# Patient Record
Sex: FEMALE | Race: WHITE | NOT HISPANIC OR LATINO | Employment: UNEMPLOYED | URBAN - METROPOLITAN AREA
[De-identification: names, ages, dates, MRNs, and addresses within clinical notes are randomized per-mention and may not be internally consistent; named-entity substitution may affect disease eponyms.]

---

## 2017-01-05 ENCOUNTER — PATIENT MESSAGE (OUTPATIENT)
Dept: ORTHOPEDICS | Facility: CLINIC | Age: 37
End: 2017-01-05

## 2017-01-05 ENCOUNTER — SURGERY (OUTPATIENT)
Age: 37
End: 2017-01-05

## 2017-01-05 RX ORDER — OXYCODONE AND ACETAMINOPHEN 10; 325 MG/1; MG/1
1 TABLET ORAL EVERY 4 HOURS PRN
Qty: 40 TABLET | Refills: 0 | Status: SHIPPED | OUTPATIENT
Start: 2017-01-05 | End: 2017-02-03 | Stop reason: SDUPTHER

## 2017-01-05 RX ADMIN — BUPIVACAINE HYDROCHLORIDE 6 ML: 2.5 INJECTION, SOLUTION EPIDURAL; INFILTRATION; INTRACAUDAL at 02:01

## 2017-01-05 RX ADMIN — FENTANYL CITRATE 25 MCG: 50 INJECTION, SOLUTION INTRAMUSCULAR; INTRAVENOUS at 02:01

## 2017-01-05 RX ADMIN — MIDAZOLAM HYDROCHLORIDE 2 MG: 2 INJECTION, SOLUTION INTRAMUSCULAR; INTRAVENOUS at 02:01

## 2017-01-05 RX ADMIN — LIDOCAINE HYDROCHLORIDE 10 ML: 10 INJECTION INFILTRATION; PERINEURAL at 02:01

## 2017-01-05 RX ADMIN — FENTANYL CITRATE 75 MCG: 50 INJECTION, SOLUTION INTRAMUSCULAR; INTRAVENOUS at 02:01

## 2017-01-05 RX ADMIN — DEXAMETHASONE SODIUM PHOSPHATE 10 MG: 10 INJECTION INTRAMUSCULAR; INTRAVENOUS at 02:01

## 2017-01-05 RX ADMIN — LIDOCAINE HYDROCHLORIDE 6 ML: 20 INJECTION, SOLUTION EPIDURAL; INFILTRATION; INTRACAUDAL; PERINEURAL at 02:01

## 2017-01-05 NOTE — TELEPHONE ENCOUNTER
Patient is requesting a refill on meds. Last refill was 12/15/16 for Percocet 10/325mg tabs #40 Q4

## 2017-01-06 ENCOUNTER — PATIENT MESSAGE (OUTPATIENT)
Dept: PAIN MEDICINE | Facility: CLINIC | Age: 37
End: 2017-01-06

## 2017-01-11 RX ORDER — CELECOXIB 200 MG/1
200 CAPSULE ORAL DAILY
Qty: 30 CAPSULE | Refills: 1 | Status: SHIPPED | OUTPATIENT
Start: 2017-01-11 | End: 2017-02-09 | Stop reason: SDUPTHER

## 2017-01-14 ENCOUNTER — PATIENT MESSAGE (OUTPATIENT)
Dept: NEUROLOGY | Facility: CLINIC | Age: 37
End: 2017-01-14

## 2017-01-16 ENCOUNTER — PATIENT MESSAGE (OUTPATIENT)
Dept: NEUROLOGY | Facility: CLINIC | Age: 37
End: 2017-01-16

## 2017-01-26 ENCOUNTER — OFFICE VISIT (OUTPATIENT)
Dept: ORTHOPEDICS | Facility: CLINIC | Age: 37
End: 2017-01-26
Payer: COMMERCIAL

## 2017-01-26 VITALS
DIASTOLIC BLOOD PRESSURE: 70 MMHG | SYSTOLIC BLOOD PRESSURE: 119 MMHG | HEIGHT: 72 IN | BODY MASS INDEX: 30.88 KG/M2 | WEIGHT: 228 LBS | HEART RATE: 69 BPM

## 2017-01-26 DIAGNOSIS — M62.81 QUADRICEPS WEAKNESS: Primary | ICD-10-CM

## 2017-01-26 DIAGNOSIS — M62.81 QUADRICEPS WEAKNESS: ICD-10-CM

## 2017-01-26 DIAGNOSIS — M17.12 PATELLOFEMORAL ARTHRITIS OF LEFT KNEE: Primary | ICD-10-CM

## 2017-01-26 PROCEDURE — 99999 PR PBB SHADOW E&M-EST. PATIENT-LVL III: CPT | Mod: PBBFAC,,, | Performed by: ORTHOPAEDIC SURGERY

## 2017-01-26 PROCEDURE — 99024 POSTOP FOLLOW-UP VISIT: CPT | Mod: S$GLB,,, | Performed by: ORTHOPAEDIC SURGERY

## 2017-01-29 NOTE — PROGRESS NOTES
This note was created using Dragon dictation software.  It occasionally misinterpreted phrases or words.      Date of surgery: November 22, 2016    Chief complaint: Left knee pain    History of present illness: 36-year-old female who underwent left knee arthroscopy with patellar chondroplasty and a partial lateral meniscectomy.  Patient had fairly significant patellar arthritic changes already that are likely posttraumatic in nature.  Pain as a 6 out of 10.  Injected and help some.  Still having a fair amount of pain.    Physical exam: Examination left knee shows healed surgical portals.  No erythema or drainage.  Patient has full range of motion.  Mild crepitus of the patella.  No calf pain.  Negative Homans sign.  Patient has a moderate amount of quadriceps atrophy.    X-rays: None    Assessment: Status post left patellar chondroplasty    Plan: Patient has moderate patellar arthritis already, even at her young age.  I think the patient would be a good candidate for hyaluronic acid.  She just finished a Synvisc series in September.  She has had one previously with good success.  I recommended a one time physical therapy session for some quad strengthening exercises.  Patient has a fair amount of quadriceps atrophy in her patellar femoral arthritis pressures could be reduced by increasing her quad.

## 2017-02-03 RX ORDER — OXYCODONE AND ACETAMINOPHEN 10; 325 MG/1; MG/1
1 TABLET ORAL EVERY 4 HOURS PRN
Qty: 40 TABLET | Refills: 0 | Status: SHIPPED | OUTPATIENT
Start: 2017-02-03 | End: 2017-02-16

## 2017-02-09 ENCOUNTER — PATIENT MESSAGE (OUTPATIENT)
Dept: PAIN MEDICINE | Facility: CLINIC | Age: 37
End: 2017-02-09

## 2017-02-09 RX ORDER — CELECOXIB 200 MG/1
200 CAPSULE ORAL DAILY
Qty: 30 CAPSULE | Refills: 1 | Status: SHIPPED | OUTPATIENT
Start: 2017-02-09 | End: 2017-04-10 | Stop reason: SDUPTHER

## 2017-02-09 RX ORDER — CYCLOBENZAPRINE HCL 10 MG
10 TABLET ORAL 3 TIMES DAILY PRN
Qty: 90 TABLET | Refills: 1 | Status: SHIPPED | OUTPATIENT
Start: 2017-02-09 | End: 2017-04-10 | Stop reason: SDUPTHER

## 2017-02-09 RX ORDER — IBUPROFEN 800 MG/1
800 TABLET ORAL 3 TIMES DAILY
Qty: 90 TABLET | Refills: 1 | OUTPATIENT
Start: 2017-02-09

## 2017-02-15 ENCOUNTER — CLINICAL SUPPORT (OUTPATIENT)
Dept: REHABILITATION | Facility: HOSPITAL | Age: 37
End: 2017-02-15
Attending: ORTHOPAEDIC SURGERY
Payer: COMMERCIAL

## 2017-02-15 DIAGNOSIS — M62.81 QUADRICEPS WEAKNESS: Primary | ICD-10-CM

## 2017-02-15 PROCEDURE — G8978 MOBILITY CURRENT STATUS: HCPCS | Mod: CM,PN

## 2017-02-15 PROCEDURE — G8979 MOBILITY GOAL STATUS: HCPCS | Mod: CK,PN

## 2017-02-15 PROCEDURE — 97162 PT EVAL MOD COMPLEX 30 MIN: CPT | Mod: PN
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
## 2017-02-16 ENCOUNTER — OFFICE VISIT (OUTPATIENT)
Dept: PAIN MEDICINE | Facility: CLINIC | Age: 37
End: 2017-02-16
Payer: COMMERCIAL

## 2017-02-16 ENCOUNTER — APPOINTMENT (OUTPATIENT)
Dept: LAB | Facility: HOSPITAL | Age: 37
End: 2017-02-16
Attending: ANESTHESIOLOGY
Payer: COMMERCIAL

## 2017-02-16 VITALS
SYSTOLIC BLOOD PRESSURE: 100 MMHG | HEART RATE: 70 BPM | WEIGHT: 227.94 LBS | BODY MASS INDEX: 30.87 KG/M2 | HEIGHT: 72 IN | DIASTOLIC BLOOD PRESSURE: 71 MMHG

## 2017-02-16 DIAGNOSIS — M47.816 FACET ARTHROPATHY, LUMBAR: ICD-10-CM

## 2017-02-16 DIAGNOSIS — M17.12 OSTEOARTHRITIS OF LEFT KNEE, UNSPECIFIED OSTEOARTHRITIS TYPE: ICD-10-CM

## 2017-02-16 DIAGNOSIS — M51.36 DDD (DEGENERATIVE DISC DISEASE), LUMBAR: ICD-10-CM

## 2017-02-16 DIAGNOSIS — F11.90 CHRONIC, CONTINUOUS USE OF OPIOIDS: Primary | ICD-10-CM

## 2017-02-16 LAB
AMPHET+METHAMPHET UR QL: NEGATIVE
BARBITURATES UR QL SCN>200 NG/ML: NEGATIVE
BENZODIAZ UR QL SCN>200 NG/ML: NEGATIVE
BZE UR QL SCN: NEGATIVE
CANNABINOIDS UR QL SCN: NEGATIVE
CREAT UR-MCNC: 47 MG/DL
METHADONE UR QL SCN>300 NG/ML: NEGATIVE
OPIATES UR QL SCN: NEGATIVE
PCP UR QL SCN>25 NG/ML: NEGATIVE
TOXICOLOGY INFORMATION: NORMAL

## 2017-02-16 PROCEDURE — 82570 ASSAY OF URINE CREATININE: CPT

## 2017-02-16 PROCEDURE — 80365 DRUG SCREENING OXYCODONE: CPT

## 2017-02-16 PROCEDURE — 99999 PR PBB SHADOW E&M-EST. PATIENT-LVL IV: CPT | Mod: PBBFAC,,, | Performed by: PHYSICIAN ASSISTANT

## 2017-02-16 PROCEDURE — 99214 OFFICE O/P EST MOD 30 MIN: CPT | Mod: S$GLB,,, | Performed by: PHYSICIAN ASSISTANT

## 2017-02-16 RX ORDER — HYDROCODONE BITARTRATE AND ACETAMINOPHEN 10; 325 MG/1; MG/1
1 TABLET ORAL EVERY 8 HOURS PRN
Qty: 90 TABLET | Refills: 0 | Status: SHIPPED | OUTPATIENT
Start: 2017-02-16 | End: 2017-03-17

## 2017-02-16 RX ORDER — HYDROCODONE BITARTRATE AND ACETAMINOPHEN 10; 325 MG/1; MG/1
1 TABLET ORAL EVERY 8 HOURS PRN
Qty: 90 TABLET | Refills: 0 | Status: SHIPPED | OUTPATIENT
Start: 2017-03-17 | End: 2017-04-13
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
## 2017-02-17 ENCOUNTER — PATIENT MESSAGE (OUTPATIENT)
Dept: PAIN MEDICINE | Facility: CLINIC | Age: 37
End: 2017-02-17

## 2017-02-19 LAB
CODEINE UR-MCNC: NEGATIVE NG/ML
CODEINE UR-MCNC: NEGATIVE NG/ML
HYDROCODONE UR-MCNC: NEGATIVE NG/ML
HYDROMORPHONE UR-MCNC: NEGATIVE NG/ML
MORPHINE UR-MCNC: NEGATIVE NG/ML
NALOXONE BY LS MS/MS: NEGATIVE NG/ML
NORHYDROCODONE BY LC MS/MS: NEGATIVE NG/ML
NOROXYCODONE BY LC-MS/MS: 1626 NG/ML
NOROXYMORPHONE BY LC-MS/MS: NEGATIVE NG/ML
OXYCODONE UR-MCNC: 1127 NG/ML
OXYCODONE UR-MCNC: 28 NG/ML
TOXICOLOGIST REVIEW: NORMAL

## 2017-02-21 ENCOUNTER — CLINICAL SUPPORT (OUTPATIENT)
Dept: REHABILITATION | Facility: HOSPITAL | Age: 37
End: 2017-02-21
Attending: ORTHOPAEDIC SURGERY
Payer: COMMERCIAL

## 2017-02-21 DIAGNOSIS — M62.81 QUADRICEPS WEAKNESS: ICD-10-CM

## 2017-02-21 DIAGNOSIS — M17.12 PATELLOFEMORAL ARTHRITIS OF LEFT KNEE: ICD-10-CM

## 2017-02-21 PROCEDURE — 97110 THERAPEUTIC EXERCISES: CPT | Mod: PN

## 2017-02-21 NOTE — PROGRESS NOTES
Name: Aleyda Rosario [facility] Number: [id number]  Date of Treatment: 02/21/2017   Diagnosis:   Encounter Diagnoses   Name Primary?    Quadriceps weakness     Patellofemoral arthritis of left knee        Time in: 1100  Time Out: 1200  Total Treatment Time: 60        Subjective:    Aleyda requesting to get a copy of exercises to do at home. Pt stated that due to financial reasons she is unable to come 2 days/week.   Patient reports their pain to be n/a/10 on a 0-10 scale with 0 being no pain and 10 being the worst pain imaginable.    Objective    Patient received individual therapy to increase strength, endurance, ROM and flexibility with 0 patients with activities as follows:     Aleyda received therapeutic exercises to develop strength, endurance, ROM, flexibility, posture and core stabilization for 60 minutes including:       nustep x 15 min L-2  Hamstring stretch3 x 30 sec  Piriformis stretch 3 x 30 sec  LAQ 2# 3 x 10 reps  Quad sets x 30 reps  SAQ 0# L, 2# R x 30 reps  S/L abd 2# R, 0# L 2 x 10 reps  S/L add 2# r, 0# l 2 X 10 Reps  Ball squeezes x 30 reps      Written Home Exercises Provided: Pt given a copy of exercises and stretches for HEP.  Discussed each exercises with patient.  Pt demonstrated each exercises and technique corrected as needed      Pt demo good understanding of the education provided. Aleyda demonstrated good return demonstration of activities.     Assessment:     Pt did well with exercises today.  Demonstrated good technique.  Expressed importance for patient to come in next week x 1 treatment to make sure4 proper technique with exercises.    Pt will continue to benefit from skilled PT intervention. Medical Necessity is demonstrated by:  Pain limits function of effected part for some activities, Unable to participate fully in daily activities, Requires skilled supervision to complete and progress HEP and Weakness.    Patient is making good progress towards established  goals.          Plan:  Continue with established Plan of Care towards PT goals.

## 2017-03-08 ENCOUNTER — PROCEDURE VISIT (OUTPATIENT)
Dept: NEUROLOGY | Facility: CLINIC | Age: 37
End: 2017-03-08
Payer: COMMERCIAL

## 2017-03-08 VITALS
HEIGHT: 71 IN | RESPIRATION RATE: 20 BRPM | DIASTOLIC BLOOD PRESSURE: 87 MMHG | TEMPERATURE: 98 F | WEIGHT: 224.88 LBS | BODY MASS INDEX: 31.48 KG/M2 | HEART RATE: 77 BPM | SYSTOLIC BLOOD PRESSURE: 132 MMHG

## 2017-03-08 DIAGNOSIS — G43.719 CHRONIC MIGRAINE WITHOUT AURA, WITH INTRACTABLE MIGRAINE, SO STATED, WITHOUT MENTION OF STATUS MIGRAINOSUS: Primary | ICD-10-CM

## 2017-03-08 PROCEDURE — 64615 CHEMODENERV MUSC MIGRAINE: CPT | Mod: S$GLB,,, | Performed by: PSYCHIATRY & NEUROLOGY

## 2017-03-08 NOTE — PROCEDURES
Procedures     BOTOX PROCEDURE    PROCEDURE PERFORMED: Botulinum toxin injection (14893)    CLINICAL INDICATION: G43.719  NDC: 3459-2332-92    A time out was conducted just before the start of the procedure to verify the correct patient and procedure, procedure location, and all relevant critical information.       This is the third Botox injections and I am aiming for at least 50%  improvement in the patient's symptoms. She let it lapse and this is likely why the last treatment was not as effective. Frequency of treatment is every 3 months unless no response to the treatments, at which time we will discontinue the injections.     DESCRIPTION OF PROCEDURE: After obtaining informed consent and under aseptic technique, a total of 155 units of botulinum toxin type A were injected in the following muscles: Procerus 5 units,  5 units bilaterally, frontalis 20 units, temporalis 20 units bilaterally, occipitalis 15 units, upper cervical paraspinals 10 units bilaterally and trapezius 15 units bilaterally. The patient was given a total of 155 units in 31 sites.The patient tolerated the procedure well. There were no complications. The patient was given a prescription for repeat treatment in 3 months.     Unavoidable waste 45 units    Madisyn Rojas M.D  Medical Director, Headache and Facial Pain  [facility]

## 2017-03-08 NOTE — MR AVS SNAPSHOT
[facility]  [address] [facility] [place] [address]  Phone: [phone]  Fax: [phone]                  Aleyda Plasencia   3/8/2017 10:30 AM   Procedure visit    Description:  Female : 1980   Provider:  Lola Rojas MD   Department:  [facility]           Reason for Visit     Botulinum Toxin Injection           Diagnoses this Visit        Comments    Chronic migraine without aura, with intractable migraine, so stated, without mention of status migrainosus    -  Primary            To Do List           Future Appointments        Provider Department Dept Phone    3/14/2017 10:00 AM Arin Jose PTA [facility] [phone]    3/16/2017 10:00 AM Trupti Gray PTA [facility] [phone]    3/21/2017 10:00 AM Arin Jose PTA [facility] [phone]    3/23/2017 10:00 AM Trupti Gray PTA [facility] [phone]    3/28/2017 10:00 AM Bridgett Salgado, PT [facility] [phone]      Goals (5 Years of Data)     None      [facility]     [facility] - 24/7 Assistance  Registered nurses in the [facility] provide clinical advisement, health education, appointment booking, and other advisory services.  Call for this free service at [phone].             Medications           Message regarding Medications     Verify the changes and/or additions to your medication regime listed below are the same as discussed with your clinician today.  If any of these changes or additions are incorrect, please notify your healthcare provider.        These medications were administered today        Dose Freq    onabotulinumtoxina injection 200 Units 200 Units Once    Sig: Inject 200 Units into the muscle once.    Class: Normal    Route: Intramuscular           Verify that the below list of medications is an accurate representation of the medications  you are currently taking.  If none reported, the list may be blank. If incorrect, please contact your healthcare provider. Carry this list with you in case of emergency.           Current Medications     amitriptyline (ELAVIL) 25 MG tablet 25 mg PO every hs    celecoxib (CELEBREX) 200 MG capsule Take 1 capsule (200 mg total) by mouth once daily.    cetirizine (ZYRTEC) 5 MG tablet Take 5 mg by mouth once daily.    cholecalciferol, vitamin D3, 50,000 unit capsule Take 50,000 Units by mouth once a week.     cyanocobalamin 1,000 mcg/mL injection Inject 1,000 mcg into the skin. Twice monthly    cyanocobalamin, vitamin B-12, 1,000 mcg/mL Kit Inject 1 mL into the muscle every 21 days.    cyclobenzaprine (FLEXERIL) 10 MG tablet Take 1 tablet (10 mg total) by mouth 3 (three) times daily as needed for Muscle spasms.    diclofenac sodium 1 % Gel daily as needed.     epinephrine (EPIPEN) 0.3 mg/0.3 mL (1:1,000) AtIn 0.3 mg daily as needed.     ferrous sulfate 325 mg (65 mg iron) Tab tablet Take 325 mg by mouth once daily.     fluoxetine (PROZAC) 20 MG capsule Take 20 mg by mouth once daily.     hydrocodone-acetaminophen 10-325mg (NORCO)  mg Tab Take 1 tablet by mouth every 8 (eight) hours as needed for Pain.    hydrocodone-acetaminophen 10-325mg (NORCO)  mg Tab Starting on Mar 17, 2017. Take 1 tablet by mouth every 8 (eight) hours as needed for Pain.    hydrOXYzine pamoate (VISTARIL) 25 MG Cap Take 25 mg by mouth every 4 (four) hours as needed.     ibuprofen (ADVIL,MOTRIN) 800 MG tablet every 6 (six) hours as needed.     isometheptene-apap-dichloralphenazone 650-21-346ht (MIDRIN) -325 mg per capsule Take 1 capsule by mouth 4 (four) times daily as needed.    ketorolac (TORADOL) 60 mg/2 mL Soln Please provide patients with IV syringes    lorazepam (ATIVAN) 0.5 MG tablet Take 0.5 mg by mouth every 4 (four) hours as needed.     potassium chloride SA (K-DUR,KLOR-CON) 20 MEQ tablet Take 20 mEq by mouth once daily.  "   promethazine (PHENERGAN) 25 MG tablet Take 1 tablet by mouth every 6 (six) hours as needed (migraine). -MAY CAUSE DROWSINESS-    pseudoephedrine-DM-guaifenesin 45- mg Tab Take 1 tablet by mouth daily as needed.     sumatriptan succinate 4 mg/0.5 mL PnIj Inject 4 mg into the skin daily as needed. imitrex    VOLTAREN 1 % Gel daily as needed.            Clinical Reference Information           Your Vitals Were     BP Pulse Temp Resp Height Weight    132/87 (BP Location: Left arm, Patient Position: Sitting, BP Method: Automatic) 77 97.5 °F (36.4 °C) (Oral) 20 5' 11" (1.803 m) 102 kg (224 lb 13.9 oz)    BMI                31.36 kg/m2          Blood Pressure          Most Recent Value    BP  132/87      Allergies as of 3/8/2017     Clarithromycin    Pcn [Penicillins]    Sulfa (Sulfonamide Antibiotics)    Wellbutrin [Bupropion Hcl]    Betadine [Povidone-iodine]    Cefprozil    Iodinated Contrast Media - Iv Dye    Latex, Natural Rubber    Sulfamethoxazole-trimethoprim    Iodine    Latex      Immunizations Administered on Date of Encounter - 3/8/2017     None      Administrations This Visit     onabotulinumtoxina injection 200 Units     Admin Date Action Dose Route Administered By             03/08/2017 Given 200 Units Intramuscular Lola Rojas MD                      Smoking Cessation     If you would like to quit smoking:   You may be eligible for free services if you are a [place] resident and started smoking cigarettes before September 1, 1988.  Call the Smoking Cessation Trust ([facility]) toll free at ([phone] [phone] or [phone] [phone].   Call 1-800-QUIT-NOW if you do not meet the above criteria.            Language Assistance Services     ATTENTION: Language assistance services are available, free of charge. Please call [phone].      ATENCIÓN: Si narciso toscano, tiene a garcia disposición servicios gratuitos de asistencia lingüística. Llame al [phone].     CHÚ Ý: N?u b?n nói Ti?ng Vi?t, có các " d?ch v? h? tr? ngôn ng? mi?n phí melissah cho b?n. G?i s? [phone].         [facility] complies with applicable Federal civil rights laws and does not discriminate on the basis of race, color, national origin, age, disability, or sex.

## 2017-03-26 ENCOUNTER — PATIENT MESSAGE (OUTPATIENT)
Dept: ORTHOPEDICS | Facility: CLINIC | Age: 37
End: 2017-03-26

## 2017-04-06 ENCOUNTER — TELEPHONE (OUTPATIENT)
Dept: ORTHOPEDICS | Facility: CLINIC | Age: 37
End: 2017-04-06

## 2017-04-10 RX ORDER — CYCLOBENZAPRINE HCL 10 MG
TABLET ORAL
Qty: 90 TABLET | Refills: 0 | Status: SHIPPED | OUTPATIENT
Start: 2017-04-10 | End: 2017-05-03 | Stop reason: SDUPTHER

## 2017-04-10 RX ORDER — CELECOXIB 200 MG/1
CAPSULE ORAL
Qty: 30 CAPSULE | Refills: 0 | Status: SHIPPED | OUTPATIENT
Start: 2017-04-10 | End: 2017-05-03 | Stop reason: SDUPTHER

## 2017-04-13 ENCOUNTER — PATIENT MESSAGE (OUTPATIENT)
Dept: ORTHOPEDICS | Facility: CLINIC | Age: 37
End: 2017-04-13

## 2017-04-13 ENCOUNTER — OFFICE VISIT (OUTPATIENT)
Dept: ORTHOPEDICS | Facility: CLINIC | Age: 37
End: 2017-04-13
Payer: COMMERCIAL

## 2017-04-13 ENCOUNTER — OFFICE VISIT (OUTPATIENT)
Dept: PAIN MEDICINE | Facility: CLINIC | Age: 37
End: 2017-04-13
Payer: COMMERCIAL

## 2017-04-13 VITALS
WEIGHT: 224.88 LBS | HEART RATE: 61 BPM | SYSTOLIC BLOOD PRESSURE: 106 MMHG | BODY MASS INDEX: 30.46 KG/M2 | DIASTOLIC BLOOD PRESSURE: 68 MMHG | HEIGHT: 72 IN

## 2017-04-13 VITALS — WEIGHT: 224 LBS | HEIGHT: 72 IN | BODY MASS INDEX: 30.34 KG/M2

## 2017-04-13 DIAGNOSIS — M25.562 LEFT KNEE PAIN, UNSPECIFIED CHRONICITY: ICD-10-CM

## 2017-04-13 DIAGNOSIS — M17.12 PATELLOFEMORAL ARTHRITIS OF LEFT KNEE: Primary | ICD-10-CM

## 2017-04-13 DIAGNOSIS — M47.816 FACET ARTHROPATHY, LUMBAR: ICD-10-CM

## 2017-04-13 DIAGNOSIS — M51.36 DDD (DEGENERATIVE DISC DISEASE), LUMBAR: Primary | ICD-10-CM

## 2017-04-13 PROCEDURE — 99214 OFFICE O/P EST MOD 30 MIN: CPT | Mod: S$GLB,,, | Performed by: ANESTHESIOLOGY

## 2017-04-13 PROCEDURE — 1160F RVW MEDS BY RX/DR IN RCRD: CPT | Mod: S$GLB,,, | Performed by: ANESTHESIOLOGY

## 2017-04-13 PROCEDURE — 20610 DRAIN/INJ JOINT/BURSA W/O US: CPT | Mod: LT,S$GLB,, | Performed by: ORTHOPAEDIC SURGERY

## 2017-04-13 PROCEDURE — 99499 UNLISTED E&M SERVICE: CPT | Mod: S$GLB,,, | Performed by: ORTHOPAEDIC SURGERY

## 2017-04-13 PROCEDURE — 99999 PR PBB SHADOW E&M-EST. PATIENT-LVL III: CPT | Mod: PBBFAC,,, | Performed by: ANESTHESIOLOGY

## 2017-04-13 PROCEDURE — 99999 PR PBB SHADOW E&M-EST. PATIENT-LVL II: CPT | Mod: PBBFAC,,, | Performed by: ORTHOPAEDIC SURGERY

## 2017-04-13 RX ORDER — HYDROCODONE BITARTRATE AND ACETAMINOPHEN 10; 325 MG/1; MG/1
1 TABLET ORAL EVERY 12 HOURS PRN
Qty: 60 TABLET | Refills: 0 | Status: SHIPPED | OUTPATIENT
Start: 2017-04-13 | End: 2017-05-11 | Stop reason: SDUPTHER

## 2017-04-13 RX ORDER — AMITRIPTYLINE HYDROCHLORIDE 25 MG/1
TABLET, FILM COATED ORAL
Status: ON HOLD | COMMUNITY
Start: 2017-04-10 | End: 2020-10-06

## 2017-04-13 RX ORDER — HYDROXYZINE PAMOATE 25 MG/1
25 CAPSULE ORAL EVERY 8 HOURS PRN
COMMUNITY
Start: 2017-04-03

## 2017-04-13 NOTE — PROCEDURES
Large Joint Aspiration/Injection  Date/Time: 4/13/2017 12:46 PM  Performed by: PARIS KAY  Authorized by: PARIS KAY     Consent Done?:  Yes (Verbal)  Indications:  Pain  Procedure site marked: Yes    Timeout: Prior to procedure the correct patient, procedure, and site was verified      Location:  Knee  Site:  L knee  Prep: Patient was prepped and draped in usual sterile fashion    Needle size:  20 G  Approach:  Anterolateral  Medications:  16 mg hyaluronate 16 mg/2 mL  Patient tolerance:  Patient tolerated the procedure well with no immediate complications
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
## 2017-04-13 NOTE — PROGRESS NOTES
This note was created using Dragon dictation software.  It occasionally misinterpreted phrases or words.      Date of surgery: November 22, 2016    Chief complaint: Left knee pain    History of present illness: 36-year-old female who underwent left knee arthroscopy with patellar chondroplasty and a partial lateral meniscectomy.  Patient had fairly significant patellar arthritic changes already that are likely posttraumatic in nature.  Pain as a 8 out of 10.  Injected and help some.  Still having a fair amount of pain.  Having more patellar instability as well.    Physical exam: Examination left knee shows healed surgical portals.  No erythema or drainage.  Patient has full range of motion.  Mild crepitus of the patella.  No calf pain.  Negative Homans sign.  Patient has a moderate amount of quadriceps atrophy.    X-rays: None    Assessment: Status post left patellar chondroplasty  Left patellar instability    Plan: Patient has moderate patellar arthritis already, even at her young age.  Unfortunately, the patient is having more patellar instability as well.  Here for Synvisc.  I injected her with Synvisc 1 of 3.

## 2017-04-13 NOTE — MR AVS SNAPSHOT
[facility]  [address] [place] [address]  [address]  [place] [address]  Phone: [phone]                  Aleyda Plasencia   2017 9:40 AM   Office Visit    Description:  Female : 1980   Provider:  Yariel Ramirez MD   Department:  [facility]           Reason for Visit     Back Pain           Diagnoses this Visit        Comments    DDD (degenerative disc disease), lumbar    -  Primary     Facet arthropathy, lumbar         Left knee pain, unspecified chronicity                To Do List           Future Appointments        Provider Department Dept Phone    2017 1:45 PM Feliberto Cardenas MD [facility] [phone]    2017 10:20 AM MD [facility] [phone]    2017 10:00 AM Lola Rojas MD [facility] [phone]      Goals (5 Years of Data)     None       These Medications        Disp Refills Start End    hydrocodone-acetaminophen 10-325mg (NORCO)  mg Tab 60 tablet 0 2017    Take 1 tablet by mouth every 12 (twelve) hours as needed (pain). - Oral    Pharmacy: [facility] [place], [address] Ph #: [phone]         [facility]     [facility] Care [facility] -  Assistance  Unless otherwise directed by your provider, please contact [facility], our nurse care line that is available for  assistance.     Registered nurses in the [facility] provide: appointment scheduling, clinical advisement, health education, and other advisory services.  Call: [phone] (toll free)               Medications           Message regarding Medications     Verify the changes and/or additions to your medication regime listed below are the same as discussed with your clinician today.  If any of these changes or additions are incorrect, please notify your healthcare provider.        START taking these NEW medications         Refills    hydrocodone-acetaminophen 10-325mg (NORCO)  mg Tab 0    Sig: Take 1 tablet by mouth every 12 (twelve) hours as needed (pain).    Class: Print    Route: Oral           Verify that the below list of medications is an accurate representation of the medications you are currently taking.  If none reported, the list may be blank. If incorrect, please contact your healthcare provider. Carry this list with you in case of emergency.           Current Medications     amitriptyline (ELAVIL) 25 MG tablet TAKE ONE TABLET BY MOUTH EVERY NIGHT FOR 2 WEEKS THEN INCREASE TO 2 TABLETS AT BEDTIME    celecoxib (CELEBREX) 200 MG capsule TAKE ONE CAPSULE BY MOUTH EVERY DAY    cetirizine (ZYRTEC) 5 MG tablet Take 5 mg by mouth once daily.    cholecalciferol, vitamin D3, 50,000 unit capsule Take 50,000 Units by mouth once a week.     cyanocobalamin 1,000 mcg/mL injection Inject 1,000 mcg into the skin. Twice monthly    cyanocobalamin, vitamin B-12, 1,000 mcg/mL Kit Inject 1 mL into the muscle every 21 days.    cyclobenzaprine (FLEXERIL) 10 MG tablet TAKE ONE TABLET BY MOUTH 3 TIMES A DAY AS NEEDED FOR MUSCLE SPASMS    diclofenac sodium 1 % Gel daily as needed.     epinephrine (EPIPEN) 0.3 mg/0.3 mL (1:1,000) AtIn 0.3 mg daily as needed.     ferrous sulfate 325 mg (65 mg iron) Tab tablet Take 325 mg by mouth once daily.     fluoxetine (PROZAC) 20 MG capsule Take 20 mg by mouth once daily.     hydrOXYzine pamoate (VISTARIL) 25 MG Cap Take 25 mg by mouth every 4 (four) hours as needed.     hydrOXYzine pamoate (VISTARIL) 25 MG Cap TAKE ONE CAPSULE BY MOUTH 3 TIMES A DAY AS NEEDED FOR ANXIETY    ibuprofen (ADVIL,MOTRIN) 800 MG tablet every 6 (six) hours as needed.     isometheptene-apap-dichloralphenazone 282-52-634uw (MIDRIN) -325 mg per capsule Take 1 capsule by mouth 4 (four) times daily as needed.    ketorolac (TORADOL) 60 mg/2 mL Soln Please provide patients with IV syringes    lorazepam (ATIVAN) 0.5 MG tablet  Take 0.5 mg by mouth every 4 (four) hours as needed.     potassium chloride SA (K-DUR,KLOR-CON) 20 MEQ tablet Take 20 mEq by mouth once daily.    promethazine (PHENERGAN) 25 MG tablet Take 1 tablet by mouth every 6 (six) hours as needed (migraine). -MAY CAUSE DROWSINESS-    pseudoephedrine-DM-guaifenesin 45- mg Tab Take 1 tablet by mouth daily as needed.     VOLTAREN 1 % Gel daily as needed.     hydrocodone-acetaminophen 10-325mg (NORCO)  mg Tab Take 1 tablet by mouth every 12 (twelve) hours as needed (pain).    sumatriptan succinate 4 mg/0.5 mL PnIj Inject 4 mg into the skin daily as needed. imitrex           Clinical Reference Information           Your Vitals Were     BP Pulse Height Weight BMI    106/68 61 6' (1.829 m) 102 kg (224 lb 13.9 oz) 30.5 kg/m2      Blood Pressure          Most Recent Value    BP  106/68      Allergies as of 4/13/2017     Clarithromycin    Pcn [Penicillins]    Sulfa (Sulfonamide Antibiotics)    Wellbutrin [Bupropion Hcl]    Betadine [Povidone-iodine]    Cefprozil    Iodinated Contrast Media - Iv Dye    Latex, Natural Rubber    Sulfamethoxazole-trimethoprim    Iodine    Latex      Immunizations Administered on Date of Encounter - 4/13/2017     None      Smoking Cessation     If you would like to quit smoking:   You may be eligible for free services if you are a [place] resident and started smoking cigarettes before September 1, 1988.  Call the Smoking Cessation Trust ([facility]) toll free at ([phone] or [phone] [phone].   Call 1-800-QUIT-NOW if you do not meet the above criteria.   Contact us via email: [email or web address]   View our website for more information: [email or web address]        Language Assistance Services     ATTENTION: Language assistance services are available, free of charge. Please call [phone].      ATENCIÓN: Si habla español, tiene a garcia disposición servicios gratuitos de asistencia lingüística. Llame al [phone].     CHÚ Ý: N?u  b?n nói Ti?ng Vi?t, có các d?ch v? h? tr? ngôn ng? mi?n phí dành cho b?n. G?i s? [phone].         [facility] complies with applicable Federal civil rights laws and does not discriminate on the basis of race, color, national origin, age, disability, or sex.

## 2017-04-20 ENCOUNTER — OFFICE VISIT (OUTPATIENT)
Dept: ORTHOPEDICS | Facility: CLINIC | Age: 37
End: 2017-04-20
Payer: COMMERCIAL

## 2017-04-20 DIAGNOSIS — M17.12 PATELLOFEMORAL ARTHRITIS OF LEFT KNEE: Primary | ICD-10-CM

## 2017-04-20 PROCEDURE — 99999 PR PBB SHADOW E&M-EST. PATIENT-LVL II: CPT | Mod: PBBFAC,,, | Performed by: ORTHOPAEDIC SURGERY

## 2017-04-20 PROCEDURE — 20610 DRAIN/INJ JOINT/BURSA W/O US: CPT | Mod: LT,S$GLB,, | Performed by: ORTHOPAEDIC SURGERY

## 2017-04-20 PROCEDURE — 99499 UNLISTED E&M SERVICE: CPT | Mod: S$GLB,,, | Performed by: ORTHOPAEDIC SURGERY

## 2017-04-20 NOTE — PROCEDURES
Large Joint Aspiration/Injection  Date/Time: 4/20/2017 2:19 PM  Performed by: PARIS KAY  Authorized by: PARIS KAY     Consent Done?:  Yes (Verbal)  Indications:  Pain  Procedure site marked: Yes    Timeout: Prior to procedure the correct patient, procedure, and site was verified      Location:  Knee  Site:  L knee  Prep: Patient was prepped and draped in usual sterile fashion    Needle size:  20 G  Approach:  Anterolateral  Medications:  16 mg hyaluronate 16 mg/2 mL  Patient tolerance:  Patient tolerated the procedure well with no immediate complications

## 2017-04-20 NOTE — PROGRESS NOTES
This note was created using Dragon dictation software.  It occasionally misinterpreted phrases or words.      Date of surgery: November 22, 2016    Chief complaint: Left knee pain    History of present illness: 36-year-old female who underwent left knee arthroscopy with patellar chondroplasty and a partial lateral meniscectomy.  Patient had fairly significant patellar arthritic changes already that are likely posttraumatic in nature.  Pain as a 8 out of 10.  Injected and help some.  Still having a fair amount of pain.  Having more patellar instability as well.    Physical exam: Examination left knee shows healed surgical portals.  No erythema or drainage.  Patient has full range of motion.  Mild crepitus of the patella.  No calf pain.  Negative Homans sign.  Patient has a moderate amount of quadriceps atrophy.    X-rays: None    Assessment: Status post left patellar chondroplasty  Left patellar instability    Plan:   I injected her with Synvisc 2 of 3.  Talked about possibly doing an MPFL reconstruction on her a month or 2 after we finish the Synvisc.  Follow-up next week.

## 2017-04-27 ENCOUNTER — OFFICE VISIT (OUTPATIENT)
Dept: ORTHOPEDICS | Facility: CLINIC | Age: 37
End: 2017-04-27
Payer: COMMERCIAL

## 2017-04-27 DIAGNOSIS — M17.12 PATELLOFEMORAL ARTHRITIS OF LEFT KNEE: Primary | ICD-10-CM

## 2017-04-27 DIAGNOSIS — M25.362 PATELLAR INSTABILITY OF LEFT KNEE: ICD-10-CM

## 2017-04-27 PROCEDURE — 99999 PR PBB SHADOW E&M-EST. PATIENT-LVL II: CPT | Mod: PBBFAC,,, | Performed by: ORTHOPAEDIC SURGERY

## 2017-04-27 PROCEDURE — 99499 UNLISTED E&M SERVICE: CPT | Mod: S$GLB,,, | Performed by: ORTHOPAEDIC SURGERY

## 2017-04-27 PROCEDURE — 20610 DRAIN/INJ JOINT/BURSA W/O US: CPT | Mod: LT,S$GLB,, | Performed by: ORTHOPAEDIC SURGERY

## 2017-04-27 NOTE — PROCEDURES
Large Joint Aspiration/Injection  Date/Time: 4/27/2017 1:20 PM  Performed by: PARIS KAY  Authorized by: PARIS KAY     Consent Done?:  Yes (Verbal)  Indications:  Pain  Procedure site marked: Yes    Timeout: Prior to procedure the correct patient, procedure, and site was verified      Location:  Knee  Site:  L knee  Prep: Patient was prepped and draped in usual sterile fashion    Needle size:  20 G  Approach:  Anterolateral  Medications:  16 mg hyaluronate 16 mg/2 mL  Patient tolerance:  Patient tolerated the procedure well with no immediate complications

## 2017-04-27 NOTE — PROGRESS NOTES
This note was created using Dragon dictation software.  It occasionally misinterpreted phrases or words.      Date of surgery: November 22, 2016    Chief complaint: Left knee pain    History of present illness: 36-year-old female who underwent left knee arthroscopy with patellar chondroplasty and a partial lateral meniscectomy.  Patient had fairly significant patellar arthritic changes already that are likely posttraumatic in nature.  Pain as a 8 out of 10.  Injected and help some.  Still having a fair amount of pain.  Having more patellar instability as well.    Physical exam: Examination left knee shows healed surgical portals.  No erythema or drainage.  Patient has full range of motion.  Mild crepitus of the patella.  No calf pain.  Negative Homans sign.  Patient has a moderate amount of quadriceps atrophy.    Heart is regular rate and rhythm without abnormal murmurs rubs or gallops  Lungs are clear to auscultation bilaterally  Abdomen is soft nontender nondistended    X-rays: None    Assessment: Status post left patellar chondroplasty  Left patellar instability    Plan:   I injected her with Synvisc 3 of 3.  Talked about possibly doing an MPFL reconstruction on her in about 6 weeks.  The plan is for a left MPFL reconstruction using a semitendinosus or gracilis allograft.Risks, benefits, and alternatives to the procedure were explained to the patient including but not limited to damage to nerves, arteries, blood vessels, bones, tendons, ligaments, stiffness, instability, infection, DVT, PE, as well as general anesthetic complications including seizure, stroke, heart attack and even death. The patient understood these risks and wished to proceed and signed the informed consent.

## 2017-04-28 RX ORDER — CLINDAMYCIN PHOSPHATE 900 MG/50ML
900 INJECTION, SOLUTION INTRAVENOUS
Status: CANCELLED | OUTPATIENT
Start: 2017-04-28

## 2017-05-03 RX ORDER — CYCLOBENZAPRINE HCL 10 MG
TABLET ORAL
Qty: 90 TABLET | Refills: 0 | Status: SHIPPED | OUTPATIENT
Start: 2017-05-03 | End: 2017-06-02 | Stop reason: SDUPTHER

## 2017-05-03 RX ORDER — CELECOXIB 200 MG/1
CAPSULE ORAL
Qty: 30 CAPSULE | Refills: 0 | Status: SHIPPED | OUTPATIENT
Start: 2017-05-03 | End: 2017-06-02 | Stop reason: SDUPTHER

## 2017-05-11 ENCOUNTER — OFFICE VISIT (OUTPATIENT)
Dept: PAIN MEDICINE | Facility: CLINIC | Age: 37
End: 2017-05-11
Payer: COMMERCIAL

## 2017-05-11 VITALS
DIASTOLIC BLOOD PRESSURE: 72 MMHG | HEART RATE: 66 BPM | HEIGHT: 72 IN | WEIGHT: 232 LBS | SYSTOLIC BLOOD PRESSURE: 104 MMHG | BODY MASS INDEX: 31.42 KG/M2

## 2017-05-11 DIAGNOSIS — M51.36 DDD (DEGENERATIVE DISC DISEASE), LUMBAR: ICD-10-CM

## 2017-05-11 DIAGNOSIS — M47.816 FACET ARTHROPATHY, LUMBAR: Primary | ICD-10-CM

## 2017-05-11 DIAGNOSIS — M25.562 LEFT KNEE PAIN, UNSPECIFIED CHRONICITY: ICD-10-CM

## 2017-05-11 PROCEDURE — 99999 PR PBB SHADOW E&M-EST. PATIENT-LVL III: CPT | Mod: PBBFAC,,, | Performed by: ANESTHESIOLOGY

## 2017-05-11 PROCEDURE — 1160F RVW MEDS BY RX/DR IN RCRD: CPT | Mod: S$GLB,,, | Performed by: ANESTHESIOLOGY

## 2017-05-11 PROCEDURE — 99213 OFFICE O/P EST LOW 20 MIN: CPT | Mod: S$GLB,,, | Performed by: ANESTHESIOLOGY

## 2017-05-11 RX ORDER — FLUOXETINE HYDROCHLORIDE 40 MG/1
40 CAPSULE ORAL NIGHTLY
Refills: 3 | COMMUNITY
Start: 2017-04-23 | End: 2020-11-24 | Stop reason: SDUPTHER

## 2017-05-11 RX ORDER — HYDROCODONE BITARTRATE AND ACETAMINOPHEN 10; 325 MG/1; MG/1
1 TABLET ORAL EVERY 12 HOURS PRN
Qty: 30 TABLET | Refills: 0 | Status: SHIPPED | OUTPATIENT
Start: 2017-05-11 | End: 2017-06-02 | Stop reason: SDUPTHER
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
## 2017-05-17 ENCOUNTER — PATIENT MESSAGE (OUTPATIENT)
Dept: NEUROLOGY | Facility: CLINIC | Age: 37
End: 2017-05-17

## 2017-05-17 RX ORDER — KETOROLAC TROMETHAMINE 30 MG/ML
INJECTION, SOLUTION INTRAMUSCULAR; INTRAVENOUS
Qty: 10 ML | Refills: 3 | Status: SHIPPED | OUTPATIENT
Start: 2017-05-17 | End: 2017-09-07 | Stop reason: SDUPTHER

## 2017-05-28 ENCOUNTER — PATIENT MESSAGE (OUTPATIENT)
Dept: SURGERY | Facility: HOSPITAL | Age: 37
End: 2017-05-28

## 2017-06-02 ENCOUNTER — PROCEDURE VISIT (OUTPATIENT)
Dept: NEUROLOGY | Facility: CLINIC | Age: 37
End: 2017-06-02
Payer: COMMERCIAL

## 2017-06-02 ENCOUNTER — PATIENT MESSAGE (OUTPATIENT)
Dept: PAIN MEDICINE | Facility: CLINIC | Age: 37
End: 2017-06-02

## 2017-06-02 VITALS
BODY MASS INDEX: 31.36 KG/M2 | HEART RATE: 78 BPM | DIASTOLIC BLOOD PRESSURE: 73 MMHG | RESPIRATION RATE: 18 BRPM | HEIGHT: 72 IN | WEIGHT: 231.5 LBS | SYSTOLIC BLOOD PRESSURE: 113 MMHG | TEMPERATURE: 98 F

## 2017-06-02 DIAGNOSIS — M47.816 FACET ARTHROPATHY, LUMBAR: ICD-10-CM

## 2017-06-02 DIAGNOSIS — G43.719 CHRONIC MIGRAINE WITHOUT AURA, WITH INTRACTABLE MIGRAINE, SO STATED, WITHOUT MENTION OF STATUS MIGRAINOSUS: Primary | ICD-10-CM

## 2017-06-02 DIAGNOSIS — M25.562 LEFT KNEE PAIN, UNSPECIFIED CHRONICITY: ICD-10-CM

## 2017-06-02 DIAGNOSIS — M51.36 DDD (DEGENERATIVE DISC DISEASE), LUMBAR: ICD-10-CM

## 2017-06-02 PROCEDURE — 64615 CHEMODENERV MUSC MIGRAINE: CPT | Mod: S$GLB,,, | Performed by: PSYCHIATRY & NEUROLOGY

## 2017-06-02 RX ORDER — CYCLOBENZAPRINE HCL 10 MG
TABLET ORAL
Qty: 90 TABLET | Refills: 0 | Status: SHIPPED | OUTPATIENT
Start: 2017-06-02 | End: 2017-07-03 | Stop reason: SDUPTHER

## 2017-06-02 RX ORDER — CYCLOBENZAPRINE HCL 10 MG
10 TABLET ORAL 3 TIMES DAILY
Qty: 90 TABLET | Refills: 0 | Status: SHIPPED | OUTPATIENT
Start: 2017-06-02 | End: 2017-06-02 | Stop reason: SDUPTHER

## 2017-06-02 RX ORDER — HYDROCODONE BITARTRATE AND ACETAMINOPHEN 10; 325 MG/1; MG/1
1 TABLET ORAL
Qty: 30 TABLET | Refills: 0 | Status: SHIPPED | OUTPATIENT
Start: 2017-06-02 | End: 2017-07-02

## 2017-06-02 RX ORDER — CELECOXIB 200 MG/1
200 CAPSULE ORAL DAILY
Qty: 30 CAPSULE | Refills: 0 | Status: SHIPPED | OUTPATIENT
Start: 2017-06-02 | End: 2017-06-02 | Stop reason: SDUPTHER

## 2017-06-02 RX ORDER — CELECOXIB 200 MG/1
CAPSULE ORAL
Qty: 30 CAPSULE | Refills: 0 | Status: SHIPPED | OUTPATIENT
Start: 2017-06-02 | End: 2017-07-03 | Stop reason: SDUPTHER

## 2017-06-02 NOTE — PROCEDURES
Procedures     BOTOX PROCEDURE    PROCEDURE PERFORMED: Botulinum toxin injection (03490)    CLINICAL INDICATION: G43.719  NDC: 0152-5730-49    A time out was conducted just before the start of the procedure to verify the correct patient and procedure, procedure location, and all relevant critical information.       This is the forth Botox injections and I am aiming for at least 50%  improvement in the patient's symptoms. She let it lapse and this is likely why the last treatment was not as effective. Frequency of treatment is every 3 months unless no response to the treatments, at which time we will discontinue the injections.     DESCRIPTION OF PROCEDURE: After obtaining informed consent and under aseptic technique, a total of 155 units of botulinum toxin type A were injected in the following muscles: Procerus 5 units,  5 units bilaterally, frontalis 20 units, temporalis 20 units bilaterally, occipitalis 15 units, upper cervical paraspinals 10 units bilaterally and trapezius 15 units bilaterally. The patient was given a total of 155 units in 31 sites.The patient tolerated the procedure well. There were no complications. The patient was given a prescription for repeat treatment in 3 months.     Unavoidable waste 45 units    Madisyn Rojas M.D  Medical Director, Headache and Facial Pain  [facility]

## 2017-07-03 RX ORDER — CELECOXIB 200 MG/1
CAPSULE ORAL
Qty: 30 CAPSULE | Refills: 0 | Status: SHIPPED | OUTPATIENT
Start: 2017-07-03 | End: 2017-08-01 | Stop reason: SDUPTHER

## 2017-07-03 RX ORDER — CYCLOBENZAPRINE HCL 10 MG
TABLET ORAL
Qty: 90 TABLET | Refills: 0 | Status: SHIPPED | OUTPATIENT
Start: 2017-07-03 | End: 2017-08-01 | Stop reason: SDUPTHER

## 2017-08-01 RX ORDER — CELECOXIB 200 MG/1
CAPSULE ORAL
Qty: 30 CAPSULE | Refills: 0 | Status: SHIPPED | OUTPATIENT
Start: 2017-08-01 | End: 2017-09-21 | Stop reason: SDUPTHER

## 2017-08-01 RX ORDER — CYCLOBENZAPRINE HCL 10 MG
TABLET ORAL
Qty: 90 TABLET | Refills: 0 | Status: SHIPPED | OUTPATIENT
Start: 2017-08-01 | End: 2017-09-21 | Stop reason: SDUPTHER

## 2017-09-05 ENCOUNTER — TELEPHONE (OUTPATIENT)
Dept: ORTHOPEDICS | Facility: CLINIC | Age: 37
End: 2017-09-05

## 2017-09-05 NOTE — TELEPHONE ENCOUNTER
----- Message from Shannan Ayala sent at 9/5/2017 12:40 PM CDT -----  Contact: Aleyda   Patient is ready to set up surgery appointment. Please call [phone]. Thanks!

## 2017-09-07 DIAGNOSIS — G43.719 CHRONIC MIGRAINE WITHOUT AURA, WITH INTRACTABLE MIGRAINE, SO STATED, WITHOUT MENTION OF STATUS MIGRAINOSUS: Primary | ICD-10-CM

## 2017-09-07 RX ORDER — KETOROLAC TROMETHAMINE 30 MG/ML
INJECTION, SOLUTION INTRAMUSCULAR; INTRAVENOUS
Qty: 10 ML | Refills: 3 | Status: SHIPPED | OUTPATIENT
Start: 2017-09-07 | End: 2019-01-10 | Stop reason: SDUPTHER

## 2017-09-07 NOTE — TELEPHONE ENCOUNTER
----- Message from Love Jaimes sent at 9/6/2017 10:50 AM CDT -----  Contact: self  Patient needs first available appointment due to Botox. Patient also needs a refill of Tordol. Please call patient at [phone]. Thanks!

## 2017-09-08 ENCOUNTER — TELEPHONE (OUTPATIENT)
Dept: NEUROLOGY | Facility: CLINIC | Age: 37
End: 2017-09-08

## 2017-09-08 DIAGNOSIS — M25.362 PATELLAR INSTABILITY OF LEFT KNEE: Primary | ICD-10-CM

## 2017-09-08 RX ORDER — CLINDAMYCIN PHOSPHATE 900 MG/50ML
900 INJECTION, SOLUTION INTRAVENOUS
Status: CANCELLED | OUTPATIENT
Start: 2017-09-08

## 2017-09-08 NOTE — TELEPHONE ENCOUNTER
----- Message from Janie Gonzales sent at 9/8/2017 12:30 PM CDT -----  Contact: self [phone]  Please call her to reschedule her procedure on Monday due to insurance.  Thank you!

## 2017-09-15 ENCOUNTER — PROCEDURE VISIT (OUTPATIENT)
Dept: NEUROLOGY | Facility: CLINIC | Age: 37
End: 2017-09-15
Payer: COMMERCIAL

## 2017-09-15 VITALS
HEIGHT: 72 IN | BODY MASS INDEX: 31.36 KG/M2 | WEIGHT: 231.5 LBS | HEART RATE: 62 BPM | RESPIRATION RATE: 18 BRPM | DIASTOLIC BLOOD PRESSURE: 64 MMHG | SYSTOLIC BLOOD PRESSURE: 106 MMHG

## 2017-09-15 DIAGNOSIS — G43.719 CHRONIC MIGRAINE WITHOUT AURA, WITH INTRACTABLE MIGRAINE, SO STATED, WITHOUT MENTION OF STATUS MIGRAINOSUS: Primary | ICD-10-CM

## 2017-09-15 PROCEDURE — 64615 CHEMODENERV MUSC MIGRAINE: CPT | Mod: S$GLB,,, | Performed by: PSYCHIATRY & NEUROLOGY

## 2017-09-15 NOTE — PROCEDURES
Procedures   and Follow Up    The Botox injections have achieved well over 50%  improvement in the patient's symptoms. Migraines have been reduced at least 7 days per month and the number of cumulative hours suffering with headaches has been reduced at least 100 total hours per month. Today she does have a headache indicating that the Botox has worn off. Frequency of treatment is every 3 months unless no response to the treatments, at which time we will discontinue the injections.     ROS: Positive for photophobia, phonophobia, nausea, insomnia with the attacks     Past Medical History:   Diagnosis Date    Arthritis     OA    Depression     Migraine      Current Outpatient Prescriptions   Medication Sig    amitriptyline (ELAVIL) 25 MG tablet TAKE ONE TABLET BY MOUTH EVERY NIGHT FOR 2 WEEKS THEN INCREASE TO 2 TABLETS AT BEDTIME    celecoxib (CELEBREX) 200 MG capsule TAKE ONE CAPSULE BY MOUTH EVERY DAY    cetirizine (ZYRTEC) 5 MG tablet Take 5 mg by mouth once daily.    cholecalciferol, vitamin D3, 50,000 unit capsule Take 50,000 Units by mouth once a week.     cyanocobalamin 1,000 mcg/mL injection Inject 1,000 mcg into the skin. Twice monthly    cyanocobalamin, vitamin B-12, 1,000 mcg/mL Kit Inject 1 mL into the muscle every 21 days.    cyclobenzaprine (FLEXERIL) 10 MG tablet TAKE ONE TABLET BY MOUTH 3 TIMES A DAY AS NEEDED FOR MUSCLE SPASMS    diclofenac sodium 1 % Gel daily as needed.     epinephrine (EPIPEN) 0.3 mg/0.3 mL (1:1,000) AtIn 0.3 mg daily as needed.     fluoxetine (PROZAC) 20 MG capsule     hydrOXYzine pamoate (VISTARIL) 25 MG Cap Take 25 mg by mouth every 4 (four) hours as needed.     hydrOXYzine pamoate (VISTARIL) 25 MG Cap TAKE ONE CAPSULE BY MOUTH 3 TIMES A DAY AS NEEDED FOR ANXIETY    ibuprofen (ADVIL,MOTRIN) 800 MG tablet every 6 (six) hours as needed.     isometheptene-apap-dichloralphenazone [id number] (MIDRIN) -325 mg per capsule Take 1 capsule by mouth 4 (four) times  daily as needed.    ketorolac (TORADOL) 60 mg/2 mL Soln Please provide patients with IV syringes    lorazepam (ATIVAN) 0.5 MG tablet Take 0.5 mg by mouth every 4 (four) hours as needed.     potassium chloride SA (K-DUR,KLOR-CON) 20 MEQ tablet Take 20 mEq by mouth once daily.    promethazine (PHENERGAN) 25 MG tablet Take 1 tablet by mouth every 6 (six) hours as needed (migraine). -MAY CAUSE DROWSINESS-    pseudoephedrine-DM-guaifenesin 45- mg Tab Take 1 tablet by mouth daily as needed.     sumatriptan succinate 4 mg/0.5 mL PnIj Inject 4 mg into the skin daily as needed. imitrex    VOLTAREN 1 % Gel daily as needed.      Current Facility-Administered Medications   Medication    onabotulinumtoxina injection 200 Units    [COMPLETED] onabotulinumtoxina injection 200 Units     Review of patient's allergies indicates:   Allergen Reactions    Clarithromycin Swelling and Other (See Comments)     DIFFICULTY SWALLOWING  DIFFICULTY SWALLOWING    Pcn [penicillins] Anaphylaxis    Sulfa (sulfonamide antibiotics) Hives    Wellbutrin [bupropion hcl] Shortness Of Breath and Anaphylaxis    Betadine [povidone-iodine] Hives     Swelling      Cefprozil Hives    Iodinated contrast- oral and iv dye Hives    Latex, natural rubber Rash    Sulfamethoxazole-trimethoprim Nausea And Vomiting    Iodine Hives and Swelling    Latex Hives and Swelling       Alert and fully oriented. MOCA: 25/30  Lungs CTA  Heart RRR  CN II-XII intact  Motor normal bulk and tone, symmetric strength  Coordination intact FTN  Sensory in tact to LT  Gait: normal, pace and base     A/P:     Chronic Migraine responding to Botox as expected. Continue treatment until patient in true remission, meaning that the patient stays headache free when Botox wears off in 10 to 12 weeks. That will be the time to stop.  Encouraged the patient to comply with with early acute intervention for escalations  Subjective cognitive deficit. MOCA 25/30, will repeat in 6  months and send for neuropsychological evaluation if score falls      BOTOX PROCEDURE    PROCEDURE PERFORMED: Botulinum toxin injection (09531)    CLINICAL INDICATION: G43.719  NDC: 6201-7722-82    A time out was conducted just before the start of the procedure to verify the correct patient and procedure, procedure location, and all relevant critical information.     DESCRIPTION OF PROCEDURE: After obtaining informed consent and under aseptic technique, a total of 155 units of botulinum toxin type A were injected in the following muscles: Procerus 5 units,  5 units bilaterally, frontalis 20 units, temporalis 20 units bilaterally, occipitalis 15 units, upper cervical paraspinals 10 units bilaterally and trapezius 15 units bilaterally. The patient was given a total of 155 units in 31 sites.The patient tolerated the procedure well. There were no complications. The patient was given a prescription for repeat treatment in 3 months.     Unavoidable waste 45 units            Madisyn Rojas M.D  Medical Director, Headache and Facial Pain  [facility]

## 2017-09-20 ENCOUNTER — HOSPITAL ENCOUNTER (OUTPATIENT)
Dept: PREADMISSION TESTING | Facility: HOSPITAL | Age: 37
Discharge: HOME | End: 2017-09-20
Attending: ORTHOPAEDIC SURGERY
Payer: COMMERCIAL

## 2017-09-20 VITALS — HEIGHT: 72 IN | BODY MASS INDEX: 31.29 KG/M2 | WEIGHT: 231 LBS

## 2017-09-20 DIAGNOSIS — M25.362 PATELLAR INSTABILITY OF LEFT KNEE: ICD-10-CM

## 2017-09-20 LAB
ANION GAP SERPL CALC-SCNC: 12 MMOL/L
BASOPHILS # BLD AUTO: 0 K/UL
BASOPHILS NFR BLD: 0.4 %
BUN SERPL-MCNC: 5 MG/DL
CALCIUM SERPL-MCNC: 9.1 MG/DL
CHLORIDE SERPL-SCNC: 107 MMOL/L
CO2 SERPL-SCNC: 22 MMOL/L
CREAT SERPL-MCNC: 0.7 MG/DL
DIFFERENTIAL METHOD: ABNORMAL
EOSINOPHIL # BLD AUTO: 0.1 K/UL
EOSINOPHIL NFR BLD: 2.8 %
ERYTHROCYTE [DISTWIDTH] IN BLOOD BY AUTOMATED COUNT: 13.4 %
EST. GFR  (AFRICAN AMERICAN): >60 ML/MIN/1.73 M^2
EST. GFR  (NON AFRICAN AMERICAN): >60 ML/MIN/1.73 M^2
GLUCOSE SERPL-MCNC: 108 MG/DL
HCT VFR BLD AUTO: 41.1 %
HGB BLD-MCNC: 14.1 G/DL
LYMPHOCYTES # BLD AUTO: 1.4 K/UL
LYMPHOCYTES NFR BLD: 29.7 %
MCH RBC QN AUTO: 32.5 PG
MCHC RBC AUTO-ENTMCNC: 34.3 G/DL
MCV RBC AUTO: 95 FL
MONOCYTES # BLD AUTO: 0.6 K/UL
MONOCYTES NFR BLD: 11.7 %
NEUTROPHILS # BLD AUTO: 2.6 K/UL
NEUTROPHILS NFR BLD: 55.4 %
PLATELET # BLD AUTO: 188 K/UL
PMV BLD AUTO: 8.6 FL
POTASSIUM SERPL-SCNC: 3.2 MMOL/L
RBC # BLD AUTO: 4.34 M/UL
SODIUM SERPL-SCNC: 141 MMOL/L
WBC # BLD AUTO: 4.7 K/UL

## 2017-09-20 PROCEDURE — 36415 COLL VENOUS BLD VENIPUNCTURE: CPT

## 2017-09-20 PROCEDURE — 85025 COMPLETE CBC W/AUTO DIFF WBC: CPT

## 2017-09-20 PROCEDURE — 99900103 DSU ONLY-NO CHARGE-INITIAL HR (STAT)

## 2017-09-20 PROCEDURE — 99900104 DSU ONLY-NO CHARGE-EA ADD'L HR (STAT)

## 2017-09-20 PROCEDURE — 80048 BASIC METABOLIC PNL TOTAL CA: CPT

## 2017-09-20 RX ORDER — HYDROCODONE BITARTRATE AND ACETAMINOPHEN 10; 325 MG/1; MG/1
TABLET ORAL 3 TIMES DAILY
COMMUNITY
End: 2018-11-01

## 2017-09-21 RX ORDER — CELECOXIB 200 MG/1
CAPSULE ORAL
Qty: 30 CAPSULE | Refills: 0 | Status: SHIPPED | OUTPATIENT
Start: 2017-09-21 | End: 2017-10-02 | Stop reason: SDUPTHER

## 2017-09-21 RX ORDER — CYCLOBENZAPRINE HCL 10 MG
TABLET ORAL
Qty: 90 TABLET | Refills: 0 | Status: SHIPPED | OUTPATIENT
Start: 2017-09-21 | End: 2017-10-02 | Stop reason: SDUPTHER

## 2017-09-27 ENCOUNTER — ANESTHESIA EVENT (OUTPATIENT)
Dept: SURGERY | Facility: HOSPITAL | Age: 37
End: 2017-09-27
Payer: COMMERCIAL

## 2017-09-29 ENCOUNTER — ANESTHESIA (OUTPATIENT)
Dept: SURGERY | Facility: HOSPITAL | Age: 37
End: 2017-09-29
Payer: COMMERCIAL

## 2017-09-29 ENCOUNTER — SURGERY (OUTPATIENT)
Age: 37
End: 2017-09-29

## 2017-09-29 ENCOUNTER — HOSPITAL ENCOUNTER (OUTPATIENT)
Facility: HOSPITAL | Age: 37
Discharge: HOME | End: 2017-09-29
Attending: ORTHOPAEDIC SURGERY | Admitting: ORTHOPAEDIC SURGERY
Payer: COMMERCIAL

## 2017-09-29 DIAGNOSIS — M25.362 PATELLAR INSTABILITY OF LEFT KNEE: ICD-10-CM

## 2017-09-29 PROCEDURE — S0077 INJECTION, CLINDAMYCIN PHOSP: HCPCS | Performed by: ORTHOPAEDIC SURGERY

## 2017-09-29 PROCEDURE — 99900103 DSU ONLY-NO CHARGE-INITIAL HR (STAT): Performed by: ORTHOPAEDIC SURGERY

## 2017-09-29 PROCEDURE — 71000033 HC RECOVERY, INTIAL HOUR: Performed by: ORTHOPAEDIC SURGERY

## 2017-09-29 PROCEDURE — 36000709 HC OR TIME LEV III EA ADD 15 MIN: Performed by: ORTHOPAEDIC SURGERY

## 2017-09-29 PROCEDURE — D9220A PRA ANESTHESIA: Mod: ,,, | Performed by: ANESTHESIOLOGY

## 2017-09-29 PROCEDURE — 25000003 PHARM REV CODE 250: Performed by: ANESTHESIOLOGY

## 2017-09-29 PROCEDURE — 37000009 HC ANESTHESIA EA ADD 15 MINS: Performed by: ORTHOPAEDIC SURGERY

## 2017-09-29 PROCEDURE — 25000003 PHARM REV CODE 250: Performed by: ORTHOPAEDIC SURGERY

## 2017-09-29 PROCEDURE — 25000003 PHARM REV CODE 250: Performed by: NURSE ANESTHETIST, CERTIFIED REGISTERED

## 2017-09-29 PROCEDURE — 99900104 DSU ONLY-NO CHARGE-EA ADD'L HR (STAT): Performed by: ORTHOPAEDIC SURGERY

## 2017-09-29 PROCEDURE — 27420 REVISION OF UNSTABLE KNEECAP: CPT | Mod: LT,,, | Performed by: ORTHOPAEDIC SURGERY

## 2017-09-29 PROCEDURE — 63600175 PHARM REV CODE 636 W HCPCS: Performed by: ANESTHESIOLOGY

## 2017-09-29 PROCEDURE — 25000003 PHARM REV CODE 250

## 2017-09-29 PROCEDURE — 37000008 HC ANESTHESIA 1ST 15 MINUTES: Performed by: ORTHOPAEDIC SURGERY

## 2017-09-29 PROCEDURE — 64448 NJX AA&/STRD FEM NRV NFS IMG: CPT | Mod: 59,LT,, | Performed by: ANESTHESIOLOGY

## 2017-09-29 PROCEDURE — 27800903 OPTIME MED/SURG SUP & DEVICES OTHER IMPLANTS: Performed by: ORTHOPAEDIC SURGERY

## 2017-09-29 PROCEDURE — 71000039 HC RECOVERY, EACH ADD'L HOUR: Performed by: ORTHOPAEDIC SURGERY

## 2017-09-29 PROCEDURE — 63600175 PHARM REV CODE 636 W HCPCS: Performed by: NURSE ANESTHETIST, CERTIFIED REGISTERED

## 2017-09-29 PROCEDURE — 27201423 OPTIME MED/SURG SUP & DEVICES STERILE SUPPLY: Performed by: ORTHOPAEDIC SURGERY

## 2017-09-29 PROCEDURE — 76942 ECHO GUIDE FOR BIOPSY: CPT | Performed by: ANESTHESIOLOGY

## 2017-09-29 PROCEDURE — 36000708 HC OR TIME LEV III 1ST 15 MIN: Performed by: ORTHOPAEDIC SURGERY

## 2017-09-29 PROCEDURE — 63600175 PHARM REV CODE 636 W HCPCS

## 2017-09-29 PROCEDURE — C1713 ANCHOR/SCREW BN/BN,TIS/BN: HCPCS | Performed by: ORTHOPAEDIC SURGERY

## 2017-09-29 PROCEDURE — 71000015 HC POSTOP RECOV 1ST HR: Performed by: ORTHOPAEDIC SURGERY

## 2017-09-29 PROCEDURE — C2626 INFUSION PUMP, NON-PROG,TEMP: HCPCS | Performed by: ANESTHESIOLOGY

## 2017-09-29 DEVICE — IMPLANTABLE DEVICE: Type: IMPLANTABLE DEVICE | Site: KNEE | Status: FUNCTIONAL

## 2017-09-29 DEVICE — SYS PATLLO FEM LIGAMENT 6X23MM: Type: IMPLANTABLE DEVICE | Site: KNEE | Status: FUNCTIONAL

## 2017-09-29 RX ORDER — METOCLOPRAMIDE HYDROCHLORIDE 5 MG/ML
10 INJECTION INTRAMUSCULAR; INTRAVENOUS EVERY 10 MIN PRN
Status: COMPLETED | OUTPATIENT
Start: 2017-09-29 | End: 2017-09-29

## 2017-09-29 RX ORDER — OXYCODONE HYDROCHLORIDE 5 MG/1
5 TABLET ORAL
Status: DISCONTINUED | OUTPATIENT
Start: 2017-09-29 | End: 2017-09-29 | Stop reason: HOSPADM

## 2017-09-29 RX ORDER — DEXAMETHASONE SODIUM PHOSPHATE 4 MG/ML
INJECTION, SOLUTION INTRA-ARTICULAR; INTRALESIONAL; INTRAMUSCULAR; INTRAVENOUS; SOFT TISSUE
Status: DISCONTINUED | OUTPATIENT
Start: 2017-09-29 | End: 2017-09-29

## 2017-09-29 RX ORDER — OXYCODONE HYDROCHLORIDE 5 MG/1
5 TABLET ORAL ONCE
Status: COMPLETED | OUTPATIENT
Start: 2017-09-29 | End: 2017-09-29

## 2017-09-29 RX ORDER — LIDOCAINE HYDROCHLORIDE 10 MG/ML
1 INJECTION, SOLUTION EPIDURAL; INFILTRATION; INTRACAUDAL; PERINEURAL
Status: DISCONTINUED | OUTPATIENT
Start: 2017-09-29 | End: 2017-09-29 | Stop reason: HOSPADM

## 2017-09-29 RX ORDER — CLINDAMYCIN PHOSPHATE 900 MG/50ML
900 INJECTION, SOLUTION INTRAVENOUS
Status: COMPLETED | OUTPATIENT
Start: 2017-09-29 | End: 2017-09-29

## 2017-09-29 RX ORDER — SODIUM CHLORIDE 0.9 % (FLUSH) 0.9 %
3 SYRINGE (ML) INJECTION
Status: DISCONTINUED | OUTPATIENT
Start: 2017-09-29 | End: 2017-09-29 | Stop reason: HOSPADM

## 2017-09-29 RX ORDER — FENTANYL CITRATE 50 UG/ML
25 INJECTION, SOLUTION INTRAMUSCULAR; INTRAVENOUS EVERY 5 MIN PRN
Status: COMPLETED | OUTPATIENT
Start: 2017-09-29 | End: 2017-09-29

## 2017-09-29 RX ORDER — MIDAZOLAM HYDROCHLORIDE 1 MG/ML
INJECTION, SOLUTION INTRAMUSCULAR; INTRAVENOUS
Status: DISCONTINUED | OUTPATIENT
Start: 2017-09-29 | End: 2017-09-29

## 2017-09-29 RX ORDER — PROPOFOL 10 MG/ML
VIAL (ML) INTRAVENOUS
Status: DISCONTINUED | OUTPATIENT
Start: 2017-09-29 | End: 2017-09-29

## 2017-09-29 RX ORDER — OXYCODONE AND ACETAMINOPHEN 5; 325 MG/1; MG/1
1 TABLET ORAL EVERY 6 HOURS PRN
Qty: 40 TABLET | Refills: 0 | Status: SHIPPED | OUTPATIENT
Start: 2017-09-29 | End: 2019-01-25

## 2017-09-29 RX ORDER — SODIUM CHLORIDE, SODIUM LACTATE, POTASSIUM CHLORIDE, CALCIUM CHLORIDE 600; 310; 30; 20 MG/100ML; MG/100ML; MG/100ML; MG/100ML
10 INJECTION, SOLUTION INTRAVENOUS CONTINUOUS
Status: DISCONTINUED | OUTPATIENT
Start: 2017-09-30 | End: 2017-09-29 | Stop reason: HOSPADM

## 2017-09-29 RX ORDER — FENTANYL CITRATE 50 UG/ML
INJECTION, SOLUTION INTRAMUSCULAR; INTRAVENOUS
Status: DISCONTINUED | OUTPATIENT
Start: 2017-09-29 | End: 2017-09-29

## 2017-09-29 RX ORDER — LIDOCAINE HCL/PF 100 MG/5ML
SYRINGE (ML) INTRAVENOUS
Status: DISCONTINUED | OUTPATIENT
Start: 2017-09-29 | End: 2017-09-29

## 2017-09-29 RX ORDER — ONDANSETRON HYDROCHLORIDE 2 MG/ML
INJECTION, SOLUTION INTRAMUSCULAR; INTRAVENOUS
Status: DISCONTINUED | OUTPATIENT
Start: 2017-09-29 | End: 2017-09-29

## 2017-09-29 RX ORDER — FENTANYL CITRATE 50 UG/ML
25 INJECTION, SOLUTION INTRAMUSCULAR; INTRAVENOUS EVERY 5 MIN PRN
Status: DISCONTINUED | OUTPATIENT
Start: 2017-09-29 | End: 2017-09-29 | Stop reason: HOSPADM

## 2017-09-29 RX ORDER — LIDOCAINE HYDROCHLORIDE 10 MG/ML
INJECTION, SOLUTION EPIDURAL; INFILTRATION; INTRACAUDAL; PERINEURAL
Status: COMPLETED
Start: 2017-09-29 | End: 2017-09-29

## 2017-09-29 RX ADMIN — FENTANYL CITRATE 25 MCG: 50 INJECTION INTRAMUSCULAR; INTRAVENOUS at 01:09

## 2017-09-29 RX ADMIN — ROPIVACAINE HYDROCHLORIDE: 2 INJECTION, SOLUTION EPIDURAL; INFILTRATION at 02:09

## 2017-09-29 RX ADMIN — EPHEDRINE SULFATE 10 MG: 50 INJECTION, SOLUTION INTRAMUSCULAR; INTRAVENOUS; SUBCUTANEOUS at 11:09

## 2017-09-29 RX ADMIN — SODIUM CHLORIDE, SODIUM LACTATE, POTASSIUM CHLORIDE, AND CALCIUM CHLORIDE 10 ML/HR: 600; 310; 30; 20 INJECTION, SOLUTION INTRAVENOUS at 07:09

## 2017-09-29 RX ADMIN — DEXAMETHASONE SODIUM PHOSPHATE 8 MG: 4 INJECTION, SOLUTION INTRAMUSCULAR; INTRAVENOUS at 11:09

## 2017-09-29 RX ADMIN — LIDOCAINE HYDROCHLORIDE 100 MG: 20 INJECTION, SOLUTION INTRAVENOUS at 11:09

## 2017-09-29 RX ADMIN — FENTANYL CITRATE 100 MCG: 50 INJECTION INTRAMUSCULAR; INTRAVENOUS at 08:09

## 2017-09-29 RX ADMIN — FENTANYL CITRATE 50 MCG: 50 INJECTION INTRAMUSCULAR; INTRAVENOUS at 11:09

## 2017-09-29 RX ADMIN — PROPOFOL 200 MG: 10 INJECTION, EMULSION INTRAVENOUS at 11:09

## 2017-09-29 RX ADMIN — MIDAZOLAM 2 MG: 1 INJECTION INTRAMUSCULAR; INTRAVENOUS at 08:09

## 2017-09-29 RX ADMIN — METOCLOPRAMIDE 10 MG: 5 INJECTION, SOLUTION INTRAMUSCULAR; INTRAVENOUS at 01:09

## 2017-09-29 RX ADMIN — LIDOCAINE HYDROCHLORIDE 10 MG: 10 INJECTION, SOLUTION EPIDURAL; INFILTRATION; INTRACAUDAL; PERINEURAL at 07:09

## 2017-09-29 RX ADMIN — ONDANSETRON 4 MG: 2 INJECTION, SOLUTION INTRAMUSCULAR; INTRAVENOUS at 11:09

## 2017-09-29 RX ADMIN — FENTANYL CITRATE 25 MCG: 50 INJECTION INTRAMUSCULAR; INTRAVENOUS at 02:09

## 2017-09-29 RX ADMIN — CLINDAMYCIN PHOSPHATE 900 MG: 18 INJECTION, SOLUTION INTRAVENOUS at 11:09

## 2017-09-29 RX ADMIN — OXYCODONE HYDROCHLORIDE 5 MG: 5 TABLET ORAL at 01:09

## 2017-09-29 NOTE — TRANSFER OF CARE
Anesthesia Transfer of Care Note    Patient: Aleyda Plasencia    Procedure(s) Performed: Procedure(s) (LRB):  RECONSTRUCTION- LIGAMENT-MPFL RECONSTRUCTION WITH ALLOGRAFT (Left)    Patient location: PACU    Anesthesia Type: general and regional    Transport from OR: Transported from OR on 2-3 L/min O2 by NC with adequate spontaneous ventilation    Post pain: adequate analgesia    Post assessment: tolerated procedure well and no apparent anesthetic complications    Post vital signs: stable    Level of consciousness: sedated    Nausea/Vomiting: no nausea/vomiting    Complications: none    Transfer of care protocol was followed      Last vitals:   Visit Vitals  BP (!) 104/51 (BP Location: Left arm, Patient Position: Lying)   Pulse (!) 48   Temp 36.1 °C (97 °F) (Temporal)   Resp 18   Ht 6' (1.829 m)   Wt 104.3 kg (230 lb)   SpO2 96%   Breastfeeding? No   BMI 31.19 kg/m²

## 2017-09-29 NOTE — PLAN OF CARE
Pt in phase II rec. Has left leg in immobilizer with ace wrap over dsg. Pt states pain now 3/10, but feels much better since given pain med in rec. SO at bedside, explained dc instructions to pt and SO, along with use of iceman and crutches. Pt used  Crutches before and is very proficient in use. Used them to go to bathroom and get dressed and ambulated with steady gait. Also instructed on use of pain ball and how to remove. Also instructed pt and SO to keep the immobilizer on until Sunday when dressing is to be removed and then put back on until clinic visie. Both verb understanding will wheel to car via wheelchair when able

## 2017-09-29 NOTE — ANESTHESIA POSTPROCEDURE EVALUATION
Anesthesia Post Evaluation    Patient: Aleyda Plasencia    Procedure(s) Performed: Procedure(s) (LRB):  RECONSTRUCTION- LIGAMENT-MPFL RECONSTRUCTION WITH ALLOGRAFT (Left)    Final Anesthesia Type: general  Patient location during evaluation: PACU  Patient participation: Yes- Able to Participate  Level of consciousness: awake and alert  Post-procedure vital signs: reviewed and stable  Pain management: adequate  Airway patency: patent  PONV status at discharge: No PONV  Anesthetic complications: no      Cardiovascular status: blood pressure returned to baseline  Respiratory status: unassisted  Hydration status: euvolemic  Follow-up not needed.        Visit Vitals  BP (!) 113/58   Pulse 80   Temp 36.4 °C (97.5 °F) (Temporal)   Resp 16   Ht 6' (1.829 m)   Wt 104.3 kg (230 lb)   SpO2 95%   Breastfeeding? No   BMI 31.19 kg/m²       Pain/Claudine Score: Pain Assessment Performed: Yes (9/29/2017  2:24 PM)  Presence of Pain: complains of pain/discomfort (9/29/2017  2:24 PM)  Pain Rating Prior to Med Admin: 4 (9/29/2017  2:07 PM)  Pain Rating Post Med Admin: 4 (9/29/2017  2:24 PM)  Claudine Score: 10 (9/29/2017  2:24 PM)

## 2017-09-29 NOTE — OR NURSING
Updated patient and fiance that there is a delay on equipment needed for surgery, so her case is pushed back. Patient okay offered warm blanket  for comfort.

## 2017-09-29 NOTE — BRIEF OP NOTE
[facility]  Brief Operative Note     SUMMARY     Surgery Date: 9/29/2017     Surgeon(s) and Role:     * Feliberto Cardenas MD - Primary    Assisting Surgeon: None    Pre-op Diagnosis:  Patellar instability of left knee [M25.362]    Post-op Diagnosis:  Post-Op Diagnosis Codes:     * Patellar instability of left knee [M25.362]    Procedure(s) (LRB):  RECONSTRUCTION- LIGAMENT-MPFL RECONSTRUCTION WITH ALLOGRAFT (Left)    Anesthesia: General    Description of the findings of the procedure: allograft reconstruction    Estimated Blood Loss: 10mL         Specimens:   Specimen (12h ago through future)    None          Discharge Note    SUMMARY     Admit Date: 9/29/2017    Discharge Date and Time:  09/29/2017 12:22 PM    Hospital Course (synopsis of major diagnoses, care, treatment, and services provided during the course of the hospital stay): Patient underwent the indicated procedure without complications or incidents and was discharged home in stable condition.        Final Diagnosis: Post-Op Diagnosis Codes:     * Patellar instability of left knee [M25.362]    Disposition: Home or Self Care    Follow Up/Patient Instructions:     Medications:  Reconciled Home Medications:   Current Discharge Medication List      START taking these medications    Details   oxycodone-acetaminophen (PERCOCET) 5-325 mg per tablet Take 1 tablet by mouth every 6 (six) hours as needed.  Qty: 40 tablet, Refills: 0         CONTINUE these medications which have NOT CHANGED    Details   amitriptyline (ELAVIL) 25 MG tablet TAKE ONE TABLET BY MOUTH EVERY NIGHT FOR 2 WEEKS THEN INCREASE TO 2 TABLETS AT BEDTIME      celecoxib (CELEBREX) 200 MG capsule TAKE ONE CAPSULE BY MOUTH EVERY DAY  Qty: 30 capsule, Refills: 0      cetirizine (ZYRTEC) 5 MG tablet Take 5 mg by mouth once daily.      cholecalciferol, vitamin D3, 50,000 unit capsule Take 50,000 Units by mouth once a week.       cyanocobalamin, vitamin B-12, 1,000 mcg/mL Kit  Inject 1 mL into the muscle every 21 days.      cyclobenzaprine (FLEXERIL) 10 MG tablet TAKE ONE TABLET BY MOUTH 3 TIMES A DAY AS NEEDED FOR MUSCLE SPASMS  Qty: 90 tablet, Refills: 0      !! diclofenac sodium 1 % Gel daily as needed.       FERROUS SULFATE (IRON ORAL) Take by mouth.      fluoxetine (PROZAC) 20 MG capsule Refills: 3      hydrocodone-acetaminophen 10-325mg (NORCO)  mg Tab Take by mouth.      hydrOXYzine pamoate (VISTARIL) 25 MG Cap TAKE ONE CAPSULE BY MOUTH 3 TIMES A DAY AS NEEDED FOR ANXIETY      ibuprofen (ADVIL,MOTRIN) 800 MG tablet every 6 (six) hours as needed.   Refills: 5      isometheptene-apap-dichloralphenazone [id number] (MIDRIN) -325 mg per capsule Take 1 capsule by mouth 4 (four) times daily as needed.      ketorolac (TORADOL) 60 mg/2 mL Soln Please provide patients with IV syringes  Qty: 10 mL, Refills: 3    Associated Diagnoses: Chronic migraine without aura, with intractable migraine, so stated, without mention of status migrainosus      lorazepam (ATIVAN) 0.5 MG tablet Take 0.5 mg by mouth every 4 (four) hours as needed.       potassium chloride SA (K-DUR,KLOR-CON) 20 MEQ tablet Take 20 mEq by mouth once daily.  Refills: 3      promethazine (PHENERGAN) 25 MG tablet Take 1 tablet by mouth every 6 (six) hours as needed (migraine). -MAY CAUSE DROWSINESS-      pseudoephedrine-DM-guaifenesin 45- mg Tab Take 1 tablet by mouth daily as needed.       !! VOLTAREN 1 % Gel daily as needed.   Refills: 3      epinephrine (EPIPEN) 0.3 mg/0.3 mL (1:1,000) AtIn 0.3 mg daily as needed.       sumatriptan succinate 4 mg/0.5 mL PnIj Inject 4 mg into the skin daily as needed. imitrex  Qty: 6 each, Refills: 5    Associated Diagnoses: Intractable chronic migraine without aura and without status migrainosus       !! - Potential duplicate medications found. Please discuss with provider.          Discharge Procedure Orders  CRUTCHES FOR HOME USE   Order Specific Question Answer Comments    Type: Axillary    Height: 6' (1.829 m)    Weight: 104.3 kg (230 lb)    Length of need (1-99 months): 1      Diet general     Keep surgical extremity elevated     Ice to affected area     Weight bearing restrictions (specify)   Order Comments: WBAT with Crutches     Remove dressing in 24 hours       Follow-up Information     Feliberto Cardenas MD In 2 weeks.    Specialties:  Sports Medicine, Orthopedic Surgery  Contact information:  [address] [place] [address] DR Gina RAMIREZ [id number] [phone]

## 2017-09-29 NOTE — ANESTHESIA PREPROCEDURE EVALUATION
09/29/2017  Aleyda Plasencia is a 37 y.o., female.    Anesthesia Evaluation    I have reviewed the Patient Summary Reports.    I have reviewed the Nursing Notes.   I have reviewed the Medications.     Review of Systems  Anesthesia Hx:  No problems with previous Anesthesia    Social:  Smoker    Hematology/Oncology:  Hematology Normal   Oncology Normal     EENT/Dental:EENT/Dental Normal   Cardiovascular:  Cardiovascular Normal     Pulmonary:   Sleep Apnea    Renal/:  Renal/ Normal     Hepatic/GI:  Hepatic/GI Normal    Musculoskeletal:   Arthritis  Patellar instability of left knee    Neurological:   Headaches Seizures    Endocrine:  Endocrine Normal    Psych:   Psychiatric History          Physical Exam  General:  Obesity    Airway/Jaw/Neck:  Airway Findings: Mouth Opening: Normal Tongue: Normal  General Airway Assessment: Adult  Mallampati: I  TM Distance: Normal, at least 6 cm        Eyes/Ears/Nose:  EYES/EARS/NOSE FINDINGS: Normal   Dental:  Dental Findings: Edentulous   Chest/Lungs:  Chest/Lungs Findings: Clear to auscultation, Normal Respiratory Rate     Heart/Vascular:  Heart Findings: Rate: Normal  Rhythm: Regular Rhythm  Sounds: Normal     Abdomen:  Abdomen Findings: Normal    Musculoskeletal:  Musculoskeletal Findings: Normal   Skin:  Skin Findings: Normal    Mental Status:  Mental Status Findings: Normal        Anesthesia Plan  Type of Anesthesia, risks & benefits discussed:  Anesthesia Type:  general  Patient's Preference:   Intra-op Monitoring Plan: standard ASA monitors  Intra-op Monitoring Plan Comments:   Post Op Pain Control Plan: per primary service following discharge from PACU  Post Op Pain Control Plan Comments:   Induction:   IV  Beta Blocker:  Patient is not currently on a Beta-Blocker (No further documentation required).       Informed Consent: Patient understands risks and  agrees with Anesthesia plan.  Questions answered. Anesthesia consent signed with patient.  ASA Score: 2     Day of Surgery Review of History & Physical: I have interviewed and examined the patient. I have reviewed the patient's H&P dated:    H&P update referred to the surgeon.         Ready For Surgery From Anesthesia Perspective.

## 2017-09-29 NOTE — DISCHARGE INSTRUCTIONS
General Information:    1.  Do not drink alcoholic beverages including beer for 24 hours or as long as you are on pain medication..  2.  Do not drive a motor vehicle, operate machinery or power tools, or signs legal papers for 24 hours or as long as you are on pain medication.   3.  You may experience light-headedness, dizziness, and sleepiness following surgery. Please do not stay alone. A responsible adult should be with you for this 24 hour period.  4.  Go home and rest.    5. Progress slowly to a normal diet unless instructed.  Otherwise, begin with liquids such as soft drinks, then soup and crackers working up to solid foods. Drink plenty of nonalcoholic fluids.  6.  Certain anesthetics and pain medications produce nausea and vomiting in certain       individuals. If nausea becomes a problem at home, call you doctor.    7. A nurse will be calling you sometime after surgery. Do not be alarmed. This is our way of finding out how you are doing.    8. Several times every hour while you are awake, take 2-3 deep breaths and cough. If you had stomach surgery hold a pillow or rolled towel firmly against your stomach before you cough. This will help with any pain the cough might cause.  9. Several times every hour while you are awake, pump and flex your feet 5-6 times and do foot circles. This will help prevent blood clots.    10.Call your doctor for severe pain, bleeding, fever, or signs or symptoms of infection (pain, swelling, redness, foul odor, drainage).    Discharge Instructions: After Your Surgery/Procedure  Youve just had surgery. During surgery you were given medicine called anesthesia to keep you relaxed and free of pain. After surgery you may have some pain or nausea. This is common. Here are some tips for feeling better and getting well after surgery.     Stay on schedule with your medication.   Going home  Your doctor or nurse will show you how to take care of yourself when you go home. He or she will  "also answer your questions. Have an adult family member or friend drive you home.      For your safety we recommend these precaution for the first 24 hours after your procedure:  · Do not drive or use heavy equipment.  · Do not make important decisions or sign legal papers.  · Do not drink alcohol.  · Have someone stay with you, if needed. He or she can watch for problems and help keep you safe.  · Your concentration, balance, coordination, and judgement may be impaired for many hours after anesthesia.  Use caution when ambulating or standing up.     · You may feel weak and "washed out" after anesthesia and surgery.      Subtle residual effects of general anesthesia or sedation with regional / local anesthesia can last more than 24 hours.  Rest for the remainder of the day or longer if your Doctor/Surgeon has advised you to do so.  Although you may feel normal within the first 24 hours, your reflexes and mental ability may be impaired without you realizing it.  You may feel dizzy, lightheaded or sleepy for 24 hours or longer.      Be sure to go to all follow-up visits with your doctor. And rest after your surgery for as long as your doctor tells you to.  Coping with pain  If you have pain after surgery, pain medicine will help you feel better. Take it as told, before pain becomes severe. Also, ask your doctor or pharmacist about other ways to control pain. This might be with heat, ice, or relaxation. And follow any other instructions your surgeon or nurse gives you.  Tips for taking pain medicine  To get the best relief possible, remember these points:  · Pain medicines can upset your stomach. Taking them with a little food may help.  · Most pain relievers taken by mouth need at least 20 to 30 minutes to start to work.  · Taking medicine on a schedule can help you remember to take it. Try to time your medicine so that you can take it before starting an activity. This might be before you get dressed, go for a walk, " or sit down for dinner.  · Constipation is a common side effect of pain medicines. Call your doctor before taking any medicines such as laxatives or stool softeners to help ease constipation. Also ask if you should skip any foods. Drinking lots of fluids and eating foods such as fruits and vegetables that are high in fiber can also help. Remember, do not take laxatives unless your surgeon has prescribed them.  · Drinking alcohol and taking pain medicine can cause dizziness and slow your breathing. It can even be deadly. Do not drink alcohol while taking pain medicine.  · Pain medicine can make you react more slowly to things. Do not drive or run machinery while taking pain medicine.  Your health care provider may tell you to take acetaminophen to help ease your pain. Ask him or her how much you are supposed to take each day. Acetaminophen or other pain relievers may interact with your prescription medicines or other over-the-counter (OTC) drugs. Some prescription medicines have acetaminophen and other ingredients. Using both prescription and OTC acetaminophen for pain can cause you to overdose. Read the labels on your OTC medicines with care. This will help you to clearly know the list of ingredients, how much to take, and any warnings. It may also help you not take too much acetaminophen. If you have questions or do not understand the information, ask your pharmacist or health care provider to explain it to you before you take the OTC medicine.  Managing nausea  Some people have an upset stomach after surgery. This is often because of anesthesia, pain, or pain medicine, or the stress of surgery. These tips will help you handle nausea and eat healthy foods as you get better. If you were on a special food plan before surgery, ask your doctor if you should follow it while you get better. These tips may help:  · Do not push yourself to eat. Your body will tell you when to eat and how much.  · Start off with clear  liquids and soup. They are easier to digest.  · Next try semi-solid foods, such as mashed potatoes, applesauce, and gelatin, as you feel ready.  · Slowly move to solid foods. Dont eat fatty, rich, or spicy foods at first.  · Do not force yourself to have 3 large meals a day. Instead eat smaller amounts more often.  · Take pain medicines with a small amount of solid food, such as crackers or toast, to avoid nausea.     Call your surgeon if  · You still have pain an hour after taking medicine. The medicine may not be strong enough.  · You feel too sleepy, dizzy, or groggy. The medicine may be too strong.  · You have side effects like nausea, vomiting, or skin changes, such as rash, itching, or hives.       If you have obstructive sleep apnea  You were given anesthesia medicine during surgery to keep you comfortable and free of pain. After surgery, you may have more apnea spells because of this medicine and other medicines you were given. The spells may last longer than usual.   At home:  · Keep using the continuous positive airway pressure (CPAP) device when you sleep. Unless your health care provider tells you not to, use it when you sleep, day or night. CPAP is a common device used to treat obstructive sleep apnea.  · Talk with your provider before taking any pain medicine, muscle relaxants, or sedatives. Your provider will tell you about the possible dangers of taking these medicines.  © 1235-2833 [employer]. [address] [place] [id number]. All rights reserved. This information is not intended as a substitute for professional medical care. Always follow your healthcare professional's instructions.  Post op instructions for prevention of DVT  What is deep vein thrombosis?  Deep vein thrombosis (DVT) is the medical term for blood clots in the deep veins of the leg.  These blood clots can be dangerous.  A DVT can block a blood vessel and keep blood from getting where it needs to go.   Another problem is that the clot can travel to other parts of the body such as the lungs.  A clot that travels to the lungs is called a pulmonary embolus (PE) and can cause serious problems with breathing which can lead to death.  Am I at risk for DVT/PE?  If you are not very active, you are at risk of DVT.  Anyone confined to bed, sitting for long periods of time, recovering from surgery, etc. increases the risk of DVT.  Other risk factors are cancer diagnosis, certain medications, estrogen replacement in any form,older age, obesity, pregnancy, smoking, history of clotting disorders, and dehydration.  How will I know if I have a DVT?   Swelling in the lower leg   Pain   Warmth, redness, hardness or bulging of the vein  If you have any of these symptoms, call your doctors office right away.  Some people will not have any symptoms until the clot moves to the lungs.  What are the symptoms of a PE?   Panting, shortness of breath, or trouble breathing   Sharp, knife-like chest pain when you breathe   Coughing or coughing up blood   Rapid heartbeat  If you have any of these symptoms or get worse quickly, call 911 for emergency treatment.  How can I prevent a DVT?   Avoid long periods of inactivity and dont cross your legs--get up and walk around every hour or so.   Stay active--walking after surgery is highly encouraged.  This means you should get out of the house and walk in the neighborhood.  Going up and down stairs will not impair healing (unless advised against such activity by your doctor).     Drink plenty of noncaffeinated, nonalcoholic fluids each day to prevent dehydration.   Wear special support stockings, if they have been advised by your doctor.   If you travel, stop at least once an hour and walk around.   Avoid smoking (assistance with stopping is available through your healthcare provider)  Always notify your doctor if you are not able to follow the post operative instructions that are  given to you at the time of discharge.  It may be necessary to prescribe one of the medications available to prevent DVT.Using Opioids for Pain Management     Your doctor has given instructions for you to take an opioid.  This is a drug for bad pain.  It helps control pain without causing bleeding and kidney problems.  Common opioid names are morphine, hydromorphone, oxycodone, and methadone. These drugs are called narcotics.    There are several safety concerns you need to know.     · It is against the law to give or sell this drug to another person.  You must keep this medicine safely locked.    · You may have side effects from taking this medication.  These include nausea, itching, sweating, sleepiness, a change in your ability to breathe, and depression.  · Do not take alcohol or sleeping pills opioids.    · Long-term opoid use may no longer giver you relief from pain.  It can cause you stomach pain, mental anxiety, and headaches.  Long-term opoid use can potentially lead to unlawful street drug abuse and reduce your ability to stay employed.    · Your body may become opioid tolerant if you need to take more to get relief.    · You must stop taking opioids if you begin having more pain as a result of the medicine.    · Opioid withdrawal occurs when you have to stop taking the drug.  It can cause you to have nausea, vomiting, diarrhea, stomach pain, anxiety, and dilated pupils in your eyes. This condition means you are opioid dependent.    · Addiction is a drug induced brain disease. It means there are changes in how your brain is working.  Children, teens, and young adults under 25 years old are more likely to get addicted to opioids.      · Addiction can happen with repeated opioid use.  It does not happen with short-term use of two weeks or less.       For more information, please speak with your doctor or pharmacist.          Discharge Instructions: Using Crutches (Weight-Bearing)  Your healthcare provider  has prescribed crutches for you. A healthy leg can support your body weight, but when you have an injured leg or foot, you need to keep weight off it. Once you are told that you can put some weight on your leg, use a weight-bearing method of walking as the leg heals. Depending on your arm strength and balance, you can either step to or step through. Practice will help you learn to step through so that you can cover more ground with each step.      Before you use crutches  Be prepared with the following tips:  · Remove throw rugs, electrical cords, and anything else that may cause you to fall.  · Arrange your household to keep the items you need handy. Keep everything else out of the way.  · Find a backpack, kermit pack, or apron, or use pockets to carry things. This will help you keep your hands free.  Standing with crutches  Use the balanced standing (tripod) position when you start or end a movement. Also use it whenever you're standing for a length of time.  · Move your crutches in front of you about 12 inches.  · Find your balance.  · Be sure not to rest your armpits on the pads.  Walking with crutches  Follow these tips:  · Start in a balanced standing (tripod) position.  · Step forward with your affected foot.  · Land lightly between your crutches.  · Squeeze the pads against the sides of your chest.  · Support your weight with your hands and your affected leg.  · Press down on the handgrips.  Step to  This method includes the following:  · Lift your unaffected foot and step to the crutches.  · Land on your unaffected foot, between your crutches.  · Keep the knee slightly bent.  · Reach forward and out with the crutches to begin the next step.  Step through  This method includes the following:  · Lift the unaffected foot.  · Step forward through the crutches.  · Land on the unaffected foot, with the heel slightly in front of the toe of the other foot.  · Keep the knee slightly bent.  · Reach forward and  out with the crutches to begin the next step.  Follow-up  Make a follow-up appointment as directed by your healthcare provider.     When to call your healthcare provider  Call your healthcare provider right away if you have any of the following:  · Sudden or increased shortness of breath  · Sudden chest pain or localized chest pain with coughing  · Fever above 100.4°F (38.0°C)  · Increasing redness, tenderness, or swelling at theincision site or in the injured limb  · Drainage from the incision or injured limb  · Opening of the incision or injury  · Increasing pain, with or without activity   Date Last Reviewed: 8/1/2016  © 0365-1356 [employer]. [address] [place] [address], [place] [address]. All rights reserved. This information is not intended as a substitute for professional medical care. Always follow your healthcare professional's instructions.      We hope your stay was comfortable as you heal now, mend and rest.    For we have enjoyed taking care of you by giving your our best.    And as you get better, by regaining your health and strength;   We count it as a privilege to have served you and hope your time at [facility] was well spent.      Thank  You!!!
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
## 2017-09-30 NOTE — ANESTHESIA POST-OP PAIN MANAGEMENT
Acute Pain Service Progress Note    Aleyda Plasencia is a 37 y.o., female, [id number].    Surgery:  Ligament repair knee    Post Op Day #: 1    Catheter type: perineural  femoral    Infusion type: Ropivacaine 0.2%  8 basal    Problem List:    Active Hospital Problems    Diagnosis  POA    *Patellar instability of left knee [M25.362]  Yes      Resolved Hospital Problems    Diagnosis Date Resolved POA   No resolved problems to display.       Subjective:     General appearance of alert, oriented, no complaints   Pain with rest: 5    Numbers   Pain with movement: 5    Numbers   Side Effects    1. Pruritis No    2. Nausea No    3. Motor Blockade No, 0=Ability to raise lower extremities off bed    4. Sedation No, 1=awake and alert    Objective:     Catheter level unchanged   Catheter site clean, dry, intact   Catheter placement ok cm @skin      Vitals   Vitals:    09/29/17 1459   BP:    Pulse: 75   Resp:    Temp:         Labs    No visits with results within 2 Day(s) from this visit.   Latest known visit with results is:   Hospital Outpatient Visit on 09/20/2017   Component Date Value Ref Range Status    Sodium 09/20/2017 141  136 - 145 mmol/L Final    Potassium 09/20/2017 3.2* 3.5 - 5.1 mmol/L Final    Chloride 09/20/2017 107  95 - 110 mmol/L Final    CO2 09/20/2017 22* 23 - 29 mmol/L Final    Glucose 09/20/2017 108  70 - 110 mg/dL Final    BUN, Bld 09/20/2017 5* 6 - 20 mg/dL Final    Creatinine 09/20/2017 0.7  0.5 - 1.4 mg/dL Final    Calcium 09/20/2017 9.1  8.7 - 10.5 mg/dL Final    Anion Gap 09/20/2017 12  8 - 16 mmol/L Final    eGFR if African American 09/20/2017 >60  >60 mL/min/1.73 m^2 Final    eGFR if non African American 09/20/2017 >60  >60 mL/min/1.73 m^2 Final    WBC 09/20/2017 4.70  3.90 - 12.70 K/uL Final    RBC 09/20/2017 4.34  4.00 - 5.40 M/uL Final    Hemoglobin 09/20/2017 14.1  12.0 - 16.0 g/dL Final    Hematocrit 09/20/2017 41.1  37.0 - 48.5 % Final    MCV 09/20/2017 95  82 - 98 fL  Final    MCH 09/20/2017 32.5* 27.0 - 31.0 pg Final    MCHC 09/20/2017 34.3  32.0 - 36.0 g/dL Final    RDW 09/20/2017 13.4  11.5 - 14.5 % Final    Platelets 09/20/2017 188  150 - 350 K/uL Final    MPV 09/20/2017 8.6* 9.2 - 12.9 fL Final    Gran # 09/20/2017 2.6  1.8 - 7.7 K/uL Final    Lymph # 09/20/2017 1.4  1.0 - 4.8 K/uL Final    Mono # 09/20/2017 0.6  0.3 - 1.0 K/uL Final    Eos # 09/20/2017 0.1  0.0 - 0.5 K/uL Final    Baso # 09/20/2017 0.00  0.00 - 0.20 K/uL Final    Gran% 09/20/2017 55.4  38.0 - 73.0 % Final    Lymph% 09/20/2017 29.7  18.0 - 48.0 % Final    Mono% 09/20/2017 11.7  4.0 - 15.0 % Final    Eosinophil% 09/20/2017 2.8  0.0 - 8.0 % Final    Basophil% 09/20/2017 0.4  0.0 - 1.9 % Final    Differential Method 09/20/2017 Automated   Final        Meds   Current Facility-Administered Medications   Medication Dose Route Frequency Provider Last Rate Last Dose    onabotulinumtoxina injection 200 Units  1 vial Intramuscular 1 time in Clinic/HOD Lola Rojas MD         Current Outpatient Prescriptions   Medication Sig Dispense Refill    amitriptyline (ELAVIL) 25 MG tablet TAKE ONE TABLET BY MOUTH EVERY NIGHT FOR 2 WEEKS THEN INCREASE TO 2 TABLETS AT BEDTIME      celecoxib (CELEBREX) 200 MG capsule TAKE ONE CAPSULE BY MOUTH EVERY DAY 30 capsule 0    cetirizine (ZYRTEC) 5 MG tablet Take 5 mg by mouth once daily.      cholecalciferol, vitamin D3, 50,000 unit capsule Take 50,000 Units by mouth once a week.       cyanocobalamin, vitamin B-12, 1,000 mcg/mL Kit Inject 1 mL into the muscle every 21 days.      cyclobenzaprine (FLEXERIL) 10 MG tablet TAKE ONE TABLET BY MOUTH 3 TIMES A DAY AS NEEDED FOR MUSCLE SPASMS 90 tablet 0    diclofenac sodium 1 % Gel daily as needed.       FERROUS SULFATE (IRON ORAL) Take by mouth.      fluoxetine (PROZAC) 20 MG capsule   3    hydrocodone-acetaminophen 10-325mg (NORCO)  mg Tab Take by mouth.      hydrOXYzine pamoate (VISTARIL) 25 MG Cap TAKE  ONE CAPSULE BY MOUTH 3 TIMES A DAY AS NEEDED FOR ANXIETY      ibuprofen (ADVIL,MOTRIN) 800 MG tablet every 6 (six) hours as needed.   5    isometheptene-apap-dichloralphenazone [id number] (MIDRIN) -325 mg per capsule Take 1 capsule by mouth 4 (four) times daily as needed.      ketorolac (TORADOL) 60 mg/2 mL Soln Please provide patients with IV syringes 10 mL 3    lorazepam (ATIVAN) 0.5 MG tablet Take 0.5 mg by mouth every 4 (four) hours as needed.       potassium chloride SA (K-DUR,KLOR-CON) 20 MEQ tablet Take 20 mEq by mouth once daily.  3    promethazine (PHENERGAN) 25 MG tablet Take 1 tablet by mouth every 6 (six) hours as needed (migraine). -MAY CAUSE DROWSINESS-      pseudoephedrine-DM-guaifenesin 45- mg Tab Take 1 tablet by mouth daily as needed.       VOLTAREN 1 % Gel daily as needed.   3    epinephrine (EPIPEN) 0.3 mg/0.3 mL (1:1,000) AtIn 0.3 mg daily as needed.       oxycodone-acetaminophen (PERCOCET) 5-325 mg per tablet Take 1 tablet by mouth every 6 (six) hours as needed. 40 tablet 0    sumatriptan succinate 4 mg/0.5 mL PnIj Inject 4 mg into the skin daily as needed. imitrex 6 each 5        Anticoagulant dose MAR at MAR.    Assessment:     Pain control adequate    Plan:     Patient doing well, continue present treatment.    Evaluator Nikos Cash

## 2017-10-01 NOTE — ANESTHESIA POST-OP PAIN MANAGEMENT
Acute Pain Service Progress Note    Aleyda Plasencia is a 37 y.o., female, [id number].    Surgery:  Knee ligament repair    Post Op Day #: 2    Catheter type: perineural  femoral    Infusion type: Ropivacaine 0.2%  8 basal    Problem List:    Active Hospital Problems    Diagnosis  POA    *Patellar instability of left knee [M25.362]  Yes      Resolved Hospital Problems    Diagnosis Date Resolved POA   No resolved problems to display.       Subjective:     General appearance of alert, oriented, no complaints   Pain with rest: 5    Numbers   Pain with movement: 7    Numbers   Side Effects    1. Pruritis No    2. Nausea No    3. Motor Blockade No, 0=Ability to raise lower extremities off bed    4. Sedation No, 1=awake and alert    Objective:     Catheter level unchanged   Catheter site clean, dry, intact   Catheter placement ok cm @skin      Vitals   Vitals:    09/29/17 1459   BP:    Pulse: 75   Resp:    Temp:         Labs    No visits with results within 2 Day(s) from this visit.   Latest known visit with results is:   Hospital Outpatient Visit on 09/20/2017   Component Date Value Ref Range Status    Sodium 09/20/2017 141  136 - 145 mmol/L Final    Potassium 09/20/2017 3.2* 3.5 - 5.1 mmol/L Final    Chloride 09/20/2017 107  95 - 110 mmol/L Final    CO2 09/20/2017 22* 23 - 29 mmol/L Final    Glucose 09/20/2017 108  70 - 110 mg/dL Final    BUN, Bld 09/20/2017 5* 6 - 20 mg/dL Final    Creatinine 09/20/2017 0.7  0.5 - 1.4 mg/dL Final    Calcium 09/20/2017 9.1  8.7 - 10.5 mg/dL Final    Anion Gap 09/20/2017 12  8 - 16 mmol/L Final    eGFR if African American 09/20/2017 >60  >60 mL/min/1.73 m^2 Final    eGFR if non African American 09/20/2017 >60  >60 mL/min/1.73 m^2 Final    WBC 09/20/2017 4.70  3.90 - 12.70 K/uL Final    RBC 09/20/2017 4.34  4.00 - 5.40 M/uL Final    Hemoglobin 09/20/2017 14.1  12.0 - 16.0 g/dL Final    Hematocrit 09/20/2017 41.1  37.0 - 48.5 % Final    MCV 09/20/2017 95  82 - 98 fL  Final    MCH 09/20/2017 32.5* 27.0 - 31.0 pg Final    MCHC 09/20/2017 34.3  32.0 - 36.0 g/dL Final    RDW 09/20/2017 13.4  11.5 - 14.5 % Final    Platelets 09/20/2017 188  150 - 350 K/uL Final    MPV 09/20/2017 8.6* 9.2 - 12.9 fL Final    Gran # 09/20/2017 2.6  1.8 - 7.7 K/uL Final    Lymph # 09/20/2017 1.4  1.0 - 4.8 K/uL Final    Mono # 09/20/2017 0.6  0.3 - 1.0 K/uL Final    Eos # 09/20/2017 0.1  0.0 - 0.5 K/uL Final    Baso # 09/20/2017 0.00  0.00 - 0.20 K/uL Final    Gran% 09/20/2017 55.4  38.0 - 73.0 % Final    Lymph% 09/20/2017 29.7  18.0 - 48.0 % Final    Mono% 09/20/2017 11.7  4.0 - 15.0 % Final    Eosinophil% 09/20/2017 2.8  0.0 - 8.0 % Final    Basophil% 09/20/2017 0.4  0.0 - 1.9 % Final    Differential Method 09/20/2017 Automated   Final        Meds   Current Facility-Administered Medications   Medication Dose Route Frequency Provider Last Rate Last Dose    onabotulinumtoxina injection 200 Units  1 vial Intramuscular 1 time in Clinic/HOD Lola Rojas MD         Current Outpatient Prescriptions   Medication Sig Dispense Refill    amitriptyline (ELAVIL) 25 MG tablet TAKE ONE TABLET BY MOUTH EVERY NIGHT FOR 2 WEEKS THEN INCREASE TO 2 TABLETS AT BEDTIME      celecoxib (CELEBREX) 200 MG capsule TAKE ONE CAPSULE BY MOUTH EVERY DAY 30 capsule 0    cetirizine (ZYRTEC) 5 MG tablet Take 5 mg by mouth once daily.      cholecalciferol, vitamin D3, 50,000 unit capsule Take 50,000 Units by mouth once a week.       cyanocobalamin, vitamin B-12, 1,000 mcg/mL Kit Inject 1 mL into the muscle every 21 days.      cyclobenzaprine (FLEXERIL) 10 MG tablet TAKE ONE TABLET BY MOUTH 3 TIMES A DAY AS NEEDED FOR MUSCLE SPASMS 90 tablet 0    diclofenac sodium 1 % Gel daily as needed.       FERROUS SULFATE (IRON ORAL) Take by mouth.      fluoxetine (PROZAC) 20 MG capsule   3    hydrocodone-acetaminophen 10-325mg (NORCO)  mg Tab Take by mouth.      hydrOXYzine pamoate (VISTARIL) 25 MG Cap TAKE  ONE CAPSULE BY MOUTH 3 TIMES A DAY AS NEEDED FOR ANXIETY      ibuprofen (ADVIL,MOTRIN) 800 MG tablet every 6 (six) hours as needed.   5    isometheptene-apap-dichloralphenazone [id number] (MIDRIN) -325 mg per capsule Take 1 capsule by mouth 4 (four) times daily as needed.      ketorolac (TORADOL) 60 mg/2 mL Soln Please provide patients with IV syringes 10 mL 3    lorazepam (ATIVAN) 0.5 MG tablet Take 0.5 mg by mouth every 4 (four) hours as needed.       potassium chloride SA (K-DUR,KLOR-CON) 20 MEQ tablet Take 20 mEq by mouth once daily.  3    promethazine (PHENERGAN) 25 MG tablet Take 1 tablet by mouth every 6 (six) hours as needed (migraine). -MAY CAUSE DROWSINESS-      pseudoephedrine-DM-guaifenesin 45- mg Tab Take 1 tablet by mouth daily as needed.       VOLTAREN 1 % Gel daily as needed.   3    epinephrine (EPIPEN) 0.3 mg/0.3 mL (1:1,000) AtIn 0.3 mg daily as needed.       oxycodone-acetaminophen (PERCOCET) 5-325 mg per tablet Take 1 tablet by mouth every 6 (six) hours as needed. 40 tablet 0    sumatriptan succinate 4 mg/0.5 mL PnIj Inject 4 mg into the skin daily as needed. imitrex 6 each 5        Anticoagulant dose MAR at MAR.    Assessment:     Pain control adequate    Plan:     Modify present therapy to improve control of pain Patient instructed to add iboprofen up to 800 mg PO TID to ease breakthrough pain    Evaluator Nikos Cash

## 2017-10-02 ENCOUNTER — PATIENT MESSAGE (OUTPATIENT)
Dept: ORTHOPEDICS | Facility: CLINIC | Age: 37
End: 2017-10-02

## 2017-10-02 VITALS
DIASTOLIC BLOOD PRESSURE: 56 MMHG | WEIGHT: 230 LBS | OXYGEN SATURATION: 95 % | HEART RATE: 75 BPM | SYSTOLIC BLOOD PRESSURE: 119 MMHG | TEMPERATURE: 98 F | HEIGHT: 72 IN | RESPIRATION RATE: 16 BRPM | BODY MASS INDEX: 31.15 KG/M2

## 2017-10-02 RX ORDER — CELECOXIB 200 MG/1
CAPSULE ORAL
Qty: 30 CAPSULE | Refills: 0 | Status: SHIPPED | OUTPATIENT
Start: 2017-10-02 | End: 2018-12-31 | Stop reason: ALTCHOICE

## 2017-10-02 RX ORDER — OXYCODONE HYDROCHLORIDE 15 MG/1
15 TABLET, FILM COATED, EXTENDED RELEASE ORAL EVERY 12 HOURS
Qty: 20 TABLET | Refills: 0 | Status: SHIPPED | OUTPATIENT
Start: 2017-10-02 | End: 2019-10-14

## 2017-10-02 RX ORDER — CYCLOBENZAPRINE HCL 10 MG
TABLET ORAL
Qty: 90 TABLET | Refills: 0 | Status: SHIPPED | OUTPATIENT
Start: 2017-10-02 | End: 2019-01-25

## 2017-10-03 ENCOUNTER — PATIENT MESSAGE (OUTPATIENT)
Dept: ORTHOPEDICS | Facility: CLINIC | Age: 37
End: 2017-10-03

## 2017-10-03 NOTE — ANESTHESIA POST-OP PAIN MANAGEMENT
Anesthesia Acute Pain Service Follow up Note    Aledya Plasencia  1980  [id number]    Procedure: Procedure(s) (LRB):  RECONSTRUCTION- LIGAMENT-MPFL RECONSTRUCTION WITH ALLOGRAFT (Left)    4 Days Post-Op         Attempted to reach patient at all phone numbers provided to [facility], unable to obtain answer from patient or patient caregiver/emergency contact numbers. Message left for patient regarding attempt for followup, and inquiry regarding si/sx of local anesthetic toxicity as well as induration/erythema at insertion site. Instructions left to call number provided to patient in writing at time of discharge if any questions or problems occur.   Instructed patient to remove catheter if On-Q ball empty.         ZOILA WEBB MD  Department of Anesthesiology   [facility]  Ext [phone]

## 2017-10-03 NOTE — ADDENDUM NOTE
Addendum  created 10/03/17 1007 by Landon Encarnacion MD    Anesthesia Event edited, Sign clinical note

## 2017-10-12 ENCOUNTER — PATIENT MESSAGE (OUTPATIENT)
Dept: ORTHOPEDICS | Facility: CLINIC | Age: 37
End: 2017-10-12

## 2017-10-16 ENCOUNTER — OFFICE VISIT (OUTPATIENT)
Dept: ORTHOPEDICS | Facility: CLINIC | Age: 37
End: 2017-10-16
Payer: COMMERCIAL

## 2017-10-16 VITALS
SYSTOLIC BLOOD PRESSURE: 134 MMHG | DIASTOLIC BLOOD PRESSURE: 68 MMHG | HEIGHT: 72 IN | WEIGHT: 230 LBS | HEART RATE: 111 BPM | BODY MASS INDEX: 31.15 KG/M2

## 2017-10-16 DIAGNOSIS — M25.362 PATELLAR INSTABILITY OF LEFT KNEE: Primary | ICD-10-CM

## 2017-10-16 PROCEDURE — 99999 PR PBB SHADOW E&M-EST. PATIENT-LVL III: CPT | Mod: PBBFAC,,, | Performed by: ORTHOPAEDIC SURGERY

## 2017-10-16 PROCEDURE — 99024 POSTOP FOLLOW-UP VISIT: CPT | Mod: S$GLB,,, | Performed by: ORTHOPAEDIC SURGERY

## 2017-10-16 NOTE — PROGRESS NOTES
Past Medical History:   Diagnosis Date    Arthritis     OA    Depression     Migraine     Seizures     Sleep apnea     Does not use c-pap since weight loss - 170 lbs       Past Surgical History:   Procedure Laterality Date    APPENDECTOMY      CARPAL TUNNEL RELEASE Bilateral      SECTION      CHOLECYSTECTOMY      FRACTURE SURGERY      ankle    gastric sleve      HYSTERECTOMY      KNEE SURGERY      LUMBAR EPIDURAL INJECTION      SINUS SURGERY         Current Outpatient Prescriptions   Medication Sig    amitriptyline (ELAVIL) 25 MG tablet TAKE ONE TABLET BY MOUTH EVERY NIGHT FOR 2 WEEKS THEN INCREASE TO 2 TABLETS AT BEDTIME    celecoxib (CELEBREX) 200 MG capsule TAKE ONE CAPSULE BY MOUTH EVERY DAY    cetirizine (ZYRTEC) 5 MG tablet Take 5 mg by mouth once daily.    cholecalciferol, vitamin D3, 50,000 unit capsule Take 50,000 Units by mouth once a week.     cyanocobalamin, vitamin B-12, 1,000 mcg/mL Kit Inject 1 mL into the muscle every 21 days.    cyclobenzaprine (FLEXERIL) 10 MG tablet TAKE ONE TABLET BY MOUTH 3 TIMES A DAY AS NEEDED FOR MUSCLE SPASMS    diclofenac sodium 1 % Gel daily as needed.     epinephrine (EPIPEN) 0.3 mg/0.3 mL (1:1,000) AtIn 0.3 mg daily as needed.     FERROUS SULFATE (IRON ORAL) Take by mouth.    fluoxetine (PROZAC) 20 MG capsule     hydrocodone-acetaminophen 10-325mg (NORCO)  mg Tab Take by mouth.    hydrOXYzine pamoate (VISTARIL) 25 MG Cap TAKE ONE CAPSULE BY MOUTH 3 TIMES A DAY AS NEEDED FOR ANXIETY    ibuprofen (ADVIL,MOTRIN) 800 MG tablet every 6 (six) hours as needed.     isometheptene-apap-dichloralphenazone [id number] (MIDRIN) -325 mg per capsule Take 1 capsule by mouth 4 (four) times daily as needed.    ketorolac (TORADOL) 60 mg/2 mL Soln Please provide patients with IV syringes    lorazepam (ATIVAN) 0.5 MG tablet Take 0.5 mg by mouth every 4 (four) hours as needed.     oxycodone (OXYCONTIN) 15 mg TR12 12 hr tablet Take 1  tablet (15 mg total) by mouth every 12 (twelve) hours.    oxycodone-acetaminophen (PERCOCET) 5-325 mg per tablet Take 1 tablet by mouth every 6 (six) hours as needed.    potassium chloride SA (K-DUR,KLOR-CON) 20 MEQ tablet Take 20 mEq by mouth once daily.    promethazine (PHENERGAN) 25 MG tablet Take 1 tablet by mouth every 6 (six) hours as needed (migraine). -MAY CAUSE DROWSINESS-    pseudoephedrine-DM-guaifenesin 45- mg Tab Take 1 tablet by mouth daily as needed.     VOLTAREN 1 % Gel daily as needed.     sumatriptan succinate 4 mg/0.5 mL PnIj Inject 4 mg into the skin daily as needed. imitrex     Current Facility-Administered Medications   Medication    onabotulinumtoxina injection 200 Units       Review of patient's allergies indicates:   Allergen Reactions    Clarithromycin Swelling and Other (See Comments)     DIFFICULTY SWALLOWING  DIFFICULTY SWALLOWING    Pcn [penicillins] Anaphylaxis    Sulfa (sulfonamide antibiotics) Hives    Wellbutrin [bupropion hcl] Shortness Of Breath and Anaphylaxis    Betadine [povidone-iodine] Hives     Swelling      Cefprozil Hives    Iodinated contrast- oral and iv dye Hives    Latex, natural rubber Rash    Sulfamethoxazole-trimethoprim Nausea And Vomiting    Iodine Hives and Swelling    Latex Hives and Swelling       Family History   Problem Relation Age of Onset    Heart disease Mother        Social History     Social History    Marital status: Single     Spouse name: N/A    Number of children: N/A    Years of education: N/A     Occupational History    Not on file.     Social History Main Topics    Smoking status: Current Every Day Smoker     Packs/day: 0.50     Years: 20.00     Types: Cigarettes    Smokeless tobacco: Never Used    Alcohol use 0.0 oz/week      Comment: occasionally    Drug use: No    Sexual activity: Not on file     Other Topics Concern    Not on file     Social History Narrative    No narrative on file       Chief Complaint:    Chief Complaint   Patient presents with    Post-op Evaluation     s/p left knee MPFL reconstruction 9/29/17        Date of surgery: September 29, 2017    History of present illness: 37-year-old female underwent allograft MPFL reconstruction.  She is exited doing pretty well.  Compliant with the brace.  Has not been using the crutches.  Pain of 6 out of 10.  No real swelling.  No redness or erythema.      Review of Systems:    Musculoskeletal:  See HPI        Physical Examination:    Vital Signs:    Vitals:    10/16/17 1455   BP: 134/68   Pulse: (!) 111       Body mass index is 31.19 kg/m².    This a well-developed, well nourished patient in no acute distress.  They are alert and oriented and cooperative to examination.  Pt. walks without an antalgic gait.      Examination left knee shows well-healing surgical incisions.  No erythema or drainage.  No real joint effusion.  Range of motion is already 0-90°.    X-rays: None     Assessment:: Status post left MPFL reconstruction    Plan:  I reviewed the findings with her today.  I changed her out of the hinge knee brace and put her in a Shields brace.  Patient states that her insurance has run out and cannot afford physical therapy.  I printed a out a rehabilitation protocol.  I will see her back in 4 weeks.    This note was created using Dragon voice recognition software that occasionally misinterpreted phrases or words.

## 2017-10-24 ENCOUNTER — PATIENT MESSAGE (OUTPATIENT)
Dept: ORTHOPEDICS | Facility: CLINIC | Age: 37
End: 2017-10-24

## 2017-11-05 NOTE — PLAN OF CARE
Pt states saúl there pain level is a 4/10 now    She is complaining of nausea  Medications given per oder   Statement Selected

## 2017-12-14 ENCOUNTER — PATIENT MESSAGE (OUTPATIENT)
Dept: PAIN MEDICINE | Facility: CLINIC | Age: 37
End: 2017-12-14

## 2017-12-14 ENCOUNTER — PATIENT MESSAGE (OUTPATIENT)
Dept: ORTHOPEDICS | Facility: CLINIC | Age: 37
End: 2017-12-14

## 2018-07-18 DIAGNOSIS — M25.561 RIGHT KNEE PAIN, UNSPECIFIED CHRONICITY: Primary | ICD-10-CM

## 2018-07-19 ENCOUNTER — HOSPITAL ENCOUNTER (OUTPATIENT)
Dept: RADIOLOGY | Facility: HOSPITAL | Age: 38
Discharge: HOME | End: 2018-07-19
Attending: ORTHOPAEDIC SURGERY
Payer: COMMERCIAL

## 2018-07-19 ENCOUNTER — OFFICE VISIT (OUTPATIENT)
Dept: ORTHOPEDICS | Facility: CLINIC | Age: 38
End: 2018-07-19
Payer: COMMERCIAL

## 2018-07-19 VITALS
HEIGHT: 72 IN | HEART RATE: 80 BPM | WEIGHT: 230 LBS | DIASTOLIC BLOOD PRESSURE: 54 MMHG | BODY MASS INDEX: 31.15 KG/M2 | SYSTOLIC BLOOD PRESSURE: 112 MMHG

## 2018-07-19 DIAGNOSIS — M25.561 RIGHT KNEE PAIN, UNSPECIFIED CHRONICITY: ICD-10-CM

## 2018-07-19 DIAGNOSIS — M17.11 ARTHRITIS OF RIGHT KNEE: Primary | ICD-10-CM

## 2018-07-19 PROCEDURE — 73564 X-RAY EXAM KNEE 4 OR MORE: CPT | Mod: TC,PN,RT

## 2018-07-19 PROCEDURE — 99999 PR PBB SHADOW E&M-EST. PATIENT-LVL III: CPT | Mod: PBBFAC,,, | Performed by: ORTHOPAEDIC SURGERY

## 2018-07-19 PROCEDURE — 73562 X-RAY EXAM OF KNEE 3: CPT | Mod: 26,XS,LT, | Performed by: RADIOLOGY

## 2018-07-19 PROCEDURE — 73564 X-RAY EXAM KNEE 4 OR MORE: CPT | Mod: 26,RT,, | Performed by: RADIOLOGY

## 2018-07-19 PROCEDURE — 99213 OFFICE O/P EST LOW 20 MIN: CPT | Mod: S$GLB,,, | Performed by: ORTHOPAEDIC SURGERY

## 2018-07-19 NOTE — PROGRESS NOTES
Past Medical History:   Diagnosis Date    Arthritis     OA    Depression     Migraine     Seizures     Sleep apnea     Does not use c-pap since weight loss - 170 lbs       Past Surgical History:   Procedure Laterality Date    APPENDECTOMY      CARPAL TUNNEL RELEASE Bilateral      SECTION      CHOLECYSTECTOMY      FRACTURE SURGERY      ankle    gastric sleve      HYSTERECTOMY      KNEE SURGERY      LUMBAR EPIDURAL INJECTION      SINUS SURGERY         Current Outpatient Prescriptions   Medication Sig    amitriptyline (ELAVIL) 25 MG tablet TAKE ONE TABLET BY MOUTH EVERY NIGHT FOR 2 WEEKS THEN INCREASE TO 2 TABLETS AT BEDTIME    celecoxib (CELEBREX) 200 MG capsule TAKE ONE CAPSULE BY MOUTH EVERY DAY    cetirizine (ZYRTEC) 5 MG tablet Take 5 mg by mouth once daily.    cholecalciferol, vitamin D3, 50,000 unit capsule Take 50,000 Units by mouth once a week.     cyanocobalamin, vitamin B-12, 1,000 mcg/mL Kit Inject 1 mL into the muscle every 21 days.    cyclobenzaprine (FLEXERIL) 10 MG tablet TAKE ONE TABLET BY MOUTH 3 TIMES A DAY AS NEEDED FOR MUSCLE SPASMS    diclofenac sodium 1 % Gel daily as needed.     epinephrine (EPIPEN) 0.3 mg/0.3 mL (1:1,000) AtIn 0.3 mg daily as needed.     FERROUS SULFATE (IRON ORAL) Take by mouth.    fluoxetine (PROZAC) 20 MG capsule     hydrocodone-acetaminophen 10-325mg (NORCO)  mg Tab Take by mouth.    hydrOXYzine pamoate (VISTARIL) 25 MG Cap TAKE ONE CAPSULE BY MOUTH 3 TIMES A DAY AS NEEDED FOR ANXIETY    ibuprofen (ADVIL,MOTRIN) 800 MG tablet every 6 (six) hours as needed.     isometheptene-apap-dichloralphenazone [id number] (MIDRIN) -325 mg per capsule Take 1 capsule by mouth 4 (four) times daily as needed.    ketorolac (TORADOL) 60 mg/2 mL Soln Please provide patients with IV syringes    lorazepam (ATIVAN) 0.5 MG tablet Take 0.5 mg by mouth every 4 (four) hours as needed.     oxycodone (OXYCONTIN) 15 mg TR12 12 hr tablet Take 1 tablet  (15 mg total) by mouth every 12 (twelve) hours.    oxycodone-acetaminophen (PERCOCET) 5-325 mg per tablet Take 1 tablet by mouth every 6 (six) hours as needed.    potassium chloride SA (K-DUR,KLOR-CON) 20 MEQ tablet Take 20 mEq by mouth once daily.    promethazine (PHENERGAN) 25 MG tablet Take 1 tablet by mouth every 6 (six) hours as needed (migraine). -MAY CAUSE DROWSINESS-    pseudoephedrine-DM-guaifenesin 45- mg Tab Take 1 tablet by mouth daily as needed.     VOLTAREN 1 % Gel daily as needed.     sumatriptan succinate 4 mg/0.5 mL PnIj Inject 4 mg into the skin daily as needed. imitrex     Current Facility-Administered Medications   Medication    onabotulinumtoxina injection 200 Units       Review of patient's allergies indicates:   Allergen Reactions    Clarithromycin Swelling and Other (See Comments)     DIFFICULTY SWALLOWING  DIFFICULTY SWALLOWING    Pcn [penicillins] Anaphylaxis    Sulfa (sulfonamide antibiotics) Hives    Wellbutrin [bupropion hcl] Shortness Of Breath and Anaphylaxis    Betadine [povidone-iodine] Hives     Swelling      Cefprozil Hives    Iodinated contrast- oral and iv dye Hives    Latex, natural rubber Rash    Sulfamethoxazole-trimethoprim Nausea And Vomiting    Iodine Hives and Swelling    Latex Hives and Swelling       Family History   Problem Relation Age of Onset    Heart disease Mother        Social History     Social History    Marital status: Single     Spouse name: N/A    Number of children: N/A    Years of education: N/A     Occupational History    Not on file.     Social History Main Topics    Smoking status: Current Every Day Smoker     Packs/day: 0.50     Years: 20.00     Types: Cigarettes    Smokeless tobacco: Never Used    Alcohol use 0.0 oz/week      Comment: occasionally    Drug use: No    Sexual activity: Not on file     Other Topics Concern    Not on file     Social History Narrative    No narrative on file       Chief Complaint:   Chief  Complaint   Patient presents with    Right Knee - Pain       History of present illness:  This is a 38-year-old patient of mine seen for a new complaint of right knee pain.  Patient has had a long history of problems with the left knee but the right knee started few months ago.  Pretty significant pain and swelling that a DrMainor drained int and injected it with cortisone about 2 weeks ago.  Still having pain with periodic swelling.  Gives out a lot.  Pain along the lateral aspect of her knee. Patient had an MRI done but we do not have that today.  Pain of 7/10.      Review of Systems:    Constitution: Negative for chills, fever, and sweats.  Negative for unexplained weight loss.    HENT:  Negative for headaches and blurry vision.    Cardiovascular:Negative for chest pain or irregular heart beat. Negative for hypertension.    Respiratory:  Negative for cough and shortness of breath.    Gastrointestinal: Negative for abdominal pain, heartburn, melena, nausea, and vomitting.    Genitourinary:  Negative bladder incontinence and dysuria.    Musculoskeletal:  See HPI    Neurological: Negative for numbness.    Psychiatric/Behavioral: Negative for depression.  The patient is not nervous/anxious.      Endocrine: Negative for polyuria    Hematologic/Lymphatic: Negative for bleeding problem.  Does not bruise/bleed easily.    Skin: Negative for poor would healing and rash      Physical Examination:    Vital Signs:    Vitals:    07/19/18 1338   BP: (!) 112/54   Pulse: 80       Body mass index is 31.19 kg/m².    This a well-developed, well nourished patient in no acute distress.  They are alert and oriented and cooperative to examination.  Pt. walks without an antalgic gait.      Examination of the right knee shows no rashes or erythema. There are no masses ecchymosis or effusion. Patient has full range of motion from 0-130°. Patient is mildly tender to palpation over lateral joint line and nontender to palpation over the medial  joint line. Patient has a - Lachman exam, - anterior drawer exam, and - posterior drawer exam. - Kwasi's exam. Knee is stable to varus and valgus stress. 5 out of 5 motor strength. Palpable distal pulses. Intact light touch sensation.  Moderate Patellofemoral crepitus      X-rays:  X-rays of the right knee are ordered and reviewed which show moderate arthritic changes particularly of the patellofemoral joint and some medial narrowing     Assessment::  Right knee arthritis    Plan:  Reviewed the findings with her today.  I will have them send the MRI report.  I recommended a Synvisc series since this worked pretty well for her other knee.  We will get approval.    This note was created using M [employer] voice recognition software that occasionally misinterpreted phrases or words.    Consult note is delivered via Epic messaging service.

## 2018-07-20 DIAGNOSIS — M17.11 PRIMARY OSTEOARTHRITIS OF RIGHT KNEE: Primary | ICD-10-CM

## 2018-07-24 ENCOUNTER — TELEPHONE (OUTPATIENT)
Dept: ORTHOPEDICS | Facility: CLINIC | Age: 38
End: 2018-07-24

## 2018-07-24 NOTE — TELEPHONE ENCOUNTER
----- Message from Jennifer Hollis MA sent at 7/24/2018 11:02 AM CDT -----  Contact: Yani//[facility]  Yani AGUSTIN stating she needs a cover sheet with the information needed before she can fax the MRI report previously requested.   FAX# [phone]  Thanks

## 2018-07-30 ENCOUNTER — PATIENT MESSAGE (OUTPATIENT)
Dept: ORTHOPEDICS | Facility: CLINIC | Age: 38
End: 2018-07-30

## 2018-08-02 ENCOUNTER — OFFICE VISIT (OUTPATIENT)
Dept: ORTHOPEDICS | Facility: CLINIC | Age: 38
End: 2018-08-02
Payer: COMMERCIAL

## 2018-08-02 VITALS — HEIGHT: 72 IN | WEIGHT: 230 LBS | BODY MASS INDEX: 31.15 KG/M2

## 2018-08-02 DIAGNOSIS — M17.11 ARTHRITIS OF RIGHT KNEE: Primary | ICD-10-CM

## 2018-08-02 PROCEDURE — 99499 UNLISTED E&M SERVICE: CPT | Mod: S$GLB,,, | Performed by: ORTHOPAEDIC SURGERY

## 2018-08-02 PROCEDURE — 20610 DRAIN/INJ JOINT/BURSA W/O US: CPT | Mod: RT,S$GLB,, | Performed by: ORTHOPAEDIC SURGERY

## 2018-08-02 PROCEDURE — 99999 PR PBB SHADOW E&M-EST. PATIENT-LVL III: CPT | Mod: PBBFAC,,, | Performed by: ORTHOPAEDIC SURGERY

## 2018-08-02 NOTE — PROCEDURES
Large Joint Aspiration/Injection  Date/Time: 8/2/2018 1:39 PM  Performed by: PARIS KAY  Authorized by: PARIS KAY     Consent Done?:  Yes (Verbal)  Indications:  Pain  Procedure site marked: Yes    Timeout: Prior to procedure the correct patient, procedure, and site was verified      Location:  Knee  Site:  R knee  Prep: Patient was prepped and draped in usual sterile fashion    Needle size:  20 G  Approach:  Anterolateral  Medications:  16 mg hyaluronate 16 mg/2 mL  Patient tolerance:  Patient tolerated the procedure well with no immediate complications

## 2018-08-02 NOTE — PROGRESS NOTES
Past Medical History:   Diagnosis Date    Arthritis     OA    Depression     Migraine     Seizures     Sleep apnea     Does not use c-pap since weight loss - 170 lbs       Past Surgical History:   Procedure Laterality Date    APPENDECTOMY      CARPAL TUNNEL RELEASE Bilateral      SECTION      CHOLECYSTECTOMY      FRACTURE SURGERY      ankle    gastric sleve      HYSTERECTOMY      KNEE SURGERY      LUMBAR EPIDURAL INJECTION      SINUS SURGERY         Current Outpatient Prescriptions   Medication Sig    amitriptyline (ELAVIL) 25 MG tablet TAKE ONE TABLET BY MOUTH EVERY NIGHT FOR 2 WEEKS THEN INCREASE TO 2 TABLETS AT BEDTIME    celecoxib (CELEBREX) 200 MG capsule TAKE ONE CAPSULE BY MOUTH EVERY DAY    cetirizine (ZYRTEC) 5 MG tablet Take 5 mg by mouth once daily.    cholecalciferol, vitamin D3, 50,000 unit capsule Take 50,000 Units by mouth once a week.     cyanocobalamin, vitamin B-12, 1,000 mcg/mL Kit Inject 1 mL into the muscle every 21 days.    cyclobenzaprine (FLEXERIL) 10 MG tablet TAKE ONE TABLET BY MOUTH 3 TIMES A DAY AS NEEDED FOR MUSCLE SPASMS    diclofenac sodium 1 % Gel daily as needed.     epinephrine (EPIPEN) 0.3 mg/0.3 mL (1:1,000) AtIn 0.3 mg daily as needed.     FERROUS SULFATE (IRON ORAL) Take by mouth.    fluoxetine (PROZAC) 20 MG capsule     hydrocodone-acetaminophen 10-325mg (NORCO)  mg Tab Take by mouth.    hydrOXYzine pamoate (VISTARIL) 25 MG Cap TAKE ONE CAPSULE BY MOUTH 3 TIMES A DAY AS NEEDED FOR ANXIETY    ibuprofen (ADVIL,MOTRIN) 800 MG tablet every 6 (six) hours as needed.     isometheptene-apap-dichloralphenazone [id number] (MIDRIN) -325 mg per capsule Take 1 capsule by mouth 4 (four) times daily as needed.    ketorolac (TORADOL) 60 mg/2 mL Soln Please provide patients with IV syringes    lorazepam (ATIVAN) 0.5 MG tablet Take 0.5 mg by mouth every 4 (four) hours as needed.     oxycodone (OXYCONTIN) 15 mg TR12 12 hr tablet Take 1 tablet  (15 mg total) by mouth every 12 (twelve) hours.    oxycodone-acetaminophen (PERCOCET) 5-325 mg per tablet Take 1 tablet by mouth every 6 (six) hours as needed.    potassium chloride SA (K-DUR,KLOR-CON) 20 MEQ tablet Take 20 mEq by mouth once daily.    promethazine (PHENERGAN) 25 MG tablet Take 1 tablet by mouth every 6 (six) hours as needed (migraine). -MAY CAUSE DROWSINESS-    pseudoephedrine-DM-guaifenesin 45- mg Tab Take 1 tablet by mouth daily as needed.     sumatriptan succinate 4 mg/0.5 mL PnIj Inject 4 mg into the skin daily as needed. imitrex    VOLTAREN 1 % Gel daily as needed.      Current Facility-Administered Medications   Medication    onabotulinumtoxina injection 200 Units       Review of patient's allergies indicates:   Allergen Reactions    Clarithromycin Swelling and Other (See Comments)     DIFFICULTY SWALLOWING  DIFFICULTY SWALLOWING    Pcn [penicillins] Anaphylaxis    Sulfa (sulfonamide antibiotics) Hives    Wellbutrin [bupropion hcl] Shortness Of Breath and Anaphylaxis    Betadine [povidone-iodine] Hives     Swelling      Cefprozil Hives    Iodinated contrast- oral and iv dye Hives    Latex, natural rubber Rash    Sulfamethoxazole-trimethoprim Nausea And Vomiting    Iodine Hives and Swelling    Latex Hives and Swelling       Family History   Problem Relation Age of Onset    Heart disease Mother        Social History     Social History    Marital status:      Spouse name: N/A    Number of children: N/A    Years of education: N/A     Occupational History    Not on file.     Social History Main Topics    Smoking status: Current Every Day Smoker     Packs/day: 0.50     Years: 20.00     Types: Cigarettes    Smokeless tobacco: Never Used    Alcohol use 0.0 oz/week      Comment: occasionally    Drug use: No    Sexual activity: Not on file     Other Topics Concern    Not on file     Social History Narrative    No narrative on file       Chief Complaint:   Chief  Complaint   Patient presents with    Knee Pain     right knee synvisc 1/3       History of present illness:  This is a 38-year-old patient of mine seen for a new complaint of right knee pain.  Patient has had a long history of problems with the left knee but the right knee started few months ago.  Pretty significant pain and swelling that a DrMainor drained int and injected it with cortisone about 2 weeks ago.  Still having pain with periodic swelling.  Gives out a lot.  Pain along the lateral aspect of her knee. Patient had an MRI done but we do not have that today.  Pain of 7/10.      Review of Systems:    Constitution: Negative for chills, fever, and sweats.  Negative for unexplained weight loss.    HENT:  Negative for headaches and blurry vision.    Cardiovascular:Negative for chest pain or irregular heart beat. Negative for hypertension.    Respiratory:  Negative for cough and shortness of breath.    Gastrointestinal: Negative for abdominal pain, heartburn, melena, nausea, and vomitting.    Genitourinary:  Negative bladder incontinence and dysuria.    Musculoskeletal:  See HPI    Neurological: Negative for numbness.    Psychiatric/Behavioral: Negative for depression.  The patient is not nervous/anxious.      Endocrine: Negative for polyuria    Hematologic/Lymphatic: Negative for bleeding problem.  Does not bruise/bleed easily.    Skin: Negative for poor would healing and rash      Physical Examination:    Vital Signs:    There were no vitals filed for this visit.    Body mass index is 31.19 kg/m².    This a well-developed, well nourished patient in no acute distress.  They are alert and oriented and cooperative to examination.  Pt. walks without an antalgic gait.      Examination of the right knee shows no rashes or erythema. There are no masses ecchymosis or effusion. Patient has full range of motion from 0-130°. Patient is mildly tender to palpation over lateral joint line and nontender to palpation over the  medial joint line. Patient has a - Lachman exam, - anterior drawer exam, and - posterior drawer exam. - Kwasi's exam. Knee is stable to varus and valgus stress. 5 out of 5 motor strength. Palpable distal pulses. Intact light touch sensation.  Moderate Patellofemoral crepitus      X-rays:  X-rays of the right knee are  reviewed which show moderate arthritic changes particularly of the patellofemoral joint and some medial narrowing    MRI report from outside facility:  Arthritic changes with medial meniscal tear.     Assessment::  Right knee arthritis    Plan:  Reviewed the findings with her today.  I injected her right knee with Synvisc 1 of 3.  Follow up next week to continue.    This note was created using M [employer] voice recognition software that occasionally misinterpreted phrases or words.    Consult note is delivered via Epic messaging service.

## 2018-08-07 DIAGNOSIS — M25.561 RIGHT KNEE PAIN, UNSPECIFIED CHRONICITY: Primary | ICD-10-CM

## 2018-08-08 ENCOUNTER — OFFICE VISIT (OUTPATIENT)
Dept: ORTHOPEDICS | Facility: CLINIC | Age: 38
End: 2018-08-08
Payer: COMMERCIAL

## 2018-08-08 ENCOUNTER — HOSPITAL ENCOUNTER (OUTPATIENT)
Dept: RADIOLOGY | Facility: HOSPITAL | Age: 38
Discharge: HOME | End: 2018-08-08
Attending: ORTHOPAEDIC SURGERY
Payer: COMMERCIAL

## 2018-08-08 DIAGNOSIS — M17.12 PATELLOFEMORAL ARTHRITIS OF LEFT KNEE: Primary | ICD-10-CM

## 2018-08-08 DIAGNOSIS — M25.562 BILATERAL KNEE PAIN: ICD-10-CM

## 2018-08-08 DIAGNOSIS — M25.362 PATELLAR INSTABILITY OF LEFT KNEE: ICD-10-CM

## 2018-08-08 DIAGNOSIS — M25.561 BILATERAL KNEE PAIN: ICD-10-CM

## 2018-08-08 DIAGNOSIS — M17.11 PRIMARY OSTEOARTHRITIS OF RIGHT KNEE: ICD-10-CM

## 2018-08-08 DIAGNOSIS — M25.561 RIGHT KNEE PAIN, UNSPECIFIED CHRONICITY: ICD-10-CM

## 2018-08-08 PROCEDURE — 20610 DRAIN/INJ JOINT/BURSA W/O US: CPT | Mod: LT,S$GLB,, | Performed by: ORTHOPAEDIC SURGERY

## 2018-08-08 PROCEDURE — 20610 DRAIN/INJ JOINT/BURSA W/O US: CPT | Mod: 51,RT,S$GLB, | Performed by: ORTHOPAEDIC SURGERY

## 2018-08-08 PROCEDURE — 99999 PR PBB SHADOW E&M-EST. PATIENT-LVL II: CPT | Mod: PBBFAC,,, | Performed by: ORTHOPAEDIC SURGERY

## 2018-08-08 PROCEDURE — 73565 X-RAY EXAM OF KNEES: CPT | Mod: TC,PO

## 2018-08-08 PROCEDURE — 73565 X-RAY EXAM OF KNEES: CPT | Mod: 26,,, | Performed by: RADIOLOGY

## 2018-08-08 PROCEDURE — 99499 UNLISTED E&M SERVICE: CPT | Mod: S$GLB,,, | Performed by: ORTHOPAEDIC SURGERY

## 2018-08-08 RX ORDER — TRIAMCINOLONE ACETONIDE 40 MG/ML
40 INJECTION, SUSPENSION INTRA-ARTICULAR; INTRAMUSCULAR
Status: DISCONTINUED | OUTPATIENT
Start: 2018-08-08 | End: 2018-08-08 | Stop reason: HOSPADM

## 2018-08-08 RX ADMIN — TRIAMCINOLONE ACETONIDE 40 MG: 40 INJECTION, SUSPENSION INTRA-ARTICULAR; INTRAMUSCULAR at 03:08

## 2018-08-08 NOTE — PROGRESS NOTES
Past Medical History:   Diagnosis Date    Arthritis     OA    Depression     Migraine     Seizures     Sleep apnea     Does not use c-pap since weight loss - 170 lbs       Past Surgical History:   Procedure Laterality Date    APPENDECTOMY      CARPAL TUNNEL RELEASE Bilateral      SECTION      CHOLECYSTECTOMY      FRACTURE SURGERY      ankle    gastric sleve      HYSTERECTOMY      KNEE SURGERY      LUMBAR EPIDURAL INJECTION      SINUS SURGERY         Current Outpatient Prescriptions   Medication Sig    amitriptyline (ELAVIL) 25 MG tablet TAKE ONE TABLET BY MOUTH EVERY NIGHT FOR 2 WEEKS THEN INCREASE TO 2 TABLETS AT BEDTIME    celecoxib (CELEBREX) 200 MG capsule TAKE ONE CAPSULE BY MOUTH EVERY DAY    cetirizine (ZYRTEC) 5 MG tablet Take 5 mg by mouth once daily.    cholecalciferol, vitamin D3, 50,000 unit capsule Take 50,000 Units by mouth once a week.     cyanocobalamin, vitamin B-12, 1,000 mcg/mL Kit Inject 1 mL into the muscle every 21 days.    cyclobenzaprine (FLEXERIL) 10 MG tablet TAKE ONE TABLET BY MOUTH 3 TIMES A DAY AS NEEDED FOR MUSCLE SPASMS    diclofenac sodium 1 % Gel daily as needed.     epinephrine (EPIPEN) 0.3 mg/0.3 mL (1:1,000) AtIn 0.3 mg daily as needed.     FERROUS SULFATE (IRON ORAL) Take by mouth.    fluoxetine (PROZAC) 20 MG capsule     hydrocodone-acetaminophen 10-325mg (NORCO)  mg Tab Take by mouth.    hydrOXYzine pamoate (VISTARIL) 25 MG Cap TAKE ONE CAPSULE BY MOUTH 3 TIMES A DAY AS NEEDED FOR ANXIETY    ibuprofen (ADVIL,MOTRIN) 800 MG tablet every 6 (six) hours as needed.     isometheptene-apap-dichloralphenazone [id number] (MIDRIN) -325 mg per capsule Take 1 capsule by mouth 4 (four) times daily as needed.    ketorolac (TORADOL) 60 mg/2 mL Soln Please provide patients with IV syringes    lorazepam (ATIVAN) 0.5 MG tablet Take 0.5 mg by mouth every 4 (four) hours as needed.     oxycodone (OXYCONTIN) 15 mg TR12 12 hr tablet Take 1 tablet  (15 mg total) by mouth every 12 (twelve) hours.    oxycodone-acetaminophen (PERCOCET) 5-325 mg per tablet Take 1 tablet by mouth every 6 (six) hours as needed.    potassium chloride SA (K-DUR,KLOR-CON) 20 MEQ tablet Take 20 mEq by mouth once daily.    promethazine (PHENERGAN) 25 MG tablet Take 1 tablet by mouth every 6 (six) hours as needed (migraine). -MAY CAUSE DROWSINESS-    pseudoephedrine-DM-guaifenesin 45- mg Tab Take 1 tablet by mouth daily as needed.     sumatriptan succinate 4 mg/0.5 mL PnIj Inject 4 mg into the skin daily as needed. imitrex    VOLTAREN 1 % Gel daily as needed.      Current Facility-Administered Medications   Medication    onabotulinumtoxina injection 200 Units       Review of patient's allergies indicates:   Allergen Reactions    Clarithromycin Swelling and Other (See Comments)     DIFFICULTY SWALLOWING  DIFFICULTY SWALLOWING    Pcn [penicillins] Anaphylaxis    Sulfa (sulfonamide antibiotics) Hives    Wellbutrin [bupropion hcl] Shortness Of Breath and Anaphylaxis    Betadine [povidone-iodine] Hives     Swelling      Cefprozil Hives    Iodinated contrast- oral and iv dye Hives    Latex, natural rubber Rash    Sulfamethoxazole-trimethoprim Nausea And Vomiting    Iodine Hives and Swelling    Latex Hives and Swelling       Family History   Problem Relation Age of Onset    Heart disease Mother        Social History     Social History    Marital status:      Spouse name: N/A    Number of children: N/A    Years of education: N/A     Occupational History    Not on file.     Social History Main Topics    Smoking status: Current Every Day Smoker     Packs/day: 0.50     Years: 20.00     Types: Cigarettes    Smokeless tobacco: Never Used    Alcohol use 0.0 oz/week      Comment: occasionally    Drug use: No    Sexual activity: Not on file     Other Topics Concern    Not on file     Social History Narrative    No narrative on file       Chief Complaint:   No  chief complaint on file.      History of present illness:  This is a 38-year-old patient of mine seen for a new complaint of right knee pain.  Patient has had a long history of problems with the left knee but the right knee started few months ago.  Pretty significant pain and swelling that a DrMainor drained int and injected it with cortisone about 2 weeks ago.  Still having pain with periodic swelling.  Gives out a lot.  Pain along the lateral aspect of her knee. Patient had an MRI done but we do not have that today.  Pain of 7/10.      Review of Systems:    Constitution: Negative for chills, fever, and sweats.  Negative for unexplained weight loss.    HENT:  Negative for headaches and blurry vision.    Cardiovascular:Negative for chest pain or irregular heart beat. Negative for hypertension.    Respiratory:  Negative for cough and shortness of breath.    Gastrointestinal: Negative for abdominal pain, heartburn, melena, nausea, and vomitting.    Genitourinary:  Negative bladder incontinence and dysuria.    Musculoskeletal:  See HPI    Neurological: Negative for numbness.    Psychiatric/Behavioral: Negative for depression.  The patient is not nervous/anxious.      Endocrine: Negative for polyuria    Hematologic/Lymphatic: Negative for bleeding problem.  Does not bruise/bleed easily.    Skin: Negative for poor would healing and rash      Physical Examination:    Vital Signs:    There were no vitals filed for this visit.    There is no height or weight on file to calculate BMI.    This a well-developed, well nourished patient in no acute distress.  They are alert and oriented and cooperative to examination.  Pt. walks without an antalgic gait.      Examination of the right knee shows no rashes or erythema. There are no masses ecchymosis or effusion. Patient has full range of motion from 0-130°. Patient is mildly tender to palpation over lateral joint line and nontender to palpation over the medial joint line. Patient has a  - Lachman exam, - anterior drawer exam, and - posterior drawer exam. - Kwasi's exam. Knee is stable to varus and valgus stress. 5 out of 5 motor strength. Palpable distal pulses. Intact light touch sensation.  Moderate Patellofemoral crepitus      X-rays:  X-rays of the right knee are  reviewed which show moderate arthritic changes particularly of the patellofemoral joint and some medial narrowing    MRI report from outside facility:  Arthritic changes with medial meniscal tear.     Assessment::  Right knee arthritis  Left knee arthritis    Plan:  Reviewed the findings with her today.  I injected her right knee with Synvisc 2 of 3.  Inject her left knee with cortisone.  Placed her in a Shields brace as well. Follow up next week to continue.    This note was created using [employer] voice recognition software that occasionally misinterpreted phrases or words.    Consult note is delivered via Epic messaging service.

## 2018-08-08 NOTE — PROCEDURES
Large Joint Aspiration/Injection  Date/Time: 8/8/2018 3:02 PM  Performed by: PARIS KAY  Authorized by: PARIS KAY     Consent Done?:  Yes (Verbal)  Indications:  Pain  Procedure site marked: Yes    Timeout: Prior to procedure the correct patient, procedure, and site was verified      Location:  Knee  Site:  L knee and R knee  Prep: Patient was prepped and draped in usual sterile fashion    Needle size:  20 G  Approach:  Anterolateral  Medications:  40 mg triamcinolone acetonide 40 mg/mL; 16 mg hyaluronate 16 mg/2 mL  Patient tolerance:  Patient tolerated the procedure well with no immediate complications

## 2018-08-16 ENCOUNTER — OFFICE VISIT (OUTPATIENT)
Dept: ORTHOPEDICS | Facility: CLINIC | Age: 38
End: 2018-08-16
Payer: COMMERCIAL

## 2018-08-16 DIAGNOSIS — M76.52 PATELLAR TENDINITIS, LEFT KNEE: ICD-10-CM

## 2018-08-16 DIAGNOSIS — M25.562 LEFT KNEE PAIN, UNSPECIFIED CHRONICITY: Primary | ICD-10-CM

## 2018-08-16 DIAGNOSIS — M17.11 PRIMARY OSTEOARTHRITIS OF RIGHT KNEE: Primary | ICD-10-CM

## 2018-08-16 PROCEDURE — 20610 DRAIN/INJ JOINT/BURSA W/O US: CPT | Mod: RT,S$GLB,, | Performed by: ORTHOPAEDIC SURGERY

## 2018-08-16 PROCEDURE — 99213 OFFICE O/P EST LOW 20 MIN: CPT | Mod: 25,S$GLB,, | Performed by: ORTHOPAEDIC SURGERY

## 2018-08-16 PROCEDURE — 99999 PR PBB SHADOW E&M-EST. PATIENT-LVL II: CPT | Mod: PBBFAC,,, | Performed by: ORTHOPAEDIC SURGERY

## 2018-08-16 NOTE — PROGRESS NOTES
Past Medical History:   Diagnosis Date    Arthritis     OA    Depression     Migraine     Seizures     Sleep apnea     Does not use c-pap since weight loss - 170 lbs       Past Surgical History:   Procedure Laterality Date    APPENDECTOMY      CARPAL TUNNEL RELEASE Bilateral      SECTION      CHOLECYSTECTOMY      FRACTURE SURGERY      ankle    gastric sleve      HYSTERECTOMY      KNEE SURGERY      LUMBAR EPIDURAL INJECTION      SINUS SURGERY         Current Outpatient Medications   Medication Sig    amitriptyline (ELAVIL) 25 MG tablet TAKE ONE TABLET BY MOUTH EVERY NIGHT FOR 2 WEEKS THEN INCREASE TO 2 TABLETS AT BEDTIME    celecoxib (CELEBREX) 200 MG capsule TAKE ONE CAPSULE BY MOUTH EVERY DAY    cetirizine (ZYRTEC) 5 MG tablet Take 5 mg by mouth once daily.    cholecalciferol, vitamin D3, 50,000 unit capsule Take 50,000 Units by mouth once a week.     cyanocobalamin, vitamin B-12, 1,000 mcg/mL Kit Inject 1 mL into the muscle every 21 days.    cyclobenzaprine (FLEXERIL) 10 MG tablet TAKE ONE TABLET BY MOUTH 3 TIMES A DAY AS NEEDED FOR MUSCLE SPASMS    diclofenac sodium 1 % Gel daily as needed.     epinephrine (EPIPEN) 0.3 mg/0.3 mL (1:1,000) AtIn 0.3 mg daily as needed.     FERROUS SULFATE (IRON ORAL) Take by mouth.    fluoxetine (PROZAC) 20 MG capsule     hydrocodone-acetaminophen 10-325mg (NORCO)  mg Tab Take by mouth.    hydrOXYzine pamoate (VISTARIL) 25 MG Cap TAKE ONE CAPSULE BY MOUTH 3 TIMES A DAY AS NEEDED FOR ANXIETY    ibuprofen (ADVIL,MOTRIN) 800 MG tablet every 6 (six) hours as needed.     isometheptene-apap-dichloralphenazone [id number] (MIDRIN) -325 mg per capsule Take 1 capsule by mouth 4 (four) times daily as needed.    ketorolac (TORADOL) 60 mg/2 mL Soln Please provide patients with IV syringes    lorazepam (ATIVAN) 0.5 MG tablet Take 0.5 mg by mouth every 4 (four) hours as needed.     oxycodone (OXYCONTIN) 15 mg TR12 12 hr tablet Take 1 tablet  (15 mg total) by mouth every 12 (twelve) hours.    oxycodone-acetaminophen (PERCOCET) 5-325 mg per tablet Take 1 tablet by mouth every 6 (six) hours as needed.    potassium chloride SA (K-DUR,KLOR-CON) 20 MEQ tablet Take 20 mEq by mouth once daily.    promethazine (PHENERGAN) 25 MG tablet Take 1 tablet by mouth every 6 (six) hours as needed (migraine). -MAY CAUSE DROWSINESS-    pseudoephedrine-DM-guaifenesin 45- mg Tab Take 1 tablet by mouth daily as needed.     VOLTAREN 1 % Gel daily as needed.     sumatriptan succinate 4 mg/0.5 mL PnIj Inject 4 mg into the skin daily as needed. imitrex     Current Facility-Administered Medications   Medication    onabotulinumtoxina injection 200 Units       Review of patient's allergies indicates:   Allergen Reactions    Clarithromycin Swelling and Other (See Comments)     DIFFICULTY SWALLOWING  DIFFICULTY SWALLOWING    Pcn [penicillins] Anaphylaxis    Sulfa (sulfonamide antibiotics) Hives    Wellbutrin [bupropion hcl] Shortness Of Breath and Anaphylaxis    Betadine [povidone-iodine] Hives     Swelling      Cefprozil Hives    Iodinated contrast- oral and iv dye Hives    Latex, natural rubber Rash    Sulfamethoxazole-trimethoprim Nausea And Vomiting    Iodine Hives and Swelling    Latex Hives and Swelling       Family History   Problem Relation Age of Onset    Heart disease Mother        Social History     Socioeconomic History    Marital status:      Spouse name: Not on file    Number of children: Not on file    Years of education: Not on file    Highest education level: Not on file   Social Needs    Financial resource strain: Not on file    Food insecurity - worry: Not on file    Food insecurity - inability: Not on file    Transportation needs - medical: Not on file    Transportation needs - non-medical: Not on file   Occupational History    Not on file   Tobacco Use    Smoking status: Current Every Day Smoker     Packs/day: 0.50      Years: 20.00     Pack years: 10.00     Types: Cigarettes    Smokeless tobacco: Never Used   Substance and Sexual Activity    Alcohol use: Yes     Alcohol/week: 0.0 oz     Comment: occasionally    Drug use: No    Sexual activity: Not on file   Other Topics Concern    Not on file   Social History Narrative    Not on file       Chief Complaint:   Chief Complaint   Patient presents with    Knee Pain     R knee synvisc 3/3       History of present illness:  This is a 38-year-old patient of mine seen for a new complaint of right knee pain.  Patient has had a long history of problems with the left knee but the right knee started few months ago.  Pretty significant pain and swelling that a DrMainor drained it and injected it with cortisone about 2 weeks ago.  Still having pain with periodic swelling.  Gives out a lot.  Pain along the lateral aspect of her knee.  Pain of 7/10.      Review of Systems:    Constitution: Negative for chills, fever, and sweats.  Negative for unexplained weight loss.    HENT:  Negative for headaches and blurry vision.    Cardiovascular:Negative for chest pain or irregular heart beat. Negative for hypertension.    Respiratory:  Negative for cough and shortness of breath.    Gastrointestinal: Negative for abdominal pain, heartburn, melena, nausea, and vomitting.    Genitourinary:  Negative bladder incontinence and dysuria.    Musculoskeletal:  See HPI    Neurological: Negative for numbness.    Psychiatric/Behavioral: Negative for depression.  The patient is not nervous/anxious.      Endocrine: Negative for polyuria    Hematologic/Lymphatic: Negative for bleeding problem.  Does not bruise/bleed easily.    Skin: Negative for poor would healing and rash      Physical Examination:    Vital Signs:    There were no vitals filed for this visit.    There is no height or weight on file to calculate BMI.    This a well-developed, well nourished patient in no acute distress.  They are alert and oriented and  cooperative to examination.  Pt. walks without an antalgic gait.      Examination of the right knee shows no rashes or erythema. There are no masses ecchymosis or effusion. Patient has full range of motion from 0-130°. Patient is mildly tender to palpation over lateral joint line and nontender to palpation over the medial joint line. Patient has a - Lachman exam, - anterior drawer exam, and - posterior drawer exam. - Kwasi's exam. Knee is stable to varus and valgus stress. 5 out of 5 motor strength. Palpable distal pulses. Intact light touch sensation.  Moderate Patellofemoral crepitus    Exam below the left knee shows well-healed surgical portals and scars.  Pain along the medial compartment and underneath the kneecap.  Intact light touch sensation.  Moderate patellofemoral crepitus.  Knee is stable to varus valgus stress.      X-rays:  X-rays of the right knee are  reviewed which show moderate arthritic changes particularly of the patellofemoral joint and some medial narrowing    MRI report from outside facility:  Arthritic changes with medial meniscal tear.     Assessment::  Right knee arthritis  Left knee arthritis    Plan:  Reviewed the findings with her today.  I injected her right knee with Synvisc 3 of 3.  We will get an MRI of her left knee to see with going on.  She is having more increasing pain despite cortisone injections and viscosupplementation.  She has only a little more out from 1 year from her surgery.    This note was created using [employer] voice recognition software that occasionally misinterpreted phrases or words.    Consult note is delivered via Epic messaging service.

## 2018-08-17 ENCOUNTER — HOSPITAL ENCOUNTER (OUTPATIENT)
Dept: RADIOLOGY | Facility: HOSPITAL | Age: 38
Discharge: HOME | End: 2018-08-17
Attending: ORTHOPAEDIC SURGERY
Payer: COMMERCIAL

## 2018-08-17 DIAGNOSIS — M25.562 LEFT KNEE PAIN, UNSPECIFIED CHRONICITY: ICD-10-CM

## 2018-08-17 PROCEDURE — 73721 MRI JNT OF LWR EXTRE W/O DYE: CPT | Mod: 26,LT,, | Performed by: RADIOLOGY

## 2018-08-17 PROCEDURE — 73721 MRI JNT OF LWR EXTRE W/O DYE: CPT | Mod: TC,LT

## 2018-08-22 ENCOUNTER — OFFICE VISIT (OUTPATIENT)
Dept: ORTHOPEDICS | Facility: CLINIC | Age: 38
End: 2018-08-22
Payer: COMMERCIAL

## 2018-08-22 VITALS
WEIGHT: 230 LBS | SYSTOLIC BLOOD PRESSURE: 113 MMHG | HEIGHT: 72 IN | BODY MASS INDEX: 31.15 KG/M2 | HEART RATE: 64 BPM | DIASTOLIC BLOOD PRESSURE: 75 MMHG

## 2018-08-22 DIAGNOSIS — M17.12 PATELLOFEMORAL ARTHRITIS OF LEFT KNEE: Primary | ICD-10-CM

## 2018-08-22 PROCEDURE — 99213 OFFICE O/P EST LOW 20 MIN: CPT | Mod: S$GLB,,, | Performed by: ORTHOPAEDIC SURGERY

## 2018-08-22 PROCEDURE — 99999 PR PBB SHADOW E&M-EST. PATIENT-LVL III: CPT | Mod: PBBFAC,,, | Performed by: ORTHOPAEDIC SURGERY

## 2018-08-22 NOTE — PROGRESS NOTES
Past Medical History:   Diagnosis Date    Arthritis     OA    Depression     Migraine     Seizures     Sleep apnea     Does not use c-pap since weight loss - 170 lbs       Past Surgical History:   Procedure Laterality Date    APPENDECTOMY      CARPAL TUNNEL RELEASE Bilateral      SECTION      CHOLECYSTECTOMY      FRACTURE SURGERY      ankle    gastric sleve      HYSTERECTOMY      KNEE SURGERY      LUMBAR EPIDURAL INJECTION      SINUS SURGERY         Current Outpatient Medications   Medication Sig    amitriptyline (ELAVIL) 25 MG tablet TAKE ONE TABLET BY MOUTH EVERY NIGHT FOR 2 WEEKS THEN INCREASE TO 2 TABLETS AT BEDTIME    celecoxib (CELEBREX) 200 MG capsule TAKE ONE CAPSULE BY MOUTH EVERY DAY    cetirizine (ZYRTEC) 5 MG tablet Take 5 mg by mouth once daily.    cholecalciferol, vitamin D3, 50,000 unit capsule Take 50,000 Units by mouth once a week.     cyanocobalamin, vitamin B-12, 1,000 mcg/mL Kit Inject 1 mL into the muscle every 21 days.    cyclobenzaprine (FLEXERIL) 10 MG tablet TAKE ONE TABLET BY MOUTH 3 TIMES A DAY AS NEEDED FOR MUSCLE SPASMS    diclofenac sodium 1 % Gel daily as needed.     epinephrine (EPIPEN) 0.3 mg/0.3 mL (1:1,000) AtIn 0.3 mg daily as needed.     FERROUS SULFATE (IRON ORAL) Take by mouth.    fluoxetine (PROZAC) 20 MG capsule     hydrocodone-acetaminophen 10-325mg (NORCO)  mg Tab Take by mouth.    hydrOXYzine pamoate (VISTARIL) 25 MG Cap TAKE ONE CAPSULE BY MOUTH 3 TIMES A DAY AS NEEDED FOR ANXIETY    ibuprofen (ADVIL,MOTRIN) 800 MG tablet every 6 (six) hours as needed.     isometheptene-apap-dichloralphenazone [id number] (MIDRIN) -325 mg per capsule Take 1 capsule by mouth 4 (four) times daily as needed.    ketorolac (TORADOL) 60 mg/2 mL Soln Please provide patients with IV syringes    lorazepam (ATIVAN) 0.5 MG tablet Take 0.5 mg by mouth every 4 (four) hours as needed.     oxycodone (OXYCONTIN) 15 mg TR12 12 hr tablet Take 1 tablet  (15 mg total) by mouth every 12 (twelve) hours.    oxycodone-acetaminophen (PERCOCET) 5-325 mg per tablet Take 1 tablet by mouth every 6 (six) hours as needed.    potassium chloride SA (K-DUR,KLOR-CON) 20 MEQ tablet Take 20 mEq by mouth once daily.    promethazine (PHENERGAN) 25 MG tablet Take 1 tablet by mouth every 6 (six) hours as needed (migraine). -MAY CAUSE DROWSINESS-    pseudoephedrine-DM-guaifenesin 45- mg Tab Take 1 tablet by mouth daily as needed.     VOLTAREN 1 % Gel daily as needed.     sumatriptan succinate 4 mg/0.5 mL PnIj Inject 4 mg into the skin daily as needed. imitrex     Current Facility-Administered Medications   Medication    onabotulinumtoxina injection 200 Units       Review of patient's allergies indicates:   Allergen Reactions    Clarithromycin Swelling and Other (See Comments)     DIFFICULTY SWALLOWING  DIFFICULTY SWALLOWING    Pcn [penicillins] Anaphylaxis    Sulfa (sulfonamide antibiotics) Hives    Wellbutrin [bupropion hcl] Shortness Of Breath and Anaphylaxis    Betadine [povidone-iodine] Hives     Swelling      Cefprozil Hives    Iodinated contrast- oral and iv dye Hives    Latex, natural rubber Rash    Sulfamethoxazole-trimethoprim Nausea And Vomiting    Iodine Hives and Swelling    Latex Hives and Swelling       Family History   Problem Relation Age of Onset    Heart disease Mother        Social History     Socioeconomic History    Marital status:      Spouse name: Not on file    Number of children: Not on file    Years of education: Not on file    Highest education level: Not on file   Social Needs    Financial resource strain: Not on file    Food insecurity - worry: Not on file    Food insecurity - inability: Not on file    Transportation needs - medical: Not on file    Transportation needs - non-medical: Not on file   Occupational History    Not on file   Tobacco Use    Smoking status: Current Every Day Smoker     Packs/day: 0.50      Years: 20.00     Pack years: 10.00     Types: Cigarettes    Smokeless tobacco: Never Used   Substance and Sexual Activity    Alcohol use: Yes     Alcohol/week: 0.0 oz     Comment: occasionally    Drug use: No    Sexual activity: Not on file   Other Topics Concern    Not on file   Social History Narrative    Not on file       Chief Complaint:   Chief Complaint   Patient presents with    Left Knee - Pain       History of present illness:  This is a 38-year-old patient of mine seen for recurrent left knee pain. Patient has a history of multiple surgeries on the left knee with multi ligament injury.  She has severe patellofemoral arthritis.  We got an MRI which confirmed no new real injury but significant chondral damage to the patella. Pain is 6 out 10.      Review of Systems:    Constitution: Negative for chills, fever, and sweats.  Negative for unexplained weight loss.    HENT:  Negative for headaches and blurry vision.    Cardiovascular:Negative for chest pain or irregular heart beat. Negative for hypertension.    Respiratory:  Negative for cough and shortness of breath.    Gastrointestinal: Negative for abdominal pain, heartburn, melena, nausea, and vomitting.    Genitourinary:  Negative bladder incontinence and dysuria.    Musculoskeletal:  See HPI    Neurological: Negative for numbness.    Psychiatric/Behavioral: Negative for depression.  The patient is not nervous/anxious.      Endocrine: Negative for polyuria    Hematologic/Lymphatic: Negative for bleeding problem.  Does not bruise/bleed easily.    Skin: Negative for poor would healing and rash      Physical Examination:    Vital Signs:    Vitals:    08/22/18 1404   BP: 113/75   Pulse: 64       Body mass index is 31.19 kg/m².    This a well-developed, well nourished patient in no acute distress.  They are alert and oriented and cooperative to examination.  Pt. walks without an antalgic gait.        Exam below the left knee shows well-healed surgical  portals and scars.  Pain along the medial compartment and underneath the kneecap.  Intact light touch sensation.  Moderate patellofemoral crepitus.  Knee is stable to varus valgus stress.      X-rays:  X-rays of the right knee are  reviewed which show moderate arthritic changes particularly of the patellofemoral joint and some medial narrowing    MRI report from outside facility:  Arthritic changes with medial meniscal tear.    MRI of the left knee:Severe patellofemoral chondromalacia.  Anterior horn lateral meniscal tear with small parameniscal cyst.  Postoperative findings of probable medial patellofemoral ligament reconstruction.  Moderate patellar tendon and mild quadriceps tendinosis.     Assessment::  Right knee arthritis  Left knee arthritis    Plan:  Reviewed the findings with her today.  She has tried both cortisone injections as well as viscosupplementation injections.  She has had surgeries including arthroscopy and an MPFL reconstruction.  The only real treatment left would be for knee replacement. She feels mentally ready to go ahead and do this.  She has been suffering with this knee for so long and it just continues to worsen.  She knows the drawbacks including the need for multiple revisions.  Patient is about to lose her insurance.  She will need to see a primary care doctor and get medically cleared and optimized.  Patient can then follow up for surgical scheduling.  We need to wait a few months before we can do it because of her knee injection a couple months ago.    This note was created using [employer] voice recognition software that occasionally misinterpreted phrases or words.    Consult note is delivered via Epic messaging service.

## 2018-09-05 ENCOUNTER — PATIENT MESSAGE (OUTPATIENT)
Dept: ORTHOPEDICS | Facility: CLINIC | Age: 38
End: 2018-09-05

## 2018-09-13 ENCOUNTER — PATIENT MESSAGE (OUTPATIENT)
Dept: NEUROLOGY | Facility: CLINIC | Age: 38
End: 2018-09-13

## 2018-10-03 ENCOUNTER — PATIENT MESSAGE (OUTPATIENT)
Dept: ORTHOPEDICS | Facility: CLINIC | Age: 38
End: 2018-10-03

## 2018-10-08 ENCOUNTER — TELEPHONE (OUTPATIENT)
Dept: NEUROLOGY | Facility: CLINIC | Age: 38
End: 2018-10-08

## 2018-10-08 ENCOUNTER — OFFICE VISIT (OUTPATIENT)
Dept: ORTHOPEDICS | Facility: CLINIC | Age: 38
End: 2018-10-08
Payer: COMMERCIAL

## 2018-10-08 VITALS
DIASTOLIC BLOOD PRESSURE: 60 MMHG | WEIGHT: 230 LBS | HEIGHT: 72 IN | BODY MASS INDEX: 31.15 KG/M2 | HEART RATE: 64 BPM | SYSTOLIC BLOOD PRESSURE: 127 MMHG

## 2018-10-08 DIAGNOSIS — G43.719 INTRACTABLE CHRONIC MIGRAINE WITHOUT AURA AND WITHOUT STATUS MIGRAINOSUS: Primary | ICD-10-CM

## 2018-10-08 DIAGNOSIS — M17.12 PATELLOFEMORAL ARTHRITIS OF LEFT KNEE: Primary | ICD-10-CM

## 2018-10-08 PROCEDURE — 99214 OFFICE O/P EST MOD 30 MIN: CPT | Mod: 57,S$GLB,, | Performed by: ORTHOPAEDIC SURGERY

## 2018-10-08 PROCEDURE — 99999 PR PBB SHADOW E&M-EST. PATIENT-LVL III: CPT | Mod: PBBFAC,,, | Performed by: ORTHOPAEDIC SURGERY

## 2018-10-08 PROCEDURE — 3008F BODY MASS INDEX DOCD: CPT | Mod: CPTII,S$GLB,, | Performed by: ORTHOPAEDIC SURGERY

## 2018-10-08 NOTE — PROGRESS NOTES
Past Medical History:   Diagnosis Date    Arthritis     OA    Depression     Migraine     Seizures     Sleep apnea     Does not use c-pap since weight loss - 170 lbs       Past Surgical History:   Procedure Laterality Date    APPENDECTOMY      ARTHROSCOPY-KNEE Left 2016    [facility]    BLOCK-NERVE-MEDIAL BRANCH-LUMBAR Bilateral 2016    [facility]    CARPAL TUNNEL RELEASE Bilateral      SECTION      CHOLECYSTECTOMY      CHONDROPLASTY-KNEE-ARTHROSCOPY Left 2016    [facility]    FRACTURE SURGERY      ankle    gastric sleve      HYSTERECTOMY      INJECTION-STEROID-EPIDURAL-LUMBAR N/A 2016    [facility]    INJECTION-STEROID-EPIDURAL-LUMBAR N/A 3/28/2016    [facility]    KNEE SURGERY      LUMBAR EPIDURAL INJECTION      RADIOFREQUENCY THERMOCOAGULATION (RFTC)-NERVE-MEDIAN BRANCH-LUMBAR Bilateral 2017    [facility]    RADIOFREQUENCY THERMOCOAGULATION (RFTC)-NERVE-MEDIAN BRANCH-LUMBAR Bilateral 2016    [facility]    RECONSTRUCTION- LIGAMENT-MPFL RECONSTRUCTION WITH ALLOGRAFT Left 2017    [facility]    SINUS SURGERY         Current Outpatient Medications   Medication Sig    amitriptyline (ELAVIL) 25 MG tablet TAKE ONE TABLET BY MOUTH EVERY NIGHT FOR 2 WEEKS THEN INCREASE TO 2 TABLETS AT BEDTIME    celecoxib (CELEBREX) 200 MG capsule TAKE ONE CAPSULE BY MOUTH EVERY DAY    cetirizine (ZYRTEC) 5 MG tablet Take 5 mg by mouth once daily.    cholecalciferol, vitamin D3, 50,000 unit capsule Take 50,000 Units by mouth once a week.     cyanocobalamin, vitamin B-12, 1,000 mcg/mL Kit Inject 1 mL into the muscle every 21 days.    cyclobenzaprine (FLEXERIL) 10 MG tablet TAKE ONE TABLET BY MOUTH 3 TIMES A DAY AS  NEEDED FOR MUSCLE SPASMS    diclofenac sodium 1 % Gel daily as needed.     epinephrine (EPIPEN) 0.3 mg/0.3 mL (1:1,000) AtIn 0.3 mg daily as needed.     FERROUS SULFATE (IRON ORAL) Take by mouth.    fluoxetine (PROZAC) 20 MG capsule     hydrocodone-acetaminophen 10-325mg (NORCO)  mg Tab Take by mouth.    hydrOXYzine pamoate (VISTARIL) 25 MG Cap TAKE ONE CAPSULE BY MOUTH 3 TIMES A DAY AS NEEDED FOR ANXIETY    ibuprofen (ADVIL,MOTRIN) 800 MG tablet every 6 (six) hours as needed.     isometheptene-apap-dichloralphenazone [id number] (MIDRIN) -325 mg per capsule Take 1 capsule by mouth 4 (four) times daily as needed.    ketorolac (TORADOL) 60 mg/2 mL Soln Please provide patients with IV syringes    lorazepam (ATIVAN) 0.5 MG tablet Take 0.5 mg by mouth every 4 (four) hours as needed.     oxycodone (OXYCONTIN) 15 mg TR12 12 hr tablet Take 1 tablet (15 mg total) by mouth every 12 (twelve) hours.    oxycodone-acetaminophen (PERCOCET) 5-325 mg per tablet Take 1 tablet by mouth every 6 (six) hours as needed.    potassium chloride SA (K-DUR,KLOR-CON) 20 MEQ tablet Take 20 mEq by mouth once daily.    promethazine (PHENERGAN) 25 MG tablet Take 1 tablet by mouth every 6 (six) hours as needed (migraine). -MAY CAUSE DROWSINESS-    pseudoephedrine-DM-guaifenesin 45- mg Tab Take 1 tablet by mouth daily as needed.     VOLTAREN 1 % Gel daily as needed.     sumatriptan succinate 4 mg/0.5 mL PnIj Inject 4 mg into the skin daily as needed. imitrex     Current Facility-Administered Medications   Medication    onabotulinumtoxina injection 200 Units       Review of patient's allergies indicates:   Allergen Reactions    Clarithromycin Swelling and Other (See Comments)     DIFFICULTY SWALLOWING  DIFFICULTY SWALLOWING    Pcn [penicillins] Anaphylaxis    Sulfa (sulfonamide antibiotics) Hives    Wellbutrin [bupropion hcl] Shortness Of Breath and Anaphylaxis    Betadine [povidone-iodine] Hives      Swelling      Cefprozil Hives    Iodinated contrast- oral and iv dye Hives    Latex, natural rubber Rash    Sulfamethoxazole-trimethoprim Nausea And Vomiting    Iodine Hives and Swelling    Latex Hives and Swelling       Family History   Problem Relation Age of Onset    Heart disease Mother        Social History     Socioeconomic History    Marital status:      Spouse name: Not on file    Number of children: Not on file    Years of education: Not on file    Highest education level: Not on file   Social Needs    Financial resource strain: Not on file    Food insecurity - worry: Not on file    Food insecurity - inability: Not on file    Transportation needs - medical: Not on file    Transportation needs - non-medical: Not on file   Occupational History    Not on file   Tobacco Use    Smoking status: Current Every Day Smoker     Packs/day: 0.50     Years: 20.00     Pack years: 10.00     Types: Cigarettes    Smokeless tobacco: Never Used   Substance and Sexual Activity    Alcohol use: Yes     Alcohol/week: 0.0 oz     Comment: occasionally    Drug use: No    Sexual activity: Not on file   Other Topics Concern    Not on file   Social History Narrative    Not on file       Chief Complaint:   Chief Complaint   Patient presents with    Knee Pain     left knee surgery consents       History of present illness:  This is a 38-year-old patient of mine seen for recurrent left knee pain. Patient has a history of multiple surgeries on the left knee with multi ligament injury.  She has severe patellofemoral arthritis.  We got an MRI which confirmed no new real injury but significant chondral damage to the patella. Pain is 6 out 10.      Review of Systems:    Constitution: Negative for chills, fever, and sweats.  Negative for unexplained weight loss.    HENT:  Negative for headaches and blurry vision.    Cardiovascular:Negative for chest pain or irregular heart beat. Negative for  hypertension.    Respiratory:  Negative for cough and shortness of breath.    Gastrointestinal: Negative for abdominal pain, heartburn, melena, nausea, and vomitting.    Genitourinary:  Negative bladder incontinence and dysuria.    Musculoskeletal:  See HPI    Neurological: Negative for numbness.    Psychiatric/Behavioral: Negative for depression.  The patient is not nervous/anxious.      Endocrine: Negative for polyuria    Hematologic/Lymphatic: Negative for bleeding problem.  Does not bruise/bleed easily.    Skin: Negative for poor would healing and rash      Physical Examination:    Vital Signs:    Vitals:    10/08/18 1419   BP: 127/60   Pulse: 64       Body mass index is 31.19 kg/m².    This a well-developed, well nourished patient in no acute distress.  They are alert and oriented and cooperative to examination.  Pt. walks without an antalgic gait.        Exam below the left knee shows well-healed surgical portals and scars.  Pain along the medial compartment and underneath the kneecap.  Intact light touch sensation.  Moderate patellofemoral crepitus.  Knee is stable to varus valgus stress.      Heart is regular rate without obvious murmurs   Normal respiratory effort without audible wheezing  Abdomen is soft and nontender     X-rays:  X-rays of the right knee are  reviewed which show moderate arthritic changes particularly of the patellofemoral joint and some medial narrowing    MRI report from outside facility:  Arthritic changes with medial meniscal tear.    MRI of the left knee:Severe patellofemoral chondromalacia.  Anterior horn lateral meniscal tear with small parameniscal cyst.  Postoperative findings of probable medial patellofemoral ligament reconstruction.  Moderate patellar tendon and mild quadriceps tendinosis.     Assessment::  Right knee arthritis  Left knee arthritis    Plan:  Reviewed the findings with her today.  She has tried both cortisone injections as well as viscosupplementation  injections.  She has had surgeries including arthroscopy and an MPFL reconstruction.  The only real treatment left would be for knee replacement. She feels mentally ready to go ahead and do this.  She has been suffering with this knee for so long and it just continues to worsen.  She knows the drawbacks including the need for multiple revisions.  Patient is about to lose her insurance.  She will need to see a primary care doctor and get medically cleared and optimized.  Patient can then follow up for surgical scheduling.  Risks, benefits, and alternatives to the procedure were explained to the patient including but not limited to damage to nerves, arteries, blood vessels, bones, tendons, ligaments, stiffness, instability, infection, DVT, PE, as well as general anesthetic complications including seizure, stroke, heart attack and even death. The patient understood these risks and wished to proceed and signed the informed consent.       This note was created using [employer] voice recognition software that occasionally misinterpreted phrases or words.    Consult note is delivered via Epic messaging service.

## 2018-10-09 ENCOUNTER — TELEPHONE (OUTPATIENT)
Dept: NEUROLOGY | Facility: CLINIC | Age: 38
End: 2018-10-09

## 2018-10-09 RX ORDER — MUPIROCIN 20 MG/G
OINTMENT TOPICAL
Status: CANCELLED | OUTPATIENT
Start: 2018-10-09

## 2018-10-09 RX ORDER — CLINDAMYCIN PHOSPHATE 900 MG/50ML
900 INJECTION, SOLUTION INTRAVENOUS
Status: CANCELLED | OUTPATIENT
Start: 2018-10-09

## 2018-10-09 RX ORDER — SODIUM CHLORIDE 9 MG/ML
INJECTION, SOLUTION INTRAVENOUS CONTINUOUS
Status: CANCELLED | OUTPATIENT
Start: 2018-10-09

## 2018-10-09 NOTE — TELEPHONE ENCOUNTER
Called patient and rescheduled botox appointment due to insurance not covering it. Appointment successfully rescheduled, patient verbalized understanding.

## 2018-10-12 ENCOUNTER — TELEPHONE (OUTPATIENT)
Dept: NEUROLOGY | Facility: CLINIC | Age: 38
End: 2018-10-12

## 2018-10-12 NOTE — TELEPHONE ENCOUNTER
Called patient in regards to Botox appointment. Rescheduled appointment due to insurance purposes. Patient verbalized understanding.

## 2018-10-16 ENCOUNTER — TELEPHONE (OUTPATIENT)
Dept: NEUROLOGY | Facility: CLINIC | Age: 38
End: 2018-10-16

## 2018-10-16 ENCOUNTER — PATIENT MESSAGE (OUTPATIENT)
Dept: SURGERY | Facility: HOSPITAL | Age: 38
End: 2018-10-16

## 2018-10-16 NOTE — TELEPHONE ENCOUNTER
[facility] call in regards to Botox delivery. All questions answered and address verified. Botox will be delivered tomorrow per [facility] rep. Verbalized understanding.

## 2018-10-16 NOTE — TELEPHONE ENCOUNTER
----- Message from Og Morrell sent at 10/16/2018  3:06 PM CDT -----  Type: Needs Medical Advice    Who Called:  April/[facility]    [facility] Call Back Number: [phone]  Additional Information: Caller states that she needs to talk to the nurse about a delivery of Botox for 10/17.  Caller states that if they don't hear from the office within an hour, delivery will be on the next day

## 2018-10-18 ENCOUNTER — PROCEDURE VISIT (OUTPATIENT)
Dept: NEUROLOGY | Facility: CLINIC | Age: 38
End: 2018-10-18
Payer: COMMERCIAL

## 2018-10-18 VITALS
HEIGHT: 72 IN | WEIGHT: 230 LBS | DIASTOLIC BLOOD PRESSURE: 79 MMHG | HEART RATE: 80 BPM | SYSTOLIC BLOOD PRESSURE: 110 MMHG | RESPIRATION RATE: 16 BRPM | BODY MASS INDEX: 31.15 KG/M2

## 2018-10-18 DIAGNOSIS — G43.719 INTRACTABLE CHRONIC MIGRAINE WITHOUT AURA AND WITHOUT STATUS MIGRAINOSUS: Primary | ICD-10-CM

## 2018-10-18 PROCEDURE — 64615 CHEMODENERV MUSC MIGRAINE: CPT | Mod: S$GLB,,, | Performed by: PSYCHIATRY & NEUROLOGY

## 2018-10-18 NOTE — PROCEDURES
Procedures and Follow Up    The Botox injections have achieved well over 50%  improvement in the patient's symptoms. Migraines have been reduced at least 7 days per month and the number of cumulative hours suffering with headaches has been reduced at least 100 total hours per month. Today she does have a headache indicating that the Botox has worn off. Frequency of treatment is every 3 months unless no response to the treatments, at which time we will discontinue the injections.     ROS: Positive for photophobia, phonophobia, nausea, insomnia with the attacks     Past Medical History:   Diagnosis Date    Arthritis     OA    Depression     Migraine     Seizures     Sleep apnea     Does not use c-pap since weight loss - 170 lbs     Current Outpatient Medications   Medication Sig    amitriptyline (ELAVIL) 25 MG tablet TAKE ONE TABLET BY MOUTH EVERY NIGHT FOR 2 WEEKS THEN INCREASE TO 2 TABLETS AT BEDTIME    celecoxib (CELEBREX) 200 MG capsule TAKE ONE CAPSULE BY MOUTH EVERY DAY    cetirizine (ZYRTEC) 5 MG tablet Take 5 mg by mouth once daily.    cholecalciferol, vitamin D3, 50,000 unit capsule Take 50,000 Units by mouth once a week.     cyanocobalamin, vitamin B-12, 1,000 mcg/mL Kit Inject 1 mL into the muscle every 21 days.    cyclobenzaprine (FLEXERIL) 10 MG tablet TAKE ONE TABLET BY MOUTH 3 TIMES A DAY AS NEEDED FOR MUSCLE SPASMS    diclofenac sodium 1 % Gel daily as needed.     epinephrine (EPIPEN) 0.3 mg/0.3 mL (1:1,000) AtIn 0.3 mg daily as needed.     FERROUS SULFATE (IRON ORAL) Take by mouth.    fluoxetine (PROZAC) 20 MG capsule     hydrocodone-acetaminophen 10-325mg (NORCO)  mg Tab Take by mouth.    hydrOXYzine pamoate (VISTARIL) 25 MG Cap TAKE ONE CAPSULE BY MOUTH 3 TIMES A DAY AS NEEDED FOR ANXIETY    ibuprofen (ADVIL,MOTRIN) 800 MG tablet every 6 (six) hours as needed.     isometheptene-apap-dichloralphenazone [id number] (MIDRIN) -325 mg per capsule Take 1 capsule by mouth 4  (four) times daily as needed.    ketorolac (TORADOL) 60 mg/2 mL Soln Please provide patients with IV syringes    lorazepam (ATIVAN) 0.5 MG tablet Take 0.5 mg by mouth every 4 (four) hours as needed.     oxycodone (OXYCONTIN) 15 mg TR12 12 hr tablet Take 1 tablet (15 mg total) by mouth every 12 (twelve) hours.    oxycodone-acetaminophen (PERCOCET) 5-325 mg per tablet Take 1 tablet by mouth every 6 (six) hours as needed.    potassium chloride SA (K-DUR,KLOR-CON) 20 MEQ tablet Take 20 mEq by mouth once daily.    promethazine (PHENERGAN) 25 MG tablet Take 1 tablet by mouth every 6 (six) hours as needed (migraine). -MAY CAUSE DROWSINESS-    pseudoephedrine-DM-guaifenesin 45- mg Tab Take 1 tablet by mouth daily as needed.     VOLTAREN 1 % Gel daily as needed.     sumatriptan succinate 4 mg/0.5 mL PnIj Inject 4 mg into the skin daily as needed. imitrex     Current Facility-Administered Medications   Medication    onabotulinumtoxina injection 200 Units    onabotulinumtoxina injection 200 Units     Review of patient's allergies indicates:   Allergen Reactions    Clarithromycin Swelling and Other (See Comments)     DIFFICULTY SWALLOWING  DIFFICULTY SWALLOWING    Pcn [penicillins] Anaphylaxis    Sulfa (sulfonamide antibiotics) Hives    Wellbutrin [bupropion hcl] Shortness Of Breath and Anaphylaxis    Betadine [povidone-iodine] Hives     Swelling      Cefprozil Hives    Iodinated contrast- oral and iv dye Hives    Latex, natural rubber Rash    Sulfamethoxazole-trimethoprim Nausea And Vomiting    Iodine Hives and Swelling    Latex Hives and Swelling       Alert and fully oriented. MOCA: 25/30  Lungs CTA  Heart RRR  CN II-XII intact  Motor normal bulk and tone, symmetric strength  Coordination intact FTN  Sensory in tact to LT  Gait: normal, pace and base     A/P:     Chronic Migraine responding to Botox as expected. Continue treatment until patient in true remission, meaning that the patient stays  headache free when Botox wears off in 10 to 12 weeks. That will be the time to stop.  Encouraged the patient to comply with with early acute intervention for escalations  Subjective cognitive deficit. MOCA 25/30, will repeat in 6 months and send for neuropsychological evaluation if score falls      BOTOX PROCEDURE: [facility] DELIVERED, PLEASE DO NOT CHARGE    PROCEDURE PERFORMED: Botulinum toxin injection (47444)    CLINICAL INDICATION: G43.719  NDC: 5434-5170-91    A time out was conducted just before the start of the procedure to verify the correct patient and procedure, procedure location, and all relevant critical information.     DESCRIPTION OF PROCEDURE: After obtaining informed consent and under aseptic technique, a total of 155 units of botulinum toxin type A were injected in the following muscles: Procerus 5 units,  5 units bilaterally, frontalis 20 units, temporalis 20 units bilaterally, occipitalis 15 units, upper cervical paraspinals 10 units bilaterally and trapezius 15 units bilaterally. The patient was given a total of 155 units in 31 sites.The patient tolerated the procedure well. There were no complications. The patient was given a prescription for repeat treatment in 3 months.     Unavoidable waste 45 units            Madisyn Rojas M.D  Medical Director, Headache and Facial Pain  [facility]

## 2018-10-21 ENCOUNTER — PATIENT MESSAGE (OUTPATIENT)
Dept: SURGERY | Facility: HOSPITAL | Age: 38
End: 2018-10-21

## 2018-10-25 ENCOUNTER — OFFICE VISIT (OUTPATIENT)
Dept: ORTHOPEDICS | Facility: CLINIC | Age: 38
End: 2018-10-25
Payer: COMMERCIAL

## 2018-10-25 VITALS
HEART RATE: 72 BPM | DIASTOLIC BLOOD PRESSURE: 62 MMHG | BODY MASS INDEX: 31.15 KG/M2 | WEIGHT: 230 LBS | SYSTOLIC BLOOD PRESSURE: 126 MMHG | HEIGHT: 72 IN

## 2018-10-25 DIAGNOSIS — M17.11 PRIMARY OSTEOARTHRITIS OF RIGHT KNEE: Primary | ICD-10-CM

## 2018-10-25 PROCEDURE — 99213 OFFICE O/P EST LOW 20 MIN: CPT | Mod: 25,S$GLB,, | Performed by: ORTHOPAEDIC SURGERY

## 2018-10-25 PROCEDURE — 3008F BODY MASS INDEX DOCD: CPT | Mod: CPTII,S$GLB,, | Performed by: ORTHOPAEDIC SURGERY

## 2018-10-25 PROCEDURE — 99999 PR PBB SHADOW E&M-EST. PATIENT-LVL III: CPT | Mod: PBBFAC,,, | Performed by: ORTHOPAEDIC SURGERY

## 2018-10-25 PROCEDURE — 20610 DRAIN/INJ JOINT/BURSA W/O US: CPT | Mod: RT,S$GLB,, | Performed by: ORTHOPAEDIC SURGERY

## 2018-10-25 RX ADMIN — TRIAMCINOLONE ACETONIDE 40 MG: 40 INJECTION, SUSPENSION INTRA-ARTICULAR; INTRAMUSCULAR at 12:10

## 2018-10-26 RX ORDER — TRIAMCINOLONE ACETONIDE 40 MG/ML
40 INJECTION, SUSPENSION INTRA-ARTICULAR; INTRAMUSCULAR
Status: DISCONTINUED | OUTPATIENT
Start: 2018-10-25 | End: 2018-10-26 | Stop reason: HOSPADM

## 2018-10-26 NOTE — PROGRESS NOTES
Past Medical History:   Diagnosis Date    Arthritis     OA    Depression     Migraine     Seizures     Sleep apnea     Does not use c-pap since weight loss - 170 lbs       Past Surgical History:   Procedure Laterality Date    APPENDECTOMY      ARTHROSCOPY-KNEE Left 2016    [facility]    BLOCK-NERVE-MEDIAL BRANCH-LUMBAR Bilateral 2016    Performed by [facility] at [facility] OR    CARPAL TUNNEL RELEASE Bilateral      SECTION      CHOLECYSTECTOMY      CHONDROPLASTY-KNEE-ARTHROSCOPY Left 2016    [facility]    FRACTURE SURGERY      ankle    gastric sleve      HYSTERECTOMY      INJECTION-STEROID-EPIDURAL-LUMBAR N/A 2016    [facility] at [facility] OR    INJECTION-STEROID-EPIDURAL-LUMBAR N/A 3/28/2016    [facility] at [facility] OR    KNEE SURGERY      LUMBAR EPIDURAL INJECTION      RADIOFREQUENCY THERMOCOAGULATION (RFTC)-NERVE-MEDIAN BRANCH-LUMBAR Bilateral 2017    [facility]formed [facility] at [facility] OR    RADIOFREQUENCY THERMOCOAGULATION (RFTC)-NERVE-MEDIAN BRANCH-LUMBAR Bilateral 2016    [facility] by [facility] at [facility] OR    RECONSTRUCTION- LIGAMENT-MPFL RECONSTRUCTION WITH ALLOGRAFT Left 2017    [facility] OR    SINUS SURGERY         Current Outpatient Medications   Medication Sig    amitriptyline (ELAVIL) 25 MG tablet TAKE ONE TABLET BY MOUTH EVERY NIGHT FOR 2 WEEKS THEN INCREASE TO 2 TABLETS AT BEDTIME    celecoxib (CELEBREX) 200 MG capsule TAKE ONE CAPSULE BY MOUTH EVERY DAY    cetirizine (ZYRTEC) 5 MG tablet Take 5 mg by mouth once daily.    cholecalciferol, vitamin D3, 50,000 unit capsule Take 50,000 Units by mouth once a week.     cyanocobalamin, vitamin B-12, 1,000 mcg/mL Kit Inject 1 mL into the muscle every 21 days.    cyclobenzaprine (FLEXERIL) 10 MG tablet TAKE ONE TABLET BY MOUTH 3 TIMES A DAY AS  NEEDED FOR MUSCLE SPASMS    diclofenac sodium 1 % Gel daily as needed.     epinephrine (EPIPEN) 0.3 mg/0.3 mL (1:1,000) AtIn 0.3 mg daily as needed.     FERROUS SULFATE (IRON ORAL) Take by mouth.    fluoxetine (PROZAC) 20 MG capsule     hydrocodone-acetaminophen 10-325mg (NORCO)  mg Tab Take by mouth.    hydrOXYzine pamoate (VISTARIL) 25 MG Cap TAKE ONE CAPSULE BY MOUTH 3 TIMES A DAY AS NEEDED FOR ANXIETY    ibuprofen (ADVIL,MOTRIN) 800 MG tablet every 6 (six) hours as needed.     isometheptene-apap-dichloralphenazone [id number] (MIDRIN) -325 mg per capsule Take 1 capsule by mouth 4 (four) times daily as needed.    ketorolac (TORADOL) 60 mg/2 mL Soln Please provide patients with IV syringes    lorazepam (ATIVAN) 0.5 MG tablet Take 0.5 mg by mouth every 4 (four) hours as needed.     oxycodone (OXYCONTIN) 15 mg TR12 12 hr tablet Take 1 tablet (15 mg total) by mouth every 12 (twelve) hours.    oxycodone-acetaminophen (PERCOCET) 5-325 mg per tablet Take 1 tablet by mouth every 6 (six) hours as needed.    potassium chloride SA (K-DUR,KLOR-CON) 20 MEQ tablet Take 20 mEq by mouth once daily.    promethazine (PHENERGAN) 25 MG tablet Take 1 tablet by mouth every 6 (six) hours as needed (migraine). -MAY CAUSE DROWSINESS-    pseudoephedrine-DM-guaifenesin 45- mg Tab Take 1 tablet by mouth daily as needed.     VOLTAREN 1 % Gel daily as needed.     sumatriptan succinate 4 mg/0.5 mL PnIj Inject 4 mg into the skin daily as needed. imitrex     Current Facility-Administered Medications   Medication    onabotulinumtoxina injection 200 Units    onabotulinumtoxina injection 200 Units       Review of patient's allergies indicates:   Allergen Reactions    Clarithromycin Swelling and Other (See Comments)     DIFFICULTY SWALLOWING  DIFFICULTY SWALLOWING    Pcn [penicillins] Anaphylaxis    Sulfa (sulfonamide antibiotics) Hives    Wellbutrin [bupropion hcl] Shortness Of Breath and Anaphylaxis     Betadine [povidone-iodine] Hives     Swelling      Cefprozil Hives    Iodinated contrast- oral and iv dye Hives    Latex, natural rubber Rash    Sulfamethoxazole-trimethoprim Nausea And Vomiting    Iodine Hives and Swelling    Latex Hives and Swelling       Family History   Problem Relation Age of Onset    Heart disease Mother        Social History     Socioeconomic History    Marital status:      Spouse name: Not on file    Number of children: Not on file    Years of education: Not on file    Highest education level: Not on file   Social Needs    Financial resource strain: Not on file    Food insecurity - worry: Not on file    Food insecurity - inability: Not on file    Transportation needs - medical: Not on file    Transportation needs - non-medical: Not on file   Occupational History    Not on file   Tobacco Use    Smoking status: Current Every Day Smoker     Packs/day: 0.50     Years: 20.00     Pack years: 10.00     Types: Cigarettes    Smokeless tobacco: Never Used   Substance and Sexual Activity    Alcohol use: Yes     Alcohol/week: 0.0 oz     Comment: occasionally    Drug use: No    Sexual activity: Not on file   Other Topics Concern    Not on file   Social History Narrative    Not on file       Chief Complaint:   Chief Complaint   Patient presents with    Knee Pain     right knee pain and swelling       History of present illness:  This is a 38-year-old patient of [facility] seen for right knee pain today. Patient played some softball and volleyball over the weekend.  Knee swelled up on her.  More pain over the proximal lateral knee.  Pain is a 7/10.      Review of Systems:    Constitution: Negative for chills, fever, and sweats.  Negative for unexplained weight loss.    HENT:  Negative for headaches and blurry vision.    Cardiovascular:Negative for chest pain or irregular heart beat. Negative for hypertension.    Respiratory:  Negative for cough and shortness of  breath.    Gastrointestinal: Negative for abdominal pain, heartburn, melena, nausea, and vomitting.    Genitourinary:  Negative bladder incontinence and dysuria.    Musculoskeletal:  See HPI    Neurological: Negative for numbness.    Psychiatric/Behavioral: Negative for depression.  The patient is not nervous/anxious.      Endocrine: Negative for polyuria    Hematologic/Lymphatic: Negative for bleeding problem.  Does not bruise/bleed easily.    Skin: Negative for poor would healing and rash      Physical Examination:    Vital Signs:    Vitals:    10/25/18 1455   BP: 126/62   Pulse: 72       Body mass index is 31.19 kg/m².    This a well-developed, well nourished patient in no acute distress.  They are alert and oriented and cooperative to examination.  Pt. walks without an antalgic gait.        Exam below the right knee shows well-healed surgical portals and scars.  Pain along the medial compartment and underneath the kneecap.  Intact light touch sensation.  Moderate patellofemoral crepitus.  Knee is stable to varus valgus stress.      X-rays:  X-rays of the right knee are  reviewed which show moderate arthritic changes particularly of the patellofemoral joint and some medial narrowing    MRI report from outside facility:  Arthritic changes with medial meniscal tear.    MRI of the left knee:Severe patellofemoral chondromalacia.  Anterior horn lateral meniscal tear with small parameniscal cyst.  Postoperative findings of probable medial patellofemoral ligament reconstruction.  Moderate patellar tendon and mild quadriceps tendinosis.     Assessment::  Right knee arthritis      Plan:  Reviewed the findings with her today.  She would like to try another steroid injection today. She has her knee replacement scheduled for a couple weeks.  She needs this knee to help her recover.  Follow-up as needed.    This note was created using M Modal voice recognition software that occasionally misinterpreted phrases or  words.    Consult note is delivered via Epic messaging service.

## 2018-10-26 NOTE — PROCEDURES
Large Joint Aspiration/Injection: R knee  Date/Time: 10/25/2018 12:18 PM  Performed by: Feliberto Cardenas MD  Authorized by: Feliberto Cardenas MD     Consent Done?:  Yes (Verbal)  Indications:  Pain  Procedure site marked: Yes    Timeout: Prior to procedure the correct patient, procedure, and site was verified      Location:  Knee  Site:  R knee  Prep: Patient was prepped and draped in usual sterile fashion    Needle size:  20 G  Approach:  Anterolateral  Medications:  40 mg triamcinolone acetonide 40 mg/mL  Patient tolerance:  Patient tolerated the procedure well with no immediate complications

## 2018-11-01 ENCOUNTER — HOSPITAL ENCOUNTER (OUTPATIENT)
Dept: RADIOLOGY | Facility: HOSPITAL | Age: 38
Discharge: HOME | End: 2018-11-01
Attending: ORTHOPAEDIC SURGERY
Payer: COMMERCIAL

## 2018-11-01 ENCOUNTER — HOSPITAL ENCOUNTER (OUTPATIENT)
Dept: PREADMISSION TESTING | Facility: HOSPITAL | Age: 38
Discharge: HOME | End: 2018-11-01
Attending: ORTHOPAEDIC SURGERY
Payer: COMMERCIAL

## 2018-11-01 VITALS — HEIGHT: 72 IN | WEIGHT: 250 LBS | BODY MASS INDEX: 33.86 KG/M2

## 2018-11-01 DIAGNOSIS — M17.12 PATELLOFEMORAL ARTHRITIS OF LEFT KNEE: ICD-10-CM

## 2018-11-01 DIAGNOSIS — M17.11 PRIMARY OSTEOARTHRITIS OF RIGHT KNEE: Primary | ICD-10-CM

## 2018-11-01 LAB
ABO + RH BLD: NORMAL
ANION GAP SERPL CALC-SCNC: 8 MMOL/L
BASOPHILS # BLD AUTO: 0 K/UL
BASOPHILS NFR BLD: 0.6 %
BILIRUB UR QL STRIP: NEGATIVE
BLD GP AB SCN CELLS X3 SERPL QL: NORMAL
BUN SERPL-MCNC: 6 MG/DL
CALCIUM SERPL-MCNC: 9.4 MG/DL
CHLORIDE SERPL-SCNC: 104 MMOL/L
CLARITY UR: CLEAR
CO2 SERPL-SCNC: 25 MMOL/L
COLOR UR: YELLOW
CREAT SERPL-MCNC: 0.7 MG/DL
DIFFERENTIAL METHOD: ABNORMAL
EOSINOPHIL # BLD AUTO: 0.1 K/UL
EOSINOPHIL NFR BLD: 1.2 %
ERYTHROCYTE [DISTWIDTH] IN BLOOD BY AUTOMATED COUNT: 14.7 %
EST. GFR  (AFRICAN AMERICAN): >60 ML/MIN/1.73 M^2
EST. GFR  (NON AFRICAN AMERICAN): >60 ML/MIN/1.73 M^2
GLUCOSE SERPL-MCNC: 88 MG/DL
GLUCOSE UR QL STRIP: NEGATIVE
HCT VFR BLD AUTO: 41.2 %
HGB BLD-MCNC: 14.3 G/DL
HGB UR QL STRIP: ABNORMAL
KETONES UR QL STRIP: NEGATIVE
LEUKOCYTE ESTERASE UR QL STRIP: NEGATIVE
LYMPHOCYTES # BLD AUTO: 2.5 K/UL
LYMPHOCYTES NFR BLD: 29.4 %
MCH RBC QN AUTO: 32.2 PG
MCHC RBC AUTO-ENTMCNC: 34.7 G/DL
MCV RBC AUTO: 93 FL
MONOCYTES # BLD AUTO: 0.6 K/UL
MONOCYTES NFR BLD: 7.2 %
NEUTROPHILS # BLD AUTO: 5.2 K/UL
NEUTROPHILS NFR BLD: 61.6 %
NITRITE UR QL STRIP: NEGATIVE
PH UR STRIP: 7 [PH] (ref 5–8)
PLATELET # BLD AUTO: 260 K/UL
PMV BLD AUTO: 8.1 FL
POTASSIUM SERPL-SCNC: 3.9 MMOL/L
PROT UR QL STRIP: NEGATIVE
RBC # BLD AUTO: 4.44 M/UL
SODIUM SERPL-SCNC: 137 MMOL/L
SP GR UR STRIP: <=1.005 (ref 1–1.03)
URN SPEC COLLECT METH UR: ABNORMAL
UROBILINOGEN UR STRIP-ACNC: NEGATIVE EU/DL
WBC # BLD AUTO: 8.5 K/UL

## 2018-11-01 PROCEDURE — 71046 X-RAY EXAM CHEST 2 VIEWS: CPT | Mod: TC,FY

## 2018-11-01 PROCEDURE — 86850 RBC ANTIBODY SCREEN: CPT

## 2018-11-01 PROCEDURE — 36415 COLL VENOUS BLD VENIPUNCTURE: CPT

## 2018-11-01 PROCEDURE — 93010 ELECTROCARDIOGRAM REPORT: CPT | Mod: ,,, | Performed by: INTERNAL MEDICINE

## 2018-11-01 PROCEDURE — 80048 BASIC METABOLIC PNL TOTAL CA: CPT

## 2018-11-01 PROCEDURE — 81003 URINALYSIS AUTO W/O SCOPE: CPT

## 2018-11-01 PROCEDURE — 87081 CULTURE SCREEN ONLY: CPT

## 2018-11-01 PROCEDURE — 99900104 DSU ONLY-NO CHARGE-EA ADD'L HR (STAT)

## 2018-11-01 PROCEDURE — 71046 X-RAY EXAM CHEST 2 VIEWS: CPT | Mod: 26,,, | Performed by: RADIOLOGY

## 2018-11-01 PROCEDURE — 99900103 DSU ONLY-NO CHARGE-INITIAL HR (STAT)

## 2018-11-01 PROCEDURE — 85025 COMPLETE CBC W/AUTO DIFF WBC: CPT

## 2018-11-01 PROCEDURE — 93005 ELECTROCARDIOGRAM TRACING: CPT

## 2018-11-01 NOTE — PRE ADMISSION SCREENING
[facility] Risk Assessment Scale    Patient Name: Aleyda Costa  YOB: 1980  MRN: [id number]            RIsk Factor Measure Recommendation Patient Data Scale/Score   BMI >40 Reconsider surgery, weight loss   Estimated body mass index is 33.91 kg/m² as calculated from the following:    Height as of this encounter: 6' (1.829 m).    Weight as of this encounter: 113.4 kg (250 lb).   [] 0 = 1 - 24.9  [] 1 = 25-29.9  [x] 2 = 30-34.9  [] 3 = 35-39.9  [] 4 = 40-44.9  [] 5 = 45-99.9   Hemoglobin AIC (if applicable) >9 Delay surgery until DM under control  Refer for:  · Nutrition Therapy  · Exercise   · Medication    No results found for: LABA1C, HGBA1C    Lab Results   Component Value Date    GLU 88 11/01/2018      [] 0 = 4.0-5.6  [] 1 = 5.7-6.4  [] 2 = 6.5-6.9  [] 3 = 7.0-7.9  [] 4 = 8.0-8.9  [] 5 = 9.0-12   Hemoglobin (Anemia) <9 Delay surgery   Correct anemia Lab Results   Component Value Date    HGB 14.3 11/01/2018    [] 20 - <9.0                    Albumin <3 Delay surgery &Workup No results found for: ALBUMIN [] 20 - <3.0   Smoking Cessation >4 Weeks Delay Surgery  Refer to OP Cessation Class     [x] 20 - current smoker                                __0.5___ PPD                    Hx of MI, PE, Arrhythmia, CVA, DVT <30 Days Delay Surgery    N/A [] 20      Infection Variable Delay surgery and re-evaluate   N/A [] 20 - recent/current infection     Depression (PHQ) >10 out of 27 Delay Surgery and re-evaluate  Medication  Counseling              [x] 0     []1     []2     []3      []4      [] 5                    (1-4)      (5-9)  (10-14)  (15-19)   (20-27)     Memory Impairment & Memory loss (Mini-Cog Screening Tool) Advanced dementia and/or Parkinson's Reconsider surgery     [x] 0     []1     []2     []3     []4     [] 5     Physical Conditioning (Modified AM-PAC Per Physical Therapy at [facility]) Unable to ambulate on day of surgery Delay surgery and re-evaluate  Pre-Rehabilitation   (PT  evaluation)       [x]  0   []4       []8     []12        []16     []20       (<20%)   (<40%)   (<60%)   (<80% )    (>80%)     Home Environment/Caregiver support  (Per /Navigator Interview)    Availability of basic services and/or approprate assistance during post-operative period Delay surgery and re-evaluate  Safe home environment  Health   1 week post-surgery  Transportation  availability  Ability to obtain DME/Medications post-op    [x] 0     []1     []2     []3     []4     [] 5  [x] 0     []1     []2     []3     []4     [] 5  [x] 0     []1     []2     []3     []4     [] 5  [x] 0     []1     []2     []3     []4     [] 5         MD Contact: Dr. Cardenas Comments:  Total Score:  22

## 2018-11-01 NOTE — PRE ADMISSION SCREENING
Patient Name: Aleyad Costa  YOB: 1980   MRN: [id number]     [facility]   Basic Mobility Inpatient Short Form 6 Clicks         How much difficulty does the patient currently have  Unable  A Lot  A Little  None      1. Turning over in bed (including adjusting bedclothes, sheets and blankets)?     1 []    2 []    3 []    4 [x]        2. Sitting down on and standing up from a chair with arms (e.g., wheelchair, bedside commode, etc.)     1 []  2 []  3 []     4 [x]      3. Moving from lying on back to sitting on the side of the bed?     1 []  2 []  3 []    4 [x]    How much help from another person does the patient currently need  Total  A Lot  A Little  None      4. Moving to and from a bed to a chair (including a wheelchair)?    1 []  2 []  3 []    4 [x]      5. Need to walk in hospital room?    1 []  2 []  3 []    4 [x]      6. Climbing 3-5 steps with a railing?    1 []  2 []  3 []    4 [x]       Raw Score:       24          CMS 0-100% Score:       0     %   Standardized Score:    61.14           CMS Modifier:      [facility]   Basic Mobility Inpatient Short Form 6 Clicks Score Conversion Table*         *Use this form to convert -PAC Basic Mobility Inpatient Raw Scores.   [facility] Basic Mobility Short Form Scoring Example   1. Add the number values associated with the response to each item. For example, items totals yield a Raw Score of 21.   2. Match the raw score to the t-Scale scores (t-Scale score = 50.25, SE = 4.69).   3. Find the associated CMS % (CMS % = 28.97%).   4. Locate the correct CMS Functional Modifier Code, or G Code (G code = CJ)     NOTE: Each -PAC Short Form has a separate conversion table. Make sure that you use the correct conversion table.       Instruction Manual - page 45 contains conversion table
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
## 2018-11-01 NOTE — DISCHARGE INSTRUCTIONS
To confirm, Your doctor has instructed you that surgery is scheduled for:     Please report to [facility], Registration the morning of surgery. You must check-in and receive a wristband before going to your procedure.    Pre-Op will call the afternoon prior to surgery between 1:00 and 6:00 PM with the final arrival time.  Phone number: [phone]    PLEASE NOTE:  The surgery schedule has many variables which may affect the time of your surgery case.  Family members should be available if your surgery time changes.  Plan to be here the day of your procedure between 4-6 hours.    MEDICATIONS:  TAKE ONLY THESE MEDICATIONS WITH A SMALL SIP OF WATER THE MORNING OF YOUR PROCEDURE:  FLEXERIL, PERCOCET, VISTARIL IF NEEDED    DO NOT TAKE THESE MEDICATIONS 5-7 DAYS PRIOR to your procedure or per your surgeon's request: ASPIRIN, ALEVE, ADVIL, IBUPROFEN, FISH OIL VITAMIN E, HERBALS  (May take Tylenol)  CELEBREX, IBUPROFEN, TORADOL    ONLY if you are prescribed any types of blood thinners such as:  Aspirin, Coumadin, Plavix, Pradaxa, Xarelto, Aggrenox, Effient, Eliquis, Savasya, Brilinta, or any other, ask your surgeon whether you should stop taking them and how long before surgery you should stop.  You may also need to verify with the prescribing physician if it is ok to stop your medication.      INSTRUCTIONS IMPORTANT!!  · Do not eat or drink anything between midnight and the time of your procedure- this includes gum, mints, and candy.  · Do not smoke or drink alcoholic beverages 24 hours prior to your procedure.  · Shower the night before AND the morning of your procedure with a Chlorhexidine wash such as Hibiclens or Dial antibacterial soap from the neck down.  Do not get it on your face or in your eyes.  You may use your own shampoo and face wash. This helps your skin to be as bacteria free as possible.    · If you wear contact lenses, dentures, hearing aids or glasses, bring a container to put  them in during surgery and give to a family member for safe keeping.  Please leave all jewelry, piercing's and valuables at home.   · DO NOT remove hair from the surgery site.  Do not shave the incision site unless you are given specific instructions to do so.    · ONLY if you have been diagnosed with sleep apnea please bring your C-PAP machine.  · ONLY if you wear home oxygen please bring your portable oxygen tank the day of your procedure.  · ONLY if you have a history of OPEN HEART SURGERY you will need a clearance from your Cardiologist per Anesthesia.      · ONLY for patients requiring bowel prep, written instructions will be given by your doctor's office.  · ONLY if you have a neuro stimulator, please bring the controller with you the morning of surgery  · ONLY if a type and screen test is needed before surgery, please return:  · If your doctor has scheduled you for an overnight stay, bring a small overnight bag with any personal items you need.  · Make arrangements in advance for transportation home by a responsible adult.  It is not safe to drive a vehicle during the 24 hours after anesthesia.      · Visiting hours are 10:00AM to 8:30PM.  For the safety of all patients, visitors under the age of 12 are not allowed above the first floor of the hospital.    · All [facility] facilities and properties are tobacco free.  Smoking is NOT allowed.       If you have any questions about these instructions, call Pre-Op Admit  Nursing at [phone] or the Pre-Op Day Surgery Unit at [phone].

## 2018-11-03 LAB — MRSA SPEC QL CULT: NORMAL

## 2018-11-12 ENCOUNTER — ANESTHESIA EVENT (OUTPATIENT)
Dept: SURGERY | Facility: HOSPITAL | Age: 38
DRG: 470 | End: 2018-11-12
Payer: COMMERCIAL

## 2018-11-13 ENCOUNTER — HOSPITAL ENCOUNTER (INPATIENT)
Facility: HOSPITAL | Age: 38
LOS: 1 days | Discharge: HOME HEALTH | DRG: 470 | End: 2018-11-14
Attending: ORTHOPAEDIC SURGERY | Admitting: ORTHOPAEDIC SURGERY
Payer: COMMERCIAL

## 2018-11-13 ENCOUNTER — ANESTHESIA (OUTPATIENT)
Dept: SURGERY | Facility: HOSPITAL | Age: 38
DRG: 470 | End: 2018-11-13
Payer: COMMERCIAL

## 2018-11-13 DIAGNOSIS — Z96.652 S/P TOTAL KNEE ARTHROPLASTY, LEFT: Primary | ICD-10-CM

## 2018-11-13 DIAGNOSIS — M17.12 PATELLOFEMORAL ARTHRITIS OF LEFT KNEE: ICD-10-CM

## 2018-11-13 DIAGNOSIS — G43.719 INTRACTABLE CHRONIC MIGRAINE WITHOUT AURA AND WITHOUT STATUS MIGRAINOSUS: ICD-10-CM

## 2018-11-13 PROBLEM — G47.30 SLEEP APNEA: Status: ACTIVE | Noted: 2018-11-13

## 2018-11-13 PROBLEM — F32.A DEPRESSION: Status: ACTIVE | Noted: 2018-11-13

## 2018-11-13 PROCEDURE — 25000003 PHARM REV CODE 250: Performed by: ORTHOPAEDIC SURGERY

## 2018-11-13 PROCEDURE — 63600175 PHARM REV CODE 636 W HCPCS: Performed by: NURSE PRACTITIONER

## 2018-11-13 PROCEDURE — 36000711: Performed by: ORTHOPAEDIC SURGERY

## 2018-11-13 PROCEDURE — 27200750 HC INSULATED NEEDLE/ STIMUPLEX: Performed by: ANESTHESIOLOGY

## 2018-11-13 PROCEDURE — 63600175 PHARM REV CODE 636 W HCPCS: Performed by: ORTHOPAEDIC SURGERY

## 2018-11-13 PROCEDURE — 25000003 PHARM REV CODE 250: Performed by: NURSE ANESTHETIST, CERTIFIED REGISTERED

## 2018-11-13 PROCEDURE — 11000001 HC ACUTE MED/SURG PRIVATE ROOM

## 2018-11-13 PROCEDURE — 63600175 PHARM REV CODE 636 W HCPCS: Performed by: ANESTHESIOLOGY

## 2018-11-13 PROCEDURE — 37000009 HC ANESTHESIA EA ADD 15 MINS: Performed by: ORTHOPAEDIC SURGERY

## 2018-11-13 PROCEDURE — 25000003 PHARM REV CODE 250: Performed by: NURSE PRACTITIONER

## 2018-11-13 PROCEDURE — 64447 NJX AA&/STRD FEMORAL NRV IMG: CPT | Performed by: ANESTHESIOLOGY

## 2018-11-13 PROCEDURE — 36000710: Performed by: ORTHOPAEDIC SURGERY

## 2018-11-13 PROCEDURE — S0020 INJECTION, BUPIVICAINE HYDRO: HCPCS | Performed by: ANESTHESIOLOGY

## 2018-11-13 PROCEDURE — D9220A PRA ANESTHESIA: Mod: CRNA,,, | Performed by: NURSE ANESTHETIST, CERTIFIED REGISTERED

## 2018-11-13 PROCEDURE — G8978 MOBILITY CURRENT STATUS: HCPCS | Mod: CK

## 2018-11-13 PROCEDURE — 63600175 PHARM REV CODE 636 W HCPCS: Performed by: NURSE ANESTHETIST, CERTIFIED REGISTERED

## 2018-11-13 PROCEDURE — 25000003 PHARM REV CODE 250: Performed by: ANESTHESIOLOGY

## 2018-11-13 PROCEDURE — 71000039 HC RECOVERY, EACH ADD'L HOUR: Performed by: ORTHOPAEDIC SURGERY

## 2018-11-13 PROCEDURE — 63600175 PHARM REV CODE 636 W HCPCS

## 2018-11-13 PROCEDURE — 71000033 HC RECOVERY, INTIAL HOUR: Performed by: ORTHOPAEDIC SURGERY

## 2018-11-13 PROCEDURE — 64447 NJX AA&/STRD FEMORAL NRV IMG: CPT | Mod: 59,LT,, | Performed by: ANESTHESIOLOGY

## 2018-11-13 PROCEDURE — C1776 JOINT DEVICE (IMPLANTABLE): HCPCS | Performed by: ORTHOPAEDIC SURGERY

## 2018-11-13 PROCEDURE — C1713 ANCHOR/SCREW BN/BN,TIS/BN: HCPCS | Performed by: ORTHOPAEDIC SURGERY

## 2018-11-13 PROCEDURE — 27200688 HC TRAY, SPINAL-HYPER/ ISOBARIC: Performed by: NURSE ANESTHETIST, CERTIFIED REGISTERED

## 2018-11-13 PROCEDURE — 27201423 OPTIME MED/SURG SUP & DEVICES STERILE SUPPLY: Performed by: ORTHOPAEDIC SURGERY

## 2018-11-13 PROCEDURE — 99900103 DSU ONLY-NO CHARGE-INITIAL HR (STAT): Performed by: ORTHOPAEDIC SURGERY

## 2018-11-13 PROCEDURE — 27447 TOTAL KNEE ARTHROPLASTY: CPT | Mod: LT,,, | Performed by: ORTHOPAEDIC SURGERY

## 2018-11-13 PROCEDURE — 94761 N-INVAS EAR/PLS OXIMETRY MLT: CPT

## 2018-11-13 PROCEDURE — G8979 MOBILITY GOAL STATUS: HCPCS | Mod: CJ

## 2018-11-13 PROCEDURE — 97116 GAIT TRAINING THERAPY: CPT

## 2018-11-13 PROCEDURE — 37000008 HC ANESTHESIA 1ST 15 MINUTES: Performed by: ORTHOPAEDIC SURGERY

## 2018-11-13 PROCEDURE — 63600175 PHARM REV CODE 636 W HCPCS: Performed by: INTERNAL MEDICINE

## 2018-11-13 PROCEDURE — 99900104 DSU ONLY-NO CHARGE-EA ADD'L HR (STAT): Performed by: ORTHOPAEDIC SURGERY

## 2018-11-13 PROCEDURE — 97162 PT EVAL MOD COMPLEX 30 MIN: CPT

## 2018-11-13 PROCEDURE — S0077 INJECTION, CLINDAMYCIN PHOSP: HCPCS | Performed by: ORTHOPAEDIC SURGERY

## 2018-11-13 PROCEDURE — 99222 1ST HOSP IP/OBS MODERATE 55: CPT | Mod: ,,, | Performed by: INTERNAL MEDICINE

## 2018-11-13 PROCEDURE — 76942 ECHO GUIDE FOR BIOPSY: CPT | Mod: 26,,, | Performed by: ANESTHESIOLOGY

## 2018-11-13 PROCEDURE — D9220A PRA ANESTHESIA: Mod: ANES,,, | Performed by: ANESTHESIOLOGY

## 2018-11-13 PROCEDURE — 0SRD0J9 REPLACEMENT OF LEFT KNEE JOINT WITH SYNTHETIC SUBSTITUTE, CEMENTED, OPEN APPROACH: ICD-10-PCS | Performed by: ORTHOPAEDIC SURGERY

## 2018-11-13 DEVICE — PATELLA ALL POLY DOMED E 8X32: Type: IMPLANTABLE DEVICE | Site: KNEE | Status: FUNCTIONAL

## 2018-11-13 DEVICE — CEMENT BONE HV-G: Type: IMPLANTABLE DEVICE | Site: KNEE | Status: FUNCTIONAL

## 2018-11-13 RX ORDER — FLUOXETINE HYDROCHLORIDE 20 MG/1
20 CAPSULE ORAL NIGHTLY
Status: DISCONTINUED | OUTPATIENT
Start: 2018-11-13 | End: 2018-11-14 | Stop reason: HOSPADM

## 2018-11-13 RX ORDER — OXYCODONE HCL 10 MG/1
10 TABLET, FILM COATED, EXTENDED RELEASE ORAL EVERY 12 HOURS
Status: DISCONTINUED | OUTPATIENT
Start: 2018-11-13 | End: 2018-11-14 | Stop reason: HOSPADM

## 2018-11-13 RX ORDER — BUPIVACAINE HYDROCHLORIDE 7.5 MG/ML
INJECTION, SOLUTION EPIDURAL; RETROBULBAR
Status: DISCONTINUED | OUTPATIENT
Start: 2018-11-13 | End: 2018-11-13

## 2018-11-13 RX ORDER — OXYCODONE HYDROCHLORIDE 10 MG/1
10 TABLET ORAL EVERY 4 HOURS PRN
Status: DISCONTINUED | OUTPATIENT
Start: 2018-11-13 | End: 2018-11-14 | Stop reason: HOSPADM

## 2018-11-13 RX ORDER — CLINDAMYCIN PHOSPHATE 900 MG/50ML
900 INJECTION, SOLUTION INTRAVENOUS
Status: COMPLETED | OUTPATIENT
Start: 2018-11-13 | End: 2018-11-13

## 2018-11-13 RX ORDER — HYDROXYZINE PAMOATE 25 MG/1
25 CAPSULE ORAL EVERY 8 HOURS PRN
Status: DISCONTINUED | OUTPATIENT
Start: 2018-11-13 | End: 2018-11-14 | Stop reason: HOSPADM

## 2018-11-13 RX ORDER — CYCLOBENZAPRINE HCL 10 MG
10 TABLET ORAL 3 TIMES DAILY PRN
Status: DISCONTINUED | OUTPATIENT
Start: 2018-11-13 | End: 2018-11-14 | Stop reason: HOSPADM

## 2018-11-13 RX ORDER — OXYCODONE HCL 10 MG/1
10 TABLET, FILM COATED, EXTENDED RELEASE ORAL EVERY 12 HOURS
Status: DISCONTINUED | OUTPATIENT
Start: 2018-11-13 | End: 2018-11-13 | Stop reason: HOSPADM

## 2018-11-13 RX ORDER — DOCUSATE SODIUM 100 MG/1
100 CAPSULE, LIQUID FILLED ORAL EVERY 12 HOURS
Status: DISCONTINUED | OUTPATIENT
Start: 2018-11-13 | End: 2018-11-14 | Stop reason: HOSPADM

## 2018-11-13 RX ORDER — CELECOXIB 100 MG/1
200 CAPSULE ORAL DAILY
Status: DISCONTINUED | OUTPATIENT
Start: 2018-11-14 | End: 2018-11-14 | Stop reason: HOSPADM

## 2018-11-13 RX ORDER — LOPERAMIDE HYDROCHLORIDE 2 MG/1
2 CAPSULE ORAL CONTINUOUS PRN
Status: DISCONTINUED | OUTPATIENT
Start: 2018-11-13 | End: 2018-11-14 | Stop reason: HOSPADM

## 2018-11-13 RX ORDER — OXYCODONE HYDROCHLORIDE 5 MG/1
10 TABLET ORAL EVERY 4 HOURS PRN
Status: DISCONTINUED | OUTPATIENT
Start: 2018-11-13 | End: 2018-11-13 | Stop reason: HOSPADM

## 2018-11-13 RX ORDER — OXYCODONE HYDROCHLORIDE 5 MG/1
5 TABLET ORAL
Status: DISCONTINUED | OUTPATIENT
Start: 2018-11-13 | End: 2018-11-14 | Stop reason: HOSPADM

## 2018-11-13 RX ORDER — LIDOCAINE HYDROCHLORIDE 10 MG/ML
1 INJECTION, SOLUTION EPIDURAL; INFILTRATION; INTRACAUDAL; PERINEURAL ONCE
Status: DISCONTINUED | OUTPATIENT
Start: 2018-11-13 | End: 2018-11-13 | Stop reason: HOSPADM

## 2018-11-13 RX ORDER — PREGABALIN 75 MG/1
75 CAPSULE ORAL ONCE
Status: COMPLETED | OUTPATIENT
Start: 2018-11-13 | End: 2018-11-13

## 2018-11-13 RX ORDER — FAMOTIDINE 20 MG/1
20 TABLET, FILM COATED ORAL 2 TIMES DAILY
Status: DISCONTINUED | OUTPATIENT
Start: 2018-11-13 | End: 2018-11-14 | Stop reason: HOSPADM

## 2018-11-13 RX ORDER — FERROUS SULFATE 15 MG/ML
30 DROPS ORAL DAILY
Status: DISCONTINUED | OUTPATIENT
Start: 2018-11-13 | End: 2018-11-14 | Stop reason: HOSPADM

## 2018-11-13 RX ORDER — SODIUM CHLORIDE 9 MG/ML
INJECTION, SOLUTION INTRAVENOUS CONTINUOUS
Status: DISCONTINUED | OUTPATIENT
Start: 2018-11-13 | End: 2018-11-13

## 2018-11-13 RX ORDER — OXYCODONE HYDROCHLORIDE 15 MG/1
15 TABLET, FILM COATED, EXTENDED RELEASE ORAL EVERY 12 HOURS
Status: DISCONTINUED | OUTPATIENT
Start: 2018-11-13 | End: 2018-11-13

## 2018-11-13 RX ORDER — TRANEXAMIC ACID 100 MG/ML
INJECTION, SOLUTION INTRAVENOUS
Status: DISCONTINUED | OUTPATIENT
Start: 2018-11-13 | End: 2018-11-13

## 2018-11-13 RX ORDER — BUPIVACAINE HYDROCHLORIDE 5 MG/ML
INJECTION, SOLUTION EPIDURAL; INTRACAUDAL
Status: DISCONTINUED | OUTPATIENT
Start: 2018-11-13 | End: 2018-11-13

## 2018-11-13 RX ORDER — MIDAZOLAM HYDROCHLORIDE 1 MG/ML
INJECTION, SOLUTION INTRAMUSCULAR; INTRAVENOUS
Status: DISCONTINUED | OUTPATIENT
Start: 2018-11-13 | End: 2018-11-13

## 2018-11-13 RX ORDER — ENOXAPARIN SODIUM 100 MG/ML
40 INJECTION SUBCUTANEOUS EVERY 24 HOURS
Status: DISCONTINUED | OUTPATIENT
Start: 2018-11-13 | End: 2018-11-14 | Stop reason: HOSPADM

## 2018-11-13 RX ORDER — OXYCODONE HYDROCHLORIDE 5 MG/1
5 TABLET ORAL ONCE AS NEEDED
Status: DISCONTINUED | OUTPATIENT
Start: 2018-11-13 | End: 2018-11-13 | Stop reason: HOSPADM

## 2018-11-13 RX ORDER — PROPOFOL 10 MG/ML
VIAL (ML) INTRAVENOUS
Status: DISCONTINUED | OUTPATIENT
Start: 2018-11-13 | End: 2018-11-13

## 2018-11-13 RX ORDER — ONDANSETRON 2 MG/ML
4 INJECTION INTRAMUSCULAR; INTRAVENOUS ONCE
Status: COMPLETED | OUTPATIENT
Start: 2018-11-13 | End: 2018-11-13

## 2018-11-13 RX ORDER — LIDOCAINE HCL/PF 100 MG/5ML
SYRINGE (ML) INTRAVENOUS
Status: DISCONTINUED | OUTPATIENT
Start: 2018-11-13 | End: 2018-11-13

## 2018-11-13 RX ORDER — PREGABALIN 75 MG/1
75 CAPSULE ORAL 2 TIMES DAILY
Status: DISCONTINUED | OUTPATIENT
Start: 2018-11-13 | End: 2018-11-13 | Stop reason: HOSPADM

## 2018-11-13 RX ORDER — ACETAMINOPHEN 10 MG/ML
1000 INJECTION, SOLUTION INTRAVENOUS ONCE
Status: COMPLETED | OUTPATIENT
Start: 2018-11-13 | End: 2018-11-13

## 2018-11-13 RX ORDER — PROPOFOL 10 MG/ML
VIAL (ML) INTRAVENOUS CONTINUOUS PRN
Status: DISCONTINUED | OUTPATIENT
Start: 2018-11-13 | End: 2018-11-13

## 2018-11-13 RX ORDER — CELECOXIB 100 MG/1
100 CAPSULE ORAL 2 TIMES DAILY
Status: DISCONTINUED | OUTPATIENT
Start: 2018-11-13 | End: 2018-11-13 | Stop reason: HOSPADM

## 2018-11-13 RX ORDER — VANCOMYCIN HCL IN 5 % DEXTROSE 1G/250ML
1000 PLASTIC BAG, INJECTION (ML) INTRAVENOUS
Status: COMPLETED | OUTPATIENT
Start: 2018-11-13 | End: 2018-11-13

## 2018-11-13 RX ORDER — CETIRIZINE HYDROCHLORIDE 5 MG/1
5 TABLET ORAL DAILY
Status: DISCONTINUED | OUTPATIENT
Start: 2018-11-13 | End: 2018-11-14 | Stop reason: HOSPADM

## 2018-11-13 RX ORDER — ONDANSETRON 2 MG/ML
4 INJECTION INTRAMUSCULAR; INTRAVENOUS ONCE AS NEEDED
Status: DISCONTINUED | OUTPATIENT
Start: 2018-11-13 | End: 2018-11-13 | Stop reason: HOSPADM

## 2018-11-13 RX ORDER — SODIUM CHLORIDE 0.9 % (FLUSH) 0.9 %
5 SYRINGE (ML) INJECTION
Status: DISCONTINUED | OUTPATIENT
Start: 2018-11-13 | End: 2018-11-14 | Stop reason: HOSPADM

## 2018-11-13 RX ORDER — OXYCODONE HCL 10 MG/1
10 TABLET, FILM COATED, EXTENDED RELEASE ORAL ONCE
Status: COMPLETED | OUTPATIENT
Start: 2018-11-13 | End: 2018-11-13

## 2018-11-13 RX ORDER — SODIUM CHLORIDE, SODIUM LACTATE, POTASSIUM CHLORIDE, CALCIUM CHLORIDE 600; 310; 30; 20 MG/100ML; MG/100ML; MG/100ML; MG/100ML
INJECTION, SOLUTION INTRAVENOUS CONTINUOUS
Status: DISCONTINUED | OUTPATIENT
Start: 2018-11-13 | End: 2018-11-13

## 2018-11-13 RX ORDER — SCOLOPAMINE TRANSDERMAL SYSTEM 1 MG/1
1 PATCH, EXTENDED RELEASE TRANSDERMAL
Status: DISCONTINUED | OUTPATIENT
Start: 2018-11-13 | End: 2018-11-14 | Stop reason: HOSPADM

## 2018-11-13 RX ORDER — EPHEDRINE SULFATE 50 MG/ML
INJECTION, SOLUTION INTRAVENOUS
Status: DISCONTINUED | OUTPATIENT
Start: 2018-11-13 | End: 2018-11-13

## 2018-11-13 RX ORDER — FENTANYL CITRATE 50 UG/ML
INJECTION, SOLUTION INTRAMUSCULAR; INTRAVENOUS
Status: DISCONTINUED | OUTPATIENT
Start: 2018-11-13 | End: 2018-11-13

## 2018-11-13 RX ORDER — CELECOXIB 100 MG/1
400 CAPSULE ORAL ONCE
Status: COMPLETED | OUTPATIENT
Start: 2018-11-13 | End: 2018-11-13

## 2018-11-13 RX ORDER — EPINEPHRINE 0.3 MG/.3ML
0.3 INJECTION SUBCUTANEOUS DAILY PRN
Status: DISCONTINUED | OUTPATIENT
Start: 2018-11-13 | End: 2018-11-14 | Stop reason: HOSPADM

## 2018-11-13 RX ORDER — ACETAMINOPHEN 10 MG/ML
1000 INJECTION, SOLUTION INTRAVENOUS EVERY 8 HOURS
Status: DISCONTINUED | OUTPATIENT
Start: 2018-11-13 | End: 2018-11-13 | Stop reason: HOSPADM

## 2018-11-13 RX ORDER — DEXTROSE MONOHYDRATE AND SODIUM CHLORIDE 5; .9 G/100ML; G/100ML
INJECTION, SOLUTION INTRAVENOUS CONTINUOUS
Status: DISCONTINUED | OUTPATIENT
Start: 2018-11-13 | End: 2018-11-14 | Stop reason: HOSPADM

## 2018-11-13 RX ORDER — FENTANYL CITRATE 50 UG/ML
25 INJECTION, SOLUTION INTRAMUSCULAR; INTRAVENOUS EVERY 5 MIN PRN
Status: DISCONTINUED | OUTPATIENT
Start: 2018-11-13 | End: 2018-11-13 | Stop reason: HOSPADM

## 2018-11-13 RX ORDER — OXYCODONE HYDROCHLORIDE 5 MG/1
5 TABLET ORAL
Status: DISCONTINUED | OUTPATIENT
Start: 2018-11-13 | End: 2018-11-13 | Stop reason: HOSPADM

## 2018-11-13 RX ORDER — MUPIROCIN 20 MG/G
OINTMENT TOPICAL
Status: DISCONTINUED | OUTPATIENT
Start: 2018-11-13 | End: 2018-11-13 | Stop reason: HOSPADM

## 2018-11-13 RX ORDER — ONDANSETRON 2 MG/ML
4 INJECTION INTRAMUSCULAR; INTRAVENOUS EVERY 6 HOURS PRN
Status: DISCONTINUED | OUTPATIENT
Start: 2018-11-13 | End: 2018-11-14 | Stop reason: HOSPADM

## 2018-11-13 RX ADMIN — BUPIVACAINE HYDROCHLORIDE 10 ML: 5 INJECTION, SOLUTION EPIDURAL; INTRACAUDAL; PERINEURAL at 04:11

## 2018-11-13 RX ADMIN — CLINDAMYCIN PHOSPHATE 900 MG: 18 INJECTION, SOLUTION INTRAVENOUS at 07:11

## 2018-11-13 RX ADMIN — SCOPALAMINE 1 PATCH: 1 PATCH, EXTENDED RELEASE TRANSDERMAL at 09:11

## 2018-11-13 RX ADMIN — CYCLOBENZAPRINE HYDROCHLORIDE 10 MG: 10 TABLET, FILM COATED ORAL at 09:11

## 2018-11-13 RX ADMIN — ENOXAPARIN SODIUM 40 MG: 100 INJECTION SUBCUTANEOUS at 05:11

## 2018-11-13 RX ADMIN — EPHEDRINE SULFATE 10 MG: 50 INJECTION, SOLUTION INTRAMUSCULAR; INTRAVENOUS; SUBCUTANEOUS at 08:11

## 2018-11-13 RX ADMIN — DEXTROSE AND SODIUM CHLORIDE: 5; .9 INJECTION, SOLUTION INTRAVENOUS at 09:11

## 2018-11-13 RX ADMIN — SODIUM CHLORIDE, SODIUM LACTATE, POTASSIUM CHLORIDE, AND CALCIUM CHLORIDE: .6; .31; .03; .02 INJECTION, SOLUTION INTRAVENOUS at 09:11

## 2018-11-13 RX ADMIN — FAMOTIDINE 20 MG: 20 TABLET ORAL at 11:11

## 2018-11-13 RX ADMIN — BUPIVACAINE HYDROCHLORIDE 2 ML: 7.5 INJECTION, SOLUTION EPIDURAL; RETROBULBAR at 04:11

## 2018-11-13 RX ADMIN — LIDOCAINE HYDROCHLORIDE 20 MG: 20 INJECTION, SOLUTION INTRAVENOUS at 07:11

## 2018-11-13 RX ADMIN — ONDANSETRON 4 MG: 2 INJECTION INTRAMUSCULAR; INTRAVENOUS at 07:11

## 2018-11-13 RX ADMIN — FLUOXETINE 20 MG: 20 CAPSULE ORAL at 09:11

## 2018-11-13 RX ADMIN — PROPOFOL 20 MG: 10 INJECTION, EMULSION INTRAVENOUS at 07:11

## 2018-11-13 RX ADMIN — MIDAZOLAM 2 MG: 1 INJECTION INTRAMUSCULAR; INTRAVENOUS at 07:11

## 2018-11-13 RX ADMIN — OXYCODONE HYDROCHLORIDE 10 MG: 10 TABLET ORAL at 05:11

## 2018-11-13 RX ADMIN — OXYCODONE HYDROCHLORIDE 10 MG: 10 TABLET, FILM COATED, EXTENDED RELEASE ORAL at 06:11

## 2018-11-13 RX ADMIN — CELECOXIB 400 MG: 100 CAPSULE ORAL at 06:11

## 2018-11-13 RX ADMIN — ACETAMINOPHEN 1000 MG: 10 INJECTION, SOLUTION INTRAVENOUS at 06:11

## 2018-11-13 RX ADMIN — EPHEDRINE SULFATE 10 MG: 50 INJECTION, SOLUTION INTRAMUSCULAR; INTRAVENOUS; SUBCUTANEOUS at 07:11

## 2018-11-13 RX ADMIN — OXYCODONE HYDROCHLORIDE 10 MG: 10 TABLET ORAL at 01:11

## 2018-11-13 RX ADMIN — FENTANYL CITRATE 100 MCG: 50 INJECTION, SOLUTION INTRAMUSCULAR; INTRAVENOUS at 07:11

## 2018-11-13 RX ADMIN — PREGABALIN 75 MG: 75 CAPSULE ORAL at 06:11

## 2018-11-13 RX ADMIN — PROMETHAZINE HYDROCHLORIDE 6.25 MG: 25 INJECTION INTRAMUSCULAR; INTRAVENOUS at 08:11

## 2018-11-13 RX ADMIN — PROPOFOL 50 MCG/KG/MIN: 10 INJECTION, EMULSION INTRAVENOUS at 07:11

## 2018-11-13 RX ADMIN — DOCUSATE SODIUM 100 MG: 100 CAPSULE, LIQUID FILLED ORAL at 11:11

## 2018-11-13 RX ADMIN — ONDANSETRON 4 MG: 2 INJECTION INTRAMUSCULAR; INTRAVENOUS at 02:11

## 2018-11-13 RX ADMIN — FAMOTIDINE 20 MG: 20 TABLET ORAL at 09:11

## 2018-11-13 RX ADMIN — OXYCODONE HYDROCHLORIDE 10 MG: 10 TABLET, FILM COATED, EXTENDED RELEASE ORAL at 09:11

## 2018-11-13 RX ADMIN — TRANEXAMIC ACID 1000 MG: 100 INJECTION, SOLUTION INTRAVENOUS at 07:11

## 2018-11-13 RX ADMIN — DOCUSATE SODIUM 100 MG: 100 CAPSULE, LIQUID FILLED ORAL at 09:11

## 2018-11-13 RX ADMIN — VANCOMYCIN HYDROCHLORIDE 1000 MG: 1 INJECTION, POWDER, LYOPHILIZED, FOR SOLUTION INTRAVENOUS at 01:11

## 2018-11-13 RX ADMIN — SODIUM CHLORIDE, SODIUM LACTATE, POTASSIUM CHLORIDE, AND CALCIUM CHLORIDE: .6; .31; .03; .02 INJECTION, SOLUTION INTRAVENOUS at 06:11

## 2018-11-13 RX ADMIN — OXYCODONE HYDROCHLORIDE 10 MG: 10 TABLET, FILM COATED, EXTENDED RELEASE ORAL at 02:11

## 2018-11-13 RX ADMIN — MUPIROCIN: 20 OINTMENT TOPICAL at 06:11

## 2018-11-13 RX ADMIN — Medication 30 MG: at 01:11

## 2018-11-13 NOTE — OP NOTE
[facility]  Orthopedic Surgery  Operative Note    SUMMARY     Date of Procedure: 11/13/2018     Procedure: Procedure(s) (LRB):  ARTHROPLASTY, KNEE (Left)       Surgeon(s) and Role:     * Feliberto Cardenas MD - Primary    Assistant: Nam Mccauley    Pre-Operative Diagnosis: Patellofemoral arthritis of left knee [M17.12]    Post-Operative Diagnosis: Post-Op Diagnosis Codes:     * Patellofemoral arthritis of left knee [M17.12]    Anesthesia: Spinal    Complications: No    Estimated Blood Loss (EBL): 10 mL           Implants:   Implant Name Type Inv. Item Serial No.  Lot No. LRB No. Used   CEMENT BONE HV-G - [id number]  CEMENT BONE HV-G  [id number] Left 1   CEMENT BONE HV-G - [id number]  CEMENT BONE HV-G  [id number] Left 1   DJOSURGICAL UNIBERSAL TIBIA SIZE 7 LEFT     [id number] Left 1   DJOSURGICAL FEMUR SIZE 7 LEFT     [id number] Left 1   PATELLA ALL POLY DOMED E 8X32 - [id number]  PATELLA ALL POLY DOMED E 8X32  DJO [id number] Left 1   DJO SURGICAL 3D KNEE TIBIAL INSERT SIZE 7  LEFT 10MM     [id number] Left 1       Tourniquet time: 54min at 300mmHg    Specimens:   Specimen (12h ago, onward)    None                  Condition: Good    Disposition: PACU - hemodynamically stable.    Attestation: I was present and scrubbed for the entire procedure.    INDICATIONS FOR THE PROCEDURE: A 38 female with a history of chronic   Left knee arthritis, had failed all conservative measures including cortisone   injections, PT, viscosupplementation and activity modification. After a long   discussion, the patient wished to proceed with the procedure above.     PROCEDURE IN DETAIL: Risks, benefits and alternatives of the procedure were   explained to the patient including, but not limited to damage to nerves,   arteries or blood vessels. Also explained risk of infection, stiffness, DVT, PE,   polyethylene wear as well as anesthetic complications including seizure, stroke,   heart attack and death, understood  this and signed informed consent. The   patient's Left knee was marked prior to coming to the Operating Room. The patient was brought to the operating room, placed on the operating table in a supine position.A  formal timeout was done in which correct patient, procedure and op site were all   correctly identified and confirmed by the entire operating team.  900 Clinda was given prior to surgical incision. Spinal anesthesia was induced. The patient'sLeft  lower extremity was prepped and   draped in normal sterile fashion. TheLeft  leg was exsanguinated with an   Esmarch. Tourniquet was inflated up 300 mmHg. Standard anterior approach to   the knee was made. Medial parapatellar arthrotomy was made, leaving about 1/8   inch of tissue for later repair. Some prior suture was removed using the knife as well as a rongeur.  Proximal medial tibial release was performed,   making sure not to release any of the MCL. We then   everted the patella and reflexed the knee. Fat pad was excised as well as some   of the ACL. Soft tissue off the distal anterior femur was removed for   visualization, so as to help prevent notching.  The intramedullary canal was then drilled and suctioned of fat.  The intramedullary guide was then inserted with 5° of valgus set.  It was then pinned into place.  I then made a distal femoral cut of 10 millimeters.  After   this was done, we turned our attention to the tibia. Ankle clamp was then inserted.  We then used the stylus to measure 4 millimeters off the most affected side. The ankle clamp was used to replicate the tibial slope and went perfectly centered down the tibial crest. A tibial cut   was then made. We then checked our extension gap, a 10-spacer was able to be placed.  We then turned our attention back to the femur.  We then sized the femur using 3° off the posterior condylar axis. This was also parallel to the epicondylar axis.  It measured a size 7.  We then drilled our holes.  Four in 1  block for the size 7 block was then placed and the 4 in 1 cuts were made. We then   checked posteriorly and removed posterior femoral osteophytes.  We then used the gap  to make sure that a 10 spacer good fit both in flexion and extension. Varus and valgus balancing was then checked as well. We   decided to trial. Trial components were placed with a 10 meniscal bearing. The   patient had good balancing again in varus valgus in both flexion and extension.   We turned our attention to patella, caliper was used on the patella where it   measured 30. We set guide at 22 and made our 8-mm cut for polyethylene component, 32  was selected. Then drill holes were placed and the patellar button was placed.   This had good tracking. No need for a lateral release and with no hand tests,   it stayed centered the whole time. We then marked our tibial rotation. We then drilled our femoral lugs.  We then   removed everything except the size 7minus tibial tray. We then checked our tibial tray   with a drop alondra again and then marked our rotation and pinned it into place then   drilled, and then punched for our keel. We then started preparing final   components on the back table. Posterior capsule was injected with our local   Cocktail. Bony surfaces   copiously irrigated with Pulsavac and then thoroughly dried. Once the cement   was the appropriate consistency, first the tibia and then the femur were   cemented into place, removing excess cement. A 10 trial was placed. The knee   was held in compression and then the patella was placed. Cement was allowed to   cure. Once the cement was cured, we then removed excess cement. Again trialed   one final time, again seeing a 10. We then tapped into place the   final 10 meniscal bearing into place. After this was done, we proceeded with closing.  Arthrotomy was closed using our StrataFix.   Subcutaneous tissue was closed using 2-0 Vicryl and skin was using a running 3-0   StrataFix and  Dermabond.Instrument, sponge, and needle counts were correct prior to wound closure and at the conclusion of the case.  Sterile dressing was applied including a Cryo/Cuff.   They were extubated, awakened and transferred from the Operating Room to the   Recovery Room in stable condition.

## 2018-11-13 NOTE — PLAN OF CARE
Problem: Physical Therapy Goal  Goal: Physical Therapy Goal  Goals to be met by: 2018     Patient will increase functional independence with mobility by performin. Supine to sit with Contact Guard Assistance  2. Sit to stand transfer with Contact Guard Assistance  3. Bed to chair transfer with Contact Guard Assistance using Rolling Walker  4. Gait  x 250 feet with Contact Guard Assistance using Rolling Walker.   5. Lower extremity exercise program x20 reps per handout, with assistance as needed    Outcome: Ongoing (interventions implemented as appropriate)  PT eval and treat completed. Gait 20ft with RW min assist. Bathroom use in an attempt to void but unsuccessful. LK flexion 90 degrees

## 2018-11-13 NOTE — PLAN OF CARE
To or with CRNA and rn  Pt dad to the [facility] area with the pt personal belongings

## 2018-11-13 NOTE — PLAN OF CARE
Report to Santa. No pain or nausea noted or voiced. Dressing to left knee dry. Intact without drainage. Father at bedside;

## 2018-11-13 NOTE — ANESTHESIA PROCEDURE NOTES
Spinal    Diagnosis: osteoarthritis   Patient location during procedure: OR  Start time: 11/13/2018 7:25 AM  Timeout: 11/13/2018 7:25 AM  End time: 11/13/2018 7:35 AM  Staffing  Anesthesiologist: Henry Ricketts MD  Performed: anesthesiologist   Preanesthetic Checklist  Completed: patient identified, site marked, surgical consent, pre-op evaluation, timeout performed, IV checked, risks and benefits discussed and monitors and equipment checked  Spinal Block  Patient position: sitting  Prep: ChloraPrep  Patient monitoring: heart rate, cardiac monitor and continuous pulse ox  Approach: midline  Location: L4-5  Injection technique: single shot  CSF Fluid: clear free-flowing CSF  Needle  Needle type: Tracie   Needle gauge: 25 G  Needle length: 4 in  Additional Documentation: incremental injection, negative aspiration for heme and no paresthesia on injection  Needle localization: anatomical landmarks  Assessment  Sensory level: T10   Dermatomal levels determined by alcohol wipe  Ease of block: easy  Patient's tolerance of the procedure: comfortable throughout block

## 2018-11-13 NOTE — ANESTHESIA POSTPROCEDURE EVALUATION
Anesthesia Post Evaluation    Patient: Aleyda Costa    Procedure(s) Performed: Procedure(s) (LRB):  ARTHROPLASTY, KNEE (Left)    Final Anesthesia Type: spinal  Patient location during evaluation: PACU  Patient participation: Yes- Able to Participate  Level of consciousness: awake and alert  Post-procedure vital signs: reviewed and stable  Pain management: adequate  Airway patency: patent  PONV status at discharge: No PONV  Anesthetic complications: no      Cardiovascular status: hemodynamically stable  Respiratory status: unassisted and room air  Hydration status: euvolemic  Follow-up not needed.        Visit Vitals  BP (!) 90/51 (BP Location: Right arm, Patient Position: Lying)   Pulse 85   Temp 36.7 °C (98 °F) (Oral)   Resp 16   Ht 6' (1.829 m)   Wt 113.4 kg (249 lb 16 oz)   SpO2 96%   Breastfeeding? No   BMI 33.91 kg/m²       Pain/Claudine Score: Pain Assessment Performed: Yes (11/13/2018  2:00 PM)  Presence of Pain: complains of pain/discomfort (11/13/2018  2:00 PM)  Pain Rating Prior to Med Admin: 10 (11/13/2018  2:00 PM)  Pain Rating Post Med Admin: 7 (11/13/2018  2:40 PM)  Claudine Score: 10 (11/13/2018 10:15 AM)

## 2018-11-13 NOTE — NURSING
"Situation Principle Problem:  S/P total knee arthroplasty, left      Reason for Calling: Pain    Provider Calling: Cousin   Background Vitals:    11/13/18 0606 11/13/18 0645 11/13/18 0913 11/13/18 1038   BP: (!) 98/56 (!) 110/54  (!) 101/54   BP Location:       Patient Position:       Pulse: 63 (!) 59  64   Resp: 16 16  16   Temp:   98 °F (36.7 °C) 97.3 °F (36.3 °C)   TempSrc:   Temporal    SpO2: 98%   96%   Weight:       Height:           No results found for: POCTGLUCOSE    Intake/Output:    Intake/Output Summary (Last 24 hours) at 11/13/2018 1356  Last data filed at 11/13/2018 0911  Gross per 24 hour   Intake 1000 ml   Output 10 ml   Net 990 ml        Assessment What is happening: Patient received 10 mg of Oxycodone IR. Complaining of unrelieved pain.    Response Provider Response: "Oxycontin 10 mg Q12H with a dose now."       Orders placed.     "

## 2018-11-13 NOTE — CONSULTS
PCP: Darlene Hayden MD    Medical Consult    Reason for consult: Medical Management    History of Present Illness:  Patient is a 38 y.o. female s/p left total knee arthroplasty by Dr. Cardenas. Patient has PMH significant for OA, depression, TINY (not on CPAP). Post-operatively, patient is doing well. Patient denied chest pain, shortness of breath, abdominal pain, nausea, vomiting, headache, vision changes, focal neuro-deficits, cough or fever.    Past Medical History:   Diagnosis Date    Arthritis     OA    Back pain     Depression     Full dentures     Migraine     Seizures     Sleep apnea     Does not use c-pap since weight loss - 170 lbs    Wears glasses      Past Surgical History:   Procedure Laterality Date    APPENDECTOMY      ARTHROSCOPY-KNEE Left 2016    [facility]    BLOCK-NERVE-MEDIAL BRANCH-LUMBAR Bilateral 2016    [facility]    CARPAL TUNNEL RELEASE Bilateral      SECTION      CHOLECYSTECTOMY      CHONDROPLASTY-KNEE-ARTHROSCOPY Left 2016    [facility] OR    FRACTURE SURGERY      ankle    gastric sleve      HYSTERECTOMY      INJECTION-STEROID-EPIDURAL-LUMBAR N/A 2016    [facility] OR    INJECTION-STEROID-EPIDURAL-LUMBAR N/A 3/28/2016    [facility] OR    KNEE SURGERY      LUMBAR EPIDURAL INJECTION      RADIAL NERVE Right     RADIOFREQUENCY THERMOCOAGULATION (RFTC)-NERVE-MEDIAN BRANCH-LUMBAR Bilateral 2017    Performed [facility] OR    RADIOFREQUENCY THERMOCOAGULATION (RFTC)-NERVE-MEDIAN BRANCH-LUMBAR Bilateral 2016    Performed by [facility]    RECONSTRUCTION- LIGAMENT-MPFL RECONSTRUCTION WITH ALLOGRAFT Left 2017    [facility] OR    SINUS SURGERY       Family History   Problem Relation Age of Onset    Heart disease Mother       Social History     Tobacco Use    Smoking status: Current Every Day Smoker     Packs/day: 0.50     Years: 20.00     Pack years: 10.00     Types: Cigarettes    Smokeless tobacco: Never Used   Substance Use Topics    Alcohol use: Yes     Alcohol/week: 0.0 oz     Comment: occasionally    Drug use: No      Review of patient's allergies indicates:   Allergen Reactions    Clarithromycin Swelling and Other (See Comments)     DIFFICULTY SWALLOWING  DIFFICULTY SWALLOWING    Pcn [penicillins] Anaphylaxis    Sulfa (sulfonamide antibiotics) Hives    Wellbutrin [bupropion hcl] Shortness Of Breath and Anaphylaxis    Betadine [povidone-iodine] Hives     Swelling      Cefprozil Hives    Iodinated contrast- oral and iv dye Hives    Latex, natural rubber Rash    Sulfamethoxazole-trimethoprim Nausea And Vomiting    Iodine Hives and Swelling    Latex Hives and Swelling     Facility-Administered Medications Prior to Admission   Medication    onabotulinumtoxina injection 200 Units    onabotulinumtoxina injection 200 Units     PTA Medications   Medication Sig    cetirizine (ZYRTEC) 5 MG tablet Take 5 mg by mouth as needed.     amitriptyline (ELAVIL) 25 MG tablet TAKE ONE TABLET BY MOUTH EVERY NIGHT FOR 2 WEEKS THEN INCREASE TO 2 TABLETS AT BEDTIME    celecoxib (CELEBREX) 200 MG capsule TAKE ONE CAPSULE BY MOUTH EVERY DAY    cyanocobalamin, vitamin B-12, 1,000 mcg/mL Kit Inject 1 mL into the muscle every 21 days.    cyclobenzaprine (FLEXERIL) 10 MG tablet TAKE ONE TABLET BY MOUTH 3 TIMES A DAY AS NEEDED FOR MUSCLE SPASMS    epinephrine (EPIPEN) 0.3 mg/0.3 mL (1:1,000) AtIn 0.3 mg daily as needed.     FERROUS SULFATE (IRON ORAL) Take by mouth once daily.     fluoxetine (PROZAC) 20 MG capsule every evening.     hydrOXYzine pamoate (VISTARIL) 25 MG Cap TAKE ONE CAPSULE BY MOUTH 3 TIMES A DAY AS NEEDED FOR ANXIETY    ibuprofen (ADVIL,MOTRIN) 800 MG tablet every 6 (six) hours as needed.      isometheptene-apap-dichloralphenazone [id number] (MIDRIN) -325 mg per capsule Take 1 capsule by mouth 4 (four) times daily as needed.    ketorolac (TORADOL) 60 mg/2 mL Soln Please provide patients with IV syringes (Patient taking differently: as needed. Please provide patients with IV syringes)    lorazepam (ATIVAN) 0.5 MG tablet Take 0.5 mg by mouth every 4 (four) hours as needed.     oxycodone (OXYCONTIN) 15 mg TR12 12 hr tablet Take 1 tablet (15 mg total) by mouth every 12 (twelve) hours.    oxycodone-acetaminophen (PERCOCET) 5-325 mg per tablet Take 1 tablet by mouth every 6 (six) hours as needed. (Patient taking differently: Take 1 tablet by mouth 3 (three) times daily. )    potassium chloride SA (K-DUR,KLOR-CON) 20 MEQ tablet Take 20 mEq by mouth as needed.     promethazine (PHENERGAN) 25 MG tablet Take 1 tablet by mouth every 6 (six) hours as needed (migraine). -MAY CAUSE DROWSINESS-    pseudoephedrine-DM-guaifenesin 45- mg Tab Take 1 tablet by mouth daily as needed.     sumatriptan succinate 4 mg/0.5 mL PnIj Inject 4 mg into the skin daily as needed. imitrex    VOLTAREN 1 % Gel daily as needed.        Review of Systems:  Constitutional: no fever or chills  Eyes: no visual changes  Ears, nose, mouth, throat, and face: no nasal congestion or sore throat  Respiratory: no cough or shorness of breath  Cardiovascular: no chest pain or palpitations  Gastrointestinal: no nausea or vomiting, no abdominal pain or change in bowel habits  Genitourinary: no hematuria or dysuria  Integument/breast: no rash or pruritis  Hematologic/lymphatic: no easy bruising or lymphadenopathy  Musculoskeletal: see HPI  Neurological: no seizures or tremors.  Behavioral/Psych: no auditory or visual hallucinations  Endocrine: no heat or cold intolerance     OBJECTIVE:     Vital Signs (Most Recent)  Temp: 97.3 °F (36.3 °C) (11/13/18 1038)  Pulse: 64 (11/13/18 1038)  Resp: 16 (11/13/18 1038)  BP: (!) 101/54 (11/13/18  1038)  SpO2: 96 % (11/13/18 1038)    Physical Exam:  General appearance: well developed, appears stated age  Head: normocephalic, atraumatic  Eyes:  conjunctivae/corneas clear. PERRL.  Nose: Nares normal. Septum midline.  Throat: lips, mucosa, and tongue normal; teeth and gums normal, no throat erythema.  Neck: supple, symmetrical, trachea midline, no JVD and thyroid not enlarged, symmetric, no tenderness/mass/nodules  Lungs:  clear to auscultation bilaterally and normal respiratory effort  Chest wall: no tenderness  Heart: regular rate and rhythm, S1, S2 normal, no murmur, click, rub or gallop  Abdomen: soft, non-tender non-distented; bowel sounds normal; no masses,  no organomegaly  Extremities: no cyanosis, clubbing or edema. Left knee dressing C/D/I.  Pulses: 2+ and symmetric  Skin: Skin color, texture, turgor normal. No rashes or lesions.  Lymph nodes: Cervical, supraclavicular, and axillary nodes normal.  Neurologic: Normal strength and tone. No focal numbness or weakness. CNII-XII intact.      Laboratory:   CBC: No results for input(s): WBC, RBC, HGB, HCT, PLT, MCV, MCH, MCHC in the last 168 hours.  CMP: No results for input(s): GLU, CALCIUM, ALBUMIN, PROT, NA, K, CO2, CL, BUN, CREATININE, ALKPHOS, ALT, AST, BILITOT in the last 168 hours.    No results found for: HGBA1C    Diagnostic Results:  Left knee x-ray:     ASSESSMENT/PLAN:       Active Hospital Problems    Diagnosis  POA    *S/P total knee arthroplasty, left [Z96.652]  Patellofemoral arthritis of left knee [M17.12]  Continue to follow Orthopedic recommendations.  Needs aggressive incentive spirometry.  Follow hemoglobin and hematocrit closely.  Pain control with IV narcotics and antiemetics as needed.  Physical therapy as per Orthopedics protocol with fall precautions.    Not Applicable    Depression [F32.9]  Continue Fluoxetine.    Yes    Sleep apnea [G47.30]  Yes     Use CPAP as needed.      Nicotine addiction  Smoking cessation counseling  performed. Dangers of cigarette smoking were reviewed with patient in detail and patient was encouraged to quit. Nicotine replacement options were discussed for > 3 minutes.        DVT prophylaxis: Lovenox 40 mg SQ q day.    Thank you for allowing me to participate in the care of your patient. Will follow with you.

## 2018-11-13 NOTE — TRANSFER OF CARE
Anesthesia Transfer of Care Note    Patient: Aleyda Costa    Procedure(s) Performed: Procedure(s) (LRB):  ARTHROPLASTY, KNEE (Left)    Patient location: PACU    Anesthesia Type: MAC, regional and spinal    Transport from OR: Transported from OR on 2-3 L/min O2 by NC with adequate spontaneous ventilation    Post pain: adequate analgesia    Post assessment: no apparent anesthetic complications and tolerated procedure well    Post vital signs: stable    Level of consciousness: awake, alert and oriented    Nausea/Vomiting: no nausea/vomiting    Complications: none    Transfer of care protocol was followed      Last vitals:   Visit Vitals  BP (!) 110/54   Pulse (!) 59   Temp 36.7 °C (98 °F) (Temporal)   Resp 16   Ht 6' (1.829 m)   Wt 113.4 kg (250 lb)   SpO2 98%   Breastfeeding? No   BMI 33.91 kg/m²

## 2018-11-13 NOTE — PLAN OF CARE
Problem: Patient Care Overview  Goal: Plan of Care Review  Outcome: Ongoing (interventions implemented as appropriate)  A&Ox4. Up with walker and one person assist to commode. Worked with PT today; ambulated to BR and back to bed. IVF infusing per order; IV antibiotics infused per order. VSS. Remains afebrile throughout shift. Remains fall-free throughout shift. Comfort level established. Good pain control with prn medications. Neurovascular checks every 4 hours; neurovascularly intact. Dressing to left knee CDI. Cryotherapy intact. Bed low, brakes locked, SR up x2, call light within reach. Verbalized understanding of poc. Open communication facilitated. Will continue to monitor.

## 2018-11-13 NOTE — PT/OT/SLP EVAL
Physical Therapy Evaluation    Patient Name:  Aleyda Costa   MRN:  [id number]    Recommendations:     Discharge Recommendations:  home health PT   Discharge Equipment Recommendations: walker, rolling   Barriers to discharge: None    Assessment:     Aleyda Costa is a 38 y.o. female admitted with a medical diagnosis of S/P total knee arthroplasty, left.  She presents with the following impairments/functional limitations:  weakness, impaired functional mobilty, impaired self care skills, impaired sensation, gait instability, decreased ROM, orthopedic precautions, decreased safety awareness, decreased lower extremity function . Pt seen po day0 , alert, legs slightly numb but tolerated gait short distance to bathrooma nd back to bed. Pt should progress well with PT.    Rehab Prognosis:  fair; patient would benefit from acute skilled PT services to address these deficits and reach maximum level of function.      Recent Surgery: Procedure(s) (LRB):  ARTHROPLASTY, KNEE (Left) Day of Surgery    Plan:     During this hospitalization, patient to be seen BID to address the above listed problems via gait training, therapeutic activities, therapeutic exercises  · Plan of Care Expires:  11/30/18   Plan of Care Reviewed with: patient, friend    Subjective     Communicated with nurse Brandon prior to session.  Patient found supine upon PT entry to room, agreeable to evaluation.    Friend at bedside who stated pt lived at home with spouse and 2 children  Pt stated wanting to use bathroom to void but no result    Chief Complaint: legs as numb  Patient comments/goals: get better  Pain/Comfort:  · Pain Rating 1: 0/10    Patients cultural, spiritual, Protestant conflicts given the current situation:      Living Environment:  Home with spouse  Prior to admission, patients level of function was independent.  Patient has the following equipment: none.  DME owned (not currently used): none.  Upon discharge, patient will have  assistance from family.    Objective:     Patient found with: peripheral IV, cryotherapy     General Precautions: Standard, fall   Orthopedic Precautions:LLE weight bearing as tolerated   Braces: N/A     Exams:  · RLE ROM: WFL  · RLE Strength: WFL  · LLE ROM: Deficits: LK flexion 90 degrees  · LLE Strength: 3-/5    Functional Mobility:  · Bed Mobility:     · Rolling Left:  minimum assistance  · Scooting: minimum assistance  · Bridging: minimum assistance  · Supine to Sit: minimum assistance  · Sit to Supine: minimum assistance  · Transfers:     · Sit to Stand:  minimum assistance with rolling walker  · Toilet Transfer: minimum assistance with  rolling walker and grab bars  using  Stand Pivot  · Gait: 20ft with WBAT LLE    AM-PAC 6 CLICK MOBILITY  Total Score:16       Therapeutic Activities and Exercises:   EOB sitting with extra time  Bathroom use min assist with RW  Pt requesting donning of underwear with set up assist  Back to bed post PT, SCD and cryo reapplied    Patient left HOB elevated with all lines intact, call button in reach, nurse Roma notified and friend present.    GOALS:   Multidisciplinary Problems     Physical Therapy Goals        Problem: Physical Therapy Goal    Goal Priority Disciplines Outcome Goal Variances Interventions   Physical Therapy Goal     PT, PT/OT Ongoing (interventions implemented as appropriate)     Description:  Goals to be met by: 2018     Patient will increase functional independence with mobility by performin. Supine to sit with Contact Guard Assistance  2. Sit to stand transfer with Contact Guard Assistance  3. Bed to chair transfer with Contact Guard Assistance using Rolling Walker  4. Gait  x 250 feet with Contact Guard Assistance using Rolling Walker.   5. Lower extremity exercise program x20 reps per handout, with assistance as needed                      History:     Past Medical History:   Diagnosis Date    Arthritis     OA    Back pain     Depression      Full dentures     Migraine     Seizures     Sleep apnea     Does not use c-pap since weight loss - 170 lbs    Wears glasses        Past Surgical History:   Procedure Laterality Date    APPENDECTOMY      ARTHROSCOPY-KNEE Left 2016    [facility]    BLOCK-NERVE-MEDIAL BRANCH-LUMBAR Bilateral 2016    [facility]    CARPAL TUNNEL RELEASE Bilateral      SECTION      CHOLECYSTECTOMY      CHONDROPLASTY-KNEE-ARTHROSCOPY Left 2016    [facility] OR    FRACTURE SURGERY      ankle    gastric sleve      HYSTERECTOMY      INJECTION-STEROID-EPIDURAL-LUMBAR N/A 2016    [facility]    INJECTION-STEROID-EPIDURAL-LUMBAR N/A 3/28/2016    [facility] OR    KNEE SURGERY      LUMBAR EPIDURAL INJECTION      RADIAL NERVE Right     RADIOFREQUENCY THERMOCOAGULATION (RFTC)-NERVE-MEDIAN BRANCH-LUMBAR Bilateral 2017    [facility] OR    RADIOFREQUENCY THERMOCOAGULATION (RFTC)-NERVE-MEDIAN BRANCH-LUMBAR Bilateral 2016    [facility] OR    RECONSTRUCTION- LIGAMENT-MPFL RECONSTRUCTION WITH ALLOGRAFT Left 2017    [facility] at [facility] OR    SINUS SURGERY         Clinical Decision Making:     History  Co-morbidities and personal factors that may impact the plan of care Examination  Body Structures and Functions, activity limitations and participation restrictions that may impact the plan of care Clinical Presentation   Decision Making/ Complexity Score   Co-morbidities:   [] Time since onset of injury / illness / exacerbation  [] Status of current condition  []Patient's cognitive status and safety concerns    [] Multiple Medical Problems (see med hx)  Personal Factors:   [] Patient's age  [] Prior Level of function   [] Patient's home situation (environment and  family support)  [] Patient's level of motivation  [] Expected progression of patient      HISTORY:(criteria)    [] 09374 - no personal factors/history    [] 00961 - has 1-2 personal factor/comorbidity     [] 47596 - has >3 personal factor/comorbidity     Body Regions:  [] Objective examination findings  [] Head     []  Neck  [] Trunk   [] Upper Extremity  [] Lower Extremity    Body Systems:  [] For communication ability, affect, cognition, language, and learning style: the assessment of the ability to make needs known, consciousness, orientation (person, place, and time), expected emotional /behavioral responses, and learning preferences (eg, learning barriers, education  needs)  [] For the neuromuscular system: a general assessment of gross coordinated movement (eg, balance, gait, locomotion, transfers, and transitions) and motor function  (motor control and motor learning)  [] For the musculoskeletal system: the assessment of gross symmetry, gross range of motion, gross strength, height, and weight  [] For the integumentary system: the assessment of pliability(texture), presence of scar formation, skin color, and skin integrity  [] For cardiovascular/pulmonary system: the assessment of heart rate, respiratory rate, blood pressure, and edema     Activity limitations:    [] Patient's cognitive status and saf ety concerns          [] Status of current condition      [] Weight bearing restriction  [] Cardiopulmunary Restriction    Participation Restrictions:   [] Goals and goal agreement with the patient     [] Rehab potential (prognosis) and probable outcome      Examination of Body System: (criteria)    [] 63052 - addressing 1-2 elements    [] 12535 - addressing a total of 3 or more elements     [] 25990 -  Addressing a total of 4 or more elements         Clinical Presentation: (criteria)  Choose one     On examination of body system using standardized tests and measures patient presents with (CHOOSE ONE) elements  from any of the following: body structures and functions, activity limitations, and/or participation restrictions.  Leading to a clinical presentation that is considered (CHOOSE ONE)                              Clinical Decision Making  (Eval Complexity):  Choose One     Time Tracking:     PT Received On: 11/13/18  PT Start Time: 1446     PT Stop Time: 1508  PT Total Time (min): 22 min     Billable Minutes: Evaluation 10 and Gait Training 12      Brenda Chin, PT  11/13/2018

## 2018-11-13 NOTE — ANESTHESIA PROCEDURE NOTES
Peripheral    Patient location during procedure: pre-op   Block not for primary anesthetic.  Reason for block: at surgeon's request and post-op pain management   Post-op Pain Location: left knee   Start time: 11/13/2018 7:00 AM  Timeout: 11/13/2018 7:00 AM   End time: 11/13/2018 7:08 AM  Staffing  Anesthesiologist: Henry Ricketts MD  Performed: anesthesiologist   Preanesthetic Checklist  Completed: patient identified, site marked, surgical consent, pre-op evaluation, timeout performed, IV checked, risks and benefits discussed and monitors and equipment checked  Peripheral Block  Patient position: supine  Prep: ChloraPrep  Patient monitoring: heart rate, cardiac monitor, continuous pulse ox, continuous capnometry and frequent blood pressure checks  Block type: adductor canal  Laterality: left  Injection technique: single shot  Needle  Needle type: Stimuplex   Needle gauge: 21 G  Needle length: 4 in  Needle localization: anatomical landmarks and ultrasound guidance   -ultrasound image captured on disc.  Assessment  Injection assessment: negative aspiration, negative parasthesia and local visualized surrounding nerve  Paresthesia pain: none  Heart rate change: no  Slow fractionated injection: yes  Additional Notes  + Exparel 1.3% 20cc.  VSS.  [facility] RN monitoring vitals throughout procedure.  Patient tolerated procedure well.

## 2018-11-13 NOTE — H&P
Past Medical History:   Diagnosis Date    Arthritis       OA    Depression      Migraine      Seizures      Sleep apnea       Does not use c-pap since weight loss - 170 lbs               Past Surgical History:   Procedure Laterality Date    APPENDECTOMY        ARTHROSCOPY-KNEE Left 2016     [facility]formed [facility] OR    BLOCK-NERVE-MEDIAL BRANCH-LUMBAR Bilateral 2016     Performed by [facility] at [facility] OR    CARPAL TUNNEL RELEASE Bilateral       SECTION        CHOLECYSTECTOMY        CHONDROPLASTY-KNEE-ARTHROSCOPY Left 2016     [facility]    FRACTURE SURGERY         ankle    gastric sleve        HYSTERECTOMY        INJECTION-STEROID-EPIDURAL-LUMBAR N/A 2016     [facility] at [facility] OR    INJECTION-STEROID-EPIDURAL-LUMBAR N/A 3/28/2016     [facility] at [facility] OR    KNEE SURGERY        LUMBAR EPIDURAL INJECTION        RADIOFREQUENCY THERMOCOAGULATION (RFTC)-NERVE-MEDIAN BRANCH-LUMBAR Bilateral 2017     [facility] at [facility] OR    RADIOFREQUENCY THERMOCOAGULATION (RFTC)-NERVE-MEDIAN BRANCH-LUMBAR Bilateral 2016     [facility] at [facility] OR    RECONSTRUCTION- LIGAMENT-MPFL RECONSTRUCTION WITH ALLOGRAFT Left 2017     [facility] at [facility] OR    SINUS SURGERY                  Current Outpatient Medications   Medication Sig    amitriptyline (ELAVIL) 25 MG tablet TAKE ONE TABLET BY MOUTH EVERY NIGHT FOR 2 WEEKS THEN INCREASE TO 2 TABLETS AT BEDTIME    celecoxib (CELEBREX) 200 MG capsule TAKE ONE CAPSULE BY MOUTH EVERY DAY    cetirizine (ZYRTEC) 5 MG tablet Take 5 mg by mouth once daily.    cholecalciferol, vitamin D3, 50,000 unit capsule Take 50,000 Units by mouth once a week.     cyanocobalamin, vitamin B-12, 1,000 mcg/mL Kit Inject 1 mL into the muscle every 21 days.    cyclobenzaprine (FLEXERIL) 10 MG  tablet TAKE ONE TABLET BY MOUTH 3 TIMES A DAY AS NEEDED FOR MUSCLE SPASMS    diclofenac sodium 1 % Gel daily as needed.     epinephrine (EPIPEN) 0.3 mg/0.3 mL (1:1,000) AtIn 0.3 mg daily as needed.     FERROUS SULFATE (IRON ORAL) Take by mouth.    fluoxetine (PROZAC) 20 MG capsule      hydrocodone-acetaminophen 10-325mg (NORCO)  mg Tab Take by mouth.    hydrOXYzine pamoate (VISTARIL) 25 MG Cap TAKE ONE CAPSULE BY MOUTH 3 TIMES A DAY AS NEEDED FOR ANXIETY    ibuprofen (ADVIL,MOTRIN) 800 MG tablet every 6 (six) hours as needed.     isometheptene-apap-dichloralphenazone [id number] (MIDRIN) -325 mg per capsule Take 1 capsule by mouth 4 (four) times daily as needed.    ketorolac (TORADOL) 60 mg/2 mL Soln Please provide patients with IV syringes    lorazepam (ATIVAN) 0.5 MG tablet Take 0.5 mg by mouth every 4 (four) hours as needed.     oxycodone (OXYCONTIN) 15 mg TR12 12 hr tablet Take 1 tablet (15 mg total) by mouth every 12 (twelve) hours.    oxycodone-acetaminophen (PERCOCET) 5-325 mg per tablet Take 1 tablet by mouth every 6 (six) hours as needed.    potassium chloride SA (K-DUR,KLOR-CON) 20 MEQ tablet Take 20 mEq by mouth once daily.    promethazine (PHENERGAN) 25 MG tablet Take 1 tablet by mouth every 6 (six) hours as needed (migraine). -MAY CAUSE DROWSINESS-    pseudoephedrine-DM-guaifenesin 45- mg Tab Take 1 tablet by mouth daily as needed.     VOLTAREN 1 % Gel daily as needed.     sumatriptan succinate 4 mg/0.5 mL PnIj Inject 4 mg into the skin daily as needed. imitrex          Current Facility-Administered Medications   Medication    onabotulinumtoxina injection 200 Units               Review of patient's allergies indicates:   Allergen Reactions    Clarithromycin Swelling and Other (See Comments)       DIFFICULTY SWALLOWING  DIFFICULTY SWALLOWING    Pcn [penicillins] Anaphylaxis    Sulfa (sulfonamide antibiotics) Hives    Wellbutrin [bupropion hcl] Shortness Of Breath  and Anaphylaxis    Betadine [povidone-iodine] Hives       Swelling       Cefprozil Hives    Iodinated contrast- oral and iv dye Hives    Latex, natural rubber Rash    Sulfamethoxazole-trimethoprim Nausea And Vomiting    Iodine Hives and Swelling    Latex Hives and Swelling               Family History   Problem Relation Age of Onset    Heart disease Mother           Social History               Socioeconomic History    Marital status:        Spouse name: Not on file    Number of children: Not on file    Years of education: Not on file    Highest education level: Not on file   Social Needs    Financial resource strain: Not on file    Food insecurity - worry: Not on file    Food insecurity - inability: Not on file    Transportation needs - medical: Not on file    Transportation needs - non-medical: Not on file   Occupational History    Not on file   Tobacco Use    Smoking status: Current Every Day Smoker       Packs/day: 0.50       Years: 20.00       Pack years: 10.00       Types: Cigarettes    Smokeless tobacco: Never Used   Substance and Sexual Activity    Alcohol use: Yes       Alcohol/week: 0.0 oz       Comment: occasionally    Drug use: No    Sexual activity: Not on file   Other Topics Concern    Not on file   Social History Narrative    Not on file            Chief Complaint:        Chief Complaint   Patient presents with    Knee Pain       left knee surgery consents         History of present illness:  This is a 38-year-old patient of mine seen for recurrent left knee pain. Patient has a history of multiple surgeries on the left knee with multi ligament injury.  She has severe patellofemoral arthritis.  We got an MRI which confirmed no new real injury but significant chondral damage to the patella. Pain is 6 out 10.        Review of Systems:     Constitution: Negative for chills, fever, and sweats.  Negative for unexplained weight loss.     HENT:  Negative for headaches and  blurry vision.     Cardiovascular:Negative for chest pain or irregular heart beat. Negative for hypertension.     Respiratory:  Negative for cough and shortness of breath.     Gastrointestinal: Negative for abdominal pain, heartburn, melena, nausea, and vomitting.     Genitourinary:  Negative bladder incontinence and dysuria.     Musculoskeletal:  See HPI     Neurological: Negative for numbness.     Psychiatric/Behavioral: Negative for depression.  The patient is not nervous/anxious.       Endocrine: Negative for polyuria     Hematologic/Lymphatic: Negative for bleeding problem.  Does not bruise/bleed easily.     Skin: Negative for poor would healing and rash        Physical Examination:     Vital Signs:    Vitals:     10/08/18 1419   BP: 127/60   Pulse: 64         Body mass index is 31.19 kg/m².     This a well-developed, well nourished patient in no acute distress.  They are alert and oriented and cooperative to examination.  Pt. walks without an antalgic gait.          Exam below the left knee shows well-healed surgical portals and scars.  Pain along the medial compartment and underneath the kneecap.  Intact light touch sensation.  Moderate patellofemoral crepitus.  Knee is stable to varus valgus stress.       Heart is regular rate without obvious murmurs   Normal respiratory effort without audible wheezing  Abdomen is soft and nontender      X-rays:  X-rays of the right knee are  reviewed which show moderate arthritic changes particularly of the patellofemoral joint and some medial narrowing     MRI report from outside facility:  Arthritic changes with medial meniscal tear.     MRI of the left knee:Severe patellofemoral chondromalacia.  Anterior horn lateral meniscal tear with small parameniscal cyst.  Postoperative findings of probable medial patellofemoral ligament reconstruction.  Moderate patellar tendon and mild quadriceps tendinosis.     Assessment::  Right knee arthritis  Left knee arthritis     Plan:   Reviewed the findings with her today.  She has tried both cortisone injections as well as viscosupplementation injections.  She has had surgeries including arthroscopy and an MPFL reconstruction.  The only real treatment left would be for knee replacement. She feels mentally ready to go ahead and do this.  She has been suffering with this knee for so long and it just continues to worsen.  She knows the drawbacks including the need for multiple revisions.  Patient is about to lose her insurance.  She will need to see a primary care doctor and get medically cleared and optimized.  Patient can then follow up for surgical scheduling.  Risks, benefits, and alternatives to the procedure were explained to the patient including but not limited to damage to nerves, arteries, blood vessels, bones, tendons, ligaments, stiffness, instability, infection, DVT, PE, as well as general anesthetic complications including seizure, stroke, heart attack and even death. The patient understood these risks and wished to proceed and signed the informed consent.         This note was created using "[employer] voice recognition software that occasionally misinterpreted phrases or words.     Consult note is delivered via Epic messaging service.

## 2018-11-13 NOTE — NURSING TRANSFER
Nursing Transfer Note      11/13/2018     Transfer from PACU to 405    Transfer via bed    Transported by [facility]    Chart send with patient: YES    Notified: Dr. Wise of patient arrival    Upon arrival to floor:   Oriented to room; patient belongings with patient's father at bedside. PIV intact and red; patient complaining of pain to that sight. Attempted to flush; flushed sluggishly. PIV removed, pressure applied, bandage applied. Cryotherapy in use. Dressing to left knee CDI. SCD/flexi foot pump in use. Pulses +2 bilaterally. VSS. Pain 0/10; voices no other complaints at this time. Bed low, brakes locked, SR upx2, call light within reach. Will continue to monitor.

## 2018-11-13 NOTE — ANESTHESIA PREPROCEDURE EVALUATION
11/13/2018  Aleyda Costa is a 38 y.o., female.    Pre-op Assessment    I have reviewed the Patient Summary Reports.     I have reviewed the Nursing Notes.   I have reviewed the Medications.     Review of Systems  Anesthesia Hx:  No problems with previous Anesthesia    Social:  Smoker    Hematology/Oncology:  Hematology Normal   Oncology Normal     EENT/Dental:EENT/Dental Normal   Cardiovascular:  Cardiovascular Normal     Pulmonary:   Sleep Apnea    Renal/:  Renal/ Normal     Hepatic/GI:  Hepatic/GI Normal    Musculoskeletal:   Arthritis  Patellofemoral arthritis of left knee    Neurological:   Headaches Seizures    Endocrine:  Endocrine Normal    Psych:   Psychiatric History          Physical Exam  General:  Obesity    Airway/Jaw/Neck:  Airway Findings: Mouth Opening: Normal Tongue: Normal  General Airway Assessment: Adult  Mallampati: I  TM Distance: Normal, at least 6 cm        Eyes/Ears/Nose:  EYES/EARS/NOSE FINDINGS: Normal   Dental:  Dental Findings: Edentulous   Chest/Lungs:  Chest/Lungs Findings: Clear to auscultation, Normal Respiratory Rate     Heart/Vascular:  Heart Findings: Rate: Normal  Rhythm: Regular Rhythm  Sounds: Normal        Mental Status:  Mental Status Findings:  Cooperative, Alert and Oriented         Anesthesia Plan  Type of Anesthesia, risks & benefits discussed:  Anesthesia Type:  spinal  Patient's Preference:   Intra-op Monitoring Plan: standard ASA monitors  Intra-op Monitoring Plan Comments:   Post Op Pain Control Plan: multimodal analgesia, peripheral nerve block and IV/PO Opioids PRN  Post Op Pain Control Plan Comments:   Induction:   IV  Beta Blocker:  Patient is not currently on a Beta-Blocker (No further documentation required).       Informed Consent: Patient understands risks and agrees with Anesthesia plan.  Questions answered. Anesthesia consent signed with  patient.  ASA Score: 2     Day of Surgery Review of History & Physical: I have interviewed and examined the patient. I have reviewed the patient's H&P dated:    H&P update referred to the surgeon.         Ready For Surgery From Anesthesia Perspective.

## 2018-11-13 NOTE — NURSING
Spoke with Dr. Ricketts in regards to PRN pain medication being DC'd. He gave verbal orders to reorder previous orders of Oxycodone IR 5 mg Q3H PRN and Oxycodone IR 10mg Q4H PRN. Orders placed. Will continue to monitor.

## 2018-11-13 NOTE — NURSING
"Situation Principle Problem:  S/P total knee arthroplasty, left      Reason for Calling: Nausea     Provider Calling:    Background Vitals:    11/13/18 0606 11/13/18 0645 11/13/18 0913 11/13/18 1038   BP: (!) 98/56 (!) 110/54  (!) 101/54   BP Location:       Patient Position:       Pulse: 63 (!) 59  64   Resp: 16 16  16   Temp:   98 °F (36.7 °C) 97.3 °F (36.3 °C)   TempSrc:   Temporal    SpO2: 98%   96%   Weight:       Height:           No results found for: POCTGLUCOSE    Intake/Output:    Intake/Output Summary (Last 24 hours) at 11/13/2018 1412  Last data filed at 11/13/2018 0911  Gross per 24 hour   Intake 1000 ml   Output 10 ml   Net 990 ml        Assessment What is happening: Patient complaining of nausea post pain medication administration.    Response Provider Response: "Zofran 4mg Q6H PRN"      Orders placed.     "

## 2018-11-14 VITALS
SYSTOLIC BLOOD PRESSURE: 106 MMHG | TEMPERATURE: 98 F | HEIGHT: 72 IN | BODY MASS INDEX: 33.86 KG/M2 | DIASTOLIC BLOOD PRESSURE: 59 MMHG | WEIGHT: 250 LBS | OXYGEN SATURATION: 95 % | HEART RATE: 69 BPM | RESPIRATION RATE: 16 BRPM

## 2018-11-14 LAB
ANION GAP SERPL CALC-SCNC: 7 MMOL/L
BASOPHILS # BLD AUTO: 0 K/UL
BASOPHILS NFR BLD: 0.3 %
BUN SERPL-MCNC: 7 MG/DL
CALCIUM SERPL-MCNC: 8.4 MG/DL
CHLORIDE SERPL-SCNC: 106 MMOL/L
CO2 SERPL-SCNC: 23 MMOL/L
CREAT SERPL-MCNC: 0.8 MG/DL
DIFFERENTIAL METHOD: ABNORMAL
EOSINOPHIL # BLD AUTO: 0.1 K/UL
EOSINOPHIL NFR BLD: 1.1 %
ERYTHROCYTE [DISTWIDTH] IN BLOOD BY AUTOMATED COUNT: 14.4 %
EST. GFR  (AFRICAN AMERICAN): >60 ML/MIN/1.73 M^2
EST. GFR  (NON AFRICAN AMERICAN): >60 ML/MIN/1.73 M^2
GLUCOSE SERPL-MCNC: 138 MG/DL
HCT VFR BLD AUTO: 34.7 %
HGB BLD-MCNC: 11.9 G/DL
LYMPHOCYTES # BLD AUTO: 0.8 K/UL
LYMPHOCYTES NFR BLD: 13 %
MCH RBC QN AUTO: 31.7 PG
MCHC RBC AUTO-ENTMCNC: 34.3 G/DL
MCV RBC AUTO: 92 FL
MONOCYTES # BLD AUTO: 0.4 K/UL
MONOCYTES NFR BLD: 6.2 %
NEUTROPHILS # BLD AUTO: 4.7 K/UL
NEUTROPHILS NFR BLD: 79.4 %
PLATELET # BLD AUTO: 204 K/UL
PMV BLD AUTO: 8.3 FL
POTASSIUM SERPL-SCNC: 4.1 MMOL/L
RBC # BLD AUTO: 3.75 M/UL
SODIUM SERPL-SCNC: 136 MMOL/L
WBC # BLD AUTO: 5.9 K/UL

## 2018-11-14 PROCEDURE — 97165 OT EVAL LOW COMPLEX 30 MIN: CPT

## 2018-11-14 PROCEDURE — 36415 COLL VENOUS BLD VENIPUNCTURE: CPT

## 2018-11-14 PROCEDURE — 94761 N-INVAS EAR/PLS OXIMETRY MLT: CPT

## 2018-11-14 PROCEDURE — 99232 SBSQ HOSP IP/OBS MODERATE 35: CPT | Mod: ,,, | Performed by: INTERNAL MEDICINE

## 2018-11-14 PROCEDURE — G8988 SELF CARE GOAL STATUS: HCPCS | Mod: CK

## 2018-11-14 PROCEDURE — 63600175 PHARM REV CODE 636 W HCPCS: Performed by: INTERNAL MEDICINE

## 2018-11-14 PROCEDURE — 85025 COMPLETE CBC W/AUTO DIFF WBC: CPT

## 2018-11-14 PROCEDURE — 80048 BASIC METABOLIC PNL TOTAL CA: CPT

## 2018-11-14 PROCEDURE — 25000003 PHARM REV CODE 250: Performed by: ORTHOPAEDIC SURGERY

## 2018-11-14 PROCEDURE — 97110 THERAPEUTIC EXERCISES: CPT

## 2018-11-14 PROCEDURE — G8989 SELF CARE D/C STATUS: HCPCS | Mod: CK

## 2018-11-14 PROCEDURE — 97116 GAIT TRAINING THERAPY: CPT

## 2018-11-14 PROCEDURE — G8987 SELF CARE CURRENT STATUS: HCPCS | Mod: CK

## 2018-11-14 PROCEDURE — 25000003 PHARM REV CODE 250: Performed by: ANESTHESIOLOGY

## 2018-11-14 PROCEDURE — 97535 SELF CARE MNGMENT TRAINING: CPT

## 2018-11-14 RX ORDER — SODIUM CHLORIDE 0.9 % (FLUSH) 0.9 %
5 SYRINGE (ML) INJECTION
Status: DISCONTINUED | OUTPATIENT
Start: 2018-11-14 | End: 2018-11-14 | Stop reason: HOSPADM

## 2018-11-14 RX ORDER — OXYCODONE AND ACETAMINOPHEN 5; 325 MG/1; MG/1
1 TABLET ORAL EVERY 4 HOURS PRN
Qty: 50 TABLET | Refills: 0 | Status: SHIPPED | OUTPATIENT
Start: 2018-11-14 | End: 2019-01-25

## 2018-11-14 RX ORDER — ACETAMINOPHEN 325 MG/1
650 TABLET ORAL EVERY 6 HOURS PRN
Status: DISCONTINUED | OUTPATIENT
Start: 2018-11-14 | End: 2018-11-14 | Stop reason: HOSPADM

## 2018-11-14 RX ADMIN — CETIRIZINE HYDROCHLORIDE 5 MG: 5 TABLET, FILM COATED ORAL at 08:11

## 2018-11-14 RX ADMIN — CYCLOBENZAPRINE HYDROCHLORIDE 10 MG: 10 TABLET, FILM COATED ORAL at 06:11

## 2018-11-14 RX ADMIN — OXYCODONE HYDROCHLORIDE 10 MG: 10 TABLET, FILM COATED, EXTENDED RELEASE ORAL at 08:11

## 2018-11-14 RX ADMIN — FAMOTIDINE 20 MG: 20 TABLET ORAL at 08:11

## 2018-11-14 RX ADMIN — CELECOXIB 200 MG: 100 CAPSULE ORAL at 08:11

## 2018-11-14 RX ADMIN — OXYCODONE HYDROCHLORIDE 10 MG: 10 TABLET ORAL at 02:11

## 2018-11-14 RX ADMIN — ONDANSETRON 4 MG: 2 INJECTION INTRAMUSCULAR; INTRAVENOUS at 02:11

## 2018-11-14 RX ADMIN — CYCLOBENZAPRINE HYDROCHLORIDE 10 MG: 10 TABLET, FILM COATED ORAL at 12:11

## 2018-11-14 RX ADMIN — DOCUSATE SODIUM 100 MG: 100 CAPSULE, LIQUID FILLED ORAL at 08:11

## 2018-11-14 RX ADMIN — OXYCODONE HYDROCHLORIDE 10 MG: 10 TABLET ORAL at 12:11

## 2018-11-14 NOTE — NURSING
Situation Principle Problem:  S/P total knee arthroplasty, left, post operative nausea and vomiting      Reason for Calling: nausea and vomiting    Provider Calling:  LASHAY Ruelas   Background Vitals:    11/13/18 1100 11/13/18 1521 11/13/18 1602 11/13/18 1939   BP:  (!) 90/51     BP Location:  Right arm     Patient Position:  Lying     Pulse:  60 85 86   Resp:  15 16 16   Temp:  98 °F (36.7 °C)     TempSrc:  Oral     SpO2:  97% 96% 99%   Weight: 113.4 kg (249 lb 16 oz)      Height: 6' (1.829 m)          No results found for: POCTGLUCOSE    Intake/Output:    Intake/Output Summary (Last 24 hours) at 11/13/2018 1952  Last data filed at 11/13/2018 1800  Gross per 24 hour   Intake 3011.67 ml   Output 10 ml   Net 3001.67 ml        Assessment What is happening: Patient actively vomiting despite receiving 4mg Zofran at 1401 and 1911   Response Provider Response: Phenergan 6.25mg IV [id number] (2nd choice) and scopolamine patch.

## 2018-11-14 NOTE — PROGRESS NOTES
Progress Note  Hospital Medicine  Patient Name:Aleyda Costa  MRN:  [id number]  Patient Class: IP- Inpatient  Admit Date: 11/13/2018  Length of Stay: 1 days  Expected Discharge Date:   Attending Physician: Feliberto Cardenas MD  Primary Care Provider:  Darlene Hayden MD    SUBJECTIVE:     Principal Problem: S/P total knee arthroplasty, left  Initial history of present illness: Patient is a 38 y.o. female s/p left total knee arthroplasty by Dr. Cardenas. Patient has PMH significant for OA, depression, TINY (not on CPAP). Post-operatively, patient is doing well. Patient denied chest pain, shortness of breath, abdominal pain, nausea, vomiting, headache, vision changes, focal neuro-deficits, cough or fever.    PMH/PSH/SH/FH/Meds: reviewed.    Symptoms/Review of Systems: Patient is doing well. Nausea and vomiting better with Scopolamine patch application. Pain controlled. No shortness of breath, cough, chest pain or headache, fever or abdominal pain.     Diet:  Adequate intake.    Activity level: Normal.    Pain:  Patient reports no pain.       OBJECTIVE:   Vital Signs (Most Recent):      Temp: (!) 100.5 °F (38.1 °C) (11/14/18 0620)  Pulse: 78 (11/14/18 0530)  Resp: 14 (11/14/18 0530)  BP: (!) 98/50 (11/14/18 0530)  SpO2: 99 % (11/14/18 0759)       Vital Signs Range (Last 24H):  Temp:  [97.3 °F (36.3 °C)-100.6 °F (38.1 °C)]   Pulse:  [60-91]   Resp:  [12-18]   BP: ()/(50-54)   SpO2:  [91 %-99 %]     Weight: 113.4 kg (249 lb 16 oz)  Body mass index is 33.91 kg/m².    Intake/Output Summary (Last 24 hours) at 11/14/2018 0820  Last data filed at 11/14/2018 0700  Gross per 24 hour   Intake 4681.67 ml   Output --   Net 4681.67 ml     Physical Examination:  General appearance: well developed, appears stated age  Head: normocephalic, atraumatic  Eyes:  conjunctivae/corneas clear. PERRL.  Nose: Nares normal. Septum midline.  Throat: lips, mucosa, and tongue normal; teeth and gums normal, no throat erythema.  Neck:  supple, symmetrical, trachea midline, no JVD and thyroid not enlarged, symmetric, no tenderness/mass/nodules  Lungs:  clear to auscultation bilaterally and normal respiratory effort  Chest wall: no tenderness  Heart: regular rate and rhythm, S1, S2 normal, no murmur, click, rub or gallop  Abdomen: soft, non-tender non-distented; bowel sounds normal; no masses,  no organomegaly  Extremities: no cyanosis, clubbing or edema. Left knee dressing C/D/I.  Pulses: 2+ and symmetric  Skin: Skin color, texture, turgor normal. No rashes or lesions.  Lymph nodes: Cervical, supraclavicular, and axillary nodes normal.  Neurologic: Normal strength and tone. No focal numbness or weakness. CNII-XII intact.      CBC:  Recent Labs   Lab 11/14/18  0341   WBC 5.90   RBC 3.75*   HGB 11.9*   HCT 34.7*      MCV 92   MCH 31.7*   MCHC 34.3   BMP  Recent Labs   Lab 11/14/18  0341   *      K 4.1      CO2 23   BUN 7   CREATININE 0.8   CALCIUM 8.4*      Diagnostic Results:  Microbiology Results (last 7 days)     ** No results found for the last 168 hours. **         Left knee x-ray: Left TKA    Assessment/Plan:      *S/P total knee arthroplasty, left [Z96.652]  Patellofemoral arthritis of left knee [M17.12]  Continue to follow Orthopedic recommendations.  Needs aggressive incentive spirometry.  Follow hemoglobin and hematocrit closely.  Pain control with PO narcotics and antiemetics as needed.  Physical therapy as per Orthopedics protocol with fall precautions.      Not Applicable    Depression [F32.9]  Continue Fluoxetine.      Yes    Sleep apnea [G47.30]   Yes       Use CPAP as needed.        Nicotine addiction  Smoking cessation counseling performed. Dangers of cigarette smoking were reviewed with patient in detail and patient was encouraged to quit.              VTE Risk Mitigation (From admission, onward)        Ordered     enoxaparin injection 40 mg  Daily      11/13/18 0915     IP VTE HIGH RISK PATIENT  Once       11/13/18 1033     Place sequential compression device  Until discontinued      11/13/18 1033     Place NITHYA hose  Until discontinued      11/13/18 1033        Darryl Wise MD  Department of Hospital Medicine   [facility]

## 2018-11-14 NOTE — PLAN OF CARE
11/14/18 1132   Discharge Assessment   Assessment Type Discharge Planning Assessment   Confirmed/corrected address and phone number on facesheet? Yes   Assessment information obtained from? Patient   Expected Length of Stay (days) 2   Communicated expected length of stay with patient/caregiver yes   Prior to hospitilization cognitive status: Alert/Oriented   Prior to hospitalization functional status: Independent   Current cognitive status: Alert/Oriented   Current Functional Status: Needs Assistance;Assistive Equipment   Lives With spouse;child(darrell), dependent   Able to Return to Prior Arrangements yes   Is patient able to care for self after discharge? Yes  (with assistance )   Patient's perception of discharge disposition admitted as an inpatient   Readmission Within The Last 30 Days no previous admission in last 30 days   Patient currently being followed by outpatient case management? No   Patient currently receives any other outside agency services? No   Equipment Currently Used at Home walker, rolling   Do you have any problems affording any of your prescribed medications? No  (pharm: [facility] in [place] )   Is the patient taking medications as prescribed? yes   Does the patient have transportation home? Yes   Does the patient receive services at the [facility]? No   Discharge Plan A Home Health   Patient/Family In Agreement With Plan yes     Patient is going to her father's house at [address] [place] [address] [place] [id number]. She signed the pt choice/disclosure for [facility]; I placed in the pt's blue folder....ANTHONY Malcolm CM

## 2018-11-14 NOTE — PT/OT/SLP PROGRESS
Physical Therapy Treatment    Patient Name:  Aleyda Costa   MRN:  [id number]    Recommendations:     Discharge Recommendations:      Discharge Equipment Recommendations:     Barriers to discharge: None    Assessment:     Aleyda Costa is a 38 y.o. female admitted with a medical diagnosis of S/P total knee arthroplasty, left.  She presents with the following impairments/functional limitations:  weakness, impaired endurance, impaired self care skills, impaired functional mobilty, gait instability, pain, orthopedic precautions .    Rehab Prognosis:  good; patient would benefit from acute skilled PT services to address these deficits and reach maximum level of function.      Recent Surgery: Procedure(s) (LRB):  ARTHROPLASTY, KNEE (Left) 1 Day Post-Op    Plan:     During this hospitalization, patient to be seen BID to address the above listed problems via gait training, therapeutic activities, therapeutic exercises  · Plan of Care Expires:  11/30/18   Plan of Care Reviewed with: patient    Subjective     Communicated with nurse Chanel prior to session.  Patient found supine in bed upon PT entry to room, agreeable to treatment.      Chief Complaint: pain L knee  Patient comments/goals: to return home  Pain/Comfort:  · Pain Rating 1: other (see comments)(did not rate)  · Location - Side 1: Left  · Location - Orientation 1: generalized  · Location 1: knee  · Pain Addressed 1: Pre-medicate for activity, Nurse notified    Patients cultural, spiritual, [employer] conflicts given the current situation:      Objective:     Patient found with: cryotherapy     General Precautions: Standard, fall   Orthopedic Precautions:LLE weight bearing as tolerated   Braces: N/A     Functional Mobility:  · Bed Mobility:     · Rolling Left:  contact guard assistance  · Rolling Right: contact guard assistance  · Supine to Sit: contact guard assistance  · Sit to Supine: contact guard assistance  · Transfers:     · Sit to Stand:  contact  guard assistance with rolling walker  · Gait: 250' with rw and CGA, WBAT LLE      AM-PAC 6 CLICK MOBILITY          Therapeutic Activities and Exercises:       Patient left supine with all lines intact, call button in reach and nurse Chanel notified..    GOALS:   Multidisciplinary Problems     Physical Therapy Goals        Problem: Physical Therapy Goal    Goal Priority Disciplines Outcome Goal Variances Interventions   Physical Therapy Goal     PT, PT/OT      Description:  Goals to be met by: 2018     Patient will increase functional independence with mobility by performin. Supine to sit with Contact Guard Assistance  2. Sit to stand transfer with Contact Guard Assistance  3. Bed to chair transfer with Contact Guard Assistance using Rolling Walker  4. Gait  x 250 feet with Contact Guard Assistance using Rolling Walker.   5. Lower extremity exercise program x20 reps per handout, with assistance as needed                       Time Tracking:     PT Received On: 18  PT Start Time: 1325     PT Stop Time: 1335  PT Total Time (min): 10 min     Billable Minutes: Gait Training 10min    Treatment Type: Treatment  PT/PTA: PTA     PTA Visit Number: 1     Arabella Zhang, BERYL  2018

## 2018-11-14 NOTE — PROGRESS NOTES
11/13/18 1939   Patient Assessment/Suction   Level of Consciousness (AVPU) alert   PRE-TX-O2-ETCO2   O2 Device (Oxygen Therapy) room air   SpO2 99 %   Pulse Oximetry Type Intermittent   Pulse 86   Resp 16

## 2018-11-14 NOTE — PLAN OF CARE
Problem: Patient Care Overview  Goal: Plan of Care Review  Outcome: Ongoing (interventions implemented as appropriate)  Patient AAO, VSS.  PIV intact and infusing.  IVF and abx infusing as ordered. Pain well managed with PRN mediations.  Pt medicated throughout shift for nausea.  Scopolamine patch applied to L ear.  Dressing to L knee CDI. Cryo utilized. Walker at bedside.  NITHYA/SCD on.  Pt verbalized understanding of POC. Q2hr rounding done during shift to promote patient safety. Patient free from falls and injury during shift.  Bed in lowest position, brakes locked, and call light within reach.  Will continue to monitor.

## 2018-11-14 NOTE — PLAN OF CARE
11/14/18 5445   Post-Acute Status   Post-Acute Authorization Home Health/Hospice   HME Status Set-up Complete

## 2018-11-14 NOTE — PLAN OF CARE
Problem: Physical Therapy Goal  Goal: Physical Therapy Goal  Goals to be met by: 2018     Patient will increase functional independence with mobility by performin. Supine to sit with Contact Guard Assistance  2. Sit to stand transfer with Contact Guard Assistance  3. Bed to chair transfer with Contact Guard Assistance using Rolling Walker  4. Gait  x 250 feet with Contact Guard Assistance using Rolling Walker.   5. Lower extremity exercise program x20 reps per handout, with assistance as needed     Outcome: Ongoing (interventions implemented as appropriate)  Ambulated with rw and Min A, WBAT LLE.

## 2018-11-14 NOTE — HPI
This is a 38-year-old patient of mine seen for recurrent left knee pain. Patient has a history of multiple surgeries on the left knee with multi ligament injury.  She has severe patellofemoral arthritis.  We got an MRI which confirmed no new real injury but significant chondral damage to the patella. Pain is 6 out 10.

## 2018-11-14 NOTE — PT/OT/SLP PROGRESS
Physical Therapy Treatment    Patient Name:  lAeyda Costa   MRN:  [id number]    Recommendations:     Discharge Recommendations:      Discharge Equipment Recommendations:     Barriers to discharge: None    Assessment:     Aleyda Costa is a 38 y.o. female admitted with a medical diagnosis of S/P total knee arthroplasty, left.  She presents with the following impairments/functional limitations:  weakness, impaired endurance, impaired self care skills, impaired functional mobilty, gait instability, pain, orthopedic precautions, decreased lower extremity function, decreased ROM .    Rehab Prognosis:  good; patient would benefit from acute skilled PT services to address these deficits and reach maximum level of function.      Recent Surgery: Procedure(s) (LRB):  ARTHROPLASTY, KNEE (Left) 1 Day Post-Op    Plan:     During this hospitalization, patient to be seen BID to address the above listed problems via gait training, therapeutic activities, therapeutic exercises  · Plan of Care Expires:  11/30/18   Plan of Care Reviewed with: patient, family    Subjective     Communicated with nurse Chanel prior to session.  Patient found supine in bed upon PT entry to room, agreeable to treatment.      Chief Complaint: pain L knee  Patient comments/goals: to return home  Pain/Comfort:  · Pain Rating 1: other (see comments)(did not rate)  · Location - Side 1: Left  · Location - Orientation 1: generalized  · Location 1: knee  · Pain Addressed 1: Pre-medicate for activity, Reposition, Nurse notified    Patients cultural, spiritual, L[employer] conflicts given the current situation:      Objective:     Patient found with: SCD, peripheral IV, cryotherapy     General Precautions: Standard, fall   Orthopedic Precautions:LLE weight bearing as tolerated   Braces: N/A     Functional Mobility:  · Bed Mobility:     · Rolling Left:  minimum assistance  · Supine to Sit: minimum assistance  · Transfers:     · Sit to Stand:  minimum  assistance with rolling walker  · Gait: 50' with rw and Min A, WBAT LLE      AM-PAC 6 CLICK MOBILITY          Therapeutic Activities and Exercises:   BLE exercises at 10 reps each ; TKE's, SLR's, HS, QS. AP's LLE only.    Patient left up in chair with all lines intact, call button in reach, nurse Chanel notified and family present..    GOALS:   Multidisciplinary Problems     Physical Therapy Goals        Problem: Physical Therapy Goal    Goal Priority Disciplines Outcome Goal Variances Interventions   Physical Therapy Goal     PT, PT/OT Ongoing (interventions implemented as appropriate)     Description:  Goals to be met by: 2018     Patient will increase functional independence with mobility by performin. Supine to sit with Contact Guard Assistance  2. Sit to stand transfer with Contact Guard Assistance  3. Bed to chair transfer with Contact Guard Assistance using Rolling Walker  4. Gait  x 250 feet with Contact Guard Assistance using Rolling Walker.   5. Lower extremity exercise program x20 reps per handout, with assistance as needed                      Time Tracking:     PT Received On: 18  PT Start Time: 0845     PT Stop Time: 0905  PT Total Time (min): 20 min     Billable Minutes: Gait Training 10min and Therapeutic Exercise 10min    Treatment Type: Treatment  PT/PTA: PTA     PTA Visit Number: 1     Arabella Zhang, BERYL  2018

## 2018-11-14 NOTE — DISCHARGE SUMMARY
"[facility]  Orthopedics  Discharge Summary      Patient Name: Aleyda Costa  MRN: [id number]  Admission Date: 11/13/2018  Hospital Length of Stay: 1 days  Discharge Date and Time:  11/14/2018 10:35 AM  Attending Physician: Feliberto Kay MD   Discharging Provider: Feliberto Kay MD  Primary Care Provider: Darlene Hayden MD    HPI:   This is a 38-year-old patient of mine seen for recurrent left knee pain. Patient has a history of multiple surgeries on the left knee with multi ligament injury.  She has severe patellofemoral arthritis.  We got an MRI which confirmed no new real injury but significant chondral damage to the patella. Pain is 6 out 10.        Procedure(s) (LRB):  ARTHROPLASTY, KNEE (Left)      Hospital Course:  S/p L TKA on 11/13/18    Consults (From admission, onward)        Status Ordering Provider     Inpatient consult to Hospitalist  Once     Provider:  Darryl Wise MD    Completed FELIBERTO KAY          Significant Diagnostic Studies: No pertinent studies.    Pending Diagnostic Studies:     None        Final Active Diagnoses:    Diagnosis Date Noted POA    PRINCIPAL PROBLEM:  S/P total knee arthroplasty, left [Z96.652] 11/13/2018 Not Applicable    Depression [F32.9] 11/13/2018 Yes    Sleep apnea [G47.30] 11/13/2018 Yes    Patellofemoral arthritis of left knee [M17.12] 11/22/2016 Yes      Problems Resolved During this Admission:      Discharged Condition: good    Disposition:     Follow Up:  Follow-up Information     Feliberto Kay MD On 11/26/2018.    Specialties:  Sports Medicine, Orthopedic Surgery  Why:  @8:30am   Contact information:  [address] [place] [address] [address]ina [address]IREZ [id number]  [phone]                 Patient Instructions:      WALKER FOR HOME USE     Order Specific Question Answer Comments   Type of Walker: Adult (5'4"-6'6")    With wheels? Yes 2 front    Height: 6' (1.829 m)    Weight: 113.4 kg (249 lb 16 oz)    Length of need (1-99 " months): 11    Does patient have medical equipment at home? none    Please check all that apply: Patient's condition impairs ambulation.      Ambulatory referral to Home Health   Referral Priority: Routine Referral Type: Home Health   Referral Reason: Specialty Services Required   Requested Specialty: Home Health Services   Number of Visits Requested: 1     Referral to Home health   Referral Priority: Routine Referral Type: Home Health   Referral Reason: Specialty Services Required   Requested Specialty: Home Health Services   Number of Visits Requested: 1     Diet general     Ice to affected area     No driving, operating heavy equipment or signing legal documents while taking pain medication     Call MD for:  temperature >100.4     Call MD for:  persistent nausea and vomiting     Call MD for:  severe uncontrolled pain     Call MD for:  difficulty breathing, headache or visual disturbances     Call MD for:  redness, tenderness, or signs of infection (pain, swelling, redness, odor or green/yellow discharge around incision site)     Call MD for:  hives     Call MD for:  persistent dizziness or light-headedness     Call MD for:  extreme fatigue     Leave dressing on - Keep it clean, dry, and intact until clinic visit     Change dressing (specify)   Order Comments: Dressing change: If dressing loses its seal, change daily.     Medications:  Reconciled Home Medications:      Medication List      START taking these medications    mupirocin calcium 2% nasl oint 2 % Oint  Commonly known as:  BACTROBAN  1 g by Nasal route 2 (two) times daily. for 5 days        CHANGE how you take these medications    ketorolac 60 mg/2 mL Soln  Commonly known as:  TORADOL  Please provide patients with IV syringes  What changed:    · when to take this  · reasons to take this  · additional instructions     * oxyCODONE-acetaminophen 5-325 mg per tablet  Commonly known as:  PERCOCET  Take 1 tablet by mouth every 6 (six) hours as needed.  What  changed:  when to take this     * oxyCODONE-acetaminophen 5-325 mg per tablet  Commonly known as:  PERCOCET  Take 1 tablet by mouth every 4 (four) hours as needed for Pain.  What changed:  You were already taking a medication with the same name, and this prescription was added. Make sure you understand how and when to take each.         * This list has 2 medication(s) that are the same as other medications prescribed for you. Read the directions carefully, and ask your doctor or other care provider to review them with you.            CONTINUE taking these medications    amitriptyline 25 MG tablet  Commonly known as:  ELAVIL  TAKE ONE TABLET BY MOUTH EVERY NIGHT FOR 2 WEEKS THEN INCREASE TO 2 TABLETS AT BEDTIME     celecoxib 200 MG capsule  Commonly known as:  CeleBREX  TAKE ONE CAPSULE BY MOUTH EVERY DAY     cetirizine 5 MG tablet  Commonly known as:  ZYRTEC  Take 5 mg by mouth as needed.     cyanocobalamin (vitamin B-12) 1,000 mcg/mL Kit  Inject 1 mL into the muscle every 21 days.     cyclobenzaprine 10 MG tablet  Commonly known as:  FLEXERIL  TAKE ONE TABLET BY MOUTH 3 TIMES A DAY AS NEEDED FOR MUSCLE SPASMS     EPINEPHrine 0.3 mg/0.3 mL Atin  Commonly known as:  EPIPEN  0.3 mg daily as needed.     FLUoxetine 20 MG capsule  Commonly known as:  PROZAC  every evening.     hydrOXYzine pamoate 25 MG Cap  Commonly known as:  VISTARIL  TAKE ONE CAPSULE BY MOUTH 3 TIMES A DAY AS NEEDED FOR ANXIETY     ibuprofen 800 MG tablet  Commonly known as:  ADVIL,MOTRIN  every 6 (six) hours as needed.     IRON ORAL  Take by mouth once daily.     isometheptene-apap-dichloralphenazone 675-35-287ty -325 mg per capsule  Commonly known as:  MIDRIN  Take 1 capsule by mouth 4 (four) times daily as needed.     LORazepam 0.5 MG tablet  Commonly known as:  ATIVAN  Take 0.5 mg by mouth every 4 (four) hours as needed.     oxyCODONE 15 mg Tr12 12 hr tablet  Commonly known as:  OXYCONTIN  Take 1 tablet (15 mg total) by mouth every 12  (twelve) hours.     potassium chloride SA 20 MEQ tablet  Commonly known as:  K-DUR,KLOR-CON  Take 20 mEq by mouth as needed.     promethazine 25 MG tablet  Commonly known as:  PHENERGAN  Take 1 tablet by mouth every 6 (six) hours as needed (migraine). -MAY CAUSE DROWSINESS-     pseudoephedrine-DM-guaifenesin 45- mg Tab  Take 1 tablet by mouth daily as needed.     SUMAtriptan succinate 4 mg/0.5 mL Pnij  Commonly known as:  IMITREX  Inject 4 mg into the skin daily as needed. imitrex     VOLTAREN 1 % Gel  Generic drug:  diclofenac sodium  daily as needed.            Feliberto Cardenas MD  Orthopedics  [facility]

## 2018-11-14 NOTE — PT/OT/SLP EVAL
Occupational Therapy   Evaluation and Discharge Note    Name: Aleyda Costa  MRN: [id number]  Admitting Diagnosis:  S/P total knee arthroplasty, left 1 Day Post-Op    Recommendations:     Discharge Recommendations: home health PT  Discharge Equipment Recommendations:  walker, rolling  Barriers to discharge:       History:     Occupational Profile:  Living Environment: Pt lives with her  and two children in a [facility] with 6 FIONA and has a tub/shower.  Previous level of function: (I)/Mod I with all I/ADL's at home and in the community.  Roles and Routines: Mother, unemployed  Equipment Owned:  grab bar, bath bench  Assistance upon Discharge: Family will help her.    Past Medical History:   Diagnosis Date    Arthritis     OA    Back pain     Depression     Full dentures     Migraine     Seizures     Sleep apnea     Does not use c-pap since weight loss - 170 lbs    Wears glasses        Past Surgical History:   Procedure Laterality Date    APPENDECTOMY      ARTHROSCOPY-KNEE Left 2016    [facility]    BLOCK-NERVE-MEDIAL BRANCH-LUMBAR Bilateral 2016    [facility] at [facility] OR    CARPAL TUNNEL RELEASE Bilateral      SECTION      CHOLECYSTECTOMY      CHONDROPLASTY-KNEE-ARTHROSCOPY Left 2016    [facility] OR    FRACTURE SURGERY      ankle    gastric sleve      HYSTERECTOMY      INJECTION-STEROID-EPIDURAL-LUMBAR N/A 2016    [facility]    INJECTION-STEROID-EPIDURAL-LUMBAR N/A 3/28/2016    [facility]    KNEE SURGERY      LUMBAR EPIDURAL INJECTION      RADIAL NERVE Right     RADIOFREQUENCY THERMOCOAGULATION (RFTC)-NERVE-MEDIAN BRANCH-LUMBAR Bilateral 2017    [facility]    RADIOFREQUENCY THERMOCOAGULATION (RFTC)-NERVE-MEDIAN BRANCH-LUMBAR Bilateral 2016    [facility] at [facility]  OR    RECONSTRUCTION- LIGAMENT-MPFL RECONSTRUCTION WITH ALLOGRAFT Left 9/29/2017    [facility] OR    SINUS SURGERY         Subjective     Chief Complaint: L Knee pain  Patient/Family stated goals: To go home today  Communicated with: Chanel, nurse prior to session.  Pain/Comfort:  · Pain Rating 1: 7/10  · Location - Side 1: Left  · Location 1: knee  · Pain Addressed 1: Pre-medicate for activity, Reposition, Cessation of Activity  · Pain Rating Post-Intervention 1: 7/10    Patients cultural, spiritual, Congregation conflicts given the current situation:      Objective:     Patient found with: SCD, peripheral IV, cryotherapy    General Precautions: Standard, fall   Orthopedic Precautions:LLE weight bearing as tolerated   Braces: N/A     Occupational Performance:    Bed Mobility:    · Patient completed Scooting/Bridging with stand by assistance  · Patient completed Supine to Sit with stand by assistance    Functional Mobility/Transfers:        OT adjusted height on pt's personal walker which was delivered right before OT evaluation.  · Patient completed Sit <> Stand Transfer with stand by assistance  with  rolling walker   · Patient completed Toilet Transfer Stand Pivot technique with stand by assistance with  rolling walker and grab bars with cues to turn walker more slowly when turning around at toilet.  · Functional Mobility: Pt walked from bedside to bathroom, then to sink & back to bedside using walker with SBA & no LOB noted.      Activities of Daily Living:  · Grooming: stand by assistance standing at sink  · Lower Body Dressing: stand by assistance seated EOB to don/doff B socks without AE    Cognitive/Visual Perceptual:  Cognitive/Psychosocial Skills:     -       Safety awareness/insight to disability: intact     Physical Exam:  Balance:    -       Supervision static and SBA dynamic standing  Postural examination/scapula alignment:    -       Posterior pelvic tilt  Skin  "integrity: Visible skin intact  Edema:  None noted  Dominant hand:    -       right  Upper Extremity Range of Motion:     -       Right Upper Extremity: WNL  -       Left Upper Extremity: WNL  Upper Extremity Strength:    -       Right Upper Extremity: WNL  -       Left Upper Extremity: WNL   Strength:    -       Right Upper Extremity: WNL  -       Left Upper Extremity: WNL  Fine Motor Coordination:    -       Intact  Gross motor coordination:   WFL    Patient left up in chair with all lines intact, call button in reach and Yma, nurse notified    [facility] 6 Click:  [facility] Total Score: 19    Treatment & Education:  OT ed pt on OT role & discharge recommendations.    Education:    Assessment:     Aleyda Costa is a 38 y.o. female with a medical diagnosis of S/P total knee arthroplasty, leftOT education completed on LB ADLs and toilet transfers with use of adaptive equipment and on safety with all ADL tasks using assistive devices with pt able to provide return demonstration. No further OT needs at this time.        Clinical Decision Makin.  OT Low:  "Pt evaluation falls under low complexity for evaluation coding due to performance deficits noted in 1-3 areas as stated above and 0 co-morbities affecting current functional status. Data obtained from problem focused assessments. No modifications or assistance was required for completion of evaluation. Only brief occupational profile and history review completed."     Plan:     During this hospitalization, patient does not require further acute OT services.  Please re-consult if situation changes.    · Plan of Care Reviewed with: patient, family    This Plan of care has been discussed with the patient who was involved in its development and understands and is in agreement with the identified goals and treatment plan    Time Tracking:     OT Date of Treatment: 18  OT Start Time: 1048  OT Stop Time: 1108  OT Total Time (min): 20 min    Billable " Minutes:Evaluation 8  Self Care/Home Management 12    DINORA Roberson  11/14/2018

## 2018-11-14 NOTE — PLAN OF CARE
I sent the pts face sheet, H&P, HH orders and discharge summary [facility] and added the info to the pts AVS.  Adina Pierce LMSW     11/14/2018 11:24:05 AM Note: faxing to [place] office [email or web address] 11/14/2018 10:57:54 AM New Adina Pierce     I sent the previous info, discharge summary and orders to the [place] office via [employer] and updated them with the pts address that she is discharging to. I updated the pts AVS. Adian Pierce LMSW     1:51- Per Aleyda at [phone] [facility] - they have received the referral. Adina Pierce LMSW            11/14/18 1058   Post-Acute Status   Post-Acute Authorization HME   E Status Referrals Sent

## 2018-11-15 ENCOUNTER — TELEPHONE (OUTPATIENT)
Dept: ORTHOPEDICS | Facility: CLINIC | Age: 38
End: 2018-11-15

## 2018-11-15 ENCOUNTER — PATIENT MESSAGE (OUTPATIENT)
Dept: ORTHOPEDICS | Facility: CLINIC | Age: 38
End: 2018-11-15

## 2018-11-15 NOTE — NURSING
Discharge instructions reviewed with patient. Patient verbalized understanding. Patient educated on use of cryotherapy. Peripheral IV removed per order. Patient escorted from floor by patient transporter. Relinquish care at this time.

## 2018-11-15 NOTE — TELEPHONE ENCOUNTER
Returned call to [facility] and advised pt is to have 1 week of home health PT then discharged to outpatient PT after. Delma verbalized understanding.

## 2018-11-15 NOTE — TELEPHONE ENCOUNTER
----- Message from Jennifer Hollis MA sent at 11/15/2018  9:38 AM CST -----  Contact: Delma//[facility]  Patient is set to admit today.  Orders were: Admit to home PT x1 week.  [facility] would like to know more specifics. They state the orders are different than the protocol.    Call Back# [phone]

## 2018-11-18 ENCOUNTER — HOSPITAL ENCOUNTER (INPATIENT)
Facility: HOSPITAL | Age: 38
LOS: 2 days | Discharge: HOME | DRG: 176 | End: 2018-11-21
Attending: HOSPITALIST | Admitting: HOSPITALIST
Payer: COMMERCIAL

## 2018-11-18 DIAGNOSIS — R79.89 ELEVATED D-DIMER: ICD-10-CM

## 2018-11-18 DIAGNOSIS — I26.99 ACUTE PULMONARY EMBOLISM: Primary | ICD-10-CM

## 2018-11-18 PROBLEM — R07.1 CHEST PAIN ON BREATHING: Status: ACTIVE | Noted: 2018-11-18

## 2018-11-18 LAB — TROPONIN I SERPL DL<=0.01 NG/ML-MCNC: <0.006 NG/ML

## 2018-11-18 PROCEDURE — 36415 COLL VENOUS BLD VENIPUNCTURE: CPT

## 2018-11-18 PROCEDURE — 25000003 PHARM REV CODE 250: Performed by: NURSE PRACTITIONER

## 2018-11-18 PROCEDURE — G0379 DIRECT REFER HOSPITAL OBSERV: HCPCS

## 2018-11-18 PROCEDURE — 63600175 PHARM REV CODE 636 W HCPCS: Performed by: NURSE PRACTITIONER

## 2018-11-18 PROCEDURE — G0378 HOSPITAL OBSERVATION PER HR: HCPCS

## 2018-11-18 PROCEDURE — 84484 ASSAY OF TROPONIN QUANT: CPT

## 2018-11-18 RX ORDER — MUPIROCIN 20 MG/G
OINTMENT TOPICAL 2 TIMES DAILY
Status: DISCONTINUED | OUTPATIENT
Start: 2018-11-18 | End: 2018-11-21 | Stop reason: HOSPADM

## 2018-11-18 RX ORDER — POTASSIUM CHLORIDE 20 MEQ/15ML
40 SOLUTION ORAL
Status: DISCONTINUED | OUTPATIENT
Start: 2018-11-18 | End: 2018-11-21 | Stop reason: HOSPADM

## 2018-11-18 RX ORDER — LANOLIN ALCOHOL/MO/W.PET/CERES
800 CREAM (GRAM) TOPICAL
Status: DISCONTINUED | OUTPATIENT
Start: 2018-11-18 | End: 2018-11-21 | Stop reason: HOSPADM

## 2018-11-18 RX ORDER — LIDOCAINE 50 MG/G
1 PATCH TOPICAL
Status: DISCONTINUED | OUTPATIENT
Start: 2018-11-18 | End: 2018-11-21 | Stop reason: HOSPADM

## 2018-11-18 RX ORDER — SODIUM CHLORIDE 0.9 % (FLUSH) 0.9 %
5 SYRINGE (ML) INJECTION
Status: DISCONTINUED | OUTPATIENT
Start: 2018-11-18 | End: 2018-11-21 | Stop reason: HOSPADM

## 2018-11-18 RX ORDER — DOCUSATE SODIUM 100 MG/1
100 CAPSULE, LIQUID FILLED ORAL DAILY
Status: DISCONTINUED | OUTPATIENT
Start: 2018-11-18 | End: 2018-11-20

## 2018-11-18 RX ORDER — KETOROLAC TROMETHAMINE 30 MG/ML
15 INJECTION, SOLUTION INTRAMUSCULAR; INTRAVENOUS EVERY 6 HOURS PRN
Status: DISCONTINUED | OUTPATIENT
Start: 2018-11-18 | End: 2018-11-21 | Stop reason: HOSPADM

## 2018-11-18 RX ORDER — HYDROCODONE BITARTRATE AND ACETAMINOPHEN 10; 325 MG/1; MG/1
1 TABLET ORAL EVERY 6 HOURS PRN
Status: DISCONTINUED | OUTPATIENT
Start: 2018-11-18 | End: 2018-11-18

## 2018-11-18 RX ORDER — SODIUM CHLORIDE 9 MG/ML
INJECTION, SOLUTION INTRAVENOUS CONTINUOUS
Status: DISCONTINUED | OUTPATIENT
Start: 2018-11-18 | End: 2018-11-21 | Stop reason: HOSPADM

## 2018-11-18 RX ORDER — ONDANSETRON 2 MG/ML
8 INJECTION INTRAMUSCULAR; INTRAVENOUS EVERY 8 HOURS PRN
Status: DISCONTINUED | OUTPATIENT
Start: 2018-11-18 | End: 2018-11-21 | Stop reason: HOSPADM

## 2018-11-18 RX ORDER — CYCLOBENZAPRINE HCL 5 MG
10 TABLET ORAL 3 TIMES DAILY PRN
Status: DISCONTINUED | OUTPATIENT
Start: 2018-11-18 | End: 2018-11-21 | Stop reason: HOSPADM

## 2018-11-18 RX ORDER — ACETAMINOPHEN 500 MG
1000 TABLET ORAL EVERY 6 HOURS PRN
Status: DISCONTINUED | OUTPATIENT
Start: 2018-11-18 | End: 2018-11-21 | Stop reason: HOSPADM

## 2018-11-18 RX ORDER — ENOXAPARIN SODIUM 150 MG/ML
1 INJECTION SUBCUTANEOUS
Status: DISCONTINUED | OUTPATIENT
Start: 2018-11-19 | End: 2018-11-21

## 2018-11-18 RX ORDER — ENOXAPARIN SODIUM 100 MG/ML
40 INJECTION SUBCUTANEOUS EVERY 24 HOURS
Status: DISCONTINUED | OUTPATIENT
Start: 2018-11-18 | End: 2018-11-19

## 2018-11-18 RX ORDER — HYDROXYZINE PAMOATE 25 MG/1
25 CAPSULE ORAL EVERY 8 HOURS PRN
Status: DISCONTINUED | OUTPATIENT
Start: 2018-11-18 | End: 2018-11-21 | Stop reason: HOSPADM

## 2018-11-18 RX ORDER — IBUPROFEN 200 MG
16 TABLET ORAL
Status: DISCONTINUED | OUTPATIENT
Start: 2018-11-18 | End: 2018-11-21 | Stop reason: HOSPADM

## 2018-11-18 RX ORDER — POTASSIUM CHLORIDE 20 MEQ/15ML
60 SOLUTION ORAL
Status: DISCONTINUED | OUTPATIENT
Start: 2018-11-18 | End: 2018-11-21 | Stop reason: HOSPADM

## 2018-11-18 RX ORDER — ENOXAPARIN SODIUM 100 MG/ML
80 INJECTION SUBCUTANEOUS ONCE
Status: COMPLETED | OUTPATIENT
Start: 2018-11-19 | End: 2018-11-18

## 2018-11-18 RX ORDER — SYRING-NEEDL,DISP,INSUL,0.3 ML 29 G X1/2"
296 SYRINGE, EMPTY DISPOSABLE MISCELLANEOUS ONCE AS NEEDED
Status: ACTIVE | OUTPATIENT
Start: 2018-11-18 | End: 2018-11-18

## 2018-11-18 RX ORDER — AMOXICILLIN 250 MG
1 CAPSULE ORAL 2 TIMES DAILY
Status: DISCONTINUED | OUTPATIENT
Start: 2018-11-18 | End: 2018-11-21 | Stop reason: HOSPADM

## 2018-11-18 RX ORDER — OXYCODONE AND ACETAMINOPHEN 5; 325 MG/1; MG/1
1 TABLET ORAL EVERY 4 HOURS PRN
Status: DISCONTINUED | OUTPATIENT
Start: 2018-11-18 | End: 2018-11-21 | Stop reason: HOSPADM

## 2018-11-18 RX ORDER — CELECOXIB 100 MG/1
200 CAPSULE ORAL DAILY
Status: DISCONTINUED | OUTPATIENT
Start: 2018-11-19 | End: 2018-11-21 | Stop reason: HOSPADM

## 2018-11-18 RX ORDER — IBUPROFEN 200 MG
24 TABLET ORAL
Status: DISCONTINUED | OUTPATIENT
Start: 2018-11-18 | End: 2018-11-21 | Stop reason: HOSPADM

## 2018-11-18 RX ORDER — AMITRIPTYLINE HYDROCHLORIDE 25 MG/1
50 TABLET, FILM COATED ORAL NIGHTLY
Status: DISCONTINUED | OUTPATIENT
Start: 2018-11-18 | End: 2018-11-21 | Stop reason: HOSPADM

## 2018-11-18 RX ORDER — FLUOXETINE HYDROCHLORIDE 20 MG/1
20 CAPSULE ORAL NIGHTLY
Status: DISCONTINUED | OUTPATIENT
Start: 2018-11-18 | End: 2018-11-21 | Stop reason: HOSPADM

## 2018-11-18 RX ORDER — RAMELTEON 8 MG/1
8 TABLET ORAL NIGHTLY PRN
Status: DISCONTINUED | OUTPATIENT
Start: 2018-11-18 | End: 2018-11-21 | Stop reason: HOSPADM

## 2018-11-18 RX ORDER — GLUCAGON 1 MG
1 KIT INJECTION
Status: DISCONTINUED | OUTPATIENT
Start: 2018-11-18 | End: 2018-11-21 | Stop reason: HOSPADM

## 2018-11-18 RX ADMIN — OXYCODONE AND ACETAMINOPHEN 1 TABLET: 5; 325 TABLET ORAL at 11:11

## 2018-11-18 RX ADMIN — ENOXAPARIN SODIUM 80 MG: 100 INJECTION SUBCUTANEOUS at 11:11

## 2018-11-18 RX ADMIN — SODIUM CHLORIDE: 0.9 INJECTION, SOLUTION INTRAVENOUS at 10:11

## 2018-11-18 RX ADMIN — FLUOXETINE 20 MG: 20 CAPSULE ORAL at 09:11

## 2018-11-18 RX ADMIN — AMITRIPTYLINE HYDROCHLORIDE 50 MG: 50 TABLET, FILM COATED ORAL at 09:11

## 2018-11-18 RX ADMIN — KETOROLAC TROMETHAMINE 15 MG: 30 INJECTION, SOLUTION INTRAMUSCULAR at 10:11

## 2018-11-18 RX ADMIN — MUPIROCIN: 20 OINTMENT TOPICAL at 10:11

## 2018-11-18 RX ADMIN — DOCUSATE SODIUM 100 MG: 100 CAPSULE, LIQUID FILLED ORAL at 09:11

## 2018-11-18 RX ADMIN — ENOXAPARIN SODIUM 40 MG: 100 INJECTION SUBCUTANEOUS at 09:11

## 2018-11-18 RX ADMIN — LIDOCAINE 1 PATCH: 50 PATCH TOPICAL at 09:11

## 2018-11-18 RX ADMIN — SENNOSIDES AND DOCUSATE SODIUM 1 TABLET: 8.6; 5 TABLET ORAL at 09:11

## 2018-11-19 PROBLEM — F17.200 NICOTINE DEPENDENCE: Status: ACTIVE | Noted: 2018-11-19

## 2018-11-19 PROBLEM — R01.1 MURMUR, HEART: Status: ACTIVE | Noted: 2018-11-19

## 2018-11-19 PROBLEM — E83.39 HYPERPHOSPHATEMIA: Status: ACTIVE | Noted: 2018-11-19

## 2018-11-19 PROBLEM — F33.0 MILD EPISODE OF RECURRENT MAJOR DEPRESSIVE DISORDER: Status: ACTIVE | Noted: 2018-11-13

## 2018-11-19 PROBLEM — R93.89 ABNORMAL CXR: Status: ACTIVE | Noted: 2018-11-19

## 2018-11-19 PROBLEM — D64.9 NORMOCYTIC ANEMIA: Status: ACTIVE | Noted: 2018-11-19

## 2018-11-19 LAB
ALBUMIN SERPL BCP-MCNC: 2.8 G/DL
ALP SERPL-CCNC: 94 U/L
ALT SERPL W/O P-5'-P-CCNC: 32 U/L
ANION GAP SERPL CALC-SCNC: 11 MMOL/L
AORTIC ROOT ANNULUS: 2.62 CM
AORTIC VALVE CUSP SEPERATION: 2.15 CM
APTT BLDCRRT: 40.7 SEC
AST SERPL-CCNC: 27 U/L
AV MEAN GRADIENT: 5.55 MMHG
AV PEAK GRADIENT: 10.37 MMHG
AV VALVE AREA: 2.74 CM2
BASOPHILS # BLD AUTO: 0 K/UL
BASOPHILS NFR BLD: 0.3 %
BILIRUB SERPL-MCNC: 0.6 MG/DL
BSA FOR ECHO PROCEDURE: 2.47 M2
BUN SERPL-MCNC: 10 MG/DL
CALCIUM SERPL-MCNC: 9.1 MG/DL
CHLORIDE SERPL-SCNC: 101 MMOL/L
CO2 SERPL-SCNC: 25 MMOL/L
CREAT SERPL-MCNC: 0.8 MG/DL
CV ECHO LV RWT: 0.32 CM
D DIMER PPP IA.FEU-MCNC: 5.28 MG/L FEU
DIFFERENTIAL METHOD: ABNORMAL
DOP CALC AO PEAK VEL: 1.61 M/S
DOP CALC AO VTI: 27.65 CM
DOP CALC LVOT AREA: 3.27 CM2
DOP CALC LVOT DIAMETER: 2.04 CM
DOP CALC LVOT STROKE VOLUME: 75.89 CM3
DOP CALCLVOT PEAK VEL VTI: 23.23 CM
E WAVE DECELERATION TIME: 221.28 MSEC
E/A RATIO: 0.87
E/E' RATIO: 7.52
ECHO LV POSTERIOR WALL: 0.78 CM (ref 0.6–1.1)
EOSINOPHIL # BLD AUTO: 0.2 K/UL
EOSINOPHIL NFR BLD: 3.9 %
ERYTHROCYTE [DISTWIDTH] IN BLOOD BY AUTOMATED COUNT: 13.7 %
EST. GFR  (AFRICAN AMERICAN): >60 ML/MIN/1.73 M^2
EST. GFR  (NON AFRICAN AMERICAN): >60 ML/MIN/1.73 M^2
FRACTIONAL SHORTENING: 15 % (ref 28–44)
GLUCOSE SERPL-MCNC: 107 MG/DL
HCT VFR BLD AUTO: 32.9 %
HGB BLD-MCNC: 11.3 G/DL
INR PPP: 1.1
INTERVENTRICULAR SEPTUM: 0.81 CM (ref 0.6–1.1)
IVRT: 0.13 MSEC
LEFT ATRIUM SIZE: 3.71 CM
LEFT INTERNAL DIMENSION IN SYSTOLE: 4.21 CM (ref 2.1–4)
LEFT VENTRICLE DIASTOLIC VOLUME INDEX: 94.79 ML/M2
LEFT VENTRICLE DIASTOLIC VOLUME: 234.13 ML
LEFT VENTRICLE MASS INDEX: 53.4 G/M2
LEFT VENTRICLE SYSTOLIC VOLUME INDEX: 56.4 ML/M2
LEFT VENTRICLE SYSTOLIC VOLUME: 139.43 ML
LEFT VENTRICULAR INTERNAL DIMENSION IN DIASTOLE: 4.94 CM (ref 3.5–6)
LEFT VENTRICULAR MASS: 131.97 G
LV LATERAL E/E' RATIO: 6.58
LV SEPTAL E/E' RATIO: 8.78
LYMPHOCYTES # BLD AUTO: 1.7 K/UL
LYMPHOCYTES NFR BLD: 27.8 %
MAGNESIUM SERPL-MCNC: 2.4 MG/DL
MCH RBC QN AUTO: 31.6 PG
MCHC RBC AUTO-ENTMCNC: 34.2 G/DL
MCV RBC AUTO: 93 FL
MONOCYTES # BLD AUTO: 0.7 K/UL
MONOCYTES NFR BLD: 11.7 %
MV PEAK A VEL: 0.91 M/S
MV PEAK E VEL: 0.79 M/S
NEUTROPHILS # BLD AUTO: 3.4 K/UL
NEUTROPHILS NFR BLD: 56.3 %
PHOSPHATE SERPL-MCNC: 4.9 MG/DL
PISA TR MAX VEL: 2.41 M/S
PLATELET # BLD AUTO: 251 K/UL
PMV BLD AUTO: 7.9 FL
POTASSIUM SERPL-SCNC: 3.9 MMOL/L
PROT SERPL-MCNC: 6.8 G/DL
PROTHROMBIN TIME: 10.7 SEC
PULM VEIN A" WAVE DURATION": 101 MSEC
PV PEAK VELOCITY: 0.97 CM/S
RBC # BLD AUTO: 3.56 M/UL
RIGHT VENTRICULAR END-DIASTOLIC DIMENSION: 2.65 CM
SODIUM SERPL-SCNC: 137 MMOL/L
TDI LATERAL: 0.12
TDI SEPTAL: 0.09
TDI: 0.11
TR MAX PG: 23.23 MMHG
TRICUSPID ANNULAR PLANE SYSTOLIC EXCURSION: 2.99 CM
TROPONIN I SERPL DL<=0.01 NG/ML-MCNC: <0.006 NG/ML
TROPONIN I SERPL DL<=0.01 NG/ML-MCNC: <0.006 NG/ML
WBC # BLD AUTO: 6 K/UL

## 2018-11-19 PROCEDURE — 25000003 PHARM REV CODE 250: Performed by: NURSE PRACTITIONER

## 2018-11-19 PROCEDURE — 85610 PROTHROMBIN TIME: CPT

## 2018-11-19 PROCEDURE — 12000002 HC ACUTE/MED SURGE SEMI-PRIVATE ROOM

## 2018-11-19 PROCEDURE — 83735 ASSAY OF MAGNESIUM: CPT

## 2018-11-19 PROCEDURE — 85379 FIBRIN DEGRADATION QUANT: CPT

## 2018-11-19 PROCEDURE — 99223 1ST HOSP IP/OBS HIGH 75: CPT | Mod: ,,, | Performed by: INTERNAL MEDICINE

## 2018-11-19 PROCEDURE — 80053 COMPREHEN METABOLIC PANEL: CPT

## 2018-11-19 PROCEDURE — 84100 ASSAY OF PHOSPHORUS: CPT

## 2018-11-19 PROCEDURE — 63600175 PHARM REV CODE 636 W HCPCS: Performed by: NURSE PRACTITIONER

## 2018-11-19 PROCEDURE — 85730 THROMBOPLASTIN TIME PARTIAL: CPT

## 2018-11-19 PROCEDURE — 63600175 PHARM REV CODE 636 W HCPCS: Performed by: INTERNAL MEDICINE

## 2018-11-19 PROCEDURE — 84484 ASSAY OF TROPONIN QUANT: CPT | Mod: 91

## 2018-11-19 PROCEDURE — 85025 COMPLETE CBC W/AUTO DIFF WBC: CPT

## 2018-11-19 PROCEDURE — 36415 COLL VENOUS BLD VENIPUNCTURE: CPT

## 2018-11-19 PROCEDURE — 94761 N-INVAS EAR/PLS OXIMETRY MLT: CPT

## 2018-11-19 PROCEDURE — 25500020 PHARM REV CODE 255: Performed by: SPECIALIST

## 2018-11-19 RX ORDER — DIPHENHYDRAMINE HCL 25 MG
50 CAPSULE ORAL ONCE
Status: COMPLETED | OUTPATIENT
Start: 2018-11-20 | End: 2018-11-20

## 2018-11-19 RX ADMIN — DOCUSATE SODIUM 100 MG: 100 CAPSULE, LIQUID FILLED ORAL at 08:11

## 2018-11-19 RX ADMIN — ENOXAPARIN SODIUM 120 MG: 150 INJECTION SUBCUTANEOUS at 08:11

## 2018-11-19 RX ADMIN — THERA TABS 1 TABLET: TAB at 12:11

## 2018-11-19 RX ADMIN — AMITRIPTYLINE HYDROCHLORIDE 50 MG: 50 TABLET, FILM COATED ORAL at 08:11

## 2018-11-19 RX ADMIN — OXYCODONE AND ACETAMINOPHEN 1 TABLET: 5; 325 TABLET ORAL at 08:11

## 2018-11-19 RX ADMIN — CELECOXIB 200 MG: 100 CAPSULE ORAL at 08:11

## 2018-11-19 RX ADMIN — MUPIROCIN: 20 OINTMENT TOPICAL at 08:11

## 2018-11-19 RX ADMIN — OXYCODONE AND ACETAMINOPHEN 1 TABLET: 5; 325 TABLET ORAL at 12:11

## 2018-11-19 RX ADMIN — KETOROLAC TROMETHAMINE 15 MG: 30 INJECTION, SOLUTION INTRAMUSCULAR at 06:11

## 2018-11-19 RX ADMIN — LIDOCAINE 1 PATCH: 50 PATCH TOPICAL at 10:11

## 2018-11-19 RX ADMIN — OXYCODONE AND ACETAMINOPHEN 1 TABLET: 5; 325 TABLET ORAL at 04:11

## 2018-11-19 RX ADMIN — PREDNISONE 50 MG: 20 TABLET ORAL at 06:11

## 2018-11-19 RX ADMIN — SULFUR HEXAFLUORIDE 2.4 ML: KIT at 01:11

## 2018-11-19 RX ADMIN — SODIUM CHLORIDE: 0.9 INJECTION, SOLUTION INTRAVENOUS at 01:11

## 2018-11-19 RX ADMIN — SENNOSIDES AND DOCUSATE SODIUM 1 TABLET: 8.6; 5 TABLET ORAL at 08:11

## 2018-11-19 RX ADMIN — FLUOXETINE 20 MG: 20 CAPSULE ORAL at 08:11

## 2018-11-19 RX ADMIN — OXYCODONE AND ACETAMINOPHEN 1 TABLET: 5; 325 TABLET ORAL at 10:11

## 2018-11-19 NOTE — HOSPITAL COURSE
The patient was monitored closely during her stay. She was placed on full dose lovenox. Pulmonology was consulted.  A Vq scan was done which showed an intermediate  probability for pulmonary embolus.  A bilateral lower extremity ultrasound showed no evidence of deep venous thrombosis in either lower extremity. Patient was scheduled for a CTA of chest. Patient with reported history of anaphylaxis to iodine and was premedicated with benadryl and prednisone prior to procedure.  CTA demonstrated Pulmonary emboli. See results below. She was initiated on oral anticoagulation. Case management consulted to ensure affordability. Discussed with Dr. Kumari. Patient stable for DC    PE; chest CTA heart RRR musc: left knee dressing intact.

## 2018-11-19 NOTE — PLAN OF CARE
Met with patient and spouse at bedside. Patient resides with spouse and school age children. Patient is post op left knee surgery last Tuesday with Dr. Wood. Currently receives home therapy services with [facility] ([facility]  Patient has a walker and a shower chair. Left contact information on white board in room, patient verbalizes understanding to contact for any questions.     11/19/18  11:45 Notified Alejandra with [facility] that patient is currently in the hospital. Referral to be sent to  upon discharge. Patient choice form signed.        11/19/18 1144   Discharge Reassessment   Assessment Type Discharge Planning Assessment   Provided patient/caregiver education on the expected discharge date and the discharge plan Yes   Do you have any problems affording any of your prescribed medications? Yes   Discharge Plan A Home Health   Patient choice form signed by patient/caregiver Yes   Anticipated Discharge Disposition Home-Health   Can the patient answer the patient profile reliably? Yes, cognitively intact   How does the patient rate their overall health at the present time? Excellent   Describe the patient's ability to walk at the present time. Walks with the help of equipment   How often would a person be available to care for the patient? Whenever needed   During the past month, has the patient often been bothered by feeling down, depressed or hopeless? No   During the past month, has the patient often been bothered by little interest or pleasure in doing things? No   Post-Acute Status   Post-Acute Authorization Home Health/Hospice   Home Health/Hospice Status Referrals Sent

## 2018-11-19 NOTE — SIGNIFICANT EVENT
VQ results with large segmental perfusion defect and high prob for PE. 1mg/kg lovenox q 12 ordered.  Consulted pulmonology and ordered BLE US.  Continue supplemental O2.

## 2018-11-19 NOTE — PLAN OF CARE
Problem: Patient Care Overview  Goal: Plan of Care Review  Outcome: Revised  VQ lung scan complete and read by V-RAD  Lovonox injections started; pt educated on rational  BLE ultasound and echocardiogram pending for today  Pulmonary consult pending  Pt with pain to right lateral chest wall; increases with deep breaths; lidocaine patch in place; po and IV pain meds utilized  Oxygen saturations upper 90's on room air.

## 2018-11-19 NOTE — HPI
This is a 39 yo female with PMHx significant for migraines, depression, and arthritis with recent L TKA (11/13).  She was admitted to the service of hospital medicine from [facility] for RIGHT chest wall pain that began this AM.  She reports pain under right breast with radiation to back; pain is 8/10 and worsened with inspiration. There are no alleviating factors including her home oral narcotics.  She denies any cough or injury to the area.  She states the pain does take her breath away.  She denies abdominal pain, but reports nausea and constipation with last BM on Tuesday. She has minimal leg swelling since surgery, but denies any numbness, tingling.  She is not on birth control, quit smoking last week, and has no history of clots.  She had elevated D-Dimer at outside facility that reportedly was > 4000 and was transferred here for VQ scan to rule out PE as she has anaphylaxis to iodine. Patient placed in the hospital for further evaluation and treatment.

## 2018-11-19 NOTE — PLAN OF CARE
Order received for DC planning.  Plan for patient to discharge to home with resumption of home health with [facility] with physical therapy (as ordered/cleared     11/19/18 3267   Discharge Reassessment   Assessment Type Discharge Planning Reassessment    by MD).
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
## 2018-11-19 NOTE — PLAN OF CARE
11/19/18 1649   PRE-TX-O2-ETCO2   O2 Device (Oxygen Therapy) room air   SpO2 95 %   Pulse Oximetry Type Intermittent   $ Pulse Oximetry - Multiple Charge Pulse Oximetry - Multiple

## 2018-11-19 NOTE — SUBJECTIVE & OBJECTIVE
Interval  History: Still some SOB upon exertion and CP with inspiration. VQ scan positive for intermediate probability of pulmonary emboli. Continue full dose lovenox. Pulmonology consulted.           Review of Systems   Constitutional: Positive for activity change. Negative for appetite change, chills, diaphoresis, fatigue and fever.   HENT: Negative for congestion, ear pain, facial swelling, hearing loss, postnasal drip, sore throat and trouble swallowing.    Eyes: Negative for pain and redness.   Respiratory: Positive for shortness of breath. Negative for cough and wheezing.         SOB with exertion   Cardiovascular: Positive for chest pain and leg swelling (minimal LEFT leg swelling). Negative for palpitations.        CP with inspiration   Gastrointestinal: Positive for constipation and nausea. Negative for abdominal distention, abdominal pain, blood in stool and vomiting.   Endocrine: Negative for polydipsia and polyphagia.   Genitourinary: Negative for decreased urine volume, difficulty urinating, dyspareunia, dysuria, flank pain and hematuria.   Musculoskeletal: Positive for arthralgias and gait problem. Negative for myalgias, neck pain and neck stiffness.   Skin: Positive for wound. Negative for color change.        Left  knee surgical dressing   Allergic/Immunologic: Negative for food allergies.   Neurological: Negative for dizziness, seizures, facial asymmetry, speech difficulty, weakness, light-headedness and headaches.   Hematological: Does not bruise/bleed easily.   Psychiatric/Behavioral: Negative for agitation, behavioral problems, confusion and suicidal ideas. The patient is not nervous/anxious.      Objective:     Vital Signs (Most Recent):  Temp: 98 °F (36.7 °C) (11/19/18 1518)  Pulse: 86 (11/19/18 1518)  Resp: 20 (11/19/18 1518)  BP: (!) 104/53 (11/19/18 1518)  SpO2: 95 % (11/19/18 1518) Vital Signs (24h Range):  Temp:  [96.9 °F (36.1 °C)-99.1 °F (37.3 °C)] 98 °F (36.7 °C)  Pulse:  [70-86]  86  Resp:  [16-20] 20  SpO2:  [92 %-95 %] 95 %  BP: ()/(52-58) 104/53     Weight: 120.3 kg (265 lb 3.4 oz)  Body mass index is 35.97 kg/m².    Physical Exam   Constitutional: She is oriented to person, place, and time. She appears well-developed and well-nourished. No distress.   HENT:   Head: Normocephalic and atraumatic.   Eyes: Conjunctivae and EOM are normal. Pupils are equal, round, and reactive to light. Right eye exhibits no discharge. Left eye exhibits no discharge.   Neck: Normal range of motion. Neck supple. No JVD present.   Cardiovascular: Normal rate, regular rhythm and intact distal pulses.   Murmur heard.  Pulmonary/Chest: Effort normal. No respiratory distress. She exhibits tenderness (tenderness at RIGHT lateral ribs).   BBS diminished   Abdominal: Soft. Bowel sounds are normal. She exhibits no distension. There is no tenderness. There is no guarding.   Genitourinary:   Genitourinary Comments: Not examined   Musculoskeletal: Normal range of motion. She exhibits edema (minimal LEFT LE edema near knee).   Neurological: She is alert and oriented to person, place, and time. No cranial nerve deficit.   Skin: Skin is warm and dry. Capillary refill takes less than 2 seconds. She is not diaphoretic. No erythema.   Left knee dressing intact   Psychiatric: She has a normal mood and affect. Her behavior is normal. Judgment and thought content normal.         CRANIAL NERVES     CN III, IV, VI   Pupils are equal, round, and reactive to light.  Extraocular motions are normal.     Labs: Reviewed

## 2018-11-19 NOTE — H&P
[facility] Hospital Medicine  History & Physical    Patient Name: Aleyda Costa  MRN: [id number]  Admission Date: 11/18/2018  Attending Physician: Carole Silverman MD   Primary Care Provider: Darlene Hayden MD         Patient information was obtained from patient, EMS personnel, past medical records and ER records.     Subjective:     Principal Problem:Chest pain on breathing    Chief Complaint:   Chief Complaint   Patient presents with    Chest Pain        HPI: Aleyda Costa is a 37 yo female with PMHx significant for migraines, depression, and arthritis with recent L TKA (11/13).  She was admitted to the service of hospital medicine from [facility] for RIGHT chest wall pain that began this AM.  She reports pain under right breast with radiation to back; pain is 8/10 and worsened with inspiration. There are no alleviating factors including her home oral narcotics.  She denies any cough or injury to the area.  She states the pain does take her breath away.  She denies abdominal pain, but reports nausea and constipation with last BM on Tuesday. She has minimal leg swelling since surgery, but denies any numbness, tingling.  She is not on birth control, quit smoking last week, and has no history of clots.  She had elevated D-Dimer at outside facility and was transferred here for VQ scan to rule out PE as she has anaphylaxis to iodine.  Other pertinent medical history as below:    Patient accepted for direct admission per Dr. Silverman- Case discussed with her.      Past Medical History:   Diagnosis Date    Arthritis     OA    Back pain     Cancer     skin cancer to back    Depression     Full dentures     Migraine     Seizures     Sleep apnea     Does not use c-pap since weight loss - 170 lbs    Wears glasses        Past Surgical History:   Procedure Laterality Date    APPENDECTOMY      ARTHROPLASTY, KNEE Left 11/13/2018    Performed [facility]as [facility]  at [facility] OR    ARTHROSCOPY-KNEE Left 2016    [facility]    BLOCK-NERVE-MEDIAL BRANCH-LUMBAR Bilateral 2016    [facility]    CARPAL TUNNEL RELEASE Bilateral      SECTION      CHOLECYSTECTOMY      CHONDROPLASTY-KNEE-ARTHROSCOPY Left 2016    [facility] at [facility] OR    FRACTURE SURGERY      ankle    gastric sleve      HYSTERECTOMY      INJECTION-STEROID-EPIDURAL-LUMBAR N/A 2016    [facility]    INJECTION-STEROID-EPIDURAL-LUMBAR N/A 3/28/2016    [facility] at [facility] OR    JOINT REPLACEMENT Left 2018    KNEE ARTHROPLASTY Left 2018    Procedure: ARTHROPLASTY, KNEE;  Surgeon: Feliberto Cardenas MD;  Location: [facility];  Service: Orthopedics;  Laterality: Left;    KNEE SURGERY      LUMBAR EPIDURAL INJECTION      RADIAL NERVE Right     RADIOFREQUENCY THERMOCOAGULATION (RFTC)-NERVE-MEDIAN BRANCH-LUMBAR Bilateral 2017    [facility] at [facility] OR    RADIOFREQUENCY THERMOCOAGULATION (RFTC)-NERVE-MEDIAN BRANCH-LUMBAR Bilateral 2016    [facility] at [facility] OR    RECONSTRUCTION- LIGAMENT-MPFL RECONSTRUCTION WITH ALLOGRAFT Left 2017    Performed by [facility] at [facility] OR    SINUS SURGERY         Review of patient's allergies indicates:   Allergen Reactions    Clarithromycin Swelling and Other (See Comments)     DIFFICULTY SWALLOWING  DIFFICULTY SWALLOWING    Pcn [penicillins] Anaphylaxis    Sulfa (sulfonamide antibiotics) Hives    Wellbutrin [bupropion hcl] Shortness Of Breath and Anaphylaxis    Betadine [povidone-iodine] Hives     Swelling      Cefprozil Hives    Iodinated contrast- oral and iv dye Hives    Latex, natural rubber Rash    Sulfamethoxazole-trimethoprim Nausea And Vomiting    Iodine Hives and Swelling    Latex Hives and Swelling       No current  facility-administered medications on file prior to encounter.      Current Outpatient Medications on File Prior to Encounter   Medication Sig    amitriptyline (ELAVIL) 25 MG tablet TAKE ONE TABLET BY MOUTH EVERY NIGHT FOR 2 WEEKS THEN INCREASE TO 2 TABLETS AT BEDTIME    celecoxib (CELEBREX) 200 MG capsule TAKE ONE CAPSULE BY MOUTH EVERY DAY    cetirizine (ZYRTEC) 5 MG tablet Take 5 mg by mouth as needed.     cyclobenzaprine (FLEXERIL) 10 MG tablet TAKE ONE TABLET BY MOUTH 3 TIMES A DAY AS NEEDED FOR MUSCLE SPASMS    epinephrine (EPIPEN) 0.3 mg/0.3 mL (1:1,000) AtIn 0.3 mg daily as needed.     FERROUS SULFATE (IRON ORAL) Take by mouth once daily.     mupirocin calcium 2% nasl oint (BACTROBAN) 2 % Oint 1 g by Nasal route 2 (two) times daily. for 5 days    oxycodone-acetaminophen (PERCOCET) 5-325 mg per tablet Take 1 tablet by mouth every 6 (six) hours as needed. (Patient taking differently: Take 1 tablet by mouth 3 (three) times daily. )    potassium chloride SA (K-DUR,KLOR-CON) 20 MEQ tablet Take 20 mEq by mouth as needed.     promethazine (PHENERGAN) 25 MG tablet Take 1 tablet by mouth every 6 (six) hours as needed (migraine). -MAY CAUSE DROWSINESS-    VOLTAREN 1 % Gel daily as needed.     cyanocobalamin, vitamin B-12, 1,000 mcg/mL Kit Inject 1 mL into the muscle every 21 days.    fluoxetine (PROZAC) 20 MG capsule every evening.     hydrOXYzine pamoate (VISTARIL) 25 MG Cap TAKE ONE CAPSULE BY MOUTH 3 TIMES A DAY AS NEEDED FOR ANXIETY    ibuprofen (ADVIL,MOTRIN) 800 MG tablet every 6 (six) hours as needed.     isometheptene-apap-dichloralphenazone [id number] (MIDRIN) -325 mg per capsule Take 1 capsule by mouth 4 (four) times daily as needed.    ketorolac (TORADOL) 60 mg/2 mL Soln Please provide patients with IV syringes (Patient taking differently: as needed. Please provide patients with IV syringes)    lorazepam (ATIVAN) 0.5 MG tablet Take 0.5 mg by mouth every 4 (four) hours as needed.      oxycodone (OXYCONTIN) 15 mg TR12 12 hr tablet Take 1 tablet (15 mg total) by mouth every 12 (twelve) hours. (Patient taking differently: Take 5 mg by mouth every 4 (four) hours. )    oxyCODONE-acetaminophen (PERCOCET) 5-325 mg per tablet Take 1 tablet by mouth every 4 (four) hours as needed for Pain.    pseudoephedrine-DM-guaifenesin 45- mg Tab Take 1 tablet by mouth daily as needed.     sumatriptan succinate 4 mg/0.5 mL PnIj Inject 4 mg into the skin daily as needed. imitrex     Family History     Problem Relation (Age of Onset)    Heart disease Mother, Father        Tobacco Use    Smoking status: Former Smoker     Packs/day: 0.50     Years: 20.00     Pack years: 10.00     Types: Cigarettes     Last attempt to quit: 2018     Years since quittin.0    Smokeless tobacco: Never Used   Substance and Sexual Activity    Alcohol use: Yes     Alcohol/week: 0.0 oz     Comment: occasionally    Drug use: Yes     Types: Other-see comments    Sexual activity: Yes     Partners: Male     Review of Systems   Constitutional: Negative for activity change, appetite change, chills, fatigue and fever.   HENT: Negative for congestion, postnasal drip, sore throat and trouble swallowing.    Eyes: Negative for photophobia and visual disturbance.   Respiratory: Positive for shortness of breath. Negative for cough and wheezing.    Cardiovascular: Positive for chest pain and leg swelling (minimal LEFT leg swelling). Negative for palpitations.   Gastrointestinal: Positive for constipation and nausea. Negative for abdominal distention, abdominal pain and vomiting.   Genitourinary: Negative for decreased urine volume, dysuria, flank pain and hematuria.   Musculoskeletal: Negative for arthralgias and myalgias.   Skin: Negative for color change.   Neurological: Negative for dizziness, weakness, light-headedness and headaches.   Psychiatric/Behavioral: Negative for confusion. The patient is not nervous/anxious.       Objective:     Vital Signs (Most Recent):  Temp: 99.1 °F (37.3 °C) (11/18/18 2052)  Pulse: 79 (11/18/18 2052)  Resp: 16 (11/18/18 2052)  BP: (!) 127/58 (11/18/18 2050)  SpO2: 95 % (11/18/18 2050) Vital Signs (24h Range):  Temp:  [99.1 °F (37.3 °C)] 99.1 °F (37.3 °C)  Pulse:  [79] 79  Resp:  [16] 16  SpO2:  [95 %] 95 %  BP: (127)/(58) 127/58     Weight: 120.3 kg (265 lb 3.4 oz)  Body mass index is 35.97 kg/m².    Physical Exam   Constitutional: She is oriented to person, place, and time. She appears well-developed and well-nourished. No distress.   HENT:   Head: Normocephalic and atraumatic.   Eyes: Conjunctivae and EOM are normal. Pupils are equal, round, and reactive to light.   Neck: Normal range of motion. Neck supple. No thyromegaly present.   Cardiovascular: Normal rate, regular rhythm, normal heart sounds and intact distal pulses.   Pulmonary/Chest: Effort normal and breath sounds normal. No respiratory distress. She exhibits tenderness (tenderness at RIGHT lateral ribs).   Abdominal: Soft. Bowel sounds are normal. She exhibits no distension. There is no tenderness.   Musculoskeletal: Normal range of motion. She exhibits edema (minimal LEFT LE edema near knee). She exhibits no tenderness.   Neurological: She is alert and oriented to person, place, and time. No cranial nerve deficit.   Skin: Skin is warm and dry. Capillary refill takes less than 2 seconds. No erythema.   Psychiatric: She has a normal mood and affect. Her behavior is normal. Judgment and thought content normal.         CRANIAL NERVES     CN III, IV, VI   Pupils are equal, round, and reactive to light.  Extraocular motions are normal.        I reviewed records from outside facility.  Significant for the following:    WBC: 6  H/H 12.8/38.7  PLT: 296    D-Dimer: 4450 (ref range 0-400)  BNP: 6  Trop: 0.03      Na: 133  K: 3.8  BUN/Cr 7/0.63    CXR Report: No acute process      EKG: NSR 84 no ischemic ST or T wave findings      Assessment/Plan:      * Chest pain on breathing    Acute.  Reproducible with characteristics concerning for costochondritis or pleurisy.  Given recent surgery and + D-Dimer patient was transferred for VQ which is pending at this time.  Utilize anti-inflammatories for pain control, add lidocaine patch.  Doubt cardiac at all, but will trend out troponins.  Monitor sats and maintain O2 > 92%.       Mild depression    Chronic issue.  Stable.  Continue fluoxetine, elavil. Monitor clinically.        S/P total knee arthroplasty, left    Continue routine pain medication, consult PT for AM.        DDD (degenerative disc disease), lumbar    Chronic issue. Stable.  Continue pain medication as per home regimen.         VTE Risk Mitigation (From admission, onward)        Ordered     enoxaparin injection 40 mg  Daily      11/18/18 2114     IP VTE HIGH RISK PATIENT  Once      11/18/18 2114             Elsa Lai NP  Department of Hospital Medicine   [facility]

## 2018-11-19 NOTE — ASSESSMENT & PLAN NOTE
Acute.  Reproducible with characteristics concerning for costochondritis or pleurisy.  Given recent surgery and + D-Dimer patient was transferred for VQ which is pending at this time.  Utilize anti-inflammatories for pain control, add lidocaine patch.  Doubt cardiac at all, but will trend out troponins.  Monitor sats and maintain O2 > 92%.

## 2018-11-19 NOTE — SUBJECTIVE & OBJECTIVE
Past Medical History:   Diagnosis Date    Arthritis     OA    Back pain     Cancer     skin cancer to back    Depression     Full dentures     Migraine     Seizures     Sleep apnea     Does not use c-pap since weight loss - 170 lbs    Wears glasses        Past Surgical History:   Procedure Laterality Date    APPENDECTOMY      ARTHROPLASTY, KNEE Left 2018    [facility]    ARTHROSCOPY-KNEE Left 2016    [facility]    BLOCK-NERVE-MEDIAL BRANCH-LUMBAR Bilateral 2016    [facility]    CARPAL TUNNEL RELEASE Bilateral      SECTION      CHOLECYSTECTOMY      CHONDROPLASTY-KNEE-ARTHROSCOPY Left 2016    [facility]    FRACTURE SURGERY      ankle    gastric sleve      HYSTERECTOMY      INJECTION-STEROID-EPIDURAL-LUMBAR N/A 2016    [facility]    INJECTION-STEROID-EPIDURAL-LUMBAR N/A 3/28/2016    [facility] OR    JOINT REPLACEMENT Left 2018    KNEE ARTHROPLASTY Left 2018    Procedure: ARTHROPLASTY, KNEE;  Surgeon: Feliberto Cardenas MD;  Location: [facility] OR;  Service: Orthopedics;  Laterality: Left;    KNEE SURGERY      LUMBAR EPIDURAL INJECTION      RADIAL NERVE Right     RADIOFREQUENCY THERMOCOAGULATION (RFTC)-NERVE-MEDIAN BRANCH-LUMBAR Bilateral 2017    [facility] OR    RADIOFREQUENCY THERMOCOAGULATION (RFTC)-NERVE-MEDIAN BRANCH-LUMBAR Bilateral 2016    [facility] OR    RECONSTRUCTION- LIGAMENT-MPFL RECONSTRUCTION WITH ALLOGRAFT Left 2017    [facility] at [facility]    SINUS SURGERY         Review of patient's allergies indicates:   Allergen Reactions    Clarithromycin Swelling and Other (See Comments)     DIFFICULTY SWALLOWING  DIFFICULTY SWALLOWING    Pcn  [penicillins] Anaphylaxis    Sulfa (sulfonamide antibiotics) Hives    Wellbutrin [bupropion hcl] Shortness Of Breath and Anaphylaxis    Betadine [povidone-iodine] Hives     Swelling      Cefprozil Hives    Iodinated contrast- oral and iv dye Hives    Latex, natural rubber Rash    Sulfamethoxazole-trimethoprim Nausea And Vomiting    Iodine Hives and Swelling    Latex Hives and Swelling       No current facility-administered medications on file prior to encounter.      Current Outpatient Medications on File Prior to Encounter   Medication Sig    amitriptyline (ELAVIL) 25 MG tablet TAKE ONE TABLET BY MOUTH EVERY NIGHT FOR 2 WEEKS THEN INCREASE TO 2 TABLETS AT BEDTIME    celecoxib (CELEBREX) 200 MG capsule TAKE ONE CAPSULE BY MOUTH EVERY DAY    cetirizine (ZYRTEC) 5 MG tablet Take 5 mg by mouth as needed.     cyclobenzaprine (FLEXERIL) 10 MG tablet TAKE ONE TABLET BY MOUTH 3 TIMES A DAY AS NEEDED FOR MUSCLE SPASMS    epinephrine (EPIPEN) 0.3 mg/0.3 mL (1:1,000) AtIn 0.3 mg daily as needed.     FERROUS SULFATE (IRON ORAL) Take by mouth once daily.     mupirocin calcium 2% nasl oint (BACTROBAN) 2 % Oint 1 g by Nasal route 2 (two) times daily. for 5 days    oxycodone-acetaminophen (PERCOCET) 5-325 mg per tablet Take 1 tablet by mouth every 6 (six) hours as needed. (Patient taking differently: Take 1 tablet by mouth 3 (three) times daily. )    potassium chloride SA (K-DUR,KLOR-CON) 20 MEQ tablet Take 20 mEq by mouth as needed.     promethazine (PHENERGAN) 25 MG tablet Take 1 tablet by mouth every 6 (six) hours as needed (migraine). -MAY CAUSE DROWSINESS-    VOLTAREN 1 % Gel daily as needed.     cyanocobalamin, vitamin B-12, 1,000 mcg/mL Kit Inject 1 mL into the muscle every 21 days.    fluoxetine (PROZAC) 20 MG capsule every evening.     hydrOXYzine pamoate (VISTARIL) 25 MG Cap TAKE ONE CAPSULE BY MOUTH 3 TIMES A DAY AS NEEDED FOR ANXIETY    ibuprofen (ADVIL,MOTRIN) 800 MG tablet every 6 (six) hours  as needed.     isometheptene-apap-dichloralphenazone [id number] (MIDRIN) -325 mg per capsule Take 1 capsule by mouth 4 (four) times daily as needed.    ketorolac (TORADOL) 60 mg/2 mL Soln Please provide patients with IV syringes (Patient taking differently: as needed. Please provide patients with IV syringes)    lorazepam (ATIVAN) 0.5 MG tablet Take 0.5 mg by mouth every 4 (four) hours as needed.     oxycodone (OXYCONTIN) 15 mg TR12 12 hr tablet Take 1 tablet (15 mg total) by mouth every 12 (twelve) hours. (Patient taking differently: Take 5 mg by mouth every 4 (four) hours. )    oxyCODONE-acetaminophen (PERCOCET) 5-325 mg per tablet Take 1 tablet by mouth every 4 (four) hours as needed for Pain.    pseudoephedrine-DM-guaifenesin 45- mg Tab Take 1 tablet by mouth daily as needed.     sumatriptan succinate 4 mg/0.5 mL PnIj Inject 4 mg into the skin daily as needed. imitrex     Family History     Problem Relation (Age of Onset)    Heart disease Mother, Father        Tobacco Use    Smoking status: Former Smoker     Packs/day: 0.50     Years: 20.00     Pack years: 10.00     Types: Cigarettes     Last attempt to quit: 2018     Years since quittin.0    Smokeless tobacco: Never Used   Substance and Sexual Activity    Alcohol use: Yes     Alcohol/week: 0.0 oz     Comment: occasionally    Drug use: Yes     Types: Other-see comments    Sexual activity: Yes     Partners: Male     Review of Systems   Constitutional: Negative for activity change, appetite change, chills, fatigue and fever.   HENT: Negative for congestion, postnasal drip, sore throat and trouble swallowing.    Eyes: Negative for photophobia and visual disturbance.   Respiratory: Positive for shortness of breath. Negative for cough and wheezing.    Cardiovascular: Positive for chest pain and leg swelling (minimal LEFT leg swelling). Negative for palpitations.   Gastrointestinal: Positive for constipation and nausea. Negative for  abdominal distention, abdominal pain and vomiting.   Genitourinary: Negative for decreased urine volume, dysuria, flank pain and hematuria.   Musculoskeletal: Negative for arthralgias and myalgias.   Skin: Negative for color change.   Neurological: Negative for dizziness, weakness, light-headedness and headaches.   Psychiatric/Behavioral: Negative for confusion. The patient is not nervous/anxious.      Objective:     Vital Signs (Most Recent):  Temp: 99.1 °F (37.3 °C) (11/18/18 2052)  Pulse: 79 (11/18/18 2052)  Resp: 16 (11/18/18 2052)  BP: (!) 127/58 (11/18/18 2050)  SpO2: 95 % (11/18/18 2050) Vital Signs (24h Range):  Temp:  [99.1 °F (37.3 °C)] 99.1 °F (37.3 °C)  Pulse:  [79] 79  Resp:  [16] 16  SpO2:  [95 %] 95 %  BP: (127)/(58) 127/58     Weight: 120.3 kg (265 lb 3.4 oz)  Body mass index is 35.97 kg/m².    Physical Exam   Constitutional: She is oriented to person, place, and time. She appears well-developed and well-nourished. No distress.   HENT:   Head: Normocephalic and atraumatic.   Eyes: Conjunctivae and EOM are normal. Pupils are equal, round, and reactive to light.   Neck: Normal range of motion. Neck supple. No thyromegaly present.   Cardiovascular: Normal rate, regular rhythm, normal heart sounds and intact distal pulses.   Pulmonary/Chest: Effort normal and breath sounds normal. No respiratory distress. She exhibits tenderness (tenderness at RIGHT lateral ribs).   Abdominal: Soft. Bowel sounds are normal. She exhibits no distension. There is no tenderness.   Musculoskeletal: Normal range of motion. She exhibits edema (minimal LEFT LE edema near knee). She exhibits no tenderness.   Neurological: She is alert and oriented to person, place, and time. No cranial nerve deficit.   Skin: Skin is warm and dry. Capillary refill takes less than 2 seconds. No erythema.   Psychiatric: She has a normal mood and affect. Her behavior is normal. Judgment and thought content normal.         CRANIAL NERVES     CN III,  IV, VI   Pupils are equal, round, and reactive to light.  Extraocular motions are normal.        I reviewed records from outside facility.  Significant for the following:    WBC: 6  H/H 12.8/38.7  PLT: 296    D-Dimer: 4450 (ref range 0-400)  BNP: 6  Trop: 0.03      Na: 133  K: 3.8  BUN/Cr 7/0.63    CXR Report: No acute process      EKG: NSR 84 no ischemic ST or T wave findings

## 2018-11-19 NOTE — PT/OT/SLP PROGRESS
Physical Therapy      Patient Name:  Aleyda Costa   MRN:  [id number]    PT orders acknowledged. Per NP Anne escalante on pulmonologist input prior to PT    Brenda Chin, PT

## 2018-11-19 NOTE — PROGRESS NOTES
[facility] Hospital Medicine  Progress Note    Patient Name: Aleyda Costa  MRN: [id number]  Patient Class: OP- Observation   Admission Date: 11/18/2018  Length of Stay: 0 days  Attending Physician: Rita Pak MD  Primary Care Provider: Darlene Hayden MD      Subjective:     Principal Problem:Chest pain on breathing    HPI:  This is a 37 yo female with PMHx significant for migraines, depression, and arthritis with recent L TKA (11/13).  She was admitted to the service of hospital medicine from [facility] for RIGHT chest wall pain that began this AM.  She reports pain under right breast with radiation to back; pain is 8/10 and worsened with inspiration. There are no alleviating factors including her home oral narcotics.  She denies any cough or injury to the area.  She states the pain does take her breath away.  She denies abdominal pain, but reports nausea and constipation with last BM on Tuesday. She has minimal leg swelling since surgery, but denies any numbness, tingling.  She is not on birth control, quit smoking last week, and has no history of clots.  She had elevated D-Dimer at outside facility that reportedly was > 4000 and was transferred here for VQ scan to rule out PE as she has anaphylaxis to iodine. Patient placed in the hospital for further evaluation and treatment.     Hospital Course:  The patient was monitored closely during her stay. She was placed on full dose lovenox. Pulmonology was consulted.  A Vq scan was done which showed an intermediate  probability for pulmonary embolus.  A bilateral lower extremity ultrasound showed no evidence of deep venous thrombosis in either lower extremity. Her Ddimer level was repeated.        Interval  History: Still some SOB upon exertion and CP with inspiration. VQ scan positive for intermediate probability of pulmonary emboli. Continue full dose lovenox. Pulmonology consulted.           Review of Systems    Constitutional: Positive for activity change. Negative for appetite change, chills, diaphoresis, fatigue and fever.   HENT: Negative for congestion, ear pain, facial swelling, hearing loss, postnasal drip, sore throat and trouble swallowing.    Eyes: Negative for pain and redness.   Respiratory: Positive for shortness of breath. Negative for cough and wheezing.         SOB with exertion   Cardiovascular: Positive for chest pain and leg swelling (minimal LEFT leg swelling). Negative for palpitations.        CP with inspiration   Gastrointestinal: Positive for constipation and nausea. Negative for abdominal distention, abdominal pain, blood in stool and vomiting.   Endocrine: Negative for polydipsia and polyphagia.   Genitourinary: Negative for decreased urine volume, difficulty urinating, dyspareunia, dysuria, flank pain and hematuria.   Musculoskeletal: Positive for arthralgias and gait problem. Negative for myalgias, neck pain and neck stiffness.   Skin: Positive for wound. Negative for color change.        Left  knee surgical dressing   Allergic/Immunologic: Negative for food allergies.   Neurological: Negative for dizziness, seizures, facial asymmetry, speech difficulty, weakness, light-headedness and headaches.   Hematological: Does not bruise/bleed easily.   Psychiatric/Behavioral: Negative for agitation, behavioral problems, confusion and suicidal ideas. The patient is not nervous/anxious.      Objective:     Vital Signs (Most Recent):  Temp: 98 °F (36.7 °C) (11/19/18 1518)  Pulse: 86 (11/19/18 1518)  Resp: 20 (11/19/18 1518)  BP: (!) 104/53 (11/19/18 1518)  SpO2: 95 % (11/19/18 1518) Vital Signs (24h Range):  Temp:  [96.9 °F (36.1 °C)-99.1 °F (37.3 °C)] 98 °F (36.7 °C)  Pulse:  [70-86] 86  Resp:  [16-20] 20  SpO2:  [92 %-95 %] 95 %  BP: ()/(52-58) 104/53     Weight: 120.3 kg (265 lb 3.4 oz)  Body mass index is 35.97 kg/m².    Physical Exam   Constitutional: She is oriented to person, place, and time.  She appears well-developed and well-nourished. No distress.   HENT:   Head: Normocephalic and atraumatic.   Eyes: Conjunctivae and EOM are normal. Pupils are equal, round, and reactive to light. Right eye exhibits no discharge. Left eye exhibits no discharge.   Neck: Normal range of motion. Neck supple. No JVD present.   Cardiovascular: Normal rate, regular rhythm and intact distal pulses.   Murmur heard.  Pulmonary/Chest: Effort normal. No respiratory distress. She exhibits tenderness (tenderness at RIGHT lateral ribs).   BBS diminished   Abdominal: Soft. Bowel sounds are normal. She exhibits no distension. There is no tenderness. There is no guarding.   Genitourinary:   Genitourinary Comments: Not examined   Musculoskeletal: Normal range of motion. She exhibits edema (minimal LEFT LE edema near knee).   Neurological: She is alert and oriented to person, place, and time. No cranial nerve deficit.   Skin: Skin is warm and dry. Capillary refill takes less than 2 seconds. She is not diaphoretic. No erythema.   Left knee dressing intact   Psychiatric: She has a normal mood and affect. Her behavior is normal. Judgment and thought content normal.         CRANIAL NERVES     CN III, IV, VI   Pupils are equal, round, and reactive to light.  Extraocular motions are normal.     Labs: Reviewed    Assessment/Plan:      * Acute Pulmonary Emboli/ Chest pain on breathing    Positive Vq scan-  Pulmonology consulted  Continue full dose lovenox for now       Mild depression    Chronic issue.  Stable.  Continue fluoxetine, elavil. Monitor clinically.       Hyperphosphatemia-  Monitor   S/P total knee arthroplasty, left    Continue routine pain medication   Physical therapy consulted      Heart Murmur-  Check echocardiogram    Nicotine Dependence-  Smoking cessation education > 3 minutes  Nicotine patch declined    Normocytic Anemia-  Add MVI     DDD (degenerative disc disease), lumbar    Chronic issue. Stable.  Continue pain  medication as per home regimen.         VTE Risk Mitigation (From admission, onward)        Ordered     enoxaparin injection 120 mg  Every 12 hours (non-standard times)      11/18/18 2343     IP VTE HIGH RISK PATIENT  Once      11/18/18 2114          SCHUYLER Rivera  Department of Hospital Medicine   [facility]    Time spent seeing patient( greater than 1/2 spent in direct contact) : 37 minutes

## 2018-11-20 PROBLEM — K59.00 CONSTIPATION: Status: ACTIVE | Noted: 2018-11-20

## 2018-11-20 LAB
ALBUMIN SERPL BCP-MCNC: 2.7 G/DL
ALP SERPL-CCNC: 98 U/L
ALT SERPL W/O P-5'-P-CCNC: 38 U/L
ANION GAP SERPL CALC-SCNC: 9 MMOL/L
AST SERPL-CCNC: 33 U/L
BASOPHILS # BLD AUTO: 0 K/UL
BASOPHILS NFR BLD: 0.3 %
BILIRUB SERPL-MCNC: 0.5 MG/DL
BUN SERPL-MCNC: 10 MG/DL
CALCIUM SERPL-MCNC: 9.5 MG/DL
CHLORIDE SERPL-SCNC: 106 MMOL/L
CO2 SERPL-SCNC: 24 MMOL/L
CREAT SERPL-MCNC: 0.6 MG/DL
DIFFERENTIAL METHOD: ABNORMAL
EOSINOPHIL # BLD AUTO: 0 K/UL
EOSINOPHIL NFR BLD: 0.2 %
ERYTHROCYTE [DISTWIDTH] IN BLOOD BY AUTOMATED COUNT: 13.9 %
EST. GFR  (AFRICAN AMERICAN): >60 ML/MIN/1.73 M^2
EST. GFR  (NON AFRICAN AMERICAN): >60 ML/MIN/1.73 M^2
GLUCOSE SERPL-MCNC: 139 MG/DL
HCT VFR BLD AUTO: 32.3 %
HGB BLD-MCNC: 11.3 G/DL
LYMPHOCYTES # BLD AUTO: 0.7 K/UL
LYMPHOCYTES NFR BLD: 12.8 %
MAGNESIUM SERPL-MCNC: 2.2 MG/DL
MCH RBC QN AUTO: 32 PG
MCHC RBC AUTO-ENTMCNC: 34.9 G/DL
MCV RBC AUTO: 92 FL
MONOCYTES # BLD AUTO: 0.3 K/UL
MONOCYTES NFR BLD: 5 %
NEUTROPHILS # BLD AUTO: 4.5 K/UL
NEUTROPHILS NFR BLD: 81.7 %
PHOSPHATE SERPL-MCNC: 3.2 MG/DL
PLATELET # BLD AUTO: 273 K/UL
PMV BLD AUTO: 8 FL
POTASSIUM SERPL-SCNC: 4.5 MMOL/L
PROT SERPL-MCNC: 6.9 G/DL
RBC # BLD AUTO: 3.52 M/UL
SODIUM SERPL-SCNC: 139 MMOL/L
WBC # BLD AUTO: 5.5 K/UL

## 2018-11-20 PROCEDURE — 80053 COMPREHEN METABOLIC PANEL: CPT

## 2018-11-20 PROCEDURE — 25000003 PHARM REV CODE 250: Performed by: NURSE PRACTITIONER

## 2018-11-20 PROCEDURE — 94761 N-INVAS EAR/PLS OXIMETRY MLT: CPT

## 2018-11-20 PROCEDURE — 97161 PT EVAL LOW COMPLEX 20 MIN: CPT

## 2018-11-20 PROCEDURE — G8978 MOBILITY CURRENT STATUS: HCPCS | Mod: CJ

## 2018-11-20 PROCEDURE — 99232 SBSQ HOSP IP/OBS MODERATE 35: CPT | Mod: ,,, | Performed by: INTERNAL MEDICINE

## 2018-11-20 PROCEDURE — 84100 ASSAY OF PHOSPHORUS: CPT

## 2018-11-20 PROCEDURE — 85025 COMPLETE CBC W/AUTO DIFF WBC: CPT

## 2018-11-20 PROCEDURE — 97116 GAIT TRAINING THERAPY: CPT

## 2018-11-20 PROCEDURE — G8979 MOBILITY GOAL STATUS: HCPCS | Mod: CI

## 2018-11-20 PROCEDURE — 83735 ASSAY OF MAGNESIUM: CPT

## 2018-11-20 PROCEDURE — 12000002 HC ACUTE/MED SURGE SEMI-PRIVATE ROOM

## 2018-11-20 PROCEDURE — 36415 COLL VENOUS BLD VENIPUNCTURE: CPT

## 2018-11-20 PROCEDURE — 25000003 PHARM REV CODE 250: Performed by: INTERNAL MEDICINE

## 2018-11-20 PROCEDURE — 63600175 PHARM REV CODE 636 W HCPCS: Performed by: INTERNAL MEDICINE

## 2018-11-20 PROCEDURE — 97110 THERAPEUTIC EXERCISES: CPT

## 2018-11-20 PROCEDURE — 25500020 PHARM REV CODE 255

## 2018-11-20 PROCEDURE — 63600175 PHARM REV CODE 636 W HCPCS: Performed by: NURSE PRACTITIONER

## 2018-11-20 RX ORDER — DIPHENHYDRAMINE HCL 25 MG
50 CAPSULE ORAL ONCE
Status: COMPLETED | OUTPATIENT
Start: 2018-11-20 | End: 2018-11-20

## 2018-11-20 RX ORDER — POLYETHYLENE GLYCOL 3350 17 G/17G
17 POWDER, FOR SOLUTION ORAL 2 TIMES DAILY
Status: DISCONTINUED | OUTPATIENT
Start: 2018-11-20 | End: 2018-11-21 | Stop reason: HOSPADM

## 2018-11-20 RX ORDER — BISACODYL 5 MG
10 TABLET, DELAYED RELEASE (ENTERIC COATED) ORAL ONCE
Status: COMPLETED | OUTPATIENT
Start: 2018-11-20 | End: 2018-11-20

## 2018-11-20 RX ORDER — SODIUM CHLORIDE 9 MG/ML
INJECTION, SOLUTION INTRAVENOUS
Status: DISPENSED
Start: 2018-11-20 | End: 2018-11-21

## 2018-11-20 RX ADMIN — PREDNISONE 50 MG: 20 TABLET ORAL at 08:11

## 2018-11-20 RX ADMIN — PREDNISONE 50 MG: 20 TABLET ORAL at 07:11

## 2018-11-20 RX ADMIN — LIDOCAINE 1 PATCH: 50 PATCH TOPICAL at 09:11

## 2018-11-20 RX ADMIN — ENOXAPARIN SODIUM 120 MG: 150 INJECTION SUBCUTANEOUS at 08:11

## 2018-11-20 RX ADMIN — OXYCODONE AND ACETAMINOPHEN 1 TABLET: 5; 325 TABLET ORAL at 05:11

## 2018-11-20 RX ADMIN — BISACODYL 10 MG: 5 TABLET, COATED ORAL at 02:11

## 2018-11-20 RX ADMIN — SENNOSIDES AND DOCUSATE SODIUM 1 TABLET: 8.6; 5 TABLET ORAL at 09:11

## 2018-11-20 RX ADMIN — ENOXAPARIN SODIUM 120 MG: 150 INJECTION SUBCUTANEOUS at 09:11

## 2018-11-20 RX ADMIN — IOHEXOL 100 ML: 350 INJECTION, SOLUTION INTRAVENOUS at 08:11

## 2018-11-20 RX ADMIN — OXYCODONE AND ACETAMINOPHEN 1 TABLET: 5; 325 TABLET ORAL at 11:11

## 2018-11-20 RX ADMIN — DOCUSATE SODIUM 100 MG: 100 CAPSULE, LIQUID FILLED ORAL at 08:11

## 2018-11-20 RX ADMIN — PREDNISONE 50 MG: 20 TABLET ORAL at 01:11

## 2018-11-20 RX ADMIN — SENNOSIDES AND DOCUSATE SODIUM 1 TABLET: 8.6; 5 TABLET ORAL at 08:11

## 2018-11-20 RX ADMIN — DIPHENHYDRAMINE HYDROCHLORIDE 50 MG: 25 CAPSULE ORAL at 06:11

## 2018-11-20 RX ADMIN — AMITRIPTYLINE HYDROCHLORIDE 50 MG: 50 TABLET, FILM COATED ORAL at 09:11

## 2018-11-20 RX ADMIN — FLUOXETINE 20 MG: 20 CAPSULE ORAL at 09:11

## 2018-11-20 RX ADMIN — POLYETHYLENE GLYCOL 3350 17 G: 17 POWDER, FOR SOLUTION ORAL at 09:11

## 2018-11-20 RX ADMIN — THERA TABS 1 TABLET: TAB at 08:11

## 2018-11-20 RX ADMIN — MUPIROCIN: 20 OINTMENT TOPICAL at 10:11

## 2018-11-20 RX ADMIN — CELECOXIB 200 MG: 100 CAPSULE ORAL at 08:11

## 2018-11-20 RX ADMIN — KETOROLAC TROMETHAMINE 15 MG: 30 INJECTION, SOLUTION INTRAMUSCULAR at 09:11

## 2018-11-20 RX ADMIN — DIPHENHYDRAMINE HYDROCHLORIDE 50 MG: 25 CAPSULE ORAL at 07:11

## 2018-11-20 NOTE — PLAN OF CARE
Problem: Patient Care Overview  Goal: Plan of Care Review  Outcome: Ongoing (interventions implemented as appropriate)   11/20/18 7714   Coping/Psychosocial   Plan Of Care Reviewed With patient   Alert and oriented. No distress noted. VSS. PRN meds given for pain. Up with assist and walker.daughter at bedside all shift. Ambulated hallway x5 during shift, tolerated well. Free of fall or injury. Side rails up x2. Call light in reach. Will continue to monitor.

## 2018-11-20 NOTE — PT/OT/SLP EVAL
Physical Therapy Evaluation    Patient Name:  Aleyda Costa   MRN:  [id number]    Recommendations:     Discharge Recommendations:  home health PT   Discharge Equipment Recommendations: none   Barriers to discharge: None    Assessment:     Aleyda Costa is a 38 y.o. female admitted with a medical diagnosis of Chest pain on breathing.  She presents with the following impairments/functional limitations:  weakness, impaired endurance, gait instability, impaired self care skills, impaired functional mobilty, decreased lower extremity function, decreased ROM, orthopedic precautions . Pt with +PE, negative LE's DVT, OK for PT. Pt presents with po pain and stiffness LK. Pt to benefit from continued therapies    Rehab Prognosis:  good; patient would benefit from acute skilled PT services to address these deficits and reach maximum level of function.      Recent Surgery: * No surgery found *      Plan:     During this hospitalization, patient to be seen daily to address the above listed problems via gait training, therapeutic activities, therapeutic exercises  · Plan of Care Expires:  11/30/18   Plan of Care Reviewed with: patient, daughter, father    Subjective     Communicated with nurse Richard prior to session.  Patient found standing with RW with daughter upon PT entry to room, agreeable to evaluation.    Nurse stated pt scheduled for test in pm   Pt stated just had something for pain  Daughter and dad assisting with care  Stated had HHPT 2x    Chief Complaint: pain with LK ROM  Patient comments/goals: get better and be able to go back home- pt has been staying at dad's house as she has 5 steps to enter  Pain/Comfort:  · Pain Rating 1: 10/10  · Location - Side 1: Left  · Location 1: knee  · Pain Addressed 1: Reposition, Pre-medicate for activity, Distraction    Patients cultural, spiritual, Nondenominational conflicts given the current situation:      Living Environment:  Home with spouse and children, has  steps  Prior to admission, patients level of function was ambulatory with RW.  Patient has the following equipment: walker, rolling.  DME owned (not currently used): .  Upon discharge, patient will have assistance from family.    Objective:     Patient found with: cryotherapy, peripheral IV     General Precautions: Standard, fall   Orthopedic Precautions:LLE weight bearing as tolerated   Braces: N/A     Exams:  · Postural Exam:  Patient presented with the following abnormalities:    · -       Rounded shoulders  · -       Forward head  · -       tall  · RLE ROM: WFL  · RLE Strength: WFL  · LLE ROM: Deficits: ~60 degrees flexion/painful  · LLE Strength: Deficits: 3-/5    Functional Mobility:  · Transfers:     · Sit to Stand:  contact guard assistance with rolling walker  · Gait: 250ft with RW CGA  · Stairs:  Pt ascended/descended 5 stair(s) with no AD with bilateral handrails with Contact Guard Assistance.     AM-PAC 6 CLICK MOBILITY  Total Score:20       Therapeutic Activities and Exercises:   OOB to chair post gait  Completed AP,QS,HS using towel on floor, abd/add,  Iceman filled with ice and applied on LK, ace used for reinforcement    Patient left up in chair with all lines intact, call button in reach and dad,daughter present.    GOALS:   Multidisciplinary Problems     Physical Therapy Goals        Problem: Physical Therapy Goal    Goal Priority Disciplines Outcome Goal Variances Interventions   Physical Therapy Goal     PT, PT/OT Ongoing (interventions implemented as appropriate)     Description:  Goals to be met by: 2018     Patient will increase functional independence with mobility by performin. Sit to stand transfer with Modified [place]  2. Bed to chair transfer with Modified [place] using Rolling Walker  3. Gait  x 250x2 feet with Modified [place] using Rolling Walker.   4. Lower extremity exercise program x20 reps per handout, with assistance as needed                       History:     Past Medical History:   Diagnosis Date    Arthritis     OA    Back pain     Cancer     skin cancer to back    Depression     Full dentures     Migraine     Seizures     Sleep apnea     Does not use c-pap since weight loss - 170 lbs    Wears glasses        Past Surgical History:   Procedure Laterality Date    APPENDECTOMY      ARTHROPLASTY, KNEE Left 2018    Performed by [facility]as [facility] at [facility] OR    ARTHROSCOPY-KNEE Left 2016    Performed by [facility] at [facility] OR    BLOCK-NERVE-MEDIAL BRANCH-LUMBAR Bilateral 2016    [facility]    CARPAL TUNNEL RELEASE Bilateral      SECTION      CHOLECYSTECTOMY      CHONDROPLASTY-KNEE-ARTHROSCOPY Left 2016    Performed [facility] at [facility] OR    FRACTURE SURGERY      ankle    gastric sleve      HYSTERECTOMY      HYSTERECTOMY      INJECTION-STEROID-EPIDURAL-LUMBAR N/A 2016    [facility]    INJECTION-STEROID-EPIDURAL-LUMBAR N/A 3/28/2016    [facility]    JOINT REPLACEMENT Left 2018    KNEE ARTHROPLASTY Left 2018    Procedure: ARTHROPLASTY, KNEE;  Surgeon: Feliberto Cardenas MD;  Location: [facility] OR;  Service: Orthopedics;  Laterality: Left;    KNEE SURGERY      LUMBAR EPIDURAL INJECTION      RADIAL NERVE Right     RADIOFREQUENCY THERMOCOAGULATION (RFTC)-NERVE-MEDIAN BRANCH-LUMBAR Bilateral 2017    [facility]    RADIOFREQUENCY THERMOCOAGULATION (RFTC)-NERVE-MEDIAN BRANCH-LUMBAR Bilateral 2016    [facility]    RECONSTRUCTION- LIGAMENT-MPFL RECONSTRUCTION WITH ALLOGRAFT Left 2017    Performed by [facility]as MD at [facility] OR    SINUS SURGERY         Clinical Decision Making:     History  Co-morbidities and personal factors that may impact the plan of care Examination  Body Structures and  Functions, activity limitations and participation restrictions that may impact the plan of care Clinical Presentation   Decision Making/ Complexity Score   Co-morbidities:   [] Time since onset of injury / illness / exacerbation  [] Status of current condition  []Patient's cognitive status and safety concerns    [] Multiple Medical Problems (see med hx)  Personal Factors:   [] Patient's age  [] Prior Level of function   [] Patient's home situation (environment and family support)  [] Patient's level of motivation  [] Expected progression of patient      HISTORY:(criteria)    [] 24744 - no personal factors/history    [] 00727 - has 1-2 personal factor/comorbidity     [] 63437 - has >3 personal factor/comorbidity     Body Regions:  [] Objective examination findings  [] Head     []  Neck  [] Trunk   [] Upper Extremity  [] Lower Extremity    Body Systems:  [] For communication ability, affect, cognition, language, and learning style: the assessment of the ability to make needs known, consciousness, orientation (person, place, and time), expected emotional /behavioral responses, and learning preferences (eg, learning barriers, education  needs)  [] For the neuromuscular system: a general assessment of gross coordinated movement (eg, balance, gait, locomotion, transfers, and transitions) and motor function  (motor control and motor learning)  [] For the musculoskeletal system: the assessment of gross symmetry, gross range of motion, gross strength, height, and weight  [] For the integumentary system: the assessment of pliability(texture), presence of scar formation, skin color, and skin integrity  [] For cardiovascular/pulmonary system: the assessment of heart rate, respiratory rate, blood pressure, and edema     Activity limitations:    [] Patient's cognitive status and saf ety concerns          [] Status of current condition      [] Weight bearing restriction  [] Cardiopulmunary Restriction    Participation  Restrictions:   [] Goals and goal agreement with the patient     [] Rehab potential (prognosis) and probable outcome      Examination of Body System: (criteria)    [] 88925 - addressing 1-2 elements    [] 24710 - addressing a total of 3 or more elements     [] 93762 -  Addressing a total of 4 or more elements         Clinical Presentation: (criteria)  Choose one     On examination of body system using standardized tests and measures patient presents with (CHOOSE ONE) elements from any of the following: body structures and functions, activity limitations, and/or participation restrictions.  Leading to a clinical presentation that is considered (CHOOSE ONE)                              Clinical Decision Making  (Eval Complexity):  Choose One     Time Tracking:     PT Received On: 11/20/18  PT Start Time: 1118     PT Stop Time: 1157  PT Total Time (min): 39 min     Billable Minutes: Evaluation 10, Gait Training 10 and Therapeutic Exercise 19      Brenda Chin, PT  11/20/2018

## 2018-11-20 NOTE — PROGRESS NOTES
[facility] Hospital Medicine  Progress Note    Patient Name: Aleyda Costa  MRN: [id number]  Patient Class: IP- Inpatient   Admission Date: 11/18/2018  Length of Stay: 1 days  Attending Physician: Rita Pak MD  Primary Care Provider: Darlene Hayden MD      Subjective:     Principal Problem:Chest pain on breathing    HPI:  This is a 37 yo female with PMHx significant for migraines, depression, and arthritis with recent L TKA (11/13).  She was admitted to the service of hospital medicine from [facility] for RIGHT chest wall pain that began this AM.  She reports pain under right breast with radiation to back; pain is 8/10 and worsened with inspiration. There are no alleviating factors including her home oral narcotics.  She denies any cough or injury to the area.  She states the pain does take her breath away.  She denies abdominal pain, but reports nausea and constipation with last BM on Tuesday. She has minimal leg swelling since surgery, but denies any numbness, tingling.  She is not on birth control, quit smoking last week, and has no history of clots.  She had elevated D-Dimer at outside facility that reportedly was > 4000 and was transferred here for VQ scan to rule out PE as she has anaphylaxis to iodine. Patient placed in the hospital for further evaluation and treatment.     Hospital Course:  The patient was monitored closely during her stay. She was placed on full dose lovenox. Pulmonology was consulted.  A Vq scan was done which showed an intermediate  probability for pulmonary embolus.  A bilateral lower extremity ultrasound showed no evidence of deep venous thrombosis in either lower extremity. Patient was scheduled for a CTA of chest. Patient with reported history of anaphylaxis to iodine and was premedicated with benadryl and prednisone prior to procedure.           Interval  History:  Patient for CTA of chest today and to be premedicated prior to  procedure with prednisone and benadryl.           Review of Systems   Constitutional: Positive for fatigue. Negative for activity change, appetite change, chills, diaphoresis and fever.   HENT: Negative for congestion, ear pain, facial swelling, hearing loss, postnasal drip, sore throat and trouble swallowing.    Eyes: Negative for pain and redness.   Respiratory: Positive for shortness of breath. Negative for cough and wheezing.         SOB with exertion   Cardiovascular: Positive for chest pain and leg swelling (minimal LEFT leg swelling). Negative for palpitations.        CP with inspiration   Gastrointestinal: Positive for constipation and nausea. Negative for abdominal distention, abdominal pain, blood in stool and vomiting.   Endocrine: Negative for polydipsia and polyphagia.   Genitourinary: Negative for decreased urine volume, difficulty urinating, dyspareunia, dysuria, flank pain and hematuria.   Musculoskeletal: Positive for arthralgias and gait problem. Negative for myalgias, neck pain and neck stiffness.   Skin: Positive for wound. Negative for color change.        Left  knee surgical dressing   Allergic/Immunologic: Negative for food allergies.   Neurological: Negative for dizziness, seizures, facial asymmetry, speech difficulty, weakness, light-headedness and headaches.   Hematological: Does not bruise/bleed easily.   Psychiatric/Behavioral: Negative for agitation, behavioral problems, confusion and suicidal ideas. The patient is not nervous/anxious.      Objective:     Vital Signs (Most Recent):  Temp: 97 °F (36.1 °C) (11/20/18 0900)  Pulse: 65 (11/20/18 0900)  Resp: 18 (11/20/18 0900)  BP: 106/60 (11/20/18 0900)  SpO2: (!) 94 % (11/20/18 0900) Vital Signs (24h Range):  Temp:  [96.9 °F (36.1 °C)-98.9 °F (37.2 °C)] 97 °F (36.1 °C)  Pulse:  [64-86] 65  Resp:  [18-20] 18  SpO2:  [92 %-95 %] 94 %  BP: (104-106)/(53-60) 106/60     Weight: 120.8 kg (266 lb 5.1 oz)  Body mass index is 36.12  kg/m².    Physical Exam   Constitutional: She is oriented to person, place, and time. She appears well-developed and well-nourished. No distress.   HENT:   Head: Normocephalic and atraumatic.   Eyes: Conjunctivae and EOM are normal. Pupils are equal, round, and reactive to light. Right eye exhibits no discharge. Left eye exhibits no discharge.   Neck: Normal range of motion. Neck supple. No JVD present.   Cardiovascular: Normal rate, regular rhythm and intact distal pulses.   Murmur heard.  Pulmonary/Chest: Effort normal. No respiratory distress. She exhibits tenderness (tenderness at RIGHT lateral ribs).   BBS diminished   Abdominal: Soft. Bowel sounds are normal. She exhibits no distension. There is no tenderness. There is no guarding.   Genitourinary:   Genitourinary Comments: Not examined   Musculoskeletal: Normal range of motion. She exhibits edema (minimal LEFT LE edema near knee).   Neurological: She is alert and oriented to person, place, and time. No cranial nerve deficit.   Skin: Skin is warm and dry. Capillary refill takes less than 2 seconds. She is not diaphoretic. No erythema.   Left knee dressing intact   Psychiatric: She has a normal mood and affect. Her behavior is normal. Judgment and thought content normal.         CRANIAL NERVES     CN III, IV, VI   Pupils are equal, round, and reactive to light.  Extraocular motions are normal.     Labs: Reviewed    Assessment/Plan:         * Acute Pulmonary Emboli/ Chest pain on breathing     Positive Vq scan-  Pulmonology consulted- CTA of chest ordered for today  Continue full dose lovenox for now              Mild episode of recurrent major depressive disorder    Chronic issue.  Stable.  Continue fluoxetine, elavil. Monitor clinically.        S/P total knee arthroplasty, left    Continue routine pain medication  Physical therapy consulted        DDD (degenerative disc disease), lumbar    Chronic issue. Stable.  Continue pain medication as per home  regimen.         VTE Risk Mitigation (From admission, onward)        Ordered     enoxaparin injection 120 mg  Every 12 hours (non-standard times)      11/18/18 2343     IP VTE HIGH RISK PATIENT  Once      11/18/18 2114          SCHUYLER Rivera  Department of Hospital Medicine   [facility]    Time spent seeing patient( greater than 1/2 spent in direct contact) :  32 minutes

## 2018-11-20 NOTE — PLAN OF CARE
Problem: Patient Care Overview  Goal: Plan of Care Review  Outcome: Ongoing (interventions implemented as appropriate)  02 saturation 94% on room air.

## 2018-11-20 NOTE — SUBJECTIVE & OBJECTIVE
Interval  History:  Patient for CTA of chest today and to be premedicated prior to procedure with prednisone and benadryl.           Review of Systems   Constitutional: Positive for fatigue. Negative for activity change, appetite change, chills, diaphoresis and fever.   HENT: Negative for congestion, ear pain, facial swelling, hearing loss, postnasal drip, sore throat and trouble swallowing.    Eyes: Negative for pain and redness.   Respiratory: Positive for shortness of breath. Negative for cough and wheezing.         SOB with exertion   Cardiovascular: Positive for chest pain and leg swelling (minimal LEFT leg swelling). Negative for palpitations.        CP with inspiration   Gastrointestinal: Positive for constipation and nausea. Negative for abdominal distention, abdominal pain, blood in stool and vomiting.   Endocrine: Negative for polydipsia and polyphagia.   Genitourinary: Negative for decreased urine volume, difficulty urinating, dyspareunia, dysuria, flank pain and hematuria.   Musculoskeletal: Positive for arthralgias and gait problem. Negative for myalgias, neck pain and neck stiffness.   Skin: Positive for wound. Negative for color change.        Left  knee surgical dressing   Allergic/Immunologic: Negative for food allergies.   Neurological: Negative for dizziness, seizures, facial asymmetry, speech difficulty, weakness, light-headedness and headaches.   Hematological: Does not bruise/bleed easily.   Psychiatric/Behavioral: Negative for agitation, behavioral problems, confusion and suicidal ideas. The patient is not nervous/anxious.      Objective:     Vital Signs (Most Recent):  Temp: 97 °F (36.1 °C) (11/20/18 0900)  Pulse: 65 (11/20/18 0900)  Resp: 18 (11/20/18 0900)  BP: 106/60 (11/20/18 0900)  SpO2: (!) 94 % (11/20/18 0900) Vital Signs (24h Range):  Temp:  [96.9 °F (36.1 °C)-98.9 °F (37.2 °C)] 97 °F (36.1 °C)  Pulse:  [64-86] 65  Resp:  [18-20] 18  SpO2:  [92 %-95 %] 94 %  BP: (104-106)/(53-60)  106/60     Weight: 120.8 kg (266 lb 5.1 oz)  Body mass index is 36.12 kg/m².    Physical Exam   Constitutional: She is oriented to person, place, and time. She appears well-developed and well-nourished. No distress.   HENT:   Head: Normocephalic and atraumatic.   Eyes: Conjunctivae and EOM are normal. Pupils are equal, round, and reactive to light. Right eye exhibits no discharge. Left eye exhibits no discharge.   Neck: Normal range of motion. Neck supple. No JVD present.   Cardiovascular: Normal rate, regular rhythm and intact distal pulses.   Murmur heard.  Pulmonary/Chest: Effort normal. No respiratory distress. She exhibits tenderness (tenderness at RIGHT lateral ribs).   BBS diminished   Abdominal: Soft. Bowel sounds are normal. She exhibits no distension. There is no tenderness. There is no guarding.   Genitourinary:   Genitourinary Comments: Not examined   Musculoskeletal: Normal range of motion. She exhibits edema (minimal LEFT LE edema near knee).   Neurological: She is alert and oriented to person, place, and time. No cranial nerve deficit.   Skin: Skin is warm and dry. Capillary refill takes less than 2 seconds. She is not diaphoretic. No erythema.   Left knee dressing intact   Psychiatric: She has a normal mood and affect. Her behavior is normal. Judgment and thought content normal.         CRANIAL NERVES     CN III, IV, VI   Pupils are equal, round, and reactive to light.  Extraocular motions are normal.     Labs: Reviewed

## 2018-11-20 NOTE — PLAN OF CARE
Problem: Physical Therapy Goal  Goal: Physical Therapy Goal  Goals to be met by: 2018     Patient will increase functional independence with mobility by performin. Sit to stand transfer with Modified [place]  2. Bed to chair transfer with Modified Mot[place] using Rolling Walker  3. Gait  x 250x2 feet with Modified Motley using Rolling Walker.   4. Lower extremity exercise program x20 reps per handout, with assistance as needed    Outcome: Ongoing (interventions implemented as appropriate)  PT eval and treat completed. Gait 250ft with own RW. OOB to chair, thera ex with AP,LAQ,HS, abd/add,LK flexion 60 degrees with pain. Iceman applied

## 2018-11-20 NOTE — PROGRESS NOTES
Progress Note  PULMONARY    Admit Date: 11/18/2018 11/20/2018      SUBJECTIVE:     Nov 20, pain now 4/10, pt wishes for cta.  Premedicated. I called [facility], unable to discuss with radiology as none available.  Discussed with ct tech- 50 mg prednisone q 12 x 3 given last pm and benadryl given- pt was said to be ok for cta but still not done?   Protocol on epic was just for single dose prednisone?      Pt breathing ok.  Ambulated today      PFSH and Allergies reviewed.    OBJECTIVE:     Vitals (Most recent):  Vitals:    11/20/18 1625   BP: 123/61   Pulse: 63   Resp: 18   Temp: 97.7 °F (36.5 °C)       Vitals (24 hour range):  Temp:  [96.9 °F (36.1 °C)-98.9 °F (37.2 °C)]   Pulse:  [63-76]   Resp:  [17-18]   BP: (104-123)/(55-64)   SpO2:  [92 %-95 %]       Intake/Output Summary (Last 24 hours) at 11/20/2018 1649  Last data filed at 11/20/2018 0613  Gross per 24 hour   Intake 1783.75 ml   Output --   Net 1783.75 ml          Physical Exam:  The patient's neuro status (alertness,orientation,cognitive function,motor skills,), pharyngeal exam (oral lesions, hygiene, abn dentition,), Neck (jvd,mass,thyroid,nodes in neck and above/below clavicle),RESPIRATORY(symmetry,effort,fremitus,percussion,auscultation),  Cor(rhythm,heart tones including gallops,perfusion,edema)ABD(distention,hepatic&splenomegaly,tenderness,masses), Skin(rash,cyanosis),Psyc(affect,judgement,).  Exam negative except for these pertinent findings:    Alert, comfortable, no distress. Clear lungs.    Radiographs reviewed: view by direct vision  No new  No results found for this or any previous visit.]    Labs     Recent Labs   Lab 11/20/18  0514   WBC 5.50   HGB 11.3*   HCT 32.3*        Recent Labs   Lab 11/20/18  0514      K 4.5      CO2 24   BUN 10   CREATININE 0.6   *   CALCIUM 9.5   MG 2.2   PHOS 3.2   AST 33   ALT 38   ALKPHOS 98   BILITOT 0.5   PROT 6.9   ALBUMIN 2.7*   No results for input(s): PH, PCO2, PO2, HCO3 in the last  24 hours.  Microbiology Results (last 7 days)     ** No results found for the last 168 hours. **          Impression:  Active Hospital Problems    Diagnosis  POA    *Chest pain on breathing [R07.1]  Yes    Constipation [K59.00]  Yes    Normocytic anemia [D64.9]  Yes    Hyperphosphatemia [E83.39]  Yes    Nicotine dependence [F17.200]  Yes    Murmur, heart [R01.1]  Yes    Abnormal CXR [R93.89]  Yes    Acute pulmonary embolism [I26.99]  Yes    Mild episode of recurrent major depressive disorder [F33.0]  Yes    S/P total knee arthroplasty, left [Z96.652]  Not Applicable    DDD (degenerative disc disease), lumbar [M51.36]  Yes      Resolved Hospital Problems   No resolved problems to display.               Plan:     Nov 20, v/q abn and picture c/w pe provoked by knee operation.  Pt elects to precede with confirmatory study (vs empiric anticoagg course).  If no pe, could go home with pain control.  ifpe could go home on anticoagg.                                       .

## 2018-11-20 NOTE — PLAN OF CARE
Problem: Patient Care Overview  Goal: Plan of Care Review  Outcome: Ongoing (interventions implemented as appropriate)   11/19/18 4297   Coping/Psychosocial   Plan Of Care Reviewed With patient;family   Pt medicated for pain as needed. NAD noted. POC reviewed w pt and they verbalized understanding. Tele- SR    Pt free from falls, Pt ambulates to the bathroom. Bed in low position, side rails up x 2. Call light in reach, family at bedside and wheels locked. Will continue to monitor.   Pt agreeing to premed at this time for possible CTA tomorrow

## 2018-11-20 NOTE — PLAN OF CARE
11/20/18 0936   Discharge Assessment   Assessment Type Discharge Planning Assessment   Confirmed/corrected address and phone number on facesheet? Yes   Assessment information obtained from? Patient   Expected Length of Stay (days) 2   Communicated expected length of stay with patient/caregiver yes   Prior to hospitilization cognitive status: Alert/Oriented   Prior to hospitalization functional status: Independent   Current cognitive status: Alert/Oriented   Current Functional Status: Independent   Lives With spouse;child(darrell), dependent   Able to Return to Prior Arrangements yes   Is patient able to care for self after discharge? Yes   Patient's perception of discharge disposition home health   Readmission Within The Last 30 Days current reason for admission unrelated to previous admission   Patient currently being followed by outpatient case management? No   Patient currently receives any other outside agency services? Yes   Name and contact number of agency or person providing outside services [facility]   Is it the patient/care giver preference to resume care with the current outside agency? Yes   Equipment Currently Used at Home shower chair;walker, rolling   Do you have any problems affording any of your prescribed medications? No   Is the patient taking medications as prescribed? yes   Does the patient have transportation home? Yes   Discharge Plan A Home Health   Discharge Plan B Home Health   Patient/Family In Agreement With Plan yes   Readmission Questionnaire   At the time of your discharge, did someone talk to you about what your health problems were? Yes   At the time of discharge, did someone talk to you about what to do if you experienced worsening of your health problem? Yes   At the time of discharge, did someone talk to you about which medication to take when you left the hospital and which ones to stop taking? Yes   At the time of discharge, did someone talk to you about when and where to  follow up with a doctor after you left the hospital? Yes   What do you believe caused you to be sick enough to be re-admitted? chest pain   Do you have problems taking your medications as prescribed? Yes   Do you have problems obtaining/receiving your medications? No   Living Arrangements house   Does the patient have family/friends to help with healtcare needs after discharge? yes   Does your caregiver provide all the help you need? Yes   Are you currently feeling confused? No   Are you currently having problems thinking? No   Are you currently having memory problems? No   Have you felt down, depressed, or hopeless? 0

## 2018-11-20 NOTE — NURSING
Received call from Roslyn in CT scan asking what medications had the pt already received for CT of chest d/t pt's iodine allergy. I let her know that pt's MAR shows that 50mg of prednisone was given last night at 6:30PM and pt received benadryl at 0600 today. She faxed over the protocol for pre-medicating patients with iodine allergies prior to CT's with contrast. Updated Dr. Kumari of protocol and new scheduled time for CT scan. Ordered to order the protocol of the pre-meds which are as follows: Prednisone 50mg Q 6hrs X 3doses PO and One dose of PO Benadryl 50mg when last dose of prednisone given.   First dose of prednisone given at 8AM. Second dose scheduled for 2pm. Third dose scheduled for 8pm along with the benadryl. CT of chest now scheduled for 9pm tonight.

## 2018-11-21 VITALS
HEART RATE: 63 BPM | RESPIRATION RATE: 18 BRPM | TEMPERATURE: 96 F | BODY MASS INDEX: 36.07 KG/M2 | DIASTOLIC BLOOD PRESSURE: 55 MMHG | WEIGHT: 266.31 LBS | SYSTOLIC BLOOD PRESSURE: 120 MMHG | HEIGHT: 72 IN | OXYGEN SATURATION: 98 %

## 2018-11-21 LAB
ALBUMIN SERPL BCP-MCNC: 2.8 G/DL
ALP SERPL-CCNC: 93 U/L
ALT SERPL W/O P-5'-P-CCNC: 32 U/L
ANION GAP SERPL CALC-SCNC: 10 MMOL/L
AST SERPL-CCNC: 21 U/L
BASOPHILS # BLD AUTO: 0 K/UL
BASOPHILS NFR BLD: 0.3 %
BILIRUB SERPL-MCNC: 0.3 MG/DL
BUN SERPL-MCNC: 12 MG/DL
CALCIUM SERPL-MCNC: 9.4 MG/DL
CHLORIDE SERPL-SCNC: 107 MMOL/L
CHOLEST SERPL-MCNC: 196 MG/DL
CHOLEST/HDLC SERPL: 5 {RATIO}
CO2 SERPL-SCNC: 24 MMOL/L
CREAT SERPL-MCNC: 0.7 MG/DL
DIFFERENTIAL METHOD: ABNORMAL
EOSINOPHIL # BLD AUTO: 0 K/UL
EOSINOPHIL NFR BLD: 0 %
ERYTHROCYTE [DISTWIDTH] IN BLOOD BY AUTOMATED COUNT: 13.6 %
EST. GFR  (AFRICAN AMERICAN): >60 ML/MIN/1.73 M^2
EST. GFR  (NON AFRICAN AMERICAN): >60 ML/MIN/1.73 M^2
GLUCOSE SERPL-MCNC: 145 MG/DL
HCT VFR BLD AUTO: 32.3 %
HDLC SERPL-MCNC: 39 MG/DL
HDLC SERPL: 19.9 %
HGB BLD-MCNC: 11 G/DL
LDLC SERPL CALC-MCNC: 128.6 MG/DL
LYMPHOCYTES # BLD AUTO: 1.3 K/UL
LYMPHOCYTES NFR BLD: 12 %
MAGNESIUM SERPL-MCNC: 2.3 MG/DL
MCH RBC QN AUTO: 31.4 PG
MCHC RBC AUTO-ENTMCNC: 34 G/DL
MCV RBC AUTO: 92 FL
MONOCYTES # BLD AUTO: 0.5 K/UL
MONOCYTES NFR BLD: 4.3 %
NEUTROPHILS # BLD AUTO: 8.9 K/UL
NEUTROPHILS NFR BLD: 83.4 %
NONHDLC SERPL-MCNC: 157 MG/DL
PHOSPHATE SERPL-MCNC: 3.7 MG/DL
PLATELET # BLD AUTO: 290 K/UL
PMV BLD AUTO: 8 FL
POTASSIUM SERPL-SCNC: 4 MMOL/L
PROT SERPL-MCNC: 6.9 G/DL
RBC # BLD AUTO: 3.5 M/UL
SODIUM SERPL-SCNC: 141 MMOL/L
TRIGL SERPL-MCNC: 142 MG/DL
WBC # BLD AUTO: 10.7 K/UL

## 2018-11-21 PROCEDURE — 99232 SBSQ HOSP IP/OBS MODERATE 35: CPT | Mod: ,,, | Performed by: INTERNAL MEDICINE

## 2018-11-21 PROCEDURE — 36415 COLL VENOUS BLD VENIPUNCTURE: CPT

## 2018-11-21 PROCEDURE — 85025 COMPLETE CBC W/AUTO DIFF WBC: CPT

## 2018-11-21 PROCEDURE — 25000003 PHARM REV CODE 250: Performed by: NURSE PRACTITIONER

## 2018-11-21 PROCEDURE — 83735 ASSAY OF MAGNESIUM: CPT

## 2018-11-21 PROCEDURE — 80061 LIPID PANEL: CPT

## 2018-11-21 PROCEDURE — 63600175 PHARM REV CODE 636 W HCPCS: Performed by: NURSE PRACTITIONER

## 2018-11-21 PROCEDURE — 94760 N-INVAS EAR/PLS OXIMETRY 1: CPT

## 2018-11-21 PROCEDURE — 84100 ASSAY OF PHOSPHORUS: CPT

## 2018-11-21 PROCEDURE — 80053 COMPREHEN METABOLIC PANEL: CPT

## 2018-11-21 RX ORDER — PRAVASTATIN SODIUM 20 MG/1
20 TABLET ORAL DAILY
Qty: 90 TABLET | Refills: 3 | Status: SHIPPED | OUTPATIENT
Start: 2018-11-21 | End: 2019-01-25

## 2018-11-21 RX ORDER — PRAVASTATIN SODIUM 10 MG/1
20 TABLET ORAL DAILY
Qty: 180 TABLET | Refills: 3 | Status: SHIPPED | OUTPATIENT
Start: 2018-11-21 | End: 2019-01-25

## 2018-11-21 RX ORDER — PRAVASTATIN SODIUM 10 MG/1
20 TABLET ORAL DAILY
Status: DISCONTINUED | OUTPATIENT
Start: 2018-11-21 | End: 2018-11-21 | Stop reason: HOSPADM

## 2018-11-21 RX ADMIN — POLYETHYLENE GLYCOL 3350 17 G: 17 POWDER, FOR SOLUTION ORAL at 09:11

## 2018-11-21 RX ADMIN — KETOROLAC TROMETHAMINE 15 MG: 30 INJECTION, SOLUTION INTRAMUSCULAR at 09:11

## 2018-11-21 RX ADMIN — MUPIROCIN: 20 OINTMENT TOPICAL at 09:11

## 2018-11-21 RX ADMIN — SENNOSIDES AND DOCUSATE SODIUM 1 TABLET: 8.6; 5 TABLET ORAL at 09:11

## 2018-11-21 RX ADMIN — OXYCODONE AND ACETAMINOPHEN 1 TABLET: 5; 325 TABLET ORAL at 04:11

## 2018-11-21 RX ADMIN — APIXABAN 10 MG: 2.5 TABLET, FILM COATED ORAL at 09:11

## 2018-11-21 RX ADMIN — CYCLOBENZAPRINE HYDROCHLORIDE 10 MG: 5 TABLET, FILM COATED ORAL at 07:11

## 2018-11-21 RX ADMIN — CELECOXIB 200 MG: 100 CAPSULE ORAL at 09:11

## 2018-11-21 RX ADMIN — OXYCODONE AND ACETAMINOPHEN 1 TABLET: 5; 325 TABLET ORAL at 10:11

## 2018-11-21 RX ADMIN — THERA TABS 1 TABLET: TAB at 09:11

## 2018-11-21 RX ADMIN — HYDROXYZINE PAMOATE 25 MG: 25 CAPSULE ORAL at 09:11

## 2018-11-21 NOTE — PROGRESS NOTES
11/20/18 2143   Patient Assessment/Suction   Level of Consciousness (AVPU) alert   PRE-TX-O2-ETCO2   O2 Device (Oxygen Therapy) room air   SpO2 95 %   Pulse Oximetry Type Intermittent   Pulse 69   Resp 16

## 2018-11-21 NOTE — PLAN OF CARE
Problem: Patient Care Overview  Goal: Plan of Care Review  Outcome: Ongoing (interventions implemented as appropriate)  Pt. Rested well during the night. CT completed and results told to pt. Regular diet. Tele showing normal sinus. Ambulate with assist due to post knee surgery. Daughter remained at bedside. NaCl 0.9% infusing at 75mL/hr. Labs monitored. PRN pain medication given. Will continue to monitor.

## 2018-11-21 NOTE — PLAN OF CARE
1430  Met with patient at bedside regarding prescribed Eliquis; patient states that her pharmacy said the Eliquis requires a PA.    1438  Contacted [facility]; the pharmacist said that the 30 day supply is approved with a copay of $300, however the 7 days supply is denied and will cost $225.    1441  Contacted [facility] and spoke to Martha for 39 minutes; ultimately Martha stated they are unable to approve the 7 day supply because it exceeds the quantity limit of 2.5 tabs per day, and we are requesting 4 tabs per day x 7 days.    1528  Contacted Hailey JIMENEZ and updated her.  Hailey JIMENEZ stated to cancel the Eliquis, and she will order Xarelto.    1529  Contacted [facility] and updated the pharmacist.    1530  Contacted patient and updated her on the change of medication.       11/21/18 1531   Discharge Reassessment   Assessment Type Discharge Planning Reassessment

## 2018-11-21 NOTE — PROGRESS NOTES
Progress Note  PULMONARY    Admit Date: 11/18/2018 11/21/2018      SUBJECTIVE:     Nov 20, pain now 4/10, pt wishes for cta.  Premedicated. I called [facility], unable to discuss with radiology as none available.  Discussed with ct tech- 50 mg prednisone q 12 x 3 given last pm and benadryl given- pt was said to be ok for cta but still not done?   Protocol on epic was just for single dose prednisone?      Pt breathing ok.  Ambulated today  Nov 21, better.      PFSH and Allergies reviewed.    OBJECTIVE:     Vitals (Most recent):  Vitals:    11/21/18 0735   BP: (!) 120/55   Pulse: 63   Resp: 18   Temp: 96.3 °F (35.7 °C)       Vitals (24 hour range):  Temp:  [96.3 °F (35.7 °C)-97.9 °F (36.6 °C)]   Pulse:  [49-69]   Resp:  [16-18]   BP: (110-123)/(55-66)   SpO2:  [94 %-98 %]       Intake/Output Summary (Last 24 hours) at 11/21/2018 1302  Last data filed at 11/21/2018 0600  Gross per 24 hour   Intake 1140 ml   Output --   Net 1140 ml          Physical Exam:  The patient's neuro status (alertness,orientation,cognitive function,motor skills,), pharyngeal exam (oral lesions, hygiene, abn dentition,), Neck (jvd,mass,thyroid,nodes in neck and above/below clavicle),RESPIRATORY(symmetry,effort,fremitus,percussion,auscultation),  Cor(rhythm,heart tones including gallops,perfusion,edema)ABD(distention,hepatic&splenomegaly,tenderness,masses), Skin(rash,cyanosis),Psyc(affect,judgement,).  Exam negative except for these pertinent findings:    Alert, comfortable, no distress. Clear lungs.    Radiographs reviewed: view by direct vision   cta viewed with rll pe.  No results found for this or any previous visit.]    Labs     Recent Labs   Lab 11/21/18 0457   WBC 10.70   HGB 11.0*   HCT 32.3*        Recent Labs   Lab 11/21/18 0457      K 4.0      CO2 24   BUN 12   CREATININE 0.7   *   CALCIUM 9.4   MG 2.3   PHOS 3.7   AST 21   ALT 32   ALKPHOS 93   BILITOT 0.3   PROT 6.9   ALBUMIN 2.8*   No results for  input(s): PH, PCO2, PO2, HCO3 in the last 24 hours.  Microbiology Results (last 7 days)     ** No results found for the last 168 hours. **          Impression:  Active Hospital Problems    Diagnosis  POA    *Chest pain on breathing [R07.1]  Yes    Constipation [K59.00]  Yes    Normocytic anemia [D64.9]  Yes    Hyperphosphatemia [E83.39]  Yes    Nicotine dependence [F17.200]  Yes    Murmur, heart [R01.1]  Yes    Abnormal CXR [R93.89]  Yes    Acute pulmonary embolism [I26.99]  Yes    Mild episode of recurrent major depressive disorder [F33.0]  Yes    S/P total knee arthroplasty, left [Z96.652]  Not Applicable    DDD (degenerative disc disease), lumbar [M51.36]  Yes      Resolved Hospital Problems   No resolved problems to display.               Plan:     Nov 20, v/q abn and picture c/w pe provoked by knee operation.  Pt elects to precede with confirmatory study (vs empiric anticoagg course).  If no pe, could go home with pain control.  ifpe could go home on anticoagg.       Nov 21, impressive pe right lower lung, discussed and reviewed radiographs with pt.  Pt needs 3- 6 months anticoagg.  Suggest office f/u in next month or 2.  Still with pain but less.  Noted min flushing with cta.                                .

## 2018-11-21 NOTE — NURSING
Discharge instructions and education reviewed with pt. Pt to be discharged on Eliquis; awaiting final approval from insurance for prescription.  Telemetry box and leads removed.  Peripheral IV removed with catheter intact.  Pt to be escorted to family vehicle via wheelchair.

## 2018-11-21 NOTE — PLAN OF CARE
11/21/18 1535   Final Note   Assessment Type Final Discharge Note   Anticipated Discharge Disposition Home

## 2018-11-22 NOTE — DISCHARGE SUMMARY
[facility] Hospital Medicine  Discharge Summary      Patient Name: Aleyda Costa  MRN: [id number]  Admission Date: 11/18/2018  Hospital Length of Stay: 2 days  Discharge Date and Time: 11/21/2018  3:42 PM  Attending Physician: No att. providers found   Discharging Provider: Hailey Narayan NP  Primary Care Provider: Darlene Hayden MD      HPI:   This is a 37 yo female with PMHx significant for migraines, depression, and arthritis with recent L TKA (11/13).  She was admitted to the service of hospital medicine from [facility] for RIGHT chest wall pain that began this AM.  She reports pain under right breast with radiation to back; pain is 8/10 and worsened with inspiration. There are no alleviating factors including her home oral narcotics.  She denies any cough or injury to the area.  She states the pain does take her breath away.  She denies abdominal pain, but reports nausea and constipation with last BM on Tuesday. She has minimal leg swelling since surgery, but denies any numbness, tingling.  She is not on birth control, quit smoking last week, and has no history of clots.  She had elevated D-Dimer at outside facility that reportedly was > 4000 and was transferred here for VQ scan to rule out PE as she has anaphylaxis to iodine. Patient placed in the hospital for further evaluation and treatment.     * No surgery found *      Hospital Course:   The patient was monitored closely during her stay. She was placed on full dose lovenox. Pulmonology was consulted.  A Vq scan was done which showed an intermediate  probability for pulmonary embolus.  A bilateral lower extremity ultrasound showed no evidence of deep venous thrombosis in either lower extremity. Patient was scheduled for a CTA of chest. Patient with reported history of anaphylaxis to iodine and was premedicated with benadryl and prednisone prior to procedure.  CTA demonstrated Pulmonary emboli. See results  below. She was initiated on oral anticoagulation. Case management consulted to ensure affordability. Discussed with Dr. Kumari. Patient stable for DC    PE; chest CTA heart RRR musc: left knee dressing intact.             Consults:   Consults (From admission, onward)        Status Ordering Provider     Inpatient consult to Pulmonology  Once     Provider:  Khanh Kumari MD    Completed FRANKI CRONIN     IP consult to case management  Once     Provider:  (Not yet assigned)    ANNABELLA Morrissey          No new Assessment & Plan notes have been filed under this hospital service since the last note was generated.  Service: Hospital Medicine    Final Active Diagnoses:    Diagnosis Date Noted POA    PRINCIPAL PROBLEM:  Chest pain on breathing [R07.1] 11/18/2018 Yes    Constipation [K59.00] 11/20/2018 Yes    Normocytic anemia [D64.9] 11/19/2018 Yes    Hyperphosphatemia [E83.39] 11/19/2018 Yes    Nicotine dependence [F17.200] 11/19/2018 Yes    Murmur, heart [R01.1] 11/19/2018 Yes    Abnormal CXR [R93.89] 11/19/2018 Yes    Acute pulmonary embolism [I26.99] 11/18/2018 Yes    Mild episode of recurrent major depressive disorder [F33.0] 11/13/2018 Yes    S/P total knee arthroplasty, left [Z96.652] 11/13/2018 Not Applicable    DDD (degenerative disc disease), lumbar [M51.36] 03/28/2016 Yes      Problems Resolved During this Admission:       Discharged Condition: stable    Disposition: Home or Self Care    Follow Up:  Follow-up Information     Darlene Hayden MD In 1 week.    Specialty:  Family Medicine  Contact information:  [address] [facility] [address]  [phone]             Khanh Kumari MD.    Specialty:  Pulmonary Disease  Contact information:  [address]  [address]  [place] [address]  [phone]             Norberto Smith MD In 1 week.    Specialty:  Cardiology  Contact information:  [address]  [place] [address]  [phone]                  Patient Instructions:      Diet Adult Regular     Notify your health care provider if you experience any of the following:  worsening rash     Notify your health care provider if you experience any of the following:  severe persistent headache     Notify your health care provider if you experience any of the following:  difficulty breathing or increased cough     Notify your health care provider if you experience any of the following:  redness, tenderness, or signs of infection (pain, swelling, redness, odor or green/yellow discharge around incision site)     Notify your health care provider if you experience any of the following:  severe uncontrolled pain     Notify your health care provider if you experience any of the following:  persistent nausea and vomiting or diarrhea     Activity as tolerated       Significant Diagnostic Studies: Labs:   BMP:   Recent Labs   Lab 11/21/18 0457   *      K 4.0      CO2 24   BUN 12   CREATININE 0.7   CALCIUM 9.4   MG 2.3    and CMP   Recent Labs   Lab 11/21/18 0457      K 4.0      CO2 24   *   BUN 12   CREATININE 0.7   CALCIUM 9.4   PROT 6.9   ALBUMIN 2.8*   BILITOT 0.3   ALKPHOS 93   AST 21   ALT 32   ANIONGAP 10   ESTGFRAFRICA >60   EGFRNONAA >60     Radiology:   CTA Chest Non Coronary [[id number]] Resulted: 11/21/18 1025   Order Status: Completed Updated: 11/21/18 1027   Narrative:     EXAMINATION:  CTA CHEST NON CORONARY    CLINICAL HISTORY:  pulmonary embolism;    TECHNIQUE:  Low dose axial images, sagittal and coronal reformations were obtained from the thoracic inlet to the lung bases following the IV administration of 100 mL of Omnipaque 350.  Contrast timing was optimized to evaluate the pulmonary arteries.  MIP images were performed.    COMPARISON:  None    FINDINGS:  The visualized portion of the thyroid gland is unremarkable in appearance.  There are small shotty appearing mediastinal nodes.  There is no pleural or  pericardial effusion.  There are postoperative changes in the visualized upper abdomen a prior cholecystectomy and gastric reduction surgery and with small hiatal hernia versus dilatation of the distal esophagus.  Significant hilar adenopathy is not seen    There are pulmonary artery emboli.  There are intraluminal filling defects within segmental and subsegmental branches of the right lower lobe nearly occlusive.  There are filling defects in subsegmental branches of the left lower lobe at least 1 of which appearing nearly occlusive.  There is peripheral wedge-shaped opacity posteriorly right lower lobe suggesting pulmonary infarct.  There also dependent hypoventilatory changes and atelectatic stranding.   Impression:       Pulmonary artery emboli    Right posterior basilar opacification favoring pulmonary infarct.    Additional findings as detailed above    Final read    Virtual Radiology concordant      Electronically signed by: Lily Mcgowan MD  Date: 11/21/2018  Time: 10:25   RADIOLOGY REPORT [[id number]] Resulted: 11/18/18 0000   Order Status: Completed Updated: 11/20/18 1507   X-Ray Chest PA And Lateral [[id number]] Resulted: 11/19/18 1739   Order Status: Completed Updated: 11/19/18 1741   Narrative:     EXAMINATION:  XR CHEST PA AND LATERAL    CLINICAL HISTORY:  CP, elevated ddimer;    TECHNIQUE:  PA and lateral views of the chest were performed.    COMPARISON:  Same-day V/Q scan and prior chest radiograph 11/01/2018    FINDINGS:  Airspace opacity in the right lower lobe.  Normal heart size.  No pleural effusion or pneumothorax.  Normal pulmonary vascular distribution.  No acute osseous abnormality.   Impression:       Right basilar airspace opacity could reflect pneumonia, aspiration, infarct, atelectasis.  Correlating with recent V/Q scan this would represent a triple match defect of moderate size correlating to an intermediate probability.      Electronically signed by: Dominik Sexton  Date:  11/19/2018  Time: 17:39   NM Lung Ventilation Perfusion Imaging [[id number]] Resulted: 11/19/18 1528   Order Status: Completed Updated: 11/19/18 1531   Addenda:         There is a moderate-sized segmental perfusion defect at the medial right   lung base which is not wedge-shaped.  No other perfusion defects are seen.    Intermediate probability for pulmonary embolus.  Recommend correlation   with concurrent radiograph as the most recent comparison radiograph was   performed 11/01/2018.   Findings discussed with TONY Rowland, at 15:30 hrs.     Electronically signed by: Dominik Sexton Date: 11/19/2018 Time: 15:28  Signed: 11/19/18 1531 by Dominik Sexton MD   Narrative:     EXAMINATION:  NM LUNG VENTILATION AND PERFUSION IMAGING    CLINICAL HISTORY:  Dyspnea, cardiac origin suspected;    TECHNIQUE:  10 mCi of Xe133 were placed in the nebulizer. Following the inhalation Tc-99m-DTPA in aerosol and the subsequent IV administration of 5.3 mCi of Tc-99m-MAA, multiple images of the thorax were obtained in various projections.    COMPARISON:  11/01/2018    FINDINGS:  There is symmetric uptake in the lungs on ventilation phase with no central trapping.  No perfusion defects are noted.   Impression:       Low probability for pulmonary embolus.      Electronically signed by: Dominik Sexton  Date: 11/19/2018  Time: 11:28   US Lower Extremity Veins Bilateral [[id number]] Resulted: 11/19/18 0934   Order Status: Completed Updated: 11/19/18 0936   Narrative:     EXAMINATION:  US LOWER EXTREMITY VEINS BILATERAL    CLINICAL HISTORY:  eval for DVT;    TECHNIQUE:  Duplex and color flow Doppler evaluation of the bilateral lower extremity veins was performed.    COMPARISON:  None    FINDINGS:  Thigh veins: Both common femoral, femoral, popliteal, proximal medial saphenous, are patent and free of thrombus. The veins are normally compressible and have normal phasic flow and augmentation response.    Calf veins: The paired peroneal and  posterior tibial and anterior tibial calf veins are patent bilaterally.   Impression:       No evidence of deep venous thrombosis in either lower extremity.      Electronically signed by: Lily Mcgowan MD  Date: 11/19/2018  Time: 09:34         Pending Diagnostic Studies:     None         Medications:  Reconciled Home Medications:      Medication List      START taking these medications    * pravastatin 10 MG tablet  Commonly known as:  PRAVACHOL  Take 2 tablets (20 mg total) by mouth once daily.     * pravastatin 20 MG tablet  Commonly known as:  PRAVACHOL  Take 1 tablet (20 mg total) by mouth once daily.     * rivaroxaban 15 mg Tab  Commonly known as:  XARELTO  Take 1 tablet (15 mg total) by mouth 2 (two) times daily with meals. for 21 days     * rivaroxaban 10 mg Tab  Commonly known as:  XARELTO  Take 2 tablets (20 mg total) by mouth daily with dinner or evening meal.  Start taking on:  12/5/2018         * This list has 4 medication(s) that are the same as other medications prescribed for you. Read the directions carefully, and ask your doctor or other care provider to review them with you.            CHANGE how you take these medications    ketorolac 60 mg/2 mL Soln  Commonly known as:  TORADOL  Please provide patients with IV syringes  What changed:    · when to take this  · reasons to take this  · additional instructions     oxyCODONE 15 mg Tr12 12 hr tablet  Commonly known as:  OXYCONTIN  Take 1 tablet (15 mg total) by mouth every 12 (twelve) hours.  What changed:    · how much to take  · when to take this     * oxyCODONE-acetaminophen 5-325 mg per tablet  Commonly known as:  PERCOCET  Take 1 tablet by mouth every 6 (six) hours as needed.  What changed:  when to take this     * oxyCODONE-acetaminophen 5-325 mg per tablet  Commonly known as:  PERCOCET  Take 1 tablet by mouth every 4 (four) hours as needed for Pain.  What changed:  Another medication with the same name was changed. Make sure you understand how  and when to take each.         * This list has 2 medication(s) that are the same as other medications prescribed for you. Read the directions carefully, and ask your doctor or other care provider to review them with you.            CONTINUE taking these medications    amitriptyline 25 MG tablet  Commonly known as:  ELAVIL  TAKE ONE TABLET BY MOUTH EVERY NIGHT FOR 2 WEEKS THEN INCREASE TO 2 TABLETS AT BEDTIME     celecoxib 200 MG capsule  Commonly known as:  CeleBREX  TAKE ONE CAPSULE BY MOUTH EVERY DAY     cetirizine 5 MG tablet  Commonly known as:  ZYRTEC  Take 5 mg by mouth as needed.     cyanocobalamin (vitamin B-12) 1,000 mcg/mL Kit  Inject 1 mL into the muscle every 21 days.     cyclobenzaprine 10 MG tablet  Commonly known as:  FLEXERIL  TAKE ONE TABLET BY MOUTH 3 TIMES A DAY AS NEEDED FOR MUSCLE SPASMS     EPINEPHrine 0.3 mg/0.3 mL Atin  Commonly known as:  EPIPEN  0.3 mg daily as needed.     FLUoxetine 20 MG capsule  Commonly known as:  PROZAC  every evening.     hydrOXYzine pamoate 25 MG Cap  Commonly known as:  VISTARIL  TAKE ONE CAPSULE BY MOUTH 3 TIMES A DAY AS NEEDED FOR ANXIETY     ibuprofen 800 MG tablet  Commonly known as:  ADVIL,MOTRIN  every 6 (six) hours as needed.     IRON ORAL  Take by mouth once daily.     isometheptene-apap-dichloralphenazone [id number] -325 mg per capsule  Commonly known as:  MIDRIN  Take 1 capsule by mouth 4 (four) times daily as needed.     LORazepam 0.5 MG tablet  Commonly known as:  ATIVAN  Take 0.5 mg by mouth every 4 (four) hours as needed.     potassium chloride SA 20 MEQ tablet  Commonly known as:  K-DUR,KLOR-CON  Take 20 mEq by mouth as needed.     promethazine 25 MG tablet  Commonly known as:  PHENERGAN  Take 1 tablet by mouth every 6 (six) hours as needed (migraine). -MAY CAUSE DROWSINESS-     pseudoephedrine-DM-guaifenesin 45- mg Tab  Take 1 tablet by mouth daily as needed.     SUMAtriptan succinate 4 mg/0.5 mL Pnij  Commonly known as:   IMITREX  Inject 4 mg into the skin daily as needed. imitrex     VOLTAREN 1 % Gel  Generic drug:  diclofenac sodium  daily as needed.        STOP taking these medications    mupirocin calcium 2% nasl oint 2 % Oint  Commonly known as:  BACTROBAN            Indwelling Lines/Drains at time of discharge:   Lines/Drains/Airways     Epidural Line                 Perineural Analgesia/Anesthesia Assessment (using dermatomes) Epidural 09/29/17 0830 419 days                Time spent on the discharge of patient: 45 minutes  Patient was seen and examined on the date of discharge and determined to be suitable for discharge.         Hailey Narayan NP  Department of Hospital Medicine  [facility]

## 2018-11-23 ENCOUNTER — DOCUMENTATION ONLY (OUTPATIENT)
Dept: REHABILITATION | Facility: HOSPITAL | Age: 38
End: 2018-11-23

## 2018-11-23 DIAGNOSIS — M25.562 LEFT KNEE PAIN, UNSPECIFIED CHRONICITY: Primary | ICD-10-CM

## 2018-11-23 NOTE — PROGRESS NOTES
REHAB SERVICES OUTPATIENT DISCHARGE SUMMARY  Physical Therapy      Name:  Aleyda Costa  Date:  11/23/2018  (late entry for discharge 3/1/2017  Date of Evaluation:  2/15/2017  Physician:  Dr. LILY Cardenas  Total # Of Visits:  2  Cancelled:  See EMR  No Shows:  See EMR  Diagnosis:  L knee dysfunction    Physical/Functional Status:  At time of discharge, patient was able to N/A as patient failed to complete established PT POC.      The patient is to be discharged from our Therapy service for the following reason(s):  Patient has not attended therapy since 8/21/2017    Degree of Goal Achievement:  Patient has not met goals secondary to:  Failed to complete established PT POC    Patient Education:  Initiated exercise instruction    Discharge Plan:  Other:  Pt to follow up with MD as needed.

## 2018-11-26 ENCOUNTER — HOSPITAL ENCOUNTER (OUTPATIENT)
Dept: RADIOLOGY | Facility: HOSPITAL | Age: 38
Discharge: HOME | End: 2018-11-26
Attending: ORTHOPAEDIC SURGERY
Payer: COMMERCIAL

## 2018-11-26 ENCOUNTER — PATIENT MESSAGE (OUTPATIENT)
Dept: ORTHOPEDICS | Facility: CLINIC | Age: 38
End: 2018-11-26

## 2018-11-26 ENCOUNTER — OFFICE VISIT (OUTPATIENT)
Dept: ORTHOPEDICS | Facility: CLINIC | Age: 38
End: 2018-11-26
Payer: COMMERCIAL

## 2018-11-26 VITALS
HEIGHT: 72 IN | BODY MASS INDEX: 36.03 KG/M2 | DIASTOLIC BLOOD PRESSURE: 74 MMHG | SYSTOLIC BLOOD PRESSURE: 116 MMHG | HEART RATE: 105 BPM | WEIGHT: 266 LBS

## 2018-11-26 DIAGNOSIS — M25.562 LEFT KNEE PAIN, UNSPECIFIED CHRONICITY: ICD-10-CM

## 2018-11-26 DIAGNOSIS — Z96.652 S/P TOTAL KNEE ARTHROPLASTY, LEFT: Primary | ICD-10-CM

## 2018-11-26 PROCEDURE — 99999 PR PBB SHADOW E&M-EST. PATIENT-LVL III: CPT | Mod: PBBFAC,,, | Performed by: ORTHOPAEDIC SURGERY

## 2018-11-26 PROCEDURE — 99024 POSTOP FOLLOW-UP VISIT: CPT | Mod: S$GLB,,, | Performed by: ORTHOPAEDIC SURGERY

## 2018-11-26 PROCEDURE — 73560 X-RAY EXAM OF KNEE 1 OR 2: CPT | Mod: 26,LT,, | Performed by: RADIOLOGY

## 2018-11-26 PROCEDURE — 73560 X-RAY EXAM OF KNEE 1 OR 2: CPT | Mod: TC,PN,LT

## 2018-11-26 NOTE — PROGRESS NOTES
Past Medical History:   Diagnosis Date    Arthritis     OA    Back pain     Cancer     skin cancer to back    Depression     Full dentures     Migraine     Seizures     Sleep apnea     Does not use c-pap since weight loss - 170 lbs    Wears glasses        Past Surgical History:   Procedure Laterality Date    APPENDECTOMY      ARTHROPLASTY, KNEE Left 2018    Performed [facility] at [facility] OR    ARTHROSCOPY-KNEE Left 2016    [facility]    BLOCK-NERVE-MEDIAL BRANCH-LUMBAR Bilateral 2016    Performed by [facility]iel [facility] at [facility]    CARPAL TUNNEL RELEASE Bilateral      SECTION      CHOLECYSTECTOMY      CHONDROPLASTY-KNEE-ARTHROSCOPY Left 2016    [facility]    FRACTURE SURGERY      ankle    gastric sleve      HYSTERECTOMY      HYSTERECTOMY      INJECTION-STEROID-EPIDURAL-LUMBAR N/A 2016    Per[facility] at [facility]    INJECTION-STEROID-EPIDURAL-LUMBAR N/A 3/28/2016    [facility] at [facility]    JOINT REPLACEMENT Left 2018    KNEE ARTHROPLASTY Left 2018    Procedure: ARTHROPLASTY, KNEE;  Surgeon: Feliberto Cardenas MD;  Location: [facility] OR;  Service: Orthopedics;  Laterality: Left;    KNEE SURGERY      LUMBAR EPIDURAL INJECTION      RADIAL NERVE Right     RADIOFREQUENCY THERMOCOAGULATION (RFTC)-NERVE-MEDIAN BRANCH-LUMBAR Bilateral 2017    Performed [facility] at [facility]    RADIOFREQUENCY THERMOCOAGULATION (RFTC)-NERVE-MEDIAN BRANCH-LUMBAR Bilateral 2016    Performed [facility] at [facility]    RECONSTRUCTION- LIGAMENT-MPFL RECONSTRUCTION WITH ALLOGRAFT Left 2017    [facility] at [facility]    SINUS SURGERY         Current Outpatient Medications   Medication Sig    amitriptyline (ELAVIL) 25 MG tablet TAKE ONE TABLET BY MOUTH EVERY NIGHT FOR 2 WEEKS THEN INCREASE TO 2  TABLETS AT BEDTIME    celecoxib (CELEBREX) 200 MG capsule TAKE ONE CAPSULE BY MOUTH EVERY DAY    cetirizine (ZYRTEC) 5 MG tablet Take 5 mg by mouth as needed.     cyanocobalamin, vitamin B-12, 1,000 mcg/mL Kit Inject 1 mL into the muscle every 21 days.    cyclobenzaprine (FLEXERIL) 10 MG tablet TAKE ONE TABLET BY MOUTH 3 TIMES A DAY AS NEEDED FOR MUSCLE SPASMS    epinephrine (EPIPEN) 0.3 mg/0.3 mL (1:1,000) AtIn 0.3 mg daily as needed.     FERROUS SULFATE (IRON ORAL) Take by mouth once daily.     fluoxetine (PROZAC) 20 MG capsule every evening.     hydrOXYzine pamoate (VISTARIL) 25 MG Cap TAKE ONE CAPSULE BY MOUTH 3 TIMES A DAY AS NEEDED FOR ANXIETY    ibuprofen (ADVIL,MOTRIN) 800 MG tablet every 6 (six) hours as needed.     isometheptene-apap-dichloralphenazone [id number] (MIDRIN) -325 mg per capsule Take 1 capsule by mouth 4 (four) times daily as needed.    ketorolac (TORADOL) 60 mg/2 mL Soln Please provide patients with IV syringes (Patient taking differently: as needed. Please provide patients with IV syringes)    lorazepam (ATIVAN) 0.5 MG tablet Take 0.5 mg by mouth every 4 (four) hours as needed.     oxycodone (OXYCONTIN) 15 mg TR12 12 hr tablet Take 1 tablet (15 mg total) by mouth every 12 (twelve) hours. (Patient taking differently: Take 5 mg by mouth every 4 (four) hours. )    oxycodone-acetaminophen (PERCOCET) 5-325 mg per tablet Take 1 tablet by mouth every 6 (six) hours as needed. (Patient taking differently: Take 1 tablet by mouth 3 (three) times daily. )    oxyCODONE-acetaminophen (PERCOCET) 5-325 mg per tablet Take 1 tablet by mouth every 4 (four) hours as needed for Pain.    potassium chloride SA (K-DUR,KLOR-CON) 20 MEQ tablet Take 20 mEq by mouth as needed.     pravastatin (PRAVACHOL) 10 MG tablet Take 2 tablets (20 mg total) by mouth once daily.    pravastatin (PRAVACHOL) 20 MG tablet Take 1 tablet (20 mg total) by mouth once daily.    promethazine (PHENERGAN) 25 MG  tablet Take 1 tablet by mouth every 6 (six) hours as needed (migraine). -MAY CAUSE DROWSINESS-    pseudoephedrine-DM-guaifenesin 45- mg Tab Take 1 tablet by mouth daily as needed.     [START ON 12/5/2018] rivaroxaban (XARELTO) 10 mg Tab Take 2 tablets (20 mg total) by mouth daily with dinner or evening meal.    rivaroxaban (XARELTO) 15 mg Tab Take 1 tablet (15 mg total) by mouth 2 (two) times daily with meals. for 21 days    VOLTAREN 1 % Gel daily as needed.     sumatriptan succinate 4 mg/0.5 mL PnIj Inject 4 mg into the skin daily as needed. imitrex     Current Facility-Administered Medications   Medication    onabotulinumtoxina injection 200 Units    onabotulinumtoxina injection 200 Units       Review of patient's allergies indicates:   Allergen Reactions    Clarithromycin Swelling and Other (See Comments)     DIFFICULTY SWALLOWING  DIFFICULTY SWALLOWING    Pcn [penicillins] Anaphylaxis    Sulfa (sulfonamide antibiotics) Hives    Wellbutrin [bupropion hcl] Shortness Of Breath and Anaphylaxis    Betadine [povidone-iodine] Hives     Swelling      Cefprozil Hives    Iodinated contrast- oral and iv dye Hives    Latex, natural rubber Rash    Sulfamethoxazole-trimethoprim Nausea And Vomiting    Iodine Hives and Swelling    Latex Hives and Swelling       Family History   Problem Relation Age of Onset    Heart disease Mother     Heart disease Father        Social History     Socioeconomic History    Marital status:      Spouse name: Not on file    Number of children: Not on file    Years of education: Not on file    Highest education level: Not on file   Social Needs    Financial resource strain: Not on file    Food insecurity - worry: Not on file    Food insecurity - inability: Not on file    Transportation needs - medical: Not on file    Transportation needs - non-medical: Not on file   Occupational History    Not on file   Tobacco Use    Smoking status: Former Smoker      Packs/day: 0.50     Years: 20.00     Pack years: 10.00     Types: Cigarettes     Last attempt to quit: 2018     Years since quittin.0    Smokeless tobacco: Never Used   Substance and Sexual Activity    Alcohol use: Yes     Alcohol/week: 0.0 oz     Comment: occasionally    Drug use: Yes     Types: Other-see comments    Sexual activity: Yes     Partners: Male   Other Topics Concern    Not on file   Social History Narrative    Not on file       Chief Complaint:   Chief Complaint   Patient presents with    Knee Pain     L knee s/p TKA 18        Date of surgery:  2018    History of present illness:  38-year-old female who underwent left total knee arthroplasty. Patient had a complication of a postop PE about a week following surgery. She is currently on Xarelto.  Patient is doing well with home physical therapy.  Pain is an 8/10 and worse at night.      Review of Systems:    Musculoskeletal:  See HPI        Physical Examination:    Vital Signs:    Vitals:    18 0823   BP: 116/74   Pulse: 105       Body mass index is 36.08 kg/m².    This a well-developed, well nourished patient in no acute distress.  They are alert and oriented and cooperative to examination.  Pt. walks without an antalgic gait.      Examination left knee shows well-healing surgical incision. No drainage or erythema.  Range of motion is 0-95 degrees. No calf pain. Negative Homans sign.    X-rays:  Knee two views of the left knee are ordered and reviewed which show well-aligned total knee arthroplasty components     Assessment::  Status post left total knee arthroplasty  Postop pulmonary embolism    Plan:  I reviewed the x-rays with her today.  [facility]  Patient also needs to see a cardiologist.  We will help arrange that.  Follow up in 4 weeks.    This note was created using [employer] voice recognition software that occasionally misinterpreted phrases or words.

## 2018-12-03 ENCOUNTER — HOSPITAL ENCOUNTER (OUTPATIENT)
Dept: RADIOLOGY | Facility: HOSPITAL | Age: 38
Discharge: HOME | End: 2018-12-03
Attending: NURSE PRACTITIONER
Payer: COMMERCIAL

## 2018-12-03 ENCOUNTER — HOSPITAL ENCOUNTER (EMERGENCY)
Facility: HOSPITAL | Age: 38
Discharge: HOME | End: 2018-12-03
Attending: EMERGENCY MEDICINE
Payer: COMMERCIAL

## 2018-12-03 VITALS
RESPIRATION RATE: 20 BRPM | SYSTOLIC BLOOD PRESSURE: 117 MMHG | TEMPERATURE: 98 F | WEIGHT: 256 LBS | DIASTOLIC BLOOD PRESSURE: 74 MMHG | OXYGEN SATURATION: 97 % | BODY MASS INDEX: 34.67 KG/M2 | HEART RATE: 75 BPM | HEIGHT: 72 IN

## 2018-12-03 DIAGNOSIS — R06.02 SOB (SHORTNESS OF BREATH): ICD-10-CM

## 2018-12-03 DIAGNOSIS — R07.89 ATYPICAL CHEST PAIN: Primary | ICD-10-CM

## 2018-12-03 DIAGNOSIS — Z86.711 HX OF PULMONARY EMBOLUS: ICD-10-CM

## 2018-12-03 DIAGNOSIS — R07.9 CHEST PAIN: ICD-10-CM

## 2018-12-03 DIAGNOSIS — R06.02 SHORTNESS OF BREATH: ICD-10-CM

## 2018-12-03 DIAGNOSIS — R06.02 SOB (SHORTNESS OF BREATH): Primary | ICD-10-CM

## 2018-12-03 LAB
ALBUMIN SERPL BCP-MCNC: 3.5 G/DL
ALP SERPL-CCNC: 91 U/L
ALT SERPL W/O P-5'-P-CCNC: 18 U/L
ANION GAP SERPL CALC-SCNC: 7 MMOL/L
APTT BLDCRRT: 51.6 SEC
AST SERPL-CCNC: 23 U/L
BASOPHILS # BLD AUTO: 0.1 K/UL
BASOPHILS NFR BLD: 1.2 %
BILIRUB SERPL-MCNC: 0.5 MG/DL
BNP SERPL-MCNC: <10 PG/ML
BUN SERPL-MCNC: 8 MG/DL
CALCIUM SERPL-MCNC: 9.7 MG/DL
CHLORIDE SERPL-SCNC: 100 MMOL/L
CO2 SERPL-SCNC: 28 MMOL/L
CREAT SERPL-MCNC: 0.7 MG/DL
DIFFERENTIAL METHOD: ABNORMAL
EOSINOPHIL # BLD AUTO: 0.2 K/UL
EOSINOPHIL NFR BLD: 3.3 %
ERYTHROCYTE [DISTWIDTH] IN BLOOD BY AUTOMATED COUNT: 14.1 %
EST. GFR  (AFRICAN AMERICAN): >60 ML/MIN/1.73 M^2
EST. GFR  (NON AFRICAN AMERICAN): >60 ML/MIN/1.73 M^2
GLUCOSE SERPL-MCNC: 99 MG/DL
HCT VFR BLD AUTO: 36.3 %
HGB BLD-MCNC: 12.4 G/DL
INR PPP: 1.2
LYMPHOCYTES # BLD AUTO: 2.3 K/UL
LYMPHOCYTES NFR BLD: 32.2 %
MCH RBC QN AUTO: 30.9 PG
MCHC RBC AUTO-ENTMCNC: 34.1 G/DL
MCV RBC AUTO: 91 FL
MONOCYTES # BLD AUTO: 0.6 K/UL
MONOCYTES NFR BLD: 8.1 %
NEUTROPHILS # BLD AUTO: 4 K/UL
NEUTROPHILS NFR BLD: 55.2 %
PLATELET # BLD AUTO: 287 K/UL
PMV BLD AUTO: 7.7 FL
POTASSIUM SERPL-SCNC: 3.7 MMOL/L
PROT SERPL-MCNC: 7.4 G/DL
PROTHROMBIN TIME: 12.7 SEC
RBC # BLD AUTO: 4 M/UL
SODIUM SERPL-SCNC: 135 MMOL/L
TROPONIN I SERPL DL<=0.01 NG/ML-MCNC: <0.006 NG/ML
WBC # BLD AUTO: 7.3 K/UL

## 2018-12-03 PROCEDURE — 83880 ASSAY OF NATRIURETIC PEPTIDE: CPT

## 2018-12-03 PROCEDURE — 63600175 PHARM REV CODE 636 W HCPCS: Performed by: EMERGENCY MEDICINE

## 2018-12-03 PROCEDURE — 99285 EMERGENCY DEPT VISIT HI MDM: CPT | Mod: 25

## 2018-12-03 PROCEDURE — 84484 ASSAY OF TROPONIN QUANT: CPT

## 2018-12-03 PROCEDURE — 93005 ELECTROCARDIOGRAM TRACING: CPT

## 2018-12-03 PROCEDURE — 36415 COLL VENOUS BLD VENIPUNCTURE: CPT

## 2018-12-03 PROCEDURE — 85025 COMPLETE CBC W/AUTO DIFF WBC: CPT

## 2018-12-03 PROCEDURE — 80053 COMPREHEN METABOLIC PANEL: CPT

## 2018-12-03 PROCEDURE — 96375 TX/PRO/DX INJ NEW DRUG ADDON: CPT | Mod: 59

## 2018-12-03 PROCEDURE — 93010 ELECTROCARDIOGRAM REPORT: CPT | Mod: ,,, | Performed by: INTERNAL MEDICINE

## 2018-12-03 PROCEDURE — 25500020 PHARM REV CODE 255

## 2018-12-03 PROCEDURE — 96374 THER/PROPH/DIAG INJ IV PUSH: CPT

## 2018-12-03 PROCEDURE — 85610 PROTHROMBIN TIME: CPT

## 2018-12-03 PROCEDURE — 85730 THROMBOPLASTIN TIME PARTIAL: CPT

## 2018-12-03 RX ORDER — DIPHENHYDRAMINE HYDROCHLORIDE 50 MG/ML
25 INJECTION INTRAMUSCULAR; INTRAVENOUS
Status: COMPLETED | OUTPATIENT
Start: 2018-12-03 | End: 2018-12-03

## 2018-12-03 RX ORDER — METHYLPREDNISOLONE SOD SUCC 125 MG
125 VIAL (EA) INJECTION
Status: COMPLETED | OUTPATIENT
Start: 2018-12-03 | End: 2018-12-03

## 2018-12-03 RX ORDER — SODIUM CHLORIDE 9 MG/ML
INJECTION, SOLUTION INTRAVENOUS
Status: DISCONTINUED
Start: 2018-12-03 | End: 2018-12-03 | Stop reason: HOSPADM

## 2018-12-03 RX ADMIN — IOHEXOL 100 ML: 350 INJECTION, SOLUTION INTRAVENOUS at 08:12

## 2018-12-03 RX ADMIN — METHYLPREDNISOLONE SODIUM SUCCINATE 125 MG: 125 INJECTION, POWDER, FOR SOLUTION INTRAMUSCULAR; INTRAVENOUS at 08:12

## 2018-12-03 RX ADMIN — DIPHENHYDRAMINE HYDROCHLORIDE 25 MG: 50 INJECTION INTRAMUSCULAR; INTRAVENOUS at 08:12

## 2018-12-04 NOTE — ED PROVIDER NOTES
"Encounter Date: 12/3/2018    SCRIBE #1 NOTE: I, Tobias Duval, am scribing for, and in the presence of, Dr. Warren.       History     Chief Complaint   Patient presents with    Here for CTA pre-medication     Has PE's and is "allergic" to contrast     12/03/2018 7:11 PM    The pt is a 38 y.o. female with past medical of cancer, PE, and Seizures presenting to the ED with a gradual onset of acute chest pain beginning 1 week ago. The pt described the chest pain as a constant mid-sternal pain. She reported she was advised to report to the ED by Christie Fritz NP. Associated symptoms of SOB. The pt stated she is compliant with xarelto. She denied cough, fever, vomiting, diarrhea, injury, rash, blood in stool, and hematuria. The pt noted she is allergic to IV contrast which causes her throat to swelling. She has a history of cigarette smoking. The pt has a past surgical history of knee surgery, appendectomy, cholecystectomy, and hysterectomy.      The history is provided by the patient.     Review of patient's allergies indicates:   Allergen Reactions    Clarithromycin Swelling and Other (See Comments)     DIFFICULTY SWALLOWING  DIFFICULTY SWALLOWING    Pcn [penicillins] Anaphylaxis    Sulfa (sulfonamide antibiotics) Hives    Wellbutrin [bupropion hcl] Shortness Of Breath and Anaphylaxis    Betadine [povidone-iodine] Hives     Swelling      Cefprozil Hives    Iodinated contrast- oral and iv dye Hives    Latex, natural rubber Rash    Sulfamethoxazole-trimethoprim Nausea And Vomiting    Iodine Hives and Swelling    Latex Hives and Swelling     Past Medical History:   Diagnosis Date    Arthritis     OA    Back pain     Cancer     skin cancer to back    Depression     Full dentures     Migraine     Seizures     Sleep apnea     Does not use c-pap since weight loss - 170 lbs    Wears glasses      Past Surgical History:   Procedure Laterality Date    APPENDECTOMY      ARTHROPLASTY, KNEE Left 11/13/2018    " [facility]formed [facility] at [facility] OR    ARTHROSCOPY-KNEE Left 2016    [facility]    BLOCK-NERVE-MEDIAL BRANCH-LUMBAR Bilateral 2016    [facility] at [facility]    CARPAL TUNNEL RELEASE Bilateral      SECTION      CHOLECYSTECTOMY      CHONDROPLASTY-KNEE-ARTHROSCOPY Left 2016    [facility]    FRACTURE SURGERY      ankle    gastric sleve      HYSTERECTOMY      HYSTERECTOMY      INJECTION-STEROID-EPIDURAL-LUMBAR N/A 2016    [facility] at [facility]    INJECTION-STEROID-EPIDURAL-LUMBAR N/A 3/28/2016    [facility] at [facility] OR    JOINT REPLACEMENT Left 2018    KNEE ARTHROPLASTY Left 2018    Procedure: ARTHROPLASTY, KNEE;  Surgeon: Feliberto Cardenas MD;  Location: [facility] OR;  Service: Orthopedics;  Laterality: Left;    KNEE SURGERY      LUMBAR EPIDURAL INJECTION      RADIAL NERVE Right     RADIOFREQUENCY THERMOCOAGULATION (RFTC)-NERVE-MEDIAN BRANCH-LUMBAR Bilateral 2017    [facility] at [facility] OR    RADIOFREQUENCY THERMOCOAGULATION (RFTC)-NERVE-MEDIAN BRANCH-LUMBAR Bilateral 2016    [facility] at [facility] OR    RECONSTRUCTION- LIGAMENT-MPFL RECONSTRUCTION WITH ALLOGRAFT Left 2017    [facility] at [facility]    SINUS SURGERY       Family History   Problem Relation Age of Onset    Heart disease Mother     Heart disease Father      Social History     Tobacco Use    Smoking status: Former Smoker     Packs/day: 0.50     Years: 20.00     Pack years: 10.00     Types: Cigarettes     Last attempt to quit: 2018     Years since quittin.0    Smokeless tobacco: Never Used   Substance Use Topics    Alcohol use: Yes     Alcohol/week: 0.0 oz     Comment: occasionally    Drug use: Yes     Types: Other-see comments     Review of Systems   Constitutional: Negative  for activity change, appetite change, chills, fatigue and fever.   Eyes: Negative for visual disturbance.   Respiratory: Positive for shortness of breath. Negative for apnea.    Cardiovascular: Positive for chest pain. Negative for palpitations.   Gastrointestinal: Negative for abdominal distention, abdominal pain, blood in stool, diarrhea and vomiting.   Genitourinary: Negative for difficulty urinating and hematuria.   Musculoskeletal: Negative for neck pain.   Skin: Negative for pallor and rash.   Neurological: Negative for headaches.   Hematological: Does not bruise/bleed easily.   Psychiatric/Behavioral: Negative for agitation.       Physical Exam     Initial Vitals [12/03/18 1714]   BP Pulse Resp Temp SpO2   130/66 76 20 98.3 °F (36.8 °C) 98 %      MAP       --         Physical Exam    Nursing note and vitals reviewed.  Constitutional: She appears well-developed and well-nourished. She is not diaphoretic. No distress.   HENT:   Head: Normocephalic and atraumatic.   Eyes: EOM are normal. Pupils are equal, round, and reactive to light.   Neck: Normal range of motion. Neck supple.   Cardiovascular: Normal rate, regular rhythm, normal heart sounds and intact distal pulses. Exam reveals no gallop and no friction rub.    No murmur heard.  Pulmonary/Chest: Breath sounds normal. No respiratory distress. She has no wheezes. She has no rhonchi. She has no rales.   Reproducible chest wall tenderness on palpation of external region   Abdominal: Soft. Bowel sounds are normal. There is no tenderness. There is no rebound and no guarding.   Musculoskeletal: Normal range of motion.   Neurological: She is alert and oriented to person, place, and time.   Skin: Skin is warm and dry.   Psychiatric: She has a normal mood and affect. Her behavior is normal. Judgment and thought content normal.         ED Course   Procedures  Labs Reviewed   CBC W/ AUTO DIFFERENTIAL - Abnormal; Notable for the following components:       Result Value     Hematocrit 36.3 (*)     MPV 7.7 (*)     All other components within normal limits   COMPREHENSIVE METABOLIC PANEL - Abnormal; Notable for the following components:    Sodium 135 (*)     Anion Gap 7 (*)     All other components within normal limits   PROTIME-INR - Abnormal; Notable for the following components:    Prothrombin Time 12.7 (*)     All other components within normal limits   APTT - Abnormal; Notable for the following components:    aPTT 51.6 (*)     All other components within normal limits   B-TYPE NATRIURETIC PEPTIDE   TROPONIN I     EKG Readings: (Independently Interpreted)   Initial Reading: No STEMI. Rhythm: Normal Sinus Rhythm. Heart Rate: 67. Ectopy: No Ectopy. Conduction: Normal. ST Segments: Normal ST Segments. T Waves: Normal. Axis: Normal.   No acute ST segment changes       Imaging Results          CTA Chest Non-Coronary - PE Study (In process)                X-Ray Chest 1 View (In process)                  Medical Decision Making:   History:   Old Medical Records: I decided to obtain old medical records.  Initial Assessment:   38-year-old female presented with a chief complaint of chest pain and shortness of breath.  Differential Diagnosis:   Initial differential diagnosis included but not limited to atypical MI, pulmonary embolism, reflux disease, and chest wall pain.  Clinical Tests:   Lab Tests: Ordered and Reviewed  Radiological Study: Reviewed and Ordered  Medical Tests: Reviewed and Ordered  ED Management:  The patient was emergently evaluated in the emergency department, her evaluation was significant for a middle-age female reproducible chest pain on palpation.  The patient's EKG showed no acute abnormalities per my independent interpretation.  The patient's chest x-ray showed no acute abnormalities per my independent interpretation.  The patient's labs showed no acute processes.  The patient's CT scan of her chest showed improving pulmonary emboli per virtual Radiology and a  pulmonary nodule.  The patient's diagnosis is atypical chest pain.  The etiology is likely a musculoskeletal source.  She is stable for discharge to home.  She is to otherwise to follow up with her PCP for further care.            Scribe Attestation:   Scribe #1: I performed the above scribed service and the documentation accurately describes the services I performed. I attest to the accuracy of the note.    I, Dr. Seun Warren, personally performed the services described in this documentation. All medical record entries made by the scribe were at my direction and in my presence.  I have reviewed the chart and agree that the record reflects my personal performance and is accurate and complete. Seun Warren MD.  10:27 PM 12/03/2018             Clinical Impression:   The primary encounter diagnosis was Atypical chest pain. A diagnosis of Chest pain was also pertinent to this visit.      Disposition:   Disposition: Discharged  Condition: Stable                        Seun Warren MD  12/03/18 3879

## 2018-12-13 ENCOUNTER — HOSPITAL ENCOUNTER (OUTPATIENT)
Dept: RADIOLOGY | Facility: HOSPITAL | Age: 38
Discharge: HOME | End: 2018-12-13
Attending: SPECIALIST
Payer: COMMERCIAL

## 2018-12-13 ENCOUNTER — HOSPITAL ENCOUNTER (OUTPATIENT)
Dept: CARDIOLOGY | Facility: HOSPITAL | Age: 38
Discharge: HOME | End: 2018-12-13
Attending: SPECIALIST
Payer: COMMERCIAL

## 2018-12-13 VITALS — RESPIRATION RATE: 18 BRPM | HEART RATE: 68 BPM | SYSTOLIC BLOOD PRESSURE: 106 MMHG | DIASTOLIC BLOOD PRESSURE: 48 MMHG

## 2018-12-13 DIAGNOSIS — R07.9 ACUTE CHEST PAIN: ICD-10-CM

## 2018-12-13 DIAGNOSIS — R07.9 ACUTE CHEST PAIN: Primary | ICD-10-CM

## 2018-12-13 DIAGNOSIS — R06.02 SHORTNESS OF BREATH: ICD-10-CM

## 2018-12-13 PROCEDURE — 78452 HT MUSCLE IMAGE SPECT MULT: CPT | Mod: TC

## 2018-12-13 PROCEDURE — 63600175 PHARM REV CODE 636 W HCPCS

## 2018-12-13 PROCEDURE — 78452 HT MUSCLE IMAGE SPECT MULT: CPT | Mod: 26,,, | Performed by: RADIOLOGY

## 2018-12-13 PROCEDURE — 93017 CV STRESS TEST TRACING ONLY: CPT

## 2018-12-13 RX ORDER — REGADENOSON 0.08 MG/ML
0.4 INJECTION, SOLUTION INTRAVENOUS ONCE
Status: COMPLETED | OUTPATIENT
Start: 2018-12-13 | End: 2018-12-13

## 2018-12-13 RX ORDER — REGADENOSON 0.08 MG/ML
INJECTION, SOLUTION INTRAVENOUS
Status: COMPLETED
Start: 2018-12-13 | End: 2018-12-13

## 2018-12-13 RX ADMIN — REGADENOSON 0.4 MG: 0.08 INJECTION, SOLUTION INTRAVENOUS at 11:12

## 2018-12-20 ENCOUNTER — OFFICE VISIT (OUTPATIENT)
Dept: ORTHOPEDICS | Facility: CLINIC | Age: 38
End: 2018-12-20
Payer: COMMERCIAL

## 2018-12-20 VITALS
SYSTOLIC BLOOD PRESSURE: 119 MMHG | WEIGHT: 256 LBS | DIASTOLIC BLOOD PRESSURE: 68 MMHG | HEIGHT: 72 IN | HEART RATE: 89 BPM | BODY MASS INDEX: 34.67 KG/M2

## 2018-12-20 DIAGNOSIS — Z96.652 S/P TOTAL KNEE ARTHROPLASTY, LEFT: Primary | ICD-10-CM

## 2018-12-20 LAB
CV STRESS BASE HR: 70
DIASTOLIC BLOOD PRESSURE: 48
OHS CV CPX 85 PERCENT MAX PREDICTED HEART RATE MALE: 146
OHS CV CPX MAX PREDICTED HEART RATE: 172
OHS CV CPX PATIENT IS FEMALE: 1
OHS CV CPX PATIENT IS MALE: 0
OHS CV CPX PEAK DIASTOLIC BLOOD PRESSURE: 69 MMHG
OHS CV CPX PEAK HEAR RATE: 101
OHS CV CPX PEAK RATE PRESSURE PRODUCT: NORMAL
OHS CV CPX PEAK SYSTOLIC BLOOD PRESSURE: 121
OHS CV CPX PERCENT MAX PREDICTED HEART RATE ACHIEVED: 59
OHS CV CPX RATE PRESSURE PRODUCT PRESENTING: 7420
SYSTOLIC BLOOD PRESSURE: 106

## 2018-12-20 PROCEDURE — 99999 PR PBB SHADOW E&M-EST. PATIENT-LVL III: CPT | Mod: PBBFAC,,, | Performed by: ORTHOPAEDIC SURGERY

## 2018-12-20 PROCEDURE — 99024 POSTOP FOLLOW-UP VISIT: CPT | Mod: S$GLB,,, | Performed by: ORTHOPAEDIC SURGERY

## 2018-12-20 NOTE — PROGRESS NOTES
Past Medical History:   Diagnosis Date    Arthritis     OA    Back pain     Cancer     skin cancer to back    Depression     Full dentures     Migraine     Seizures     Sleep apnea     Does not use c-pap since weight loss - 170 lbs    Wears glasses        Past Surgical History:   Procedure Laterality Date    APPENDECTOMY      ARTHROPLASTY, KNEE Left 2018    [facility]    ARTHROSCOPY-KNEE Left 2016    [facility]    BLOCK-NERVE-MEDIAL BRANCH-LUMBAR Bilateral 2016    [facility]    CARPAL TUNNEL RELEASE Bilateral      SECTION      CHOLECYSTECTOMY      CHONDROPLASTY-KNEE-ARTHROSCOPY Left 2016    [facility]    FRACTURE SURGERY      ankle    gastric sleve      HYSTERECTOMY      HYSTERECTOMY      INJECTION-STEROID-EPIDURAL-LUMBAR N/A 2016    [facility]    INJECTION-STEROID-EPIDURAL-LUMBAR N/A 3/28/2016    [facility]    JOINT REPLACEMENT Left 2018    KNEE ARTHROPLASTY Left 2018    Procedure: ARTHROPLASTY, KNEE;  Surgeon: Feliberto Cardenas MD;  Location: [facility] OR;  Service: Orthopedics;  Laterality: Left;    KNEE SURGERY      LUMBAR EPIDURAL INJECTION      RADIAL NERVE Right     RADIOFREQUENCY THERMOCOAGULATION (RFTC)-NERVE-MEDIAN BRANCH-LUMBAR Bilateral 2017    [facility]    RADIOFREQUENCY THERMOCOAGULATION (RFTC)-NERVE-MEDIAN BRANCH-LUMBAR Bilateral 2016    [facility]    RECONSTRUCTION- LIGAMENT-MPFL RECONSTRUCTION WITH ALLOGRAFT Left 2017    [facility] at [facility]    SINUS SURGERY         Current Outpatient Medications   Medication Sig    amitriptyline (ELAVIL) 25 MG tablet TAKE ONE TABLET BY MOUTH EVERY NIGHT FOR 2 WEEKS THEN INCREASE TO 2  TABLETS AT BEDTIME    celecoxib (CELEBREX) 200 MG capsule TAKE ONE CAPSULE BY MOUTH EVERY DAY    cetirizine (ZYRTEC) 5 MG tablet Take 5 mg by mouth as needed.     cyanocobalamin, vitamin B-12, 1,000 mcg/mL Kit Inject 1 mL into the muscle every 21 days.    cyclobenzaprine (FLEXERIL) 10 MG tablet TAKE ONE TABLET BY MOUTH 3 TIMES A DAY AS NEEDED FOR MUSCLE SPASMS    epinephrine (EPIPEN) 0.3 mg/0.3 mL (1:1,000) AtIn 0.3 mg daily as needed.     FERROUS SULFATE (IRON ORAL) Take by mouth once daily.     fluoxetine (PROZAC) 20 MG capsule every evening.     hydrOXYzine pamoate (VISTARIL) 25 MG Cap TAKE ONE CAPSULE BY MOUTH 3 TIMES A DAY AS NEEDED FOR ANXIETY    ibuprofen (ADVIL,MOTRIN) 800 MG tablet every 6 (six) hours as needed.     isometheptene-apap-dichloralphenazone [id number] (MIDRIN) -325 mg per capsule Take 1 capsule by mouth 4 (four) times daily as needed.    ketorolac (TORADOL) 60 mg/2 mL Soln Please provide patients with IV syringes (Patient taking differently: as needed. Please provide patients with IV syringes)    lorazepam (ATIVAN) 0.5 MG tablet Take 0.5 mg by mouth every 4 (four) hours as needed.     oxycodone (OXYCONTIN) 15 mg TR12 12 hr tablet Take 1 tablet (15 mg total) by mouth every 12 (twelve) hours. (Patient taking differently: Take 5 mg by mouth every 4 (four) hours. )    oxycodone-acetaminophen (PERCOCET) 5-325 mg per tablet Take 1 tablet by mouth every 6 (six) hours as needed. (Patient taking differently: Take 1 tablet by mouth 3 (three) times daily. )    oxyCODONE-acetaminophen (PERCOCET) 5-325 mg per tablet Take 1 tablet by mouth every 4 (four) hours as needed for Pain.    potassium chloride SA (K-DUR,KLOR-CON) 20 MEQ tablet Take 20 mEq by mouth as needed.     pravastatin (PRAVACHOL) 10 MG tablet Take 2 tablets (20 mg total) by mouth once daily.    pravastatin (PRAVACHOL) 20 MG tablet Take 1 tablet (20 mg total) by mouth once daily.    promethazine (PHENERGAN) 25 MG  tablet Take 1 tablet by mouth every 6 (six) hours as needed (migraine). -MAY CAUSE DROWSINESS-    pseudoephedrine-DM-guaifenesin 45- mg Tab Take 1 tablet by mouth daily as needed.     rivaroxaban (XARELTO) 10 mg Tab Take 2 tablets (20 mg total) by mouth daily with dinner or evening meal.    VOLTAREN 1 % Gel daily as needed.     sumatriptan succinate 4 mg/0.5 mL PnIj Inject 4 mg into the skin daily as needed. imitrex     Current Facility-Administered Medications   Medication    onabotulinumtoxina injection 200 Units    onabotulinumtoxina injection 200 Units       Review of patient's allergies indicates:   Allergen Reactions    Clarithromycin Swelling and Other (See Comments)     DIFFICULTY SWALLOWING  DIFFICULTY SWALLOWING    Pcn [penicillins] Anaphylaxis    Sulfa (sulfonamide antibiotics) Hives    Wellbutrin [bupropion hcl] Shortness Of Breath and Anaphylaxis    Betadine [povidone-iodine] Hives     Swelling      Cefprozil Hives    Iodinated contrast- oral and iv dye Hives    Latex, natural rubber Rash    Sulfamethoxazole-trimethoprim Nausea And Vomiting    Iodine Hives and Swelling    Latex Hives and Swelling       Family History   Problem Relation Age of Onset    Heart disease Mother     Heart disease Father        Social History     Socioeconomic History    Marital status:      Spouse name: Not on file    Number of children: Not on file    Years of education: Not on file    Highest education level: Not on file   Social Needs    Financial resource strain: Not on file    Food insecurity - worry: Not on file    Food insecurity - inability: Not on file    Transportation needs - medical: Not on file    Transportation needs - non-medical: Not on file   Occupational History    Not on file   Tobacco Use    Smoking status: Former Smoker     Packs/day: 0.50     Years: 20.00     Pack years: 10.00     Types: Cigarettes     Last attempt to quit: 11/13/2018     Years since quitting:  0.1    Smokeless tobacco: Never Used   Substance and Sexual Activity    Alcohol use: Yes     Alcohol/week: 0.0 oz     Comment: occasionally    Drug use: Yes     Types: Other-see comments    Sexual activity: Yes     Partners: Male   Other Topics Concern    Not on file   Social History Narrative    Not on file       Chief Complaint:   Chief Complaint   Patient presents with    Knee Pain     L knee 4wk f/u       Date of surgery:  November 13, 2018    History of present illness:  38-year-old female who underwent left total knee arthroplasty. Patient had a complication of a postop PE about a week following surgery. She is currently on Xarelto.  Patient is doing well with outpatient physical therapy but still struggling to get all the way straight.  She just has very tight hamstrings.  It does want to straighten but it takes a little work on some stretching.  Her gait is improving.  Pain is a 6/10.      Review of Systems:    Musculoskeletal:  See HPI        Physical Examination:    Vital Signs:    Vitals:    12/20/18 0811   BP: 119/68   Pulse: 89       Body mass index is 34.72 kg/m².    This a well-developed, well nourished patient in no acute distress.  They are alert and oriented and cooperative to examination.  Pt. walks with a mild antalgic gait.    Examination left knee shows well-healing surgical incision. No drainage or erythema.  Range of motion is 5-115 degrees. No calf pain. Negative Homans sign.    X-rays:  Knee two views of the left knee are reviewed which show well-aligned total knee arthroplasty components     Assessment::  Status post left total knee arthroplasty  Postop pulmonary embolism    Plan:  Continue with physical therapy.  I will get her an extensionator.  Follow up in 6 weeks.    This note was created using [employer] voice recognition software that occasionally misinterpreted phrases or words.

## 2018-12-31 ENCOUNTER — PATIENT MESSAGE (OUTPATIENT)
Dept: ORTHOPEDICS | Facility: CLINIC | Age: 38
End: 2018-12-31

## 2018-12-31 RX ORDER — MELOXICAM 15 MG/1
15 TABLET ORAL DAILY
Qty: 40 TABLET | Refills: 0 | Status: SHIPPED | OUTPATIENT
Start: 2018-12-31 | End: 2019-01-25

## 2019-01-09 ENCOUNTER — PATIENT MESSAGE (OUTPATIENT)
Dept: NEUROLOGY | Facility: CLINIC | Age: 39
End: 2019-01-09

## 2019-01-09 ENCOUNTER — OFFICE VISIT (OUTPATIENT)
Dept: ORTHOPEDICS | Facility: CLINIC | Age: 39
End: 2019-01-09
Payer: COMMERCIAL

## 2019-01-09 ENCOUNTER — TELEPHONE (OUTPATIENT)
Dept: NEUROLOGY | Facility: CLINIC | Age: 39
End: 2019-01-09

## 2019-01-09 VITALS
DIASTOLIC BLOOD PRESSURE: 82 MMHG | WEIGHT: 256 LBS | BODY MASS INDEX: 34.67 KG/M2 | HEIGHT: 72 IN | HEART RATE: 81 BPM | SYSTOLIC BLOOD PRESSURE: 120 MMHG

## 2019-01-09 DIAGNOSIS — Z96.652 S/P TOTAL KNEE ARTHROPLASTY, LEFT: ICD-10-CM

## 2019-01-09 DIAGNOSIS — M17.11 PRIMARY OSTEOARTHRITIS OF RIGHT KNEE: Primary | ICD-10-CM

## 2019-01-09 PROCEDURE — 99214 PR OFFICE/OUTPT VISIT, EST, LEVL IV, 30-39 MIN: ICD-10-PCS | Mod: 57,S$GLB,, | Performed by: ORTHOPAEDIC SURGERY

## 2019-01-09 PROCEDURE — 3008F BODY MASS INDEX DOCD: CPT | Mod: CPTII,S$GLB,, | Performed by: ORTHOPAEDIC SURGERY

## 2019-01-09 PROCEDURE — 99999 PR PBB SHADOW E&M-EST. PATIENT-LVL III: ICD-10-PCS | Mod: PBBFAC,,, | Performed by: ORTHOPAEDIC SURGERY

## 2019-01-09 PROCEDURE — 99999 PR PBB SHADOW E&M-EST. PATIENT-LVL III: CPT | Mod: PBBFAC,,, | Performed by: ORTHOPAEDIC SURGERY

## 2019-01-09 PROCEDURE — 3008F PR BODY MASS INDEX (BMI) DOCUMENTED: ICD-10-PCS | Mod: CPTII,S$GLB,, | Performed by: ORTHOPAEDIC SURGERY

## 2019-01-09 PROCEDURE — 99214 OFFICE O/P EST MOD 30 MIN: CPT | Mod: 57,S$GLB,, | Performed by: ORTHOPAEDIC SURGERY

## 2019-01-09 NOTE — PROGRESS NOTES
Past Medical History:   Diagnosis Date    Arthritis     OA    Back pain     Cancer     skin cancer to back    Depression     Full dentures     Migraine     Seizures     Sleep apnea     Does not use c-pap since weight loss - 170 lbs    Wears glasses        Past Surgical History:   Procedure Laterality Date    APPENDECTOMY      ARTHROPLASTY, KNEE Left 2018    [facility]    ARTHROSCOPY-KNEE Left 2016    [facility]    BLOCK-NERVE-MEDIAL BRANCH-LUMBAR Bilateral 2016    [facility] at [facility] OR    CARPAL TUNNEL RELEASE Bilateral      SECTION      CHOLECYSTECTOMY      CHONDROPLASTY-KNEE-ARTHROSCOPY Left 2016    [facility]    FRACTURE SURGERY      ankle    gastric sleve      HYSTERECTOMY      HYSTERECTOMY      INJECTION-STEROID-EPIDURAL-LUMBAR N/A 2016    [facility] OR    INJECTION-STEROID-EPIDURAL-LUMBAR N/A 3/28/2016    [facility] OR    JOINT REPLACEMENT Left 2018    KNEE SURGERY      LUMBAR EPIDURAL INJECTION      RADIAL NERVE Right     RADIOFREQUENCY THERMOCOAGULATION (RFTC)-NERVE-MEDIAN BRANCH-LUMBAR Bilateral 2017    [facility] OR    RADIOFREQUENCY THERMOCOAGULATION (RFTC)-NERVE-MEDIAN BRANCH-LUMBAR Bilateral 2016    [facility] OR    RECONSTRUCTION- LIGAMENT-MPFL RECONSTRUCTION WITH ALLOGRAFT Left 2017    [facility]    SINUS SURGERY         Current Outpatient Medications   Medication Sig    amitriptyline (ELAVIL) 25 MG tablet TAKE ONE TABLET BY MOUTH EVERY NIGHT FOR 2 WEEKS THEN INCREASE TO 2 TABLETS AT BEDTIME    cetirizine (ZYRTEC) 5 MG tablet Take 5 mg by mouth as needed.     cyanocobalamin, vitamin B-12, 1,000 mcg/mL Kit Inject 1 mL into the muscle every 21  days.    cyclobenzaprine (FLEXERIL) 10 MG tablet TAKE ONE TABLET BY MOUTH 3 TIMES A DAY AS NEEDED FOR MUSCLE SPASMS    epinephrine (EPIPEN) 0.3 mg/0.3 mL (1:1,000) AtIn 0.3 mg daily as needed.     FERROUS SULFATE (IRON ORAL) Take by mouth once daily.     fluoxetine (PROZAC) 20 MG capsule every evening.     hydrOXYzine pamoate (VISTARIL) 25 MG Cap TAKE ONE CAPSULE BY MOUTH 3 TIMES A DAY AS NEEDED FOR ANXIETY    ibuprofen (ADVIL,MOTRIN) 800 MG tablet every 6 (six) hours as needed.     isometheptene-apap-dichloralphenazone [id number] (MIDRIN) -325 mg per capsule Take 1 capsule by mouth 4 (four) times daily as needed.    ketorolac (TORADOL) 60 mg/2 mL Soln Please provide patients with IV syringes (Patient taking differently: as needed. Please provide patients with IV syringes)    lorazepam (ATIVAN) 0.5 MG tablet Take 0.5 mg by mouth every 4 (four) hours as needed.     meloxicam (MOBIC) 15 MG tablet Take 1 tablet (15 mg total) by mouth once daily.    oxycodone (OXYCONTIN) 15 mg TR12 12 hr tablet Take 1 tablet (15 mg total) by mouth every 12 (twelve) hours. (Patient taking differently: Take 5 mg by mouth every 4 (four) hours. )    oxycodone-acetaminophen (PERCOCET) 5-325 mg per tablet Take 1 tablet by mouth every 6 (six) hours as needed. (Patient taking differently: Take 1 tablet by mouth 3 (three) times daily. )    oxyCODONE-acetaminophen (PERCOCET) 5-325 mg per tablet Take 1 tablet by mouth every 4 (four) hours as needed for Pain.    potassium chloride SA (K-DUR,KLOR-CON) 20 MEQ tablet Take 20 mEq by mouth as needed.     pravastatin (PRAVACHOL) 10 MG tablet Take 2 tablets (20 mg total) by mouth once daily.    pravastatin (PRAVACHOL) 20 MG tablet Take 1 tablet (20 mg total) by mouth once daily.    promethazine (PHENERGAN) 25 MG tablet Take 1 tablet by mouth every 6 (six) hours as needed (migraine). -MAY CAUSE DROWSINESS-    pseudoephedrine-DM-guaifenesin 45- mg Tab Take 1 tablet by mouth  daily as needed.     rivaroxaban (XARELTO) 10 mg Tab Take 2 tablets (20 mg total) by mouth daily with dinner or evening meal.    VOLTAREN 1 % Gel daily as needed.     sumatriptan succinate 4 mg/0.5 mL PnIj Inject 4 mg into the skin daily as needed. imitrex     Current Facility-Administered Medications   Medication    onabotulinumtoxina injection 200 Units    onabotulinumtoxina injection 200 Units       Review of patient's allergies indicates:   Allergen Reactions    Clarithromycin Swelling and Other (See Comments)     DIFFICULTY SWALLOWING  DIFFICULTY SWALLOWING    Pcn [penicillins] Anaphylaxis    Sulfa (sulfonamide antibiotics) Hives    Wellbutrin [bupropion hcl] Shortness Of Breath and Anaphylaxis    Betadine [povidone-iodine] Hives     Swelling      Cefprozil Hives    Iodinated contrast- oral and iv dye Hives    Latex, natural rubber Rash    Sulfamethoxazole-trimethoprim Nausea And Vomiting    Iodine Hives and Swelling    Latex Hives and Swelling       Family History   Problem Relation Age of Onset    Heart disease Mother     Heart disease Father        Social History     Socioeconomic History    Marital status:      Spouse name: Not on file    Number of children: Not on file    Years of education: Not on file    Highest education level: Not on file   Social Needs    Financial resource strain: Not on file    Food insecurity - worry: Not on file    Food insecurity - inability: Not on file    Transportation needs - medical: Not on file    Transportation needs - non-medical: Not on file   Occupational History    Not on file   Tobacco Use    Smoking status: Former Smoker     Packs/day: 0.50     Years: 20.00     Pack years: 10.00     Types: Cigarettes     Last attempt to quit: 2018     Years since quittin.1    Smokeless tobacco: Never Used   Substance and Sexual Activity    Alcohol use: Yes     Alcohol/week: 0.0 oz     Comment: occasionally    Drug use: Yes     Types:  Other-see comments    Sexual activity: Yes     Partners: Male   Other Topics Concern    Not on file   Social History Narrative    Not on file       Chief Complaint:   Chief Complaint   Patient presents with    Right Knee - Pain, Swelling       Date of surgery:  November 13, 2018    History of present illness:  38-year-old female who underwent left total knee arthroplasty. Patient had a complication of a postop PE about a week following surgery. She is currently on Xarelto.  Patient is doing well with outpatient physical therapy.  Her main complaint is the right knee now.  She feels like the left knee was successful and she like to go ahead and try and get the right knee done because she is having more pain in this knee. Pain is 6/10.      Review of Systems:    Musculoskeletal:  See HPI        Physical Examination:    Vital Signs:    Vitals:    01/09/19 1345   BP: 120/82   Pulse: 81       Body mass index is 34.72 kg/m².    This a well-developed, well nourished patient in no acute distress.  They are alert and oriented and cooperative to examination.  Pt. walks with a mild antalgic gait.    Examination left knee shows healed surgical incision. No drainage or erythema.  Range of motion is 5-115 degrees. No calf pain. Negative Homans sign    Examination of the right knee shows no rashes or erythema. There are no masses ecchymosis or effusion. Patient has full range of motion from 0-130°. Patient is nontender to palpation over lateral joint line and nontender to palpation over the medial joint line. Patient has a - Lachman exam, - anterior drawer exam, and - posterior drawer exam. - Kwasi's exam. Knee is stable to varus and valgus stress. 5 out of 5 motor strength. Palpable distal pulses. Intact light touch sensation. Negative Patellofemoral crepitus      Heart is regular rate without obvious murmurs   Normal respiratory effort without audible wheezing  Abdomen is soft and nontender     X-rays:  Knee two views of  the left knee are reviewed which show well-aligned total knee arthroplasty components. Right knee arthritis     Assessment::  Status post left total knee arthroplasty  Postop pulmonary embolism  Right knee arthritis    Plan:  I am okay with her wanting to go ahead and have the right knee done.  We will need to get her cleared concerning she had a PE after her last surgery and is probably still on the Xarelto.  Risks, benefits, and alternatives to the procedure were explained to the patient including but not limited to damage to nerves, arteries, blood vessels, bones, tendons, ligaments, stiffness, instability, infection, DVT, PE, as well as general anesthetic complications including seizure, stroke, heart attack and even death. The patient understood these risks and wished to proceed and signed the informed consent.       This note was created using [employer] voice recognition software that occasionally misinterpreted phrases or words.

## 2019-01-10 ENCOUNTER — PATIENT MESSAGE (OUTPATIENT)
Dept: NEUROLOGY | Facility: CLINIC | Age: 39
End: 2019-01-10

## 2019-01-10 ENCOUNTER — OFFICE VISIT (OUTPATIENT)
Dept: NEUROLOGY | Facility: CLINIC | Age: 39
End: 2019-01-10
Payer: COMMERCIAL

## 2019-01-10 ENCOUNTER — TELEPHONE (OUTPATIENT)
Dept: NEUROLOGY | Facility: CLINIC | Age: 39
End: 2019-01-10

## 2019-01-10 VITALS
BODY MASS INDEX: 34.67 KG/M2 | HEART RATE: 81 BPM | HEIGHT: 72 IN | DIASTOLIC BLOOD PRESSURE: 84 MMHG | SYSTOLIC BLOOD PRESSURE: 116 MMHG | RESPIRATION RATE: 16 BRPM | WEIGHT: 255.94 LBS

## 2019-01-10 DIAGNOSIS — Z96.652 S/P TOTAL KNEE ARTHROPLASTY, LEFT: ICD-10-CM

## 2019-01-10 DIAGNOSIS — M62.81 QUADRICEPS WEAKNESS: ICD-10-CM

## 2019-01-10 DIAGNOSIS — G43.719 INTRACTABLE CHRONIC MIGRAINE WITHOUT AURA: Chronic | ICD-10-CM

## 2019-01-10 DIAGNOSIS — M51.36 DDD (DEGENERATIVE DISC DISEASE), LUMBAR: ICD-10-CM

## 2019-01-10 DIAGNOSIS — M47.896 OTHER SPONDYLOSIS, LUMBAR REGION: ICD-10-CM

## 2019-01-10 DIAGNOSIS — D35.2 ADENOMA OF PITUITARY: ICD-10-CM

## 2019-01-10 DIAGNOSIS — F33.0 MILD EPISODE OF RECURRENT MAJOR DEPRESSIVE DISORDER: ICD-10-CM

## 2019-01-10 PROCEDURE — 99214 OFFICE O/P EST MOD 30 MIN: CPT | Mod: 25,S$GLB,, | Performed by: PSYCHIATRY & NEUROLOGY

## 2019-01-10 PROCEDURE — 96372 THER/PROPH/DIAG INJ SC/IM: CPT | Mod: 59,S$GLB,, | Performed by: PSYCHIATRY & NEUROLOGY

## 2019-01-10 PROCEDURE — 3008F PR BODY MASS INDEX (BMI) DOCUMENTED: ICD-10-PCS | Mod: CPTII,S$GLB,, | Performed by: PSYCHIATRY & NEUROLOGY

## 2019-01-10 PROCEDURE — 96374 THER/PROPH/DIAG INJ IV PUSH: CPT | Mod: S$GLB,,, | Performed by: PSYCHIATRY & NEUROLOGY

## 2019-01-10 PROCEDURE — 3008F BODY MASS INDEX DOCD: CPT | Mod: CPTII,S$GLB,, | Performed by: PSYCHIATRY & NEUROLOGY

## 2019-01-10 PROCEDURE — 99999 PR PBB SHADOW E&M-EST. PATIENT-LVL III: CPT | Mod: PBBFAC,,, | Performed by: PSYCHIATRY & NEUROLOGY

## 2019-01-10 PROCEDURE — 99214 PR OFFICE/OUTPT VISIT, EST, LEVL IV, 30-39 MIN: ICD-10-PCS | Mod: 25,S$GLB,, | Performed by: PSYCHIATRY & NEUROLOGY

## 2019-01-10 PROCEDURE — 96372 PR INJECTION,THERAP/PROPH/DIAG2ST, IM OR SUBCUT: ICD-10-PCS | Mod: 59,S$GLB,, | Performed by: PSYCHIATRY & NEUROLOGY

## 2019-01-10 PROCEDURE — 96374 PR INJECTION,THERAP/PROPH/DIAGNOST, IV PUSH, INITIAL DRUG: ICD-10-PCS | Mod: S$GLB,,, | Performed by: PSYCHIATRY & NEUROLOGY

## 2019-01-10 PROCEDURE — 99999 PR PBB SHADOW E&M-EST. PATIENT-LVL III: ICD-10-PCS | Mod: PBBFAC,,, | Performed by: PSYCHIATRY & NEUROLOGY

## 2019-01-10 RX ORDER — MUPIROCIN 20 MG/G
OINTMENT TOPICAL 2 TIMES DAILY
Refills: 0 | COMMUNITY
Start: 2018-11-14 | End: 2019-01-25

## 2019-01-10 RX ORDER — KETOROLAC TROMETHAMINE 30 MG/ML
30 INJECTION, SOLUTION INTRAMUSCULAR; INTRAVENOUS ONCE
Status: COMPLETED | OUTPATIENT
Start: 2019-01-10 | End: 2019-01-10

## 2019-01-10 RX ORDER — PROMETHAZINE HYDROCHLORIDE 25 MG/1
25 TABLET ORAL EVERY 6 HOURS PRN
Qty: 20 TABLET | Refills: 3 | Status: SHIPPED | OUTPATIENT
Start: 2019-01-10 | End: 2019-11-14 | Stop reason: SDUPTHER

## 2019-01-10 RX ORDER — KETOROLAC TROMETHAMINE 30 MG/ML
INJECTION, SOLUTION INTRAMUSCULAR; INTRAVENOUS
Qty: 10 ML | Refills: 3 | Status: SHIPPED | OUTPATIENT
Start: 2019-01-10 | End: 2019-05-17 | Stop reason: ALTCHOICE

## 2019-01-10 RX ORDER — TIZANIDINE 4 MG/1
4 TABLET ORAL
COMMUNITY
Start: 2018-12-21 | End: 2019-01-20

## 2019-01-10 RX ADMIN — KETOROLAC TROMETHAMINE 30 MG: 30 INJECTION, SOLUTION INTRAMUSCULAR; INTRAVENOUS at 04:01

## 2019-01-10 NOTE — PROGRESS NOTES
Subjective:       Patient ID: Aleyda Plasencia is a 37 y.o. female.    Chief Complaint: Migraine  INTERVAL HISTORY 1/10/19  The patient is not doing well. Botox works for her but she is unable to afford the high co-pay. She is having frequent and severe attacks. She recently had a left knee replacement which was complicated by Pulmonary emboli. She is now on Xarelto. Her pulmonologist has allowed Toradol in moderation. Otherwise information below is still accurate and current.   Headache questionnaire     1. When did your Headaches start?               1997        2. Where are your headaches located?              Temples and back of head        3. Your headache's characteristics:              Excruciating, Pressure        4. How long does the headache last?              days        5. How often does the headache occur?              monthly        6. Are your headaches preceded or accompanied by other symptoms? yes              If yes, please describe.  Vomiting, sensitive, to light and sound         7. Does the headache awaken you at night? yes              If so, how often? Couple times a month            8. Please lety the word that best describes your headache's intensity:               severe            9. Using a scale of 1 through 10, with 0 = no pain and 10 = the worst pain:              What score is your headache now? 7              What score is your headache at its worst? 10              What score is your headache at its best? 0           10. Possible associated headache symptoms:  [x]  Sensitivity to light                  [] Dizziness                    [] Nasal or sinus pressure/ pain              [x] Sensitivity to noise                 [] Vertigo                        [x] Problems with concentration  [x] Sensitivity to smells   [] Ringing in ears           [x] Problems with memory                        [x] Blurred vision             [x] Irritability                     [] Problems with task  "completion   [] Double vision             [x] Anger              []  Problems with relaxation  [] Loss of appetite                     [x] Anxiety                       [] Neck tightness, Neck pain  [x] Nausea                                   [] Nasal congestion  [x] Vomiting                                             11. Headache improving factors:  [x] Sleep              [] Heat  [x] Darkness                    [] Ice  [] Local pressure           [] Menses (period)  [] Massage                    [x] Medications:             12. Headache worsening factors:   [x] Fatigue           [] Sneezing                    [x] Changes in Weather  [x] Light   [] Bending Over [x] Stress  [x] Noise  [] Ovulation                    [] Multiple Sclerosis Flare-Up  [] Smells            [] Menses                      [] Food   [] Coughing        [] Alcohol        13. Number of caffeinated drinks per day: 2        14. Number of diet drinks per day:  0                           HPI  The patient is a 36 y/o female with long history of migraine headache, with periods of remission and exacerbation doing poorly for the last couple of months. . Headache is located in the temples, moderate in intensity, pressure, like a tight band. Weekly attacks last days. She treats escalations with alternation Imitrex 4 mg SQ with Midrin and Ibuprofen 800 mg. In the past has been on Topamax without benefit. Currently on Elavil and Prozac without relief in terms of headache. Intensity 10/10 twice a wek.   Associated symptoms include sensitivity to light, noise. Blurred vision, nausea, dizziness, irritability, anger, anxiety, problems with concentration, memory, task completion. Better with sleep, darkness. Worse with fatigue, light, noise, bending over, stress.  She also states that it is about to have surgery in the right forearm to "clean nerves" from work related injury after a TV fell on her right forearm.    Review of Systems   Constitutional: " Positive for fatigue. Negative for activity change, appetite change and fever.   HENT: Negative for congestion, dental problem, hearing loss, sinus pressure, tinnitus, trouble swallowing and voice change.    Eyes: Negative for photophobia, pain, redness and visual disturbance.   Respiratory: Negative for cough, chest tightness and shortness of breath.    Cardiovascular: Negative for chest pain, palpitations and leg swelling.   Gastrointestinal: Negative for abdominal pain, blood in stool, nausea and vomiting.   Endocrine: Negative for cold intolerance and heat intolerance.   Genitourinary: Negative for difficulty urinating, frequency, menstrual problem and urgency.   Musculoskeletal: Positive for arthralgias, back pain, myalgias and neck pain. Negative for gait problem, joint swelling and neck stiffness.   Skin: Negative.    Neurological: Positive for headaches. Negative for dizziness, tremors, seizures, syncope, facial asymmetry, speech difficulty, weakness, light-headedness and numbness.   Hematological: Negative for adenopathy. Does not bruise/bleed easily.   Psychiatric/Behavioral: Positive for dysphoric mood (depression). Negative for agitation, behavioral problems, confusion, decreased concentration, self-injury, sleep disturbance and suicidal ideas. The patient is not nervous/anxious and is not hyperactive.          Past Medical History   Diagnosis Date    Arthritis      OA    Depression     Migraine      Past Surgical History   Procedure Laterality Date    Fracture surgery       ankle    Knee surgery      Appendectomy      Cholecystectomy       section      Hysterectomy      Gastric sleve      Sinus surgery      Carpal tunnel release Bilateral     Lumbar epidural injection       Family History   Problem Relation Age of Onset    Heart disease Mother      History     Social History    Marital status: Single     Spouse name: N/A    Number of children: N/A    Years of education: N/A      Occupational History    Not on file.     Social History Main Topics    Smoking status: Current Every Day Smoker     Packs/day: 0.50     Types: Cigarettes    Smokeless tobacco: Never Used    Alcohol use: 0.0 oz/week     0 Standard drinks or equivalent per week      Comment: occasionally    Drug use: No    Sexual activity: Not on file     Other Topics Concern    Not on file     Social History Narrative     Allergies   Allergen Reactions    Clarithromycin Swelling     DIFFICULTY SWALLOWING    Pcn [Penicillins] Anaphylaxis    Sulfa (Sulfonamide Antibiotics) Hives    Wellbutrin [Bupropion Hcl] Shortness Of Breath    Betadine [Povidone-Iodine] Hives     Swelling      Cefprozil Hives    Iodinated Contrast Media - Iv Dye Hives    Latex, Natural Rubber Rash     Current Outpatient Prescriptions   Medication Sig    amitriptyline (ELAVIL) 25 MG tablet 25 mg PO every hs    celecoxib (CELEBREX) 200 MG capsule Take 200 mg by mouth once daily.    cetirizine (ZYRTEC) 5 MG tablet Take 5 mg by mouth once daily.    cyanocobalamin 1,000 mcg/mL injection Inject 1,000 mcg into the skin. Twice monthly    cyclobenzaprine (FLEXERIL) 10 MG tablet Take 10 mg by mouth 3 (three) times daily as needed.     diclofenac sodium 1 % Gel daily as needed.     epinephrine (EPIPEN) 0.3 mg/0.3 mL (1:1,000) AtIn 0.3 mg daily as needed.     fluoxetine (PROZAC) 20 MG capsule Take 20 mg by mouth once daily.     hydrocodone-acetaminophen 10-325mg (NORCO)  mg Tab Take 1 tablet by mouth every 12 (twelve) hours as needed (pain).    hydrOXYzine pamoate (VISTARIL) 25 MG Cap Take 25 mg by mouth every 4 (four) hours as needed.     ibuprofen (ADVIL,MOTRIN) 800 MG tablet every 6 (six) hours as needed.     isometheptene-apap-dichloralphenazone [id number] (MIDRIN) -325 mg per capsule Take 1 capsule by mouth 4 (four) times daily as needed.    lorazepam (ATIVAN) 0.5 MG tablet Take 0.5 mg by mouth every 4 (four) hours as needed.      potassium chloride SA (K-DUR,KLOR-CON) 20 MEQ tablet Take 20 mEq by mouth once daily.    promethazine (PHENERGAN) 25 MG tablet Take 1 tablet by mouth every 6 (six) hours as needed (migraine). -MAY CAUSE DROWSINESS-    pseudoephedrine-DM-guaifenesin 45- mg Tab Take 1 tablet by mouth daily as needed.     sumatriptan succinate 4 mg/0.5 mL PnIj daily as needed. imitrex    VOLTAREN 1 % Gel daily as needed.      No current facility-administered medications for this visit.          Objective:      Vitals:    01/10/19 1543   BP: 116/84   Pulse: 81   Resp: 16   Body mass index is 34.71 kg/m².    Physical Exam    Constitutional:   She appears well-developed and well-nourished. She is well groomed  HENT:    Head: Atraumatic, oral and nasal mucosa intact  Eyes: Conjunctivae and EOM are normal. Pupils are equal, round, and reactive to light OU  Neck: Neck supple. No thyromegaly present  Cardiovascular: Normal rate and normal heart sounds  No murmur heard  Pulmonary/Chest: Effort normal and breath sounds normal  Musculoskeletal: Normal range of motion. No joint stiffness. No vertebral point tenderness  Skin: Skin is warm and dry  Psychiatric: Normal mood and affect     Neuro exam:    Mental status:  Awake, attentive, Alert, oriented to self, place, year and month  Language function is intact  No findings to suggest executive dysfuntion    Patient has adequate insight      Cranial Nerves:  Smell was not formally evaluated  Cranial Nerves II - XII: intact  Pursuits were smooth, normal saccades, no nystagmus OU  Motor - facial movement was symmetrical and normal     Palate moved well and was symmetrical with normal palatal and oral sensation  Tongue movements were full    Motor:  Normal muscle bulk and symmetry. No fasciculations were noted    No pronator drift  Strength 5/5 bilaterally     Reflexes:  Tendon reflexes were 2 + at biceps, triceps, brachioradialis, patellar, and 1+ Achilles   On plantar stimulation toes  were down going bilaterally  No clonus was noted     Sensory: Intact to light touch, pin prick in all extremities.    Gait: Romberg absent. Normal gait.     Review of Data:   Lab Results   Component Value Date    BNP <10 12/03/2018     (L) 12/03/2018    K 3.7 12/03/2018    MG 2.3 11/21/2018     12/03/2018    CO2 28 12/03/2018    BUN 8 12/03/2018    CREATININE 0.7 12/03/2018    GLU 99 12/03/2018    AST 23 12/03/2018    ALT 18 12/03/2018    ALBUMIN 3.5 12/03/2018    PROT 7.4 12/03/2018    BILITOT 0.5 12/03/2018    CHOL 196 11/21/2018    HDL 39 (L) 11/21/2018    LDLCALC 128.6 11/21/2018    TRIG 142 11/21/2018       Lab Results   Component Value Date    WBC 7.30 12/03/2018    HGB 12.4 12/03/2018    HCT 36.3 (L) 12/03/2018    MCV 91 12/03/2018     12/03/2018     Results for orders placed or performed during the hospital encounter of 07/12/16   MRI Brain W WO Contrast    Narrative    Sagittal and axial images are obtained with T1 weighting.  Additional axial images are obtained with T2, flair, diffusion weighting and ADC map.  Following the administration of IV gadolinium contrast 5mls, axial and coronal images are obtained.  Findings L. coronal and sagittal images are obtained through the sella turcica pre-and post contrast as well.    There is a 4 mm round pituitary adenoma seen just to the left of midline.  This does not distorted the rest of the pituitary gland or impinge upon the suprasellar cistern or optic chiasm.  The infundibulum is not deviated significantly.  This is best seen on the T2 and flair weighted imaging.  It does not show significant gadolinium enhancement on T1 weighting.    The general brain anatomy appears within normal limits.  There is normal medulla, beck, brainstem, basal ganglia, corpus callosum, cerebral and cerebellar lobar structure.  The internal auditory canals are sharp.  The basal cisterns are clear and symmetric.    There is no mass-effect or midline shift.  The  ventricles are of normal size and symmetric.  The gray-white matter differentiation is normal.  Within the brain parenchyma no hyper or hypointense lesions consistent with tumor, edema, CVA or hemorrhage are seen..    Following administration of gadolinium contrast, no abnormal areas of contrast enhancement are seen.    Impression    4 mm pituitary adenoma identified to the left of midline.  The rest of the MR of the brain with and without gadolinium is negative.  ______________________________________     Electronically signed by: Jose Salas MD  Date:     07/12/16  Time:    09:37      The patient reported a protracted, severe headache. I injected 30 mg of IV Toradol very slow push.          Assessment and Plan   The patient suffers from Migraine without aura recently exacerbated for the last 2 months. She has been on Topamax without benefit. Currently on Elavil and Prozac without relief in terms of headache. She was responding well to Botox but unable to afford the kenna copayment associated with the treatment. She is an excellent candidate for Botox treatment as she cannot take antihypertensives (blood pressure too low) and has multiple medical problems.  Trial of Aimovig  Pituitary adenoma, will repeat MRI given the recent worsening of her headaches  Facet arthropathy and DJD, followed by Dr. Ramirez.    Counseling:  More than 50% of the 25 minute encounter was spent face to face counseling the patient regarding current status and future plan of care as well as side of the medications. All questions were answered to patient's satisfaction        Madisyn Rojas M.D  Medical Director, Headache and Facial Pain  [facility]

## 2019-01-11 ENCOUNTER — TELEPHONE (OUTPATIENT)
Dept: PHARMACY | Facility: CLINIC | Age: 39
End: 2019-01-11

## 2019-01-15 NOTE — TELEPHONE ENCOUNTER
DOCUMENTATION ONLY:  Prior Authorization for Aimovig approved from 01/15/19 to 04/14/19    Case Id: [id number]    Co-pay: $40    Patient Assistance IS required.    DOCUMENTATION ONLY:  Financial Assistance for Aimovig approved.  Source: Aimovig Copay Card  BIN : [id number]  PCN : [id number]  ID : [id number]  Group: [id number]  Co-pay: $5.00    Forwarded to the clinical pharmacist for consult and shipment.    -ARR

## 2019-01-17 ENCOUNTER — PATIENT MESSAGE (OUTPATIENT)
Dept: NEUROLOGY | Facility: CLINIC | Age: 39
End: 2019-01-17

## 2019-01-18 ENCOUNTER — PATIENT MESSAGE (OUTPATIENT)
Dept: NEUROLOGY | Facility: CLINIC | Age: 39
End: 2019-01-18

## 2019-01-18 ENCOUNTER — TELEPHONE (OUTPATIENT)
Dept: NEUROLOGY | Facility: CLINIC | Age: 39
End: 2019-01-18

## 2019-01-18 RX ORDER — HYDROCODONE BITARTRATE AND IBUPROFEN 7.5; 2 MG/1; MG/1
TABLET, FILM COATED ORAL
Qty: 14 TABLET | Refills: 0 | Status: SHIPPED | OUTPATIENT
Start: 2019-01-18 | End: 2019-01-25

## 2019-01-18 NOTE — TELEPHONE ENCOUNTER
----- Message from Jean-Claude Edmond sent at 1/18/2019  3:57 PM CST -----  Contact: [facility]  Type: Needs Medical Advice    Who Called:  [facility]  Best Call Back Number: [phone]  Additional Information: Seeking update on prior auth for medications that was sent to office. Please advise

## 2019-01-24 ENCOUNTER — PATIENT MESSAGE (OUTPATIENT)
Dept: NEUROLOGY | Facility: CLINIC | Age: 39
End: 2019-01-24

## 2019-01-25 ENCOUNTER — TELEPHONE (OUTPATIENT)
Dept: PHARMACY | Facility: CLINIC | Age: 39
End: 2019-01-25

## 2019-01-25 NOTE — TELEPHONE ENCOUNTER
Initial Aimovig consult completed on . Aimovig 140mg will be shipped on  to arrive at patient's home on  via [employer]. $5.00 copay and permission to charge CC on file. Patient intends to start Aimovig once received on . Address confirmed. Confirmed 2 patient identifiers - name and . Therapy Appropriate.    Counseled patient on administration directions:  - Inject 140 mg (two auto-injectors) into the skin every 28 days.   - Take out of the refrigerator 30-60 minutes prior to injection.  - Wash hands before and after injection.  - Monthly RX will come with gauze, bandaids, and alcohol swabs.  - Patient may inject in either the tops of the thighs, abdomen- but at least 2 inches away from belly button, or the outer part of her upper arm (with assistance). If injecting 2 pens, use 2 different injection sites.  - Patient is to wipe down the injection sit.e with the alcohol pad, wait to dry.    - Remove white cap from pen  - Gently squeeze OR stretch the area of the cleaned skin and hold it firmly.  Place the pen flat against the skin then push down on the purple button and release - there will be an initial click; in 10-15 seconds you will hear a second click and the window will go from from clear to yellow, indicating injection is complete.  - Patient should rotate injection sites.   - Patient will use sharps container; once full, per [place] law, she/ he may lock the sharps container and place in trash. Pharmacy will replace the sharps at no additional charge.    Patient was counseled on possible side effects:  - Injection site reaction: redness, soreness, itching, bruising, which should resolve within 3-5 days.  - constipation  - muscle cramping/spasms    Advised to keep a calendar to stay compliant. Consultation included: indication; goals of treatment (may take 3 months to reach full efficacy); administration; storage and handling; side effects; how to handle side effects; the importance of compliance; the  importance of keeping all follow up appointments.  Patient understands to report any medication changes to [facility] and provider. All questions answered and addressed to patients satisfaction. I will f/u with patient in 7-10 days from start, [facility] to contact patient in 3 weeks for refills.     InBasket sent 11:18 am on 1/25/19    Rohit Douglass, PharmD  Clinical Pharmacist   [facility]   P: [phone]

## 2019-01-31 ENCOUNTER — TELEPHONE (OUTPATIENT)
Dept: HEMATOLOGY/ONCOLOGY | Facility: CLINIC | Age: 39
End: 2019-01-31

## 2019-01-31 ENCOUNTER — OFFICE VISIT (OUTPATIENT)
Dept: ORTHOPEDICS | Facility: CLINIC | Age: 39
End: 2019-01-31
Payer: COMMERCIAL

## 2019-01-31 VITALS
HEART RATE: 76 BPM | WEIGHT: 255 LBS | BODY MASS INDEX: 34.54 KG/M2 | DIASTOLIC BLOOD PRESSURE: 74 MMHG | HEIGHT: 72 IN | SYSTOLIC BLOOD PRESSURE: 127 MMHG

## 2019-01-31 DIAGNOSIS — M17.11 PRIMARY OSTEOARTHRITIS OF RIGHT KNEE: Primary | ICD-10-CM

## 2019-01-31 PROCEDURE — 3008F PR BODY MASS INDEX (BMI) DOCUMENTED: ICD-10-PCS | Mod: CPTII,S$GLB,, | Performed by: ORTHOPAEDIC SURGERY

## 2019-01-31 PROCEDURE — 20610 LARGE JOINT ASPIRATION/INJECTION: R KNEE: ICD-10-PCS | Mod: 79,RT,S$GLB, | Performed by: ORTHOPAEDIC SURGERY

## 2019-01-31 PROCEDURE — 99024 PR POST-OP FOLLOW-UP VISIT: ICD-10-PCS | Mod: S$GLB,,, | Performed by: ORTHOPAEDIC SURGERY

## 2019-01-31 PROCEDURE — 20610 DRAIN/INJ JOINT/BURSA W/O US: CPT | Mod: 79,RT,S$GLB, | Performed by: ORTHOPAEDIC SURGERY

## 2019-01-31 PROCEDURE — 3008F BODY MASS INDEX DOCD: CPT | Mod: CPTII,S$GLB,, | Performed by: ORTHOPAEDIC SURGERY

## 2019-01-31 PROCEDURE — 99024 POSTOP FOLLOW-UP VISIT: CPT | Mod: S$GLB,,, | Performed by: ORTHOPAEDIC SURGERY

## 2019-01-31 PROCEDURE — 99999 PR PBB SHADOW E&M-EST. PATIENT-LVL III: ICD-10-PCS | Mod: PBBFAC,,, | Performed by: ORTHOPAEDIC SURGERY

## 2019-01-31 PROCEDURE — 99213 OFFICE O/P EST LOW 20 MIN: CPT | Mod: 24,25,S$GLB, | Performed by: ORTHOPAEDIC SURGERY

## 2019-01-31 PROCEDURE — 99999 PR PBB SHADOW E&M-EST. PATIENT-LVL III: CPT | Mod: PBBFAC,,, | Performed by: ORTHOPAEDIC SURGERY

## 2019-01-31 PROCEDURE — 99213 PR OFFICE/OUTPT VISIT, EST, LEVL III, 20-29 MIN: ICD-10-PCS | Mod: 24,25,S$GLB, | Performed by: ORTHOPAEDIC SURGERY

## 2019-01-31 RX ADMIN — TRIAMCINOLONE ACETONIDE 40 MG: 40 INJECTION, SUSPENSION INTRA-ARTICULAR; INTRAMUSCULAR at 11:01

## 2019-01-31 NOTE — TELEPHONE ENCOUNTER
Called and spoke to pt to schedule referral apt from Dr. Abdalla. Pt confirmed apt scheduled and verbalized understanding.

## 2019-01-31 NOTE — TELEPHONE ENCOUNTER
----- Message from Lucy Landrum LPN sent at 1/31/2019  1:38 PM CST -----  Pt being referred by Dr. Cardenas for eval for blood clotting disorder due to PE. Please call pt to schedule soonest available appointment. Thanks!

## 2019-02-01 RX ORDER — TRIAMCINOLONE ACETONIDE 40 MG/ML
40 INJECTION, SUSPENSION INTRA-ARTICULAR; INTRAMUSCULAR
Status: DISCONTINUED | OUTPATIENT
Start: 2019-01-31 | End: 2019-02-01 | Stop reason: HOSPADM

## 2019-02-01 NOTE — PROCEDURES
Large Joint Aspiration/Injection: R knee  Date/Time: 1/31/2019 11:06 AM  Performed by: Feliberto Cardenas MD  Authorized by: Feliberto Cardenas MD     Consent Done?:  Yes (Verbal)  Indications:  Pain  Procedure site marked: Yes    Timeout: Prior to procedure the correct patient, procedure, and site was verified      Location:  Knee  Site:  R knee  Prep: Patient was prepped and draped in usual sterile fashion    Needle size:  18 G  Approach:  Lateral  Medications:  40 mg triamcinolone acetonide 40 mg/mL  Aspirate amount (ml):  30  Aspirate:  Serous and yellow  Patient tolerance:  Patient tolerated the procedure well with no immediate complications

## 2019-02-01 NOTE — PROGRESS NOTES
Past Medical History:   Diagnosis Date    Arthritis     OA    Back pain     Cancer     skin cancer to back    Depression     Full dentures     Migraine     Seizures     Sleep apnea     Does not use c-pap since weight loss - 170 lbs    Wears glasses        Past Surgical History:   Procedure Laterality Date    APPENDECTOMY      ARTHROPLASTY, KNEE Left 2018    [facility]    ARTHROSCOPY-KNEE Left 2016    [facility]    BLOCK-NERVE-MEDIAL BRANCH-LUMBAR Bilateral 2016    [facility]    CARPAL TUNNEL RELEASE Bilateral      SECTION      CHOLECYSTECTOMY      CHONDROPLASTY-KNEE-ARTHROSCOPY Left 2016    [facility] OR    FRACTURE SURGERY      ankle    gastric sleve      HYSTERECTOMY      HYSTERECTOMY      INJECTION-STEROID-EPIDURAL-LUMBAR N/A 2016    [facility]    INJECTION-STEROID-EPIDURAL-LUMBAR N/A 3/28/2016    [facility]    JOINT REPLACEMENT Left 2018    KNEE SURGERY      LUMBAR EPIDURAL INJECTION      RADIAL NERVE Right     RADIOFREQUENCY THERMOCOAGULATION (RFTC)-NERVE-MEDIAN BRANCH-LUMBAR Bilateral 2017    [facility]    RADIOFREQUENCY THERMOCOAGULATION (RFTC)-NERVE-MEDIAN BRANCH-LUMBAR Bilateral 2016    [facility]    RECONSTRUCTION- LIGAMENT-MPFL RECONSTRUCTION WITH ALLOGRAFT Left 2017    [facility] OR    SINUS SURGERY         Current Outpatient Medications   Medication Sig    amitriptyline (ELAVIL) 25 MG tablet TAKE ONE TABLET BY MOUTH EVERY NIGHT FOR 2 WEEKS THEN INCREASE TO 2 TABLETS AT BEDTIME    cetirizine (ZYRTEC) 5 MG tablet Take 5 mg by mouth as needed.     cyanocobalamin, vitamin B-12, 1,000 mcg/mL Kit Inject 1 mL into the muscle every 21  days.    epinephrine (EPIPEN) 0.3 mg/0.3 mL (1:1,000) AtIn 0.3 mg daily as needed.     erenumab-aooe (AIMOVIG AUTOINJECTOR) 70 mg/mL AtIn Inject 2 mLs (140 mg total) into the skin every 28 days.    fluoxetine (PROZAC) 20 MG capsule every evening.     hydrOXYzine pamoate (VISTARIL) 25 MG Cap TAKE ONE CAPSULE BY MOUTH 3 TIMES A DAY AS NEEDED FOR ANXIETY    ibuprofen (ADVIL,MOTRIN) 800 MG tablet every 6 (six) hours as needed.     ketorolac (TORADOL) 60 mg/2 mL Soln Please provide patients with IM syringes    lorazepam (ATIVAN) 0.5 MG tablet Take 0.5 mg by mouth every 4 (four) hours as needed.     oxycodone (OXYCONTIN) 15 mg TR12 12 hr tablet Take 1 tablet (15 mg total) by mouth every 12 (twelve) hours. (Patient taking differently: Take 5 mg by mouth every 4 (four) hours. )    potassium chloride SA (K-DUR,KLOR-CON) 20 MEQ tablet Take 20 mEq by mouth as needed.     promethazine (PHENERGAN) 25 MG tablet Take 1 tablet (25 mg total) by mouth every 6 (six) hours as needed for Nausea. Take 1 tablet by mouth every 6 (six) hours as needed (migraine). -MAY CAUSE DROWSINESS-    pseudoephedrine-DM-guaifenesin 45- mg Tab Take 1 tablet by mouth daily as needed.     rivaroxaban (XARELTO) 10 mg Tab Take 2 tablets (20 mg total) by mouth daily with dinner or evening meal. (Patient taking differently: Take 25 mg by mouth daily with dinner or evening meal. )    VOLTAREN 1 % Gel daily as needed.     sumatriptan succinate 4 mg/0.5 mL PnIj Inject 4 mg into the skin daily as needed. imitrex     Current Facility-Administered Medications   Medication    onabotulinumtoxina injection 200 Units    onabotulinumtoxina injection 200 Units       Review of patient's allergies indicates:   Allergen Reactions    Clarithromycin Swelling and Other (See Comments)     DIFFICULTY SWALLOWING  DIFFICULTY SWALLOWING    Pcn [penicillins] Anaphylaxis    Sulfa (sulfonamide antibiotics) Hives    Wellbutrin [bupropion hcl] Shortness Of  Breath and Anaphylaxis    Betadine [povidone-iodine] Hives     Swelling      Cefprozil Hives    Iodinated contrast- oral and iv dye Hives    Latex, natural rubber Rash    Sulfamethoxazole-trimethoprim Nausea And Vomiting    Iodine Hives and Swelling    Latex Hives and Swelling       Family History   Problem Relation Age of Onset    Heart disease Mother     Heart disease Father        Social History     Socioeconomic History    Marital status:      Spouse name: Not on file    Number of children: Not on file    Years of education: Not on file    Highest education level: Not on file   Social Needs    Financial resource strain: Not on file    Food insecurity - worry: Not on file    Food insecurity - inability: Not on file    Transportation needs - medical: Not on file    Transportation needs - non-medical: Not on file   Occupational History    Not on file   Tobacco Use    Smoking status: Former Smoker     Packs/day: 0.50     Years: 20.00     Pack years: 10.00     Types: Cigarettes     Last attempt to quit: 2018     Years since quittin.2    Smokeless tobacco: Never Used   Substance and Sexual Activity    Alcohol use: Yes     Alcohol/week: 0.0 oz     Comment: occasionally    Drug use: Yes     Types: Other-see comments    Sexual activity: Yes     Partners: Male   Other Topics Concern    Not on file   Social History Narrative    Not on file       Chief Complaint:   Chief Complaint   Patient presents with    Knee Pain     L knee f/u       Date of surgery:  2018    History of present illness:  38-year-old female who underwent left total knee arthroplasty. Patient had a complication of a postop PE about a week following surgery. She is currently on Xarelto.  Patient is doing well with outpatient physical therapy.  Her main complaint is the right knee now.  She feels like the left knee was successful and she like to go ahead and try and get the right knee done because she  is having more pain in this knee. She cannot get off the Xarelto at this time until the clot has resolved though.  Having more swelling in the right knee as well.  Pain is 6/10.      Review of Systems:    Musculoskeletal:  See HPI        Physical Examination:    Vital Signs:    Vitals:    01/31/19 1316   BP: 127/74   Pulse: 76       Body mass index is 34.58 kg/m².    This a well-developed, well nourished patient in no acute distress.  They are alert and oriented and cooperative to examination.  Pt. walks with a mild antalgic gait.    Examination left knee shows healed surgical incision. No drainage or erythema.  Range of motion is 5-115 degrees. No calf pain. Negative Homans sign    Examination of the right knee shows no rashes or erythema. There are no masses ecchymosis and a small effusion. Patient has full range of motion from 0-130°. Patient is nontender to palpation over lateral joint line and nontender to palpation over the medial joint line. Knee is stable to varus and valgus stress. 5 out of 5 motor strength. Palpable distal pulses. Intact light touch sensation. Negative Patellofemoral crepitus    X-rays:  Knee two views of the left knee are reviewed which show well-aligned total knee arthroplasty components. Right knee arthritis     Assessment::  Status post left total knee arthroplasty  Postop pulmonary embolism  Right knee arthritis    Plan:  I recommended aspiration and injection of her right knee.  She cannot proceed with a right knee surgery at this time because she cannot off the blood thinners.  Follow-up in a couple months.    This note was created using M Mod[employer] voice recognition software that occasionally misinterpreted phrases or words.

## 2019-02-18 ENCOUNTER — TELEPHONE (OUTPATIENT)
Dept: PHARMACY | Facility: CLINIC | Age: 39
End: 2019-02-18

## 2019-02-20 ENCOUNTER — OFFICE VISIT (OUTPATIENT)
Dept: HEMATOLOGY/ONCOLOGY | Facility: CLINIC | Age: 39
End: 2019-02-20
Payer: COMMERCIAL

## 2019-02-20 VITALS
HEART RATE: 89 BPM | SYSTOLIC BLOOD PRESSURE: 115 MMHG | OXYGEN SATURATION: 97 % | DIASTOLIC BLOOD PRESSURE: 62 MMHG | HEIGHT: 72 IN | WEIGHT: 251.75 LBS | BODY MASS INDEX: 34.1 KG/M2 | TEMPERATURE: 98 F | RESPIRATION RATE: 20 BRPM

## 2019-02-20 DIAGNOSIS — I27.82 OTHER CHRONIC PULMONARY EMBOLISM WITHOUT ACUTE COR PULMONALE: Primary | ICD-10-CM

## 2019-02-20 DIAGNOSIS — G89.29 OTHER CHRONIC PAIN: ICD-10-CM

## 2019-02-20 DIAGNOSIS — Z79.01 ANTICOAGULATED: ICD-10-CM

## 2019-02-20 PROBLEM — R01.1 MURMUR, HEART: Status: RESOLVED | Noted: 2018-11-19 | Resolved: 2019-02-20

## 2019-02-20 PROCEDURE — 99204 OFFICE O/P NEW MOD 45 MIN: CPT | Mod: S$GLB,,, | Performed by: INTERNAL MEDICINE

## 2019-02-20 PROCEDURE — 99999 PR PBB SHADOW E&M-EST. PATIENT-LVL III: ICD-10-PCS | Mod: PBBFAC,,, | Performed by: INTERNAL MEDICINE

## 2019-02-20 PROCEDURE — 99999 PR PBB SHADOW E&M-EST. PATIENT-LVL III: CPT | Mod: PBBFAC,,, | Performed by: INTERNAL MEDICINE

## 2019-02-20 PROCEDURE — 99204 PR OFFICE/OUTPT VISIT, NEW, LEVL IV, 45-59 MIN: ICD-10-PCS | Mod: S$GLB,,, | Performed by: INTERNAL MEDICINE

## 2019-02-20 PROCEDURE — 3008F BODY MASS INDEX DOCD: CPT | Mod: CPTII,S$GLB,, | Performed by: INTERNAL MEDICINE

## 2019-02-20 PROCEDURE — 3008F PR BODY MASS INDEX (BMI) DOCUMENTED: ICD-10-PCS | Mod: CPTII,S$GLB,, | Performed by: INTERNAL MEDICINE

## 2019-02-20 RX ORDER — DABIGATRAN ETEXILATE 150 MG/1
150 CAPSULE ORAL 2 TIMES DAILY
Qty: 60 CAPSULE | Refills: 2 | Status: SHIPPED | OUTPATIENT
Start: 2019-02-20 | End: 2019-05-17

## 2019-02-20 NOTE — LETTER
February 20, 2019      Feliberto Cardenas MD  [address] [place] [address] [address]  [place] [address]           [facility]  [address], [address]  [facility] [address]  Phone: [phone]          Patient: Aleyda Costa   MR Number: [id number]   YOB: 1980   Date of Visit: 2/20/2019       Dear Dr. Feliberto Cardenas:    Thank you for referring Aleyda Costa to me for evaluation. Attached you will find relevant portions of my assessment and plan of care.    If you have questions, please do not hesitate to call me. I look forward to following Aleyda Costa along with you.    Sincerely,    Rabia Pina MD    Enclosure  CC:  No Recipients    If you would like to receive this communication electronically, please contact [email or web address] or [phone] to request more information on [employer] access.    For providers and/or their staff who would like to refer a patient to [facility], please contact us through our one-stop-shop provider referral line, [facility], at [phone].    If you feel you have received this communication in error or would no longer like to receive these types of communications, please e-mail [email or web address]

## 2019-02-20 NOTE — PROGRESS NOTES
CC I have a clot in my lungs    Aleyda Costa is a 38 y.o.    Pt had undergone a total left knee replacement in November 2018 when a few days later she presented to ER with increasing SOB. Workup revealed bilateral PE. SHe was placed on xarelto. SHe had an ultrasound of legs which was negative. SHe has recently undergone a repeat CTA that showed no resolution of the emboli.   Pt drinks water and tea   Pt had a gastric sleeve in 2014.       CTA Chest Non-Coronary - PE Study   Order: [id number]   Status:  Final result   Visible to patient:  Yes (Patient Portal) Next appt:  04/10/2019 at 02:40 PM in Neurology (Lola Rojas MD)   Details     Reading Physician Reading Date Result Priority   Dominik Sexton MD 12/4/2018       Narrative     EXAMINATION:  CTA CHEST NON CORONARY    CLINICAL HISTORY:  Chest pain, acute, PE suspected, high pretest prob;    TECHNIQUE:  Low dose axial images, sagittal and coronal reformations were obtained from the thoracic inlet to the lung bases following the IV administration of 100 mL of Omnipaque 350.  Contrast timing was optimized to evaluate the pulmonary arteries.  MIP images were performed.    COMPARISON:  11/20/2018    FINDINGS:  Structures at the base of the neck are unremarkable.  Small subpleural airspace opacity in the right lower lobe.  There are 2 pulmonary nodules in the left lower lobe with reference measuring 5 mm series 2, image 136.  No pleural effusion or pneumothorax.  There is a filling defect within a segmental artery in the right lower lobe, partially occlusive.  There is also a filling defect within a segmental artery in the left lower lobe, partially occlusive.  Normal size heart with no evidence for right heart strain.  No adenopathy.  Small hiatal hernia.  Gastric sleeve and cholecystectomy.  Punctate low density in the glenohumeral joints, nonspecific, possibly reflective of vacuum phenomenon.      Impression       1. Bilateral pulmonary emboli at  the segmental level.  Slight decrease in clot burden.  2. Small infarct in the right lower lobe, decreased in size.  3. Left lung pulmonary nodules. In a low risk patient, no further follow-up is recommended.  In a high-risk patient, 12 month follow-up CT could be considered.      Electronically signed by: Dominik Carlie  Date: 12/04/2018  Time: 08:57                       CTA Chest Non Coronary   Order: [id number]   Status:  Final result   Visible to patient:  Yes (Patient Portal) Next appt:  04/10/2019 at 02:40 PM in Neurology (Lola Rojas MD)   Details     Reading Physician Reading Date Result Priority   Lily Mcgowan MD 11/21/2018       Narrative     EXAMINATION:  CTA CHEST NON CORONARY    CLINICAL HISTORY:  pulmonary embolism;    TECHNIQUE:  Low dose axial images, sagittal and coronal reformations were obtained from the thoracic inlet to the lung bases following the IV administration of 100 mL of Omnipaque 350.  Contrast timing was optimized to evaluate the pulmonary arteries.  MIP images were performed.    COMPARISON:  None    FINDINGS:  The visualized portion of the thyroid gland is unremarkable in appearance.  There are small shotty appearing mediastinal nodes.  There is no pleural or pericardial effusion.  There are postoperative changes in the visualized upper abdomen a prior cholecystectomy and gastric reduction surgery and with small hiatal hernia versus dilatation of the distal esophagus.  Significant hilar adenopathy is not seen    There are pulmonary artery emboli.  There are intraluminal filling defects within segmental and subsegmental branches of the right lower lobe nearly occlusive.  There are filling defects in subsegmental branches of the left lower lobe at least 1 of which appearing nearly occlusive.  There is peripheral wedge-shaped opacity posteriorly right lower lobe suggesting pulmonary infarct.  There also dependent hypoventilatory changes and atelectatic stranding.       Impression       Pulmonary artery emboli    Right posterior basilar opacification favoring pulmonary infarct.    Additional findings as detailed above    Final read    Virtual Radiology concordant      Electronically signed by: Lily Mcgowan MD  Date: 11/21/2018  Time: 10:25           US Lower Extremity Veins Bilateral   Order: [id number]   Status:  Final result   Visible to patient:  Yes (Patient Portal) Next appt:  04/10/2019 at 02:40 PM in Neurology (Lola Rojas MD)   Details     Reading Physician Reading Date Result Priority   Lily Mcgowan MD 11/19/2018       Narrative     EXAMINATION:  US LOWER EXTREMITY VEINS BILATERAL    CLINICAL HISTORY:  eval for DVT;    TECHNIQUE:  Duplex and color flow Doppler evaluation of the bilateral lower extremity veins was performed.    COMPARISON:  None    FINDINGS:  Thigh veins: Both common femoral, femoral, popliteal, proximal medial saphenous, are patent and free of thrombus. The veins are normally compressible and have normal phasic flow and augmentation response.    Calf veins: The paired peroneal and posterior tibial and anterior tibial calf veins are patent bilaterally.      Impression       No evidence of deep venous thrombosis in either lower extremity.      Electronically signed by: Lily Mcgowan MD  Date: 11/19/2018  Time: 09:34                               Past Medical History:   Diagnosis Date    Arthritis     OA    Back pain     Cancer     skin cancer to back    Depression     Full dentures     Migraine     Seizures     Sleep apnea     Does not use c-pap since weight loss - 170 lbs    Wears glasses      Past Surgical History:   Procedure Laterality Date    APPENDECTOMY      ARTHROPLASTY, KNEE Left 11/13/2018    [facility]    ARTHROSCOPY-KNEE Left 11/22/2016    [facility]    BLOCK-NERVE-MEDIAL BRANCH-LUMBAR Bilateral 5/26/2016    [facility] at  [facility] OR    CARPAL TUNNEL RELEASE Bilateral      SECTION      CHOLECYSTECTOMY      CHONDROPLASTY-KNEE-ARTHROSCOPY Left 2016    [facility]    FRACTURE SURGERY      ankle    gastric sleve      HYSTERECTOMY      HYSTERECTOMY      INJECTION-STEROID-EPIDURAL-LUMBAR N/A 2016    [facility] at [facility]    INJECTION-STEROID-EPIDURAL-LUMBAR N/A 3/28/2016    [facility] at [facility]    JOINT REPLACEMENT Left 2018    KNEE SURGERY      LUMBAR EPIDURAL INJECTION      RADIAL NERVE Right     RADIOFREQUENCY THERMOCOAGULATION (RFTC)-NERVE-MEDIAN BRANCH-LUMBAR Bilateral 2017    [facility] at [facility]    RADIOFREQUENCY THERMOCOAGULATION (RFTC)-NERVE-MEDIAN BRANCH-LUMBAR Bilateral 2016    [facility] at [facility]    RECONSTRUCTION- LIGAMENT-MPFL RECONSTRUCTION WITH ALLOGRAFT Left 2017    [facility] at [facility] OR    SINUS SURGERY         Current Outpatient Medications:     amitriptyline (ELAVIL) 25 MG tablet, TAKE ONE TABLET BY MOUTH EVERY NIGHT FOR 2 WEEKS THEN INCREASE TO 2 TABLETS AT BEDTIME, Disp: , Rfl:     cetirizine (ZYRTEC) 5 MG tablet, Take 5 mg by mouth as needed. , Disp: , Rfl:     cyanocobalamin, vitamin B-12, 1,000 mcg/mL Kit, Inject 1 mL into the muscle every 21 days., Disp: , Rfl:     epinephrine (EPIPEN) 0.3 mg/0.3 mL (1:1,000) AtIn, 0.3 mg daily as needed. , Disp: , Rfl:     erenumab-aooe (AIMOVIG AUTOINJECTOR) 70 mg/mL AtIn, Inject 2 mLs (140 mg total) into the skin every 28 days., Disp: 2 mL, Rfl: 5    fluoxetine (PROZAC) 20 MG capsule, every evening. , Disp: , Rfl: 3    hydrOXYzine pamoate (VISTARIL) 25 MG Cap, TAKE ONE CAPSULE BY MOUTH 3 TIMES A DAY AS NEEDED FOR ANXIETY, Disp: , Rfl:     ibuprofen (ADVIL,MOTRIN) 800 MG tablet, every 6 (six) hours as needed. , Disp: , Rfl: 5    ketorolac (TORADOL) 60 mg/2 mL Soln, Please provide  patients with IM syringes, Disp: 10 mL, Rfl: 3    lorazepam (ATIVAN) 0.5 MG tablet, Take 0.5 mg by mouth every 4 (four) hours as needed. , Disp: , Rfl:     oxycodone (OXYCONTIN) 15 mg TR12 12 hr tablet, Take 1 tablet (15 mg total) by mouth every 12 (twelve) hours. (Patient taking differently: Take 5 mg by mouth every 4 (four) hours. ), Disp: 20 tablet, Rfl: 0    potassium chloride SA (K-DUR,KLOR-CON) 20 MEQ tablet, Take 20 mEq by mouth as needed. , Disp: , Rfl: 3    promethazine (PHENERGAN) 25 MG tablet, Take 1 tablet (25 mg total) by mouth every 6 (six) hours as needed for Nausea. Take 1 tablet by mouth every 6 (six) hours as needed (migraine). -MAY CAUSE DROWSINESS-, Disp: 20 tablet, Rfl: 3    pseudoephedrine-DM-guaifenesin 45- mg Tab, Take 1 tablet by mouth daily as needed. , Disp: , Rfl:     rivaroxaban (XARELTO) 10 mg Tab, Take 2 tablets (20 mg total) by mouth daily with dinner or evening meal. (Patient taking differently: Take 25 mg by mouth daily with dinner or evening meal. ), Disp: 30 tablet, Rfl: 6    sumatriptan succinate 4 mg/0.5 mL PnIj, Inject 4 mg into the skin daily as needed. imitrex, Disp: 6 each, Rfl: 5    VOLTAREN 1 % Gel, daily as needed. , Disp: , Rfl: 3    Current Facility-Administered Medications:     onabotulinumtoxina injection 200 Units, 1 vial, Intramuscular, 1 time in Clinic/HOD, Lola Rojas MD    onabotulinumtoxina injection 200 Units, 200 Units, Intramuscular, Q90 Days, Lola Rojas MD, 200 Units at 10/18/18 1603  Review of patient's allergies indicates:   Allergen Reactions    Clarithromycin Swelling and Other (See Comments)     DIFFICULTY SWALLOWING  DIFFICULTY SWALLOWING    Pcn [penicillins] Anaphylaxis    Sulfa (sulfonamide antibiotics) Hives    Wellbutrin [bupropion hcl] Shortness Of Breath and Anaphylaxis    Betadine [povidone-iodine] Hives     Swelling      Cefprozil Hives    Iodinated contrast- oral and iv dye Hives    Latex, natural  rubber Rash    Sulfamethoxazole-trimethoprim Nausea And Vomiting    Iodine Hives and Swelling    Latex Hives and Swelling     Social History     Tobacco Use    Smoking status: Former Smoker     Packs/day: 0.50     Years: 20.00     Pack years: 10.00     Types: Cigarettes     Last attempt to quit: 2018     Years since quittin.2    Smokeless tobacco: Never Used   Substance Use Topics    Alcohol use: Yes     Alcohol/week: 0.0 oz     Comment: occasionally    Drug use: Yes     Types: Other-see comments     Family History   Problem Relation Age of Onset    Heart disease Mother     Heart disease Father    Her mom dies while on coumadin   Dad required plavix     CONSTITUTIONAL: No fevers, chills, night sweats, wt. loss, appetite changes  SKIN: no rashes or itching  ENT: No headaches, head trauma, vision changes, or eye pain  LYMPH NODES: None noticed   CV: No chest pain, palpitations.   RESP: No dyspnea on exertion, cough, wheezing, or hemoptysis  GI: No nausea, emesis, diarrhea, constipation, melena, hematochezia, pain.   : No dysuria, hematuria, urgency, or frequency   HEME: No easy bruising, bleeding problems  PSYCHIATRIC: No depression, anxiety, psychosis, hallucinations.  NEURO: No seizures, memory loss, dizziness or difficulty sleeping  MSK: No arthralgias or joint swelling         /62   Pulse 89   Temp 97.8 °F (36.6 °C)   Resp 20   Ht 6' (1.829 m)   Wt 114.2 kg (251 lb 12.3 oz)   SpO2 97%   BMI 34.15 kg/m²   Gen: NAD, A and O x3,   Psych: pleasant affect, normal thought process  Eyes: Pupils round and non dilated, EOM intact  Nose: Nares patent  OP clear, mucosa patent  Neck: suppple, no JVD, trachea midline, no palpable mass, no adenopathy  Lungs: CTAB, no wheezes, no use of accessory muscles  CV: S1S2 with RRR, No murmur  Abd: soft, NTND, + BS, No HSM, no ascites  Extr: No CCE, ROXANA, strength normal, good capillary refill  Neuro: steady gait, CNs grossly intact  Skin: intact, no  lesions noted  Rheum: No joint swelling    Lab Results   Component Value Date    WBC 7.30 12/03/2018    HGB 12.4 12/03/2018    HCT 36.3 (L) 12/03/2018    MCV 91 12/03/2018     12/03/2018     CMP  Sodium   Date Value Ref Range Status   12/03/2018 135 (L) 136 - 145 mmol/L Final     Potassium   Date Value Ref Range Status   12/03/2018 3.7 3.5 - 5.1 mmol/L Final     Chloride   Date Value Ref Range Status   12/03/2018 100 95 - 110 mmol/L Final     CO2   Date Value Ref Range Status   12/03/2018 28 23 - 29 mmol/L Final     Glucose   Date Value Ref Range Status   12/03/2018 99 70 - 110 mg/dL Final     BUN, Bld   Date Value Ref Range Status   12/03/2018 8 6 - 20 mg/dL Final     Creatinine   Date Value Ref Range Status   12/03/2018 0.7 0.5 - 1.4 mg/dL Final     Calcium   Date Value Ref Range Status   12/03/2018 9.7 8.7 - 10.5 mg/dL Final     Total Protein   Date Value Ref Range Status   12/03/2018 7.4 6.0 - 8.4 g/dL Final     Albumin   Date Value Ref Range Status   12/03/2018 3.5 3.5 - 5.2 g/dL Final     Total Bilirubin   Date Value Ref Range Status   12/03/2018 0.5 0.1 - 1.0 mg/dL Final     Comment:     For infants and newborns, interpretation of results should be based  on gestational age, weight and in agreement with clinical  observations.  Premature Infant recommended reference ranges:  Up to 24 hours.............<8.0 mg/dL  Up to 48 hours............<12.0 mg/dL  3-5 days..................<15.0 mg/dL  6-29 days.................<15.0 mg/dL       Alkaline Phosphatase   Date Value Ref Range Status   12/03/2018 91 55 - 135 U/L Final     AST   Date Value Ref Range Status   12/03/2018 23 10 - 40 U/L Final     ALT   Date Value Ref Range Status   12/03/2018 18 10 - 44 U/L Final     Anion Gap   Date Value Ref Range Status   12/03/2018 7 (L) 8 - 16 mmol/L Final     eGFR if    Date Value Ref Range Status   12/03/2018 >60 >60 mL/min/1.73 m^2 Final     eGFR if non    Date Value Ref Range Status  "  12/03/2018 >60 >60 mL/min/1.73 m^2 Final     Comment:     Calculation used to obtain the estimated glomerular filtration  rate (eGFR) is the CKD-EPI equation.          Other chronic pulmonary embolism without acute cor pulmonale  -     Antithrombin III; Future; Expected date: 02/20/2019  -     Cardiolipin antibody; Future; Expected date: 02/20/2019  -     DRVVT; Future; Expected date: 02/20/2019  -     Factor 5 leiden; Future; Expected date: 02/20/2019  -     Factor 8 assay; Future; Expected date: 02/20/2019  -     Fibrinogen; Future; Expected date: 02/20/2019  -     Plasminogen activity; Future; Expected date: 02/20/2019  -     Protein C antigen, total; Future; Expected date: 02/20/2019  -     Protein S antigen, free; Future; Expected date: 02/20/2019  -     Prothrombin gene mutation; Future; Expected date: 02/20/2019  -     dabigatran etexilate (PRADAXA) 150 mg Cap; Take 1 capsule (150 mg total) by mouth 2 (two) times daily. "Do NOT break, chew, or open capsules."  Dispense: 60 capsule; Refill: 2    Anticoagulated    Other chronic pain  -     CBC auto differential; Standing  -     CMP; Future; Expected date: 02/20/2019    at risk of dehydration due to gastric bypass    Pt needs a hypercoagulable workup to check for inherited thrombophilia. This will determine how long she needs to be on anticoagulation  NO surgery until we get her more stable as far as her emboli     Pt has failed xarelto and MUST be out on an agent with a different mechanism of action ie pradaxa  Stop xarelto  Start pradaxa repeat imaging in about 2 months  Proper hydration encouraged with electrolytes   RTC 1 month  With labs alone     Thank you for allowing me to evaluate and participate in the care of this pleasant patient. Please fell free to call me with any questions or concerns.    Warmly,  Rabia Pina MD    This note was dictated with Dragon and briefly proofread. Please excuse any sentences that may be unclear or words " [facility]

## 2019-03-13 ENCOUNTER — TELEPHONE (OUTPATIENT)
Dept: HEMATOLOGY/ONCOLOGY | Facility: CLINIC | Age: 39
End: 2019-03-13

## 2019-03-13 NOTE — TELEPHONE ENCOUNTER
Called to speak to pt to reschedule apt with  pm 3/25. Phone picked up during call but no answer. Will try back at a later time.

## 2019-03-14 ENCOUNTER — TELEPHONE (OUTPATIENT)
Dept: PHARMACY | Facility: CLINIC | Age: 39
End: 2019-03-14

## 2019-03-25 ENCOUNTER — LAB VISIT (OUTPATIENT)
Dept: LAB | Facility: HOSPITAL | Age: 39
End: 2019-03-25
Attending: INTERNAL MEDICINE
Payer: COMMERCIAL

## 2019-03-25 ENCOUNTER — OFFICE VISIT (OUTPATIENT)
Dept: HEMATOLOGY/ONCOLOGY | Facility: CLINIC | Age: 39
End: 2019-03-25
Payer: COMMERCIAL

## 2019-03-25 VITALS
SYSTOLIC BLOOD PRESSURE: 101 MMHG | HEART RATE: 65 BPM | DIASTOLIC BLOOD PRESSURE: 55 MMHG | BODY MASS INDEX: 34.1 KG/M2 | WEIGHT: 251.75 LBS | RESPIRATION RATE: 20 BRPM | HEIGHT: 72 IN | TEMPERATURE: 98 F | OXYGEN SATURATION: 97 %

## 2019-03-25 DIAGNOSIS — G89.29 OTHER CHRONIC PAIN: ICD-10-CM

## 2019-03-25 DIAGNOSIS — F33.0 MILD EPISODE OF RECURRENT MAJOR DEPRESSIVE DISORDER: ICD-10-CM

## 2019-03-25 DIAGNOSIS — Z86.711 HISTORY OF PULMONARY EMBOLISM: ICD-10-CM

## 2019-03-25 DIAGNOSIS — Z96.652 S/P TOTAL KNEE ARTHROPLASTY, LEFT: Primary | ICD-10-CM

## 2019-03-25 PROBLEM — I26.99 ACUTE PULMONARY EMBOLISM: Status: RESOLVED | Noted: 2018-11-18 | Resolved: 2019-03-25

## 2019-03-25 LAB
ALBUMIN SERPL BCP-MCNC: 3.5 G/DL (ref 3.5–5.2)
ALP SERPL-CCNC: 85 U/L (ref 55–135)
ALT SERPL W/O P-5'-P-CCNC: 20 U/L (ref 10–44)
ANION GAP SERPL CALC-SCNC: 8 MMOL/L (ref 8–16)
AST SERPL-CCNC: 33 U/L (ref 10–40)
BASOPHILS # BLD AUTO: 0 K/UL (ref 0–0.2)
BASOPHILS NFR BLD: 0.4 % (ref 0–1.9)
BILIRUB SERPL-MCNC: 0.3 MG/DL (ref 0.1–1)
BUN SERPL-MCNC: 6 MG/DL (ref 6–20)
CALCIUM SERPL-MCNC: 9.4 MG/DL (ref 8.7–10.5)
CHLORIDE SERPL-SCNC: 104 MMOL/L (ref 95–110)
CO2 SERPL-SCNC: 26 MMOL/L (ref 23–29)
CREAT SERPL-MCNC: 0.7 MG/DL (ref 0.5–1.4)
DIFFERENTIAL METHOD: ABNORMAL
EOSINOPHIL # BLD AUTO: 0.1 K/UL (ref 0–0.5)
EOSINOPHIL NFR BLD: 2 % (ref 0–8)
ERYTHROCYTE [DISTWIDTH] IN BLOOD BY AUTOMATED COUNT: 15.8 % (ref 11.5–14.5)
EST. GFR  (AFRICAN AMERICAN): >60 ML/MIN/1.73 M^2
EST. GFR  (NON AFRICAN AMERICAN): >60 ML/MIN/1.73 M^2
GLUCOSE SERPL-MCNC: 98 MG/DL (ref 70–110)
HCT VFR BLD AUTO: 40.2 % (ref 37–48.5)
HGB BLD-MCNC: 13.3 G/DL (ref 12–16)
LYMPHOCYTES # BLD AUTO: 2.1 K/UL (ref 1–4.8)
LYMPHOCYTES NFR BLD: 38.1 % (ref 18–48)
MCH RBC QN AUTO: 30 PG (ref 27–31)
MCHC RBC AUTO-ENTMCNC: 33 G/DL (ref 32–36)
MCV RBC AUTO: 91 FL (ref 82–98)
MONOCYTES # BLD AUTO: 0.3 K/UL (ref 0.3–1)
MONOCYTES NFR BLD: 5.6 % (ref 4–15)
NEUTROPHILS # BLD AUTO: 3 K/UL (ref 1.8–7.7)
NEUTROPHILS NFR BLD: 53.9 % (ref 38–73)
PLATELET # BLD AUTO: 289 K/UL (ref 150–350)
PMV BLD AUTO: 8.5 FL (ref 9.2–12.9)
POTASSIUM SERPL-SCNC: 3.6 MMOL/L (ref 3.5–5.1)
PROT SERPL-MCNC: 7.3 G/DL (ref 6–8.4)
RBC # BLD AUTO: 4.43 M/UL (ref 4–5.4)
SODIUM SERPL-SCNC: 138 MMOL/L (ref 136–145)
WBC # BLD AUTO: 5.6 K/UL (ref 3.9–12.7)

## 2019-03-25 PROCEDURE — 3008F BODY MASS INDEX DOCD: CPT | Mod: CPTII,S$GLB,, | Performed by: INTERNAL MEDICINE

## 2019-03-25 PROCEDURE — 99999 PR PBB SHADOW E&M-EST. PATIENT-LVL IV: CPT | Mod: PBBFAC,,, | Performed by: INTERNAL MEDICINE

## 2019-03-25 PROCEDURE — 99214 PR OFFICE/OUTPT VISIT, EST, LEVL IV, 30-39 MIN: ICD-10-PCS | Mod: S$GLB,,, | Performed by: INTERNAL MEDICINE

## 2019-03-25 PROCEDURE — 99999 PR PBB SHADOW E&M-EST. PATIENT-LVL IV: ICD-10-PCS | Mod: PBBFAC,,, | Performed by: INTERNAL MEDICINE

## 2019-03-25 PROCEDURE — 3008F PR BODY MASS INDEX (BMI) DOCUMENTED: ICD-10-PCS | Mod: CPTII,S$GLB,, | Performed by: INTERNAL MEDICINE

## 2019-03-25 PROCEDURE — 85025 COMPLETE CBC W/AUTO DIFF WBC: CPT

## 2019-03-25 PROCEDURE — 36415 COLL VENOUS BLD VENIPUNCTURE: CPT

## 2019-03-25 PROCEDURE — 80053 COMPREHEN METABOLIC PANEL: CPT

## 2019-03-25 PROCEDURE — 99214 OFFICE O/P EST MOD 30 MIN: CPT | Mod: S$GLB,,, | Performed by: INTERNAL MEDICINE

## 2019-03-25 NOTE — PROGRESS NOTES
CC I ran out of blood thinners     Aleyda Costa is a 38 y.o.    Pt had undergone a total left knee replacement in November 2018 when a few days later she presented to ER with increasing SOB. Workup revealed bilateral PE. SHe was placed on xarelto. SHe had an ultrasound of legs which was negative. SHe has recently undergone a repeat CTA that showed no resolution of the emboli.   Pt drinks water and tea   Pt had a gastric sleeve in 2014.       CTA Chest Non-Coronary - PE Study   Order: [id number]   Status:  Final result   Visible to patient:  Yes (Patient Portal) Next appt:  04/10/2019 at 02:40 PM in Neurology (Lola Rojas MD)   Details     Reading Physician Reading Date Result Priority   oDminik Sexton MD 12/4/2018       Narrative     EXAMINATION:  CTA CHEST NON CORONARY    CLINICAL HISTORY:  Chest pain, acute, PE suspected, high pretest prob;    TECHNIQUE:  Low dose axial images, sagittal and coronal reformations were obtained from the thoracic inlet to the lung bases following the IV administration of 100 mL of Omnipaque 350.  Contrast timing was optimized to evaluate the pulmonary arteries.  MIP images were performed.    COMPARISON:  11/20/2018    FINDINGS:  Structures at the base of the neck are unremarkable.  Small subpleural airspace opacity in the right lower lobe.  There are 2 pulmonary nodules in the left lower lobe with reference measuring 5 mm series 2, image 136.  No pleural effusion or pneumothorax.  There is a filling defect within a segmental artery in the right lower lobe, partially occlusive.  There is also a filling defect within a segmental artery in the left lower lobe, partially occlusive.  Normal size heart with no evidence for right heart strain.  No adenopathy.  Small hiatal hernia.  Gastric sleeve and cholecystectomy.  Punctate low density in the glenohumeral joints, nonspecific, possibly reflective of vacuum phenomenon.      Impression       1. Bilateral pulmonary emboli  at the segmental level.  Slight decrease in clot burden.  2. Small infarct in the right lower lobe, decreased in size.  3. Left lung pulmonary nodules. In a low risk patient, no further follow-up is recommended.  In a high-risk patient, 12 month follow-up CT could be considered.      Electronically signed by: Dominik Carlie  Date: 12/04/2018  Time: 08:57                       CTA Chest Non Coronary   Order: [id number]   Status:  Final result   Visible to patient:  Yes (Patient Portal) Next appt:  04/10/2019 at 02:40 PM in Neurology (Lola Rojas MD)   Details     Reading Physician Reading Date Result Priority   Lily Mcgowan MD 11/21/2018       Narrative     EXAMINATION:  CTA CHEST NON CORONARY    CLINICAL HISTORY:  pulmonary embolism;    TECHNIQUE:  Low dose axial images, sagittal and coronal reformations were obtained from the thoracic inlet to the lung bases following the IV administration of 100 mL of Omnipaque 350.  Contrast timing was optimized to evaluate the pulmonary arteries.  MIP images were performed.    COMPARISON:  None    FINDINGS:  The visualized portion of the thyroid gland is unremarkable in appearance.  There are small shotty appearing mediastinal nodes.  There is no pleural or pericardial effusion.  There are postoperative changes in the visualized upper abdomen a prior cholecystectomy and gastric reduction surgery and with small hiatal hernia versus dilatation of the distal esophagus.  Significant hilar adenopathy is not seen    There are pulmonary artery emboli.  There are intraluminal filling defects within segmental and subsegmental branches of the right lower lobe nearly occlusive.  There are filling defects in subsegmental branches of the left lower lobe at least 1 of which appearing nearly occlusive.  There is peripheral wedge-shaped opacity posteriorly right lower lobe suggesting pulmonary infarct.  There also dependent hypoventilatory changes and atelectatic stranding.       Impression       Pulmonary artery emboli    Right posterior basilar opacification favoring pulmonary infarct.    Additional findings as detailed above    Final read    Virtual Radiology concordant      Electronically signed by: Lily Mcgowan MD  Date: 11/21/2018  Time: 10:25           US Lower Extremity Veins Bilateral   Order: [id number]   Status:  Final result   Visible to patient:  Yes (Patient Portal) Next appt:  04/10/2019 at 02:40 PM in Neurology (Lola Rojas MD)   Details     Reading Physician Reading Date Result Priority   Lily Mcgowan MD 11/19/2018       Narrative     EXAMINATION:  US LOWER EXTREMITY VEINS BILATERAL    CLINICAL HISTORY:  eval for DVT;    TECHNIQUE:  Duplex and color flow Doppler evaluation of the bilateral lower extremity veins was performed.    COMPARISON:  None    FINDINGS:  Thigh veins: Both common femoral, femoral, popliteal, proximal medial saphenous, are patent and free of thrombus. The veins are normally compressible and have normal phasic flow and augmentation response.    Calf veins: The paired peroneal and posterior tibial and anterior tibial calf veins are patent bilaterally.      Impression       No evidence of deep venous thrombosis in either lower extremity.      Electronically signed by: Lily Mcgowan MD  Date: 11/19/2018  Time: 09:34                               Past Medical History:   Diagnosis Date    Arthritis     OA    Back pain     Cancer     skin cancer to back    Depression     Full dentures     Migraine     Seizures     Sleep apnea     Does not use c-pap since weight loss - 170 lbs    Wears glasses      Past Surgical History:   Procedure Laterality Date    APPENDECTOMY      ARTHROPLASTY, KNEE Left 11/13/2018    [facility]    ARTHROSCOPY-KNEE Left 11/22/2016    [facility]    BLOCK-NERVE-MEDIAL BRANCH-LUMBAR Bilateral 5/26/2016    [facility] at  [facility] OR    CARPAL TUNNEL RELEASE Bilateral      SECTION      CHOLECYSTECTOMY      CHONDROPLASTY-KNEE-ARTHROSCOPY Left 2016    [facility]    FRACTURE SURGERY      ankle    gastric sleve      HYSTERECTOMY      HYSTERECTOMY      INJECTION-STEROID-EPIDURAL-LUMBAR N/A 2016    [facility]    INJECTION-STEROID-EPIDURAL-LUMBAR N/A 3/28/2016    [facility]    JOINT REPLACEMENT Left 2018    KNEE SURGERY      LUMBAR EPIDURAL INJECTION      RADIAL NERVE Right     RADIOFREQUENCY THERMOCOAGULATION (RFTC)-NERVE-MEDIAN BRANCH-LUMBAR Bilateral 2017    [facility]    RADIOFREQUENCY THERMOCOAGULATION (RFTC)-NERVE-MEDIAN BRANCH-LUMBAR Bilateral 2016    [facility]    RECONSTRUCTION- LIGAMENT-MPFL RECONSTRUCTION WITH ALLOGRAFT Left 2017    [facility] OR    SINUS SURGERY         Current Outpatient Medications:     amitriptyline (ELAVIL) 25 MG tablet, TAKE ONE TABLET BY MOUTH EVERY NIGHT FOR 2 WEEKS THEN INCREASE TO 2 TABLETS AT BEDTIME, Disp: , Rfl:     cetirizine (ZYRTEC) 5 MG tablet, Take 5 mg by mouth as needed. , Disp: , Rfl:     cyanocobalamin, vitamin B-12, 1,000 mcg/mL Kit, Inject 1 mL into the muscle every 21 days., Disp: , Rfl:     dabigatran etexilate (PRADAXA) 150 mg Cap, Take 1 capsule (150 mg total) by mouth 2 (two) times daily. "Do NOT break, chew, or open capsules.", Disp: 60 capsule, Rfl: 2    epinephrine (EPIPEN) 0.3 mg/0.3 mL (1:1,000) AtIn, 0.3 mg daily as needed. , Disp: , Rfl:     erenumab-aooe (AIMOVIG AUTOINJECTOR) 70 mg/mL AtIn, Inject 2 mLs (140 mg total) into the skin every 28 days., Disp: 2 mL, Rfl: 5    fluoxetine (PROZAC) 20 MG capsule, every evening. , Disp: , Rfl: 3    hydrOXYzine pamoate (VISTARIL) 25 MG Cap, TAKE ONE CAPSULE BY MOUTH 3 TIMES A DAY AS NEEDED FOR " ANXIETY, Disp: , Rfl:     ibuprofen (ADVIL,MOTRIN) 800 MG tablet, every 6 (six) hours as needed. , Disp: , Rfl: 5    ketorolac (TORADOL) 60 mg/2 mL Soln, Please provide patients with IM syringes, Disp: 10 mL, Rfl: 3    lorazepam (ATIVAN) 0.5 MG tablet, Take 0.5 mg by mouth every 4 (four) hours as needed. , Disp: , Rfl:     oxycodone (OXYCONTIN) 15 mg TR12 12 hr tablet, Take 1 tablet (15 mg total) by mouth every 12 (twelve) hours. (Patient taking differently: Take 5 mg by mouth every 4 (four) hours. ), Disp: 20 tablet, Rfl: 0    potassium chloride SA (K-DUR,KLOR-CON) 20 MEQ tablet, Take 20 mEq by mouth as needed. , Disp: , Rfl: 3    promethazine (PHENERGAN) 25 MG tablet, Take 1 tablet (25 mg total) by mouth every 6 (six) hours as needed for Nausea. Take 1 tablet by mouth every 6 (six) hours as needed (migraine). -MAY CAUSE DROWSINESS-, Disp: 20 tablet, Rfl: 3    pseudoephedrine-DM-guaifenesin 45- mg Tab, Take 1 tablet by mouth daily as needed. , Disp: , Rfl:     VOLTAREN 1 % Gel, daily as needed. , Disp: , Rfl: 3    sumatriptan succinate 4 mg/0.5 mL PnIj, Inject 4 mg into the skin daily as needed. imitrex, Disp: 6 each, Rfl: 5    Current Facility-Administered Medications:     onabotulinumtoxina injection 200 Units, 1 vial, Intramuscular, 1 time in Clinic/HOD, Lola Rojas MD    onabotulinumtoxina injection 200 Units, 200 Units, Intramuscular, Q90 Days, Lola Rojas MD, 200 Units at 10/18/18 1609  Review of patient's allergies indicates:   Allergen Reactions    Clarithromycin Swelling and Other (See Comments)     DIFFICULTY SWALLOWING  DIFFICULTY SWALLOWING    Pcn [penicillins] Anaphylaxis    Sulfa (sulfonamide antibiotics) Hives    Wellbutrin [bupropion hcl] Shortness Of Breath and Anaphylaxis    Betadine [povidone-iodine] Hives     Swelling      Cefprozil Hives    Iodinated contrast- oral and iv dye Hives    Latex, natural rubber Rash    Sulfamethoxazole-trimethoprim Nausea  And Vomiting    Iodine Hives and Swelling    Latex Hives and Swelling     Social History     Tobacco Use    Smoking status: Former Smoker     Packs/day: 0.50     Years: 20.00     Pack years: 10.00     Types: Cigarettes     Last attempt to quit: 2018     Years since quittin.3    Smokeless tobacco: Never Used   Substance Use Topics    Alcohol use: Yes     Alcohol/week: 0.0 oz     Comment: occasionally    Drug use: Yes     Types: Other-see comments     Family History   Problem Relation Age of Onset    Heart disease Mother     Heart disease Father    Her mom dies while on coumadin   Dad required plavix     CONSTITUTIONAL: No fevers, chills, night sweats, wt. loss, appetite changes  SKIN: no rashes or itching  ENT: No headaches, head trauma, vision changes, or eye pain  LYMPH NODES: None noticed   CV: No chest pain, palpitations.   RESP: No dyspnea on exertion, cough, wheezing, or hemoptysis  GI: No nausea, emesis, diarrhea, constipation, melena, hematochezia, pain.   : No dysuria, hematuria, urgency, or frequency   HEME: No easy bruising, bleeding problems  PSYCHIATRIC: No depression, anxiety, psychosis, hallucinations.  NEURO: No seizures, memory loss, dizziness or difficulty sleeping  MSK: No arthralgias or joint swelling         BP (!) 101/55   Pulse 65   Temp 97.9 °F (36.6 °C)   Resp 20   Ht 6' (1.829 m)   Wt 114.2 kg (251 lb 12.3 oz)   SpO2 97%   BMI 34.15 kg/m²   Gen: NAD, A and O x3,   Psych: pleasant affect, normal thought process  Eyes: Pupils round and non dilated, EOM intact  Nose: Nares patent  OP clear, mucosa patent  Neck: suppple, no JVD, trachea midline, no palpable mass, no adenopathy  Lungs: CTAB, no wheezes, no use of accessory muscles  CV: S1S2 with RRR, No murmur  Abd: soft, NTND, + BS, No HSM, no ascites  Extr: No CCE, ROXANA, strength normal, good capillary refill  Neuro: steady gait, CNs grossly intact  Skin: intact, no lesions noted  Rheum: No joint swelling    Lab  Results   Component Value Date    WBC 5.60 03/25/2019    HGB 13.3 03/25/2019    HCT 40.2 03/25/2019    MCV 91 03/25/2019     03/25/2019     CMP  Sodium   Date Value Ref Range Status   03/25/2019 138 136 - 145 mmol/L Final     Potassium   Date Value Ref Range Status   03/25/2019 3.6 3.5 - 5.1 mmol/L Final     Chloride   Date Value Ref Range Status   03/25/2019 104 95 - 110 mmol/L Final     CO2   Date Value Ref Range Status   03/25/2019 26 23 - 29 mmol/L Final     Glucose   Date Value Ref Range Status   03/25/2019 98 70 - 110 mg/dL Final     BUN, Bld   Date Value Ref Range Status   03/25/2019 6 6 - 20 mg/dL Final     Creatinine   Date Value Ref Range Status   03/25/2019 0.7 0.5 - 1.4 mg/dL Final     Calcium   Date Value Ref Range Status   03/25/2019 9.4 8.7 - 10.5 mg/dL Final     Total Protein   Date Value Ref Range Status   03/25/2019 7.3 6.0 - 8.4 g/dL Final     Albumin   Date Value Ref Range Status   03/25/2019 3.5 3.5 - 5.2 g/dL Final     Total Bilirubin   Date Value Ref Range Status   03/25/2019 0.3 0.1 - 1.0 mg/dL Final     Comment:     For infants and newborns, interpretation of results should be based  on gestational age, weight and in agreement with clinical  observations.  Premature Infant recommended reference ranges:  Up to 24 hours.............<8.0 mg/dL  Up to 48 hours............<12.0 mg/dL  3-5 days..................<15.0 mg/dL  6-29 days.................<15.0 mg/dL       Alkaline Phosphatase   Date Value Ref Range Status   03/25/2019 85 55 - 135 U/L Final     AST   Date Value Ref Range Status   03/25/2019 33 10 - 40 U/L Final     ALT   Date Value Ref Range Status   03/25/2019 20 10 - 44 U/L Final     Anion Gap   Date Value Ref Range Status   03/25/2019 8 8 - 16 mmol/L Final     eGFR if    Date Value Ref Range Status   03/25/2019 >60 >60 mL/min/1.73 m^2 Final     eGFR if non    Date Value Ref Range Status   03/25/2019 >60 >60 mL/min/1.73 m^2 Final     Comment:      Calculation used to obtain the estimated glomerular filtration  rate (eGFR) is the CKD-EPI equation.        No pain  No fall risk       S/P total knee arthroplasty, left    Mild episode of recurrent major depressive disorder    History of pulmonary embolism         at risk of dehydration due to gastric bypass  Pt needs a hypercoagulable workup to check for inherited thrombophilia. This will determine how long she needs to be on anticoagulation  No surgery until we get her more stable as far as her emboli   Pt has failed xarelto and MUST be out on an agent with a different mechanism of action ie pradaxa  Stop xarelto  Start pradaxa repeat imaging in about 2 months  Proper hydration encouraged with electrolytes   Checking inherited thrombophilia labs and will call with further recs      Thank you for allowing me to evaluate and participate in the care of this pleasant patient. Please fell free to call me with any questions or concerns.    Warmly,  Rabia Pina MD    This note was dictated with Dragon and briefly proofread. Please excuse any sentences that may be unclear or words misspelled

## 2019-04-03 ENCOUNTER — DOCUMENTATION ONLY (OUTPATIENT)
Dept: HEMATOLOGY/ONCOLOGY | Facility: CLINIC | Age: 39
End: 2019-04-03

## 2019-04-03 NOTE — PROGRESS NOTES
Pt was seen on march 25th. Dr. Pina ordered lab work, to be phone reviewed after completion. I handed Dr. Pina lab work to review yesterday 4/2/19.

## 2019-04-05 ENCOUNTER — PATIENT MESSAGE (OUTPATIENT)
Dept: HEMATOLOGY/ONCOLOGY | Facility: CLINIC | Age: 39
End: 2019-04-05

## 2019-04-05 ENCOUNTER — TELEPHONE (OUTPATIENT)
Dept: HEMATOLOGY/ONCOLOGY | Facility: CLINIC | Age: 39
End: 2019-04-05

## 2019-04-05 DIAGNOSIS — D64.9 NORMOCYTIC ANEMIA: Primary | ICD-10-CM

## 2019-04-06 ENCOUNTER — HOSPITAL ENCOUNTER (EMERGENCY)
Facility: HOSPITAL | Age: 39
Discharge: HOME | End: 2019-04-06
Attending: EMERGENCY MEDICINE
Payer: COMMERCIAL

## 2019-04-06 VITALS
HEART RATE: 73 BPM | HEIGHT: 72 IN | SYSTOLIC BLOOD PRESSURE: 137 MMHG | BODY MASS INDEX: 33.86 KG/M2 | DIASTOLIC BLOOD PRESSURE: 72 MMHG | WEIGHT: 250 LBS | TEMPERATURE: 98 F | OXYGEN SATURATION: 99 % | RESPIRATION RATE: 22 BRPM

## 2019-04-06 DIAGNOSIS — M54.6 ACUTE RIGHT-SIDED THORACIC BACK PAIN: Primary | ICD-10-CM

## 2019-04-06 LAB
ANION GAP SERPL CALC-SCNC: 10 MMOL/L (ref 8–16)
B-HCG UR QL: NEGATIVE
BASOPHILS # BLD AUTO: 0.1 K/UL (ref 0–0.2)
BASOPHILS NFR BLD: 0.7 % (ref 0–1.9)
BUN SERPL-MCNC: 5 MG/DL (ref 6–20)
CALCIUM SERPL-MCNC: 9.1 MG/DL (ref 8.7–10.5)
CHLORIDE SERPL-SCNC: 107 MMOL/L (ref 95–110)
CO2 SERPL-SCNC: 23 MMOL/L (ref 23–29)
CREAT SERPL-MCNC: 0.7 MG/DL (ref 0.5–1.4)
CTP QC/QA: YES
DIFFERENTIAL METHOD: ABNORMAL
EOSINOPHIL # BLD AUTO: 0.1 K/UL (ref 0–0.5)
EOSINOPHIL NFR BLD: 1.5 % (ref 0–8)
ERYTHROCYTE [DISTWIDTH] IN BLOOD BY AUTOMATED COUNT: 17 % (ref 11.5–14.5)
EST. GFR  (AFRICAN AMERICAN): >60 ML/MIN/1.73 M^2
EST. GFR  (NON AFRICAN AMERICAN): >60 ML/MIN/1.73 M^2
GLUCOSE SERPL-MCNC: 117 MG/DL (ref 70–110)
HCT VFR BLD AUTO: 37.3 % (ref 37–48.5)
HGB BLD-MCNC: 12.4 G/DL (ref 12–16)
LYMPHOCYTES # BLD AUTO: 2.3 K/UL (ref 1–4.8)
LYMPHOCYTES NFR BLD: 34 % (ref 18–48)
MCH RBC QN AUTO: 30.4 PG (ref 27–31)
MCHC RBC AUTO-ENTMCNC: 33.2 G/DL (ref 32–36)
MCV RBC AUTO: 92 FL (ref 82–98)
MONOCYTES # BLD AUTO: 0.5 K/UL (ref 0.3–1)
MONOCYTES NFR BLD: 7.2 % (ref 4–15)
NEUTROPHILS # BLD AUTO: 3.9 K/UL (ref 1.8–7.7)
NEUTROPHILS NFR BLD: 56.6 % (ref 38–73)
PLATELET # BLD AUTO: 239 K/UL (ref 150–350)
PMV BLD AUTO: 8.2 FL (ref 9.2–12.9)
POTASSIUM SERPL-SCNC: 3.2 MMOL/L (ref 3.5–5.1)
RBC # BLD AUTO: 4.08 M/UL (ref 4–5.4)
SODIUM SERPL-SCNC: 140 MMOL/L (ref 136–145)
TROPONIN I SERPL DL<=0.01 NG/ML-MCNC: <0.006 NG/ML (ref 0–0.03)
WBC # BLD AUTO: 6.8 K/UL (ref 3.9–12.7)

## 2019-04-06 PROCEDURE — 25000003 PHARM REV CODE 250: Performed by: EMERGENCY MEDICINE

## 2019-04-06 PROCEDURE — 25500020 PHARM REV CODE 255: Performed by: EMERGENCY MEDICINE

## 2019-04-06 PROCEDURE — 99284 EMERGENCY DEPT VISIT MOD MDM: CPT | Mod: 25

## 2019-04-06 PROCEDURE — 93005 ELECTROCARDIOGRAM TRACING: CPT

## 2019-04-06 PROCEDURE — 84484 ASSAY OF TROPONIN QUANT: CPT

## 2019-04-06 PROCEDURE — 96375 TX/PRO/DX INJ NEW DRUG ADDON: CPT | Mod: 59

## 2019-04-06 PROCEDURE — 80048 BASIC METABOLIC PNL TOTAL CA: CPT

## 2019-04-06 PROCEDURE — 85025 COMPLETE CBC W/AUTO DIFF WBC: CPT

## 2019-04-06 PROCEDURE — 81025 URINE PREGNANCY TEST: CPT | Performed by: EMERGENCY MEDICINE

## 2019-04-06 PROCEDURE — 96374 THER/PROPH/DIAG INJ IV PUSH: CPT | Mod: 59

## 2019-04-06 PROCEDURE — 96365 THER/PROPH/DIAG IV INF INIT: CPT

## 2019-04-06 PROCEDURE — 63600175 PHARM REV CODE 636 W HCPCS: Performed by: EMERGENCY MEDICINE

## 2019-04-06 PROCEDURE — 36415 COLL VENOUS BLD VENIPUNCTURE: CPT

## 2019-04-06 RX ORDER — KETOROLAC TROMETHAMINE 30 MG/ML
10 INJECTION, SOLUTION INTRAMUSCULAR; INTRAVENOUS
Status: COMPLETED | OUTPATIENT
Start: 2019-04-06 | End: 2019-04-06

## 2019-04-06 RX ORDER — DIPHENHYDRAMINE HYDROCHLORIDE 50 MG/ML
50 INJECTION INTRAMUSCULAR; INTRAVENOUS
Status: COMPLETED | OUTPATIENT
Start: 2019-04-06 | End: 2019-04-06

## 2019-04-06 RX ORDER — POTASSIUM CHLORIDE 750 MG/1
30 TABLET, EXTENDED RELEASE ORAL
Status: COMPLETED | OUTPATIENT
Start: 2019-04-06 | End: 2019-04-06

## 2019-04-06 RX ADMIN — DIPHENHYDRAMINE HYDROCHLORIDE 50 MG: 50 INJECTION INTRAMUSCULAR; INTRAVENOUS at 10:04

## 2019-04-06 RX ADMIN — SODIUM CHLORIDE, SODIUM LACTATE, POTASSIUM CHLORIDE, AND CALCIUM CHLORIDE 1000 ML: .6; .31; .03; .02 INJECTION, SOLUTION INTRAVENOUS at 10:04

## 2019-04-06 RX ADMIN — KETOROLAC TROMETHAMINE 10 MG: 30 INJECTION, SOLUTION INTRAMUSCULAR at 10:04

## 2019-04-06 RX ADMIN — IOHEXOL 100 ML: 350 INJECTION, SOLUTION INTRAVENOUS at 11:04

## 2019-04-06 RX ADMIN — POTASSIUM CHLORIDE 30 MEQ: 750 TABLET, EXTENDED RELEASE ORAL at 11:04

## 2019-04-07 NOTE — ED PROVIDER NOTES
"Encounter Date: 2019    SCRIBE #1 NOTE: IGrace, am scribing for, and in the presence of, Dr. Dominik Ryan.       History     Chief Complaint   Patient presents with    Chest Pain     With SOB was seen at [facility] and sent here for possible PE.  Hx of PE     Time seen by provider: 10:15 PM on 2019    Aleyda Costa is a 38 y.o. female with PMHx of depression, seizures, PE, and prior DVT who presents to the ED with complaints of right sided flank pain that started recently. Patient endorses having chest pain when taking deep breaths and describes it as a "tightness". She was seen at [place] [facility] and was referred to [facility] for evaluation of a possible PE. She reports having recurrent episodes of a PE and recently discontinued anticoagulation x1 week ago. Patient reports having N/V last night. She denies fever, cough, and diarrhea. She denies abdominal pain and abnormal urinary symptoms. Denies leg pain or swelling. Patient last took a Hydrocodone this morning for pain. Patient has no other medical concerns or complaints at this moment. She denies onset of any other new symptoms currently. SHx includes appendectomy, cholecystectomy, , hysterectomy, and left knee arthroplasty.     The history is provided by the patient.     Review of patient's allergies indicates:   Allergen Reactions    Clarithromycin Swelling and Other (See Comments)     DIFFICULTY SWALLOWING  DIFFICULTY SWALLOWING    Pcn [penicillins] Anaphylaxis    Sulfa (sulfonamide antibiotics) Hives    Wellbutrin [bupropion hcl] Shortness Of Breath and Anaphylaxis    Betadine [povidone-iodine] Hives     Swelling      Cefprozil Hives    Iodinated contrast- oral and iv dye Hives    Latex, natural rubber Rash    Sulfamethoxazole-trimethoprim Nausea And Vomiting    Iodine Hives and Swelling    Latex Hives and Swelling     Past Medical History:   Diagnosis Date    Arthritis     OA    Back pain     Cancer     " skin cancer to back    Depression     Full dentures     Migraine     Seizures     Sleep apnea     Does not use c-pap since weight loss - 170 lbs    Wears glasses      Past Surgical History:   Procedure Laterality Date    APPENDECTOMY      ARTHROPLASTY, KNEE Left 2018    [facility]    ARTHROSCOPY-KNEE Left 2016    [facility]    BLOCK-NERVE-MEDIAL BRANCH-LUMBAR Bilateral 2016    [facility]    CARPAL TUNNEL RELEASE Bilateral      SECTION      CHOLECYSTECTOMY      CHONDROPLASTY-KNEE-ARTHROSCOPY Left 2016    [facility]    FRACTURE SURGERY      ankle    gastric sleve      HYSTERECTOMY      HYSTERECTOMY      INJECTION-STEROID-EPIDURAL-LUMBAR N/A 2016    [facility] [place]    INJECTION-STEROID-EPIDURAL-LUMBAR N/A 3/28/2016    [facility]    JOINT REPLACEMENT Left 2018    KNEE SURGERY      LUMBAR EPIDURAL INJECTION      RADIAL NERVE Right     RADIOFREQUENCY THERMOCOAGULATION (RFTC)-NERVE-MEDIAN BRANCH-LUMBAR Bilateral 2017    [facility]    RADIOFREQUENCY THERMOCOAGULATION (RFTC)-NERVE-MEDIAN BRANCH-LUMBAR Bilateral 2016    [facility] [place]    RECONSTRUCTION- LIGAMENT-MPFL RECONSTRUCTION WITH ALLOGRAFT Left 2017    [facility]    SINUS SURGERY       Family History   Problem Relation Age of Onset    Heart disease Mother     Heart disease Father      Social History     Tobacco Use    Smoking status: Former Smoker     Packs/day: 0.50     Years: 20.00     Pack years: 10.00     Types: Cigarettes     Last attempt to quit: 2018     Years since quittin.3    Smokeless tobacco: Never Used   Substance Use Topics    Alcohol use: Yes     Alcohol/week: 0.0 oz      Comment: occasionally    Drug use: Yes     Types: Other-see comments     Review of Systems   Constitutional: Negative for activity change, chills, fatigue and fever.   Eyes: Negative for visual disturbance.   Respiratory: Negative for apnea and shortness of breath.    Cardiovascular: Positive for chest pain. Negative for palpitations.   Gastrointestinal: Positive for nausea and vomiting. Negative for abdominal distention, abdominal pain and blood in stool.   Genitourinary: Negative for difficulty urinating.   Musculoskeletal: Positive for back pain (right sided). Negative for neck pain.   Skin: Negative for pallor and rash.   Neurological: Negative for headaches.   Hematological: Does not bruise/bleed easily.   Psychiatric/Behavioral: Negative for agitation.       Physical Exam     Initial Vitals [04/06/19 2211]   BP Pulse Resp Temp SpO2   137/72 73 (!) 22 97.6 °F (36.4 °C) 99 %      MAP       --         Physical Exam    Nursing note and vitals reviewed.  Constitutional: She appears well-developed and well-nourished. She is not diaphoretic. No distress.   HENT:   Head: Normocephalic and atraumatic.   Mouth/Throat: Oropharynx is clear and moist.   Eyes: Conjunctivae are normal.   Neck: Neck supple.   Cardiovascular: Normal rate, regular rhythm, normal heart sounds and intact distal pulses. Exam reveals no gallop and no friction rub.    No murmur heard.  Pulses:       Radial pulses are 2+ on the right side, and 2+ on the left side.        Dorsalis pedis pulses are 2+ on the right side, and 2+ on the left side.        Posterior tibial pulses are 2+ on the right side, and 2+ on the left side.   Pulmonary/Chest: Breath sounds normal. She has no wheezes. She has no rhonchi. She has no rales.   Abdominal: Soft. She exhibits no distension and no mass. There is no tenderness. There is no rigidity, no rebound and no guarding. No hernia.   Abdomen soft and non distended without TTP, rebound or guarding. No palpable masses or  hernias noted.    Musculoskeletal: Normal range of motion.        Cervical back: She exhibits normal range of motion and no tenderness.        Thoracic back: She exhibits pain. She exhibits normal range of motion and no tenderness.        Lumbar back: She exhibits normal range of motion and no tenderness.        Right lower leg: She exhibits no tenderness and no edema.        Left lower leg: She exhibits no tenderness and no edema.   Mild reproducible right lateral mid thoracic back pain. No midline spinous tenderness. 5/5 strength and sensation to light touch intact to BUE's and BLE's. Bilateral lower extremities symmetric without edema or TTP. 2+ radial and DP/PT pulses.    Neurological: She is alert and oriented to person, place, and time.   Skin: No rash noted. No erythema.   Psychiatric: Her speech is normal.         ED Course   Procedures  Labs Reviewed   CBC W/ AUTO DIFFERENTIAL - Abnormal; Notable for the following components:       Result Value    RDW 17.0 (*)     MPV 8.2 (*)     All other components within normal limits   BASIC METABOLIC PANEL - Abnormal; Notable for the following components:    Potassium 3.2 (*)     Glucose 117 (*)     BUN, Bld 5 (*)     All other components within normal limits   TROPONIN I   POCT URINE PREGNANCY          Imaging Results          CTA Chest Non-Coronary (Final result)  Result time 04/06/19 23:33:34    Final result by Riley Johnson MD (04/06/19 23:33:34)                 Impression:      No evidence of central pulmonary embolism.  Distal and subsegmental pulmonary emboli difficult to exclude given poor visualization of the distal pulmonary tree secondary to motion degradation.  Correlation with DVT study may be obtained, as clinically warranted.    Resolution of previous filling defect within the segmental pulmonary artery branch to the right lower lobe.    Dependent changes in the lung bases, otherwise no acute process.    Postoperative changes in the upper  abdomen.    Hepatic steatosis.      Electronically signed by: Riley Johnson MD  Date:    04/06/2019  Time:    23:33             Narrative:    EXAMINATION:  CTA CHEST NON CORONARY    CLINICAL HISTORY:  Back pain, + outpatient d-dimer, r/o PE;    TECHNIQUE:  Low dose axial images, sagittal and coronal reformations were obtained from the thoracic inlet to the lung bases following the IV administration of 100 ML of Omnipaque 350.  Contrast timing was optimized to evaluate the pulmonary arteries.  MIP images were performed.    COMPARISON:  12/13/2018.    FINDINGS:  CT pulmonary embolism examination:    There are no filling defects within the central pulmonary tree to suggest pulmonary embolism.  The distal and subsegmental pulmonary tree are poorly visualized given motion degradation.  Previous filling defect within the right lower lobe segmental pulmonary tree is not visualized.    CT chest examination:    The base of the neck is within normal limits.  The supraclavicular regions are unremarkable.  The thyroid gland is within normal limits.    The trachea is unremarkable.  The central airways are within normal limits.  There is no evidence of bronchiectasis.  No endobronchial lesion is identified.    The heart is unremarkable.  There are no pericardial effusions.  No coronary artery calcifications are identified.    The thoracic aorta is normal in caliber.  There are scattered calcifications along the course of the thoracic aorta.  There is no intimal flap to suggest dissection.  The great vessels arising from the aortic arch are within normal limits.    There is a 1.6 x 1.3 cm subcarinal lymph node.  This is increased from the previous measurement of 1 cm.  No additional enlarged lymph nodes are identified.  The axillary regions are within normal limits.    There are no pleural effusions.  There is no evidence of a pneumothorax.  There is no evidence of pneumomediastinum.  No airspace opacity is present.  Previous 2 mm  nodule in the left lower lobe is difficult to visualize given motion limitations.  There are dependent changes in the lung bases.    The esophagus is within normal limits.  There is a small hiatal hernia.  There are changes of gastric bypass surgery.  There is no evidence of gastric outlet obstruction.  There also changes of prior cholecystectomy.  There is fatty infiltration of the liver.  There is no evidence of free air in the upper abdomen.    The chest wall is unremarkable.  There are degenerative changes in the osseous structures.  There is no evidence of a fracture.                            (rad read)    The patient was informed of the incidental finding(s) as well as the need for PCP or specialist follow-up for reevaluation and possible further investigation or monitoring.       Medical Decision Making:   History:   Old Medical Records: I decided to obtain old medical records.  Clinical Tests:   Lab Tests: Reviewed and Ordered  Radiological Study: Ordered and Reviewed  Medical Tests: Reviewed and Ordered            Scribe Attestation:   Scribe #1: I performed the above scribed service and the documentation accurately describes the services I performed. I attest to the accuracy of the note.      I, Dr. Dominik Ryan, personally performed the services described in this documentation. All medical record entries made by the scribe were at my direction and in my presence.  I have reviewed the chart and agree that the record reflects my personal performance and is accurate and complete. Dominik Ryan MD.  11:48 PM 04/06/2019    Aleyda Costa is a 38 y.o. female presenting with reproducible area of thoracic back pain laterally developing after vomiting. No further emesis.  No sign of emergent pathology related to vomiting including benign abdominal exam.  I have very low suspicion for emergent intra-abdominal processes such as obstruction, appendicitis, cholecystitis.  There is no sign of  emergent intrathoracic process with no sign of ACS.  There is no sign of pneumothorax.  CTA shows no sign of PE.  I do not think anticoagulation is indicated.  Limitations of study as detailed by radiologist discussed in detail, but I think she is appropriate for outpatient follow-up.  She notably has no hypoxia or tachycardia.  This is much more likely muscular in etiology as it is reproducible.  The patient has a nonfocal neurological examination with no red flags.  I doubt acute spinal cord processes requiring further spinal imaging such as CT or MRI.  I doubt epidural abscesses, severe deep space infection, transverse myelitis, discitis, cauda equina syndrome, or other emergent conditions.  The patient feels improved with Toradol here and will be discharged to follow up with PCP or Hematology.  Detailed return precautions reviewed.          ED Course as of Apr 06 2349   Sat Apr 06, 2019 2242 EKG:  Normal sinus rhythm at a rate of 66.  Normal intervals.  Normal axis.  No significant ST or T wave changes suggesting acute ischemia or infarction.      [MR]      ED Course User Index  [MR] Dominik Ryan MD     Clinical Impression:       ICD-10-CM ICD-9-CM   1. Acute right-sided thoracic back pain M54.6 724.1                                Dominik Ryan MD  04/06/19 0181

## 2019-04-09 ENCOUNTER — TELEPHONE (OUTPATIENT)
Dept: NEUROLOGY | Facility: CLINIC | Age: 39
End: 2019-04-09

## 2019-04-09 NOTE — TELEPHONE ENCOUNTER
Called patient to reschedule appointment due to provider being out. No answer , left voicemail to return call and sent message in patient portal.

## 2019-04-16 ENCOUNTER — TELEPHONE (OUTPATIENT)
Dept: PHARMACY | Facility: CLINIC | Age: 39
End: 2019-04-16

## 2019-04-23 ENCOUNTER — PATIENT MESSAGE (OUTPATIENT)
Dept: ORTHOPEDICS | Facility: CLINIC | Age: 39
End: 2019-04-23

## 2019-04-29 ENCOUNTER — OFFICE VISIT (OUTPATIENT)
Dept: ORTHOPEDICS | Facility: CLINIC | Age: 39
End: 2019-04-29
Payer: COMMERCIAL

## 2019-04-29 VITALS
SYSTOLIC BLOOD PRESSURE: 143 MMHG | WEIGHT: 250 LBS | DIASTOLIC BLOOD PRESSURE: 69 MMHG | HEIGHT: 72 IN | BODY MASS INDEX: 33.86 KG/M2 | HEART RATE: 74 BPM

## 2019-04-29 DIAGNOSIS — Z96.652 S/P TOTAL KNEE ARTHROPLASTY, LEFT: Primary | ICD-10-CM

## 2019-04-29 DIAGNOSIS — G57.32 PERONEAL NEURITIS, LEFT: ICD-10-CM

## 2019-04-29 PROCEDURE — 3008F PR BODY MASS INDEX (BMI) DOCUMENTED: ICD-10-PCS | Mod: CPTII,S$GLB,, | Performed by: ORTHOPAEDIC SURGERY

## 2019-04-29 PROCEDURE — 99999 PR PBB SHADOW E&M-EST. PATIENT-LVL III: CPT | Mod: PBBFAC,,, | Performed by: ORTHOPAEDIC SURGERY

## 2019-04-29 PROCEDURE — 99999 PR PBB SHADOW E&M-EST. PATIENT-LVL III: ICD-10-PCS | Mod: PBBFAC,,, | Performed by: ORTHOPAEDIC SURGERY

## 2019-04-29 PROCEDURE — 99213 PR OFFICE/OUTPT VISIT, EST, LEVL III, 20-29 MIN: ICD-10-PCS | Mod: S$GLB,,, | Performed by: ORTHOPAEDIC SURGERY

## 2019-04-29 PROCEDURE — 3008F BODY MASS INDEX DOCD: CPT | Mod: CPTII,S$GLB,, | Performed by: ORTHOPAEDIC SURGERY

## 2019-04-29 PROCEDURE — 99213 OFFICE O/P EST LOW 20 MIN: CPT | Mod: S$GLB,,, | Performed by: ORTHOPAEDIC SURGERY

## 2019-04-29 RX ORDER — GABAPENTIN 300 MG/1
300 CAPSULE ORAL DAILY
Qty: 30 CAPSULE | Refills: 0 | Status: SHIPPED | OUTPATIENT
Start: 2019-04-29 | End: 2019-05-17

## 2019-04-29 NOTE — PROGRESS NOTES
Past Medical History:   Diagnosis Date    Arthritis     OA    Back pain     Cancer     skin cancer to back    Depression     Full dentures     Migraine     Seizures     Sleep apnea     Does not use c-pap since weight loss - 170 lbs    Wears glasses        Past Surgical History:   Procedure Laterality Date    APPENDECTOMY      ARTHROPLASTY, KNEE Left 2018    [facility]    ARTHROSCOPY-KNEE Left 2016    [facility]    BLOCK-NERVE-MEDIAL BRANCH-LUMBAR Bilateral 2016    [facility] by [facility] at [facility] OR    CARPAL TUNNEL RELEASE Bilateral      SECTION      CHOLECYSTECTOMY      CHONDROPLASTY-KNEE-ARTHROSCOPY Left 2016    [facility]    FRACTURE SURGERY      ankle    gastric sleve      HYSTERECTOMY      HYSTERECTOMY      INJECTION-STEROID-EPIDURAL-LUMBAR N/A 2016    [facility] at [facility]    INJECTION-STEROID-EPIDURAL-LUMBAR N/A 3/28/2016    [facility] at [facility] OR    JOINT REPLACEMENT Left 2018    KNEE SURGERY      LUMBAR EPIDURAL INJECTION      RADIAL NERVE Right     RADIOFREQUENCY THERMOCOAGULATION (RFTC)-NERVE-MEDIAN BRANCH-LUMBAR Bilateral 2017    [facility] at [facility]    RADIOFREQUENCY THERMOCOAGULATION (RFTC)-NERVE-MEDIAN BRANCH-LUMBAR Bilateral 2016    [facility] at [facility]    RECONSTRUCTION- LIGAMENT-MPFL RECONSTRUCTION WITH ALLOGRAFT Left 2017    [facility]    SINUS SURGERY         Current Outpatient Medications   Medication Sig    amitriptyline (ELAVIL) 25 MG tablet TAKE ONE TABLET BY MOUTH EVERY NIGHT FOR 2 WEEKS THEN INCREASE TO 2 TABLETS AT BEDTIME    cetirizine (ZYRTEC) 5 MG tablet Take 5 mg by mouth as needed.     cyanocobalamin, vitamin B-12, 1,000 mcg/mL Kit Inject 1 mL into the muscle every 21  "days.    dabigatran etexilate (PRADAXA) 150 mg Cap Take 1 capsule (150 mg total) by mouth 2 (two) times daily. "Do NOT break, chew, or open capsules."    epinephrine (EPIPEN) 0.3 mg/0.3 mL (1:1,000) AtIn 0.3 mg daily as needed.     erenumab-aooe (AIMOVIG AUTOINJECTOR) 70 mg/mL AtIn Inject 2 mLs (140 mg total) into the skin every 28 days.    fluoxetine (PROZAC) 20 MG capsule every evening.     hydrOXYzine pamoate (VISTARIL) 25 MG Cap TAKE ONE CAPSULE BY MOUTH 3 TIMES A DAY AS NEEDED FOR ANXIETY    ibuprofen (ADVIL,MOTRIN) 800 MG tablet every 6 (six) hours as needed.     ketorolac (TORADOL) 60 mg/2 mL Soln Please provide patients with IM syringes    lorazepam (ATIVAN) 0.5 MG tablet Take 0.5 mg by mouth every 4 (four) hours as needed.     oxycodone (OXYCONTIN) 15 mg TR12 12 hr tablet Take 1 tablet (15 mg total) by mouth every 12 (twelve) hours. (Patient taking differently: Take 5 mg by mouth every 4 (four) hours. )    potassium chloride SA (K-DUR,KLOR-CON) 20 MEQ tablet Take 20 mEq by mouth as needed.     promethazine (PHENERGAN) 25 MG tablet Take 1 tablet (25 mg total) by mouth every 6 (six) hours as needed for Nausea. Take 1 tablet by mouth every 6 (six) hours as needed (migraine). -MAY CAUSE DROWSINESS-    pseudoephedrine-DM-guaifenesin 45- mg Tab Take 1 tablet by mouth daily as needed.     VOLTAREN 1 % Gel daily as needed.     sumatriptan succinate 4 mg/0.5 mL PnIj Inject 4 mg into the skin daily as needed. imitrex     Current Facility-Administered Medications   Medication    onabotulinumtoxina injection 200 Units    onabotulinumtoxina injection 200 Units       Review of patient's allergies indicates:   Allergen Reactions    Clarithromycin Swelling and Other (See Comments)     DIFFICULTY SWALLOWING  DIFFICULTY SWALLOWING    Pcn [penicillins] Anaphylaxis    Sulfa (sulfonamide antibiotics) Hives    Wellbutrin [bupropion hcl] Shortness Of Breath and Anaphylaxis    Betadine [povidone-iodine] " Hives     Swelling      Cefprozil Hives    Iodinated contrast- oral and iv dye Hives    Latex, natural rubber Rash    Sulfamethoxazole-trimethoprim Nausea And Vomiting    Iodine Hives and Swelling    Latex Hives and Swelling       Family History   Problem Relation Age of Onset    Heart disease Mother     Heart disease Father        Social History     Socioeconomic History    Marital status:      Spouse name: Not on file    Number of children: Not on file    Years of education: Not on file    Highest education level: Not on file   Occupational History    Not on file   Social Needs    Financial resource strain: Not on file    Food insecurity:     Worry: Not on file     Inability: Not on file    Transportation needs:     Medical: Not on file     Non-medical: Not on file   Tobacco Use    Smoking status: Former Smoker     Packs/day: 0.50     Years: 20.00     Pack years: 10.00     Types: Cigarettes     Last attempt to quit: 2018     Years since quittin.4    Smokeless tobacco: Never Used   Substance and Sexual Activity    Alcohol use: Yes     Alcohol/week: 0.0 oz     Comment: occasionally    Drug use: Yes     Types: Other-see comments    Sexual activity: Yes     Partners: Male   Lifestyle    Physical activity:     Days per week: Not on file     Minutes per session: Not on file    Stress: Not on file   Relationships    Social connections:     Talks on phone: Not on file     Gets together: Not on file     Attends Sabianist service: Not on file     Active member of club or organization: Not on file     Attends meetings of clubs or organizations: Not on file     Relationship status: Not on file   Other Topics Concern    Not on file   Social History Narrative    Not on file       Chief Complaint:   Chief Complaint   Patient presents with    Left Knee - Pain       Date of surgery:  2018    History of present illness:  38-year-old female who underwent left total knee  arthroplasty. Patient had a complication of a postop PE about a week following surgery. Patient is doing well with outpatient physical therapy.  She feels like the left knee was successful and she like to go ahead and try and get the right knee done because she is having more pain in this knee. Cannot get it done because there still looking into her clotting problem.  She is having a little more pain in the left knee.  Pain in the back of her knee.  Feels like it is a pinched nerve to her.  She is off the Xarelto but is still getting worked up for possible clotting disorder.  Pain is a 7/10.      Review of Systems:    Musculoskeletal:  See HPI        Physical Examination:    Vital Signs:    Vitals:    04/29/19 1046   BP: (!) 143/69   Pulse: 74       Body mass index is 33.91 kg/m².    This a well-developed, well nourished patient in no acute distress.  They are alert and oriented and cooperative to examination.  Pt. walks with a mild antalgic gait.    Examination left knee shows healed surgical incision. No drainage or erythema.  Range of motion is 5-115 degrees. No calf pain. Negative Homans sign    Examination of the right knee shows no rashes or erythema. There are no masses ecchymosis and a small effusion. Patient has full range of motion from 0-130°. Patient is nontender to palpation over lateral joint line and nontender to palpation over the medial joint line. Knee is stable to varus and valgus stress. 5 out of 5 motor strength. Palpable distal pulses. Intact light touch sensation. Negative Patellofemoral crepitus.  A little tenderness near the posterior lateral knee.    X-rays:  Knee two views of the left knee are reviewed which show well-aligned total knee arthroplasty components. Right knee arthritis     Assessment::  Status post left total knee arthroplasty  Postop pulmonary embolism  Right knee arthritis    Plan:  I recommended Neurontin 300 milligrams at night.  She has little nerve irritation possibly.   Hopefully this should help.  I will see her back in 6 weeks.    This note was created using [employer] voice recognition software that occasionally misinterpreted phrases or words.

## 2019-05-03 ENCOUNTER — LAB VISIT (OUTPATIENT)
Dept: LAB | Facility: HOSPITAL | Age: 39
End: 2019-05-03
Attending: INTERNAL MEDICINE
Payer: COMMERCIAL

## 2019-05-03 DIAGNOSIS — D64.9 NORMOCYTIC ANEMIA: ICD-10-CM

## 2019-05-03 PROCEDURE — 85598 HEXAGNAL PHOSPH PLTLT NEUTRL: CPT

## 2019-05-03 PROCEDURE — 36415 COLL VENOUS BLD VENIPUNCTURE: CPT

## 2019-05-04 ENCOUNTER — PATIENT MESSAGE (OUTPATIENT)
Dept: ORTHOPEDICS | Facility: CLINIC | Age: 39
End: 2019-05-04

## 2019-05-06 DIAGNOSIS — M54.5 ACUTE BILATERAL LOW BACK PAIN, WITH SCIATICA PRESENCE UNSPECIFIED: Primary | ICD-10-CM

## 2019-05-06 RX ORDER — METHYLPREDNISOLONE 4 MG/1
TABLET ORAL
Qty: 1 PACKAGE | Refills: 0 | Status: SHIPPED | OUTPATIENT
Start: 2019-05-06 | End: 2019-05-17 | Stop reason: ALTCHOICE

## 2019-05-07 LAB — APTT HEX PL PPP: NEGATIVE S

## 2019-05-08 ENCOUNTER — HOSPITAL ENCOUNTER (OUTPATIENT)
Dept: RADIOLOGY | Facility: HOSPITAL | Age: 39
Discharge: HOME | End: 2019-05-08
Attending: ORTHOPAEDIC SURGERY
Payer: COMMERCIAL

## 2019-05-08 DIAGNOSIS — M54.5 ACUTE BILATERAL LOW BACK PAIN, WITH SCIATICA PRESENCE UNSPECIFIED: ICD-10-CM

## 2019-05-08 PROCEDURE — 72148 MRI LUMBAR SPINE W/O DYE: CPT | Mod: TC

## 2019-05-08 PROCEDURE — 72148 MRI LUMBAR SPINE WITHOUT CONTRAST: ICD-10-PCS | Mod: 26,,, | Performed by: RADIOLOGY

## 2019-05-08 PROCEDURE — 72148 MRI LUMBAR SPINE W/O DYE: CPT | Mod: 26,,, | Performed by: RADIOLOGY

## 2019-05-13 ENCOUNTER — PATIENT MESSAGE (OUTPATIENT)
Dept: ORTHOPEDICS | Facility: CLINIC | Age: 39
End: 2019-05-13

## 2019-05-14 ENCOUNTER — PATIENT MESSAGE (OUTPATIENT)
Dept: SPINE | Facility: CLINIC | Age: 39
End: 2019-05-14

## 2019-05-16 ENCOUNTER — TELEPHONE (OUTPATIENT)
Dept: PHARMACY | Facility: CLINIC | Age: 39
End: 2019-05-16

## 2019-05-16 NOTE — TELEPHONE ENCOUNTER
Refill and new formulation consult call for Aimovig 140mg syringe. Patient confirmed need of the refill. Will [employer] on  to arrive on  with patient consent. Copay $5.00 at 004 with auth to charge [facility]. Address confirmed. Patient has 0 doses remaining / next injection due . Patient denies missed doses and no side effects.  No new medications/allergies/medical conditions. Labs not needed. No ER/Urgent care visits in past month. Sharps container needed. Patient taking the medication as directed. Patient verbalized understanding on new dose (1 pen) and denied further questions. Confirmed 2 patient identifiers - Name and .    Lester Guthrie, PharmD  Clinical Pharmacist  [facility]  P: [phone]

## 2019-05-17 ENCOUNTER — OFFICE VISIT (OUTPATIENT)
Dept: SPINE | Facility: CLINIC | Age: 39
End: 2019-05-17
Payer: COMMERCIAL

## 2019-05-17 ENCOUNTER — OFFICE VISIT (OUTPATIENT)
Dept: HEMATOLOGY/ONCOLOGY | Facility: CLINIC | Age: 39
End: 2019-05-17
Payer: COMMERCIAL

## 2019-05-17 ENCOUNTER — TELEPHONE (OUTPATIENT)
Dept: PAIN MEDICINE | Facility: CLINIC | Age: 39
End: 2019-05-17

## 2019-05-17 VITALS
HEIGHT: 72 IN | DIASTOLIC BLOOD PRESSURE: 69 MMHG | SYSTOLIC BLOOD PRESSURE: 113 MMHG | HEART RATE: 67 BPM | WEIGHT: 250 LBS | BODY MASS INDEX: 33.86 KG/M2

## 2019-05-17 VITALS
OXYGEN SATURATION: 96 % | TEMPERATURE: 99 F | RESPIRATION RATE: 20 BRPM | HEART RATE: 73 BPM | HEIGHT: 72 IN | BODY MASS INDEX: 33.53 KG/M2 | WEIGHT: 247.56 LBS

## 2019-05-17 DIAGNOSIS — Z98.84 WEIGHT GAIN STATUS POST GASTRIC BYPASS: ICD-10-CM

## 2019-05-17 DIAGNOSIS — G89.29 CHRONIC LEFT-SIDED LOW BACK PAIN, WITH SCIATICA PRESENCE UNSPECIFIED: Primary | ICD-10-CM

## 2019-05-17 DIAGNOSIS — R59.9 ADENOPATHY: Primary | ICD-10-CM

## 2019-05-17 DIAGNOSIS — R91.8 ABNORMAL CT SCAN OF LUNG: ICD-10-CM

## 2019-05-17 DIAGNOSIS — M54.5 CHRONIC LEFT-SIDED LOW BACK PAIN, WITH SCIATICA PRESENCE UNSPECIFIED: Primary | ICD-10-CM

## 2019-05-17 DIAGNOSIS — Z86.711 HISTORY OF PULMONARY EMBOLUS (PE): ICD-10-CM

## 2019-05-17 DIAGNOSIS — R63.5 WEIGHT GAIN STATUS POST GASTRIC BYPASS: ICD-10-CM

## 2019-05-17 PROCEDURE — 3008F PR BODY MASS INDEX (BMI) DOCUMENTED: ICD-10-PCS | Mod: CPTII,S$GLB,, | Performed by: INTERNAL MEDICINE

## 2019-05-17 PROCEDURE — 99204 OFFICE O/P NEW MOD 45 MIN: CPT | Mod: ,,, | Performed by: PHYSICAL MEDICINE & REHABILITATION

## 2019-05-17 PROCEDURE — 99214 PR OFFICE/OUTPT VISIT, EST, LEVL IV, 30-39 MIN: ICD-10-PCS | Mod: S$GLB,,, | Performed by: INTERNAL MEDICINE

## 2019-05-17 PROCEDURE — 99999 PR PBB SHADOW E&M-EST. PATIENT-LVL V: ICD-10-PCS | Mod: PBBFAC,,, | Performed by: INTERNAL MEDICINE

## 2019-05-17 PROCEDURE — 99214 OFFICE O/P EST MOD 30 MIN: CPT | Mod: S$GLB,,, | Performed by: INTERNAL MEDICINE

## 2019-05-17 PROCEDURE — 99204 PR OFFICE/OUTPT VISIT, NEW, LEVL IV, 45-59 MIN: ICD-10-PCS | Mod: ,,, | Performed by: PHYSICAL MEDICINE & REHABILITATION

## 2019-05-17 PROCEDURE — 99999 PR PBB SHADOW E&M-EST. PATIENT-LVL V: CPT | Mod: PBBFAC,,, | Performed by: INTERNAL MEDICINE

## 2019-05-17 PROCEDURE — 3008F PR BODY MASS INDEX (BMI) DOCUMENTED: ICD-10-PCS | Mod: ,,, | Performed by: PHYSICAL MEDICINE & REHABILITATION

## 2019-05-17 PROCEDURE — 3008F BODY MASS INDEX DOCD: CPT | Mod: ,,, | Performed by: PHYSICAL MEDICINE & REHABILITATION

## 2019-05-17 PROCEDURE — 3008F BODY MASS INDEX DOCD: CPT | Mod: CPTII,S$GLB,, | Performed by: INTERNAL MEDICINE

## 2019-05-17 RX ORDER — TIZANIDINE 4 MG/1
TABLET ORAL
Refills: 2 | COMMUNITY
Start: 2019-03-01 | End: 2020-06-18 | Stop reason: ALTCHOICE

## 2019-05-17 NOTE — PROGRESS NOTES
CC I stopped the pradaxa    Aleyda Costa is a 38 y.o.    Pt had undergone a total left knee replacement in November 2018 when a few days later she presented to ER with increasing SOB. Workup revealed bilateral PE. SHe was placed on xarelto. SHe had an ultrasound of legs which was negative.Repeat CTA revealed no resolution of PE when she saw me. I changed her to pradaxa. She is here to review repeat CTA . She mistakenly quit taking pradaxa a few weeks ago. She has no SOB or pain . No swelling of her legs   Pt had a gastric sleeve in 2014.   She is here for results of hyper coag workup in order for her to proceed with Right knee replacement     Past Medical History:   Diagnosis Date    Arthritis     OA    Back pain     Cancer     skin cancer to back    Depression     Full dentures     Migraine     Seizures     Sleep apnea     Does not use c-pap since weight loss - 170 lbs    Wears glasses    She remains on Prozac for mood and B12 to help with energy      Current Outpatient Medications:     amitriptyline (ELAVIL) 25 MG tablet, TAKE ONE TABLET BY MOUTH EVERY NIGHT FOR 2 WEEKS THEN INCREASE TO 2 TABLETS AT BEDTIME, Disp: , Rfl:     cetirizine (ZYRTEC) 5 MG tablet, Take 5 mg by mouth as needed. , Disp: , Rfl:     cyanocobalamin, vitamin B-12, 1,000 mcg/mL Kit, Inject 1 mL into the muscle every 21 days., Disp: , Rfl:     epinephrine (EPIPEN) 0.3 mg/0.3 mL (1:1,000) AtIn, 0.3 mg daily as needed. , Disp: , Rfl:     erenumab-aooe (AIMOVIG AUTOINJECTOR) 70 mg/mL AtIn, Inject 2 mLs (140 mg total) into the skin every 28 days., Disp: 2 mL, Rfl: 5    erenumab-aooe 140 mg/mL AtIn, Inject 140 mg into the skin every 28 days., Disp: 1 mL, Rfl: 1    fluoxetine (PROZAC) 20 MG capsule, every evening. , Disp: , Rfl: 3    hydrOXYzine pamoate (VISTARIL) 25 MG Cap, TAKE ONE CAPSULE BY MOUTH 3 TIMES A DAY AS NEEDED FOR ANXIETY, Disp: , Rfl:     ibuprofen (ADVIL,MOTRIN) 800 MG tablet, every 6 (six) hours as needed. ,  Disp: , Rfl: 5    ibuprofen/D5W 250mL, Take 800 mg by mouth., Disp: , Rfl:     lorazepam (ATIVAN) 0.5 MG tablet, Take 0.5 mg by mouth every 4 (four) hours as needed. , Disp: , Rfl:     oxycodone (OXYCONTIN) 15 mg TR12 12 hr tablet, Take 1 tablet (15 mg total) by mouth every 12 (twelve) hours. (Patient taking differently: Take 5 mg by mouth every 8 (eight) hours. ), Disp: 20 tablet, Rfl: 0    promethazine (PHENERGAN) 25 MG tablet, Take 1 tablet (25 mg total) by mouth every 6 (six) hours as needed for Nausea. Take 1 tablet by mouth every 6 (six) hours as needed (migraine). -MAY CAUSE DROWSINESS-, Disp: 20 tablet, Rfl: 3    sumatriptan succinate 4 mg/0.5 mL PnIj, Inject 4 mg into the skin daily as needed. imitrex, Disp: 6 each, Rfl: 5    tiZANidine (ZANAFLEX) 4 MG tablet, Take 1 tablet (4 mg total) by mouth every 8 (eight) hours as needed., Disp: , Rfl: 2    VOLTAREN 1 % Gel, daily as needed. , Disp: , Rfl: 3    Current Facility-Administered Medications:     onabotulinumtoxina injection 200 Units, 1 vial, Intramuscular, 1 time in Clinic/HOD, Lola Rojas MD    onabotulinumtoxina injection 200 Units, 200 Units, Intramuscular, Q90 Days, Lola Rojas MD, 200 Units at 10/18/18 1609    Social History     Tobacco Use    Smoking status: Former Smoker     Packs/day: 0.50     Years: 20.00     Pack years: 10.00     Types: Cigarettes     Last attempt to quit: 2018     Years since quittin.5    Smokeless tobacco: Never Used   Substance Use Topics    Alcohol use: Yes     Alcohol/week: 0.0 oz     Comment: occasionally    Drug use: Yes     Types: Other-see comments     Family History   Problem Relation Age of Onset    Heart disease Mother     Heart disease Father        CONSTITUTIONAL: No fevers, chills, night sweats, wt. loss, appetite changes  SKIN: no rashes or itching  ENT: No headaches, head trauma,or eye pain , no discharge  LYMPH NODES: None noticed   CV: No chest pain, palpitations.    RESP: No dyspnea on exertion, cough, wheezing, or hemoptysis  GI: No nausea, emesis, diarrhea, constipation, melena, hematochezia, pain.   : No dysuria, hematuria, urgency, or frequency   HEME: No easy bruising, bleeding problems  PSYCHIATRIC: No depression, anxiety, hallucinations.  NEURO: No seizures, memory loss, dizziness or difficulty sleeping         Pulse 73   Temp 98.5 °F (36.9 °C)   Resp 20   Ht 6' (1.829 m)   Wt 112.3 kg (247 lb 9.2 oz)   SpO2 96%   BMI 33.58 kg/m²     Constitutional: oriented to person, place, and time.  well-developed and well-nourished.   HENT:   Head: Normocephalic and atraumatic.   Right Ear: External ear normal.   Left Ear: External ear normal.   Nose: Nose normal.   Mouth/Throat: Oropharynx is clear and moist.   Eyes: Conjunctivae and EOM are normal. Pupils are equal, round  Neck: Normal range of motion. Neck supple. No thyromegaly present.     No murmur heard.  Pulmonary/Chest: Effort normal no wheezes.  no rales.   Abdominal: Soft. Bowel sounds are normal..   Musculoskeletal: Normal range of motion.  no edema   Lymphadenopathy:    no cervical adenopathy.   Neurological: alert and oriented to person, place, and time.  Skin: Skin is warm and dry. No rash noted.   Psychiatric: normal mood and affect.    Vitals reviewed.    Lab Results   Component Value Date    WBC 6.80 04/06/2019    HGB 12.4 04/06/2019    HCT 37.3 04/06/2019    MCV 92 04/06/2019     04/06/2019     CMP  Sodium   Date Value Ref Range Status   04/06/2019 140 136 - 145 mmol/L Final     Potassium   Date Value Ref Range Status   04/06/2019 3.2 (L) 3.5 - 5.1 mmol/L Final     Chloride   Date Value Ref Range Status   04/06/2019 107 95 - 110 mmol/L Final     CO2   Date Value Ref Range Status   04/06/2019 23 23 - 29 mmol/L Final     Glucose   Date Value Ref Range Status   04/06/2019 117 (H) 70 - 110 mg/dL Final     BUN, Bld   Date Value Ref Range Status   04/06/2019 5 (L) 6 - 20 mg/dL Final     Creatinine    Date Value Ref Range Status   04/06/2019 0.7 0.5 - 1.4 mg/dL Final     Calcium   Date Value Ref Range Status   04/06/2019 9.1 8.7 - 10.5 mg/dL Final     Total Protein   Date Value Ref Range Status   03/25/2019 7.3 6.0 - 8.4 g/dL Final     Albumin   Date Value Ref Range Status   03/25/2019 3.5 3.5 - 5.2 g/dL Final     Total Bilirubin   Date Value Ref Range Status   03/25/2019 0.3 0.1 - 1.0 mg/dL Final     Comment:     For infants and newborns, interpretation of results should be based  on gestational age, weight and in agreement with clinical  observations.  Premature Infant recommended reference ranges:  Up to 24 hours.............<8.0 mg/dL  Up to 48 hours............<12.0 mg/dL  3-5 days..................<15.0 mg/dL  6-29 days.................<15.0 mg/dL       Alkaline Phosphatase   Date Value Ref Range Status   03/25/2019 85 55 - 135 U/L Final     AST   Date Value Ref Range Status   03/25/2019 33 10 - 40 U/L Final     ALT   Date Value Ref Range Status   03/25/2019 20 10 - 44 U/L Final     Anion Gap   Date Value Ref Range Status   04/06/2019 10 8 - 16 mmol/L Final     eGFR if    Date Value Ref Range Status   04/06/2019 >60 >60 mL/min/1.73 m^2 Final     eGFR if non    Date Value Ref Range Status   04/06/2019 >60 >60 mL/min/1.73 m^2 Final     Comment:     Calculation used to obtain the estimated glomerular filtration  rate (eGFR) is the CKD-EPI equation.        No pain  No fall risk   Repeat labs negative    Adenopathy  -     CT Chest With Contrast; Future; Expected date: 05/17/2019    Abnormal CT scan of lung  -     CT Chest With Contrast; Future; Expected date: 05/17/2019    History of pulmonary embolus (PE)    Weight gain status post gastric bypass         Hypercoag workup is negative. Pt has no contraindication to surgery from a heme standpoint but must be cleared by PCP for such.   Proper hydration encouraged with electrolytes after and before surgery  She has no  antiphospholipid syndrome or lupus anticoagulant. She must stay hydrated : she is at risk of clotting if she remains dehydrated post gastric bypass.   She is to return to her pcp then DR Nicolas   Given the growth of the subcarinal lymph node from 1 cm to 1.6 cm , she needs a repeat CT chest with contrast in 4 months; history tobacco use       Thank you for allowing me to evaluate and participate in the care of this pleasant patient. Please fell free to call me with any questions or concerns.    Warmly,  Rabia Pina MD    This note was dictated with Dragon and briefly proofread. Please excuse any sentences that may be unclear or words misspelled

## 2019-05-17 NOTE — PROGRESS NOTES
SUBJECTIVE:    Patient ID: Aleyda Costa is a 38 y.o. female.    Chief Complaint: Results (MRI )    This is a 38-year-old woman who sees Dr. Hayden for her primary care. She recently stopped treatment for deep vein thrombosis otherwise she has no chronic major medical problems.  She does however have a long history of low back pain.  In the past she has had interlaminar epidural steroid injections as well as medial branch blocks and radiofrequency ablation with variable results.  She presents now with complaints of left-sided low back pain at the lumbosacral junction radiating to the lateral portion of the left knee.  She had a left knee arthroplasty in November.  She denies any weakness bowel or bladder dysfunction fever chills sweats or unexpected weight loss.  MRI of the lumbar spine is summarized below:      Impression      At L5-S1 there is focal disc protrusion/small herniation to the left with left lateral recess and neural foraminal stenosis.  At L4-5 there short pedicles and hypertrophy of the facets and ligamentum flavum with moderate spinal canal and mild left greater than right neural foraminal stenosis.  At L3-4 there is hypertrophy of the facets and ligamentum flavum and mild spinal canal and bilateral neural foraminal stenosis.            Past Medical History:   Diagnosis Date    Arthritis     OA    Back pain     Cancer     skin cancer to back    Depression     Full dentures     Migraine     Seizures     Sleep apnea     Does not use c-pap since weight loss - 170 lbs    Wears glasses      Social History     Socioeconomic History    Marital status:      Spouse name: Not on file    Number of children: Not on file    Years of education: Not on file    Highest education level: Not on file   Occupational History    Not on file   Social Needs    Financial resource strain: Not on file    Food insecurity:     Worry: Not on file     Inability: Not on file    Transportation needs:      Medical: Not on file     Non-medical: Not on file   Tobacco Use    Smoking status: Former Smoker     Packs/day: 0.50     Years: 20.00     Pack years: 10.00     Types: Cigarettes     Last attempt to quit: 2018     Years since quittin.5    Smokeless tobacco: Never Used   Substance and Sexual Activity    Alcohol use: Yes     Alcohol/week: 0.0 oz     Comment: occasionally    Drug use: Yes     Types: Other-see comments    Sexual activity: Yes     Partners: Male   Lifestyle    Physical activity:     Days per week: Not on file     Minutes per session: Not on file    Stress: Not on file   Relationships    Social connections:     Talks on phone: Not on file     Gets together: Not on file     Attends Yazidism service: Not on file     Active member of club or organization: Not on file     Attends meetings of clubs or organizations: Not on file     Relationship status: Not on file   Other Topics Concern    Not on file   Social History Narrative    Not on file     Past Surgical History:   Procedure Laterality Date    APPENDECTOMY      ARTHROPLASTY, KNEE Left 2018    [facility]    ARTHROSCOPY-KNEE Left 2016    [facility]    BLOCK-NERVE-MEDIAL BRANCH-LUMBAR Bilateral 2016    [facility]    CARPAL TUNNEL RELEASE Bilateral      SECTION      CHOLECYSTECTOMY      CHONDROPLASTY-KNEE-ARTHROSCOPY Left 2016    [facility]    FRACTURE SURGERY      ankle    gastric sleve      HYSTERECTOMY      HYSTERECTOMY      INJECTION-STEROID-EPIDURAL-LUMBAR N/A 2016    [facility]    INJECTION-STEROID-EPIDURAL-LUMBAR N/A 3/28/2016    [facility]    JOINT REPLACEMENT Left 2018    KNEE SURGERY      LUMBAR EPIDURAL INJECTION      RADIAL NERVE Right     RADIOFREQUENCY  THERMOCOAGULATION ([facility]C)-NERVE-MEDIAN BRANCH-LUMBAR Bilateral 1/5/2017    [facility]    RADIOFREQUENCY THERMOCOAGULATION ([facility])-NERVE-MEDIAN BRANCH-LUMBAR Bilateral 6/6/2016    [facility] [place]    RECONSTRUCTION- LIGAMENT-MPFL RECONSTRUCTION WITH ALLOGRAFT Left 9/29/2017    [facility]    SINUS SURGERY       Family History   Problem Relation Age of Onset    Heart disease Mother     Heart disease Father      Vitals:    05/17/19 0838   BP: 113/69   Pulse: 67   Weight: 113.4 kg (250 lb)   Height: 6' (1.829 m)       Review of Systems   Constitutional: Negative for chills, diaphoresis, fatigue, fever and unexpected weight change.   HENT: Negative for trouble swallowing.    Eyes: Negative for visual disturbance.   Respiratory: Negative for shortness of breath.    Cardiovascular: Negative for chest pain.   Gastrointestinal: Negative for abdominal pain, constipation, nausea and vomiting.   Genitourinary: Negative for difficulty urinating.   Musculoskeletal: Negative for arthralgias, back pain, gait problem, joint swelling, myalgias, neck pain and neck stiffness.   Neurological: Negative for dizziness, speech difficulty, weakness, light-headedness, numbness and headaches.          Objective:      Physical Exam   Constitutional: She is oriented to person, place, and time. She appears well-developed and well-nourished.   Neurological: She is alert and oriented to person, place, and time.   She is awake and in no acute distress  No point tenderness or palpable masses about the lumbar spine  Forward flexion is normal and painless.  Extension causes mild discomfort on the left at the lumbosacral junction  She can heel and toe walk normally  Deep tendon reflexes are +1 at the right knee.  Left knee not tested.  Deep tendon reflexes are trace at both ankles  Strength is normal in both lower extremities           Assessment:       1. Chronic  left-sided low back pain, with sciatica presence unspecified           Plan:     she has a nonfocal examination from a neurological standpoint and no historical red flags.  She presents with low back and left leg pain which may be radicular although I do not clearly see any upper nerve root involvement on her MRI.  She has no radicular symptoms below the level of the knee.  I recommended trial of epidural steroid injections interlaminar epidural at L3-4.  If that does not improve her symptoms then consider EMG and nerve conduction studies.  Follow-up with me after the procedure      Chronic left-sided low back pain, with sciatica presence unspecified

## 2019-05-17 NOTE — TELEPHONE ENCOUNTER
----- Message from Austin Rodriguez MD sent at 5/17/2019  9:03 AM CDT -----  Please schedule interlaminar epidural steroid injection at L3-4

## 2019-05-17 NOTE — H&P (VIEW-ONLY)
SUBJECTIVE:    Patient ID: Aleyda Costa is a 38 y.o. female.    Chief Complaint: Results (MRI )    This is a 38-year-old woman who sees Dr. Hayden for her primary care. She recently stopped treatment for deep vein thrombosis otherwise she has no chronic major medical problems.  She does however have a long history of low back pain.  In the past she has had interlaminar epidural steroid injections as well as medial branch blocks and radiofrequency ablation with variable results.  She presents now with complaints of left-sided low back pain at the lumbosacral junction radiating to the lateral portion of the left knee.  She had a left knee arthroplasty in November.  She denies any weakness bowel or bladder dysfunction fever chills sweats or unexpected weight loss.  MRI of the lumbar spine is summarized below:      Impression      At L5-S1 there is focal disc protrusion/small herniation to the left with left lateral recess and neural foraminal stenosis.  At L4-5 there short pedicles and hypertrophy of the facets and ligamentum flavum with moderate spinal canal and mild left greater than right neural foraminal stenosis.  At L3-4 there is hypertrophy of the facets and ligamentum flavum and mild spinal canal and bilateral neural foraminal stenosis.            Past Medical History:   Diagnosis Date    Arthritis     OA    Back pain     Cancer     skin cancer to back    Depression     Full dentures     Migraine     Seizures     Sleep apnea     Does not use c-pap since weight loss - 170 lbs    Wears glasses      Social History     Socioeconomic History    Marital status:      Spouse name: Not on file    Number of children: Not on file    Years of education: Not on file    Highest education level: Not on file   Occupational History    Not on file   Social Needs    Financial resource strain: Not on file    Food insecurity:     Worry: Not on file     Inability: Not on file    Transportation needs:      Medical: Not on file     Non-medical: Not on file   Tobacco Use    Smoking status: Former Smoker     Packs/day: 0.50     Years: 20.00     Pack years: 10.00     Types: Cigarettes     Last attempt to quit: 2018     Years since quittin.5    Smokeless tobacco: Never Used   Substance and Sexual Activity    Alcohol use: Yes     Alcohol/week: 0.0 oz     Comment: occasionally    Drug use: Yes     Types: Other-see comments    Sexual activity: Yes     Partners: Male   Lifestyle    Physical activity:     Days per week: Not on file     Minutes per session: Not on file    Stress: Not on file   Relationships    Social connections:     Talks on phone: Not on file     Gets together: Not on file     Attends Religion service: Not on file     Active member of club or organization: Not on file     Attends meetings of clubs or organizations: Not on file     Relationship status: Not on file   Other Topics Concern    Not on file   Social History Narrative    Not on file     Past Surgical History:   Procedure Laterality Date    APPENDECTOMY      ARTHROPLASTY, KNEE Left 2018    [facility] [place]    ARTHROSCOPY-KNEE Left 2016    [facility]    BLOCK-NERVE-MEDIAL BRANCH-LUMBAR Bilateral 2016    [facility]    CARPAL TUNNEL RELEASE Bilateral      SECTION      CHOLECYSTECTOMY      CHONDROPLASTY-KNEE-ARTHROSCOPY Left 2016    [facility]    FRACTURE SURGERY      ankle    gastric sleve      HYSTERECTOMY      HYSTERECTOMY      INJECTION-STEROID-EPIDURAL-LUMBAR N/A 2016    [facility] [place]    INJECTION-STEROID-EPIDURAL-LUMBAR N/A 3/28/2016    [facility] [place]    JOINT REPLACEMENT Left 2018    KNEE SURGERY      LUMBAR EPIDURAL INJECTION      RADIAL NERVE Right     RADIOFREQUENCY  THERMOCOAGULATION ([facility])-NERVE-MEDIAN BRANCH-LUMBAR Bilateral 1/5/2017    [facility]    RADIOFREQUENCY THERMOCOAGULATION ([facility])-NERVE-MEDIAN BRANCH-LUMBAR Bilateral 6/6/2016    [facility] [place]    RECONSTRUCTION- LIGAMENT-MPFL RECONSTRUCTION WITH ALLOGRAFT Left 9/29/2017    [facility]    SINUS SURGERY       Family History   Problem Relation Age of Onset    Heart disease Mother     Heart disease Father      Vitals:    05/17/19 0838   BP: 113/69   Pulse: 67   Weight: 113.4 kg (250 lb)   Height: 6' (1.829 m)       Review of Systems   Constitutional: Negative for chills, diaphoresis, fatigue, fever and unexpected weight change.   HENT: Negative for trouble swallowing.    Eyes: Negative for visual disturbance.   Respiratory: Negative for shortness of breath.    Cardiovascular: Negative for chest pain.   Gastrointestinal: Negative for abdominal pain, constipation, nausea and vomiting.   Genitourinary: Negative for difficulty urinating.   Musculoskeletal: Negative for arthralgias, back pain, gait problem, joint swelling, myalgias, neck pain and neck stiffness.   Neurological: Negative for dizziness, speech difficulty, weakness, light-headedness, numbness and headaches.          Objective:      Physical Exam   Constitutional: She is oriented to person, place, and time. She appears well-developed and well-nourished.   Neurological: She is alert and oriented to person, place, and time.   She is awake and in no acute distress  No point tenderness or palpable masses about the lumbar spine  Forward flexion is normal and painless.  Extension causes mild discomfort on the left at the lumbosacral junction  She can heel and toe walk normally  Deep tendon reflexes are +1 at the right knee.  Left knee not tested.  Deep tendon reflexes are trace at both ankles  Strength is normal in both lower extremities           Assessment:       1. Chronic  left-sided low back pain, with sciatica presence unspecified           Plan:     she has a nonfocal examination from a neurological standpoint and no historical red flags.  She presents with low back and left leg pain which may be radicular although I do not clearly see any upper nerve root involvement on her MRI.  She has no radicular symptoms below the level of the knee.  I recommended trial of epidural steroid injections interlaminar epidural at L3-4.  If that does not improve her symptoms then consider EMG and nerve conduction studies.  Follow-up with me after the procedure      Chronic left-sided low back pain, with sciatica presence unspecified

## 2019-05-22 ENCOUNTER — PATIENT MESSAGE (OUTPATIENT)
Dept: ORTHOPEDICS | Facility: CLINIC | Age: 39
End: 2019-05-22

## 2019-05-24 DIAGNOSIS — M54.16 LUMBAR RADICULOPATHY: Primary | ICD-10-CM

## 2019-06-03 ENCOUNTER — PATIENT MESSAGE (OUTPATIENT)
Dept: ORTHOPEDICS | Facility: CLINIC | Age: 39
End: 2019-06-03

## 2019-06-06 ENCOUNTER — PATIENT MESSAGE (OUTPATIENT)
Dept: SURGERY | Facility: AMBULARY SURGERY CENTER | Age: 39
End: 2019-06-06

## 2019-06-07 ENCOUNTER — PATIENT MESSAGE (OUTPATIENT)
Dept: ORTHOPEDICS | Facility: CLINIC | Age: 39
End: 2019-06-07

## 2019-06-07 ENCOUNTER — HOSPITAL ENCOUNTER (OUTPATIENT)
Facility: AMBULARY SURGERY CENTER | Age: 39
Discharge: HOME | End: 2019-06-07
Attending: ANESTHESIOLOGY | Admitting: ANESTHESIOLOGY
Payer: COMMERCIAL

## 2019-06-07 DIAGNOSIS — M54.16 LUMBAR RADICULITIS: ICD-10-CM

## 2019-06-07 DIAGNOSIS — M51.36 DDD (DEGENERATIVE DISC DISEASE), LUMBAR: Primary | ICD-10-CM

## 2019-06-07 PROCEDURE — 62323 NJX INTERLAMINAR LMBR/SAC: CPT | Mod: ,,, | Performed by: ANESTHESIOLOGY

## 2019-06-07 PROCEDURE — 99152 PR MOD CONSCIOUS SEDATION, SAME PHYS, 5+ YRS, FIRST 15 MIN: ICD-10-PCS | Mod: ,,, | Performed by: ANESTHESIOLOGY

## 2019-06-07 PROCEDURE — 62323 NJX INTERLAMINAR LMBR/SAC: CPT | Performed by: ANESTHESIOLOGY

## 2019-06-07 PROCEDURE — 99152 MOD SED SAME PHYS/QHP 5/>YRS: CPT | Mod: ,,, | Performed by: ANESTHESIOLOGY

## 2019-06-07 PROCEDURE — 62323 PR INJ LUMBAR/SACRAL, W/IMAGING GUIDANCE: ICD-10-PCS | Mod: ,,, | Performed by: ANESTHESIOLOGY

## 2019-06-07 RX ORDER — MIDAZOLAM HYDROCHLORIDE 2 MG/2ML
INJECTION, SOLUTION INTRAMUSCULAR; INTRAVENOUS
Status: DISCONTINUED | OUTPATIENT
Start: 2019-06-07 | End: 2019-06-07 | Stop reason: HOSPADM

## 2019-06-07 RX ORDER — FENTANYL CITRATE 50 UG/ML
INJECTION, SOLUTION INTRAMUSCULAR; INTRAVENOUS
Status: DISCONTINUED | OUTPATIENT
Start: 2019-06-07 | End: 2019-06-07 | Stop reason: HOSPADM

## 2019-06-07 RX ORDER — DEXAMETHASONE SODIUM PHOSPHATE 10 MG/ML
INJECTION INTRAMUSCULAR; INTRAVENOUS
Status: DISCONTINUED | OUTPATIENT
Start: 2019-06-07 | End: 2019-06-07 | Stop reason: HOSPADM

## 2019-06-07 RX ORDER — LIDOCAINE HYDROCHLORIDE 10 MG/ML
INJECTION, SOLUTION EPIDURAL; INFILTRATION; INTRACAUDAL; PERINEURAL
Status: DISCONTINUED | OUTPATIENT
Start: 2019-06-07 | End: 2019-06-07 | Stop reason: HOSPADM

## 2019-06-07 RX ORDER — SODIUM CHLORIDE, SODIUM LACTATE, POTASSIUM CHLORIDE, CALCIUM CHLORIDE 600; 310; 30; 20 MG/100ML; MG/100ML; MG/100ML; MG/100ML
INJECTION, SOLUTION INTRAVENOUS ONCE AS NEEDED
Status: COMPLETED | OUTPATIENT
Start: 2019-06-07 | End: 2019-06-07

## 2019-06-07 RX ORDER — SODIUM CHLORIDE 9 MG/ML
INJECTION, SOLUTION INTRAMUSCULAR; INTRAVENOUS; SUBCUTANEOUS
Status: DISCONTINUED | OUTPATIENT
Start: 2019-06-07 | End: 2019-06-07 | Stop reason: HOSPADM

## 2019-06-07 RX ADMIN — SODIUM CHLORIDE, SODIUM LACTATE, POTASSIUM CHLORIDE, CALCIUM CHLORIDE: 600; 310; 30; 20 INJECTION, SOLUTION INTRAVENOUS at 11:06

## 2019-06-07 NOTE — DISCHARGE SUMMARY
[facility]  Discharge Note  Short Stay    Admit Date: 6/7/2019    Discharge Date and Time: 6/7/2019    Attending Physician: Yariel Ramirez MD     Discharge Provider: Yariel Ramirez    Diagnoses:  Active Hospital Problems    Diagnosis  POA    *Lumbar radiculitis [M54.16]  Yes      Resolved Hospital Problems   No resolved problems to display.       Hospital Course: Lumbar KHUSHBOO  Discharged Condition: Good    Final Diagnoses:   Active Hospital Problems    Diagnosis  POA    *Lumbar radiculitis [M54.16]  Yes      Resolved Hospital Problems   No resolved problems to display.       Disposition: Home or Self Care    Follow up/Patient Instructions:    Medications:  Reconciled Home Medications:      Medication List      CHANGE how you take these medications    oxyCODONE 15 mg Tr12 12 hr tablet  Commonly known as:  OXYCONTIN  Take 1 tablet (15 mg total) by mouth every 12 (twelve) hours.  What changed:    · how much to take  · when to take this        CONTINUE taking these medications    AIMOVIG AUTOINJECTOR 140 mg/mL Atin  Generic drug:  erenumab-aooe  Inject 140 mg into the skin every 28 days.     amitriptyline 25 MG tablet  Commonly known as:  ELAVIL  TAKE ONE TABLET BY MOUTH EVERY NIGHT FOR 2 WEEKS THEN INCREASE TO 2 TABLETS AT BEDTIME     cetirizine 5 MG tablet  Commonly known as:  ZYRTEC  Take 5 mg by mouth as needed.     cyanocobalamin (vitamin B-12) 1,000 mcg/mL Kit  Inject 1 mL into the muscle every 21 days.     EPINEPHrine 0.3 mg/0.3 mL Atin  Commonly known as:  EPIPEN  0.3 mg daily as needed.     FLUoxetine 20 MG capsule  every evening.     hydrOXYzine pamoate 25 MG Cap  Commonly known as:  VISTARIL  TAKE ONE CAPSULE BY MOUTH 3 TIMES A DAY AS NEEDED FOR ANXIETY     * ibuprofen 800 MG tablet  Commonly known as:  ADVIL,MOTRIN  every 6 (six) hours as needed.     * ibuprofen/D5W 250mL  Take 800 mg by mouth.     LORazepam 0.5 MG tablet  Commonly known as:  ATIVAN  Take 0.5 mg by mouth every 4 (four) hours as needed.      promethazine 25 MG tablet  Commonly known as:  PHENERGAN  Take 1 tablet (25 mg total) by mouth every 6 (six) hours as needed for Nausea. Take 1 tablet by mouth every 6 (six) hours as needed (migraine). -MAY CAUSE DROWSINESS-     SUMAtriptan succinate 4 mg/0.5 mL Pnij  Commonly known as:  IMITREX  Inject 4 mg into the skin daily as needed. imitrex     tiZANidine 4 MG tablet  Commonly known as:  ZANAFLEX  Take 1 tablet (4 mg total) by mouth every 8 (eight) hours as needed.     VOLTAREN 1 % Gel  Generic drug:  diclofenac sodium  daily as needed.         * This list has 2 medication(s) that are the same as other medications prescribed for you. Read the directions carefully, and ask your doctor or other care provider to review them with you.              Discharge Procedure Orders   Call MD for:  temperature >100.4     Call MD for:  persistent nausea and vomiting or diarrhea     Call MD for:  severe uncontrolled pain     Call MD for:  redness, tenderness, or signs of infection (pain, swelling, redness, odor or green/yellow discharge around incision site)     Call MD for:  difficulty breathing or increased cough     Call MD for:  severe persistent headache        Follow up with MD in 2-3 weeks    Discharge Procedure Orders (must include Diet, Follow-up, Activity):   Discharge Procedure Orders (must include Diet, Follow-up, Activity)   Call MD for:  temperature >100.4     Call MD for:  persistent nausea and vomiting or diarrhea     Call MD for:  severe uncontrolled pain     Call MD for:  redness, tenderness, or signs of infection (pain, swelling, redness, odor or green/yellow discharge around incision site)     Call MD for:  difficulty breathing or increased cough     Call MD for:  severe persistent headache

## 2019-06-07 NOTE — OP NOTE
PROCEDURE DATE: 6/7/2019    Procedure:   Interlaminar epidural steroid injection at L3-4 under fluoroscopic guidance.    Diagnosis: lUMBAR DISC DISPLACEMENT WITHOUT MYELOPATHY  pOSTOP DIAGNOSIS: sAME    Physician: Yariel Ramirez M.D.    Medications injected:10 mg dexamethasone with 4 ml of preservative free NaCl    Local anesthetic injected:    Lidocaine 1% 2 ml total    Sedation Medications: RN IV sedation    Estimated blood loss:  None    Complications:  None    Technique:  Time-out taken to identify patient and procedure prior to starting the procedure.  With the patient laying in a prone position, the area was prepped and draped in the usual sterile fashion using ChloraPrep and a fenestrated drape.  After determining the target level with an AP fluoroscopic view, local anesthetic was given using a 25-gauge 1.5 inch needle by raising a wheal and then infiltrating toward the interlaminar entry space.  A 3.5inch 20-gauge Touhy needle was introduced under AP fluoroscopic guidance to the interlaminar space of L3-4. Once the trajectory was established, the needle was visualized in the lateral view and advanced using loss of resistance technique. Once in the desired position, 1ml of non-iodine contrast was injected to confirm placement and there was no vascular uptake nor intrathecal spread.  The medication was then injected slowly. The patient tolerated the procedure well.      The patient was monitored after the procedure.   They were given post-procedure and discharge instructions to follow at home.  The patient was discharged in a stable condition.

## 2019-06-07 NOTE — PLAN OF CARE
Stable, states ready to go home, shade po fluids, pain tolerable, no legs weakness, belongings given to pt, ambulated to car with RN to father

## 2019-06-10 VITALS
HEIGHT: 72 IN | OXYGEN SATURATION: 95 % | HEART RATE: 68 BPM | SYSTOLIC BLOOD PRESSURE: 118 MMHG | WEIGHT: 247 LBS | TEMPERATURE: 98 F | BODY MASS INDEX: 33.46 KG/M2 | RESPIRATION RATE: 20 BRPM | DIASTOLIC BLOOD PRESSURE: 65 MMHG

## 2019-06-19 ENCOUNTER — TELEPHONE (OUTPATIENT)
Dept: PHARMACY | Facility: CLINIC | Age: 39
End: 2019-06-19

## 2019-06-28 ENCOUNTER — OFFICE VISIT (OUTPATIENT)
Dept: SPINE | Facility: CLINIC | Age: 39
End: 2019-06-28
Payer: COMMERCIAL

## 2019-06-28 VITALS — WEIGHT: 246.94 LBS | HEIGHT: 72 IN | BODY MASS INDEX: 33.45 KG/M2

## 2019-06-28 DIAGNOSIS — M54.5 CHRONIC LEFT-SIDED LOW BACK PAIN, WITH SCIATICA PRESENCE UNSPECIFIED: Primary | ICD-10-CM

## 2019-06-28 DIAGNOSIS — G89.29 CHRONIC LEFT-SIDED LOW BACK PAIN, WITH SCIATICA PRESENCE UNSPECIFIED: Primary | ICD-10-CM

## 2019-06-28 PROCEDURE — 99213 OFFICE O/P EST LOW 20 MIN: CPT | Mod: ,,, | Performed by: PHYSICAL MEDICINE & REHABILITATION

## 2019-06-28 PROCEDURE — 3008F BODY MASS INDEX DOCD: CPT | Mod: ,,, | Performed by: PHYSICAL MEDICINE & REHABILITATION

## 2019-06-28 PROCEDURE — 3008F PR BODY MASS INDEX (BMI) DOCUMENTED: ICD-10-PCS | Mod: ,,, | Performed by: PHYSICAL MEDICINE & REHABILITATION

## 2019-06-28 PROCEDURE — 99213 PR OFFICE/OUTPT VISIT, EST, LEVL III, 20-29 MIN: ICD-10-PCS | Mod: ,,, | Performed by: PHYSICAL MEDICINE & REHABILITATION

## 2019-06-28 RX ORDER — CELECOXIB 200 MG/1
200 CAPSULE ORAL NIGHTLY
Status: ON HOLD | COMMUNITY
End: 2020-06-23 | Stop reason: HOSPADM

## 2019-06-28 NOTE — PROGRESS NOTES
SUBJECTIVE:    Patient ID: Aleyda Costa is a 38 y.o. female.    Chief Complaint: Follow-up (post procedure with  )    She is here for follow-up status post interlaminar epidural steroid injection at L3-4 on 2019 with Dr. Ramirez.  No significant improvement from that procedure.  I note that she has discussed spinal cord stimulator with her pain management specialist.  I do not have a problem with her pursuing that        Past Medical History:   Diagnosis Date    Arthritis     OA    Back pain     Cancer     skin cancer to back    Depression     Full dentures     Migraine     Seizures     Sleep apnea     Does not use c-pap since weight loss - 170 lbs    Wears glasses      Social History     Socioeconomic History    Marital status:      Spouse name: Not on file    Number of children: Not on file    Years of education: Not on file    Highest education level: Not on file   Occupational History    Not on file   Social Needs    Financial resource strain: Not on file    Food insecurity:     Worry: Not on file     Inability: Not on file    Transportation needs:     Medical: Not on file     Non-medical: Not on file   Tobacco Use    Smoking status: Former Smoker     Packs/day: 0.50     Years: 20.00     Pack years: 10.00     Types: Cigarettes     Last attempt to quit: 2018     Years since quittin.6    Smokeless tobacco: Never Used   Substance and Sexual Activity    Alcohol use: Yes     Alcohol/week: 0.0 oz     Comment: occasionally    Drug use: Yes     Types: Other-see comments    Sexual activity: Yes     Partners: Male   Lifestyle    Physical activity:     Days per week: Not on file     Minutes per session: Not on file    Stress: Not on file   Relationships    Social connections:     Talks on phone: Not on file     Gets together: Not on file     Attends Jew service: Not on file     Active member of club or organization: Not on file     Attends meetings of clubs or  organizations: Not on file     Relationship status: Not on file   Other Topics Concern    Not on file   Social History Narrative    Not on file     Past Surgical History:   Procedure Laterality Date    APPENDECTOMY      ARTHROPLASTY, KNEE Left 2018    [facility] at [facility]    ARTHROSCOPY-KNEE Left 2016    [facility]    BLOCK-NERVE-MEDIAL BRANCH-LUMBAR Bilateral 2016    [facility]    CARPAL TUNNEL RELEASE Bilateral      SECTION      CHOLECYSTECTOMY      CHONDROPLASTY-KNEE-ARTHROSCOPY Left 2016    [facility]    FRACTURE SURGERY      ankle    gastric sleve      HYSTERECTOMY      HYSTERECTOMY      Injection-steroid-epidural-lumbar N/A 2019    [facility]    INJECTION-STEROID-EPIDURAL-LUMBAR N/A 2016    [facility]    INJECTION-STEROID-EPIDURAL-LUMBAR N/A 3/28/2016    [facility]    JOINT REPLACEMENT Left 2018    KNEE SURGERY      LUMBAR EPIDURAL INJECTION      RADIAL NERVE Right     RADIOFREQUENCY THERMOCOAGULATION (RFTC)-NERVE-MEDIAN BRANCH-LUMBAR Bilateral 2017    [facility]    RADIOFREQUENCY THERMOCOAGULATION (RFTC)-NERVE-MEDIAN BRANCH-LUMBAR Bilateral 2016    [facility]    RECONSTRUCTION- LIGAMENT-MPFL RECONSTRUCTION WITH ALLOGRAFT Left 2017    [facility] OR    SINUS SURGERY       Family History   Problem Relation Age of Onset    Heart disease Mother     Heart disease Father      Vitals:    19 0956   Weight: 112 kg (246 lb 14.6 oz)   Height: 6' (1.829 m)       Review of Systems   Constitutional: Negative for chills, diaphoresis, fatigue, fever and unexpected weight change.   HENT: Negative for trouble swallowing.    Eyes:  Negative for visual disturbance.   Respiratory: Negative for shortness of breath.    Cardiovascular: Negative for chest pain.   Gastrointestinal: Negative for abdominal pain, constipation, nausea and vomiting.   Genitourinary: Negative for difficulty urinating.   Musculoskeletal: Negative for arthralgias, back pain, gait problem, joint swelling, myalgias, neck pain and neck stiffness.   Neurological: Negative for dizziness, speech difficulty, weakness, light-headedness, numbness and headaches.          Objective:      Physical Exam   Constitutional: She is oriented to person, place, and time. She appears well-developed and well-nourished.   Neurological: She is alert and oriented to person, place, and time.           Assessment:       1. Chronic left-sided low back pain, with sciatica presence unspecified           Plan:     I have nothing further to add to her treatment.  She can follow up with pain management      Chronic left-sided low back pain, with sciatica presence unspecified

## 2019-07-19 ENCOUNTER — TELEPHONE (OUTPATIENT)
Dept: PHARMACY | Facility: CLINIC | Age: 39
End: 2019-07-19

## 2019-07-19 DIAGNOSIS — G43.719 INTRACTABLE CHRONIC MIGRAINE WITHOUT AURA AND WITHOUT STATUS MIGRAINOSUS: Primary | ICD-10-CM

## 2019-07-22 ENCOUNTER — TELEPHONE (OUTPATIENT)
Dept: PHARMACY | Facility: CLINIC | Age: 39
End: 2019-07-22

## 2019-08-06 ENCOUNTER — PATIENT MESSAGE (OUTPATIENT)
Dept: NEUROLOGY | Facility: CLINIC | Age: 39
End: 2019-08-06

## 2019-08-07 ENCOUNTER — PATIENT MESSAGE (OUTPATIENT)
Dept: NEUROLOGY | Facility: CLINIC | Age: 39
End: 2019-08-07

## 2019-08-07 DIAGNOSIS — H53.8 BLURRED VISION: Primary | ICD-10-CM

## 2019-08-19 ENCOUNTER — PATIENT MESSAGE (OUTPATIENT)
Dept: ADMINISTRATIVE | Facility: OTHER | Age: 39
End: 2019-08-19

## 2019-08-19 ENCOUNTER — TELEPHONE (OUTPATIENT)
Dept: PHARMACY | Facility: CLINIC | Age: 39
End: 2019-08-19

## 2019-09-12 ENCOUNTER — TELEPHONE (OUTPATIENT)
Dept: HEMATOLOGY/ONCOLOGY | Facility: CLINIC | Age: 39
End: 2019-09-12

## 2019-09-12 NOTE — TELEPHONE ENCOUNTER
----- Message from Carole Stiles, RT sent at 9/12/2019  1:13 PM CDT -----  This pt is on our schedule for tomorrow and is allergic to iv dye, this pt needs the 13 hour allergy prep called in to their pharmacy and needs to be made aware to pick this up from their pharmacy and the instructions

## 2019-09-12 NOTE — TELEPHONE ENCOUNTER
Patient notified that she will need a steroid prep of Prednisone and Benadryl prior to Ct scan tomorrow at 8:00am. Patient instructed to take 50 mg of Prednisone 13 hours ( 7:00pm), 7 hours ( 2:00am), and 1 hour (7:00am). And to take 50 mg of Benadryl at 7:00 am. Patient verbalized understanding and prednisone called into [facility].

## 2019-09-13 ENCOUNTER — HOSPITAL ENCOUNTER (OUTPATIENT)
Dept: RADIOLOGY | Facility: HOSPITAL | Age: 39
Discharge: HOME | End: 2019-09-13
Attending: INTERNAL MEDICINE
Payer: COMMERCIAL

## 2019-09-13 DIAGNOSIS — R59.9 ADENOPATHY: ICD-10-CM

## 2019-09-13 DIAGNOSIS — R91.8 ABNORMAL CT SCAN OF LUNG: ICD-10-CM

## 2019-09-13 PROCEDURE — 71260 CT CHEST WITH CONTRAST: ICD-10-PCS | Mod: 26,,, | Performed by: RADIOLOGY

## 2019-09-13 PROCEDURE — 71260 CT THORAX DX C+: CPT | Mod: 26,,, | Performed by: RADIOLOGY

## 2019-09-13 PROCEDURE — 25500020 PHARM REV CODE 255: Performed by: INTERNAL MEDICINE

## 2019-09-13 PROCEDURE — 71260 CT THORAX DX C+: CPT | Mod: TC

## 2019-09-13 RX ADMIN — IOHEXOL 75 ML: 350 INJECTION, SOLUTION INTRAVENOUS at 08:09

## 2019-09-18 ENCOUNTER — TELEPHONE (OUTPATIENT)
Dept: PHARMACY | Facility: CLINIC | Age: 39
End: 2019-09-18

## 2019-09-18 DIAGNOSIS — G43.719 INTRACTABLE CHRONIC MIGRAINE WITHOUT AURA AND WITHOUT STATUS MIGRAINOSUS: ICD-10-CM

## 2019-09-18 NOTE — TELEPHONE ENCOUNTER
RX call regarding next injection date for Aimovig from [facility]. Script out of refills, electronically requested additional. Patient reached-- explained the situation. Patient stated her next injection is not till Saturday ,9/28.. I let her know, we would call her back after additional refills were received..

## 2019-09-20 ENCOUNTER — TELEPHONE (OUTPATIENT)
Dept: HEMATOLOGY/ONCOLOGY | Facility: CLINIC | Age: 39
End: 2019-09-20

## 2019-09-20 NOTE — TELEPHONE ENCOUNTER
Message left on patient phone and with emergency contact to notify patient that Dr Pina will not be in today.

## 2019-09-22 ENCOUNTER — PATIENT MESSAGE (OUTPATIENT)
Dept: HEMATOLOGY/ONCOLOGY | Facility: CLINIC | Age: 39
End: 2019-09-22

## 2019-09-23 ENCOUNTER — PATIENT MESSAGE (OUTPATIENT)
Dept: HEMATOLOGY/ONCOLOGY | Facility: CLINIC | Age: 39
End: 2019-09-23

## 2019-09-23 ENCOUNTER — TELEPHONE (OUTPATIENT)
Dept: HEMATOLOGY/ONCOLOGY | Facility: CLINIC | Age: 39
End: 2019-09-23

## 2019-09-23 NOTE — TELEPHONE ENCOUNTER
Patient is requesting that Dr. Pina call her and discuss the results of her CT Scan.  Dr. Pina was given the result of the CT Scan to call and discuss the results.

## 2019-09-26 ENCOUNTER — DOCUMENTATION ONLY (OUTPATIENT)
Dept: HEMATOLOGY/ONCOLOGY | Facility: CLINIC | Age: 39
End: 2019-09-26

## 2019-09-26 ENCOUNTER — OFFICE VISIT (OUTPATIENT)
Dept: HEMATOLOGY/ONCOLOGY | Facility: CLINIC | Age: 39
End: 2019-09-26
Payer: COMMERCIAL

## 2019-09-26 VITALS
OXYGEN SATURATION: 100 % | BODY MASS INDEX: 34.04 KG/M2 | HEART RATE: 52 BPM | DIASTOLIC BLOOD PRESSURE: 62 MMHG | WEIGHT: 251.31 LBS | RESPIRATION RATE: 16 BRPM | TEMPERATURE: 98 F | SYSTOLIC BLOOD PRESSURE: 125 MMHG | HEIGHT: 72 IN

## 2019-09-26 DIAGNOSIS — F17.200 NICOTINE DEPENDENCE, UNCOMPLICATED, UNSPECIFIED NICOTINE PRODUCT TYPE: ICD-10-CM

## 2019-09-26 DIAGNOSIS — K44.9 HIATAL HERNIA: Primary | ICD-10-CM

## 2019-09-26 DIAGNOSIS — K29.70 GASTRITIS, PRESENCE OF BLEEDING UNSPECIFIED, UNSPECIFIED CHRONICITY, UNSPECIFIED GASTRITIS TYPE: ICD-10-CM

## 2019-09-26 PROCEDURE — 99999 PR PBB SHADOW E&M-EST. PATIENT-LVL III: CPT | Mod: PBBFAC,,, | Performed by: INTERNAL MEDICINE

## 2019-09-26 PROCEDURE — 99999 PR PBB SHADOW E&M-EST. PATIENT-LVL III: ICD-10-PCS | Mod: PBBFAC,,, | Performed by: INTERNAL MEDICINE

## 2019-09-26 PROCEDURE — 99214 PR OFFICE/OUTPT VISIT, EST, LEVL IV, 30-39 MIN: ICD-10-PCS | Mod: S$GLB,,, | Performed by: INTERNAL MEDICINE

## 2019-09-26 PROCEDURE — 3008F PR BODY MASS INDEX (BMI) DOCUMENTED: ICD-10-PCS | Mod: CPTII,S$GLB,, | Performed by: INTERNAL MEDICINE

## 2019-09-26 PROCEDURE — 99214 OFFICE O/P EST MOD 30 MIN: CPT | Mod: S$GLB,,, | Performed by: INTERNAL MEDICINE

## 2019-09-26 PROCEDURE — 3008F BODY MASS INDEX DOCD: CPT | Mod: CPTII,S$GLB,, | Performed by: INTERNAL MEDICINE

## 2019-09-26 RX ORDER — OXYCODONE HCL 10 MG/1
10 TABLET, FILM COATED, EXTENDED RELEASE ORAL EVERY 12 HOURS
Status: ON HOLD | COMMUNITY
End: 2020-10-06

## 2019-09-26 RX ORDER — KETOROLAC TROMETHAMINE 30 MG/ML
30 INJECTION, SOLUTION INTRAMUSCULAR; INTRAVENOUS
COMMUNITY
End: 2019-12-18 | Stop reason: SDUPTHER

## 2019-09-26 NOTE — PROGRESS NOTES
CC How are my scans     Aleyda Costa is a 39 y.o.    Pt had undergone a total left knee replacement in November 2018 when a few days later she presented to ER with increasing SOB. Workup revealed bilateral PE. SHe was placed on xarelto. SHe had an ultrasound of legs which was negative.Repeat CTA revealed no resolution of PE when she saw me. I changed her to pradaxa. She is here to review repeat CTA . She mistakenly quit taking pradaxa a few weeks ago. She has no SOB or pain . No swelling of her legs   Pt had a gastric sleeve in 2014.   She is here for results of hyper coag workup in order for her to proceed with Right knee replacement     Hexagonal test negative   CT chest hiatal hernia was reported as a paraesophageal mass   Spoke with radiologist     Past Medical History:   Diagnosis Date    Arthritis     OA    Back pain     Cancer     skin cancer to back    Depression     Full dentures     Migraine     Seizures     Sleep apnea     Does not use c-pap since weight loss - 170 lbs    Wears glasses    She remains on Prozac for mood and B12 to help with energy      Current Outpatient Medications:     amitriptyline (ELAVIL) 25 MG tablet, TAKE ONE TABLET BY MOUTH EVERY NIGHT FOR 2 WEEKS THEN INCREASE TO 2 TABLETS AT BEDTIME, Disp: , Rfl:     celecoxib (CELEBREX) 200 MG capsule, Take 200 mg by mouth 2 (two) times daily., Disp: , Rfl:     cetirizine (ZYRTEC) 5 MG tablet, Take 5 mg by mouth as needed. , Disp: , Rfl:     cyanocobalamin, vitamin B-12, 1,000 mcg/mL Kit, Inject 1 mL into the muscle every 21 days., Disp: , Rfl:     epinephrine (EPIPEN) 0.3 mg/0.3 mL (1:1,000) AtIn, 0.3 mg daily as needed. , Disp: , Rfl:     erenumab-aooe 140 mg/mL AtIn, Inject 140 mg into the skin every 28 days., Disp: 1 mL, Rfl: 1    fluoxetine (PROZAC) 20 MG capsule, every evening. , Disp: , Rfl: 3    hydrOXYzine pamoate (VISTARIL) 25 MG Cap, TAKE ONE CAPSULE BY MOUTH 3 TIMES A DAY AS NEEDED FOR ANXIETY, Disp: , Rfl:      ibuprofen (ADVIL,MOTRIN) 800 MG tablet, every 6 (six) hours as needed. , Disp: , Rfl: 5    ibuprofen/D5W 250mL, Take 800 mg by mouth., Disp: , Rfl:     ketorolac (TORADOL) 30 mg/mL injection, Inject 30 mg into the muscle as needed., Disp: , Rfl:     lorazepam (ATIVAN) 0.5 MG tablet, Take 0.5 mg by mouth every 4 (four) hours as needed. , Disp: , Rfl:     oxyCODONE (OXYCONTIN) 10 mg 12 hr tablet, Take 10 mg by mouth every 12 (twelve) hours., Disp: , Rfl:     oxycodone (OXYCONTIN) 15 mg TR12 12 hr tablet, Take 1 tablet (15 mg total) by mouth every 12 (twelve) hours. (Patient taking differently: Take 5 mg by mouth every 8 (eight) hours. ), Disp: 20 tablet, Rfl: 0    promethazine (PHENERGAN) 25 MG tablet, Take 1 tablet (25 mg total) by mouth every 6 (six) hours as needed for Nausea. Take 1 tablet by mouth every 6 (six) hours as needed (migraine). -MAY CAUSE DROWSINESS-, Disp: 20 tablet, Rfl: 3    tiZANidine (ZANAFLEX) 4 MG tablet, Take 1 tablet (4 mg total) by mouth every 8 (eight) hours as needed., Disp: , Rfl: 2    VOLTAREN 1 % Gel, daily as needed. , Disp: , Rfl: 3    Current Facility-Administered Medications:     onabotulinumtoxina injection 200 Units, 1 vial, Intramuscular, 1 time in Clinic/HOD, Lola Rojas MD    onabotulinumtoxina injection 200 Units, 200 Units, Intramuscular, Q90 Days, Lola Rojas MD, 200 Units at 10/18/18 1609    Social History     Tobacco Use    Smoking status: Former Smoker     Packs/day: 0.50     Years: 20.00     Pack years: 10.00     Types: Cigarettes     Last attempt to quit: 2018     Years since quittin.8    Smokeless tobacco: Never Used   Substance Use Topics    Alcohol use: Yes     Alcohol/week: 0.0 standard drinks     Comment: occasionally    Drug use: Yes     Types: Other-see comments     Family History   Problem Relation Age of Onset    Heart disease Mother     Heart disease Father        CONSTITUTIONAL: No fevers, chills, night sweats,  wt. loss, appetite changes  SKIN: no rashes or itching  ENT: No headaches, head trauma,or eye pain , no discharge  LYMPH NODES: None noticed   CV: No chest pain, palpitations.   RESP: No dyspnea on exertion, cough, wheezing, or hemoptysis  GI:  Intermittent nausea, no emesis, diarrhea, constipation, melena, hematochezia, int bloating   : No dysuria, hematuria, urgency, or frequency   HEME: No easy bruising, bleeding problems  PSYCHIATRIC: No depression, anxiety, hallucinations.  NEURO: No seizures, memory loss, dizziness or difficulty sleeping         /62   Pulse (!) 52   Temp 97.7 °F (36.5 °C)   Resp 16   Ht 6' (1.829 m)   Wt 114 kg (251 lb 5.2 oz)   SpO2 100%   BMI 34.09 kg/m²     Constitutional: oriented to person, place, and time.  well-developed and well-nourished.   HENT:   Head: Normocephalic and atraumatic.   Right Ear: External ear normal.   Left Ear: External ear normal.   Nose: Nose normal.   Mouth/Throat: Oropharynx is clear and moist.   Eyes: Conjunctivae and EOM are normal. Pupils are equal, round  Neck: Normal range of motion. Neck supple. No thyromegaly present.   RRR No murmur heard.  Pulmonary/Chest: Effort normal no wheezes.  no rales.   Abdominal: Soft. Bowel sounds are normal..   Musculoskeletal: Normal range of motion.  no edema   Lymphadenopathy:    no cervical adenopathy.   Neurological: alert and oriented to person, place, and time.  Skin: Skin is warm and dry. No rash noted.   Psychiatric: anxious mood and affect.    Vitals reviewed.    Lab Results   Component Value Date    WBC 6.80 04/06/2019    HGB 12.4 04/06/2019    HCT 37.3 04/06/2019    MCV 92 04/06/2019     04/06/2019     CMP  Sodium   Date Value Ref Range Status   04/06/2019 140 136 - 145 mmol/L Final     Potassium   Date Value Ref Range Status   04/06/2019 3.2 (L) 3.5 - 5.1 mmol/L Final     Chloride   Date Value Ref Range Status   04/06/2019 107 95 - 110 mmol/L Final     CO2   Date Value Ref Range Status    04/06/2019 23 23 - 29 mmol/L Final     Glucose   Date Value Ref Range Status   04/06/2019 117 (H) 70 - 110 mg/dL Final     BUN, Bld   Date Value Ref Range Status   04/06/2019 5 (L) 6 - 20 mg/dL Final     Creatinine   Date Value Ref Range Status   04/06/2019 0.7 0.5 - 1.4 mg/dL Final     Calcium   Date Value Ref Range Status   04/06/2019 9.1 8.7 - 10.5 mg/dL Final     Total Protein   Date Value Ref Range Status   03/25/2019 7.3 6.0 - 8.4 g/dL Final     Albumin   Date Value Ref Range Status   03/25/2019 3.5 3.5 - 5.2 g/dL Final     Total Bilirubin   Date Value Ref Range Status   03/25/2019 0.3 0.1 - 1.0 mg/dL Final     Comment:     For infants and newborns, interpretation of results should be based  on gestational age, weight and in agreement with clinical  observations.  Premature Infant recommended reference ranges:  Up to 24 hours.............<8.0 mg/dL  Up to 48 hours............<12.0 mg/dL  3-5 days..................<15.0 mg/dL  6-29 days.................<15.0 mg/dL       Alkaline Phosphatase   Date Value Ref Range Status   03/25/2019 85 55 - 135 U/L Final     AST   Date Value Ref Range Status   03/25/2019 33 10 - 40 U/L Final     ALT   Date Value Ref Range Status   03/25/2019 20 10 - 44 U/L Final     Anion Gap   Date Value Ref Range Status   04/06/2019 10 8 - 16 mmol/L Final     eGFR if    Date Value Ref Range Status   04/06/2019 >60 >60 mL/min/1.73 m^2 Final     eGFR if non    Date Value Ref Range Status   04/06/2019 >60 >60 mL/min/1.73 m^2 Final     Comment:     Calculation used to obtain the estimated glomerular filtration  rate (eGFR) is the CKD-EPI equation.      reviewed scans   No pain  No fall risk   Repeat labs negative    Hiatal hernia    Gastritis, presence of bleeding unspecified, unspecified chronicity, unspecified gastritis type    Nicotine dependence, uncomplicated, unspecified nicotine product type     subcarinal adenopathy     Hypercoag workup is negative. Pt  has no contraindication to surgery from a heme standpoint but must be cleared by PCP for such.   Proper hydration encouraged with electrolytes after and before surgery  She has no antiphospholipid syndrome or lupus anticoagulant. She must stay hydrated : she is at risk of clotting if she remains dehydrated post gastric bypass.   She is to return to her pcp then DR Nicolas   Given the growth of the subcarinal lymph node from 1 cm to 1.6 cm , she needs a repeat CT chest with contrast in 4 months; history tobacco use   Explained she could see  Surgeon to discuss options for hiatal hernia repair since se is symptomatic at times     Thank you for allowing me to evaluate and participate in the care of this pleasant patient. Please fell free to call me with any questions or concerns.    Warmly,  Rabia Pina MD    This note was dictated with Dragon and briefly proofread. Please excuse any sentences that may be unclear or words misspelled

## 2019-09-26 NOTE — PROGRESS NOTES
Subjective:       Patient ID: Aleyda Costa is a 39 y.o. female.    Chief Complaint: No chief complaint on file.    HPI  Review of Systems    Objective:      Physical Exam    Assessment:       No diagnosis found.    Plan:       ***  pt discharged per Dr Pina

## 2019-10-01 ENCOUNTER — PATIENT MESSAGE (OUTPATIENT)
Dept: HEMATOLOGY/ONCOLOGY | Facility: CLINIC | Age: 39
End: 2019-10-01

## 2019-10-01 ENCOUNTER — PATIENT MESSAGE (OUTPATIENT)
Dept: ORTHOPEDICS | Facility: CLINIC | Age: 39
End: 2019-10-01

## 2019-10-04 DIAGNOSIS — M17.11 PRIMARY OSTEOARTHRITIS OF RIGHT KNEE: Primary | ICD-10-CM

## 2019-10-04 DIAGNOSIS — M17.11 ARTHRITIS OF KNEE, RIGHT: ICD-10-CM

## 2019-10-04 RX ORDER — MUPIROCIN 20 MG/G
OINTMENT TOPICAL
Status: CANCELLED | OUTPATIENT
Start: 2019-10-04

## 2019-10-04 RX ORDER — CLINDAMYCIN PHOSPHATE 900 MG/50ML
900 INJECTION, SOLUTION INTRAVENOUS
Status: CANCELLED | OUTPATIENT
Start: 2019-10-04

## 2019-10-09 ENCOUNTER — TELEPHONE (OUTPATIENT)
Dept: ORTHOPEDICS | Facility: CLINIC | Age: 39
End: 2019-10-09

## 2019-10-09 NOTE — TELEPHONE ENCOUNTER
----- Message from Kayla Delatorre sent at 10/9/2019 11:18 AM CDT -----  S/rupert Ibarra at [facility] states pt case is denied because it is deemed not medically necessary the arthritis isn't severe enough for a knee replacement but a peer 2 peer can be done by calling [phone] using ref#[id number]

## 2019-10-10 ENCOUNTER — PATIENT MESSAGE (OUTPATIENT)
Dept: HEMATOLOGY/ONCOLOGY | Facility: CLINIC | Age: 39
End: 2019-10-10

## 2019-10-11 ENCOUNTER — TELEPHONE (OUTPATIENT)
Dept: ORTHOPEDICS | Facility: CLINIC | Age: 39
End: 2019-10-11

## 2019-10-11 NOTE — TELEPHONE ENCOUNTER
----- Message from Feliberto Cardenas MD sent at 10/11/2019  1:43 PM CDT -----  I reviewed her chart.  I think we would have a much better chance of getting a knee replacement approved if she tried a knee arthroscopy 1st.  We have an MRI on her from last year that shows she has sa medial meniscal tear and some arthritis, but her arthritis on x-ray is not severe enough for them to approve her knee replacement. I think we could see her and  scope her knee. We could do a meniscal repair or meniscectomy that might help her enough that a knee replacement is at needed. If her arthritis on arthroscopy is severe enough, we can thoroughly document that and have a better chance of getting it approved.  I do not think we are getting around there radiographic requirements with the existing medical documentation that we have.

## 2019-10-14 ENCOUNTER — OFFICE VISIT (OUTPATIENT)
Dept: ORTHOPEDICS | Facility: CLINIC | Age: 39
End: 2019-10-14
Payer: COMMERCIAL

## 2019-10-14 ENCOUNTER — HOSPITAL ENCOUNTER (OUTPATIENT)
Dept: PREADMISSION TESTING | Facility: HOSPITAL | Age: 39
Discharge: HOME | End: 2019-10-14
Attending: ORTHOPAEDIC SURGERY
Payer: COMMERCIAL

## 2019-10-14 VITALS
SYSTOLIC BLOOD PRESSURE: 124 MMHG | DIASTOLIC BLOOD PRESSURE: 59 MMHG | BODY MASS INDEX: 34 KG/M2 | HEART RATE: 68 BPM | WEIGHT: 251 LBS | HEIGHT: 72 IN

## 2019-10-14 DIAGNOSIS — M17.11 PRIMARY OSTEOARTHRITIS OF RIGHT KNEE: Primary | ICD-10-CM

## 2019-10-14 DIAGNOSIS — M25.361 PATELLAR INSTABILITY OF RIGHT KNEE: ICD-10-CM

## 2019-10-14 DIAGNOSIS — S83.241D ACUTE MEDIAL MENISCAL TEAR, RIGHT, SUBSEQUENT ENCOUNTER: Primary | ICD-10-CM

## 2019-10-14 LAB
ANION GAP SERPL CALC-SCNC: 8 MMOL/L (ref 8–16)
BASOPHILS # BLD AUTO: 0.04 K/UL (ref 0–0.2)
BASOPHILS NFR BLD: 0.5 % (ref 0–1.9)
BUN SERPL-MCNC: 8 MG/DL (ref 6–20)
CALCIUM SERPL-MCNC: 9.4 MG/DL (ref 8.7–10.5)
CHLORIDE SERPL-SCNC: 105 MMOL/L (ref 95–110)
CO2 SERPL-SCNC: 25 MMOL/L (ref 23–29)
CREAT SERPL-MCNC: 0.7 MG/DL (ref 0.5–1.4)
DIFFERENTIAL METHOD: ABNORMAL
EOSINOPHIL # BLD AUTO: 0.2 K/UL (ref 0–0.5)
EOSINOPHIL NFR BLD: 2 % (ref 0–8)
ERYTHROCYTE [DISTWIDTH] IN BLOOD BY AUTOMATED COUNT: 13.5 % (ref 11.5–14.5)
EST. GFR  (AFRICAN AMERICAN): >60 ML/MIN/1.73 M^2
EST. GFR  (NON AFRICAN AMERICAN): >60 ML/MIN/1.73 M^2
GLUCOSE SERPL-MCNC: 94 MG/DL (ref 70–110)
HCT VFR BLD AUTO: 42 % (ref 37–48.5)
HGB BLD-MCNC: 13.9 G/DL (ref 12–16)
IMM GRANULOCYTES # BLD AUTO: 0.03 K/UL (ref 0–0.04)
LYMPHOCYTES # BLD AUTO: 2.2 K/UL (ref 1–4.8)
LYMPHOCYTES NFR BLD: 30.6 % (ref 18–48)
MCH RBC QN AUTO: 31.3 PG (ref 27–31)
MCHC RBC AUTO-ENTMCNC: 33.1 G/DL (ref 32–36)
MCV RBC AUTO: 95 FL (ref 82–98)
MONOCYTES # BLD AUTO: 0.7 K/UL (ref 0.3–1)
MONOCYTES NFR BLD: 9.1 % (ref 4–15)
NEUTROPHILS # BLD AUTO: 4.2 K/UL (ref 1.8–7.7)
NEUTROPHILS NFR BLD: 57.4 % (ref 38–73)
NRBC BLD-RTO: 0 /100 WBC
PLATELET # BLD AUTO: 225 K/UL (ref 150–350)
PMV BLD AUTO: 10.2 FL (ref 9.2–12.9)
POTASSIUM SERPL-SCNC: 3.8 MMOL/L (ref 3.5–5.1)
RBC # BLD AUTO: 4.44 M/UL (ref 4–5.4)
SODIUM SERPL-SCNC: 138 MMOL/L (ref 136–145)
WBC # BLD AUTO: 7.33 K/UL (ref 3.9–12.7)

## 2019-10-14 PROCEDURE — 99999 PR PBB SHADOW E&M-EST. PATIENT-LVL III: CPT | Mod: PBBFAC,,, | Performed by: ORTHOPAEDIC SURGERY

## 2019-10-14 PROCEDURE — 3008F PR BODY MASS INDEX (BMI) DOCUMENTED: ICD-10-PCS | Mod: CPTII,S$GLB,, | Performed by: ORTHOPAEDIC SURGERY

## 2019-10-14 PROCEDURE — 36415 COLL VENOUS BLD VENIPUNCTURE: CPT

## 2019-10-14 PROCEDURE — 99999 PR PBB SHADOW E&M-EST. PATIENT-LVL III: ICD-10-PCS | Mod: PBBFAC,,, | Performed by: ORTHOPAEDIC SURGERY

## 2019-10-14 PROCEDURE — 99900104 DSU ONLY-NO CHARGE-EA ADD'L HR (STAT)

## 2019-10-14 PROCEDURE — 3008F BODY MASS INDEX DOCD: CPT | Mod: CPTII,S$GLB,, | Performed by: ORTHOPAEDIC SURGERY

## 2019-10-14 PROCEDURE — 85025 COMPLETE CBC W/AUTO DIFF WBC: CPT

## 2019-10-14 PROCEDURE — 99900103 DSU ONLY-NO CHARGE-INITIAL HR (STAT)

## 2019-10-14 PROCEDURE — 80048 BASIC METABOLIC PNL TOTAL CA: CPT

## 2019-10-14 PROCEDURE — 99214 OFFICE O/P EST MOD 30 MIN: CPT | Mod: 57,S$GLB,, | Performed by: ORTHOPAEDIC SURGERY

## 2019-10-14 PROCEDURE — 99214 PR OFFICE/OUTPT VISIT, EST, LEVL IV, 30-39 MIN: ICD-10-PCS | Mod: 57,S$GLB,, | Performed by: ORTHOPAEDIC SURGERY

## 2019-10-14 NOTE — PROGRESS NOTES
Past Medical History:   Diagnosis Date    Arthritis     OA    Back pain     Cancer     skin cancer to back    Depression     Full dentures     Migraine     Seizures     Sleep apnea     Does not use c-pap since weight loss - 170 lbs    Wears glasses        Past Surgical History:   Procedure Laterality Date    APPENDECTOMY      CARPAL TUNNEL RELEASE Bilateral      SECTION      CHOLECYSTECTOMY      EPIDURAL STEROID INJECTION INTO LUMBAR SPINE N/A 2019    Procedure: Injection-steroid-epidural-lumbar;  Surgeon: Yariel Ramirez MD;  Location: [facility] OR;  Service: Pain Management;  Laterality: N/A;  L3-4    FRACTURE SURGERY      ankle    gastric sleve      HYSTERECTOMY      HYSTERECTOMY      JOINT REPLACEMENT Left 2018    KNEE ARTHROPLASTY Left 2018    Procedure: ARTHROPLASTY, KNEE;  Surgeon: Feliberto Cardenas MD;  Location: [facility] OR;  Service: Orthopedics;  Laterality: Left;    KNEE SURGERY      LUMBAR EPIDURAL INJECTION      RADIAL NERVE Right     SINUS SURGERY         Current Outpatient Medications   Medication Sig    amitriptyline (ELAVIL) 25 MG tablet TAKE ONE TABLET BY MOUTH EVERY NIGHT FOR 2 WEEKS THEN INCREASE TO 2 TABLETS AT BEDTIME    celecoxib (CELEBREX) 200 MG capsule Take 200 mg by mouth 2 (two) times daily.    cetirizine (ZYRTEC) 5 MG tablet Take 5 mg by mouth as needed.     cyanocobalamin, vitamin B-12, 1,000 mcg/mL Kit Inject 1 mL into the muscle every 21 days.    epinephrine (EPIPEN) 0.3 mg/0.3 mL (1:1,000) AtIn 0.3 mg daily as needed.     erenumab-aooe 140 mg/mL AtIn Inject 140 mg into the skin every 28 days.    fluoxetine (PROZAC) 20 MG capsule every evening.     hydrOXYzine pamoate (VISTARIL) 25 MG Cap TAKE ONE CAPSULE BY MOUTH 3 TIMES A DAY AS NEEDED FOR ANXIETY    ibuprofen (ADVIL,MOTRIN) 800 MG tablet every 6 (six) hours as needed.     ibuprofen/D5W 250mL Take 800 mg by mouth.    ketorolac (TORADOL) 30 mg/mL injection Inject 30 mg into the  muscle as needed.    lorazepam (ATIVAN) 0.5 MG tablet Take 0.5 mg by mouth every 4 (four) hours as needed.     oxyCODONE (OXYCONTIN) 10 mg 12 hr tablet Take 10 mg by mouth every 12 (twelve) hours.    oxycodone (OXYCONTIN) 15 mg TR12 12 hr tablet Take 1 tablet (15 mg total) by mouth every 12 (twelve) hours. (Patient taking differently: Take 5 mg by mouth every 8 (eight) hours. )    promethazine (PHENERGAN) 25 MG tablet Take 1 tablet (25 mg total) by mouth every 6 (six) hours as needed for Nausea. Take 1 tablet by mouth every 6 (six) hours as needed (migraine). -MAY CAUSE DROWSINESS-    tiZANidine (ZANAFLEX) 4 MG tablet Take 1 tablet (4 mg total) by mouth every 8 (eight) hours as needed.    VOLTAREN 1 % Gel daily as needed.      Current Facility-Administered Medications   Medication    onabotulinumtoxina injection 200 Units       Review of patient's allergies indicates:   Allergen Reactions    Clarithromycin Swelling and Other (See Comments)     DIFFICULTY SWALLOWING  DIFFICULTY SWALLOWING    Pcn [penicillins] Anaphylaxis    Sulfa (sulfonamide antibiotics) Hives    Wellbutrin [bupropion hcl] Shortness Of Breath and Anaphylaxis    Betadine [povidone-iodine] Hives     Swelling      Cefprozil Hives    Iodinated contrast- oral and iv dye Hives    Latex, natural rubber Rash    Sulfamethoxazole-trimethoprim Nausea And Vomiting    Iodine Hives and Swelling    Latex Hives and Swelling       Family History   Problem Relation Age of Onset    Heart disease Mother     Heart disease Father        Social History     Socioeconomic History    Marital status:      Spouse name: Not on file    Number of children: Not on file    Years of education: Not on file    Highest education level: Not on file   Occupational History    Not on file   Social Needs    Financial resource strain: Not on file    Food insecurity:     Worry: Not on file     Inability: Not on file    Transportation needs:     Medical: Not  on file     Non-medical: Not on file   Tobacco Use    Smoking status: Former Smoker     Packs/day: 0.50     Years: 20.00     Pack years: 10.00     Types: Cigarettes     Last attempt to quit: 2018     Years since quittin.9    Smokeless tobacco: Never Used   Substance and Sexual Activity    Alcohol use: Yes     Alcohol/week: 0.0 standard drinks     Comment: occasionally    Drug use: Yes     Types: Other-see comments    Sexual activity: Yes     Partners: Male   Lifestyle    Physical activity:     Days per week: Not on file     Minutes per session: Not on file    Stress: Not on file   Relationships    Social connections:     Talks on phone: Not on file     Gets together: Not on file     Attends Islam service: Not on file     Active member of club or organization: Not on file     Attends meetings of clubs or organizations: Not on file     Relationship status: Not on file   Other Topics Concern    Not on file   Social History Narrative    Not on file       Chief Complaint:   Chief Complaint   Patient presents with    Right Knee - Pain       Date of surgery:  2018 left total knee    History of present illness:  39-year-old female who underwent left total knee arthroplasty. Patient had a complication of a postop PE about a week following surgery. Her main complaint now is her right knee. Patient has patellar instability of this right knee just like she had on the other side. She had tried MPFL reconstruction on the other side which had worked for a while until the arthritis pain became more severe  She has had a couple steroid injections previously as well as a Synvisc series.  She had an MRI done which showed a medial meniscal tear in the past.  She is developing more mechanical symptoms with catching and instability to her right medial knee now with some falls due to her patellar instability.    Answers for HPI/ROS submitted by the patient on 10/13/2019   Leg pain  unexpected weight  change: No  appetite change : No  sleep disturbance: Yes  IMMUNOCOMPROMISED: No  nervous/ anxious: Yes  dysphoric mood: No  rash: No  visual disturbance: No  eye redness: No  eye pain: No  ear pain: No  tinnitus: No  hearing loss: No  sinus pressure : No  nosebleeds: No  enviro allergies: No  food allergies: Yes  cough: No  shortness of breath: No  sweating: No  dysuria: No  frequency: No  difficulty urinating: No  hematuria: No  painful intercourse: No  chest pain: No  palpitations: No  nausea: No  vomiting: No  diarrhea: No  blood in stool: No  constipation: No  headaches: Yes  dizziness: No  numbness: Yes  seizures: No  joint swelling: Yes  myalgia: Yes  weakness: Yes  back pain: Yes  Pain Chronicity: chronic  History of trauma: Yes  Onset: more than 1 year ago  Frequency: constantly  Progression since onset: rapidly worsening  Injury mechanism: collision/contact  injury location: at home  pain- numeric: 10/10  pain location: right knee  pain quality: aching, sharp, tightness, throbbing, tingling, numb  Radiating Pain: No  Aggravating factors: activity, bending, bearing weight, exercise, extension, standing, flexion, walking, lying down, sitting, touching  fever: No  inability to bear weight: Yes  itching: No  joint locking: Yes  limited range of motion: No  stiffness: Yes  tingling: Yes  Treatments tried: cold, heat, injection treatment, movement, oral narcotics, rest  physical therapy: ineffective  Improvement on treatment: no relief      Physical Examination:    Vital Signs:    Vitals:    10/14/19 0906   BP: (!) 124/59   Pulse: 68       Body mass index is 34.04 kg/m².    This a well-developed, well nourished patient in no acute distress.  They are alert and oriented and cooperative to examination.  Pt. walks with a mild antalgic gait.    Examination left knee shows healed surgical incision. No drainage or erythema.  Range of motion is 5-115 degrees. No calf pain. Negative Homans sign    Examination of the  right knee shows no rashes or erythema. There are no masses ecchymosis and a small effusion. Patient has full range of motion from 0-130°. Patient is nontender to palpation over lateral joint line and markedly tender to palpation over the medial joint line. Knee is stable to varus and valgus stress. 5 out of 5 motor strength. Palpable distal pulses. Intact light touch sensation. Negative Patellofemoral crepitus.  Positive medial Apley test.  Patient does have about 15° of lateral tilt with the 3 quadrant glide laterally and 2 quadrant glide medially. Mild patellar apprehension.    Heart is regular rate without obvious murmurs   Normal respiratory effort without audible wheezing  Abdomen is soft and nontender     X-rays:  Knee two views of the left knee are reviewed which show well-aligned total knee arthroplasty components. Right knee arthritis.  Has some medial joint space narrowing but still has space.  Is not bone on bone.    MRI of the right knee from outside facility:  Right medial meniscal tear  Mild knee arthritis     Assessment:  Right medial meniscal tear  Right chronic patellar instability    Plan:  I reviewed the findings with her today.  She is having more falls and episodes of giving way probably more from the patellar instability than the meniscal tear. She wanted to try just go ahead and do the knee replacement since she did so well on the other side but her insurance company does not think her arthritis is severe enough to warrant it. She still has joint space along the medial knee.  Plan is for right medial meniscal repair versus meniscectomy as well as allograft MPFL reconstruction.  Risks, benefits, and alternatives to the procedure were explained to the patient including but not limited to damage to nerves, arteries, blood vessels, bones, tendons, ligaments, stiffness, instability, infection, DVT, PE, as well as general anesthetic complications including seizure, stroke, heart attack and even  death. The patient understood these risks and wished to proceed and signed the informed consent.       This note was created using [employer] voice recognition software that occasionally misinterpreted phrases or words.

## 2019-10-14 NOTE — DISCHARGE INSTRUCTIONS
To confirm, Your doctor has instructed you that surgery is scheduled for: 10/25/2019    Please report to [facility], Registration the morning of surgery. You must check-in and receive a wristband before going to your procedure.    Pre-Op will call the afternoon prior to surgery between 1:00 and 6:00 PM with the final arrival time.  Phone number: [phone]    PLEASE NOTE:  The surgery schedule has many variables which may affect the time of your surgery case.  Family members should be available if your surgery time changes.  Plan to be here the day of your procedure between 4-6 hours.    MEDICATIONS:  TAKE ONLY THESE MEDICATIONS WITH A SMALL SIP OF WATER THE MORNING OF YOUR PROCEDURE:  ZANAFLEX IF NEEDED, OXYCODONE IF NEEDED, LORAZEPAM IF NEEDED, ZYRTEC IF NEEDED      DO NOT TAKE THESE MEDICATIONS 5-7 DAYS PRIOR to your procedure or per your surgeon's request: ASPIRIN, ALEVE, ADVIL, IBUPROFEN, FISH OIL VITAMIN E, HERBALS, TORADOL, CLEBREX  (May take Tylenol)    ONLY if you are prescribed any types of blood thinners such as:  Aspirin, Coumadin, Plavix, Pradaxa, Xarelto, Aggrenox, Effient, Eliquis, Savasya, Brilinta, or any other, ask your surgeon whether you should stop taking them and how long before surgery you should stop.  You may also need to verify with the prescribing physician if it is ok to stop your medication.      INSTRUCTIONS IMPORTANT!!  · Do not eat or drink anything between midnight and the time of your procedure- this includes gum, mints, and candy.  · Do not smoke or drink alcoholic beverages 24 hours prior to your procedure.  · Shower the night before AND the morning of your procedure with a Chlorhexidine wash such as Hibiclens or Dial antibacterial soap from the neck down.  Do not get it on your face or in your eyes.  You may use your own shampoo and face wash. This helps your skin to be as bacteria free as possible.    · If you wear contact lenses, dentures, hearing aids  or glasses, bring a container to put them in during surgery and give to a family member for safe keeping.  Please leave all jewelry, piercing's and valuables at home.   · DO NOT remove hair from the surgery site.  Do not shave the incision site unless you are given specific instructions to do so.    · ONLY if you have been diagnosed with sleep apnea please bring your C-PAP machine.  · ONLY if you wear home oxygen please bring your portable oxygen tank the day of your procedure.  · ONLY if you have a history of OPEN HEART SURGERY you will need a clearance from your Cardiologist per Anesthesia.      · ONLY for patients requiring bowel prep, written instructions will be given by your doctor's office.  · ONLY if you have a neuro stimulator, please bring the controller with you the morning of surgery  · ONLY if a type and screen test is needed before surgery, please return: N/A  · If your doctor has scheduled you for an overnight stay, bring a small overnight bag with any personal items you need.  · Make arrangements in advance for transportation home by a responsible adult.  It is not safe to drive a vehicle during the 24 hours after anesthesia.      · Visiting hours are 10:00AM to 8:30PM.  For the safety of all patients, visitors under the age of 12 are not allowed above the first floor of the hospital.    · All [facility] facilities and properties are tobacco free.  Smoking is NOT allowed.       If you have any questions about these instructions, call Pre-Op Admit  Nursing at [phone] or the Pre-Op Day Surgery Unit at [phone].

## 2019-10-14 NOTE — H&P (VIEW-ONLY)
Past Medical History:   Diagnosis Date    Arthritis     OA    Back pain     Cancer     skin cancer to back    Depression     Full dentures     Migraine     Seizures     Sleep apnea     Does not use c-pap since weight loss - 170 lbs    Wears glasses        Past Surgical History:   Procedure Laterality Date    APPENDECTOMY      CARPAL TUNNEL RELEASE Bilateral      SECTION      CHOLECYSTECTOMY      EPIDURAL STEROID INJECTION INTO LUMBAR SPINE N/A 2019    Procedure: Injection-steroid-epidural-lumbar;  Surgeon: Yariel Ramirez MD;  Location: [facility] OR;  Service: Pain Management;  Laterality: N/A;  L3-4    FRACTURE SURGERY      ankle    gastric sleve      HYSTERECTOMY      HYSTERECTOMY      JOINT REPLACEMENT Left 2018    KNEE ARTHROPLASTY Left 2018    Procedure: ARTHROPLASTY, KNEE;  Surgeon: Feliberto Cardenas MD;  Location: [facility] OR;  Service: Orthopedics;  Laterality: Left;    KNEE SURGERY      LUMBAR EPIDURAL INJECTION      RADIAL NERVE Right     SINUS SURGERY         Current Outpatient Medications   Medication Sig    amitriptyline (ELAVIL) 25 MG tablet TAKE ONE TABLET BY MOUTH EVERY NIGHT FOR 2 WEEKS THEN INCREASE TO 2 TABLETS AT BEDTIME    celecoxib (CELEBREX) 200 MG capsule Take 200 mg by mouth 2 (two) times daily.    cetirizine (ZYRTEC) 5 MG tablet Take 5 mg by mouth as needed.     cyanocobalamin, vitamin B-12, 1,000 mcg/mL Kit Inject 1 mL into the muscle every 21 days.    epinephrine (EPIPEN) 0.3 mg/0.3 mL (1:1,000) AtIn 0.3 mg daily as needed.     erenumab-aooe 140 mg/mL AtIn Inject 140 mg into the skin every 28 days.    fluoxetine (PROZAC) 20 MG capsule every evening.     hydrOXYzine pamoate (VISTARIL) 25 MG Cap TAKE ONE CAPSULE BY MOUTH 3 TIMES A DAY AS NEEDED FOR ANXIETY    ibuprofen (ADVIL,MOTRIN) 800 MG tablet every 6 (six) hours as needed.     ibuprofen/D5W 250mL Take 800 mg by mouth.    ketorolac (TORADOL) 30 mg/mL injection Inject 30 mg into the  muscle as needed.    lorazepam (ATIVAN) 0.5 MG tablet Take 0.5 mg by mouth every 4 (four) hours as needed.     oxyCODONE (OXYCONTIN) 10 mg 12 hr tablet Take 10 mg by mouth every 12 (twelve) hours.    oxycodone (OXYCONTIN) 15 mg TR12 12 hr tablet Take 1 tablet (15 mg total) by mouth every 12 (twelve) hours. (Patient taking differently: Take 5 mg by mouth every 8 (eight) hours. )    promethazine (PHENERGAN) 25 MG tablet Take 1 tablet (25 mg total) by mouth every 6 (six) hours as needed for Nausea. Take 1 tablet by mouth every 6 (six) hours as needed (migraine). -MAY CAUSE DROWSINESS-    tiZANidine (ZANAFLEX) 4 MG tablet Take 1 tablet (4 mg total) by mouth every 8 (eight) hours as needed.    VOLTAREN 1 % Gel daily as needed.      Current Facility-Administered Medications   Medication    onabotulinumtoxina injection 200 Units       Review of patient's allergies indicates:   Allergen Reactions    Clarithromycin Swelling and Other (See Comments)     DIFFICULTY SWALLOWING  DIFFICULTY SWALLOWING    Pcn [penicillins] Anaphylaxis    Sulfa (sulfonamide antibiotics) Hives    Wellbutrin [bupropion hcl] Shortness Of Breath and Anaphylaxis    Betadine [povidone-iodine] Hives     Swelling      Cefprozil Hives    Iodinated contrast- oral and iv dye Hives    Latex, natural rubber Rash    Sulfamethoxazole-trimethoprim Nausea And Vomiting    Iodine Hives and Swelling    Latex Hives and Swelling       Family History   Problem Relation Age of Onset    Heart disease Mother     Heart disease Father        Social History     Socioeconomic History    Marital status:      Spouse name: Not on file    Number of children: Not on file    Years of education: Not on file    Highest education level: Not on file   Occupational History    Not on file   Social Needs    Financial resource strain: Not on file    Food insecurity:     Worry: Not on file     Inability: Not on file    Transportation needs:     Medical: Not  on file     Non-medical: Not on file   Tobacco Use    Smoking status: Former Smoker     Packs/day: 0.50     Years: 20.00     Pack years: 10.00     Types: Cigarettes     Last attempt to quit: 2018     Years since quittin.9    Smokeless tobacco: Never Used   Substance and Sexual Activity    Alcohol use: Yes     Alcohol/week: 0.0 standard drinks     Comment: occasionally    Drug use: Yes     Types: Other-see comments    Sexual activity: Yes     Partners: Male   Lifestyle    Physical activity:     Days per week: Not on file     Minutes per session: Not on file    Stress: Not on file   Relationships    Social connections:     Talks on phone: Not on file     Gets together: Not on file     Attends Synagogue service: Not on file     Active member of club or organization: Not on file     Attends meetings of clubs or organizations: Not on file     Relationship status: Not on file   Other Topics Concern    Not on file   Social History Narrative    Not on file       Chief Complaint:   Chief Complaint   Patient presents with    Right Knee - Pain       Date of surgery:  2018 left total knee    History of present illness:  39-year-old female who underwent left total knee arthroplasty. Patient had a complication of a postop PE about a week following surgery. Her main complaint now is her right knee. Patient has patellar instability of this right knee just like she had on the other side. She had tried MPFL reconstruction on the other side which had worked for a while until the arthritis pain became more severe  She has had a couple steroid injections previously as well as a Synvisc series.  She had an MRI done which showed a medial meniscal tear in the past.  She is developing more mechanical symptoms with catching and instability to her right medial knee now with some falls due to her patellar instability.    Answers for HPI/ROS submitted by the patient on 10/13/2019   Leg pain  unexpected weight  change: No  appetite change : No  sleep disturbance: Yes  IMMUNOCOMPROMISED: No  nervous/ anxious: Yes  dysphoric mood: No  rash: No  visual disturbance: No  eye redness: No  eye pain: No  ear pain: No  tinnitus: No  hearing loss: No  sinus pressure : No  nosebleeds: No  enviro allergies: No  food allergies: Yes  cough: No  shortness of breath: No  sweating: No  dysuria: No  frequency: No  difficulty urinating: No  hematuria: No  painful intercourse: No  chest pain: No  palpitations: No  nausea: No  vomiting: No  diarrhea: No  blood in stool: No  constipation: No  headaches: Yes  dizziness: No  numbness: Yes  seizures: No  joint swelling: Yes  myalgia: Yes  weakness: Yes  back pain: Yes  Pain Chronicity: chronic  History of trauma: Yes  Onset: more than 1 year ago  Frequency: constantly  Progression since onset: rapidly worsening  Injury mechanism: collision/contact  injury location: at home  pain- numeric: 10/10  pain location: right knee  pain quality: aching, sharp, tightness, throbbing, tingling, numb  Radiating Pain: No  Aggravating factors: activity, bending, bearing weight, exercise, extension, standing, flexion, walking, lying down, sitting, touching  fever: No  inability to bear weight: Yes  itching: No  joint locking: Yes  limited range of motion: No  stiffness: Yes  tingling: Yes  Treatments tried: cold, heat, injection treatment, movement, oral narcotics, rest  physical therapy: ineffective  Improvement on treatment: no relief      Physical Examination:    Vital Signs:    Vitals:    10/14/19 0906   BP: (!) 124/59   Pulse: 68       Body mass index is 34.04 kg/m².    This a well-developed, well nourished patient in no acute distress.  They are alert and oriented and cooperative to examination.  Pt. walks with a mild antalgic gait.    Examination left knee shows healed surgical incision. No drainage or erythema.  Range of motion is 5-115 degrees. No calf pain. Negative Homans sign    Examination of the  right knee shows no rashes or erythema. There are no masses ecchymosis and a small effusion. Patient has full range of motion from 0-130°. Patient is nontender to palpation over lateral joint line and markedly tender to palpation over the medial joint line. Knee is stable to varus and valgus stress. 5 out of 5 motor strength. Palpable distal pulses. Intact light touch sensation. Negative Patellofemoral crepitus.  Positive medial Apley test.  Patient does have about 15° of lateral tilt with the 3 quadrant glide laterally and 2 quadrant glide medially. Mild patellar apprehension.    Heart is regular rate without obvious murmurs   Normal respiratory effort without audible wheezing  Abdomen is soft and nontender     X-rays:  Knee two views of the left knee are reviewed which show well-aligned total knee arthroplasty components. Right knee arthritis.  Has some medial joint space narrowing but still has space.  Is not bone on bone.    MRI of the right knee from outside facility:  Right medial meniscal tear  Mild knee arthritis     Assessment:  Right medial meniscal tear  Right chronic patellar instability    Plan:  I reviewed the findings with her today.  She is having more falls and episodes of giving way probably more from the patellar instability than the meniscal tear. She wanted to try just go ahead and do the knee replacement since she did so well on the other side but her insurance company does not think her arthritis is severe enough to warrant it. She still has joint space along the medial knee.  Plan is for right medial meniscal repair versus meniscectomy as well as allograft MPFL reconstruction.  Risks, benefits, and alternatives to the procedure were explained to the patient including but not limited to damage to nerves, arteries, blood vessels, bones, tendons, ligaments, stiffness, instability, infection, DVT, PE, as well as general anesthetic complications including seizure, stroke, heart attack and even  death. The patient understood these risks and wished to proceed and signed the informed consent.       This note was created using [employer] voice recognition software that occasionally misinterpreted phrases or words.

## 2019-10-15 PROBLEM — S83.241D ACUTE MEDIAL MENISCAL TEAR, RIGHT, SUBSEQUENT ENCOUNTER: Status: ACTIVE | Noted: 2019-10-15

## 2019-10-15 RX ORDER — CLINDAMYCIN PHOSPHATE 900 MG/50ML
900 INJECTION, SOLUTION INTRAVENOUS
Status: CANCELLED | OUTPATIENT
Start: 2019-10-15

## 2019-10-21 ENCOUNTER — TELEPHONE (OUTPATIENT)
Dept: PHARMACY | Facility: CLINIC | Age: 39
End: 2019-10-21

## 2019-10-24 ENCOUNTER — ANESTHESIA EVENT (OUTPATIENT)
Dept: SURGERY | Facility: HOSPITAL | Age: 39
End: 2019-10-24
Payer: COMMERCIAL

## 2019-10-24 ENCOUNTER — PATIENT MESSAGE (OUTPATIENT)
Dept: SURGERY | Facility: HOSPITAL | Age: 39
End: 2019-10-24

## 2019-10-25 ENCOUNTER — HOSPITAL ENCOUNTER (OUTPATIENT)
Facility: HOSPITAL | Age: 39
Discharge: HOME | End: 2019-10-25
Attending: ORTHOPAEDIC SURGERY | Admitting: ORTHOPAEDIC SURGERY
Payer: COMMERCIAL

## 2019-10-25 ENCOUNTER — ANESTHESIA (OUTPATIENT)
Dept: SURGERY | Facility: HOSPITAL | Age: 39
End: 2019-10-25
Payer: COMMERCIAL

## 2019-10-25 DIAGNOSIS — M25.361 PATELLAR INSTABILITY OF RIGHT KNEE: ICD-10-CM

## 2019-10-25 DIAGNOSIS — S83.241D ACUTE MEDIAL MENISCAL TEAR, RIGHT, SUBSEQUENT ENCOUNTER: Primary | ICD-10-CM

## 2019-10-25 PROCEDURE — 63600175 PHARM REV CODE 636 W HCPCS: Performed by: NURSE ANESTHETIST, CERTIFIED REGISTERED

## 2019-10-25 PROCEDURE — 71000015 HC POSTOP RECOV 1ST HR: Performed by: ORTHOPAEDIC SURGERY

## 2019-10-25 PROCEDURE — 36000711: Performed by: ORTHOPAEDIC SURGERY

## 2019-10-25 PROCEDURE — 63600175 PHARM REV CODE 636 W HCPCS: Performed by: ANESTHESIOLOGY

## 2019-10-25 PROCEDURE — 99900103 DSU ONLY-NO CHARGE-INITIAL HR (STAT): Performed by: ORTHOPAEDIC SURGERY

## 2019-10-25 PROCEDURE — 99900104 DSU ONLY-NO CHARGE-EA ADD'L HR (STAT): Performed by: ORTHOPAEDIC SURGERY

## 2019-10-25 PROCEDURE — 27422 REVISION OF UNSTABLE KNEECAP: CPT | Mod: RT,,, | Performed by: ORTHOPAEDIC SURGERY

## 2019-10-25 PROCEDURE — 37000008 HC ANESTHESIA 1ST 15 MINUTES: Performed by: ORTHOPAEDIC SURGERY

## 2019-10-25 PROCEDURE — 64447 PR NERVE BLOCK INJ, ANES/STEROID, FEMORAL, INCL IMAG GUIDANCE: ICD-10-PCS | Mod: 59,RT,, | Performed by: ANESTHESIOLOGY

## 2019-10-25 PROCEDURE — 71000033 HC RECOVERY, INTIAL HOUR: Performed by: ORTHOPAEDIC SURGERY

## 2019-10-25 PROCEDURE — C1762 CONN TISS, HUMAN(INC FASCIA): HCPCS | Performed by: ORTHOPAEDIC SURGERY

## 2019-10-25 PROCEDURE — 27201423 OPTIME MED/SURG SUP & DEVICES STERILE SUPPLY: Performed by: ORTHOPAEDIC SURGERY

## 2019-10-25 PROCEDURE — D9220A PRA ANESTHESIA: ICD-10-PCS | Mod: ANES,,, | Performed by: ANESTHESIOLOGY

## 2019-10-25 PROCEDURE — 27422 PR FIX UNSTABLE PATELLA,EXTEN REALIGN: ICD-10-PCS | Mod: RT,,, | Performed by: ORTHOPAEDIC SURGERY

## 2019-10-25 PROCEDURE — 76942 PR U/S GUIDANCE FOR NEEDLE GUIDANCE: ICD-10-PCS | Mod: 26,,, | Performed by: ANESTHESIOLOGY

## 2019-10-25 PROCEDURE — 71000039 HC RECOVERY, EACH ADD'L HOUR: Performed by: ORTHOPAEDIC SURGERY

## 2019-10-25 PROCEDURE — D9220A PRA ANESTHESIA: ICD-10-PCS | Mod: CRNA,,, | Performed by: NURSE ANESTHETIST, CERTIFIED REGISTERED

## 2019-10-25 PROCEDURE — 25000003 PHARM REV CODE 250: Performed by: NURSE ANESTHETIST, CERTIFIED REGISTERED

## 2019-10-25 PROCEDURE — C1713 ANCHOR/SCREW BN/BN,TIS/BN: HCPCS | Performed by: ORTHOPAEDIC SURGERY

## 2019-10-25 PROCEDURE — 25000003 PHARM REV CODE 250: Performed by: ANESTHESIOLOGY

## 2019-10-25 PROCEDURE — 29881 ARTHRS KNE SRG MNISECTMY M/L: CPT | Mod: 51,RT,, | Performed by: ORTHOPAEDIC SURGERY

## 2019-10-25 PROCEDURE — 29881 PR KNEE SCOPE SINGLE MENISECECTOMY: ICD-10-PCS | Mod: 51,RT,, | Performed by: ORTHOPAEDIC SURGERY

## 2019-10-25 PROCEDURE — S0020 INJECTION, BUPIVICAINE HYDRO: HCPCS | Performed by: ANESTHESIOLOGY

## 2019-10-25 PROCEDURE — D9220A PRA ANESTHESIA: Mod: CRNA,,, | Performed by: NURSE ANESTHETIST, CERTIFIED REGISTERED

## 2019-10-25 PROCEDURE — 63600175 PHARM REV CODE 636 W HCPCS

## 2019-10-25 PROCEDURE — 27200651 HC AIRWAY, LMA: Performed by: NURSE ANESTHETIST, CERTIFIED REGISTERED

## 2019-10-25 PROCEDURE — 64447 NJX AA&/STRD FEMORAL NRV IMG: CPT | Performed by: ANESTHESIOLOGY

## 2019-10-25 PROCEDURE — 37000009 HC ANESTHESIA EA ADD 15 MINS: Performed by: ORTHOPAEDIC SURGERY

## 2019-10-25 PROCEDURE — S0077 INJECTION, CLINDAMYCIN PHOSP: HCPCS | Performed by: ORTHOPAEDIC SURGERY

## 2019-10-25 PROCEDURE — D9220A PRA ANESTHESIA: Mod: ANES,,, | Performed by: ANESTHESIOLOGY

## 2019-10-25 PROCEDURE — 76942 ECHO GUIDE FOR BIOPSY: CPT | Mod: 26,,, | Performed by: ANESTHESIOLOGY

## 2019-10-25 PROCEDURE — 64447 NJX AA&/STRD FEMORAL NRV IMG: CPT | Mod: 59,RT,, | Performed by: ANESTHESIOLOGY

## 2019-10-25 PROCEDURE — 36000710: Performed by: ORTHOPAEDIC SURGERY

## 2019-10-25 PROCEDURE — 25000003 PHARM REV CODE 250: Performed by: ORTHOPAEDIC SURGERY

## 2019-10-25 DEVICE — SYS PATLLO FEM LIGAMENT 6X23MM: Type: IMPLANTABLE DEVICE | Site: KNEE | Status: FUNCTIONAL

## 2019-10-25 DEVICE — TENDON GRACILIS ASEPTIC 26CM: Type: IMPLANTABLE DEVICE | Site: KNEE | Status: FUNCTIONAL

## 2019-10-25 RX ORDER — CLINDAMYCIN PHOSPHATE 900 MG/50ML
900 INJECTION, SOLUTION INTRAVENOUS
Status: COMPLETED | OUTPATIENT
Start: 2019-10-25 | End: 2019-10-25

## 2019-10-25 RX ORDER — KETOROLAC TROMETHAMINE 30 MG/ML
30 INJECTION, SOLUTION INTRAMUSCULAR; INTRAVENOUS ONCE
Status: COMPLETED | OUTPATIENT
Start: 2019-10-25 | End: 2019-10-25

## 2019-10-25 RX ORDER — ACETAMINOPHEN 10 MG/ML
INJECTION, SOLUTION INTRAVENOUS
Status: DISCONTINUED | OUTPATIENT
Start: 2019-10-25 | End: 2019-10-25

## 2019-10-25 RX ORDER — BUPIVACAINE HYDROCHLORIDE 5 MG/ML
INJECTION, SOLUTION EPIDURAL; INTRACAUDAL
Status: COMPLETED | OUTPATIENT
Start: 2019-10-25 | End: 2019-10-25

## 2019-10-25 RX ORDER — ONDANSETRON HYDROCHLORIDE 2 MG/ML
INJECTION, SOLUTION INTRAMUSCULAR; INTRAVENOUS
Status: DISCONTINUED | OUTPATIENT
Start: 2019-10-25 | End: 2019-10-25

## 2019-10-25 RX ORDER — LIDOCAINE HCL/PF 100 MG/5ML
SYRINGE (ML) INTRAVENOUS
Status: DISCONTINUED | OUTPATIENT
Start: 2019-10-25 | End: 2019-10-25

## 2019-10-25 RX ORDER — PROPOFOL 10 MG/ML
VIAL (ML) INTRAVENOUS
Status: DISCONTINUED | OUTPATIENT
Start: 2019-10-25 | End: 2019-10-25

## 2019-10-25 RX ORDER — HYDROMORPHONE HYDROCHLORIDE 2 MG/ML
0.2 INJECTION, SOLUTION INTRAMUSCULAR; INTRAVENOUS; SUBCUTANEOUS EVERY 5 MIN PRN
Status: DISCONTINUED | OUTPATIENT
Start: 2019-10-25 | End: 2019-10-25 | Stop reason: HOSPADM

## 2019-10-25 RX ORDER — LIDOCAINE HYDROCHLORIDE 10 MG/ML
1 INJECTION, SOLUTION EPIDURAL; INFILTRATION; INTRACAUDAL; PERINEURAL ONCE
Status: ACTIVE | OUTPATIENT
Start: 2019-10-25

## 2019-10-25 RX ORDER — FENTANYL CITRATE 50 UG/ML
INJECTION, SOLUTION INTRAMUSCULAR; INTRAVENOUS
Status: DISCONTINUED | OUTPATIENT
Start: 2019-10-25 | End: 2019-10-25

## 2019-10-25 RX ORDER — OXYCODONE HYDROCHLORIDE 5 MG/1
5 TABLET ORAL
Status: DISCONTINUED | OUTPATIENT
Start: 2019-10-25 | End: 2019-10-25 | Stop reason: HOSPADM

## 2019-10-25 RX ORDER — SODIUM CHLORIDE, SODIUM LACTATE, POTASSIUM CHLORIDE, CALCIUM CHLORIDE 600; 310; 30; 20 MG/100ML; MG/100ML; MG/100ML; MG/100ML
INJECTION, SOLUTION INTRAVENOUS CONTINUOUS
Status: ACTIVE | OUTPATIENT
Start: 2019-10-25

## 2019-10-25 RX ORDER — DEXAMETHASONE SODIUM PHOSPHATE 4 MG/ML
INJECTION, SOLUTION INTRA-ARTICULAR; INTRALESIONAL; INTRAMUSCULAR; INTRAVENOUS; SOFT TISSUE
Status: DISCONTINUED | OUTPATIENT
Start: 2019-10-25 | End: 2019-10-25

## 2019-10-25 RX ORDER — MIDAZOLAM HYDROCHLORIDE 1 MG/ML
INJECTION, SOLUTION INTRAMUSCULAR; INTRAVENOUS
Status: DISCONTINUED | OUTPATIENT
Start: 2019-10-25 | End: 2019-10-25

## 2019-10-25 RX ORDER — ONDANSETRON 2 MG/ML
4 INJECTION INTRAMUSCULAR; INTRAVENOUS DAILY PRN
Status: DISCONTINUED | OUTPATIENT
Start: 2019-10-25 | End: 2019-10-25 | Stop reason: HOSPADM

## 2019-10-25 RX ORDER — OXYCODONE AND ACETAMINOPHEN 5; 325 MG/1; MG/1
1 TABLET ORAL EVERY 4 HOURS PRN
Qty: 40 TABLET | Refills: 0 | Status: SHIPPED | OUTPATIENT
Start: 2019-10-25 | End: 2019-11-14

## 2019-10-25 RX ORDER — FENTANYL CITRATE 50 UG/ML
25 INJECTION, SOLUTION INTRAMUSCULAR; INTRAVENOUS EVERY 5 MIN PRN
Status: COMPLETED | OUTPATIENT
Start: 2019-10-25 | End: 2019-10-25

## 2019-10-25 RX ORDER — PHENYLEPHRINE HYDROCHLORIDE 10 MG/ML
INJECTION INTRAVENOUS
Status: DISCONTINUED | OUTPATIENT
Start: 2019-10-25 | End: 2019-10-25

## 2019-10-25 RX ORDER — KETAMINE HYDROCHLORIDE 100 MG/ML
INJECTION, SOLUTION INTRAMUSCULAR; INTRAVENOUS
Status: DISCONTINUED | OUTPATIENT
Start: 2019-10-25 | End: 2019-10-25

## 2019-10-25 RX ADMIN — ONDANSETRON 4 MG: 2 INJECTION, SOLUTION INTRAMUSCULAR; INTRAVENOUS at 07:10

## 2019-10-25 RX ADMIN — LIDOCAINE HYDROCHLORIDE 100 MG: 20 INJECTION, SOLUTION INTRAVENOUS at 07:10

## 2019-10-25 RX ADMIN — PHENYLEPHRINE HYDROCHLORIDE 100 MCG: 10 INJECTION INTRAVENOUS at 08:10

## 2019-10-25 RX ADMIN — PROPOFOL 150 MG: 10 INJECTION, EMULSION INTRAVENOUS at 07:10

## 2019-10-25 RX ADMIN — MIDAZOLAM 2 MG: 1 INJECTION INTRAMUSCULAR; INTRAVENOUS at 07:10

## 2019-10-25 RX ADMIN — DEXAMETHASONE SODIUM PHOSPHATE 4 MG: 4 INJECTION, SOLUTION INTRAMUSCULAR; INTRAVENOUS at 07:10

## 2019-10-25 RX ADMIN — HYDROMORPHONE HYDROCHLORIDE 0.2 MG: 2 INJECTION, SOLUTION INTRAMUSCULAR; INTRAVENOUS; SUBCUTANEOUS at 10:10

## 2019-10-25 RX ADMIN — FENTANYL CITRATE 100 MCG: 50 INJECTION, SOLUTION INTRAMUSCULAR; INTRAVENOUS at 07:10

## 2019-10-25 RX ADMIN — FENTANYL CITRATE 25 MCG: 0.05 INJECTION, SOLUTION INTRAMUSCULAR; INTRAVENOUS at 09:10

## 2019-10-25 RX ADMIN — ACETAMINOPHEN 1000 MG: 10 INJECTION, SOLUTION INTRAVENOUS at 07:10

## 2019-10-25 RX ADMIN — KETOROLAC TROMETHAMINE 30 MG: 30 INJECTION, SOLUTION INTRAMUSCULAR at 09:10

## 2019-10-25 RX ADMIN — OXYCODONE HYDROCHLORIDE 5 MG: 5 TABLET ORAL at 09:10

## 2019-10-25 RX ADMIN — KETAMINE HYDROCHLORIDE 20 MG: 100 INJECTION, SOLUTION, CONCENTRATE INTRAMUSCULAR; INTRAVENOUS at 07:10

## 2019-10-25 RX ADMIN — FENTANYL CITRATE 50 MCG: 50 INJECTION, SOLUTION INTRAMUSCULAR; INTRAVENOUS at 08:10

## 2019-10-25 RX ADMIN — SODIUM CHLORIDE, SODIUM LACTATE, POTASSIUM CHLORIDE, AND CALCIUM CHLORIDE: .6; .31; .03; .02 INJECTION, SOLUTION INTRAVENOUS at 08:10

## 2019-10-25 RX ADMIN — FENTANYL CITRATE 50 MCG: 50 INJECTION, SOLUTION INTRAMUSCULAR; INTRAVENOUS at 07:10

## 2019-10-25 RX ADMIN — BUPIVACAINE HYDROCHLORIDE 30 ML: 5 INJECTION, SOLUTION EPIDURAL; INTRACAUDAL; PERINEURAL at 07:10

## 2019-10-25 RX ADMIN — SODIUM CHLORIDE, SODIUM LACTATE, POTASSIUM CHLORIDE, AND CALCIUM CHLORIDE: .6; .31; .03; .02 INJECTION, SOLUTION INTRAVENOUS at 06:10

## 2019-10-25 RX ADMIN — PHENYLEPHRINE HYDROCHLORIDE 100 MCG: 10 INJECTION INTRAVENOUS at 07:10

## 2019-10-25 RX ADMIN — CLINDAMYCIN PHOSPHATE 900 MG: 18 INJECTION, SOLUTION INTRAVENOUS at 07:10

## 2019-10-25 NOTE — TRANSFER OF CARE
Anesthesia Transfer of Care Note    Patient: Aleyda Costa    Procedure(s) Performed: Procedure(s) (LRB):  ARTHROSCOPY, KNEE, WITH MENISCECTOMY (Right)  RECONSTRUCTION, LIGAMENT, MEDIAL PATELLOFEMORAL, RIGHT (Right)    Patient location: PACU    Anesthesia Type: general    Transport from OR: Transported from OR on 2-3 L/min O2 by NC with adequate spontaneous ventilation    Post pain: adequate analgesia    Post assessment: no apparent anesthetic complications and tolerated procedure well    Post vital signs: stable    Level of consciousness: awake, alert and oriented    Nausea/Vomiting: no nausea/vomiting    Complications: none    Transfer of care protocol was followed      Last vitals:   Visit Vitals  /60 (BP Location: Left arm, Patient Position: Lying)   Pulse (!) 58   Temp 36.5 °C (97.7 °F) (Skin)   Resp 16   Ht 6' (1.829 m)   Wt 113.9 kg (251 lb)   Breastfeeding? No   BMI 34.04 kg/m²

## 2019-10-25 NOTE — INTERVAL H&P NOTE
The patient has been examined and the H&P has been reviewed:    I concur with the findings and no changes have occurred since H&P was written.    Anesthesia/Surgery risks, benefits and alternative options discussed and understood by patient/family.          Active Hospital Problems    Diagnosis  POA    Acute medial meniscal tear, right, subsequent encounter [S87.665P]  Not Applicable      Resolved Hospital Problems   No resolved problems to display.

## 2019-10-25 NOTE — DISCHARGE INSTRUCTIONS
"Discharge Instructions: After Your Surgery/Procedure  Youve just had surgery. During surgery you were given medicine called anesthesia to keep you relaxed and free of pain. After surgery you may have some pain or nausea. This is common. Here are some tips for feeling better and getting well after surgery.     Stay on schedule with your medication.   Going home  Your doctor or nurse will show you how to take care of yourself when you go home. He or she will also answer your questions. Have an adult family member or friend drive you home.      For your safety we recommend these precaution for the first 24 hours after your procedure:  · Do not drive or use heavy equipment.  · Do not make important decisions or sign legal papers.  · Do not drink alcohol.  · Have someone stay with you, if needed. He or she can watch for problems and help keep you safe.  · Your concentration, balance, coordination, and judgement may be impaired for many hours after anesthesia.  Use caution when ambulating or standing up.     · You may feel weak and "washed out" after anesthesia and surgery.      Subtle residual effects of general anesthesia or sedation with regional / local anesthesia can last more than 24 hours.  Rest for the remainder of the day or longer if your Doctor/Surgeon has advised you to do so.  Although you may feel normal within the first 24 hours, your reflexes and mental ability may be impaired without you realizing it.  You may feel dizzy, lightheaded or sleepy for 24 hours or longer.      Be sure to go to all follow-up visits with your doctor. And rest after your surgery for as long as your doctor tells you to.  Coping with pain  If you have pain after surgery, pain medicine will help you feel better. Take it as told, before pain becomes severe. Also, ask your doctor or pharmacist about other ways to control pain. This might be with heat, ice, or relaxation. And follow any other instructions your surgeon or nurse gives " you.  Tips for taking pain medicine  To get the best relief possible, remember these points:  · Pain medicines can upset your stomach. Taking them with a little food may help.  · Most pain relievers taken by mouth need at least 20 to 30 minutes to start to work.  · Taking medicine on a schedule can help you remember to take it. Try to time your medicine so that you can take it before starting an activity. This might be before you get dressed, go for a walk, or sit down for dinner.  · Constipation is a common side effect of pain medicines. Call your doctor before taking any medicines such as laxatives or stool softeners to help ease constipation. Also ask if you should skip any foods. Drinking lots of fluids and eating foods such as fruits and vegetables that are high in fiber can also help. Remember, do not take laxatives unless your surgeon has prescribed them.  · Drinking alcohol and taking pain medicine can cause dizziness and slow your breathing. It can even be deadly. Do not drink alcohol while taking pain medicine.  · Pain medicine can make you react more slowly to things. Do not drive or run machinery while taking pain medicine.  Your health care provider may tell you to take acetaminophen to help ease your pain. Ask him or her how much you are supposed to take each day. Acetaminophen or other pain relievers may interact with your prescription medicines or other over-the-counter (OTC) drugs. Some prescription medicines have acetaminophen and other ingredients. Using both prescription and OTC acetaminophen for pain can cause you to overdose. Read the labels on your OTC medicines with care. This will help you to clearly know the list of ingredients, how much to take, and any warnings. It may also help you not take too much acetaminophen. If you have questions or do not understand the information, ask your pharmacist or health care provider to explain it to you before you take the OTC medicine.  Managing  nausea  Some people have an upset stomach after surgery. This is often because of anesthesia, pain, or pain medicine, or the stress of surgery. These tips will help you handle nausea and eat healthy foods as you get better. If you were on a special food plan before surgery, ask your doctor if you should follow it while you get better. These tips may help:  · Do not push yourself to eat. Your body will tell you when to eat and how much.  · Start off with clear liquids and soup. They are easier to digest.  · Next try semi-solid foods, such as mashed potatoes, applesauce, and gelatin, as you feel ready.  · Slowly move to solid foods. Dont eat fatty, rich, or spicy foods at first.  · Do not force yourself to have 3 large meals a day. Instead eat smaller amounts more often.  · Take pain medicines with a small amount of solid food, such as crackers or toast, to avoid nausea.     Call your surgeon if  · You still have pain an hour after taking medicine. The medicine may not be strong enough.  · You feel too sleepy, dizzy, or groggy. The medicine may be too strong.  · You have side effects like nausea, vomiting, or skin changes, such as rash, itching, or hives.       If you have obstructive sleep apnea  You were given anesthesia medicine during surgery to keep you comfortable and free of pain. After surgery, you may have more apnea spells because of this medicine and other medicines you were given. The spells may last longer than usual.   At home:  · Keep using the continuous positive airway pressure (CPAP) device when you sleep. Unless your health care provider tells you not to, use it when you sleep, day or night. CPAP is a common device used to treat obstructive sleep apnea.  · Talk with your provider before taking any pain medicine, muscle relaxants, or sedatives. Your provider will tell you about the possible dangers of taking these medicines.  © 9097-5850 [employer]. [address] [place] [address]  [place] [address]. All rights reserved. This information is not intended as a substitute for professional medical care. Always follow your healthcare professional's instructions.        General Information:    1.  Do not drink alcoholic beverages including beer for 24 hours or as long as you are on pain medication..  2.  Do not drive a motor vehicle, operate machinery or power tools, or signs legal papers for 24 hours or as long as you are on pain medication.   3.  You may experience light-headedness, dizziness, and sleepiness following surgery. Please do not stay alone. A responsible adult should be with you for this 24 hour period.  4.  Go home and rest.  5. Progress slowly to a normal diet unless instructed.  Otherwise, begin with liquids such as soft drinks, then soup and crackers working up to solid foods. Drink plenty of nonalcoholic fluids.  6.  Certain anesthetics and pain medications produce nausea and vomiting in certain       individuals. If nausea becomes a problem at home, call you doctor.  7. A nurse will be calling you sometime after surgery. Do not be alarmed. This is our way of finding out how you are doing.  8. Several times every hour while you are awake, take 2-3 deep breaths and cough. If you had stomach surgery hold a pillow or rolled towel firmly against your stomach before you cough. This will help with any pain the cough might cause.  9. Several times every hour while you are awake, pump and flex your feet 5-6 times and do foot circles. This will help prevent blood clots.  10.Call your doctor for severe pain, bleeding, fever, or signs or symptoms of infection (pain, swelling, redness, foul odor, drainage).    Post op instructions for prevention of DVT  What is deep vein thrombosis?  Deep vein thrombosis (DVT) is the medical term for blood clots in the deep veins of the leg.  These blood clots can be dangerous.  A DVT can block a blood vessel and keep blood from getting where it needs to go.  Another  problem is that the clot can travel to other parts of the body such as the lungs.  A clot that travels to the lungs is called a pulmonary embolus (PE) and can cause serious problems with breathing which can lead to death.  Am I at risk for DVT/PE?  If you are not very active, you are at risk of DVT.  Anyone confined to bed, sitting for long periods of time, recovering from surgery, etc. increases the risk of DVT.  Other risk factors are cancer diagnosis, certain medications, estrogen replacement in any form,older age, obesity, pregnancy, smoking, history of clotting disorders, and dehydration.  How will I know if I have a DVT?   Swelling in the lower leg   Pain   Warmth, redness, hardness or bulging of the vein  If you have any of these symptoms, call your doctors office right away.  Some people will not have any symptoms until the clot moves to the lungs.  What are the symptoms of a PE?   Panting, shortness of breath, or trouble breathing   Sharp, knife-like chest pain when you breathe   Coughing or coughing up blood   Rapid heartbeat  If you have any of these symptoms or get worse quickly, call 911 for emergency treatment.  How can I prevent a DVT?   Avoid long periods of inactivity and dont cross your legs--get up and walk around every hour or so.   Stay active--walking after surgery is highly encouraged.  This means you should get out of the house and walk in the neighborhood.  Going up and down stairs will not impair healing (unless advised against such activity by your doctor).     Drink plenty of noncaffeinated, nonalcoholic fluids each day to prevent dehydration.   Wear special support stockings, if they have been advised by your doctor.   If you travel, stop at least once an hour and walk around.   Avoid smoking (assistance with stopping is available through your healthcare provider)  Always notify your doctor if you are not able to follow the post operative instructions that are given to you  at the time of discharge.  It may be necessary to prescribe one of the medications available to prevent DVT.    We hope your stay was comfortable as you heal now, mend and rest.    For we have enjoyed taking care of you by giving your our best.    And as you get better, by regaining your health and strength;   We count it as a privilege to have served you and hope your time at [facility] was well spent.      Thank  You!!!

## 2019-10-25 NOTE — OP NOTE
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 She also had a central tear of the body of the medial meniscus.        Complications: No    Estimated Blood Loss (EBL): 5 mL           Implants:   Implant Name Type Inv. Item Serial No.  Lot No. LRB No. Used   SYS PATLLO FEM LIGAMENT [id number] - [id number]  SYS PATLLO FEM LIGAMENT 6X23MM  [employer] [id number] Right 1   TENDON GRACILIS ASEPTIC 26CM - [id number]  TENDON GRACILIS ASEPTIC 26CM  MUSCULOSKELETAL TRANSPLANT FND  Right 1       Specimens:   Specimen (12h ago, onward)    None                  Condition: Good    Disposition: PACU - hemodynamically stable.    Attestation: I was present and scrubbed for the entire procedure.    INDICATIONS FOR THE PROCEDURE:  A 39-year-old female with history of patellar instability and pain.  After a long discussion, the patient wished   to proceed with the patellar stability surgery and agreed to the procedure   listed above.       PROCEDURE IN DETAIL:  Risks, benefits and alternatives to procedure were   explained to her and her parents including, but not limited to damage to nerves,    arteries or blood vessels, risk of recurrence, infection, instability,   stiffness as well as anesthetic complications including seizure, stroke, heart   attack and death.  They understood this and signed informed consent.  The   patient's right knee was marked prior to coming to the Operating Room.  Once a   formal timeout was done in which correct patient, procedure and op site were all   correctly identified and confirmed by the entire operating team.  A 2 g of   Ancef were given prior to surgical incision.  General endotracheal anesthesia   was induced.  The patient's right lower extremity was prepped and draped in   normal sterile fashion.  Leg was exsanguinated.  Tourniquet was inflated up 300   mmHg.  Diagnostic arthroscopy was performed initially with the standard   anterolateral portal.  Scope was then introduced in the suprapatellar pouch.    Spinal needle was used to locate an anteromedial portal and this was made as   well.  Diagnostic arthroscopy was performed and the patient's medial and lateral   compartments were examined and a partial medial meniscal tear was seen.  Partial meniscectomy was performed using a 3.5 millimeter full-radius shaver..  ACL and PCL were   intact with good tension.  In the patellofemoral joint, the patient's cartilage   Had severe wear of the central and lateral portion of the patella as well as she did have some lateral   tracking.  It did not sit perfectly flush within the trochlear groove.  After   this was done, then I took out the scope.  Scope portals were closed with 3-0   nylon.  We then did our MPFL procedure with a gracilis allograft.   We then made about 2 cm incision   right off the medial border of the patella.  This was taken down through skin.    Periosteum was released.  An arthrotomy was made just to the patella could be   palpated in total anteriorly and posteriorly.  Once this was done, about just   off the superior medial border was identified and a  guidepin was placed under   fluoroscopy across the patella, 15 mm distal this.  A second guide pin was then   placed parallel.  These were confirmed to be the appropriate position, both on   an AP as well as on the lateral, confirming that they were not too superficial   or too deep, but right in the middle of the patella.  After this was done, we   then used a reamer to ream over the guide pin and then prepared to place the   graft.  Our first limb of the graft was placed in 1 of the holes of the 4.75   SwiveLock anchor.  This was placed so it was just flushed and then the second   anchor was placed as well.  We had good control of the patella at this point.    Sutures were left in for closure of the arthrotomy later.  We then found the   layer just under the vastus medialis and extra-articularly and tracked it down   to the medial epicondylar area.  A small stab incision was made in this area.    We then placed our guide pin just near the medial epicondyle and adductor   tubercle and the area of the MPFL insertion.  We then brought in live fluoro,   obtained a true lateral x-ray, and using the guide established our spot for the   anatomic position of the MPFL.  This was then drilled and aimed slightly   anteriorly and superiorly to stay out of the notch.  This was drilled outside   the anterolateral skin.  We then shuttled our graft extra-articularly through   this layer and prepared for a final graft insertion.  After the guide pin was   placed, we then over reamed this about 50, so that we have plenty of room for   the graft to fully seat.  We then shuttled the guide sutures through the femur   and then pulled the graft into place.  A good palpable pop was felt.  We could   feel the tension and then while the knee was in 20 degrees of flexion and the   lateral border of the patella was articulating concurrently with the lateral   femoral condyle.  This was held in place and the interference screw was then    tightened while tension was held on the graft.  This was inserted until it was   flush with the femoral cortex.  This was confirmed under fluoro.  We then while   leaving the guide pin the nitinol and took the knee through full range of   motion, making sure that we did not over constrained the knee, seeing that the   nitinol was then removed as well.  We then released the tourniquet.  Hemostasis   was obtained with the cautery.  Deep tissue was closed using 0 Vicryl,   subcutaneous tissue was closed using 2-0 Vicryl, running 3-0 Monocryl and then   Mastisol and Steri-Strips were then used as well.  Sterile dressing was applied.    She was placed in a range of motion brace.  She was extubated, awakened and   transferred from the Operating Room to the Recovery Room in stable condition

## 2019-10-25 NOTE — ANESTHESIA POSTPROCEDURE EVALUATION
"Anesthesia Post Evaluation    Patient: Aleyda Costa    Procedure(s) Performed: Procedure(s) (LRB):  ARTHROSCOPY, KNEE, WITH MENISCECTOMY (Right)  RECONSTRUCTION, LIGAMENT, MEDIAL PATELLOFEMORAL, RIGHT (Right)    Final Anesthesia Type: general  Patient location during evaluation: PACU  Patient participation: Yes- Able to Participate  Level of consciousness: awake and alert  Post-procedure vital signs: reviewed and stable  Pain management: adequate  Airway patency: patent  PONV status at discharge: No PONV  Anesthetic complications: no      Cardiovascular status: blood pressure returned to baseline  Respiratory status: unassisted  Hydration status: euvolemic  Follow-up not needed.          Vitals Value Taken Time   /57 10/25/2019 11:15 AM   Temp 36.7 °C (98.1 °F) 10/25/2019 10:30 AM   Pulse 64 10/25/2019 11:15 AM   Resp 16 10/25/2019 11:15 AM   SpO2 95 % 10/25/2019 11:15 AM         Event Time     Out of Recovery 10:53:00          Pain/Claudine Score: Pain Rating Prior to Med Admin: 7 (10/25/2019 10:48 AM)  Pain Rating Post Med Admin: 5 (tolerable ready to go Home") (10/25/2019 10:48 AM)  Claudine Score: 10 (10/25/2019 11:15 AM)        "

## 2019-10-25 NOTE — PLAN OF CARE
Pre op assessment performed and questions answered.  Pt w/hx of pulmonary embolus following right TKA last November.  SCD in place on nonoperative leg as ordered

## 2019-10-25 NOTE — PLAN OF CARE
Dr Cash released from pacu to phase 2 skin w+d vs wnl pain tolerable now had nerve block exparell bracelet on  + pedal pulse cap refill <3 sec  No nausea no emesis awake alert orient x 4

## 2019-10-25 NOTE — DISCHARGE SUMMARY
[facility]  Discharge Note  Short Stay    Admit Date: 10/25/2019    Discharge Date and Time: 10/25/2019    Attending Physician: Feliberto Cardenas MD     Discharge Provider: Feliberto Cardenas    Diagnoses:  Active Hospital Problems    Diagnosis  POA    *Acute medial meniscal tear, right, subsequent encounter [S83.262G]  Not Applicable      Resolved Hospital Problems   No resolved problems to display.       Discharged Condition: good    Hospital Course: Patient was admitted for an outpatient procedure and tolerated the procedure well with no complications.    Final Diagnoses: Same as principal problem.    Disposition: Home or Self Care    Follow up/Patient Instructions:    Medications:  Reconciled Home Medications:      Medication List      START taking these medications    oxyCODONE-acetaminophen 5-325 mg per tablet  Commonly known as:  PERCOCET  Take 1 tablet by mouth every 4 (four) hours as needed.     rivaroxaban 10 mg Tab  Commonly known as:  XARELTO  Take 1 tablet (10 mg total) by mouth daily with dinner or evening meal.        CONTINUE taking these medications    AIMOVIG AUTOINJECTOR 140 mg/mL Atin  Generic drug:  erenumab-aooe  Inject 140 mg into the skin every 28 days.     amitriptyline 25 MG tablet  Commonly known as:  ELAVIL  TAKE ONE TABLET BY MOUTH EVERY NIGHT FOR 2 WEEKS THEN INCREASE TO 2 TABLETS AT BEDTIME     celecoxib 200 MG capsule  Commonly known as:  CeleBREX  Take 200 mg by mouth every evening.     cetirizine 5 MG tablet  Commonly known as:  ZYRTEC  Take 5 mg by mouth as needed.     cyanocobalamin (vitamin B-12) 1,000 mcg/mL Kit  Inject 1 mL into the muscle every 21 days.     EPINEPHrine 0.3 mg/0.3 mL Atin  Commonly known as:  EPIPEN  0.3 mg daily as needed.     FLUoxetine 20 MG capsule  every evening.     hydrOXYzine pamoate 25 MG Cap  Commonly known as:  VISTARIL  TAKE ONE CAPSULE BY MOUTH 3 TIMES A DAY AS NEEDED FOR ANXIETY     * ibuprofen 800 MG tablet  Commonly  known as:  ADVIL,MOTRIN  every 6 (six) hours as needed.     * ibuprofen/D5W 250mL  Take 800 mg by mouth.     ketorolac 30 mg/mL injection  Commonly known as:  TORADOL  Inject 30 mg into the muscle as needed.     LORazepam 0.5 MG tablet  Commonly known as:  ATIVAN  Take 0.5 mg by mouth every 4 (four) hours as needed.     oxyCODONE 10 mg 12 hr tablet  Commonly known as:  OXYCONTIN  Take 10 mg by mouth every 12 (twelve) hours.     promethazine 25 MG tablet  Commonly known as:  PHENERGAN  Take 1 tablet (25 mg total) by mouth every 6 (six) hours as needed for Nausea. Take 1 tablet by mouth every 6 (six) hours as needed (migraine). -MAY CAUSE DROWSINESS-     tiZANidine 4 MG tablet  Commonly known as:  ZANAFLEX  Take 1 tablet (4 mg total) by mouth every 8 (eight) hours as needed.     VOLTAREN 1 % Gel  Generic drug:  diclofenac sodium  daily as needed.         * This list has 2 medication(s) that are the same as other medications prescribed for you. Read the directions carefully, and ask your doctor or other care provider to review them with you.              Discharge Procedure Orders   CRUTCHES FOR HOME USE     Order Specific Question Answer Comments   Type: Axillary    Height: 6' (1.829 m)    Weight: 113.9 kg (251 lb)    Does patient have medical equipment at home? walker, standard    Length of need (1-99 months): 1      Remove dressing in 48 hours     Follow-up Information     Feliberto Cardenas MD In 2 weeks.    Specialties:  Sports Medicine, Orthopedic Surgery  Why:  For suture removal  Contact information:  [address] [place] [address] [address]  [place] [address] [phone]                   Discharge Procedure Orders (must include Diet, Follow-up, Activity):   Discharge Procedure Orders (must include Diet, Follow-up, Activity)   CRUTCHES FOR HOME USE     Order Specific Question Answer Comments   Type: Axillary    Height: 6' (1.829 m)    Weight: 113.9 kg (251 lb)    Does patient have medical equipment at home? walker,  standard    Length of need (1-99 months): 1      Remove dressing in 48 hours

## 2019-10-25 NOTE — ANESTHESIA PREPROCEDURE EVALUATION
10/25/2019  Aleyda Costa is a 39 y.o., female.    Pre-op Assessment    I have reviewed the Patient Summary Reports.     I have reviewed the Nursing Notes.   I have reviewed the Medications.     Review of Systems  Anesthesia Hx:  No problems with previous Anesthesia Denies Hx of Anesthetic complications  Denies Family Hx of Anesthesia complications.   Denies Personal Hx of Anesthesia complications.   Social:  Smoker    Pulmonary:   Sleep Apnea    Musculoskeletal:   Arthritis   Spine Disorders: lumbar Chronic Pain    Neurological:   Headaches Seizures    Endocrine:  Endocrine Normal    Psych:   Psychiatric History          Physical Exam  General:  Obesity    Airway/Jaw/Neck:  Airway Findings: Mouth Opening: Normal Tongue: Normal  General Airway Assessment: Adult  Mallampati: III  Improves to II with phonation.  TM Distance: Normal, at least 6 cm  Jaw/Neck Findings:  Neck ROM: Normal ROM      Dental:  Dental Findings: Upper Dentures, Lower Dentures   Chest/Lungs:  Chest/Lungs Clear    Heart/Vascular:  Heart Findings: Normal            Anesthesia Plan  Type of Anesthesia, risks & benefits discussed:  Anesthesia Type:  general  Patient's Preference:   Intra-op Monitoring Plan: standard ASA monitors  Intra-op Monitoring Plan Comments:   Post Op Pain Control Plan: multimodal analgesia and peripheral nerve block  Post Op Pain Control Plan Comments:   Induction:   IV  Beta Blocker:  Patient is not currently on a Beta-Blocker (No further documentation required).       Informed Consent: Patient understands risks and agrees with Anesthesia plan.  Questions answered. Anesthesia consent signed with patient.  ASA Score: 2     Day of Surgery Review of History & Physical:    H&P update referred to the surgeon.         Ready For Surgery From Anesthesia Perspective.

## 2019-10-25 NOTE — ANESTHESIA PROCEDURE NOTES
Peripheral Block    Patient location during procedure: pre-op   Block not for primary anesthetic.  Reason for block: at surgeon's request and post-op pain management   Post-op Pain Location: knee right  Start time: 10/25/2019 7:11 AM  Timeout: 10/25/2019 7:10 AM   End time: 10/25/2019 7:14 AM    Staffing  Authorizing Provider: Nikos Cash MD  Performing Provider: Nikos Cash MD    Preanesthetic Checklist  Completed: patient identified, site marked, surgical consent, pre-op evaluation, timeout performed, IV checked, risks and benefits discussed and monitors and equipment checked  Peripheral Block  Patient position: supine  Prep: ChloraPrep  Patient monitoring: heart rate, cardiac monitor, continuous pulse ox, continuous capnometry and frequent blood pressure checks  Block type: adductor canal  Laterality: right  Injection technique: single shot  Needle  Needle type: Stimuplex   Needle gauge: 21 G  Needle length: 4 in  Needle localization: anatomical landmarks and ultrasound guidance   -ultrasound image captured on disc.  Assessment  Injection assessment: negative aspiration, negative parasthesia and local visualized surrounding nerve  Paresthesia pain: none  Heart rate change: no  Slow fractionated injection: yes  Additional Notes  VSS.  [facility] RN monitoring vitals throughout procedure.  Patient tolerated procedure well.     Exparel 20 cc in Bup. 0.5% 10 cc mixture injected

## 2019-10-25 NOTE — PLAN OF CARE
Discharge instructions given to pt and spouse who voice understanding.  Pain 7/10 and tolerable per pt, states she wants to go home.  Taking po fluids without nausea.  Surgical dressing, brace intact to LLE.  Polar ice intact.  All questions answered.   has all of pt's personal belongings

## 2019-10-28 VITALS
WEIGHT: 251 LBS | BODY MASS INDEX: 34 KG/M2 | DIASTOLIC BLOOD PRESSURE: 57 MMHG | SYSTOLIC BLOOD PRESSURE: 101 MMHG | OXYGEN SATURATION: 95 % | TEMPERATURE: 98 F | HEART RATE: 64 BPM | RESPIRATION RATE: 16 BRPM | HEIGHT: 72 IN

## 2019-11-01 ENCOUNTER — PATIENT MESSAGE (OUTPATIENT)
Dept: NEUROLOGY | Facility: CLINIC | Age: 39
End: 2019-11-01

## 2019-11-07 ENCOUNTER — OFFICE VISIT (OUTPATIENT)
Dept: ORTHOPEDICS | Facility: CLINIC | Age: 39
End: 2019-11-07
Payer: COMMERCIAL

## 2019-11-07 VITALS — RESPIRATION RATE: 18 BRPM | HEIGHT: 72 IN | WEIGHT: 251 LBS | BODY MASS INDEX: 34 KG/M2

## 2019-11-07 DIAGNOSIS — M17.11 PRIMARY OSTEOARTHRITIS OF RIGHT KNEE: Primary | ICD-10-CM

## 2019-11-07 PROCEDURE — 99024 POSTOP FOLLOW-UP VISIT: CPT | Mod: S$GLB,,, | Performed by: ORTHOPAEDIC SURGERY

## 2019-11-07 PROCEDURE — 99999 PR PBB SHADOW E&M-EST. PATIENT-LVL III: CPT | Mod: PBBFAC,,, | Performed by: ORTHOPAEDIC SURGERY

## 2019-11-07 PROCEDURE — 99999 PR PBB SHADOW E&M-EST. PATIENT-LVL III: ICD-10-PCS | Mod: PBBFAC,,, | Performed by: ORTHOPAEDIC SURGERY

## 2019-11-07 PROCEDURE — 99024 PR POST-OP FOLLOW-UP VISIT: ICD-10-PCS | Mod: S$GLB,,, | Performed by: ORTHOPAEDIC SURGERY

## 2019-11-07 NOTE — PROGRESS NOTES
Past Medical History:   Diagnosis Date    Arthritis     OA    Back pain     Cancer     skin cancer to back    Depression     Full dentures     Migraine     Pulmonary embolism     Seizures     Sleep apnea     Does not use c-pap since weight loss - 170 lbs    Wears glasses        Past Surgical History:   Procedure Laterality Date    APPENDECTOMY      CARPAL TUNNEL RELEASE Bilateral      SECTION      CHOLECYSTECTOMY      EPIDURAL STEROID INJECTION INTO LUMBAR SPINE N/A 2019    Procedure: Injection-steroid-epidural-lumbar;  Surgeon: Yariel Ramirez MD;  Location: [facility] OR;  Service: Pain Management;  Laterality: N/A;  L3-4    FRACTURE SURGERY      ankle    gastric sleve      HYSTERECTOMY      HYSTERECTOMY      JOINT REPLACEMENT Left 2018    KNEE ARTHROPLASTY Left 2018    Procedure: ARTHROPLASTY, KNEE;  Surgeon: Feliberto Cardneas MD;  Location: [facility] OR;  Service: Orthopedics;  Laterality: Left;    KNEE ARTHROSCOPY W/ MENISCECTOMY Right 10/25/2019    Procedure: ARTHROSCOPY, KNEE, WITH MENISCECTOMY;  Surgeon: Feliberto Cardenas MD;  Location: [facility] OR;  Service: Orthopedics;  Laterality: Right;    KNEE SURGERY      LUMBAR EPIDURAL INJECTION      RADIAL NERVE Right     RECONSTRUCTION OF MEDIAL PATELLOFEMORAL LIGAMENT OF RIGHT KNEE Right 10/25/2019    Procedure: RECONSTRUCTION, LIGAMENT, MEDIAL PATELLOFEMORAL, RIGHT;  Surgeon: Feliberto Cardenas MD;  Location: [facility] OR;  Service: Orthopedics;  Laterality: Right;    SINUS SURGERY         Current Outpatient Medications   Medication Sig    amitriptyline (ELAVIL) 25 MG tablet TAKE ONE TABLET BY MOUTH EVERY NIGHT FOR 2 WEEKS THEN INCREASE TO 2 TABLETS AT BEDTIME    celecoxib (CELEBREX) 200 MG capsule Take 200 mg by mouth every evening.     cetirizine (ZYRTEC) 5 MG tablet Take 5 mg by mouth as needed.     cyanocobalamin, vitamin B-12, 1,000 mcg/mL Kit Inject 1 mL into the muscle every 21 days.    epinephrine (EPIPEN)  0.3 mg/0.3 mL (1:1,000) AtIn 0.3 mg daily as needed.     erenumab-aooe 140 mg/mL AtIn Inject 140 mg into the skin every 28 days.    fluoxetine (PROZAC) 20 MG capsule every evening.     hydrOXYzine pamoate (VISTARIL) 25 MG Cap TAKE ONE CAPSULE BY MOUTH 3 TIMES A DAY AS NEEDED FOR ANXIETY    ibuprofen (ADVIL,MOTRIN) 800 MG tablet every 6 (six) hours as needed.     ibuprofen/D5W 250mL Take 800 mg by mouth.    ketorolac (TORADOL) 30 mg/mL injection Inject 30 mg into the muscle as needed.    lorazepam (ATIVAN) 0.5 MG tablet Take 0.5 mg by mouth every 4 (four) hours as needed.     oxyCODONE (OXYCONTIN) 10 mg 12 hr tablet Take 10 mg by mouth every 12 (twelve) hours.    oxyCODONE-acetaminophen (PERCOCET) 5-325 mg per tablet Take 1 tablet by mouth every 4 (four) hours as needed.    promethazine (PHENERGAN) 25 MG tablet Take 1 tablet (25 mg total) by mouth every 6 (six) hours as needed for Nausea. Take 1 tablet by mouth every 6 (six) hours as needed (migraine). -MAY CAUSE DROWSINESS-    rivaroxaban (XARELTO) 10 mg Tab Take 1 tablet (10 mg total) by mouth daily with dinner or evening meal.    tiZANidine (ZANAFLEX) 4 MG tablet Take 1 tablet (4 mg total) by mouth every 8 (eight) hours as needed.    VOLTAREN 1 % Gel daily as needed.      Current Facility-Administered Medications   Medication    onabotulinumtoxina injection 200 Units     Facility-Administered Medications Ordered in Other Visits   Medication    lactated ringers infusion    lidocaine (PF) 10 mg/ml (1%) injection 10 mg       Review of patient's allergies indicates:   Allergen Reactions    Clarithromycin Swelling and Other (See Comments)     DIFFICULTY SWALLOWING  DIFFICULTY SWALLOWING    Pcn [penicillins] Anaphylaxis    Sulfa (sulfonamide antibiotics) Hives    Wellbutrin [bupropion hcl] Shortness Of Breath and Anaphylaxis    Betadine [povidone-iodine] Hives     Swelling      Cefprozil Hives    Iodinated contrast media Hives    Latex, natural  rubber Rash    Sulfamethoxazole-trimethoprim Nausea And Vomiting    Iodine Hives and Swelling    Latex Hives and Swelling       Family History   Problem Relation Age of Onset    Heart disease Mother     Heart disease Father        Social History     Socioeconomic History    Marital status:      Spouse name: Not on file    Number of children: Not on file    Years of education: Not on file    Highest education level: Not on file   Occupational History    Not on file   Social Needs    Financial resource strain: Not on file    Food insecurity:     Worry: Not on file     Inability: Not on file    Transportation needs:     Medical: Not on file     Non-medical: Not on file   Tobacco Use    Smoking status: Current Every Day Smoker     Packs/day: 0.25     Years: 20.00     Pack years: 5.00     Types: Cigarettes     Last attempt to quit: 2018     Years since quittin.9    Smokeless tobacco: Never Used   Substance and Sexual Activity    Alcohol use: Yes     Alcohol/week: 0.0 standard drinks     Comment: occasionally    Drug use: Yes     Types: Other-see comments    Sexual activity: Yes     Partners: Male   Lifestyle    Physical activity:     Days per week: Not on file     Minutes per session: Not on file    Stress: Not on file   Relationships    Social connections:     Talks on phone: Not on file     Gets together: Not on file     Attends Mandaeism service: Not on file     Active member of club or organization: Not on file     Attends meetings of clubs or organizations: Not on file     Relationship status: Not on file   Other Topics Concern    Not on file   Social History Narrative    Not on file       Chief Complaint:   Chief Complaint   Patient presents with    Post-op Evaluation     s/p right knee MPFL reconstruction 10/25/19        Date of surgery:  2019 right allograft MPFL reconstruction    History of present illness:  This is a 39-year-old female underwent right  allograft MPFL reconstruction for chronic patellar instability. Patient has severe arthritic change of her patellofemoral joint unfortunately.  Rest of her knee actually looked pretty good.  She might be a candidate for a PFJ in the future.  Having a little pain and swelling over the medial incision. Pain is 6/10.      Review of Systems:    Musculoskeletal:  See HPI        Physical Examination:    Vital Signs:    Vitals:    11/07/19 1016   Resp: 18       Body mass index is 34.04 kg/m².    This a well-developed, well nourished patient in no acute distress.  They are alert and oriented and cooperative to examination.  Pt. walks without an antalgic gait.      Examination of the right knee shows well-healing surgical incisions.  No erythema or drainage.  A little mild gapping over the very superior portion of the patellar incision.  Has some swelling and firmness along the medial femoral incision, likely hematoma.  Full range of motion. No calf pain. Negative Homans sign.    X-rays:  None     Assessment::  Status post right allograft MPFL reconstruction    Plan:  I reviewed the arthroscopic photos with her today.  We will start some physical therapy.  She has 2 more weeks of the Eliquis.  Follow up in 4 weeks.    This note was created using [employer] voice recognition software that occasionally misinterpreted phrases or words.

## 2019-11-12 ENCOUNTER — PATIENT MESSAGE (OUTPATIENT)
Dept: ORTHOPEDICS | Facility: CLINIC | Age: 39
End: 2019-11-12

## 2019-11-14 ENCOUNTER — PATIENT MESSAGE (OUTPATIENT)
Dept: GASTROENTEROLOGY | Facility: CLINIC | Age: 39
End: 2019-11-14

## 2019-11-14 ENCOUNTER — OFFICE VISIT (OUTPATIENT)
Dept: GASTROENTEROLOGY | Facility: CLINIC | Age: 39
End: 2019-11-14
Payer: COMMERCIAL

## 2019-11-14 VITALS
HEIGHT: 72 IN | RESPIRATION RATE: 18 BRPM | WEIGHT: 246.25 LBS | BODY MASS INDEX: 33.35 KG/M2 | SYSTOLIC BLOOD PRESSURE: 112 MMHG | HEART RATE: 71 BPM | DIASTOLIC BLOOD PRESSURE: 76 MMHG

## 2019-11-14 DIAGNOSIS — Z90.49 S/P CHOLECYSTECTOMY: ICD-10-CM

## 2019-11-14 DIAGNOSIS — Z98.84 S/P LAPAROSCOPIC SLEEVE GASTRECTOMY: ICD-10-CM

## 2019-11-14 DIAGNOSIS — K44.9 HIATAL HERNIA: ICD-10-CM

## 2019-11-14 DIAGNOSIS — R12 HEARTBURN: Primary | ICD-10-CM

## 2019-11-14 DIAGNOSIS — R13.12 OROPHARYNGEAL DYSPHAGIA: ICD-10-CM

## 2019-11-14 DIAGNOSIS — R11.2 NAUSEA AND VOMITING, INTRACTABILITY OF VOMITING NOT SPECIFIED, UNSPECIFIED VOMITING TYPE: ICD-10-CM

## 2019-11-14 PROCEDURE — 99204 OFFICE O/P NEW MOD 45 MIN: CPT | Mod: S$GLB,,, | Performed by: NURSE PRACTITIONER

## 2019-11-14 PROCEDURE — 99204 PR OFFICE/OUTPT VISIT, NEW, LEVL IV, 45-59 MIN: ICD-10-PCS | Mod: S$GLB,,, | Performed by: NURSE PRACTITIONER

## 2019-11-14 PROCEDURE — 3008F PR BODY MASS INDEX (BMI) DOCUMENTED: ICD-10-PCS | Mod: CPTII,S$GLB,, | Performed by: NURSE PRACTITIONER

## 2019-11-14 PROCEDURE — 99999 PR PBB SHADOW E&M-EST. PATIENT-LVL V: CPT | Mod: PBBFAC,,, | Performed by: NURSE PRACTITIONER

## 2019-11-14 PROCEDURE — 3008F BODY MASS INDEX DOCD: CPT | Mod: CPTII,S$GLB,, | Performed by: NURSE PRACTITIONER

## 2019-11-14 PROCEDURE — 99999 PR PBB SHADOW E&M-EST. PATIENT-LVL V: ICD-10-PCS | Mod: PBBFAC,,, | Performed by: NURSE PRACTITIONER

## 2019-11-14 RX ORDER — PROMETHAZINE HYDROCHLORIDE 25 MG/1
25 TABLET ORAL EVERY 6 HOURS PRN
Qty: 20 TABLET | Refills: 3 | OUTPATIENT
Start: 2019-11-14 | End: 2020-10-19

## 2019-11-14 RX ORDER — LANSOPRAZOLE 30 MG/1
30 TABLET, ORALLY DISINTEGRATING, DELAYED RELEASE ORAL DAILY
Qty: 30 TABLET | Refills: 11 | Status: SHIPPED | OUTPATIENT
Start: 2019-11-14 | End: 2019-12-09

## 2019-11-14 RX ORDER — LANSOPRAZOLE 30 MG/1
30 TABLET, ORALLY DISINTEGRATING, DELAYED RELEASE ORAL DAILY
Qty: 30 TABLET | Refills: 11 | Status: CANCELLED | OUTPATIENT
Start: 2019-11-14 | End: 2020-11-13

## 2019-11-14 NOTE — PROGRESS NOTES
Subjective:       Patient ID: Aleyda Costa is a 39 y.o. female, Body mass index is 33.4 kg/m².    Chief Complaint: Gastroesophageal Reflux and Dysphagia      Patient is new to me.     Gastroesophageal Reflux   She complains of dysphagia (trouble swallowing food, liquid, and pills), heartburn and nausea (intermittent; Phenergan relieves; pt will wake at night to vomit; consists of bilious material). She reports no abdominal pain, no chest pain, no choking, no coughing or no early satiety. This is a recurrent problem. The current episode started more than 1 month ago (Recurred ~ three months ago). The problem occurs frequently. The problem has been unchanged. The heartburn duration is more than one hour. The heartburn is located in the substernum. The heartburn is of moderate intensity. The heartburn wakes her from sleep. The heartburn does not limit her activity. The heartburn changes with position. The symptoms are aggravated by certain foods, caffeine and lying down. Pertinent negatives include no anemia, melena or weight loss. Risk factors include hiatal hernia, NSAIDs, obesity and caffeine use. She has tried a PPI (Past: Nexium, Prilosec, and Zantac didn't work; Prevacid helped in the past) for the symptoms. The treatment provided moderate relief. Past procedures include a UGI (showed Hiatal hernia and reflux). Past procedures do not include an EGD. hx of gastric sleeve.     Review of Systems   Constitutional: Negative for appetite change, fever, unexpected weight change and weight loss.   HENT: Negative for trouble swallowing.    Respiratory: Negative for cough, choking and shortness of breath.    Cardiovascular: Negative for chest pain.   Gastrointestinal: Positive for dysphagia (trouble swallowing food, liquid, and pills), heartburn, nausea (intermittent; Phenergan relieves; pt will wake at night to vomit; consists of bilious material) and vomiting. Negative for abdominal pain, blood in stool,  constipation, diarrhea and melena.   Genitourinary: Negative for difficulty urinating and dysuria.   Musculoskeletal: Negative for gait problem.   Skin: Negative for rash.   Neurological: Negative for speech difficulty.   Psychiatric/Behavioral: Negative for confusion.       Past Medical History:   Diagnosis Date    Arthritis     OA    Back pain     Cancer     skin cancer to back    Depression     Full dentures     Migraine     Pulmonary embolism     Seizures     Sleep apnea     Does not use c-pap since weight loss - 170 lbs    Wears glasses       Past Surgical History:   Procedure Laterality Date    APPENDECTOMY      CARPAL TUNNEL RELEASE Bilateral      SECTION      CHOLECYSTECTOMY      EPIDURAL STEROID INJECTION INTO LUMBAR SPINE N/A 2019    Procedure: Injection-steroid-epidural-lumbar;  Surgeon: Yairel Ramirez MD;  Location: [facility] OR;  Service: Pain Management;  Laterality: N/A;  L3-4    FRACTURE SURGERY      ankle    gastric sleve      HYSTERECTOMY      HYSTERECTOMY      JOINT REPLACEMENT Left 2018    KNEE ARTHROPLASTY Left 2018    Procedure: ARTHROPLASTY, KNEE;  Surgeon: Feliberto Cardenas MD;  Location: [facility] OR;  Service: Orthopedics;  Laterality: Left;    KNEE ARTHROSCOPY W/ MENISCECTOMY Right 10/25/2019    Procedure: ARTHROSCOPY, KNEE, WITH MENISCECTOMY;  Surgeon: Feliberto Cardenas MD;  Location: [facility] OR;  Service: Orthopedics;  Laterality: Right;    KNEE SURGERY      LUMBAR EPIDURAL INJECTION      RADIAL NERVE Right     RECONSTRUCTION OF MEDIAL PATELLOFEMORAL LIGAMENT OF RIGHT KNEE Right 10/25/2019    Procedure: RECONSTRUCTION, LIGAMENT, MEDIAL PATELLOFEMORAL, RIGHT;  Surgeon: Feliberto Cardenas MD;  Location: [facility] OR;  Service: Orthopedics;  Laterality: Right;    SINUS SURGERY        Family History   Problem Relation Age of Onset    Heart disease Mother     Heart disease Father     Esophageal cancer Maternal Grandmother     Colon cancer Neg Hx      Stomach cancer Neg Hx       Wt Readings from Last 10 Encounters:   11/14/19 111.7 kg (246 lb 4.1 oz)   11/07/19 113.9 kg (251 lb)   10/25/19 113.9 kg (251 lb)   10/14/19 113.9 kg (251 lb)   09/26/19 114 kg (251 lb 5.2 oz)   06/28/19 112 kg (246 lb 14.6 oz)   06/05/19 112 kg (247 lb)   05/17/19 112.3 kg (247 lb 9.2 oz)   05/17/19 113.4 kg (250 lb)   04/29/19 113.4 kg (250 lb)     Lab Results   Component Value Date    WBC 7.33 10/14/2019    HGB 13.9 10/14/2019    HCT 42.0 10/14/2019    MCV 95 10/14/2019     10/14/2019     CMP  Sodium   Date Value Ref Range Status   10/14/2019 138 136 - 145 mmol/L Final     Potassium   Date Value Ref Range Status   10/14/2019 3.8 3.5 - 5.1 mmol/L Final     Chloride   Date Value Ref Range Status   10/14/2019 105 95 - 110 mmol/L Final     CO2   Date Value Ref Range Status   10/14/2019 25 23 - 29 mmol/L Final     Glucose   Date Value Ref Range Status   10/14/2019 94 70 - 110 mg/dL Final     BUN, Bld   Date Value Ref Range Status   10/14/2019 8 6 - 20 mg/dL Final     Creatinine   Date Value Ref Range Status   10/14/2019 0.7 0.5 - 1.4 mg/dL Final     Calcium   Date Value Ref Range Status   10/14/2019 9.4 8.7 - 10.5 mg/dL Final     Total Protein   Date Value Ref Range Status   03/25/2019 7.3 6.0 - 8.4 g/dL Final     Albumin   Date Value Ref Range Status   03/25/2019 3.5 3.5 - 5.2 g/dL Final     Total Bilirubin   Date Value Ref Range Status   03/25/2019 0.3 0.1 - 1.0 mg/dL Final     Comment:     For infants and newborns, interpretation of results should be based  on gestational age, weight and in agreement with clinical  observations.  Premature Infant recommended reference ranges:  Up to 24 hours.............<8.0 mg/dL  Up to 48 hours............<12.0 mg/dL  3-5 days..................<15.0 mg/dL  6-29 days.................<15.0 mg/dL       Alkaline Phosphatase   Date Value Ref Range Status   03/25/2019 85 55 - 135 U/L Final     AST   Date Value Ref Range Status   03/25/2019 33 10  - 40 U/L Final     ALT   Date Value Ref Range Status   03/25/2019 20 10 - 44 U/L Final     Anion Gap   Date Value Ref Range Status   10/14/2019 8 8 - 16 mmol/L Final     eGFR if    Date Value Ref Range Status   10/14/2019 >60 >60 mL/min/1.73 m^2 Final     eGFR if non    Date Value Ref Range Status   10/14/2019 >60 >60 mL/min/1.73 m^2 Final     Comment:     Calculation used to obtain the estimated glomerular filtration  rate (eGFR) is the CKD-EPI equation.                  Reviewed prior medical records including radiology report of CT of chest 9/13/19 and [place] Upper GI with KUB 10/25/19 & endoscopy history (see surgical history).     Objective:      Physical Exam   Constitutional: She is oriented to person, place, and time. She appears well-developed and well-nourished.   HENT:   Head: Normocephalic.   Eyes: Pupils are equal, round, and reactive to light.   Neck: Normal range of motion.   Cardiovascular: Normal rate, regular rhythm and normal heart sounds.   No murmur heard.  Pulmonary/Chest: Breath sounds normal. No respiratory distress. She has no wheezes.   Abdominal: Soft. Bowel sounds are normal. She exhibits no distension and no mass. There is no tenderness. There is no guarding.   Musculoskeletal: Normal range of motion.   Neurological: She is alert and oriented to person, place, and time.   Skin: Skin is warm and dry. No rash noted.   Non jaundiced   Psychiatric: She has a normal mood and affect. Her speech is normal.         Assessment:       1. Heartburn    2. Nausea and vomiting, intractability of vomiting not specified, unspecified vomiting type    3. Oropharyngeal dysphagia    4. Hiatal hernia    5. S/P laparoscopic sleeve gastrectomy    6. S/P cholecystectomy           Plan:   All diagnoses and orders for this visit:    Heartburn & Hiatal hernia  - Start: lansoprazole (PREVACID SOLUTAB) 30 MG disintegrating tablet; Take 1 tablet (30 mg total) by mouth once daily.   Dispense: 30 tablet; Refill: 11  - Schedule EGD, discussed procedure with patient, including risks and benefits, patient verbalized understanding  - Take PPI in the morning 30-60 minutes before breakfast  - Educated patient on lifestyle modifications to help control/reduce reflux/abdominal pain including: avoid large meals, avoid eating within 2-3 hours of bedtime (avoid late night eating & lying down soon after eating), elevate head of bed if nocturnal symptoms are present, smoking cessation (if current smoker), & weight loss (if overweight).   - Educated to avoid known foods which trigger reflux symptoms & to minimize/avoid high-fat foods, chocolate, caffeine, citrus, alcohol, & tomato products.  - Advised to avoid/limit use of NSAID's, since they can cause GI upset, bleeding, and/or ulcers. If needed, take with food.     Nausea and vomiting, intractability of vomiting not specified, unspecified vomiting type  - Refill and continue: promethazine (PHENERGAN) 25 MG tablet; Take 1 tablet (25 mg total) by mouth every 6 (six) hours as needed for Nausea. Take 1 tablet by mouth every 6 (six) hours as needed (migraine). -MAY CAUSE DROWSINESS-  Dispense: 20 tablet; Refill: 3  - Start: lansoprazole (PREVACID SOLUTAB) 30 MG disintegrating tablet; Take 1 tablet (30 mg total) by mouth once daily.  Dispense: 30 tablet; Refill: 11  - Schedule EGD, discussed procedure with patient, including risks and benefits, patient verbalized understanding    Oropharyngeal dysphagia  - Start: lansoprazole (PREVACID SOLUTAB) 30 MG disintegrating tablet; Take 1 tablet (30 mg total) by mouth once daily.  Dispense: 30 tablet; Refill: 11  - Schedule EGD, discussed procedure with patient and possible esophageal dilation may be performed during procedure if indicated, patient verbalized understanding  - Educated patient to eat smaller more frequent meals and to eat slowly and advised to eat a soft diet.  - Possible UGI/esophagram/esophageal  manometry if symptoms persist    S/P laparoscopic sleeve gastrectomy    S/P cholecystectomy    If no improvement in symptoms or symptoms worsen, call/follow-up at clinic or go to ER

## 2019-11-14 NOTE — H&P (VIEW-ONLY)
Subjective:       Patient ID: Aleyda Costa is a 39 y.o. female, Body mass index is 33.4 kg/m².    Chief Complaint: Gastroesophageal Reflux and Dysphagia      Patient is new to me.     Gastroesophageal Reflux   She complains of dysphagia (trouble swallowing food, liquid, and pills), heartburn and nausea (intermittent; Phenergan relieves; pt will wake at night to vomit; consists of bilious material). She reports no abdominal pain, no chest pain, no choking, no coughing or no early satiety. This is a recurrent problem. The current episode started more than 1 month ago (Recurred ~ three months ago). The problem occurs frequently. The problem has been unchanged. The heartburn duration is more than one hour. The heartburn is located in the substernum. The heartburn is of moderate intensity. The heartburn wakes her from sleep. The heartburn does not limit her activity. The heartburn changes with position. The symptoms are aggravated by certain foods, caffeine and lying down. Pertinent negatives include no anemia, melena or weight loss. Risk factors include hiatal hernia, NSAIDs, obesity and caffeine use. She has tried a PPI (Past: Nexium, Prilosec, and Zantac didn't work; Prevacid helped in the past) for the symptoms. The treatment provided moderate relief. Past procedures include a UGI (showed Hiatal hernia and reflux). Past procedures do not include an EGD. hx of gastric sleeve.     Review of Systems   Constitutional: Negative for appetite change, fever, unexpected weight change and weight loss.   HENT: Negative for trouble swallowing.    Respiratory: Negative for cough, choking and shortness of breath.    Cardiovascular: Negative for chest pain.   Gastrointestinal: Positive for dysphagia (trouble swallowing food, liquid, and pills), heartburn, nausea (intermittent; Phenergan relieves; pt will wake at night to vomit; consists of bilious material) and vomiting. Negative for abdominal pain, blood in stool,  constipation, diarrhea and melena.   Genitourinary: Negative for difficulty urinating and dysuria.   Musculoskeletal: Negative for gait problem.   Skin: Negative for rash.   Neurological: Negative for speech difficulty.   Psychiatric/Behavioral: Negative for confusion.       Past Medical History:   Diagnosis Date    Arthritis     OA    Back pain     Cancer     skin cancer to back    Depression     Full dentures     Migraine     Pulmonary embolism     Seizures     Sleep apnea     Does not use c-pap since weight loss - 170 lbs    Wears glasses       Past Surgical History:   Procedure Laterality Date    APPENDECTOMY      CARPAL TUNNEL RELEASE Bilateral      SECTION      CHOLECYSTECTOMY      EPIDURAL STEROID INJECTION INTO LUMBAR SPINE N/A 2019    Procedure: Injection-steroid-epidural-lumbar;  Surgeon: Yariel Ramirez MD;  Location: [facility] OR;  Service: Pain Management;  Laterality: N/A;  L3-4    FRACTURE SURGERY      ankle    gastric sleve      HYSTERECTOMY      HYSTERECTOMY      JOINT REPLACEMENT Left 2018    KNEE ARTHROPLASTY Left 2018    Procedure: ARTHROPLASTY, KNEE;  Surgeon: Feliberto Cardenas MD;  Location: [facility] OR;  Service: Orthopedics;  Laterality: Left;    KNEE ARTHROSCOPY W/ MENISCECTOMY Right 10/25/2019    Procedure: ARTHROSCOPY, KNEE, WITH MENISCECTOMY;  Surgeon: Feliberto Cardenas MD;  Location: [facility] OR;  Service: Orthopedics;  Laterality: Right;    KNEE SURGERY      LUMBAR EPIDURAL INJECTION      RADIAL NERVE Right     RECONSTRUCTION OF MEDIAL PATELLOFEMORAL LIGAMENT OF RIGHT KNEE Right 10/25/2019    Procedure: RECONSTRUCTION, LIGAMENT, MEDIAL PATELLOFEMORAL, RIGHT;  Surgeon: Feliberto Cardenas MD;  Location: [facility] OR;  Service: Orthopedics;  Laterality: Right;    SINUS SURGERY        Family History   Problem Relation Age of Onset    Heart disease Mother     Heart disease Father     Esophageal cancer Maternal Grandmother     Colon cancer Neg Hx      Stomach cancer Neg Hx       Wt Readings from Last 10 Encounters:   11/14/19 111.7 kg (246 lb 4.1 oz)   11/07/19 113.9 kg (251 lb)   10/25/19 113.9 kg (251 lb)   10/14/19 113.9 kg (251 lb)   09/26/19 114 kg (251 lb 5.2 oz)   06/28/19 112 kg (246 lb 14.6 oz)   06/05/19 112 kg (247 lb)   05/17/19 112.3 kg (247 lb 9.2 oz)   05/17/19 113.4 kg (250 lb)   04/29/19 113.4 kg (250 lb)     Lab Results   Component Value Date    WBC 7.33 10/14/2019    HGB 13.9 10/14/2019    HCT 42.0 10/14/2019    MCV 95 10/14/2019     10/14/2019     CMP  Sodium   Date Value Ref Range Status   10/14/2019 138 136 - 145 mmol/L Final     Potassium   Date Value Ref Range Status   10/14/2019 3.8 3.5 - 5.1 mmol/L Final     Chloride   Date Value Ref Range Status   10/14/2019 105 95 - 110 mmol/L Final     CO2   Date Value Ref Range Status   10/14/2019 25 23 - 29 mmol/L Final     Glucose   Date Value Ref Range Status   10/14/2019 94 70 - 110 mg/dL Final     BUN, Bld   Date Value Ref Range Status   10/14/2019 8 6 - 20 mg/dL Final     Creatinine   Date Value Ref Range Status   10/14/2019 0.7 0.5 - 1.4 mg/dL Final     Calcium   Date Value Ref Range Status   10/14/2019 9.4 8.7 - 10.5 mg/dL Final     Total Protein   Date Value Ref Range Status   03/25/2019 7.3 6.0 - 8.4 g/dL Final     Albumin   Date Value Ref Range Status   03/25/2019 3.5 3.5 - 5.2 g/dL Final     Total Bilirubin   Date Value Ref Range Status   03/25/2019 0.3 0.1 - 1.0 mg/dL Final     Comment:     For infants and newborns, interpretation of results should be based  on gestational age, weight and in agreement with clinical  observations.  Premature Infant recommended reference ranges:  Up to 24 hours.............<8.0 mg/dL  Up to 48 hours............<12.0 mg/dL  3-5 days..................<15.0 mg/dL  6-29 days.................<15.0 mg/dL       Alkaline Phosphatase   Date Value Ref Range Status   03/25/2019 85 55 - 135 U/L Final     AST   Date Value Ref Range Status   03/25/2019 33 10  - 40 U/L Final     ALT   Date Value Ref Range Status   03/25/2019 20 10 - 44 U/L Final     Anion Gap   Date Value Ref Range Status   10/14/2019 8 8 - 16 mmol/L Final     eGFR if    Date Value Ref Range Status   10/14/2019 >60 >60 mL/min/1.73 m^2 Final     eGFR if non    Date Value Ref Range Status   10/14/2019 >60 >60 mL/min/1.73 m^2 Final     Comment:     Calculation used to obtain the estimated glomerular filtration  rate (eGFR) is the CKD-EPI equation.                  Reviewed prior medical records including radiology report of CT of chest 9/13/19 and [facility] Upper GI with KUB 10/25/19 & endoscopy history (see surgical history).     Objective:      Physical Exam   Constitutional: She is oriented to person, place, and time. She appears well-developed and well-nourished.   HENT:   Head: Normocephalic.   Eyes: Pupils are equal, round, and reactive to light.   Neck: Normal range of motion.   Cardiovascular: Normal rate, regular rhythm and normal heart sounds.   No murmur heard.  Pulmonary/Chest: Breath sounds normal. No respiratory distress. She has no wheezes.   Abdominal: Soft. Bowel sounds are normal. She exhibits no distension and no mass. There is no tenderness. There is no guarding.   Musculoskeletal: Normal range of motion.   Neurological: She is alert and oriented to person, place, and time.   Skin: Skin is warm and dry. No rash noted.   Non jaundiced   Psychiatric: She has a normal mood and affect. Her speech is normal.         Assessment:       1. Heartburn    2. Nausea and vomiting, intractability of vomiting not specified, unspecified vomiting type    3. Oropharyngeal dysphagia    4. Hiatal hernia    5. S/P laparoscopic sleeve gastrectomy    6. S/P cholecystectomy           Plan:   All diagnoses and orders for this visit:    Heartburn & Hiatal hernia  - Start: lansoprazole (PREVACID SOLUTAB) 30 MG disintegrating tablet; Take 1 tablet (30 mg total) by mouth once daily.   Dispense: 30 tablet; Refill: 11  - Schedule EGD, discussed procedure with patient, including risks and benefits, patient verbalized understanding  - Take PPI in the morning 30-60 minutes before breakfast  - Educated patient on lifestyle modifications to help control/reduce reflux/abdominal pain including: avoid large meals, avoid eating within 2-3 hours of bedtime (avoid late night eating & lying down soon after eating), elevate head of bed if nocturnal symptoms are present, smoking cessation (if current smoker), & weight loss (if overweight).   - Educated to avoid known foods which trigger reflux symptoms & to minimize/avoid high-fat foods, chocolate, caffeine, citrus, alcohol, & tomato products.  - Advised to avoid/limit use of NSAID's, since they can cause GI upset, bleeding, and/or ulcers. If needed, take with food.     Nausea and vomiting, intractability of vomiting not specified, unspecified vomiting type  - Refill and continue: promethazine (PHENERGAN) 25 MG tablet; Take 1 tablet (25 mg total) by mouth every 6 (six) hours as needed for Nausea. Take 1 tablet by mouth every 6 (six) hours as needed (migraine). -MAY CAUSE DROWSINESS-  Dispense: 20 tablet; Refill: 3  - Start: lansoprazole (PREVACID SOLUTAB) 30 MG disintegrating tablet; Take 1 tablet (30 mg total) by mouth once daily.  Dispense: 30 tablet; Refill: 11  - Schedule EGD, discussed procedure with patient, including risks and benefits, patient verbalized understanding    Oropharyngeal dysphagia  - Start: lansoprazole (PREVACID SOLUTAB) 30 MG disintegrating tablet; Take 1 tablet (30 mg total) by mouth once daily.  Dispense: 30 tablet; Refill: 11  - Schedule EGD, discussed procedure with patient and possible esophageal dilation may be performed during procedure if indicated, patient verbalized understanding  - Educated patient to eat smaller more frequent meals and to eat slowly and advised to eat a soft diet.  - Possible UGI/esophagram/esophageal  manometry if symptoms persist    S/P laparoscopic sleeve gastrectomy    S/P cholecystectomy    If no improvement in symptoms or symptoms worsen, call/follow-up at clinic or go to ER

## 2019-11-14 NOTE — PATIENT INSTRUCTIONS

## 2019-11-15 DIAGNOSIS — G43.719 INTRACTABLE CHRONIC MIGRAINE WITHOUT AURA AND WITHOUT STATUS MIGRAINOSUS: ICD-10-CM

## 2019-11-18 ENCOUNTER — TELEPHONE (OUTPATIENT)
Dept: GASTROENTEROLOGY | Facility: CLINIC | Age: 39
End: 2019-11-18

## 2019-11-19 ENCOUNTER — TELEPHONE (OUTPATIENT)
Dept: PHARMACY | Facility: CLINIC | Age: 39
End: 2019-11-19

## 2019-11-25 ENCOUNTER — PATIENT MESSAGE (OUTPATIENT)
Dept: GASTROENTEROLOGY | Facility: CLINIC | Age: 39
End: 2019-11-25

## 2019-11-25 DIAGNOSIS — R12 HEARTBURN: Primary | ICD-10-CM

## 2019-11-26 RX ORDER — PANTOPRAZOLE SODIUM 40 MG/1
40 TABLET, DELAYED RELEASE ORAL DAILY
Qty: 30 TABLET | Refills: 11 | Status: SHIPPED | OUTPATIENT
Start: 2019-11-26 | End: 2019-12-09

## 2019-11-26 NOTE — TELEPHONE ENCOUNTER
Rx for Protonix 40 mg once daily before breakfast sent to pharmacy. It looks like it's covered but let me know if you have trouble getting it filled.

## 2019-11-27 ENCOUNTER — ANESTHESIA EVENT (OUTPATIENT)
Dept: ENDOSCOPY | Facility: HOSPITAL | Age: 39
End: 2019-11-27
Payer: COMMERCIAL

## 2019-11-27 ENCOUNTER — ANESTHESIA (OUTPATIENT)
Dept: ENDOSCOPY | Facility: HOSPITAL | Age: 39
End: 2019-11-27
Payer: COMMERCIAL

## 2019-11-27 ENCOUNTER — HOSPITAL ENCOUNTER (OUTPATIENT)
Facility: HOSPITAL | Age: 39
Discharge: HOME | End: 2019-11-27
Attending: INTERNAL MEDICINE | Admitting: INTERNAL MEDICINE
Payer: COMMERCIAL

## 2019-11-27 DIAGNOSIS — K22.2 SCHATZKI'S RING: Primary | ICD-10-CM

## 2019-11-27 DIAGNOSIS — K44.9 HIATAL HERNIA: ICD-10-CM

## 2019-11-27 DIAGNOSIS — R13.10 DYSPHAGIA: ICD-10-CM

## 2019-11-27 DIAGNOSIS — K29.70 GASTRITIS, PRESENCE OF BLEEDING UNSPECIFIED, UNSPECIFIED CHRONICITY, UNSPECIFIED GASTRITIS TYPE: ICD-10-CM

## 2019-11-27 PROCEDURE — 43239 EGD BIOPSY SINGLE/MULTIPLE: CPT | Mod: 59,,, | Performed by: INTERNAL MEDICINE

## 2019-11-27 PROCEDURE — 88305 TISSUE EXAM BY PATHOLOGIST: CPT | Mod: 26,,, | Performed by: PATHOLOGY

## 2019-11-27 PROCEDURE — 43248 EGD GUIDE WIRE INSERTION: CPT | Mod: ,,, | Performed by: INTERNAL MEDICINE

## 2019-11-27 PROCEDURE — 63600175 PHARM REV CODE 636 W HCPCS: Performed by: INTERNAL MEDICINE

## 2019-11-27 PROCEDURE — 63600175 PHARM REV CODE 636 W HCPCS: Performed by: NURSE ANESTHETIST, CERTIFIED REGISTERED

## 2019-11-27 PROCEDURE — 88305 TISSUE EXAM BY PATHOLOGIST: CPT | Performed by: PATHOLOGY

## 2019-11-27 PROCEDURE — 43239 EGD BIOPSY SINGLE/MULTIPLE: CPT | Performed by: INTERNAL MEDICINE

## 2019-11-27 PROCEDURE — 43248 EGD GUIDE WIRE INSERTION: CPT | Performed by: INTERNAL MEDICINE

## 2019-11-27 PROCEDURE — 43248 PR EGD, FLEX, W/DILATION OVER GUIDEWIRE: ICD-10-PCS | Mod: ,,, | Performed by: INTERNAL MEDICINE

## 2019-11-27 PROCEDURE — 88305 TISSUE EXAM BY PATHOLOGIST: ICD-10-PCS | Mod: 26,,, | Performed by: PATHOLOGY

## 2019-11-27 PROCEDURE — 37000008 HC ANESTHESIA 1ST 15 MINUTES: Performed by: INTERNAL MEDICINE

## 2019-11-27 PROCEDURE — 27201012 HC FORCEPS, HOT/COLD, DISP: Performed by: INTERNAL MEDICINE

## 2019-11-27 PROCEDURE — D9220A PRA ANESTHESIA: ICD-10-PCS | Mod: ANES,,, | Performed by: ANESTHESIOLOGY

## 2019-11-27 PROCEDURE — D9220A PRA ANESTHESIA: ICD-10-PCS | Mod: CRNA,,, | Performed by: NURSE ANESTHETIST, CERTIFIED REGISTERED

## 2019-11-27 PROCEDURE — D9220A PRA ANESTHESIA: Mod: ANES,,, | Performed by: ANESTHESIOLOGY

## 2019-11-27 PROCEDURE — D9220A PRA ANESTHESIA: Mod: CRNA,,, | Performed by: NURSE ANESTHETIST, CERTIFIED REGISTERED

## 2019-11-27 PROCEDURE — 43239 PR EGD, FLEX, W/BIOPSY, SGL/MULTI: ICD-10-PCS | Mod: 59,,, | Performed by: INTERNAL MEDICINE

## 2019-11-27 PROCEDURE — 37000009 HC ANESTHESIA EA ADD 15 MINS: Performed by: INTERNAL MEDICINE

## 2019-11-27 RX ORDER — PROPOFOL 10 MG/ML
VIAL (ML) INTRAVENOUS
Status: DISCONTINUED | OUTPATIENT
Start: 2019-11-27 | End: 2019-11-27

## 2019-11-27 RX ORDER — LIDOCAINE HCL/PF 100 MG/5ML
SYRINGE (ML) INTRAVENOUS
Status: DISCONTINUED | OUTPATIENT
Start: 2019-11-27 | End: 2019-11-27

## 2019-11-27 RX ORDER — SODIUM CHLORIDE 9 MG/ML
INJECTION, SOLUTION INTRAVENOUS CONTINUOUS
Status: DISCONTINUED | OUTPATIENT
Start: 2019-11-27 | End: 2019-11-27 | Stop reason: HOSPADM

## 2019-11-27 RX ADMIN — PROPOFOL 50 MG: 10 INJECTION, EMULSION INTRAVENOUS at 09:11

## 2019-11-27 RX ADMIN — PROPOFOL 100 MG: 10 INJECTION, EMULSION INTRAVENOUS at 09:11

## 2019-11-27 RX ADMIN — SODIUM CHLORIDE 1000 ML: 0.9 INJECTION, SOLUTION INTRAVENOUS at 09:11

## 2019-11-27 RX ADMIN — LIDOCAINE HYDROCHLORIDE 100 MG: 20 INJECTION, SOLUTION INTRAVENOUS at 09:11

## 2019-11-27 NOTE — ANESTHESIA PREPROCEDURE EVALUATION
11/27/2019  Aleyda Costa is a 39 y.o., female.    Anesthesia Evaluation    I have reviewed the Patient Summary Reports.    I have reviewed the Nursing Notes.   I have reviewed the Medications.     Review of Systems  Social:  Smoker Smoking Status: Current Every Day Smoker - 5 pack years  Smokeless Tobacco Status: Never Used  Alcohol use: Yes; 0.0 standard drinks per week  Drug use: Other-see comments       Musculoskeletal:   Arthritis     Neurological:   Neuromuscular Disease, Headaches Seizures, well controlled    Psych:   Psychiatric History          Physical Exam  General:  Well nourished    Airway/Jaw/Neck:  Airway Findings: Mouth Opening: Normal Tongue: Normal  General Airway Assessment: Adult, Good  Mallampati: II  Improves to II with phonation.  TM Distance: 4-6 cm      Dental:  Dental Findings: In tact   Chest/Lungs:  Chest/Lungs Findings: Clear to auscultation, Normal Respiratory Rate     Heart/Vascular:  Heart Findings: Rate: Normal  Rhythm: Regular Rhythm  Sounds: Normal  Heart murmur: negative       Mental Status:  Mental Status Findings:  Cooperative, Alert and Oriented         Anesthesia Plan  Type of Anesthesia, risks & benefits discussed:  Anesthesia Type:  general  Patient's Preference:   Intra-op Monitoring Plan:   Intra-op Monitoring Plan Comments:   Post Op Pain Control Plan:   Post Op Pain Control Plan Comments:   Induction:   IV  Beta Blocker:  Patient is not currently on a Beta-Blocker (No further documentation required).       Informed Consent: Patient understands risks and agrees with Anesthesia plan.  Questions answered. Anesthesia consent signed with patient.  ASA Score: 2     Day of Surgery Review of History & Physical: I have interviewed and examined the patient. I have reviewed the patient's H&P dated:  There are no significant changes.  H&P update referred to the  surgeon.         Ready For Surgery From Anesthesia Perspective.

## 2019-11-27 NOTE — ANESTHESIA POSTPROCEDURE EVALUATION
Anesthesia Post Evaluation    Patient: Aleyda Costa    Procedure(s) Performed: Procedure(s) (LRB):  EGD (ESOPHAGOGASTRODUODENOSCOPY) (N/A)    Final Anesthesia Type: general    Patient location during evaluation: PACU  Patient participation: Yes- Able to Participate  Level of consciousness: awake and alert and oriented  Post-procedure vital signs: reviewed and stable  Pain management: adequate  Airway patency: patent    PONV status at discharge: No PONV  Anesthetic complications: no      Cardiovascular status: blood pressure returned to baseline  Respiratory status: unassisted, spontaneous ventilation and room air  Hydration status: euvolemic  Follow-up not needed.          Vitals Value Taken Time   /63 11/27/2019  9:25 AM   Temp 36.6 °C (97.9 °F) 11/27/2019  9:03 AM   Pulse 68 11/27/2019  9:25 AM   Resp 18 11/27/2019  9:25 AM   SpO2 98 % 11/27/2019  9:25 AM         No case tracking events are documented in the log.      Pain/Claudine Score: No data recorded

## 2019-11-27 NOTE — [employer] PATIENT INSTRUCTIONS
Discharge Summary/Instructions after an Endoscopic Procedure  Patient Name: Aleyda Costa  Patient MRN: [id number]  Patient YOB: 1980 Wednesday, November 27, 2019  Anselmo Kay MD  RESTRICTIONS:  During your procedure today, you received medications for sedation.  These   medications may affect your judgment, balance and coordination.  Therefore,   for 24 hours, you have the following restrictions:   - DO NOT drive a car, operate machinery, make legal/financial decisions,   sign important papers or drink alcohol.    ACTIVITY:  Today: no heavy lifting, straining or running due to procedural   sedation/anesthesia.  The following day: return to full activity including work.  DIET:  Eat and drink normally unless instructed otherwise.     TREATMENT FOR COMMON SIDE EFFECTS:  - Mild abdominal pain, nausea, belching, bloating or excessive gas:  rest,   eat lightly and use a heating pad.  - Sore Throat: treat with throat lozenges and/or gargle with warm salt   water.  - Because air was used during the procedure, expelling large amounts of air   from your rectum or belching is normal.  - If a bowel prep was taken, you may not have a bowel movement for 1-3 days.    This is normal.  SYMPTOMS TO WATCH FOR AND REPORT TO YOUR PHYSICIAN:  1. Abdominal pain or bloating, other than gas cramps.  2. Chest pain.  3. Back pain.  4. Signs of infection such as: chills or fever occurring within 24 hours   after the procedure.  5. Rectal bleeding, which would show as bright red, maroon, or black stools.   (A tablespoon of blood from the rectum is not serious, especially if   hemorrhoids are present.)  6. Vomiting.  7. Weakness or dizziness.  GO DIRECTLY TO THE NEAREST EMERGENCY ROOM IF YOU HAVE ANY OF THE FOLLOWING:      Difficulty breathing              Chills and/or fever over 101 F   Persistent vomiting and/or vomiting blood   Severe abdominal pain   Severe chest pain   Black, tarry stools   Bleeding- more than one  tablespoon   Any other symptom or condition that you feel may need urgent attention  Your doctor recommends these additional instructions:  If any biopsies were taken, your doctors clinic will contact you in 1 to 2   weeks with any results.  - Patient has a contact number available for emergencies.  The signs and   symptoms of potential delayed complications were discussed with the   patient.  Return to normal activities tomorrow.  Written discharge   instructions were provided to the patient.   - Resume previous diet.   - Continue present medications.   - No aspirin, ibuprofen, naproxen, or other non-steroidal anti-inflammatory   drugs.   - Await pathology results.   - Discharge patient to home (ambulatory).   - Follow an antireflux regimen.   - Return to nurse practitioner in 6 weeks.  For questions, problems or results please call your physician - Anselmo Kay MD at Work:  [phone].  [facility], EMERGENCY ROOM PHONE NUMBER: [phone]  IF A COMPLICATION OR EMERGENCY SITUATION ARISES AND YOU ARE UNABLE TO REACH   YOUR PHYSICIAN - GO DIRECTLY TO THE EMERGENCY ROOM.  Anselmo Kay MD  11/27/2019 9:27:40 AM  This report has been verified and signed electronically.  [employer]

## 2019-11-27 NOTE — TRANSFER OF CARE
Anesthesia Transfer of Care Note    Patient: Aleyda Costa    Procedure(s) Performed: Procedure(s) (LRB):  EGD (ESOPHAGOGASTRODUODENOSCOPY) (N/A)    Patient location: GI    Anesthesia Type: general    Transport from OR: Transported from OR on room air with adequate spontaneous ventilation    Post pain: adequate analgesia    Post assessment: no apparent anesthetic complications and tolerated procedure well    Post vital signs: stable    Level of consciousness: awake, alert and oriented    Nausea/Vomiting: no nausea/vomiting    Complications: none    Transfer of care protocol was followed      Last vitals:   Visit Vitals  BP (!) 112/59 (BP Location: Left arm, Patient Position: Lying)   Pulse 61   Temp 36.6 °C (97.9 °F) (Skin)   Resp 15   Ht 6' (1.829 m)   Wt 111.1 kg (245 lb)   Breastfeeding? No   BMI 33.23 kg/m²

## 2019-11-27 NOTE — DISCHARGE INSTRUCTIONS
"Discharge Instructions: After Your Surgery/Procedure  Youve just had surgery. During surgery you were given medicine called anesthesia to keep you relaxed and free of pain. After surgery you may have some pain or nausea. This is common. Here are some tips for feeling better and getting well after surgery.     Stay on schedule with your medication.   Going home  Your doctor or nurse will show you how to take care of yourself when you go home. He or she will also answer your questions. Have an adult family member or friend drive you home.      For your safety we recommend these precaution for the first 24 hours after your procedure:  · Do not drive or use heavy equipment.  · Do not make important decisions or sign legal papers.  · Do not drink alcohol.  · Have someone stay with you, if needed. He or she can watch for problems and help keep you safe.  · Your concentration, balance, coordination, and judgement may be impaired for many hours after anesthesia.  Use caution when ambulating or standing up.     · You may feel weak and "washed out" after anesthesia and surgery.      Subtle residual effects of general anesthesia or sedation with regional / local anesthesia can last more than 24 hours.  Rest for the remainder of the day or longer if your Doctor/Surgeon has advised you to do so.  Although you may feel normal within the first 24 hours, your reflexes and mental ability may be impaired without you realizing it.  You may feel dizzy, lightheaded or sleepy for 24 hours or longer.      Be sure to go to all follow-up visits with your doctor. And rest after your surgery for as long as your doctor tells you to.  Coping with pain  If you have pain after surgery, pain medicine will help you feel better. Take it as told, before pain becomes severe. Also, ask your doctor or pharmacist about other ways to control pain. This might be with heat, ice, or relaxation. And follow any other instructions your surgeon or nurse gives " you.  Tips for taking pain medicine  To get the best relief possible, remember these points:  · Pain medicines can upset your stomach. Taking them with a little food may help.  · Most pain relievers taken by mouth need at least 20 to 30 minutes to start to work.  · Taking medicine on a schedule can help you remember to take it. Try to time your medicine so that you can take it before starting an activity. This might be before you get dressed, go for a walk, or sit down for dinner.  · Constipation is a common side effect of pain medicines. Call your doctor before taking any medicines such as laxatives or stool softeners to help ease constipation. Also ask if you should skip any foods. Drinking lots of fluids and eating foods such as fruits and vegetables that are high in fiber can also help. Remember, do not take laxatives unless your surgeon has prescribed them.  · Drinking alcohol and taking pain medicine can cause dizziness and slow your breathing. It can even be deadly. Do not drink alcohol while taking pain medicine.  · Pain medicine can make you react more slowly to things. Do not drive or run machinery while taking pain medicine.  Your health care provider may tell you to take acetaminophen to help ease your pain. Ask him or her how much you are supposed to take each day. Acetaminophen or other pain relievers may interact with your prescription medicines or other over-the-counter (OTC) drugs. Some prescription medicines have acetaminophen and other ingredients. Using both prescription and OTC acetaminophen for pain can cause you to overdose. Read the labels on your OTC medicines with care. This will help you to clearly know the list of ingredients, how much to take, and any warnings. It may also help you not take too much acetaminophen. If you have questions or do not understand the information, ask your pharmacist or health care provider to explain it to you before you take the OTC medicine.  Managing  nausea  Some people have an upset stomach after surgery. This is often because of anesthesia, pain, or pain medicine, or the stress of surgery. These tips will help you handle nausea and eat healthy foods as you get better. If you were on a special food plan before surgery, ask your doctor if you should follow it while you get better. These tips may help:  · Do not push yourself to eat. Your body will tell you when to eat and how much.  · Start off with clear liquids and soup. They are easier to digest.  · Next try semi-solid foods, such as mashed potatoes, applesauce, and gelatin, as you feel ready.  · Slowly move to solid foods. Dont eat fatty, rich, or spicy foods at first.  · Do not force yourself to have 3 large meals a day. Instead eat smaller amounts more often.  · Take pain medicines with a small amount of solid food, such as crackers or toast, to avoid nausea.     Call your surgeon if  · You still have pain an hour after taking medicine. The medicine may not be strong enough.  · You feel too sleepy, dizzy, or groggy. The medicine may be too strong.  · You have side effects like nausea, vomiting, or skin changes, such as rash, itching, or hives.       If you have obstructive sleep apnea  You were given anesthesia medicine during surgery to keep you comfortable and free of pain. After surgery, you may have more apnea spells because of this medicine and other medicines you were given. The spells may last longer than usual.   At home:  · Keep using the continuous positive airway pressure (CPAP) device when you sleep. Unless your health care provider tells you not to, use it when you sleep, day or night. CPAP is a common device used to treat obstructive sleep apnea.  · Talk with your provider before taking any pain medicine, muscle relaxants, or sedatives. Your provider will tell you about the possible dangers of taking these medicines.  © 1777-2898 [employer]. [address] [place] [address]  [place] [address]. All rights reserved. This information is not intended as a substitute for professional medical care. Always follow your healthcare professional's instructions.    Gastritis (Adult)    Gastritis is inflammation and irritation of the stomach lining. It can be present for a short time (acute) or be long lasting (chronic). Gastritis is often caused by infection with bacteria called H pylori. More than a third of people in the [place] have this bacteria in their bodies. In many cases, H pylori causes no problems or symptoms. In some people, though, the infection irritates the stomach lining and causes gastritis. Other causes of stomach irritation include drinking alcohol or taking pain-relieving medicines called NSAIDs (such as aspirin or ibuprofen).   Symptoms of gastritis can include:  · Abdominal pain or bloating  · Loss of appetite  · Nausea or vomiting  · Vomiting blood or having black stools  · Feeling more tired than usual  An inflamed and irritated stomach lining is more likely to develop a sore called an ulcer. To help prevent this, gastritis should be treated.  Home care  If needed, medicines may be prescribed. If you have H pylori infection, treating it will likely relieve your symptoms. Other changes can help reduce stomach irritation and help it heal.  · If you have been prescribed medicines for H pylori infection, take them as directed. Take all of the medicine until it is finished or your healthcare provider tells you to stop, even if you feel better.  · Your healthcare provider may recommend avoiding NSAIDs. If you take daily aspirin for your heart or other medical reasons, do not stop without talking to your healthcare provider first.  · Avoid drinking alcohol.  · Stop smoking. Smoking can irritate the stomach and delay healing. As much as possible, stay away from second hand smoke.  Follow-up care  Follow up with your healthcare provider, or as advised by our staff. Testing may be needed to check  for inflammation or an ulcer.  When to seek medical advice  Call your healthcare provider for any of the following:  · Stomach pain that gets worse or moves to the lower right abdomen (appendix area)  · Chest pain that appears or gets worse, or spreads to the back, neck, shoulder, or arm  · Frequent vomiting (cant keep down liquids)  · Blood in the stool or vomit (red or black in color)  · Feeling weak or dizzy  · Fever of 100.4ºF (38ºC) or higher, or as directed by your healthcare provider  Date Last Reviewed: 6/22/2015  © 3769-4114 [employer]. [address] [place] [address], [place] [address]. All rights reserved. This information is not intended as a substitute for professional medical care. Always follow your healthcare professional's instructions.

## 2019-11-29 VITALS
WEIGHT: 245 LBS | BODY MASS INDEX: 33.18 KG/M2 | DIASTOLIC BLOOD PRESSURE: 58 MMHG | HEART RATE: 68 BPM | TEMPERATURE: 98 F | RESPIRATION RATE: 18 BRPM | HEIGHT: 72 IN | SYSTOLIC BLOOD PRESSURE: 100 MMHG | OXYGEN SATURATION: 98 %

## 2019-12-01 ENCOUNTER — PATIENT MESSAGE (OUTPATIENT)
Dept: GASTROENTEROLOGY | Facility: CLINIC | Age: 39
End: 2019-12-01

## 2019-12-02 ENCOUNTER — PATIENT MESSAGE (OUTPATIENT)
Dept: GASTROENTEROLOGY | Facility: CLINIC | Age: 39
End: 2019-12-02

## 2019-12-02 NOTE — TELEPHONE ENCOUNTER
Rectal bleeding is not typical after an EGD. Recommend she see Dr. Blackwell or myself in clinic to further evaluate.

## 2019-12-02 NOTE — TELEPHONE ENCOUNTER
Please refer to the patient message re. Blood in stool post Upper GI  Procedure. Pt. Reports blood has subsided at present. Please let me know if I can provide the patient with any further instructions. Thank you.

## 2019-12-05 ENCOUNTER — OFFICE VISIT (OUTPATIENT)
Dept: ORTHOPEDICS | Facility: CLINIC | Age: 39
End: 2019-12-05
Payer: COMMERCIAL

## 2019-12-05 VITALS
WEIGHT: 245 LBS | BODY MASS INDEX: 33.18 KG/M2 | SYSTOLIC BLOOD PRESSURE: 111 MMHG | HEART RATE: 76 BPM | HEIGHT: 72 IN | DIASTOLIC BLOOD PRESSURE: 65 MMHG

## 2019-12-05 DIAGNOSIS — M17.11 PRIMARY OSTEOARTHRITIS OF RIGHT KNEE: Primary | ICD-10-CM

## 2019-12-05 PROCEDURE — 99999 PR PBB SHADOW E&M-EST. PATIENT-LVL III: ICD-10-PCS | Mod: PBBFAC,,, | Performed by: ORTHOPAEDIC SURGERY

## 2019-12-05 PROCEDURE — 99999 PR PBB SHADOW E&M-EST. PATIENT-LVL III: CPT | Mod: PBBFAC,,, | Performed by: ORTHOPAEDIC SURGERY

## 2019-12-05 PROCEDURE — 99024 POSTOP FOLLOW-UP VISIT: CPT | Mod: S$GLB,,, | Performed by: ORTHOPAEDIC SURGERY

## 2019-12-05 PROCEDURE — 99024 PR POST-OP FOLLOW-UP VISIT: ICD-10-PCS | Mod: S$GLB,,, | Performed by: ORTHOPAEDIC SURGERY

## 2019-12-05 NOTE — PROGRESS NOTES
Past Medical History:   Diagnosis Date    Arthritis     OA    Back pain     Cancer     skin cancer to back    Depression     Full dentures     Migraine     Pulmonary embolism     Seizures     Sleep apnea     Does not use c-pap since weight loss - 170 lbs    Wears glasses        Past Surgical History:   Procedure Laterality Date    APPENDECTOMY      CARPAL TUNNEL RELEASE Bilateral      SECTION      CHOLECYSTECTOMY      EPIDURAL STEROID INJECTION INTO LUMBAR SPINE N/A 2019    Procedure: Injection-steroid-epidural-lumbar;  Surgeon: Yariel Ramirez MD;  Location: [facility] OR;  Service: Pain Management;  Laterality: N/A;  L3-4    ESOPHAGOGASTRODUODENOSCOPY N/A 2019    Procedure: EGD (ESOPHAGOGASTRODUODENOSCOPY);  Surgeon: Anselmo Blackwell MD;  Location: [facility];  Service: Endoscopy;  Laterality: N/A;    FRACTURE SURGERY      ankle    gastric sleve      HYSTERECTOMY      HYSTERECTOMY      JOINT REPLACEMENT Left 2018    KNEE ARTHROPLASTY Left 2018    Procedure: ARTHROPLASTY, KNEE;  Surgeon: Feliberto Cardenas MD;  Location: [facility] OR;  Service: Orthopedics;  Laterality: Left;    KNEE ARTHROSCOPY W/ MENISCECTOMY Right 10/25/2019    Procedure: ARTHROSCOPY, KNEE, WITH MENISCECTOMY;  Surgeon: Feliberto Cardenas MD;  Location: [facility] OR;  Service: Orthopedics;  Laterality: Right;    KNEE SURGERY      LUMBAR EPIDURAL INJECTION      RADIAL NERVE Right     RECONSTRUCTION OF MEDIAL PATELLOFEMORAL LIGAMENT OF RIGHT KNEE Right 10/25/2019    Procedure: RECONSTRUCTION, LIGAMENT, MEDIAL PATELLOFEMORAL, RIGHT;  Surgeon: Feliberto Cardenas MD;  Location: [facility] OR;  Service: Orthopedics;  Laterality: Right;    SINUS SURGERY         Current Outpatient Medications   Medication Sig    amitriptyline (ELAVIL) 25 MG tablet TAKE ONE TABLET BY MOUTH EVERY NIGHT FOR 2 WEEKS THEN INCREASE TO 2 TABLETS AT BEDTIME    celecoxib (CELEBREX) 200 MG capsule Take 200 mg by mouth every evening.      cetirizine (ZYRTEC) 5 MG tablet Take 5 mg by mouth as needed.     cyanocobalamin, vitamin B-12, 1,000 mcg/mL Kit Inject 1 mL into the muscle every 21 days.    epinephrine (EPIPEN) 0.3 mg/0.3 mL (1:1,000) AtIn 0.3 mg daily as needed.     erenumab-aooe 140 mg/mL AtIn Inject 140 mg into the skin every 28 days.    fluoxetine (PROZAC) 20 MG capsule every evening.     hydrOXYzine pamoate (VISTARIL) 25 MG Cap TAKE ONE CAPSULE BY MOUTH 3 TIMES A DAY AS NEEDED FOR ANXIETY    ibuprofen (ADVIL,MOTRIN) 800 MG tablet every 6 (six) hours as needed.     ibuprofen/D5W 250mL Take 800 mg by mouth.    ketorolac (TORADOL) 30 mg/mL injection Inject 30 mg into the muscle as needed.    lansoprazole (PREVACID SOLUTAB) 30 MG disintegrating tablet Take 1 tablet (30 mg total) by mouth once daily.    lorazepam (ATIVAN) 0.5 MG tablet Take 0.5 mg by mouth every 4 (four) hours as needed.     oxyCODONE (OXYCONTIN) 10 mg 12 hr tablet Take 10 mg by mouth every 12 (twelve) hours.    pantoprazole (PROTONIX) 40 MG tablet Take 1 tablet (40 mg total) by mouth once daily.    promethazine (PHENERGAN) 25 MG tablet Take 1 tablet (25 mg total) by mouth every 6 (six) hours as needed for Nausea. Take 1 tablet by mouth every 6 (six) hours as needed (migraine). -MAY CAUSE DROWSINESS-    tiZANidine (ZANAFLEX) 4 MG tablet Take 1 tablet (4 mg total) by mouth every 8 (eight) hours as needed.    VOLTAREN 1 % Gel daily as needed.      Current Facility-Administered Medications   Medication    onabotulinumtoxina injection 200 Units     Facility-Administered Medications Ordered in Other Visits   Medication    lactated ringers infusion    lidocaine (PF) 10 mg/ml (1%) injection 10 mg       Review of patient's allergies indicates:   Allergen Reactions    Clarithromycin Swelling and Other (See Comments)     DIFFICULTY SWALLOWING  DIFFICULTY SWALLOWING    Pcn [penicillins] Anaphylaxis    Sulfa (sulfonamide antibiotics) Hives    Wellbutrin [bupropion  hcl] Shortness Of Breath and Anaphylaxis    Betadine [povidone-iodine] Hives     Swelling      Cefprozil Hives    Iodinated contrast media Hives    Latex, natural rubber Rash    Sulfamethoxazole-trimethoprim Nausea And Vomiting    Iodine Hives and Swelling    Latex Hives and Swelling       Family History   Problem Relation Age of Onset    Heart disease Mother     Heart disease Father     Esophageal cancer Maternal Grandmother     Colon cancer Neg Hx     Stomach cancer Neg Hx        Social History     Socioeconomic History    Marital status:      Spouse name: Not on file    Number of children: Not on file    Years of education: Not on file    Highest education level: Not on file   Occupational History    Not on file   Social Needs    Financial resource strain: Not on file    Food insecurity:     Worry: Not on file     Inability: Not on file    Transportation needs:     Medical: Not on file     Non-medical: Not on file   Tobacco Use    Smoking status: Current Every Day Smoker     Packs/day: 0.25     Years: 20.00     Pack years: 5.00     Types: Cigarettes     Last attempt to quit: 2018     Years since quittin.0    Smokeless tobacco: Never Used   Substance and Sexual Activity    Alcohol use: Yes     Alcohol/week: 0.0 standard drinks     Comment: occasionally    Drug use: Yes     Types: Other-see comments    Sexual activity: Yes     Partners: Male   Lifestyle    Physical activity:     Days per week: Not on file     Minutes per session: Not on file    Stress: Not on file   Relationships    Social connections:     Talks on phone: Not on file     Gets together: Not on file     Attends Bahai service: Not on file     Active member of club or organization: Not on file     Attends meetings of clubs or organizations: Not on file     Relationship status: Not on file   Other Topics Concern    Not on file   Social History Narrative    Not on file       Chief Complaint:   Chief  Complaint   Patient presents with    Post-op Evaluation     s/p right knee scope 10/25/19        Date of surgery:  October 25, 2019 right allograft MPFL reconstruction    History of present illness:  This is a 39-year-old female underwent right allograft MPFL reconstruction for chronic patellar instability. Patient has severe arthritic change of her patellofemoral joint unfortunately.  Rest of her knee actually looked pretty good.  She might be a candidate for a PFJ in the future.  Having a little pain and still has some grinding.  Pain is 6/10.  Did not do formal physical therapy.  Admits to not doing a lot of it on her own either.      Review of Systems:    Musculoskeletal:  See HPI        Physical Examination:    Vital Signs:    Vitals:    12/05/19 0756   BP: 111/65   Pulse: 76       Body mass index is 33.23 kg/m².    This a well-developed, well nourished patient in no acute distress.  They are alert and oriented and cooperative to examination.  Pt. walks without an antalgic gait.      Examination of the right knee shows well-healing surgical incisions.  No erythema or drainage.   Full range of motion. No calf pain. Negative Homans sign.    X-rays:  None     Assessment::  Status post right allograft MPFL reconstruction    Plan:  Continue doing the physical therapy exercises on her own.  I stressed the importance particularly of quad and hamstring strengthening.  I will see her back in 6 weeks.  Use the brace as needed.    This note was created using [employer] voice recognition software that occasionally misinterpreted phrases or words.

## 2019-12-09 ENCOUNTER — OFFICE VISIT (OUTPATIENT)
Dept: GASTROENTEROLOGY | Facility: CLINIC | Age: 39
End: 2019-12-09
Payer: COMMERCIAL

## 2019-12-09 VITALS
SYSTOLIC BLOOD PRESSURE: 108 MMHG | BODY MASS INDEX: 33.51 KG/M2 | WEIGHT: 247.38 LBS | HEART RATE: 65 BPM | DIASTOLIC BLOOD PRESSURE: 78 MMHG | RESPIRATION RATE: 16 BRPM | HEIGHT: 72 IN

## 2019-12-09 DIAGNOSIS — Z90.49 S/P CHOLECYSTECTOMY: ICD-10-CM

## 2019-12-09 DIAGNOSIS — K92.1 HEMATOCHEZIA: Primary | ICD-10-CM

## 2019-12-09 DIAGNOSIS — R10.813 RIGHT LOWER QUADRANT ABDOMINAL TENDERNESS WITHOUT REBOUND TENDERNESS: ICD-10-CM

## 2019-12-09 DIAGNOSIS — Z98.84 S/P LAPAROSCOPIC SLEEVE GASTRECTOMY: ICD-10-CM

## 2019-12-09 DIAGNOSIS — K21.9 GERD WITHOUT ESOPHAGITIS: ICD-10-CM

## 2019-12-09 PROCEDURE — 99999 PR PBB SHADOW E&M-EST. PATIENT-LVL V: CPT | Mod: PBBFAC,,, | Performed by: NURSE PRACTITIONER

## 2019-12-09 PROCEDURE — 99214 OFFICE O/P EST MOD 30 MIN: CPT | Mod: S$GLB,,, | Performed by: NURSE PRACTITIONER

## 2019-12-09 PROCEDURE — 99999 PR PBB SHADOW E&M-EST. PATIENT-LVL V: ICD-10-PCS | Mod: PBBFAC,,, | Performed by: NURSE PRACTITIONER

## 2019-12-09 PROCEDURE — 99214 PR OFFICE/OUTPT VISIT, EST, LEVL IV, 30-39 MIN: ICD-10-PCS | Mod: S$GLB,,, | Performed by: NURSE PRACTITIONER

## 2019-12-09 PROCEDURE — 3008F PR BODY MASS INDEX (BMI) DOCUMENTED: ICD-10-PCS | Mod: CPTII,S$GLB,, | Performed by: NURSE PRACTITIONER

## 2019-12-09 PROCEDURE — 3008F BODY MASS INDEX DOCD: CPT | Mod: CPTII,S$GLB,, | Performed by: NURSE PRACTITIONER

## 2019-12-09 RX ORDER — PREDNISONE 50 MG/1
50 TABLET ORAL
Qty: 3 TABLET | Refills: 0 | OUTPATIENT
Start: 2019-12-09 | End: 2019-12-09

## 2019-12-09 RX ORDER — PANTOPRAZOLE SODIUM 40 MG/1
40 TABLET, DELAYED RELEASE ORAL 2 TIMES DAILY
Qty: 60 TABLET | Refills: 1 | Status: SHIPPED | OUTPATIENT
Start: 2019-12-09 | End: 2021-01-19 | Stop reason: SDUPTHER

## 2019-12-09 RX ORDER — PANTOPRAZOLE SODIUM 40 MG/1
40 TABLET, DELAYED RELEASE ORAL 2 TIMES DAILY
Qty: 60 TABLET | Refills: 11 | Status: SHIPPED | OUTPATIENT
Start: 2019-12-09 | End: 2019-12-09

## 2019-12-09 RX ORDER — PREDNISONE 50 MG/1
50 TABLET ORAL
Qty: 3 TABLET | Refills: 0 | Status: SHIPPED | OUTPATIENT
Start: 2019-12-09 | End: 2020-06-04

## 2019-12-09 NOTE — PROGRESS NOTES
Subjective:       Patient ID: Aleyda Costa is a 39 y.o. female, Body mass index is 33.55 kg/m².    Chief Complaint: Consult (rectal bleeding post upper GI)      Established patient of Dr. Blackwell and myself.    Rectal Bleeding   This is a new problem. The current episode started 1 to 4 weeks ago (Started after recent EGD; ~ beginning of 12/19). The problem occurs intermittently (has occurred three times). The problem has been unchanged (sees bright red blood on tissue paper and in toilet bowl after bowel movement; denies bleeding in between bowel movements; denies straining or constipation). Associated symptoms include nausea. Pertinent negatives include no abdominal pain, anorexia, change in bowel habit, chest pain, coughing, fatigue, fever, rash, vomiting or weakness. Nothing aggravates the symptoms. She has tried nothing for the symptoms.   Gastroesophageal Reflux   She complains of heartburn and nausea. She reports no abdominal pain, no chest pain, no coughing, no dysphagia (resolved after esophageal dilation on 11/27/19) or no early satiety. This is a chronic problem. The current episode started more than 1 month ago. The problem occurs frequently. The problem has been unchanged. The heartburn duration is more than one hour. The heartburn is located in the substernum. The heartburn is of moderate intensity. The heartburn wakes her from sleep. The heartburn does not limit her activity. The heartburn changes with position. The symptoms are aggravated by certain foods, caffeine and lying down. Pertinent negatives include no anemia, fatigue, melena or weight loss. Risk factors include caffeine use, hiatal hernia, obesity and NSAIDs. She has tried a PPI (Past: prevacid effective but insurance won't cover; Currently: Protonix 40 mg once daily) for the symptoms. The treatment provided mild relief. Past procedures include an EGD.     Review of Systems   Constitutional: Negative for appetite change, fatigue,  fever, unexpected weight change and weight loss.   HENT: Negative for trouble swallowing.    Respiratory: Negative for cough and shortness of breath.    Cardiovascular: Negative for chest pain.   Gastrointestinal: Positive for diarrhea (loose bowels since cholecystectomy), heartburn, hematochezia and nausea. Negative for abdominal pain, anorexia, blood in stool, change in bowel habit, constipation, dysphagia (resolved after esophageal dilation on 19), melena and vomiting.   Genitourinary: Negative for difficulty urinating and dysuria.   Musculoskeletal: Negative for gait problem.   Skin: Negative for rash.   Neurological: Negative for speech difficulty and weakness.   Psychiatric/Behavioral: Negative for confusion.       Past Medical History:   Diagnosis Date    Arthritis     OA    Back pain     Cancer     skin cancer to back    Depression     Full dentures     GERD (gastroesophageal reflux disease)     Migraine     Pulmonary embolism     Seizures     Sleep apnea     Does not use c-pap since weight loss - 170 lbs    Wears glasses       Past Surgical History:   Procedure Laterality Date    APPENDECTOMY      CARPAL TUNNEL RELEASE Bilateral      SECTION      CHOLECYSTECTOMY      EPIDURAL STEROID INJECTION INTO LUMBAR SPINE N/A 2019    Procedure: Injection-steroid-epidural-lumbar;  Surgeon: Yariel Ramirez MD;  Location: [facility] OR;  Service: Pain Management;  Laterality: N/A;  L3-4    ESOPHAGOGASTRODUODENOSCOPY N/A 2019    Procedure: EGD (ESOPHAGOGASTRODUODENOSCOPY);  Surgeon: Anselom Blackwell MD;  Location: [facility];  Service: Endoscopy;  Laterality: N/A;    FRACTURE SURGERY      ankle    gastric sleve      HYSTERECTOMY      HYSTERECTOMY      JOINT REPLACEMENT Left 2018    KNEE ARTHROPLASTY Left 2018    Procedure: ARTHROPLASTY, KNEE;  Surgeon: Feliberto Cardenas MD;  Location: [facility] OR;  Service: Orthopedics;  Laterality: Left;    KNEE ARTHROSCOPY W/  MENISCECTOMY Right 10/25/2019    Procedure: ARTHROSCOPY, KNEE, WITH MENISCECTOMY;  Surgeon: Feliberto Cardenas MD;  Location: [facility] OR;  Service: Orthopedics;  Laterality: Right;    KNEE SURGERY      LUMBAR EPIDURAL INJECTION      RADIAL NERVE Right     RECONSTRUCTION OF MEDIAL PATELLOFEMORAL LIGAMENT OF RIGHT KNEE Right 10/25/2019    Procedure: RECONSTRUCTION, LIGAMENT, MEDIAL PATELLOFEMORAL, RIGHT;  Surgeon: Feliberto Cardenas MD;  Location: [facility] OR;  Service: Orthopedics;  Laterality: Right;    SINUS SURGERY      UPPER GASTROINTESTINAL ENDOSCOPY  11/27/2019    Dr. Blackwell; mild schatzki ring-dilated; gastritis; bx in process      Family History   Problem Relation Age of Onset    Heart disease Mother     Heart disease Father     Esophageal cancer Maternal Grandmother     Colon cancer Neg Hx     Stomach cancer Neg Hx       Wt Readings from Last 10 Encounters:   12/09/19 112.2 kg (247 lb 5.7 oz)   12/05/19 111.1 kg (245 lb)   11/27/19 111.1 kg (245 lb)   11/14/19 111.7 kg (246 lb 4.1 oz)   11/07/19 113.9 kg (251 lb)   10/25/19 113.9 kg (251 lb)   10/14/19 113.9 kg (251 lb)   09/26/19 114 kg (251 lb 5.2 oz)   06/28/19 112 kg (246 lb 14.6 oz)   06/05/19 112 kg (247 lb)     Lab Results   Component Value Date    WBC 7.33 10/14/2019    HGB 13.9 10/14/2019    HCT 42.0 10/14/2019    MCV 95 10/14/2019     10/14/2019     CMP  Sodium   Date Value Ref Range Status   10/14/2019 138 136 - 145 mmol/L Final     Potassium   Date Value Ref Range Status   10/14/2019 3.8 3.5 - 5.1 mmol/L Final     Chloride   Date Value Ref Range Status   10/14/2019 105 95 - 110 mmol/L Final     CO2   Date Value Ref Range Status   10/14/2019 25 23 - 29 mmol/L Final     Glucose   Date Value Ref Range Status   10/14/2019 94 70 - 110 mg/dL Final     BUN, Bld   Date Value Ref Range Status   10/14/2019 8 6 - 20 mg/dL Final     Creatinine   Date Value Ref Range Status   10/14/2019 0.7 0.5 - 1.4 mg/dL Final     Calcium   Date Value  Ref Range Status   10/14/2019 9.4 8.7 - 10.5 mg/dL Final     Total Protein   Date Value Ref Range Status   03/25/2019 7.3 6.0 - 8.4 g/dL Final     Albumin   Date Value Ref Range Status   03/25/2019 3.5 3.5 - 5.2 g/dL Final     Total Bilirubin   Date Value Ref Range Status   03/25/2019 0.3 0.1 - 1.0 mg/dL Final     Comment:     For infants and newborns, interpretation of results should be based  on gestational age, weight and in agreement with clinical  observations.  Premature Infant recommended reference ranges:  Up to 24 hours.............<8.0 mg/dL  Up to 48 hours............<12.0 mg/dL  3-5 days..................<15.0 mg/dL  6-29 days.................<15.0 mg/dL       Alkaline Phosphatase   Date Value Ref Range Status   03/25/2019 85 55 - 135 U/L Final     AST   Date Value Ref Range Status   03/25/2019 33 10 - 40 U/L Final     ALT   Date Value Ref Range Status   03/25/2019 20 10 - 44 U/L Final     Anion Gap   Date Value Ref Range Status   10/14/2019 8 8 - 16 mmol/L Final     eGFR if    Date Value Ref Range Status   10/14/2019 >60 >60 mL/min/1.73 m^2 Final     eGFR if non    Date Value Ref Range Status   10/14/2019 >60 >60 mL/min/1.73 m^2 Final     Comment:     Calculation used to obtain the estimated glomerular filtration  rate (eGFR) is the CKD-EPI equation.                 Reviewed prior medical records including endoscopy history (see surgical history).     Objective:      Physical Exam   Constitutional: She is oriented to person, place, and time. She appears well-developed and well-nourished.   HENT:   Head: Normocephalic.   Eyes: Pupils are equal, round, and reactive to light.   Neck: Normal range of motion.   Cardiovascular: Normal rate, regular rhythm and normal heart sounds.   No murmur heard.  Pulmonary/Chest: Breath sounds normal. No respiratory distress. She has no wheezes.   Abdominal: Soft. Bowel sounds are normal. She exhibits no distension and no mass. There is  tenderness in the right lower quadrant. There is no guarding.   Musculoskeletal: Normal range of motion.   Neurological: She is alert and oriented to person, place, and time.   Skin: Skin is warm and dry. No rash noted.   Non jaundiced   Psychiatric: She has a normal mood and affect. Her speech is normal.         Assessment:       1. Hematochezia    2. Right lower quadrant abdominal tenderness without rebound tenderness    3. GERD without esophagitis    4. S/P cholecystectomy    5. S/P laparoscopic sleeve gastrectomy           Plan:   All diagnoses and orders for this visit:    Hematochezia   - CBC auto differential; Future; Expected date: 12/09/2019  - Discussed about different etiologies that can cause rectal bleeding, such as but not limited to diverticulosis, polyps, colon mass, colon inflammation or infection, anal fissure or hemorrhoids.   - Schedule Colonoscopy, discussed procedure with the patient, verbalized understanding   - You may resume normal activity as long as you feel well.  - Avoid/minimize aspirin and anti-inflammatory drugs such as ibuprofen (Advil, Motrin) and naproxen (Aleve and Naprosyn).    Right lower quadrant abdominal tenderness without rebound tenderness  - Hepatic function panel; Future; Expected date: 12/09/2019  - CT Abdomen Pelvis With Contrast; Future; Expected date: 12/09/2019  - Steroid/antihistamine prep to take prior to CT scan due to iodine allergy: - Start: predniSONE (DELTASONE) 50 MG Tab; Take 1 tablet (50 mg total) by mouth On call Procedure.  Dispense: 3 tablet; Refill: 0 and Benadryl 50 mg otc one hour prior to procedure    GERD without esophagitis  - Increase Protonix to BID for 8 weeks then back to daily: pantoprazole (PROTONIX) 40 MG tablet; Take 1 tablet (40 mg total) by mouth 2 (two) times daily.  Dispense: 60 tablet; Refill: 1  - Educated patient on lifestyle modifications to help control/reduce reflux/abdominal pain including: avoid large meals, avoid eating within  2-3 hours of bedtime (avoid late night eating & lying down soon after eating), elevate head of bed if nocturnal symptoms are present, smoking cessation (if current smoker), & weight loss (if overweight).   - Educated to avoid known foods which trigger reflux symptoms & to minimize/avoid high-fat foods, chocolate, caffeine, citrus, alcohol, & tomato products.  - Advised to avoid/limit use of NSAID's, since they can cause GI upset, bleeding, and/or ulcers. If needed, take with food.     S/P cholecystectomy    S/P laparoscopic sleeve gastrectomy    If no improvement in symptoms or symptoms worsen, call/follow-up at clinic or go to ER

## 2019-12-09 NOTE — PATIENT INSTRUCTIONS
Understanding Rectal Bleeding    Rectal bleeding is when blood passes through your rectum and anus. It can happen with or without a bowel movement. Rectal bleeding may be a sign of a serious problem in your rectum, colon, or upper GI tract. Call your healthcare provider right away if you have any rectal bleeding.  The GI Tract  The gastrointestinal (GI) tract includes the mouth, esophagus, stomach, small intestine, large intestine (colon), rectum, and anus. The food you eat is digested as it passes through the GI tract. Solid waste leaves the body through the rectum.   Rectal bleeding and GI problems  The cause of rectal bleeding may be found in any region of the GI tract. The colon or rectum may be the site of your bleeding problem. Or, bleeding may be due to problems farther up the GI tract, such as in the small intestine, duodenum, or stomach.  Causes of rectal bleeding  Rectal bleeding causes include the following:  · Hemorrhoids (swollen veins in the rectum and anus)  · Fissures (tears in or near the anus)  · Diverticulosis (inflamed pockets in the colon wall)  · Infection  · Ischemia (low blood flow)  · Radiation damage  · Inflammatory bowel disease (Crohn's disease or ulcerative colitis)  · Ulcers in the upper GI tract and inflammation of the large intestine  · Abnormal tissue growths (tumors or polyps) in the GI tract  · A bulging rectum (also called a rectal prolapse)  · Abnormal blood vessels in the small intestine or in the colon  Common symptoms  Common symptoms include the following:  · Rectal pain, itching, or soreness  · Belly pain or epigastric pain  · Minor occasional drops of blood that appear on the stool or toilet paper, to greater amounts of stool that appear black or tarry   Rectal bleeding can also happen without pain.  Date Last Reviewed: 7/1/2016  © 7488-4077 [employer]. [address] [place] [address], [place] [address]. All rights reserved. This information is not intended as a  substitute for professional medical care. Always follow your healthcare professional's instructions.

## 2019-12-09 NOTE — H&P (VIEW-ONLY)
Subjective:       Patient ID: Aleyda Costa is a 39 y.o. female, Body mass index is 33.55 kg/m².    Chief Complaint: Consult (rectal bleeding post upper GI)      Established patient of Dr. Blackwell and myself.    Rectal Bleeding   This is a new problem. The current episode started 1 to 4 weeks ago (Started after recent EGD; ~ beginning of 12/19). The problem occurs intermittently (has occurred three times). The problem has been unchanged (sees bright red blood on tissue paper and in toilet bowl after bowel movement; denies bleeding in between bowel movements; denies straining or constipation). Associated symptoms include nausea. Pertinent negatives include no abdominal pain, anorexia, change in bowel habit, chest pain, coughing, fatigue, fever, rash, vomiting or weakness. Nothing aggravates the symptoms. She has tried nothing for the symptoms.   Gastroesophageal Reflux   She complains of heartburn and nausea. She reports no abdominal pain, no chest pain, no coughing, no dysphagia (resolved after esophageal dilation on 11/27/19) or no early satiety. This is a chronic problem. The current episode started more than 1 month ago. The problem occurs frequently. The problem has been unchanged. The heartburn duration is more than one hour. The heartburn is located in the substernum. The heartburn is of moderate intensity. The heartburn wakes her from sleep. The heartburn does not limit her activity. The heartburn changes with position. The symptoms are aggravated by certain foods, caffeine and lying down. Pertinent negatives include no anemia, fatigue, melena or weight loss. Risk factors include caffeine use, hiatal hernia, obesity and NSAIDs. She has tried a PPI (Past: prevacid effective but insurance won't cover; Currently: Protonix 40 mg once daily) for the symptoms. The treatment provided mild relief. Past procedures include an EGD.     Review of Systems   Constitutional: Negative for appetite change, fatigue,  fever, unexpected weight change and weight loss.   HENT: Negative for trouble swallowing.    Respiratory: Negative for cough and shortness of breath.    Cardiovascular: Negative for chest pain.   Gastrointestinal: Positive for diarrhea (loose bowels since cholecystectomy), heartburn, hematochezia and nausea. Negative for abdominal pain, anorexia, blood in stool, change in bowel habit, constipation, dysphagia (resolved after esophageal dilation on 19), melena and vomiting.   Genitourinary: Negative for difficulty urinating and dysuria.   Musculoskeletal: Negative for gait problem.   Skin: Negative for rash.   Neurological: Negative for speech difficulty and weakness.   Psychiatric/Behavioral: Negative for confusion.       Past Medical History:   Diagnosis Date    Arthritis     OA    Back pain     Cancer     skin cancer to back    Depression     Full dentures     GERD (gastroesophageal reflux disease)     Migraine     Pulmonary embolism     Seizures     Sleep apnea     Does not use c-pap since weight loss - 170 lbs    Wears glasses       Past Surgical History:   Procedure Laterality Date    APPENDECTOMY      CARPAL TUNNEL RELEASE Bilateral      SECTION      CHOLECYSTECTOMY      EPIDURAL STEROID INJECTION INTO LUMBAR SPINE N/A 2019    Procedure: Injection-steroid-epidural-lumbar;  Surgeon: Yariel Ramirez MD;  Location: [facility] OR;  Service: Pain Management;  Laterality: N/A;  L3-4    ESOPHAGOGASTRODUODENOSCOPY N/A 2019    Procedure: EGD (ESOPHAGOGASTRODUODENOSCOPY);  Surgeon: Anselmo Blackwell MD;  Location: [facility];  Service: Endoscopy;  Laterality: N/A;    FRACTURE SURGERY      ankle    gastric sleve      HYSTERECTOMY      HYSTERECTOMY      JOINT REPLACEMENT Left 2018    KNEE ARTHROPLASTY Left 2018    Procedure: ARTHROPLASTY, KNEE;  Surgeon: Feliberto Cardenas MD;  Location: [facility] OR;  Service: Orthopedics;  Laterality: Left;    KNEE ARTHROSCOPY W/  MENISCECTOMY Right 10/25/2019    Procedure: ARTHROSCOPY, KNEE, WITH MENISCECTOMY;  Surgeon: Feliberto Cardenas MD;  Location: [facility] OR;  Service: Orthopedics;  Laterality: Right;    KNEE SURGERY      LUMBAR EPIDURAL INJECTION      RADIAL NERVE Right     RECONSTRUCTION OF MEDIAL PATELLOFEMORAL LIGAMENT OF RIGHT KNEE Right 10/25/2019    Procedure: RECONSTRUCTION, LIGAMENT, MEDIAL PATELLOFEMORAL, RIGHT;  Surgeon: Feliberto Cardenas MD;  Location: [facility] OR;  Service: Orthopedics;  Laterality: Right;    SINUS SURGERY      UPPER GASTROINTESTINAL ENDOSCOPY  11/27/2019    Dr. Blackwell; mild schatzki ring-dilated; gastritis; bx in process      Family History   Problem Relation Age of Onset    Heart disease Mother     Heart disease Father     Esophageal cancer Maternal Grandmother     Colon cancer Neg Hx     Stomach cancer Neg Hx       Wt Readings from Last 10 Encounters:   12/09/19 112.2 kg (247 lb 5.7 oz)   12/05/19 111.1 kg (245 lb)   11/27/19 111.1 kg (245 lb)   11/14/19 111.7 kg (246 lb 4.1 oz)   11/07/19 113.9 kg (251 lb)   10/25/19 113.9 kg (251 lb)   10/14/19 113.9 kg (251 lb)   09/26/19 114 kg (251 lb 5.2 oz)   06/28/19 112 kg (246 lb 14.6 oz)   06/05/19 112 kg (247 lb)     Lab Results   Component Value Date    WBC 7.33 10/14/2019    HGB 13.9 10/14/2019    HCT 42.0 10/14/2019    MCV 95 10/14/2019     10/14/2019     CMP  Sodium   Date Value Ref Range Status   10/14/2019 138 136 - 145 mmol/L Final     Potassium   Date Value Ref Range Status   10/14/2019 3.8 3.5 - 5.1 mmol/L Final     Chloride   Date Value Ref Range Status   10/14/2019 105 95 - 110 mmol/L Final     CO2   Date Value Ref Range Status   10/14/2019 25 23 - 29 mmol/L Final     Glucose   Date Value Ref Range Status   10/14/2019 94 70 - 110 mg/dL Final     BUN, Bld   Date Value Ref Range Status   10/14/2019 8 6 - 20 mg/dL Final     Creatinine   Date Value Ref Range Status   10/14/2019 0.7 0.5 - 1.4 mg/dL Final     Calcium   Date Value  Ref Range Status   10/14/2019 9.4 8.7 - 10.5 mg/dL Final     Total Protein   Date Value Ref Range Status   03/25/2019 7.3 6.0 - 8.4 g/dL Final     Albumin   Date Value Ref Range Status   03/25/2019 3.5 3.5 - 5.2 g/dL Final     Total Bilirubin   Date Value Ref Range Status   03/25/2019 0.3 0.1 - 1.0 mg/dL Final     Comment:     For infants and newborns, interpretation of results should be based  on gestational age, weight and in agreement with clinical  observations.  Premature Infant recommended reference ranges:  Up to 24 hours.............<8.0 mg/dL  Up to 48 hours............<12.0 mg/dL  3-5 days..................<15.0 mg/dL  6-29 days.................<15.0 mg/dL       Alkaline Phosphatase   Date Value Ref Range Status   03/25/2019 85 55 - 135 U/L Final     AST   Date Value Ref Range Status   03/25/2019 33 10 - 40 U/L Final     ALT   Date Value Ref Range Status   03/25/2019 20 10 - 44 U/L Final     Anion Gap   Date Value Ref Range Status   10/14/2019 8 8 - 16 mmol/L Final     eGFR if    Date Value Ref Range Status   10/14/2019 >60 >60 mL/min/1.73 m^2 Final     eGFR if non    Date Value Ref Range Status   10/14/2019 >60 >60 mL/min/1.73 m^2 Final     Comment:     Calculation used to obtain the estimated glomerular filtration  rate (eGFR) is the CKD-EPI equation.                 Reviewed prior medical records including endoscopy history (see surgical history).     Objective:      Physical Exam   Constitutional: She is oriented to person, place, and time. She appears well-developed and well-nourished.   HENT:   Head: Normocephalic.   Eyes: Pupils are equal, round, and reactive to light.   Neck: Normal range of motion.   Cardiovascular: Normal rate, regular rhythm and normal heart sounds.   No murmur heard.  Pulmonary/Chest: Breath sounds normal. No respiratory distress. She has no wheezes.   Abdominal: Soft. Bowel sounds are normal. She exhibits no distension and no mass. There is  tenderness in the right lower quadrant. There is no guarding.   Musculoskeletal: Normal range of motion.   Neurological: She is alert and oriented to person, place, and time.   Skin: Skin is warm and dry. No rash noted.   Non jaundiced   Psychiatric: She has a normal mood and affect. Her speech is normal.         Assessment:       1. Hematochezia    2. Right lower quadrant abdominal tenderness without rebound tenderness    3. GERD without esophagitis    4. S/P cholecystectomy    5. S/P laparoscopic sleeve gastrectomy           Plan:   All diagnoses and orders for this visit:    Hematochezia   - CBC auto differential; Future; Expected date: 12/09/2019  - Discussed about different etiologies that can cause rectal bleeding, such as but not limited to diverticulosis, polyps, colon mass, colon inflammation or infection, anal fissure or hemorrhoids.   - Schedule Colonoscopy, discussed procedure with the patient, verbalized understanding   - You may resume normal activity as long as you feel well.  - Avoid/minimize aspirin and anti-inflammatory drugs such as ibuprofen (Advil, Motrin) and naproxen (Aleve and Naprosyn).    Right lower quadrant abdominal tenderness without rebound tenderness  - Hepatic function panel; Future; Expected date: 12/09/2019  - CT Abdomen Pelvis With Contrast; Future; Expected date: 12/09/2019  - Steroid/antihistamine prep to take prior to CT scan due to iodine allergy: - Start: predniSONE (DELTASONE) 50 MG Tab; Take 1 tablet (50 mg total) by mouth On call Procedure.  Dispense: 3 tablet; Refill: 0 and Benadryl 50 mg otc one hour prior to procedure    GERD without esophagitis  - Increase Protonix to BID for 8 weeks then back to daily: pantoprazole (PROTONIX) 40 MG tablet; Take 1 tablet (40 mg total) by mouth 2 (two) times daily.  Dispense: 60 tablet; Refill: 1  - Educated patient on lifestyle modifications to help control/reduce reflux/abdominal pain including: avoid large meals, avoid eating within  2-3 hours of bedtime (avoid late night eating & lying down soon after eating), elevate head of bed if nocturnal symptoms are present, smoking cessation (if current smoker), & weight loss (if overweight).   - Educated to avoid known foods which trigger reflux symptoms & to minimize/avoid high-fat foods, chocolate, caffeine, citrus, alcohol, & tomato products.  - Advised to avoid/limit use of NSAID's, since they can cause GI upset, bleeding, and/or ulcers. If needed, take with food.     S/P cholecystectomy    S/P laparoscopic sleeve gastrectomy    If no improvement in symptoms or symptoms worsen, call/follow-up at clinic or go to ER

## 2019-12-10 ENCOUNTER — HOSPITAL ENCOUNTER (OUTPATIENT)
Dept: RADIOLOGY | Facility: HOSPITAL | Age: 39
Discharge: HOME | End: 2019-12-10
Attending: NURSE PRACTITIONER
Payer: COMMERCIAL

## 2019-12-10 ENCOUNTER — TELEPHONE (OUTPATIENT)
Dept: GASTROENTEROLOGY | Facility: CLINIC | Age: 39
End: 2019-12-10

## 2019-12-10 DIAGNOSIS — N83.201 CYST OF RIGHT OVARY: Primary | ICD-10-CM

## 2019-12-10 DIAGNOSIS — R10.813 RIGHT LOWER QUADRANT ABDOMINAL TENDERNESS WITHOUT REBOUND TENDERNESS: ICD-10-CM

## 2019-12-10 DIAGNOSIS — R93.5 ABNORMAL CT OF THE ABDOMEN: ICD-10-CM

## 2019-12-10 DIAGNOSIS — Z90.710 S/P HYSTERECTOMY: ICD-10-CM

## 2019-12-10 PROCEDURE — 74177 CT ABD & PELVIS W/CONTRAST: CPT | Mod: TC

## 2019-12-10 PROCEDURE — 25500020 PHARM REV CODE 255: Performed by: NURSE PRACTITIONER

## 2019-12-10 PROCEDURE — 74177 CT ABDOMEN PELVIS WITH CONTRAST: ICD-10-PCS | Mod: 26,,, | Performed by: RADIOLOGY

## 2019-12-10 PROCEDURE — 74177 CT ABD & PELVIS W/CONTRAST: CPT | Mod: 26,,, | Performed by: RADIOLOGY

## 2019-12-10 RX ADMIN — IOHEXOL 100 ML: 350 INJECTION, SOLUTION INTRAVENOUS at 09:12

## 2019-12-10 RX ADMIN — IOHEXOL 500 ML: 12 SOLUTION ORAL at 09:12

## 2019-12-10 NOTE — TELEPHONE ENCOUNTER
----- Message from Lisette Meade NP sent at 12/10/2019  1:36 PM CST -----  Please let patient know her liver function and blood counts are normal or nearly normal. Continue with previous recommendations.

## 2019-12-10 NOTE — TELEPHONE ENCOUNTER
----- Message from Lisette Meade NP sent at 12/10/2019  1:33 PM CST -----  Please let patient know her CT of abdomen and pelvis showed a small hiatal hernia, gastric sleeve, and a cystic structure within her pelvis. The cyst may represent an ovarian cyst but pelvic ultrasound is recommended to further evaluate.  I will order this and she should follow-up with GYN. Please schedule for patient.

## 2019-12-11 ENCOUNTER — PATIENT MESSAGE (OUTPATIENT)
Dept: GASTROENTEROLOGY | Facility: CLINIC | Age: 39
End: 2019-12-11

## 2019-12-12 ENCOUNTER — HOSPITAL ENCOUNTER (OUTPATIENT)
Dept: RADIOLOGY | Facility: HOSPITAL | Age: 39
Discharge: HOME | End: 2019-12-12
Attending: NURSE PRACTITIONER
Payer: COMMERCIAL

## 2019-12-12 DIAGNOSIS — N83.201 CYST OF RIGHT OVARY: ICD-10-CM

## 2019-12-12 DIAGNOSIS — R93.5 ABNORMAL CT OF THE ABDOMEN: ICD-10-CM

## 2019-12-12 DIAGNOSIS — Z90.710 S/P HYSTERECTOMY: ICD-10-CM

## 2019-12-12 LAB
FINAL PATHOLOGIC DIAGNOSIS: NORMAL
GROSS: NORMAL

## 2019-12-12 PROCEDURE — 76830 TRANSVAGINAL US NON-OB: CPT | Mod: 26,,, | Performed by: RADIOLOGY

## 2019-12-12 PROCEDURE — 76856 US PELVIS COMP WITH TRANSVAG NON-OB (XPD): ICD-10-PCS | Mod: 26,,, | Performed by: RADIOLOGY

## 2019-12-12 PROCEDURE — 76830 US PELVIS COMP WITH TRANSVAG NON-OB (XPD): ICD-10-PCS | Mod: 26,,, | Performed by: RADIOLOGY

## 2019-12-12 PROCEDURE — 76856 US EXAM PELVIC COMPLETE: CPT | Mod: 26,,, | Performed by: RADIOLOGY

## 2019-12-12 PROCEDURE — 76830 TRANSVAGINAL US NON-OB: CPT | Mod: TC

## 2019-12-13 ENCOUNTER — TELEPHONE (OUTPATIENT)
Dept: GASTROENTEROLOGY | Facility: CLINIC | Age: 39
End: 2019-12-13

## 2019-12-13 NOTE — TELEPHONE ENCOUNTER
----- Message from Anselmo Blackwell MD sent at 12/13/2019  8:14 AM CST -----  Please notify patient that biopsies reviewed and showed no bacteria.  Continue current meds and follow up as previously planned.

## 2019-12-13 NOTE — TELEPHONE ENCOUNTER
----- Message from Lisette Meade NP sent at 12/13/2019  9:52 AM CST -----  Please let patient know her US of pelvis showed the area around her right ovary suggests a cyst. Cysts are typically benign but recommend she follow- up with her GYN for further evaluation and management.

## 2019-12-18 ENCOUNTER — OFFICE VISIT (OUTPATIENT)
Dept: OBSTETRICS AND GYNECOLOGY | Facility: CLINIC | Age: 39
End: 2019-12-18
Payer: COMMERCIAL

## 2019-12-18 ENCOUNTER — OFFICE VISIT (OUTPATIENT)
Dept: NEUROLOGY | Facility: CLINIC | Age: 39
End: 2019-12-18
Payer: COMMERCIAL

## 2019-12-18 ENCOUNTER — HOSPITAL ENCOUNTER (OUTPATIENT)
Dept: RADIOLOGY | Facility: HOSPITAL | Age: 39
Discharge: HOME | End: 2019-12-18
Attending: OBSTETRICS & GYNECOLOGY
Payer: COMMERCIAL

## 2019-12-18 VITALS
RESPIRATION RATE: 16 BRPM | BODY MASS INDEX: 33.32 KG/M2 | WEIGHT: 246 LBS | HEIGHT: 72 IN | DIASTOLIC BLOOD PRESSURE: 80 MMHG | SYSTOLIC BLOOD PRESSURE: 131 MMHG | HEART RATE: 79 BPM

## 2019-12-18 VITALS — HEIGHT: 72 IN | WEIGHT: 245.56 LBS | BODY MASS INDEX: 33.26 KG/M2

## 2019-12-18 VITALS
HEIGHT: 72 IN | WEIGHT: 245.56 LBS | BODY MASS INDEX: 33.26 KG/M2 | SYSTOLIC BLOOD PRESSURE: 90 MMHG | DIASTOLIC BLOOD PRESSURE: 60 MMHG

## 2019-12-18 DIAGNOSIS — Z12.39 BREAST CANCER SCREENING: ICD-10-CM

## 2019-12-18 DIAGNOSIS — N83.201 RIGHT OVARIAN CYST: Primary | ICD-10-CM

## 2019-12-18 DIAGNOSIS — Z12.31 SCREENING MAMMOGRAM, ENCOUNTER FOR: ICD-10-CM

## 2019-12-18 DIAGNOSIS — E23.7 PITUITARY LESION: Primary | ICD-10-CM

## 2019-12-18 PROCEDURE — 77067 SCR MAMMO BI INCL CAD: CPT | Mod: TC,PN

## 2019-12-18 PROCEDURE — 99214 PR OFFICE/OUTPT VISIT, EST, LEVL IV, 30-39 MIN: ICD-10-PCS | Mod: 25,S$GLB,, | Performed by: PSYCHIATRY & NEUROLOGY

## 2019-12-18 PROCEDURE — 99203 OFFICE O/P NEW LOW 30 MIN: CPT | Mod: S$GLB,,, | Performed by: OBSTETRICS & GYNECOLOGY

## 2019-12-18 PROCEDURE — 99999 PR PBB SHADOW E&M-EST. PATIENT-LVL III: CPT | Mod: PBBFAC,,, | Performed by: OBSTETRICS & GYNECOLOGY

## 2019-12-18 PROCEDURE — 96374 THER/PROPH/DIAG INJ IV PUSH: CPT | Mod: S$GLB,,, | Performed by: PSYCHIATRY & NEUROLOGY

## 2019-12-18 PROCEDURE — 99203 PR OFFICE/OUTPT VISIT, NEW, LEVL III, 30-44 MIN: ICD-10-PCS | Mod: S$GLB,,, | Performed by: OBSTETRICS & GYNECOLOGY

## 2019-12-18 PROCEDURE — 77067 SCR MAMMO BI INCL CAD: CPT | Mod: 26,,, | Performed by: RADIOLOGY

## 2019-12-18 PROCEDURE — 77063 BREAST TOMOSYNTHESIS BI: CPT | Mod: 26,,, | Performed by: RADIOLOGY

## 2019-12-18 PROCEDURE — 99999 PR PBB SHADOW E&M-EST. PATIENT-LVL III: ICD-10-PCS | Mod: PBBFAC,,, | Performed by: OBSTETRICS & GYNECOLOGY

## 2019-12-18 PROCEDURE — 77067 MAMMO DIGITAL SCREENING BILAT WITH TOMOSYNTHESIS_CAD: ICD-10-PCS | Mod: 26,,, | Performed by: RADIOLOGY

## 2019-12-18 PROCEDURE — 3008F PR BODY MASS INDEX (BMI) DOCUMENTED: ICD-10-PCS | Mod: CPTII,S$GLB,, | Performed by: OBSTETRICS & GYNECOLOGY

## 2019-12-18 PROCEDURE — 3008F PR BODY MASS INDEX (BMI) DOCUMENTED: ICD-10-PCS | Mod: CPTII,S$GLB,, | Performed by: PSYCHIATRY & NEUROLOGY

## 2019-12-18 PROCEDURE — 3008F BODY MASS INDEX DOCD: CPT | Mod: CPTII,S$GLB,, | Performed by: PSYCHIATRY & NEUROLOGY

## 2019-12-18 PROCEDURE — 3008F BODY MASS INDEX DOCD: CPT | Mod: CPTII,S$GLB,, | Performed by: OBSTETRICS & GYNECOLOGY

## 2019-12-18 PROCEDURE — 99214 OFFICE O/P EST MOD 30 MIN: CPT | Mod: 25,S$GLB,, | Performed by: PSYCHIATRY & NEUROLOGY

## 2019-12-18 PROCEDURE — 96374 PR INJECTION,THERAP/PROPH/DIAGNOST, IV PUSH, INITIAL DRUG: ICD-10-PCS | Mod: S$GLB,,, | Performed by: PSYCHIATRY & NEUROLOGY

## 2019-12-18 PROCEDURE — 99999 PR PBB SHADOW E&M-EST. PATIENT-LVL III: CPT | Mod: PBBFAC,,, | Performed by: PSYCHIATRY & NEUROLOGY

## 2019-12-18 PROCEDURE — 99999 PR PBB SHADOW E&M-EST. PATIENT-LVL III: ICD-10-PCS | Mod: PBBFAC,,, | Performed by: PSYCHIATRY & NEUROLOGY

## 2019-12-18 PROCEDURE — 77063 MAMMO DIGITAL SCREENING BILAT WITH TOMOSYNTHESIS_CAD: ICD-10-PCS | Mod: 26,,, | Performed by: RADIOLOGY

## 2019-12-18 RX ORDER — KETOROLAC TROMETHAMINE 30 MG/ML
INJECTION, SOLUTION INTRAMUSCULAR; INTRAVENOUS
Qty: 10 ML | Refills: 3 | Status: ON HOLD | OUTPATIENT
Start: 2019-12-18 | End: 2020-06-23 | Stop reason: HOSPADM

## 2019-12-18 RX ADMIN — KETOROLAC TROMETHAMINE 30 MG: 30 INJECTION, SOLUTION INTRAMUSCULAR; INTRAVENOUS at 01:12

## 2019-12-18 NOTE — PROGRESS NOTES
Chief Complaint   Patient presents with    Establish Care    referred from gastro for cyst on ovary    CT and US in [facility]    hysterectomy and LSO        History of Present Illness: Aleyda Costa is a 39 y.o. female that presents today 2019 for   Chief Complaint   Patient presents with    Establish Care    referred from gastro for cyst on ovary    CT and US in [facility]    hysterectomy and LSO    she reports 1 month ago started blood in stool bright red and right sided abdominal pain.  She reports that she noticed it after her EGD 1 month ago.  She has colonoscopy planned.      Past Medical History:   Diagnosis Date    Abnormal Pap smear of cervix     Arthritis     OA    Back pain     Cancer     skin cancer to back    Depression     Endometriosis     Full dentures     GERD (gastroesophageal reflux disease)     Migraine     Pulmonary embolism     Seizures     Sleep apnea     Does not use c-pap since weight loss - 170 lbs    Uterine fibroid     Wears glasses        Past Surgical History:   Procedure Laterality Date    APPENDECTOMY      CARPAL TUNNEL RELEASE Bilateral      SECTION      CHOLECYSTECTOMY      EPIDURAL STEROID INJECTION INTO LUMBAR SPINE N/A 2019    Procedure: Injection-steroid-epidural-lumbar;  Surgeon: Yariel Ramirez MD;  Location: [facility] OR;  Service: Pain Management;  Laterality: N/A;  L3-4    ESOPHAGOGASTRODUODENOSCOPY N/A 2019    Procedure: EGD (ESOPHAGOGASTRODUODENOSCOPY);  Surgeon: Anselmo Blackwell MD;  Location: [facility] ENDO;  Service: Endoscopy;  Laterality: N/A;    FRACTURE SURGERY      ankle    gastric sleve      HYSTERECTOMY      endo and fibroids    HYSTERECTOMY      JOINT REPLACEMENT Left 2018    KNEE ARTHROPLASTY Left 2018    Procedure: ARTHROPLASTY, KNEE;  Surgeon: Feliberto Cardenas MD;  Location: [facility] OR;  Service: Orthopedics;  Laterality: Left;    KNEE ARTHROSCOPY W/ MENISCECTOMY Right 10/25/2019     Procedure: ARTHROSCOPY, KNEE, WITH MENISCECTOMY;  Surgeon: Feliberto Cardenas MD;  Location: [facility] OR;  Service: Orthopedics;  Laterality: Right;    KNEE SURGERY      LUMBAR EPIDURAL INJECTION      OOPHORECTOMY Left     LSO    RADIAL NERVE Right     RECONSTRUCTION OF MEDIAL PATELLOFEMORAL LIGAMENT OF RIGHT KNEE Right 10/25/2019    Procedure: RECONSTRUCTION, LIGAMENT, MEDIAL PATELLOFEMORAL, RIGHT;  Surgeon: Feliberto Cardenas MD;  Location: [facility] OR;  Service: Orthopedics;  Laterality: Right;    SINUS SURGERY      UPPER GASTROINTESTINAL ENDOSCOPY  11/27/2019    Dr. Blackwell; mild schatzki ring-dilated; gastritis; bx in process       Current Outpatient Medications   Medication Sig Dispense Refill    amitriptyline (ELAVIL) 25 MG tablet TAKE ONE TABLET BY MOUTH EVERY NIGHT FOR 2 WEEKS THEN INCREASE TO 2 TABLETS AT BEDTIME      celecoxib (CELEBREX) 200 MG capsule Take 200 mg by mouth every evening.       cetirizine (ZYRTEC) 5 MG tablet Take 5 mg by mouth as needed.       cyanocobalamin, vitamin B-12, 1,000 mcg/mL Kit Inject 1 mL into the muscle every 21 days.      erenumab-aooe 140 mg/mL AtIn Inject 140 mg into the skin every 28 days. 1 mL 1    fluoxetine (PROZAC) 20 MG capsule every evening.   3    oxyCODONE (OXYCONTIN) 10 mg 12 hr tablet Take 10 mg by mouth every 12 (twelve) hours.      pantoprazole (PROTONIX) 40 MG tablet Take 1 tablet (40 mg total) by mouth 2 (two) times daily. 60 tablet 1    epinephrine (EPIPEN) 0.3 mg/0.3 mL (1:1,000) AtIn 0.3 mg daily as needed.       hydrOXYzine pamoate (VISTARIL) 25 MG Cap TAKE ONE CAPSULE BY MOUTH 3 TIMES A DAY AS NEEDED FOR ANXIETY      ibuprofen (ADVIL,MOTRIN) 800 MG tablet every 6 (six) hours as needed.   5    ibuprofen/D5W 250mL Take 800 mg by mouth.      ketorolac (TORADOL) 30 mg/mL injection Inject 30 mg into the muscle as needed.      lorazepam (ATIVAN) 0.5 MG tablet Take 0.5 mg by mouth every 4 (four) hours as needed.       predniSONE  (DELTASONE) 50 MG Tab Take 1 tablet (50 mg total) by mouth On call Procedure. 3 tablet 0    promethazine (PHENERGAN) 25 MG tablet Take 1 tablet (25 mg total) by mouth every 6 (six) hours as needed for Nausea. Take 1 tablet by mouth every 6 (six) hours as needed (migraine). -MAY CAUSE DROWSINESS- (Patient not taking: Reported on 12/9/2019) 20 tablet 3    tiZANidine (ZANAFLEX) 4 MG tablet Take 1 tablet (4 mg total) by mouth every 8 (eight) hours as needed.  2    VOLTAREN 1 % Gel daily as needed.   3     Current Facility-Administered Medications   Medication Dose Route Frequency Provider Last Rate Last Dose    onabotulinumtoxina injection 200 Units  1 vial Intramuscular 1 time in Clinic/HOD Lola Rojas MD         Facility-Administered Medications Ordered in Other Visits   Medication Dose Route Frequency Provider Last Rate Last Dose    lactated ringers infusion   Intravenous Continuous Anjel Hernandez MD   Stopped at 10/25/19 1115    lidocaine (PF) 10 mg/ml (1%) injection 10 mg  1 mL Intradermal Once Anjel Hernandez MD           Review of patient's allergies indicates:   Allergen Reactions    Clarithromycin Swelling and Other (See Comments)     DIFFICULTY SWALLOWING  DIFFICULTY SWALLOWING    Pcn [penicillins] Anaphylaxis    Sulfa (sulfonamide antibiotics) Hives    Wellbutrin [bupropion hcl] Shortness Of Breath and Anaphylaxis    Betadine [povidone-iodine] Hives     Swelling      Cefprozil Hives    Iodinated contrast media Hives    Latex, natural rubber Rash    Sulfamethoxazole-trimethoprim Nausea And Vomiting    Iodine Hives and Swelling    Latex Hives and Swelling       Family History   Problem Relation Age of Onset    Heart disease Mother     Heart disease Father     Esophageal cancer Maternal Grandmother     Breast cancer Maternal Aunt     Colon cancer Neg Hx     Stomach cancer Neg Hx     Ovarian cancer Neg Hx        Social History     Tobacco Use    Smoking status: Current Every  Day Smoker     Packs/day: 0.25     Years: 20.00     Pack years: 5.00     Types: Cigarettes     Last attempt to quit: 2018     Years since quittin.0    Smokeless tobacco: Never Used   Substance Use Topics    Alcohol use: Yes     Alcohol/week: 0.0 standard drinks     Comment: occasionally    Drug use: Yes     Types: Other-see comments       OB History    Para Term  AB Living   3 2 2   1     SAB TAB Ectopic Multiple Live Births   1              # Outcome Date GA Lbr José Miguel/2nd Weight Sex Delivery Anes PTL Lv   3 SAB            2 Term      CS-LTranv      1 Term      CS-LTranv          Review of Symptoms:  GENERAL: Denies weight gain or weight loss. Feeling well overall.   SKIN: Denies rash or lesions.   HEAD: Denies head injury or headache.   NODES: Denies enlarged lymph nodes.   CHEST: Denies chest pain or shortness of breath.   CARDIOVASCULAR: Denies palpitations or left sided chest pain.   ABDOMEN: No abdominal pain, constipation, diarrhea, nausea, vomiting or rectal bleeding.   URINARY: No frequency, dysuria, hematuria, or burning on urination.  HEMATOLOGIC: No easy bruisability or excessive bleeding.   MUSCULOSKELETAL: Denies joint pain or swelling.     BP 90/60   Ht 6' (1.829 m)   Wt 111.4 kg (245 lb 9.5 oz)     ASSESSMENT/PLAN:  Right ovarian cyst  -     US Pelvis Comp with Transvag NON-OB (xpd; Future; Expected date: 2019    Breast cancer screening  -     Mammo Digital Screening Bilat w/ [place]; Future; Expected date: 2019      30 minutes spent today with greater than half in counseling.

## 2019-12-19 RX ORDER — KETOROLAC TROMETHAMINE 30 MG/ML
30 INJECTION, SOLUTION INTRAMUSCULAR; INTRAVENOUS ONCE
Status: COMPLETED | OUTPATIENT
Start: 2019-12-18 | End: 2019-12-18

## 2019-12-19 NOTE — PROGRESS NOTES
Subjective:       Patient ID: Aleyda Plasencia is a 37 y.o. female.    Chief Complaint: Migraine    INTERVAL HISTORY 12/18/2019  The patient comes for follow up. She is doing well overall but today she reports a very severe headache associated with nausea, photophobia and phonophobia. Aimovig 140 mg is working well overall.    INTERVAL HISTORY 1/10/19  The patient is not doing well. Botox works for her but she is unable to afford the high co-pay. She is having frequent and severe attacks. She recently had a left knee replacement which was complicated by Pulmonary emboli. She is now on Xarelto. Her pulmonologist has allowed Toradol in moderation. Otherwise information below is still accurate and current.   Headache questionnaire     1. When did your Headaches start?               1997        2. Where are your headaches located?              Temples and back of head        3. Your headache's characteristics:              Excruciating, Pressure        4. How long does the headache last?              days        5. How often does the headache occur?              monthly        6. Are your headaches preceded or accompanied by other symptoms? yes              If yes, please describe.  Vomiting, sensitive, to light and sound         7. Does the headache awaken you at night? yes              If so, how often? Couple times a month            8. Please lety the word that best describes your headache's intensity:               severe            9. Using a scale of 1 through 10, with 0 = no pain and 10 = the worst pain:              What score is your headache now? 7              What score is your headache at its worst? 10              What score is your headache at its best? 0           10. Possible associated headache symptoms:  [x]  Sensitivity to light                  [] Dizziness                    [] Nasal or sinus pressure/ pain              [x] Sensitivity to noise                 [] Vertigo                         [x] Problems with concentration  [x] Sensitivity to smells   [] Ringing in ears           [x] Problems with memory                        [x] Blurred vision             [x] Irritability                     [] Problems with task completion   [] Double vision             [x] Anger              []  Problems with relaxation  [] Loss of appetite                     [x] Anxiety                       [] Neck tightness, Neck pain  [x] Nausea                                   [] Nasal congestion  [x] Vomiting                                             11. Headache improving factors:  [x] Sleep              [] Heat  [x] Darkness                    [] Ice  [] Local pressure           [] Menses (period)  [] Massage                    [x] Medications:             12. Headache worsening factors:   [x] Fatigue           [] Sneezing                    [x] Changes in Weather  [x] Light   [] Bending Over [x] Stress  [x] Noise  [] Ovulation                    [] Multiple Sclerosis Flare-Up  [] Smells            [] Menses                      [] Food   [] Coughing        [] Alcohol        13. Number of caffeinated drinks per day: 2        14. Number of diet drinks per day:  0     HPI  The patient is a 34 y/o female with long history of migraine headache, with periods of remission and exacerbation doing poorly for the last couple of months. . Headache is located in the temples, moderate in intensity, pressure, like a tight band. Weekly attacks last days. She treats escalations with alternation Imitrex 4 mg SQ with Midrin and Ibuprofen 800 mg. In the past has been on Topamax without benefit. Currently on Elavil and Prozac without relief in terms of headache. Intensity 10/10 twice a wek.   Associated symptoms include sensitivity to light, noise. Blurred vision, nausea, dizziness, irritability, anger, anxiety, problems with concentration, memory, task completion. Better with sleep, darkness. Worse with fatigue, light, noise, bending  "over, stress.  She also states that it is about to have surgery in the right forearm to "clean nerves" from work related injury after a TV fell on her right forearm.    Review of Systems   Constitutional: Positive for fatigue. Negative for activity change, appetite change and fever.   HENT: Negative for congestion, dental problem, hearing loss, sinus pressure, tinnitus, trouble swallowing and voice change.    Eyes: Negative for photophobia, pain, redness and visual disturbance.   Respiratory: Negative for cough, chest tightness and shortness of breath.    Cardiovascular: Negative for chest pain, palpitations and leg swelling.   Gastrointestinal: Negative for abdominal pain, blood in stool, nausea and vomiting.   Endocrine: Negative for cold intolerance and heat intolerance.   Genitourinary: Negative for difficulty urinating, frequency, menstrual problem and urgency.   Musculoskeletal: Positive for arthralgias, back pain, myalgias and neck pain. Negative for gait problem, joint swelling and neck stiffness.   Skin: Negative.    Neurological: Positive for headaches. Negative for dizziness, tremors, seizures, syncope, facial asymmetry, speech difficulty, weakness, light-headedness and numbness.   Hematological: Negative for adenopathy. Does not bruise/bleed easily.   Psychiatric/Behavioral: Positive for dysphoric mood (depression). Negative for agitation, behavioral problems, confusion, decreased concentration, self-injury, sleep disturbance and suicidal ideas. The patient is not nervous/anxious and is not hyperactive.          Past Medical History   Diagnosis Date    Arthritis      OA    Depression     Migraine      Past Surgical History   Procedure Laterality Date    Fracture surgery       ankle    Knee surgery      Appendectomy      Cholecystectomy       section      Hysterectomy      Gastric sleve      Sinus surgery      Carpal tunnel release Bilateral     Lumbar epidural injection       Family " History   Problem Relation Age of Onset    Heart disease Mother      History     Social History    Marital status: Single     Spouse name: N/A    Number of children: N/A    Years of education: N/A     Occupational History    Not on file.     Social History Main Topics    Smoking status: Current Every Day Smoker     Packs/day: 0.50     Types: Cigarettes    Smokeless tobacco: Never Used    Alcohol use: 0.0 oz/week     0 Standard drinks or equivalent per week      Comment: occasionally    Drug use: No    Sexual activity: Not on file     Other Topics Concern    Not on file     Social History Narrative     Allergies   Allergen Reactions    Clarithromycin Swelling     DIFFICULTY SWALLOWING    Pcn [Penicillins] Anaphylaxis    Sulfa (Sulfonamide Antibiotics) Hives    Wellbutrin [Bupropion Hcl] Shortness Of Breath    Betadine [Povidone-Iodine] Hives     Swelling      Cefprozil Hives    Iodinated Contrast Media - Iv Dye Hives    Latex, Natural Rubber Rash     Current Outpatient Prescriptions   Medication Sig    amitriptyline (ELAVIL) 25 MG tablet 25 mg PO every hs    celecoxib (CELEBREX) 200 MG capsule Take 200 mg by mouth once daily.    cetirizine (ZYRTEC) 5 MG tablet Take 5 mg by mouth once daily.    cyanocobalamin 1,000 mcg/mL injection Inject 1,000 mcg into the skin. Twice monthly    cyclobenzaprine (FLEXERIL) 10 MG tablet Take 10 mg by mouth 3 (three) times daily as needed.     diclofenac sodium 1 % Gel daily as needed.     epinephrine (EPIPEN) 0.3 mg/0.3 mL (1:1,000) AtIn 0.3 mg daily as needed.     fluoxetine (PROZAC) 20 MG capsule Take 20 mg by mouth once daily.     hydrocodone-acetaminophen 10-325mg (NORCO)  mg Tab Take 1 tablet by mouth every 12 (twelve) hours as needed (pain).    hydrOXYzine pamoate (VISTARIL) 25 MG Cap Take 25 mg by mouth every 4 (four) hours as needed.     ibuprofen (ADVIL,MOTRIN) 800 MG tablet every 6 (six) hours as needed.      isometheptene-apap-dichloralphenazone [id number] (MIDRIN) -325 mg per capsule Take 1 capsule by mouth 4 (four) times daily as needed.    lorazepam (ATIVAN) 0.5 MG tablet Take 0.5 mg by mouth every 4 (four) hours as needed.     potassium chloride SA (K-DUR,KLOR-CON) 20 MEQ tablet Take 20 mEq by mouth once daily.    promethazine (PHENERGAN) 25 MG tablet Take 1 tablet by mouth every 6 (six) hours as needed (migraine). -MAY CAUSE DROWSINESS-    pseudoephedrine-DM-guaifenesin 45- mg Tab Take 1 tablet by mouth daily as needed.     sumatriptan succinate 4 mg/0.5 mL PnIj daily as needed. imitrex    VOLTAREN 1 % Gel daily as needed.      No current facility-administered medications for this visit.          Objective:      Vitals:    12/18/19 1310   BP: 131/80   Pulse: 79   Resp: 16     Body mass index is 33.36 kg/m².    Physical Exam    Constitutional:   She appears well-developed and well-nourished. She is well groomed  HENT:    Head: Atraumatic, oral and nasal mucosa intact  Eyes: Conjunctivae and EOM are normal. Pupils are equal, round, and reactive to light OU  Neck: Neck supple. No thyromegaly present  Cardiovascular: Normal rate and normal heart sounds  No murmur heard  Pulmonary/Chest: Effort normal and breath sounds normal  Musculoskeletal: Normal range of motion. No joint stiffness. No vertebral point tenderness  Skin: Skin is warm and dry  Psychiatric: Normal mood and affect     Neuro exam:    Mental status:  Awake, attentive, Alert, oriented to self, place, year and month  Language function is intact  No findings to suggest executive dysfuntion    Patient has adequate insight      Cranial Nerves:  Smell was not formally evaluated  Cranial Nerves II - XII: intact  Pursuits were smooth, normal saccades, no nystagmus OU  Motor - facial movement was symmetrical and normal     Palate moved well and was symmetrical with normal palatal and oral sensation  Tongue movements were full    Motor:  Normal  muscle bulk and symmetry. No fasciculations were noted    No pronator drift  Strength 5/5 bilaterally     Reflexes:  Tendon reflexes were 2 + at biceps, triceps, brachioradialis, patellar, and 1+ Achilles   On plantar stimulation toes were down going bilaterally  No clonus was noted     Sensory: Intact to light touch, pin prick in all extremities.    Gait: Romberg absent. Normal gait.     Review of Data:   Lab Results   Component Value Date    BNP <10 12/03/2018     (L) 12/03/2018    K 3.7 12/03/2018    MG 2.3 11/21/2018     12/03/2018    CO2 28 12/03/2018    BUN 8 12/03/2018    CREATININE 0.7 12/03/2018    GLU 99 12/03/2018    AST 23 12/03/2018    ALT 18 12/03/2018    ALBUMIN 3.5 12/03/2018    PROT 7.4 12/03/2018    BILITOT 0.5 12/03/2018    CHOL 196 11/21/2018    HDL 39 (L) 11/21/2018    LDLCALC 128.6 11/21/2018    TRIG 142 11/21/2018       Lab Results   Component Value Date    WBC 7.30 12/03/2018    HGB 12.4 12/03/2018    HCT 36.3 (L) 12/03/2018    MCV 91 12/03/2018     12/03/2018     Results for orders placed or performed during the hospital encounter of 07/12/16   MRI Brain W WO Contrast    Narrative    Sagittal and axial images are obtained with T1 weighting.  Additional axial images are obtained with T2, flair, diffusion weighting and ADC map.  Following the administration of IV gadolinium contrast 5mls, axial and coronal images are obtained.  Findings L. coronal and sagittal images are obtained through the sella turcica pre-and post contrast as well.    There is a 4 mm round pituitary adenoma seen just to the left of midline.  This does not distorted the rest of the pituitary gland or impinge upon the suprasellar cistern or optic chiasm.  The infundibulum is not deviated significantly.  This is best seen on the T2 and flair weighted imaging.  It does not show significant gadolinium enhancement on T1 weighting.    The general brain anatomy appears within normal limits.  There is normal  medulla, beck, brainstem, basal ganglia, corpus callosum, cerebral and cerebellar lobar structure.  The internal auditory canals are sharp.  The basal cisterns are clear and symmetric.    There is no mass-effect or midline shift.  The ventricles are of normal size and symmetric.  The gray-white matter differentiation is normal.  Within the brain parenchyma no hyper or hypointense lesions consistent with tumor, edema, CVA or hemorrhage are seen..    Following administration of gadolinium contrast, no abnormal areas of contrast enhancement are seen.    Impression    4 mm pituitary adenoma identified to the left of midline.  The rest of the MR of the brain with and without gadolinium is negative.  ______________________________________     Electronically signed by: Jose Salas MD  Date:     07/12/16  Time:    09:37      The patient reported a protracted, severe headache. I injected 30 mg of IV Toradol very slow push.        Assessment and Plan   The patient suffers from Migraine without aura recently exacerbated for the last 2 months. She has been on Topamax without benefit. Currently on Elavil and Prozac without relief in terms of headache. She was responding well to Botox but unable to afford the kenna copayment associated with the treatment. She is an excellent candidate for Botox treatment as she cannot take antihypertensives (blood pressure too low) and has multiple medical problems.  Trial of Aimovig  Pituitary adenoma, will repeat MRI, due for annual check  Facet arthropathy and DJD, followed by Dr. Ramirez.    Counseling:  More than 50% of the 25 minute encounter was spent face to face counseling the patient regarding current status and future plan of care as well as side of the medications. All questions were answered to patient's satisfaction        Madisyn Rojas M.D  Medical Director, Headache and Facial Pain  [facility]

## 2019-12-20 ENCOUNTER — TELEPHONE (OUTPATIENT)
Dept: PHARMACY | Facility: CLINIC | Age: 39
End: 2019-12-20

## 2019-12-20 NOTE — TELEPHONE ENCOUNTER
Refill call regarding Aimovig from [facility]. Shipping out Aimiovig on  for  arrival with patients consent. Copay of $0 @ 004. Address and  confirmed. Patient has 0 doses on hand at this time. Patient does need a sharps container. Patient reports no new medications, allergies, health conditions, or side effects present. Patient is currently taking the medication as directed by doctors instruction. Patient has no questions or concerns at this time. Patients next injection is scheduled for .

## 2019-12-30 ENCOUNTER — ANESTHESIA EVENT (OUTPATIENT)
Dept: ENDOSCOPY | Facility: HOSPITAL | Age: 39
End: 2019-12-30
Payer: COMMERCIAL

## 2019-12-30 ENCOUNTER — HOSPITAL ENCOUNTER (OUTPATIENT)
Facility: HOSPITAL | Age: 39
Discharge: HOME | End: 2019-12-30
Attending: INTERNAL MEDICINE | Admitting: INTERNAL MEDICINE
Payer: COMMERCIAL

## 2019-12-30 ENCOUNTER — ANESTHESIA (OUTPATIENT)
Dept: ENDOSCOPY | Facility: HOSPITAL | Age: 39
End: 2019-12-30
Payer: COMMERCIAL

## 2019-12-30 DIAGNOSIS — K63.5 POLYP OF COLON, UNSPECIFIED PART OF COLON, UNSPECIFIED TYPE: ICD-10-CM

## 2019-12-30 DIAGNOSIS — K92.1 HEMATOCHEZIA: ICD-10-CM

## 2019-12-30 DIAGNOSIS — K64.8 INTERNAL HEMORRHOIDS: Primary | ICD-10-CM

## 2019-12-30 PROCEDURE — 45380 PR COLONOSCOPY,BIOPSY: ICD-10-PCS | Mod: ,,, | Performed by: INTERNAL MEDICINE

## 2019-12-30 PROCEDURE — 27201012 HC FORCEPS, HOT/COLD, DISP: Performed by: INTERNAL MEDICINE

## 2019-12-30 PROCEDURE — 45380 COLONOSCOPY AND BIOPSY: CPT | Performed by: INTERNAL MEDICINE

## 2019-12-30 PROCEDURE — D9220A PRA ANESTHESIA: ICD-10-PCS | Mod: CRNA,,, | Performed by: NURSE ANESTHETIST, CERTIFIED REGISTERED

## 2019-12-30 PROCEDURE — D9220A PRA ANESTHESIA: Mod: ANES,,, | Performed by: ANESTHESIOLOGY

## 2019-12-30 PROCEDURE — D9220A PRA ANESTHESIA: ICD-10-PCS | Mod: ANES,,, | Performed by: ANESTHESIOLOGY

## 2019-12-30 PROCEDURE — 88305 TISSUE EXAM BY PATHOLOGIST: CPT | Mod: 26,,, | Performed by: PATHOLOGY

## 2019-12-30 PROCEDURE — D9220A PRA ANESTHESIA: Mod: CRNA,,, | Performed by: NURSE ANESTHETIST, CERTIFIED REGISTERED

## 2019-12-30 PROCEDURE — 88305 TISSUE EXAM BY PATHOLOGIST: CPT | Mod: 59 | Performed by: PATHOLOGY

## 2019-12-30 PROCEDURE — 88305 TISSUE EXAM BY PATHOLOGIST: ICD-10-PCS | Mod: 26,,, | Performed by: PATHOLOGY

## 2019-12-30 PROCEDURE — 63600175 PHARM REV CODE 636 W HCPCS: Performed by: NURSE ANESTHETIST, CERTIFIED REGISTERED

## 2019-12-30 PROCEDURE — 45380 COLONOSCOPY AND BIOPSY: CPT | Mod: ,,, | Performed by: INTERNAL MEDICINE

## 2019-12-30 PROCEDURE — 63600175 PHARM REV CODE 636 W HCPCS: Performed by: INTERNAL MEDICINE

## 2019-12-30 PROCEDURE — 37000008 HC ANESTHESIA 1ST 15 MINUTES: Performed by: INTERNAL MEDICINE

## 2019-12-30 PROCEDURE — 37000009 HC ANESTHESIA EA ADD 15 MINS: Performed by: INTERNAL MEDICINE

## 2019-12-30 RX ORDER — PROPOFOL 10 MG/ML
VIAL (ML) INTRAVENOUS
Status: DISCONTINUED | OUTPATIENT
Start: 2019-12-30 | End: 2019-12-30

## 2019-12-30 RX ORDER — SODIUM CHLORIDE 9 MG/ML
INJECTION, SOLUTION INTRAVENOUS CONTINUOUS
Status: DISCONTINUED | OUTPATIENT
Start: 2019-12-30 | End: 2019-12-30 | Stop reason: HOSPADM

## 2019-12-30 RX ADMIN — PROPOFOL 30 MG: 10 INJECTION, EMULSION INTRAVENOUS at 09:12

## 2019-12-30 RX ADMIN — SODIUM CHLORIDE 1000 ML: 0.9 INJECTION, SOLUTION INTRAVENOUS at 07:12

## 2019-12-30 RX ADMIN — PROPOFOL 120 MG: 10 INJECTION, EMULSION INTRAVENOUS at 09:12

## 2019-12-30 NOTE — [employer] PATIENT INSTRUCTIONS
Discharge Summary/Instructions after an Endoscopic Procedure  Patient Name: Aleyda Costa  Patient MRN: [id number]  Patient YOB: 1980 Monday, December 30, 2019  Anselmo Kay MD  RESTRICTIONS:  During your procedure today, you received medications for sedation.  These   medications may affect your judgment, balance and coordination.  Therefore,   for 24 hours, you have the following restrictions:   - DO NOT drive a car, operate machinery, make legal/financial decisions,   sign important papers or drink alcohol.    ACTIVITY:  Today: no heavy lifting, straining or running due to procedural   sedation/anesthesia.  The following day: return to full activity including work.  DIET:  Eat and drink normally unless instructed otherwise.     TREATMENT FOR COMMON SIDE EFFECTS:  - Mild abdominal pain, nausea, belching, bloating or excessive gas:  rest,   eat lightly and use a heating pad.  - Sore Throat: treat with throat lozenges and/or gargle with warm salt   water.  - Because air was used during the procedure, expelling large amounts of air   from your rectum or belching is normal.  - If a bowel prep was taken, you may not have a bowel movement for 1-3 days.    This is normal.  SYMPTOMS TO WATCH FOR AND REPORT TO YOUR PHYSICIAN:  1. Abdominal pain or bloating, other than gas cramps.  2. Chest pain.  3. Back pain.  4. Signs of infection such as: chills or fever occurring within 24 hours   after the procedure.  5. Rectal bleeding, which would show as bright red, maroon, or black stools.   (A tablespoon of blood from the rectum is not serious, especially if   hemorrhoids are present.)  6. Vomiting.  7. Weakness or dizziness.  GO DIRECTLY TO THE NEAREST EMERGENCY ROOM IF YOU HAVE ANY OF THE FOLLOWING:      Difficulty breathing              Chills and/or fever over 101 F   Persistent vomiting and/or vomiting blood   Severe abdominal pain   Severe chest pain   Black, tarry stools   Bleeding- more than one  tablespoon   Any other symptom or condition that you feel may need urgent attention  Your doctor recommends these additional instructions:  If any biopsies were taken, your doctors clinic will contact you in 1 to 2   weeks with any results.  - Patient has a contact number available for emergencies.  The signs and   symptoms of potential delayed complications were discussed with the   patient.  Return to normal activities tomorrow.  Written discharge   instructions were provided to the patient.   - High fiber diet.   - Continue present medications.   - Await pathology results.   - Repeat colonoscopy in 5-10 years for surveillance.   - Discharge patient to home (ambulatory).   - Return to my office PRN.  For questions, problems or results please call your physician - Anselmo Kay MD at Work:  [phone].  [facility], EMERGENCY ROOM PHONE NUMBER: [phone]  IF A COMPLICATION OR EMERGENCY SITUATION ARISES AND YOU ARE UNABLE TO REACH   YOUR PHYSICIAN - GO DIRECTLY TO THE EMERGENCY ROOM.  Anselmo Kay MD  12/30/2019 9:33:24 AM  This report has been verified and signed electronically.  [employer]

## 2019-12-30 NOTE — ANESTHESIA POSTPROCEDURE EVALUATION
Anesthesia Post Evaluation    Patient: Aleyda Costa    Procedure(s) Performed: Procedure(s) (LRB):  COLONOSCOPY (N/A)    Final Anesthesia Type: general    Patient location during evaluation: PACU  Patient participation: Yes- Able to Participate  Level of consciousness: awake and alert and oriented  Post-procedure vital signs: reviewed and stable  Pain management: adequate  Airway patency: patent    PONV status at discharge: No PONV  Anesthetic complications: no      Cardiovascular status: blood pressure returned to baseline and stable  Respiratory status: unassisted and spontaneous ventilation  Hydration status: euvolemic  Follow-up not needed.          Vitals Value Taken Time   /80 12/30/2019 10:10 AM   Temp 36.5 °C (97.7 °F) 12/30/2019  7:47 AM   Pulse 52 12/30/2019 10:15 AM   Resp 16 12/30/2019 10:10 AM   SpO2 96 % 12/30/2019 10:15 AM         Event Time     Out of Recovery 10:26:00          Pain/Claudine Score: Claudine Score: 10 (12/30/2019 10:10 AM)

## 2019-12-30 NOTE — TRANSFER OF CARE
Anesthesia Transfer of Care Note    Patient: Aleyda Costa    Procedure(s) Performed: Procedure(s) (LRB):  COLONOSCOPY (N/A)    Patient location: GI    Anesthesia Type: general    Transport from OR: Transported from OR on 2-3 L/min O2 by NC with adequate spontaneous ventilation    Post pain: adequate analgesia    Post assessment: no apparent anesthetic complications    Post vital signs: stable    Level of consciousness: awake    Nausea/Vomiting: no nausea/vomiting    Complications: none    Transfer of care protocol was followed      Last vitals:   Visit Vitals  /62   Pulse 64   Temp 36.5 °C (97.7 °F) (Skin)   Resp 20   Ht 6' (1.829 m)   Wt 111.1 kg (245 lb)   SpO2 (!) 92%   BMI 33.23 kg/m²

## 2019-12-30 NOTE — ANESTHESIA PREPROCEDURE EVALUATION
12/30/2019  Aleyda Costa is a 39 y.o., female.    Anesthesia Evaluation    I have reviewed the Patient Summary Reports.    I have reviewed the Nursing Notes.   I have reviewed the Medications.     Review of Systems  Anesthesia Hx:  No problems with previous Anesthesia    Social:  Smoker    Hematology/Oncology:         -- Cancer in past history:   Cardiovascular:  Cardiovascular Normal     Pulmonary:   Sleep Apnea    Education provided regarding risk of obstructive sleep apnea     Renal/:  Renal/ Normal     Hepatic/GI:   GERD, well controlled    Musculoskeletal:   Arthritis     Neurological:   Neuromuscular Disease, Headaches Seizures, well controlled    Endocrine:  Endocrine Normal    Psych:   Psychiatric History depression          Physical Exam  General:  Well nourished    Airway/Jaw/Neck:  Airway Findings: Mouth Opening: Normal Tongue: Normal  General Airway Assessment: Adult  Oropharynx Findings:  Mallampati: II  Jaw/Neck Findings:  Neck ROM: Normal ROM     Eyes/Ears/Nose:  Eyes/Ears/Nose Findings:    Dental:  Dental Findings:   Chest/Lungs:  Chest/Lungs Findings: Normal Respiratory Rate     Heart/Vascular:  Heart Findings: Rate: Normal  Rhythm: Regular Rhythm        Mental Status:  Mental Status Findings:  Cooperative, Alert and Oriented         Anesthesia Plan  Type of Anesthesia, risks & benefits discussed:  Anesthesia Type:  general  Patient's Preference:   Intra-op Monitoring Plan: standard ASA monitors  Intra-op Monitoring Plan Comments:   Post Op Pain Control Plan: multimodal analgesia  Post Op Pain Control Plan Comments:   Induction:   IV  Beta Blocker:  Patient is not currently on a Beta-Blocker (No further documentation required).       Informed Consent: Patient understands risks and agrees with Anesthesia plan.  Questions answered. Anesthesia consent signed with patient.  ASA Score:  3     Day of Surgery Review of History & Physical:  There are no significant changes.   H&P completed by Anesthesiologist.       Ready For Surgery From Anesthesia Perspective.

## 2019-12-30 NOTE — DISCHARGE INSTRUCTIONS
Colonoscopy     A camera attached to a flexible tube with a viewing lens is used to take video pictures.     Colonoscopy is a test to view the inside of your lower digestive tract (colon and rectum). Sometimes it can show the last part of the small intestine (ileum). During the test, small pieces of tissue may be removed for testing. This is called a biopsy. Small growths, such as polyps, may also be removed.   Why is colonoscopy done?  The test is done to help look for colon cancer. And it can help find the source of abdominal pain, bleeding, and changes in bowel habits. It may be needed once a year, depending on factors such as your:  · Age  · Health history  · Family health history  · Symptoms  · Results from any prior colonoscopy  Risks and possible complications  These include:  · Bleeding               · A puncture or tear in the colon   · Risks of anesthesia  · A cancer lesion not being seen  Getting ready   To prepare for the test:  · Talk with your healthcare provider about the risks of the test (see below). Also ask your healthcare provider about alternatives to the test.  · Tell your healthcare provider about any medicines you take. Also tell him or her about any health conditions you may have.  · Make sure your rectum and colon are empty for the test. Follow the diet and bowel prep instructions exactly. If you dont, the test may need to be rescheduled.  · Plan for a friend or family member to drive you home after the test.     Colonoscopy provides an inside view of the entire colon.     You may discuss the results with your doctor right away or at a future visit.  During the test   The test is usually done in the hospital on an outpatient basis. This means you go home the same day. The procedure takes about 30 minutes. During that time:  · You are given relaxing (sedating) medicine through an IV line. You may be drowsy, or fully asleep.  · The healthcare provider will first give you a physical exam to  check for anal and rectal problems.  · Then the anus is lubricated and the scope inserted.  · If you are awake, you may have a feeling similar to needing to have a bowel movement. You may also feel pressure as air is pumped into the colon. Its OK to pass gas during the procedure.  · Biopsy, polyp removal, or other treatments may be done during the test.  After the test   You may have gas right after the test. It can help to try to pass it to help prevent later bloating. Your healthcare provider may discuss the results with you right away. Or you may need to schedule a follow-up visit to talk about the results. After the test, you can go back to your normal eating and other activities. You may be tired from the sedation and need to rest for a few hours.  Date Last Reviewed: 11/1/2016 © 2000-2017 [employer]. [address] [place] [address]. All rights reserved. This information is not intended as a substitute for professional medical care. Always follow your healthcare professional's instructions.    Understanding Colon and Rectal Polyps    The colon (also called the large intestine) is a muscular tube that forms the last part of the digestive tract. It absorbs water and stores food waste. The colon is about 4 to 6 feet long. The rectum is the last 6 inches of the colon. The colon and rectum have a smooth lining composed of millions of cells. Changes in these cells can lead to growths in the colon that can become cancerous and should be removed. Multiple tests are available to screen for colon cancer, but the colonoscopy is the most recommended test. During colonoscopy, these polyps can be removed. How often you need this test depends on many things including your condition, your family history, symptoms, and what the findings were at the previous colonoscopy.   When the colon lining changes  Changes that happen in the cells that line the colon or rectum can lead to growths called polyps.  Over a period of years, polyps can turn cancerous. Removing polyps early may prevent cancer from ever forming.  Polyps  Polyps are fleshy clumps of tissue that form on the lining of the colon or rectum. Small polyps are usually benign (not cancerous). However, over time, cells in a polyp can change and become cancerous. Certain types of polyps known as adenomatous polyps are premalignant. The risk for invasive cancer increases with the size of the polyp and certain cell and gene features. This means that they can become cancerous if they're not removed. Hyperplastic polyps are benign. They can grow quite large and not turn cancerous.   Cancer  Almost all colorectal cancers start when polyp cells begin growing abnormally. As a cancerous tumor grows, it may involve more and more of the colon or rectum. In time, cancer can also grow beyond the colon or rectum and spread to nearby organs or to glands called lymph nodes. The cells can also travel to other parts of the body. This is known as metastasis. The earlier a cancerous tumor is removed, the better the chance of preventing its spread.    Date Last Reviewed: 8/1/2016  © 2271-6397 [employer]. [address] [place] [address]. All rights reserved. This information is not intended as a substitute for professional medical care. Always follow your healthcare professional's instructions.    High-Fiber Diet  Fiber is in fruits, vegetables, cereals, and grains. Fiber passes through your body undigested. A high-fiber diet helps food move through your intestinal tract. The added bulk is helpful in preventing constipation. In people with diverticulosis, fiber helps clean out the pouches along the colon wall. It also prevents new pouches from forming. A high-fiber diet reduces the risk of colon cancer. It also lowers blood cholesterol and prevents high blood sugar in people with diabetes.    The fiber-rich foods listed below should be part of your diet.  If you are not used to high-fiber foods, start with 1 or 2 foods from this list. Every 3 to 4 days add a new one to your diet. Do this until you are eating 4 high-fiber foods per day. This should give you 20 to 35 grams of fiber a day. It is also important to drink a lot of water when you are on this diet. You should have 6 to 8 glasses of water a day. Water makes the fiber swell and increases the benefit.  Foods high in dietary fiber  The following foods are high in dietary fiber:  Breads. Breads made with 100% whole-wheat flour; ricardo, wheat, or rye crackers; whole-grain tortillas, bran muffins.  Cereals. Whole-grain and bran cereals with bran (shredded wheat, wheat flakes, raisin bran, corn bran); oatmeal, rolled oats, granola, and brown rice.  Fruits. Fresh fruits and their edible skins (pears, prunes, raisins, berries, apples, and apricots); bananas, citrus fruit, mangoes, pineapple; and prune juice.  Nuts. Any nuts and seeds.  Vegetables. Best served raw or lightly cooked. All types, especially: green peas, celery, eggplant, potatoes, spinach, broccoli, Hershey sprouts, winter squash, carrots, cauliflower, soybeans, lentils, and fresh and dried beans of all kinds.  Other. Popcorn, any spices.  Date Last Reviewed: 8/1/2016 © 2000-2017 [employer]. [address] [place] [address]. All rights reserved. This information is not intended as a substitute for professional medical care. Always follow your healthcare professional's instructions.        Hemorrhoids    Hemorrhoids are swollen and inflamed veins inside the rectum and near the anus. The rectum is the last several inches of the colon. The anus is the passage between the rectum and the outside of the body.  Causes  The veins can become swollen due to increased pressure in them. This is most often caused by:  · Chronic constipation or diarrhea  · Straining when having a bowel movement  · Sitting too long on the toilet  · A low-fiber  diet  · Pregnancy  Symptoms  · Bleeding from the rectum (this may be noticeable after bowel movements)  · Lump near the anus  · Itching around the anus  · Pain around the anus  There are different types of hemorrhoids. Depending on the type you have and the severity, you may be able to treat yourself at home. In some cases, a procedure may be the best treatment option. Your healthcare provider can tell you more about this, if needed.  Home care  General care  · To get relief from pain or itching, try:  ¨ Topical products. Your healthcare provider may prescribe or recommend creams, ointments, or pads that can be applied to the hemorrhoid. Use these exactly as directed.  ¨ Medicines. Your healthcare provider may recommend stool softeners, suppositories, or laxatives to help manage constipation. Use these exactly as directed.  ¨ Sitz baths. A sitz bath involves sitting in a few inches of warm bath water. Be careful not to make the water so hot that you burn yourself--test it before sitting in it. Soak for about 10 to 15 minutes a few times a day. This may help relieve pain.  Tips to help prevent hemorrhoids  · Eat more fiber. Fiber adds bulk to stool and absorbs water as it moves through your colon. This makes stool softer and easier to pass.  ¨ Increase the fiber in your diet with more fiber-rich foods. These include fresh fruit, vegetables, and whole grains.  ¨ Take a fiber supplement or bulking agent, if advised to by your provider. These include products such as psyllium or methylcellulose.  · Drink plenty of water, if directed to by your provider. This can help keep stool soft.  · Be more active. Frequent exercise aids digestion and helps prevent constipation. It may also help make bowel movements more regular.  · Dont strain during bowel movements. This can make hemorrhoids more likely. Also, dont sit on the toilet for long periods of time.  Follow-up care  Follow up with your healthcare provider, or as  advised. If a culture or imaging tests were done, you will be notified of the results when they are ready. This may take a few days or longer.  When to seek medical advice  Call your healthcare provider right away if any of these occur:  · Increased bleeding from the rectum  · Increased pain around the rectum or anus  · Weakness or dizziness  Call 911  Call 911 or return to the emergency department right away if any of these occur:  · Trouble breathing or swallowing  · Fainting or loss of consciousness  · Unusually fast heart rate  · Vomiting blood  · Large amounts of blood in stool  Date Last Reviewed: 6/22/2015  © 2373-3973 [employer]. [address] [place] [address], [place] [address]. All rights reserved. This information is not intended as a substitute for professional medical care. Always follow your healthcare professional's instructions.

## 2019-12-30 NOTE — PLAN OF CARE
Dr. Blackwell at the bedside speaking to pt. Discharge instructions given to pt, pt's daughter and pt's friend. All verbalized understanding. IV removed, catheter intact. Ready for discharge.

## 2019-12-31 ENCOUNTER — PATIENT MESSAGE (OUTPATIENT)
Dept: ORTHOPEDICS | Facility: CLINIC | Age: 39
End: 2019-12-31

## 2019-12-31 VITALS
OXYGEN SATURATION: 96 % | TEMPERATURE: 98 F | BODY MASS INDEX: 33.18 KG/M2 | HEART RATE: 52 BPM | WEIGHT: 245 LBS | DIASTOLIC BLOOD PRESSURE: 80 MMHG | RESPIRATION RATE: 16 BRPM | HEIGHT: 72 IN | SYSTOLIC BLOOD PRESSURE: 125 MMHG

## 2020-01-03 ENCOUNTER — PATIENT MESSAGE (OUTPATIENT)
Dept: ORTHOPEDICS | Facility: CLINIC | Age: 40
End: 2020-01-03

## 2020-01-09 ENCOUNTER — PATIENT MESSAGE (OUTPATIENT)
Dept: GASTROENTEROLOGY | Facility: CLINIC | Age: 40
End: 2020-01-09

## 2020-01-14 ENCOUNTER — TELEPHONE (OUTPATIENT)
Dept: GASTROENTEROLOGY | Facility: CLINIC | Age: 40
End: 2020-01-14

## 2020-01-14 LAB
COMMENT: NORMAL
FINAL PATHOLOGIC DIAGNOSIS: NORMAL
GROSS: NORMAL

## 2020-01-14 NOTE — TELEPHONE ENCOUNTER
----- Message from Anselmo Blackwell MD sent at 1/14/2020  4:10 PM CST -----  Please advise patient that polyp pathology was reviewed and is benign and is the nonadenomatous type which is not precancerous/risk factor for cancer.  Repeat colonoscopy recommended in 10 years.  Place reminder in system for repeat colonoscopy.

## 2020-01-22 DIAGNOSIS — G43.719 INTRACTABLE CHRONIC MIGRAINE WITHOUT AURA AND WITHOUT STATUS MIGRAINOSUS: ICD-10-CM

## 2020-01-23 ENCOUNTER — TELEPHONE (OUTPATIENT)
Dept: PHARMACY | Facility: CLINIC | Age: 40
End: 2020-01-23

## 2020-01-30 ENCOUNTER — PATIENT MESSAGE (OUTPATIENT)
Dept: ORTHOPEDICS | Facility: CLINIC | Age: 40
End: 2020-01-30

## 2020-02-03 ENCOUNTER — PATIENT MESSAGE (OUTPATIENT)
Dept: ORTHOPEDICS | Facility: CLINIC | Age: 40
End: 2020-02-03

## 2020-02-03 ENCOUNTER — HOSPITAL ENCOUNTER (OUTPATIENT)
Dept: RADIOLOGY | Facility: HOSPITAL | Age: 40
Discharge: HOME | End: 2020-02-03
Attending: ORTHOPAEDIC SURGERY
Payer: COMMERCIAL

## 2020-02-03 ENCOUNTER — OFFICE VISIT (OUTPATIENT)
Dept: ORTHOPEDICS | Facility: CLINIC | Age: 40
End: 2020-02-03
Payer: COMMERCIAL

## 2020-02-03 VITALS
DIASTOLIC BLOOD PRESSURE: 58 MMHG | SYSTOLIC BLOOD PRESSURE: 125 MMHG | BODY MASS INDEX: 33.18 KG/M2 | HEIGHT: 72 IN | HEART RATE: 73 BPM | WEIGHT: 245 LBS

## 2020-02-03 DIAGNOSIS — M17.11 PATELLOFEMORAL ARTHRITIS OF RIGHT KNEE: Primary | ICD-10-CM

## 2020-02-03 DIAGNOSIS — M25.561 RIGHT KNEE PAIN, UNSPECIFIED CHRONICITY: ICD-10-CM

## 2020-02-03 DIAGNOSIS — M25.561 RIGHT KNEE PAIN, UNSPECIFIED CHRONICITY: Primary | ICD-10-CM

## 2020-02-03 PROCEDURE — 99999 PR PBB SHADOW E&M-EST. PATIENT-LVL III: ICD-10-PCS | Mod: PBBFAC,,, | Performed by: ORTHOPAEDIC SURGERY

## 2020-02-03 PROCEDURE — 99214 OFFICE O/P EST MOD 30 MIN: CPT | Mod: 57,S$GLB,, | Performed by: ORTHOPAEDIC SURGERY

## 2020-02-03 PROCEDURE — 73564 X-RAY EXAM KNEE 4 OR MORE: CPT | Mod: 26,RT,, | Performed by: RADIOLOGY

## 2020-02-03 PROCEDURE — 3008F BODY MASS INDEX DOCD: CPT | Mod: CPTII,S$GLB,, | Performed by: ORTHOPAEDIC SURGERY

## 2020-02-03 PROCEDURE — 73564 XR KNEE ORTHO RIGHT WITH FLEXION: ICD-10-PCS | Mod: 26,RT,, | Performed by: RADIOLOGY

## 2020-02-03 PROCEDURE — 73562 XR KNEE ORTHO RIGHT WITH FLEXION: ICD-10-PCS | Mod: 26,LT,, | Performed by: RADIOLOGY

## 2020-02-03 PROCEDURE — 73562 X-RAY EXAM OF KNEE 3: CPT | Mod: TC,PN,LT

## 2020-02-03 PROCEDURE — 3008F PR BODY MASS INDEX (BMI) DOCUMENTED: ICD-10-PCS | Mod: CPTII,S$GLB,, | Performed by: ORTHOPAEDIC SURGERY

## 2020-02-03 PROCEDURE — 99214 PR OFFICE/OUTPT VISIT, EST, LEVL IV, 30-39 MIN: ICD-10-PCS | Mod: 57,S$GLB,, | Performed by: ORTHOPAEDIC SURGERY

## 2020-02-03 PROCEDURE — 99999 PR PBB SHADOW E&M-EST. PATIENT-LVL III: CPT | Mod: PBBFAC,,, | Performed by: ORTHOPAEDIC SURGERY

## 2020-02-03 PROCEDURE — 73562 X-RAY EXAM OF KNEE 3: CPT | Mod: 26,LT,, | Performed by: RADIOLOGY

## 2020-02-03 NOTE — PROGRESS NOTES
Past Medical History:   Diagnosis Date    Abnormal Pap smear of cervix     Arthritis     OA    Back pain     Cancer     skin cancer to back    Depression     Endometriosis     Full dentures     GERD (gastroesophageal reflux disease)     Migraine     Pulmonary embolism     Seizures     Sleep apnea     Does not use c-pap since weight loss - 170 lbs    Uterine fibroid     Wears glasses        Past Surgical History:   Procedure Laterality Date    APPENDECTOMY      CARPAL TUNNEL RELEASE Bilateral      SECTION      CHOLECYSTECTOMY      COLONOSCOPY N/A 2019    Procedure: COLONOSCOPY;  Surgeon: Anselmo Blackwell MD;  Location: [facility] ENDO;  Service: Endoscopy;  Laterality: N/A;    EPIDURAL STEROID INJECTION INTO LUMBAR SPINE N/A 2019    Procedure: Injection-steroid-epidural-lumbar;  Surgeon: Yariel Ramirez MD;  Location: [facility] OR;  Service: Pain Management;  Laterality: N/A;  L3-4    ESOPHAGOGASTRODUODENOSCOPY N/A 2019    Procedure: EGD (ESOPHAGOGASTRODUODENOSCOPY);  Surgeon: Anselmo Blackwell MD;  Location: [facility];  Service: Endoscopy;  Laterality: N/A;    FRACTURE SURGERY      ankle    gastric sleve      HYSTERECTOMY      endo and fibroids    HYSTERECTOMY      JOINT REPLACEMENT Left 2018    KNEE ARTHROPLASTY Left 2018    Procedure: ARTHROPLASTY, KNEE;  Surgeon: Feliberto Cardenas MD;  Location: [facility] OR;  Service: Orthopedics;  Laterality: Left;    KNEE ARTHROSCOPY W/ MENISCECTOMY Right 10/25/2019    Procedure: ARTHROSCOPY, KNEE, WITH MENISCECTOMY;  Surgeon: Feliberto Cardenas MD;  Location: [facility] OR;  Service: Orthopedics;  Laterality: Right;    KNEE SURGERY      LUMBAR EPIDURAL INJECTION      OOPHORECTOMY Left     LSO    RADIAL NERVE Right     RECONSTRUCTION OF MEDIAL PATELLOFEMORAL LIGAMENT OF RIGHT KNEE Right 10/25/2019    Procedure: RECONSTRUCTION, LIGAMENT, MEDIAL PATELLOFEMORAL, RIGHT;  Surgeon: Feliberto Cardenas MD;  Location:  [facility] OR;  Service: Orthopedics;  Laterality: Right;    SINUS SURGERY      UPPER GASTROINTESTINAL ENDOSCOPY  11/27/2019    Dr. Blackwell; mild schatzki ring-dilated; gastritis; bx in process       Current Outpatient Medications   Medication Sig    amitriptyline (ELAVIL) 25 MG tablet TAKE ONE TABLET BY MOUTH EVERY NIGHT FOR 2 WEEKS THEN INCREASE TO 2 TABLETS AT BEDTIME    celecoxib (CELEBREX) 200 MG capsule Take 200 mg by mouth every evening.     cetirizine (ZYRTEC) 5 MG tablet Take 5 mg by mouth as needed.     cyanocobalamin, vitamin B-12, 1,000 mcg/mL Kit Inject 1 mL into the muscle every 21 days.    epinephrine (EPIPEN) 0.3 mg/0.3 mL (1:1,000) AtIn 0.3 mg daily as needed.     erenumab-aooe 140 mg/mL AtIn Inject 140 mg into the skin every 28 days.    fluoxetine (PROZAC) 20 MG capsule every evening.     hydrOXYzine pamoate (VISTARIL) 25 MG Cap TAKE ONE CAPSULE BY MOUTH 3 TIMES A DAY AS NEEDED FOR ANXIETY    ibuprofen (ADVIL,MOTRIN) 800 MG tablet every 6 (six) hours as needed.     ibuprofen/D5W 250mL Take 800 mg by mouth.    ketorolac (TORADOL) 30 mg/mL injection Use 30 mg IM q6 hours prn. Provide the patient with IM syringes and needles.    lorazepam (ATIVAN) 0.5 MG tablet Take 0.5 mg by mouth every 4 (four) hours as needed.     oxyCODONE (OXYCONTIN) 10 mg 12 hr tablet Take 10 mg by mouth every 12 (twelve) hours.    pantoprazole (PROTONIX) 40 MG tablet Take 1 tablet (40 mg total) by mouth 2 (two) times daily.    predniSONE (DELTASONE) 50 MG Tab Take 1 tablet (50 mg total) by mouth On call Procedure.    promethazine (PHENERGAN) 25 MG tablet Take 1 tablet (25 mg total) by mouth every 6 (six) hours as needed for Nausea. Take 1 tablet by mouth every 6 (six) hours as needed (migraine). -MAY CAUSE DROWSINESS-    tiZANidine (ZANAFLEX) 4 MG tablet Take 1 tablet (4 mg total) by mouth every 8 (eight) hours as needed.    VOLTAREN 1 % Gel daily as needed.      Current Facility-Administered Medications    Medication    onabotulinumtoxina injection 200 Units     Facility-Administered Medications Ordered in Other Visits   Medication    lactated ringers infusion    lidocaine (PF) 10 mg/ml (1%) injection 10 mg       Review of patient's allergies indicates:   Allergen Reactions    Clarithromycin Swelling and Other (See Comments)     DIFFICULTY SWALLOWING  DIFFICULTY SWALLOWING    Pcn [penicillins] Anaphylaxis    Sulfa (sulfonamide antibiotics) Hives    Wellbutrin [bupropion hcl] Shortness Of Breath and Anaphylaxis    Betadine [povidone-iodine] Hives     Swelling      Cefprozil Hives    Iodinated contrast media Hives    Latex, natural rubber Rash    Sulfamethoxazole-trimethoprim Nausea And Vomiting    Iodine Hives and Swelling    Latex Hives and Swelling       Family History   Problem Relation Age of Onset    Heart disease Mother     Heart disease Father     Esophageal cancer Maternal Grandmother     Breast cancer Maternal Aunt 46    Colon cancer Neg Hx     Stomach cancer Neg Hx     Ovarian cancer Neg Hx        Social History     Socioeconomic History    Marital status:      Spouse name: Not on file    Number of children: Not on file    Years of education: Not on file    Highest education level: Not on file   Occupational History    Not on file   Social Needs    Financial resource strain: Not on file    Food insecurity:     Worry: Not on file     Inability: Not on file    Transportation needs:     Medical: Not on file     Non-medical: Not on file   Tobacco Use    Smoking status: Current Every Day Smoker     Packs/day: 0.25     Years: 20.00     Pack years: 5.00     Types: Cigarettes     Last attempt to quit: 2018     Years since quittin.2    Smokeless tobacco: Never Used   Substance and Sexual Activity    Alcohol use: Yes     Alcohol/week: 0.0 standard drinks     Comment: occasionally    Drug use: Yes     Types: Other-see comments    Sexual activity: Yes     Partners:  Male   Lifestyle    Physical activity:     Days per week: Not on file     Minutes per session: Not on file    Stress: Not on file   Relationships    Social connections:     Talks on phone: Not on file     Gets together: Not on file     Attends Mandaen service: Not on file     Active member of club or organization: Not on file     Attends meetings of clubs or organizations: Not on file     Relationship status: Not on file   Other Topics Concern    Not on file   Social History Narrative    Not on file       Chief Complaint:   Chief Complaint   Patient presents with    Knee Pain     s/p right knee scope 10/25/19        Date of surgery:  October 25, 2019 right allograft MPFL reconstruction    History of present illness:  This is a 39-year-old female underwent right allograft MPFL reconstruction for chronic patellar instability. Patient has severe arthritic change of her patellofemoral joint unfortunately.  Rest of her knee actually looked pretty good.  Having a little pain and still has some grinding.  Pain is 6/10.  It some exercises on her own but it does not seem to help.  Having more pain in the ankle due to her not walking correctly. Cannot go up and down steps.  Has trouble getting out of chairs.  She has tried Celebrex and other anti-inflammatories without great success.    Review of Systems:    Musculoskeletal:  See HPI        Physical Examination:    Vital Signs:    Vitals:    02/03/20 0905   BP: (!) 125/58   Pulse: 73       Body mass index is 33.23 kg/m².    This a well-developed, well nourished patient in no acute distress.  They are alert and oriented and cooperative to examination.  Pt. walks without an antalgic gait.      Examination of the right knee shows well-healed surgical incisions.  No erythema or drainage.   Full range of motion. No calf pain. Negative Homans sign.  Significant patellofemoral crepitus.  Range of motion is 0-130 degrees. Stable to varus and valgus stress.    Heart is regular  rate without obvious murmurs   Normal respiratory effort without audible wheezing  Abdomen is soft and nontender     X-rays:  X-rays of the right knee is ordered and reviewed which show significant patellofemoral arthritis.  Well-maintained tibial femoral joint space.  Well-aligned left total knee arthroplasty     Assessment:  Right patellofemoral arthritis    Plan:  I reviewed the findings with her today.  We talked about continue with physical therapy and conservative management versus arthroplasty. We talked about patellofemoral joint replacement versus total old knee replacement. Patient does not want a patellofemoral placement.  She has done some research and rather do the whole thing if she is having a major surgery like that.  Plan will be for a Lilly CR right total knee arthroplasty. Risks, benefits, and alternatives to the procedure were explained to the patient including but not limited to damage to nerves, arteries, blood vessels, bones, tendons, ligaments, stiffness, instability, infection, DVT, PE, as well as general anesthetic complications including seizure, stroke, heart attack and even death. The patient understood these risks and wished to proceed and signed the informed consent.       This note was created using Click[employer] voice recognition software that occasionally misinterpreted phrases or words.

## 2020-02-06 PROBLEM — M17.11 PATELLOFEMORAL ARTHRITIS OF RIGHT KNEE: Status: ACTIVE | Noted: 2020-02-06

## 2020-02-06 RX ORDER — MUPIROCIN 20 MG/G
OINTMENT TOPICAL
Status: CANCELLED | OUTPATIENT
Start: 2020-02-06

## 2020-02-06 RX ORDER — CLINDAMYCIN PHOSPHATE 900 MG/50ML
900 INJECTION, SOLUTION INTRAVENOUS
Status: CANCELLED | OUTPATIENT
Start: 2020-02-06

## 2020-02-20 ENCOUNTER — TELEPHONE (OUTPATIENT)
Dept: PHARMACY | Facility: CLINIC | Age: 40
End: 2020-02-20

## 2020-03-16 ENCOUNTER — TELEPHONE (OUTPATIENT)
Dept: ORTHOPEDICS | Facility: CLINIC | Age: 40
End: 2020-03-16

## 2020-03-16 NOTE — TELEPHONE ENCOUNTER
Spoke with patient and informed her that we would need to cancel her pre op and joint camp classes that are currently scheduled for 03/26/2020. Patient informed that someone would be reaching out to her to reschedule at the direction of the hospital.

## 2020-03-19 ENCOUNTER — TELEPHONE (OUTPATIENT)
Dept: PHARMACY | Facility: CLINIC | Age: 40
End: 2020-03-19

## 2020-03-19 DIAGNOSIS — G43.719 INTRACTABLE CHRONIC MIGRAINE WITHOUT AURA AND WITHOUT STATUS MIGRAINOSUS: ICD-10-CM

## 2020-03-23 NOTE — TELEPHONE ENCOUNTER
Refill readiness for Aimovig confirmed with patient for migraines; name/ confirmed; no missed doses; no new medications; no new allergies; no side effects noted; address confirmed for 3/25 shipment and 3/26 delivery. $5 copay. CCOF 4499. Patient states she has 0 doses remaining and needs next dose by 3/28. No sharps container needed.    Yael Blake, Pharm.D.  Pharmacy Resident, PGY-1   [facility]

## 2020-04-20 ENCOUNTER — TELEPHONE (OUTPATIENT)
Dept: PHARMACY | Facility: CLINIC | Age: 40
End: 2020-04-20

## 2020-04-28 ENCOUNTER — PATIENT MESSAGE (OUTPATIENT)
Dept: NEUROLOGY | Facility: CLINIC | Age: 40
End: 2020-04-28

## 2020-04-28 NOTE — TELEPHONE ENCOUNTER
Called pt to discuss. Pt states that area of rash increasing in size and has sent another picture. Pt advised to take OYC benadryl to help. Please advise.

## 2020-05-05 ENCOUNTER — PATIENT MESSAGE (OUTPATIENT)
Dept: ORTHOPEDICS | Facility: CLINIC | Age: 40
End: 2020-05-05

## 2020-05-05 DIAGNOSIS — M17.11 ARTHRITIS OF RIGHT KNEE: ICD-10-CM

## 2020-05-05 DIAGNOSIS — Z01.818 PRE-OP TESTING: Primary | ICD-10-CM

## 2020-05-05 DIAGNOSIS — M17.11 PRIMARY OSTEOARTHRITIS OF RIGHT KNEE: Primary | ICD-10-CM

## 2020-05-05 RX ORDER — MUPIROCIN 20 MG/G
OINTMENT TOPICAL
Status: CANCELLED | OUTPATIENT
Start: 2020-05-05

## 2020-05-05 RX ORDER — CLINDAMYCIN PHOSPHATE 900 MG/50ML
900 INJECTION, SOLUTION INTRAVENOUS
Status: CANCELLED | OUTPATIENT
Start: 2020-05-05

## 2020-05-18 ENCOUNTER — TELEPHONE (OUTPATIENT)
Dept: PHARMACY | Facility: CLINIC | Age: 40
End: 2020-05-18

## 2020-05-26 ENCOUNTER — PATIENT MESSAGE (OUTPATIENT)
Dept: ORTHOPEDICS | Facility: CLINIC | Age: 40
End: 2020-05-26

## 2020-06-04 ENCOUNTER — HOSPITAL ENCOUNTER (OUTPATIENT)
Dept: RADIOLOGY | Facility: HOSPITAL | Age: 40
Discharge: HOME | End: 2020-06-04
Attending: ORTHOPAEDIC SURGERY
Payer: COMMERCIAL

## 2020-06-04 ENCOUNTER — HOSPITAL ENCOUNTER (OUTPATIENT)
Dept: PREADMISSION TESTING | Facility: HOSPITAL | Age: 40
Discharge: HOME | End: 2020-06-04
Attending: ORTHOPAEDIC SURGERY
Payer: COMMERCIAL

## 2020-06-04 VITALS — WEIGHT: 245 LBS | HEIGHT: 72 IN | BODY MASS INDEX: 33.18 KG/M2

## 2020-06-04 DIAGNOSIS — M17.11 PRIMARY OSTEOARTHRITIS OF RIGHT KNEE: ICD-10-CM

## 2020-06-04 DIAGNOSIS — Z01.818 PREOP TESTING: Primary | ICD-10-CM

## 2020-06-04 LAB
ABO + RH BLD: NORMAL
ANION GAP SERPL CALC-SCNC: 9 MMOL/L (ref 8–16)
BASOPHILS # BLD AUTO: 0.05 K/UL (ref 0–0.2)
BASOPHILS NFR BLD: 0.6 % (ref 0–1.9)
BLD GP AB SCN CELLS X3 SERPL QL: NORMAL
BUN SERPL-MCNC: 7 MG/DL (ref 6–20)
CALCIUM SERPL-MCNC: 9 MG/DL (ref 8.7–10.5)
CHLORIDE SERPL-SCNC: 106 MMOL/L (ref 95–110)
CO2 SERPL-SCNC: 25 MMOL/L (ref 23–29)
CREAT SERPL-MCNC: 0.7 MG/DL (ref 0.5–1.4)
DIFFERENTIAL METHOD: NORMAL
EOSINOPHIL # BLD AUTO: 0.2 K/UL (ref 0–0.5)
EOSINOPHIL NFR BLD: 2.5 % (ref 0–8)
ERYTHROCYTE [DISTWIDTH] IN BLOOD BY AUTOMATED COUNT: 13.6 % (ref 11.5–14.5)
EST. GFR  (AFRICAN AMERICAN): >60 ML/MIN/1.73 M^2
EST. GFR  (NON AFRICAN AMERICAN): >60 ML/MIN/1.73 M^2
GLUCOSE SERPL-MCNC: 107 MG/DL (ref 70–110)
HCT VFR BLD AUTO: 40.1 % (ref 37–48.5)
HGB BLD-MCNC: 13.3 G/DL (ref 12–16)
IMM GRANULOCYTES # BLD AUTO: 0.03 K/UL (ref 0–0.04)
IMM GRANULOCYTES NFR BLD AUTO: 0.4 % (ref 0–0.5)
LYMPHOCYTES # BLD AUTO: 2.2 K/UL (ref 1–4.8)
LYMPHOCYTES NFR BLD: 27.8 % (ref 18–48)
MCH RBC QN AUTO: 31 PG (ref 27–31)
MCHC RBC AUTO-ENTMCNC: 33.2 G/DL (ref 32–36)
MCV RBC AUTO: 94 FL (ref 82–98)
MONOCYTES # BLD AUTO: 0.6 K/UL (ref 0.3–1)
MONOCYTES NFR BLD: 7.6 % (ref 4–15)
NEUTROPHILS # BLD AUTO: 4.9 K/UL (ref 1.8–7.7)
NEUTROPHILS NFR BLD: 61.1 % (ref 38–73)
NRBC BLD-RTO: 0 /100 WBC
PLATELET # BLD AUTO: 245 K/UL (ref 150–350)
PMV BLD AUTO: 9.8 FL (ref 9.2–12.9)
POTASSIUM SERPL-SCNC: 3.8 MMOL/L (ref 3.5–5.1)
RBC # BLD AUTO: 4.29 M/UL (ref 4–5.4)
SODIUM SERPL-SCNC: 140 MMOL/L (ref 136–145)
WBC # BLD AUTO: 8.02 K/UL (ref 3.9–12.7)

## 2020-06-04 PROCEDURE — 99900104 DSU ONLY-NO CHARGE-EA ADD'L HR (STAT)

## 2020-06-04 PROCEDURE — 36415 COLL VENOUS BLD VENIPUNCTURE: CPT

## 2020-06-04 PROCEDURE — 71046 XR CHEST PA AND LATERAL: ICD-10-PCS | Mod: 26,,, | Performed by: RADIOLOGY

## 2020-06-04 PROCEDURE — 99900103 DSU ONLY-NO CHARGE-INITIAL HR (STAT)

## 2020-06-04 PROCEDURE — 87081 CULTURE SCREEN ONLY: CPT

## 2020-06-04 PROCEDURE — 80048 BASIC METABOLIC PNL TOTAL CA: CPT

## 2020-06-04 PROCEDURE — 71046 X-RAY EXAM CHEST 2 VIEWS: CPT | Mod: 26,,, | Performed by: RADIOLOGY

## 2020-06-04 PROCEDURE — 93005 ELECTROCARDIOGRAM TRACING: CPT

## 2020-06-04 PROCEDURE — 85025 COMPLETE CBC W/AUTO DIFF WBC: CPT

## 2020-06-04 PROCEDURE — 71046 X-RAY EXAM CHEST 2 VIEWS: CPT | Mod: TC,FY

## 2020-06-04 PROCEDURE — 86901 BLOOD TYPING SEROLOGIC RH(D): CPT

## 2020-06-04 NOTE — PRE ADMISSION SCREENING
JOINT CAMP ASSESSMENT    Name Aleyda Costa   MRN [id number]    Age/Sex 39 y.o. female    Surgeon Dr. Feliberto Leal*   Joint Camp Date 2020   Surgery Date 2020   Procedure Right knee arthroplasty   Insurance Payor: [employer] / Plan: [facility] CHOICE PLUS / Product Type: Commercial /    Care Team Patient Care Team:  Darlene Hayden MD as PCP - General (Family Medicine)  Feliberto Cardenas MD as Consulting Physician (Sports Medicine)    Pharmacy   [facility] [address]  [address] [address]  Phone: [phone] Fax: [phone]     [facility] [place] [place] [facility] [address] [facility]  [address] [address]  Phone: [phone] Fax: [phone]     AM-PAC Score     24   Risk Assessment Score 42     Past Medical History:   Diagnosis Date    Abnormal Pap smear of cervix     Arthritis     OA    Back pain     Cancer     skin cancer to back    Depression     Endometriosis     Full dentures     GERD (gastroesophageal reflux disease)     Migraine     Pulmonary embolism 2018    Seizures     Sleep apnea     Does not use c-pap since weight loss - 170 lbs    Uterine fibroid     Wears glasses        Past Surgical History:   Procedure Laterality Date    APPENDECTOMY      CARPAL TUNNEL RELEASE Bilateral      SECTION      CHOLECYSTECTOMY      COLONOSCOPY N/A 2019    Procedure: COLONOSCOPY;  Surgeon: Anselmo Blackwell MD;  Location: [facility];  Service: Endoscopy;  Laterality: N/A;    EPIDURAL STEROID INJECTION INTO LUMBAR SPINE N/A 2019    Procedure: Injection-steroid-epidural-lumbar;  Surgeon: Yariel Ramirez MD;  Location: [facility] OR;  Service: Pain Management;  Laterality: N/A;  L3-4    ESOPHAGOGASTRODUODENOSCOPY N/A 2019    Procedure: EGD (ESOPHAGOGASTRODUODENOSCOPY);  Surgeon: Anselmo Blackwell MD;  Location: [facility];  Service: Endoscopy;  Laterality: N/A;    FRACTURE SURGERY      ankle     gastric sleve      HYSTERECTOMY  2014    endo and fibroids    HYSTERECTOMY      JOINT REPLACEMENT Left 11/13/2018    KNEE ARTHROPLASTY Left 11/13/2018    Procedure: ARTHROPLASTY, KNEE;  Surgeon: Feliberto Cardenas MD;  Location: [facility] OR;  Service: Orthopedics;  Laterality: Left;    KNEE ARTHROSCOPY W/ MENISCECTOMY Right 10/25/2019    Procedure: ARTHROSCOPY, KNEE, WITH MENISCECTOMY;  Surgeon: Feliberto Cardenas MD;  Location: [facility] OR;  Service: Orthopedics;  Laterality: Right;    KNEE SURGERY      LUMBAR EPIDURAL INJECTION      OOPHORECTOMY Left     LSO    RADIAL NERVE Right     RECONSTRUCTION OF MEDIAL PATELLOFEMORAL LIGAMENT OF RIGHT KNEE Right 10/25/2019    Procedure: RECONSTRUCTION, LIGAMENT, MEDIAL PATELLOFEMORAL, RIGHT;  Surgeon: Feliberto Cardenas MD;  Location: [facility] OR;  Service: Orthopedics;  Laterality: Right;    SINUS SURGERY      UPPER GASTROINTESTINAL ENDOSCOPY  11/27/2019    Dr. Blackwell; mild schatzki ring-dilated; gastritis; bx in process         Home Enviroment     Living Arrangement: Lives with spouse, Lives with family  Home Environment: 1-story house/ trailer, number of outside stairs: 5, tub-shower  Home Safety Concerns: Pets in the home: cats (4).    DISCHARGE CAREGIVER/SUPPORT SYSTEM     Identified post-op caregiver: Patient has spouse / significant other and children / family / friends to help, patient.  Patient's caregiver(s) will be able to provide physical assistance. Patient will have someone to assist overnight.      Caregiver present at pre-op interview:  No      PRE-OPERATIVE FUNCTIONAL STATUS     Employment: Unemployed    Pre-op Functional Status: Patient is independent with mobility/ambulation, transfers, ADL's, IADL's.    Use of assistive device for ambulation: none  ADL: self care  ADL Limitations: difficulty with walking  Medical Restrictions: Morbid Obesity    POTENTIAL BARRIERS TO DISCHARGE/POTENTIAL POST-OP COMPLICATIONS   Patient is morbidly obese, a  current everyday smoker (half PPD) with a history of a pulmonary embolism and seizures.  She is under a pain management contract. She had a left knee arthroplasty in 2018 with a post-op pulmonary embolism and was treated with Xarelto and Pradaxa. She denies currently taking any anti-coagulants. Patient was encouraged to quit smoking.Education provided on DVT prophylaxis.  .  DISCHARGE PLAN     Expected LOS of 2 days or less for joint replacement discussed with patient. We also discussed a discharge path of HH for approximately two weeks with a transition to outpatient PT on the third week given no post-op complications.      Patient in agreement with discharge plan: Yes    Discharge to: Discharge home with home health (PT/OT) x2 weeks with transition to outpatient PT.      HH:  [facility]     OP PT: [facility]     Home DME: rolling walker  & raised toilet with grab bars, hip kit    Needed DME at D/C: Declined bath bench; stated she will sponge bathe     Rx: Per Dr. Cardenas at discharge      Meds to Beds: N/A  Patient expected to discharge on medication per hospitalist recommendation for DVT prophylaxis.

## 2020-06-04 NOTE — OR NURSING
Reminded Sabra with Dr. Cardenas that  Patient stated getting a Pulmonary Emboli after surgery with Dr. Cardenas last time and The patient stated she doesn't take pradaxa anymore.

## 2020-06-04 NOTE — PRE ADMISSION SCREENING
[facility] Risk Assessment Scale    Patient Name: Aleyda Costa  YOB: 1980  MRN: [id number]            RIsk Factor Measure Recommendation Patient Data Scale/Score   BMI >40 Reconsider surgery, weight loss   Estimated body mass index is 33.23 kg/m² as calculated from the following:    Height as of this encounter: 6' (1.829 m).    Weight as of this encounter: 111.1 kg (245 lb).   [] 0 = 1 - 24.9  [] 1 = 25-29.9  [x] 2 = 30-34.9  [] 3 = 35-39.9  [] 4 = 40-44.9  [] 5 = 45-99.9   Hemoglobin AIC (if applicable) >9 Delay surgery until DM under control  Refer for:  · Nutrition Therapy  · Exercise   · Medication    No results found for: LABA1C, HGBA1C    Lab Results   Component Value Date     06/04/2020      [] 0 = 4.0-5.6  [] 1 = 5.7-6.4  [] 2 = 6.5-6.9  [] 3 = 7.0-7.9  [] 4 = 8.0-8.9  [] 5 = 9.0-12   Hemoglobin (Anemia) <9 Delay surgery   Correct anemia Lab Results   Component Value Date    HGB 13.3 06/04/2020    [] 20 - <9.0                    Albumin <3 Delay surgery &Workup Lab Results   Component Value Date    ALBUMIN 3.9 12/10/2019    [] 20 - <3.0   Smoking Cessation >4 Weeks Delay Surgery  Refer to OP Cessation Class     [x] 20 - current smoker                                __half___ PPD                    Hx of MI, PE, Arrhythmia, CVA, DVT <30 Days Delay Surgery     [x] 20      Infection Variable Delay surgery and re-evaluate   N/A [] 20 - recent/current infection     Depression (PHQ) >10 out of 27 Delay Surgery and re-evaluate  Medication  Counseling              [x] 0     []1     []2     []3      []4      [] 5                    (1-4)      (5-9)  (10-14)  (15-19)   (20-27)     Memory Impairment & Memory loss (Mini-Cog Screening Tool) Advanced dementia and/or Parkinson's Reconsider surgery     [x] 0     []1     []2     []3     []4     [] 5     Physical Conditioning (Modified AM-PAC Per Physical [facility]) Unable to ambulate on day of surgery Delay surgery and  re-evaluate  Pre-Rehabilitation   (PT evaluation)       [x]  0   []4       []8     []12        []16     []20       (<20%)   (<40%)   (<60%)   (<80% )    (>80%)     Home Environment/Caregiver support  (Per /Navigator Interview)    Availability of basic services and/or approprate assistance during post-operative period Delay surgery and re-evaluate  Safe home environment  Health   1 week post-surgery  Transportation  availability  Ability to obtain DME/Medications post-op    [x] 0     []1     []2     []3     []4     [] 5  [x] 0     []1     []2     []3     []4     [] 5  [x] 0     []1     []2     []3     []4     [] 5  [x] 0     []1     []2     []3     []4     [] 5         MD Contact: Ronald Comments:current smoker with hx of PE after previous (L) TKA in 2018  Total Score:  42

## 2020-06-04 NOTE — PRE ADMISSION SCREENING
Patient Name: Aleyda Costa  YOB: 1980   MRN: [id number]     [facility]   Basic Mobility Inpatient Short Form 6 Clicks         How much difficulty does the patient currently have  Unable  A Lot  A Little  None      1. Turning over in bed (including adjusting bedclothes, sheets and blankets)?     1 []    2 []    3 []    4 [x]        2. Sitting down on and standing up from a chair with arms (e.g., wheelchair, bedside commode, etc.)     1 []  2 []  3 []     4 [x]      3. Moving from lying on back to sitting on the side of the bed?     1 []  2 []  3 []    4 [x]    How much help from another person does the patient currently need  Total  A Lot  A Little  None      4. Moving to and from a bed to a chair (including a wheelchair)?    1 []  2 []  3 []    4 [x]      5. Need to walk in hospital room?    1 []  2 []  3 []    4 [x]      6. Climbing 3-5 steps with a railing?    1 []  2 []  3 []    4 [x]       Raw Score:    24              CMS 0-100% Score:    0.00        %   Standardized Score:      6.94         CMS Modifier:       [facility]   Basic Mobility Inpatient Short Form 6 Clicks Score Conversion Table*         *Use this form to convert AM-PAC Basic Mobility Inpatient Raw Scores.   [facility] Inpatient Basic Mobility Short Form Scoring Example   1. Add the number values associated with the response to each item. For example, items totals yield a Raw Score of 21.   2. Match the raw score to the t-Scale scores (t-Scale score = 50.25, SE = 4.69).   3. Find the associated CMS % (CMS % = 28.97%).   4. Locate the correct CMS Functional Modifier Code, or G Code (G code = CJ)     NOTE: Each -PAC Short Form has a separate conversion table. Make sure that you use the correct conversion table.       Instruction Manual - page 45 contains conversion table

## 2020-06-04 NOTE — DISCHARGE INSTRUCTIONS
To confirm, Your doctor has instructed you that surgery is scheduled for: 6/16/2020    Please report to [facility], Registration the morning of surgery. You must check-in and receive a wristband before going to your procedure.    Pre-Op will call the afternoon prior to surgery between 1:00 and 6:00 PM with the final arrival time.  Phone number: [phone]    PLEASE NOTE:  The surgery schedule has many variables which may affect the time of your surgery case.  Family members should be available if your surgery time changes.  Plan to be here the day of your procedure between 4-6 hours.    MEDICATIONS:  TAKE ONLY THESE MEDICATIONS WITH A SMALL SIP OF WATER THE MORNING OF YOUR PROCEDURE:  VISTARIL IF NEEDED, TIZANIDINE IF NEEDED, OXYCODONE IF NEEDED, PROTONIX    DO NOT TAKE THESE MEDICATIONS 5-7 DAYS PRIOR to your procedure or per your surgeon's request:  ASPIRIN, ALEVE, ADVIL, IBUPROFEN, FISH OIL VITAMIN E, HERBALS, CELEBREX  (May take Tylenol)    ONLY if you are prescribed any types of blood thinners such as:  Aspirin, Coumadin, Plavix, Pradaxa, Xarelto, Aggrenox, Effient, Eliquis, Savasya, Brilinta, or any other, ask your surgeon whether you should stop taking them and how long before surgery you should stop.  You may also need to verify with the prescribing physician if it is ok to stop your medication.      INSTRUCTIONS IMPORTANT!!  Do not eat or drink anything between midnight and the time of your procedure- this includes gum, mints, and candy.  Do not smoke or drink alcoholic beverages 24 hours prior to your procedure.  Shower the night before AND the morning of your procedure with a Chlorhexidine wash such as Hibiclens or Dial antibacterial soap from the neck down.  Do not get it on your face or in your eyes.  You may use your own shampoo and face wash. This helps your skin to be as bacteria free as possible.    If you wear contact lenses, dentures, hearing aids or glasses, bring a  container to put them in during surgery and give to a family member for safe keeping.  Please leave all jewelry, piercing's and valuables at home.   DO NOT remove hair from the surgery site.  Do not shave the incision site unless you are given specific instructions to do so.    ONLY if you have been diagnosed with sleep apnea please bring your C-PAP machine.  ONLY if you wear home oxygen please bring your portable oxygen tank the day of your procedure.  ONLY if you have a history of OPEN HEART SURGERY you will need a clearance from your Cardiologist per Anesthesia.      ONLY for patients requiring bowel prep, written instructions will be given by your doctor's office.  ONLY if you have a neuro stimulator, please bring the controller with you the morning of surgery  ONLY if a type and screen test is needed before surgery, please return: done today  If your doctor has scheduled you for an overnight stay, bring a small overnight bag with any personal items you need.  Make arrangements in advance for transportation home by a responsible adult.  It is not safe to drive a vehicle during the 24 hours after anesthesia.       All [facility] facilities and properties are tobacco free.  Smoking is NOT allowed.      COVID TESTING SHOULD BE DONE THROUGH OUR DRIVE THROUGH TESTING AREA 48 HOURS PRIOR TO YOUR PROCEDURE.  THIS IS LOCATED NEXT TO THE EMERGENCY ROOM.    CONTACT NUMBER: [phone]    ONE SPOUSE/PARTNER OR SUPPORT PERSON MAY REMAIN WITH THE PATIENT PRIOR TO SURGERY AND MUST THEN WAIT IN A SOCIALLY DISTANT MANNER UNTIL A MEMBER OF THE SURGERY TEAM PROVIDES AN UPDATE AT THE CONCLUSION OF SURGERY.  IF THE PATIENT IS BEING DISCHARGED HOME THE PATIENT AND VISITOR MAY TRAVEL HOME SAFELY TOGETHER.  IF THE PATIENT IS BEING ADMITTED TO THE HOSPITAL AFTER SURGERY, VISITORS WILL ONLY BE ALLOWED TO REMAIN WITH THE PATIENT IF THEY MEET VISITOR CRITERIA FOR INPATIENT UNITS.    If you have any questions about these instructions, call  Pre-Op Admit  Nursing at [phone] or the Pre-Op Day Surgery Unit at [phone].

## 2020-06-06 LAB — MRSA SPEC QL CULT: NORMAL

## 2020-06-12 DIAGNOSIS — M17.11 PRIMARY OSTEOARTHRITIS OF RIGHT KNEE: Primary | ICD-10-CM

## 2020-06-14 ENCOUNTER — PROCEDURE VISIT (OUTPATIENT)
Dept: PRIMARY CARE CLINIC | Facility: CLINIC | Age: 40
End: 2020-06-14
Payer: COMMERCIAL

## 2020-06-14 DIAGNOSIS — Z01.818 PRE-OP TESTING: ICD-10-CM

## 2020-06-14 PROCEDURE — U0003 INFECTIOUS AGENT DETECTION BY NUCLEIC ACID (DNA OR RNA); SEVERE ACUTE RESPIRATORY SYNDROME CORONAVIRUS 2 (SARS-COV-2) (CORONAVIRUS DISEASE [COVID-19]), AMPLIFIED PROBE TECHNIQUE, MAKING USE OF HIGH THROUGHPUT TECHNOLOGIES AS DESCRIBED BY CMS-2020-01-R: HCPCS

## 2020-06-15 ENCOUNTER — ANESTHESIA EVENT (OUTPATIENT)
Dept: SURGERY | Facility: HOSPITAL | Age: 40
DRG: 470 | End: 2020-06-15
Payer: COMMERCIAL

## 2020-06-15 LAB — SARS-COV-2 RNA RESP QL NAA+PROBE: NOT DETECTED

## 2020-06-15 RX ORDER — FENTANYL CITRATE 50 UG/ML
100 INJECTION, SOLUTION INTRAMUSCULAR; INTRAVENOUS SEE ADMIN INSTRUCTIONS
Status: CANCELLED | OUTPATIENT
Start: 2020-06-15

## 2020-06-16 ENCOUNTER — ANESTHESIA (OUTPATIENT)
Dept: SURGERY | Facility: HOSPITAL | Age: 40
DRG: 470 | End: 2020-06-16
Payer: COMMERCIAL

## 2020-06-16 ENCOUNTER — HOSPITAL ENCOUNTER (INPATIENT)
Facility: HOSPITAL | Age: 40
LOS: 1 days | Discharge: HOME HEALTH | DRG: 470 | End: 2020-06-17
Attending: ORTHOPAEDIC SURGERY | Admitting: ORTHOPAEDIC SURGERY
Payer: COMMERCIAL

## 2020-06-16 DIAGNOSIS — M17.11 PRIMARY OSTEOARTHRITIS OF RIGHT KNEE: ICD-10-CM

## 2020-06-16 DIAGNOSIS — Z96.652 S/P TOTAL KNEE ARTHROPLASTY, LEFT: Primary | ICD-10-CM

## 2020-06-16 DIAGNOSIS — M17.11 ARTHRITIS OF RIGHT KNEE: ICD-10-CM

## 2020-06-16 PROBLEM — Z86.711 HISTORY OF PULMONARY EMBOLISM: Status: ACTIVE | Noted: 2020-06-16

## 2020-06-16 PROBLEM — F41.1 GENERALIZED ANXIETY DISORDER: Status: ACTIVE | Noted: 2020-06-16

## 2020-06-16 PROCEDURE — 37000008 HC ANESTHESIA 1ST 15 MINUTES: Performed by: ORTHOPAEDIC SURGERY

## 2020-06-16 PROCEDURE — 36000711: Performed by: ORTHOPAEDIC SURGERY

## 2020-06-16 PROCEDURE — 64447 NJX AA&/STRD FEMORAL NRV IMG: CPT | Mod: 59,RT,, | Performed by: ANESTHESIOLOGY

## 2020-06-16 PROCEDURE — 36000710: Performed by: ORTHOPAEDIC SURGERY

## 2020-06-16 PROCEDURE — 63600175 PHARM REV CODE 636 W HCPCS

## 2020-06-16 PROCEDURE — C1776 JOINT DEVICE (IMPLANTABLE): HCPCS | Performed by: ORTHOPAEDIC SURGERY

## 2020-06-16 PROCEDURE — 63600175 PHARM REV CODE 636 W HCPCS: Performed by: ORTHOPAEDIC SURGERY

## 2020-06-16 PROCEDURE — 27447 PR TOTAL KNEE ARTHROPLASTY: ICD-10-PCS | Mod: RT,,, | Performed by: ORTHOPAEDIC SURGERY

## 2020-06-16 PROCEDURE — 99900103 DSU ONLY-NO CHARGE-INITIAL HR (STAT): Performed by: ORTHOPAEDIC SURGERY

## 2020-06-16 PROCEDURE — 63600175 PHARM REV CODE 636 W HCPCS: Performed by: ANESTHESIOLOGY

## 2020-06-16 PROCEDURE — 64447 NJX AA&/STRD FEMORAL NRV IMG: CPT | Performed by: ANESTHESIOLOGY

## 2020-06-16 PROCEDURE — 99900104 DSU ONLY-NO CHARGE-EA ADD'L HR (STAT): Performed by: ORTHOPAEDIC SURGERY

## 2020-06-16 PROCEDURE — 64447 PR NERVE BLOCK INJ, ANES/STEROID, FEMORAL, INCL IMAG GUIDANCE: ICD-10-PCS | Mod: 59,RT,, | Performed by: ANESTHESIOLOGY

## 2020-06-16 PROCEDURE — 25000003 PHARM REV CODE 250: Performed by: NURSE PRACTITIONER

## 2020-06-16 PROCEDURE — 76942 ECHO GUIDE FOR BIOPSY: CPT | Mod: 26,,, | Performed by: ANESTHESIOLOGY

## 2020-06-16 PROCEDURE — 27201423 OPTIME MED/SURG SUP & DEVICES STERILE SUPPLY: Performed by: ORTHOPAEDIC SURGERY

## 2020-06-16 PROCEDURE — 25000003 PHARM REV CODE 250: Performed by: ORTHOPAEDIC SURGERY

## 2020-06-16 PROCEDURE — D9220A PRA ANESTHESIA: Mod: ANES,,, | Performed by: ANESTHESIOLOGY

## 2020-06-16 PROCEDURE — 97116 GAIT TRAINING THERAPY: CPT

## 2020-06-16 PROCEDURE — D9220A PRA ANESTHESIA: Mod: CRNA,,, | Performed by: NURSE ANESTHETIST, CERTIFIED REGISTERED

## 2020-06-16 PROCEDURE — 25000003 PHARM REV CODE 250: Performed by: ANESTHESIOLOGY

## 2020-06-16 PROCEDURE — D9220A PRA ANESTHESIA: ICD-10-PCS | Mod: CRNA,,, | Performed by: NURSE ANESTHETIST, CERTIFIED REGISTERED

## 2020-06-16 PROCEDURE — 97161 PT EVAL LOW COMPLEX 20 MIN: CPT

## 2020-06-16 PROCEDURE — C1713 ANCHOR/SCREW BN/BN,TIS/BN: HCPCS | Performed by: ORTHOPAEDIC SURGERY

## 2020-06-16 PROCEDURE — D9220A PRA ANESTHESIA: ICD-10-PCS | Mod: ANES,,, | Performed by: ANESTHESIOLOGY

## 2020-06-16 PROCEDURE — 97110 THERAPEUTIC EXERCISES: CPT

## 2020-06-16 PROCEDURE — 63600175 PHARM REV CODE 636 W HCPCS: Performed by: NURSE ANESTHETIST, CERTIFIED REGISTERED

## 2020-06-16 PROCEDURE — 25000003 PHARM REV CODE 250: Performed by: HOSPITALIST

## 2020-06-16 PROCEDURE — 37000009 HC ANESTHESIA EA ADD 15 MINS: Performed by: ORTHOPAEDIC SURGERY

## 2020-06-16 PROCEDURE — 27447 TOTAL KNEE ARTHROPLASTY: CPT | Mod: RT,,, | Performed by: ORTHOPAEDIC SURGERY

## 2020-06-16 PROCEDURE — 71000033 HC RECOVERY, INTIAL HOUR: Performed by: ORTHOPAEDIC SURGERY

## 2020-06-16 PROCEDURE — 76942 PR U/S GUIDANCE FOR NEEDLE GUIDANCE: ICD-10-PCS | Mod: 26,,, | Performed by: ANESTHESIOLOGY

## 2020-06-16 PROCEDURE — 25000003 PHARM REV CODE 250: Performed by: NURSE ANESTHETIST, CERTIFIED REGISTERED

## 2020-06-16 PROCEDURE — C9290 INJ, BUPIVACAINE LIPOSOME: HCPCS | Performed by: ANESTHESIOLOGY

## 2020-06-16 PROCEDURE — 27200750 HC INSULATED NEEDLE/ STIMUPLEX: Performed by: ANESTHESIOLOGY

## 2020-06-16 PROCEDURE — 12000002 HC ACUTE/MED SURGE SEMI-PRIVATE ROOM

## 2020-06-16 PROCEDURE — 94761 N-INVAS EAR/PLS OXIMETRY MLT: CPT

## 2020-06-16 DEVICE — INSERT TIBIAL SZ 5 9MMX3: Type: IMPLANTABLE DEVICE | Site: KNEE | Status: FUNCTIONAL

## 2020-06-16 DEVICE — FEMORAL CRUC RTN CEM SZ 6 RT: Type: IMPLANTABLE DEVICE | Site: KNEE | Status: FUNCTIONAL

## 2020-06-16 DEVICE — CEMENT BONE ANTIBIO SIMPLEX P: Type: IMPLANTABLE DEVICE | Site: KNEE | Status: FUNCTIONAL

## 2020-06-16 DEVICE — PATELLA TRI 33X9 X3 POLYETHYLE: Type: IMPLANTABLE DEVICE | Site: KNEE | Status: FUNCTIONAL

## 2020-06-16 DEVICE — PRIMARY TIBIAL BASE 5.: Type: IMPLANTABLE DEVICE | Site: KNEE | Status: FUNCTIONAL

## 2020-06-16 RX ORDER — ACETAMINOPHEN 10 MG/ML
INJECTION, SOLUTION INTRAVENOUS
Status: DISCONTINUED | OUTPATIENT
Start: 2020-06-16 | End: 2020-06-16

## 2020-06-16 RX ORDER — EPHEDRINE SULFATE 50 MG/ML
INJECTION, SOLUTION INTRAVENOUS
Status: DISCONTINUED | OUTPATIENT
Start: 2020-06-16 | End: 2020-06-16

## 2020-06-16 RX ORDER — MUPIROCIN 20 MG/G
1 OINTMENT TOPICAL 2 TIMES DAILY
Status: DISCONTINUED | OUTPATIENT
Start: 2020-06-16 | End: 2020-06-17 | Stop reason: HOSPADM

## 2020-06-16 RX ORDER — FLUOXETINE HYDROCHLORIDE 20 MG/1
20 CAPSULE ORAL NIGHTLY
Status: DISCONTINUED | OUTPATIENT
Start: 2020-06-16 | End: 2020-06-17 | Stop reason: HOSPADM

## 2020-06-16 RX ORDER — CELECOXIB 100 MG/1
100 CAPSULE ORAL DAILY
Status: DISCONTINUED | OUTPATIENT
Start: 2020-06-17 | End: 2020-06-16

## 2020-06-16 RX ORDER — MIDAZOLAM HYDROCHLORIDE 1 MG/ML
INJECTION, SOLUTION INTRAMUSCULAR; INTRAVENOUS
Status: DISCONTINUED | OUTPATIENT
Start: 2020-06-16 | End: 2020-06-16

## 2020-06-16 RX ORDER — OXYCODONE HCL 10 MG/1
10 TABLET, FILM COATED, EXTENDED RELEASE ORAL EVERY 12 HOURS
Status: DISCONTINUED | OUTPATIENT
Start: 2020-06-16 | End: 2020-06-16

## 2020-06-16 RX ORDER — OXYCODONE HYDROCHLORIDE 10 MG/1
10 TABLET ORAL
Status: DISCONTINUED | OUTPATIENT
Start: 2020-06-16 | End: 2020-06-16 | Stop reason: HOSPADM

## 2020-06-16 RX ORDER — ACETAMINOPHEN 325 MG/1
650 TABLET ORAL EVERY 6 HOURS
Status: DISCONTINUED | OUTPATIENT
Start: 2020-06-16 | End: 2020-06-16 | Stop reason: HOSPADM

## 2020-06-16 RX ORDER — HYDROXYZINE PAMOATE 25 MG/1
25 CAPSULE ORAL 3 TIMES DAILY PRN
Status: DISCONTINUED | OUTPATIENT
Start: 2020-06-16 | End: 2020-06-17 | Stop reason: HOSPADM

## 2020-06-16 RX ORDER — LIDOCAINE HYDROCHLORIDE 20 MG/ML
INJECTION INTRAVENOUS
Status: DISCONTINUED | OUTPATIENT
Start: 2020-06-16 | End: 2020-06-16

## 2020-06-16 RX ORDER — SODIUM CHLORIDE 0.9 % (FLUSH) 0.9 %
3 SYRINGE (ML) INJECTION EVERY 8 HOURS
Status: DISCONTINUED | OUTPATIENT
Start: 2020-06-16 | End: 2020-06-16 | Stop reason: HOSPADM

## 2020-06-16 RX ORDER — DIPHENHYDRAMINE HYDROCHLORIDE 50 MG/ML
25 INJECTION INTRAMUSCULAR; INTRAVENOUS EVERY 6 HOURS PRN
Status: DISCONTINUED | OUTPATIENT
Start: 2020-06-16 | End: 2020-06-16 | Stop reason: HOSPADM

## 2020-06-16 RX ORDER — PREGABALIN 75 MG/1
75 CAPSULE ORAL
Status: COMPLETED | OUTPATIENT
Start: 2020-06-16 | End: 2020-06-16

## 2020-06-16 RX ORDER — MUPIROCIN 20 MG/G
OINTMENT TOPICAL
Status: DISCONTINUED | OUTPATIENT
Start: 2020-06-16 | End: 2020-06-16 | Stop reason: HOSPADM

## 2020-06-16 RX ORDER — ONDANSETRON 2 MG/ML
4 INJECTION INTRAMUSCULAR; INTRAVENOUS DAILY PRN
Status: DISCONTINUED | OUTPATIENT
Start: 2020-06-16 | End: 2020-06-16

## 2020-06-16 RX ORDER — CELECOXIB 100 MG/1
100 CAPSULE ORAL DAILY
Status: DISCONTINUED | OUTPATIENT
Start: 2020-06-17 | End: 2020-06-16 | Stop reason: HOSPADM

## 2020-06-16 RX ORDER — ONDANSETRON 2 MG/ML
4 INJECTION INTRAMUSCULAR; INTRAVENOUS EVERY 12 HOURS PRN
Status: DISCONTINUED | OUTPATIENT
Start: 2020-06-16 | End: 2020-06-16 | Stop reason: HOSPADM

## 2020-06-16 RX ORDER — OXYCODONE HYDROCHLORIDE 5 MG/1
5 TABLET ORAL
Status: DISCONTINUED | OUTPATIENT
Start: 2020-06-16 | End: 2020-06-16 | Stop reason: HOSPADM

## 2020-06-16 RX ORDER — DOCUSATE SODIUM 100 MG/1
100 CAPSULE, LIQUID FILLED ORAL EVERY 12 HOURS
Status: DISCONTINUED | OUTPATIENT
Start: 2020-06-16 | End: 2020-06-17 | Stop reason: HOSPADM

## 2020-06-16 RX ORDER — DEXTROSE MONOHYDRATE AND SODIUM CHLORIDE 5; .9 G/100ML; G/100ML
INJECTION, SOLUTION INTRAVENOUS CONTINUOUS
Status: DISCONTINUED | OUTPATIENT
Start: 2020-06-16 | End: 2020-06-17 | Stop reason: HOSPADM

## 2020-06-16 RX ORDER — ONDANSETRON 2 MG/ML
4 INJECTION INTRAMUSCULAR; INTRAVENOUS EVERY 4 HOURS PRN
Status: DISCONTINUED | OUTPATIENT
Start: 2020-06-16 | End: 2020-06-17 | Stop reason: HOSPADM

## 2020-06-16 RX ORDER — FAMOTIDINE 20 MG/1
20 TABLET, FILM COATED ORAL 2 TIMES DAILY
Status: DISCONTINUED | OUTPATIENT
Start: 2020-06-16 | End: 2020-06-16

## 2020-06-16 RX ORDER — ZOLPIDEM TARTRATE 5 MG/1
5 TABLET ORAL NIGHTLY PRN
Status: DISCONTINUED | OUTPATIENT
Start: 2020-06-16 | End: 2020-06-16 | Stop reason: HOSPADM

## 2020-06-16 RX ORDER — HYDROMORPHONE HYDROCHLORIDE 2 MG/ML
0.2 INJECTION, SOLUTION INTRAMUSCULAR; INTRAVENOUS; SUBCUTANEOUS EVERY 5 MIN PRN
Status: DISCONTINUED | OUTPATIENT
Start: 2020-06-16 | End: 2020-06-16 | Stop reason: HOSPADM

## 2020-06-16 RX ORDER — HYDROCODONE BITARTRATE AND ACETAMINOPHEN 5; 325 MG/1; MG/1
1 TABLET ORAL EVERY 6 HOURS PRN
Qty: 10 TABLET | Refills: 0 | Status: SHIPPED | OUTPATIENT
Start: 2020-06-16 | End: 2020-06-21 | Stop reason: CLARIF

## 2020-06-16 RX ORDER — CELECOXIB 100 MG/1
200 CAPSULE ORAL ONCE
Status: DISCONTINUED | OUTPATIENT
Start: 2020-06-16 | End: 2020-06-16

## 2020-06-16 RX ORDER — VANCOMYCIN HCL IN 5 % DEXTROSE 1G/250ML
1000 PLASTIC BAG, INJECTION (ML) INTRAVENOUS
Status: COMPLETED | OUTPATIENT
Start: 2020-06-16 | End: 2020-06-16

## 2020-06-16 RX ORDER — FENTANYL CITRATE 50 UG/ML
INJECTION, SOLUTION INTRAMUSCULAR; INTRAVENOUS
Status: DISCONTINUED | OUTPATIENT
Start: 2020-06-16 | End: 2020-06-16

## 2020-06-16 RX ORDER — TRANEXAMIC ACID 100 MG/ML
INJECTION, SOLUTION INTRAVENOUS
Status: DISCONTINUED | OUTPATIENT
Start: 2020-06-16 | End: 2020-06-16

## 2020-06-16 RX ORDER — PROPOFOL 10 MG/ML
VIAL (ML) INTRAVENOUS CONTINUOUS PRN
Status: DISCONTINUED | OUTPATIENT
Start: 2020-06-16 | End: 2020-06-16

## 2020-06-16 RX ORDER — LORAZEPAM 0.5 MG/1
0.5 TABLET ORAL EVERY 4 HOURS PRN
Status: DISCONTINUED | OUTPATIENT
Start: 2020-06-16 | End: 2020-06-17 | Stop reason: HOSPADM

## 2020-06-16 RX ORDER — SODIUM CHLORIDE 0.9 % (FLUSH) 0.9 %
10 SYRINGE (ML) INJECTION
Status: DISCONTINUED | OUTPATIENT
Start: 2020-06-16 | End: 2020-06-17 | Stop reason: HOSPADM

## 2020-06-16 RX ORDER — MIDAZOLAM HYDROCHLORIDE 1 MG/ML
2 INJECTION INTRAMUSCULAR; INTRAVENOUS
Status: DISCONTINUED | OUTPATIENT
Start: 2020-06-16 | End: 2020-06-16

## 2020-06-16 RX ORDER — METHOCARBAMOL 500 MG/1
500 TABLET, FILM COATED ORAL 3 TIMES DAILY
Status: DISCONTINUED | OUTPATIENT
Start: 2020-06-16 | End: 2020-06-16 | Stop reason: HOSPADM

## 2020-06-16 RX ORDER — FENTANYL CITRATE 50 UG/ML
25 INJECTION, SOLUTION INTRAMUSCULAR; INTRAVENOUS EVERY 5 MIN PRN
Status: DISCONTINUED | OUTPATIENT
Start: 2020-06-16 | End: 2020-06-16 | Stop reason: HOSPADM

## 2020-06-16 RX ORDER — CELECOXIB 100 MG/1
400 CAPSULE ORAL
Status: COMPLETED | OUTPATIENT
Start: 2020-06-16 | End: 2020-06-16

## 2020-06-16 RX ORDER — LOPERAMIDE HYDROCHLORIDE 2 MG/1
2 CAPSULE ORAL CONTINUOUS PRN
Status: DISCONTINUED | OUTPATIENT
Start: 2020-06-16 | End: 2020-06-17 | Stop reason: HOSPADM

## 2020-06-16 RX ORDER — OXYCODONE HCL 10 MG/1
10 TABLET, FILM COATED, EXTENDED RELEASE ORAL EVERY 12 HOURS
Status: DISCONTINUED | OUTPATIENT
Start: 2020-06-16 | End: 2020-06-16 | Stop reason: HOSPADM

## 2020-06-16 RX ORDER — CLINDAMYCIN PHOSPHATE 900 MG/50ML
900 INJECTION, SOLUTION INTRAVENOUS
Status: COMPLETED | OUTPATIENT
Start: 2020-06-16 | End: 2020-06-16

## 2020-06-16 RX ORDER — IBUPROFEN 200 MG
1 TABLET ORAL DAILY
Status: DISCONTINUED | OUTPATIENT
Start: 2020-06-17 | End: 2020-06-17 | Stop reason: HOSPADM

## 2020-06-16 RX ORDER — PREGABALIN 75 MG/1
75 CAPSULE ORAL 2 TIMES DAILY
Status: DISCONTINUED | OUTPATIENT
Start: 2020-06-16 | End: 2020-06-16 | Stop reason: HOSPADM

## 2020-06-16 RX ORDER — OXYCODONE HYDROCHLORIDE 10 MG/1
10 TABLET ORAL
Status: DISCONTINUED | OUTPATIENT
Start: 2020-06-16 | End: 2020-06-17 | Stop reason: HOSPADM

## 2020-06-16 RX ORDER — CARISOPRODOL 350 MG/1
350 TABLET ORAL 4 TIMES DAILY PRN
Status: DISCONTINUED | OUTPATIENT
Start: 2020-06-16 | End: 2020-06-17 | Stop reason: HOSPADM

## 2020-06-16 RX ORDER — DABIGATRAN ETEXILATE 75 MG/1
150 CAPSULE ORAL 2 TIMES DAILY
Status: DISCONTINUED | OUTPATIENT
Start: 2020-06-16 | End: 2020-06-17 | Stop reason: HOSPADM

## 2020-06-16 RX ORDER — ONDANSETRON 2 MG/ML
INJECTION INTRAMUSCULAR; INTRAVENOUS
Status: DISCONTINUED | OUTPATIENT
Start: 2020-06-16 | End: 2020-06-16

## 2020-06-16 RX ORDER — ACETAMINOPHEN 10 MG/ML
1000 INJECTION, SOLUTION INTRAVENOUS ONCE
Status: DISCONTINUED | OUTPATIENT
Start: 2020-06-16 | End: 2020-06-16 | Stop reason: HOSPADM

## 2020-06-16 RX ORDER — CELECOXIB 100 MG/1
200 CAPSULE ORAL NIGHTLY
Status: DISCONTINUED | OUTPATIENT
Start: 2020-06-16 | End: 2020-06-17 | Stop reason: HOSPADM

## 2020-06-16 RX ORDER — LIDOCAINE HYDROCHLORIDE 10 MG/ML
1 INJECTION, SOLUTION EPIDURAL; INFILTRATION; INTRACAUDAL; PERINEURAL ONCE
Status: DISCONTINUED | OUTPATIENT
Start: 2020-06-16 | End: 2020-06-16

## 2020-06-16 RX ORDER — OXYCODONE HYDROCHLORIDE 5 MG/1
5 TABLET ORAL
Status: DISCONTINUED | OUTPATIENT
Start: 2020-06-16 | End: 2020-06-16

## 2020-06-16 RX ORDER — PANTOPRAZOLE SODIUM 40 MG/1
40 TABLET, DELAYED RELEASE ORAL 2 TIMES DAILY
Status: DISCONTINUED | OUTPATIENT
Start: 2020-06-16 | End: 2020-06-17 | Stop reason: HOSPADM

## 2020-06-16 RX ORDER — OXYCODONE HYDROCHLORIDE 5 MG/1
5 TABLET ORAL
Status: DISCONTINUED | OUTPATIENT
Start: 2020-06-16 | End: 2020-06-17 | Stop reason: HOSPADM

## 2020-06-16 RX ORDER — NAPROXEN SODIUM 220 MG/1
81 TABLET, FILM COATED ORAL 2 TIMES DAILY
Status: DISCONTINUED | OUTPATIENT
Start: 2020-06-16 | End: 2020-06-16

## 2020-06-16 RX ORDER — DEXAMETHASONE SODIUM PHOSPHATE 4 MG/ML
INJECTION, SOLUTION INTRA-ARTICULAR; INTRALESIONAL; INTRAMUSCULAR; INTRAVENOUS; SOFT TISSUE
Status: DISCONTINUED | OUTPATIENT
Start: 2020-06-16 | End: 2020-06-16

## 2020-06-16 RX ORDER — DIPHENHYDRAMINE HYDROCHLORIDE 50 MG/ML
12.5 INJECTION INTRAMUSCULAR; INTRAVENOUS
Status: DISCONTINUED | OUTPATIENT
Start: 2020-06-16 | End: 2020-06-16 | Stop reason: HOSPADM

## 2020-06-16 RX ORDER — OXYCODONE HCL 10 MG/1
10 TABLET, FILM COATED, EXTENDED RELEASE ORAL EVERY 12 HOURS
Status: DISCONTINUED | OUTPATIENT
Start: 2020-06-16 | End: 2020-06-17 | Stop reason: HOSPADM

## 2020-06-16 RX ORDER — ACETAMINOPHEN 10 MG/ML
1000 INJECTION, SOLUTION INTRAVENOUS ONCE
Status: COMPLETED | OUTPATIENT
Start: 2020-06-16 | End: 2020-06-16

## 2020-06-16 RX ORDER — SODIUM CHLORIDE 0.9 % (FLUSH) 0.9 %
3 SYRINGE (ML) INJECTION
Status: DISCONTINUED | OUTPATIENT
Start: 2020-06-16 | End: 2020-06-16 | Stop reason: HOSPADM

## 2020-06-16 RX ORDER — AMITRIPTYLINE HYDROCHLORIDE 50 MG/1
50 TABLET, FILM COATED ORAL NIGHTLY
Status: DISCONTINUED | OUTPATIENT
Start: 2020-06-16 | End: 2020-06-17 | Stop reason: HOSPADM

## 2020-06-16 RX ORDER — ASPIRIN 81 MG/1
81 TABLET ORAL 2 TIMES DAILY
Refills: 0 | COMMUNITY
Start: 2020-06-16 | End: 2020-06-17 | Stop reason: HOSPADM

## 2020-06-16 RX ORDER — PREGABALIN 75 MG/1
75 CAPSULE ORAL 2 TIMES DAILY
Status: DISCONTINUED | OUTPATIENT
Start: 2020-06-16 | End: 2020-06-17 | Stop reason: HOSPADM

## 2020-06-16 RX ADMIN — DEXAMETHASONE SODIUM PHOSPHATE 4 MG: 4 INJECTION, SOLUTION INTRAMUSCULAR; INTRAVENOUS at 07:06

## 2020-06-16 RX ADMIN — OXYCODONE HYDROCHLORIDE 15 MG: 10 TABLET ORAL at 05:06

## 2020-06-16 RX ADMIN — FENTANYL CITRATE 50 MCG: 50 INJECTION, SOLUTION INTRAMUSCULAR; INTRAVENOUS at 07:06

## 2020-06-16 RX ADMIN — ONDANSETRON 4 MG: 2 INJECTION, SOLUTION INTRAMUSCULAR; INTRAVENOUS at 07:06

## 2020-06-16 RX ADMIN — VANCOMYCIN HYDROCHLORIDE 1000 MG: 1 INJECTION, POWDER, LYOPHILIZED, FOR SOLUTION INTRAVENOUS at 11:06

## 2020-06-16 RX ADMIN — ROPIVACAINE HYDROCHLORIDE: 5 INJECTION, SOLUTION EPIDURAL; INFILTRATION; PERINEURAL at 07:06

## 2020-06-16 RX ADMIN — SODIUM CHLORIDE, SODIUM GLUCONATE, SODIUM ACETATE, POTASSIUM CHLORIDE, MAGNESIUM CHLORIDE, SODIUM PHOSPHATE, DIBASIC, AND POTASSIUM PHOSPHATE: .53; .5; .37; .037; .03; .012; .00082 INJECTION, SOLUTION INTRAVENOUS at 06:06

## 2020-06-16 RX ADMIN — FLUOXETINE 20 MG: 20 CAPSULE ORAL at 09:06

## 2020-06-16 RX ADMIN — EPHEDRINE SULFATE 15 MG: 50 INJECTION, SOLUTION INTRAMUSCULAR; INTRAVENOUS; SUBCUTANEOUS at 09:06

## 2020-06-16 RX ADMIN — CELECOXIB 400 MG: 100 CAPSULE ORAL at 06:06

## 2020-06-16 RX ADMIN — PREGABALIN 75 MG: 75 CAPSULE ORAL at 09:06

## 2020-06-16 RX ADMIN — OXYCODONE HYDROCHLORIDE 15 MG: 10 TABLET ORAL at 09:06

## 2020-06-16 RX ADMIN — LIDOCAINE HYDROCHLORIDE 75 MG: 20 INJECTION, SOLUTION INTRAVENOUS at 07:06

## 2020-06-16 RX ADMIN — MIDAZOLAM 2 MG: 1 INJECTION INTRAMUSCULAR; INTRAVENOUS at 07:06

## 2020-06-16 RX ADMIN — OXYCODONE HYDROCHLORIDE 10 MG: 10 TABLET, FILM COATED, EXTENDED RELEASE ORAL at 06:06

## 2020-06-16 RX ADMIN — ASPIRIN 81 MG 81 MG: 81 TABLET ORAL at 11:06

## 2020-06-16 RX ADMIN — AMITRIPTYLINE HYDROCHLORIDE 50 MG: 50 TABLET, FILM COATED ORAL at 09:06

## 2020-06-16 RX ADMIN — EPHEDRINE SULFATE 10 MG: 50 INJECTION, SOLUTION INTRAMUSCULAR; INTRAVENOUS; SUBCUTANEOUS at 08:06

## 2020-06-16 RX ADMIN — CLINDAMYCIN PHOSPHATE 900 MG: 18 INJECTION, SOLUTION INTRAVENOUS at 07:06

## 2020-06-16 RX ADMIN — OXYCODONE HYDROCHLORIDE 15 MG: 10 TABLET ORAL at 02:06

## 2020-06-16 RX ADMIN — CARISOPRODOL 350 MG: 350 TABLET ORAL at 03:06

## 2020-06-16 RX ADMIN — PROPOFOL 75 MCG/KG/MIN: 10 INJECTION, EMULSION INTRAVENOUS at 07:06

## 2020-06-16 RX ADMIN — MUPIROCIN: 20 OINTMENT TOPICAL at 06:06

## 2020-06-16 RX ADMIN — OXYCODONE HYDROCHLORIDE 15 MG: 10 TABLET ORAL at 11:06

## 2020-06-16 RX ADMIN — MIDAZOLAM 2 MG: 1 INJECTION INTRAMUSCULAR; INTRAVENOUS at 08:06

## 2020-06-16 RX ADMIN — CELECOXIB 200 MG: 100 CAPSULE ORAL at 09:06

## 2020-06-16 RX ADMIN — SODIUM CHLORIDE, SODIUM GLUCONATE, SODIUM ACETATE, POTASSIUM CHLORIDE, MAGNESIUM CHLORIDE, SODIUM PHOSPHATE, DIBASIC, AND POTASSIUM PHOSPHATE: .53; .5; .37; .037; .03; .012; .00082 INJECTION, SOLUTION INTRAVENOUS at 08:06

## 2020-06-16 RX ADMIN — ACETAMINOPHEN 1000 MG: 10 INJECTION, SOLUTION INTRAVENOUS at 02:06

## 2020-06-16 RX ADMIN — EPHEDRINE SULFATE 15 MG: 50 INJECTION, SOLUTION INTRAMUSCULAR; INTRAVENOUS; SUBCUTANEOUS at 08:06

## 2020-06-16 RX ADMIN — MUPIROCIN 1 G: 20 OINTMENT TOPICAL at 09:06

## 2020-06-16 RX ADMIN — DEXTROSE AND SODIUM CHLORIDE: 5; .9 INJECTION, SOLUTION INTRAVENOUS at 10:06

## 2020-06-16 RX ADMIN — DABIGATRAN ETEXILATE MESYLATE 150 MG: 75 CAPSULE ORAL at 09:06

## 2020-06-16 RX ADMIN — PREGABALIN 75 MG: 75 CAPSULE ORAL at 06:06

## 2020-06-16 RX ADMIN — PANTOPRAZOLE SODIUM 40 MG: 40 TABLET, DELAYED RELEASE ORAL at 09:06

## 2020-06-16 RX ADMIN — FAMOTIDINE 20 MG: 20 TABLET, FILM COATED ORAL at 11:06

## 2020-06-16 RX ADMIN — TRANEXAMIC ACID 1000 MG: 100 INJECTION, SOLUTION INTRAVENOUS at 07:06

## 2020-06-16 RX ADMIN — OXYCODONE HYDROCHLORIDE 10 MG: 10 TABLET, FILM COATED, EXTENDED RELEASE ORAL at 11:06

## 2020-06-16 RX ADMIN — BUPIVACAINE 20 ML: 13.3 INJECTION, SUSPENSION, LIPOSOMAL INFILTRATION at 05:06

## 2020-06-16 RX ADMIN — ACETAMINOPHEN 1000 MG: 10 INJECTION, SOLUTION INTRAVENOUS at 07:06

## 2020-06-16 NOTE — ANESTHESIA PROCEDURE NOTES
Spinal    Diagnosis: Right knee replacement  Patient location during procedure: OR  Start time: 6/16/2020 7:31 AM  Timeout: 6/16/2020 7:31 AM  End time: 6/16/2020 7:41 AM    Staffing  Authorizing Provider: Jose Marks MD  Performing Provider: Jose Marks MD    Preanesthetic Checklist  Completed: patient identified, site marked, surgical consent, pre-op evaluation, timeout performed, IV checked, risks and benefits discussed and monitors and equipment checked  Spinal Block  Patient position: sitting  Prep: ChloraPrep  Patient monitoring: heart rate, cardiac monitor, continuous pulse ox, continuous capnometry and frequent blood pressure checks  Approach: midline  Location: L2-3  Injection technique: single shot  CSF Fluid: clear free-flowing CSF  Needle  Needle type: pencil-tip   Needle gauge: 22 G  Needle length: 4 in  Additional Documentation: incremental injection, negative aspiration for heme and no paresthesia on injection  Needle localization: anatomical landmarks  Assessment  Sensory level: T6   Dermatomal levels determined by pinch or prick  Ease of block: easy  Patient's tolerance of the procedure: comfortable throughout block and no complaints  Additional Notes  Bupivicaine 0.75% 1.6ml with dextrose +  Barbotage

## 2020-06-16 NOTE — DISCHARGE SUMMARY
[facility]  Discharge Note  Short Stay    Admit Date: 6/16/2020    Discharge Date and Time: 6/16/2020    Attending Physician: Feilberto Cardenas MD     Discharge Provider: Feliberto Cardenas    Diagnoses:  Active Hospital Problems    Diagnosis  POA    *Arthritis of right knee [M17.11]  Yes      Resolved Hospital Problems   No resolved problems to display.       Discharged Condition: good    Hospital Course: Patient was admitted for an outpatient procedure and tolerated the procedure well with no complications.    Final Diagnoses: Same as principal problem.    Disposition: Home or Self Care    Follow up/Patient Instructions:    Medications:  Reconciled Home Medications:      Medication List      START taking these medications    aspirin 81 MG EC tablet  Commonly known as: ECOTRIN  Take 1 tablet (81 mg total) by mouth 2 (two) times daily.     HYDROcodone-acetaminophen 5-325 mg per tablet  Commonly known as: NORCO  Take 1 tablet by mouth every 6 (six) hours as needed for Pain.        CONTINUE taking these medications    AIMOVIG AUTOINJECTOR 140 mg/mL Atin  Generic drug: erenumab-aooe  Inject 140 mg into the skin every 28 days.     amitriptyline 25 MG tablet  Commonly known as: ELAVIL  TAKE ONE TABLET BY MOUTH EVERY NIGHT FOR 2 WEEKS THEN INCREASE TO 2 TABLETS AT BEDTIME     celecoxib 200 MG capsule  Commonly known as: CeleBREX  Take 200 mg by mouth every evening.     cetirizine 5 MG tablet  Commonly known as: ZYRTEC  Take 5 mg by mouth as needed.     cyanocobalamin (vitamin B-12) 1,000 mcg/mL Kit  Inject 1 mL into the muscle every 21 days.     EPINEPHrine 0.3 mg/0.3 mL Atin  Commonly known as: EPIPEN  0.3 mg daily as needed.     FLUoxetine 20 MG capsule  every evening.     hydrOXYzine pamoate 25 MG Cap  Commonly known as: VISTARIL  TAKE ONE CAPSULE BY MOUTH 3 TIMES A DAY AS NEEDED FOR ANXIETY     ibuprofen 800 MG tablet  Commonly known as: ADVIL,MOTRIN  every 6 (six) hours as needed.      ketorolac 30 mg/mL injection  Commonly known as: TORADOL  Use 30 mg IM q6 hours prn. Provide the patient with IM syringes and needles.     LORazepam 0.5 MG tablet  Commonly known as: ATIVAN  Take 0.5 mg by mouth every 4 (four) hours as needed.     oxyCODONE 10 mg 12 hr tablet  Commonly known as: OXYCONTIN  Take 10 mg by mouth every 12 (twelve) hours.     pantoprazole 40 MG tablet  Commonly known as: PROTONIX  Take 1 tablet (40 mg total) by mouth 2 (two) times daily.     promethazine 25 MG tablet  Commonly known as: PHENERGAN  Take 1 tablet (25 mg total) by mouth every 6 (six) hours as needed for Nausea. Take 1 tablet by mouth every 6 (six) hours as needed (migraine). -MAY CAUSE DROWSINESS-     tiZANidine 4 MG tablet  Commonly known as: ZANAFLEX  Take 1 tablet (4 mg total) by mouth every 8 (eight) hours as needed.     VOLTAREN 1 % Gel  Generic drug: diclofenac sodium  daily as needed.          Discharge Procedure Orders   Diet Adult Regular     Keep surgical extremity elevated     Ice to affected area     Call MD for:  temperature >100.4     Call MD for:  severe uncontrolled pain     Call MD for:  redness, tenderness, or signs of infection (pain, swelling, redness, odor or green/yellow discharge around incision site)     Remove dressing in 24 hours     Change dressing (specify)   Order Comments: Dressing change: One time per day using Waterproof Bandaids.     Activity as tolerated     Shower on day dressing removed (No bath)     Weight bearing restrictions (specify):     Follow-up Information     Feliberto Cardenas MD In 2 weeks.    Specialties: Sports Medicine, Orthopedic Surgery  Why: For suture removal  Contact information:  [address] [place] [address] [address]  [place] [address]  [phone]                   Discharge Procedure Orders (must include Diet, Follow-up, Activity):   Discharge Procedure Orders (must include Diet, Follow-up, Activity)   Diet Adult Regular     Keep surgical extremity elevated     Ice to  affected area     Call MD for:  temperature >100.4     Call MD for:  severe uncontrolled pain     Call MD for:  redness, tenderness, or signs of infection (pain, swelling, redness, odor or green/yellow discharge around incision site)     Remove dressing in 24 hours     Change dressing (specify)   Order Comments: Dressing change: One time per day using Waterproof Bandaids.     Activity as tolerated     Shower on day dressing removed (No bath)     Weight bearing restrictions (specify):

## 2020-06-16 NOTE — PLAN OF CARE
Cm completed the assessment with pt and family at bedside.  Pt uses dme at home.  PCP ariana Hayden.  Insurance verified as [facility].   Pt denies diabetes,dialysis and coumadin.  Disposition:  Pt will discharge to home with family. Pt signed the pt's choice disclosure form and selected [facility].  Pharmacy of choice is [facility]. DME listed below in graft.       06/16/20 1506   Discharge Assessment   Assessment Type Discharge Planning Assessment   Confirmed/corrected address and phone number on facesheet? Yes   Assessment information obtained from? Patient   Expected Length of Stay (days) 2   Communicated expected length of stay with patient/caregiver yes   Prior to hospitilization cognitive status: Alert/Oriented   Prior to hospitalization functional status: Independent;Needs Assistance;Assistive Equipment   Current cognitive status: Alert/Oriented   Current Functional Status: Needs Assistance;Assistive Equipment   Facility Arrived From: home   Lives With parent(s)   Able to Return to Prior Arrangements yes   Is patient able to care for self after discharge? Yes  (assistance at home)   Who are your caregiver(s) and their phone number(s)? ana Plasencia  [phone]   Patient's perception of discharge disposition home or selfcare   Readmission Within the Last 30 Days no previous admission in last 30 days   Patient currently being followed by outpatient case management? Yes   If yes, name of outpatient case management following: insurance company assigned oupatient case management   Patient currently receives any other outside agency services? No   Equipment Currently Used at Home 3-in-1 commode;walker, rolling;hip kit;grab bar   Do you have any problems affording any of your prescribed medications? No   Is the patient taking medications as prescribed? yes   Does the patient have transportation home? Yes   Transportation Anticipated family or friend will provide   Dialysis Name and Scheduled days na    Does the patient receive services at the Coumadin Clinic? No   Discharge Plan A Home with family;Home Health   Discharge Plan B Home with family;Home Health   DME Needed Upon Discharge  none   Patient/Family in Agreement with Plan yes

## 2020-06-16 NOTE — ASSESSMENT & PLAN NOTE
Please note patient has a history of prior pulmonary emboli and previously failed treatment with Xarelto and must be on Pradaxa 150 mg p.o. b.i.d. for discharge to prevent recurrence of DVT/ pulmonary emboli

## 2020-06-16 NOTE — OP NOTE
[facility]  Orthopedic Surgery  Operative Note    SUMMARY     Date of Procedure: 6/16/2020     Procedure: Procedure(s) (LRB):  ARTHROPLASTY, KNEE (Right)       Surgeon(s) and Role:     * Feliberto Cardenas MD - Primary    Assistant: Karime salazar    Pre-Operative Diagnosis: Primary osteoarthritis of right knee [M17.11]    Post-Operative Diagnosis: Post-Op Diagnosis Codes:     * Primary osteoarthritis of right knee [M17.11]    Anesthesia: General    Complications: No    Estimated Blood Loss (EBL): 15ml           Implants:   Implant Name Type Inv. Item Serial No.  Lot No. LRB No. Used Action   CEMENT BONE ANTIBIO SIMPLEX P - [id number]  CEMENT BONE ANTIBIO SIMPLEX P  [employer]KK[employer]BD[employer]. [id number] Right 2 Implanted   PIN PINABALL HEADLESS - [id number]  PIN PINABALL HEADLESS  [employer]KK[employer] OBDUL[employer]. [id number] Right 1 Implanted and Explanted   FEMORAL CRUC RTN WINDY SZ 6 RT - [id number]  FEMORAL CRUC RTN WINDY SZ 6 RT  [employer]BD[employer]. [id number] Right 1 Implanted   PRIMARY TIBIAL BASE 5. - [id number]  PRIMARY TIBIAL BASE 5.  [employer]BD[employer]. [id number] Right 1 Implanted   PATELLA TRI 33X9 X3 POLYETHYLE - [id number]  PATELLA TRI 33X9 X3 POLYETHYLE  [employer]BD[employer]. [id number] Right 1 Implanted   INSERT TIBIAL SZ 5 9MMX3 - [id number]  INSERT TIBIAL SZ 5 9MMX3  [employer]BD[employer]. [id number] Right 1 Implanted       Tourniquet time: 63min at 300mmHg    Specimens:   Specimen (12h ago, onward)    None                  Condition: Good    Disposition: PACU - hemodynamically stable.    Attestation: I was present and scrubbed for the entire procedure.    INDICATIONS FOR THE PROCEDURE: A 39 female with a history of chronic   Right knee arthritis, had failed all conservative measures including cortisone   injections, PT, viscosupplementation and activity modification. After a long   discussion, the patient wished to proceed with the procedure above.     PROCEDURE IN DETAIL: Risks, benefits and alternatives of the  procedure were   explained to the patient including, but not limited to damage to nerves,   arteries or blood vessels. Also explained risk of infection, stiffness, DVT, PE,   polyethylene wear as well as anesthetic complications including seizure, stroke,   heart attack and death, understood this and signed informed consent. The   patient's Right knee was marked prior to coming to the Operating Room. The patient was brought to the operating room, placed on the operating table in a supine position.A  formal timeout was done in which correct patient, procedure and op site were all   correctly identified and confirmed by the entire operating team.  900mg Clindamycin was given prior to surgical incision. Spinal anesthesia was induced. The patient'sRight  lower extremity was prepped and   draped in normal sterile fashion. TheRight  leg was exsanguinated with an   Esmarch. Tourniquet was inflated up 300 mmHg. Standard anterior approach to   the knee was made. Medial parapatellar arthrotomy was made, leaving about 1/8   inch of tissue for later repair. Proximal medial tibial release was performed,   making sure not to release any of the MCL. We then   everted the patella and reflexed the knee. Fat pad was excised as well as some   of the ACL. Soft tissue off the distal anterior femur was removed for   visualization, so as to help prevent notching.  The intramedullary canal was then drilled and suctioned of fat.  The intramedullary guide was then inserted with 5° of valgus set.  It was then pinned into place.  I then made a distal femoral cut of 8 millimeters.  After   this was done, we turned our attention to the tibia. Ankle clamp was then inserted.  We then used the stylus to measure 2 millimeters off the most affected side. The ankle clamp was used to replicate the tibial slope and went perfectly centered down the tibial crest. A tibial cut   was then made. We then checked our extension gap, a 9-spacer was able to be  placed.  We then turned our attention back to the femur.  We then sized the femur using 3° off the posterior condylar axis. This was also parallel to the epicondylar axis.  It measured a size 6.  We then drilled our holes.  Four in 1 block for the size 6 block was then placed and the 4 in 1 cuts were made. We then   checked posteriorly and removed posterior femoral osteophytes.  We then used the gap  to make sure that a 9 spacer good fit both in flexion and extension. Varus and valgus balancing was then checked as well. We   decided to trial. Trial components were placed with a 9 meniscal bearing. The   patient had good balancing again in varus valgus in both flexion and extension.   We turned our attention to patella, caliper was used on the patella where it   measured 28. We set guide at 18 and made our 8-mm cut for polyethylene component, 33 was selected. Then drill holes were placed and the patellar button was placed.   This had good tracking. No need for a lateral release and with no hand tests,   it stayed centered the whole time. We then marked our tibial rotation. We then drilled our femoral lugs.  We then   removed everything except the size 5 tibial tray. We then checked our tibial tray   with a drop alondra again and then marked our rotation and pinned it into place then   drilled, and then punched for our keel. We then started preparing final   components on the back table. Anterior, medial and lateral structures were injected with our local   Cocktail. Bony surfaces   copiously irrigated with Pulsavac and then thoroughly dried. Once the cement   was the appropriate consistency, first the tibia and then the femur were   cemented into place, removing excess cement. A 9 trial was placed. The knee   was held in compression and then the patella was placed. Cement was allowed to   cure. Once the cement was cured, we then removed excess cement. Again trialed   one final time, again seeing a 9. We then  tapped into place the   final 9 meniscal bearing into place. After this was done, we then did our   diluted iodine soak for 3 minutes and then proceeded with closing.  Arthrotomy was closed using our StrataFix.   Subcutaneous tissue was closed using 2-0 Vicryl and skin was using a running 3-0   StrataFix and Dermabond.Instrument, sponge, and needle counts were correct prior to wound closure and at the conclusion of the case.  Sterile dressing was applied including a Cryo/Cuff.   They were extubated, awakened and transferred from the Operating Room to the   Recovery Room in stable condition.

## 2020-06-16 NOTE — H&P
[facility] Medicine  History & Physical    Patient Name: Aleyda Costa  MRN: [id number]  Admission Date: 2020  Attending Physician: Pantera Rivas MD   Primary Care Provider: Darlene Hayden MD    Patient information was obtained from patient and Records available.     Subjective:     Principal Problem:Arthritis of right knee, status post right knee arthroplasty, history of prior pulmonary emboli with failed treatment on Xarelto and previously had to be treated with Pradaxa per 2019 Dr. Pina clinic note    Chief Complaint:  Right knee pain       HPI: This is a 39-year-old female who has a past medical history of depression, GERD, pulmonary emboli in 2018, sleep apnea, prior gastric sleeve, nicotine dependence, and osteoarthritis of the right knee.  Today patient underwent a right knee arthroplasty done by Dr. Crisostomo.  Patient tolerated procedure well and is currently post procedure.    Please note patient has a history of prior pulmonary emboli and previously seen by Dr. Pina on 2019-patient previously failed treatment with Xarelto and must be on Pradaxa 150 mg p.o. b.i.d. for discharge to prevent recurrence of DVT/ pulmonary emboli  Past Medical History:   Diagnosis Date    Abnormal Pap smear of cervix     Arthritis     OA    Back pain     Cancer     skin cancer to back    Depression     Endometriosis     Full dentures     GERD (gastroesophageal reflux disease)     Migraine     Pulmonary embolism 2018    Seizures     Sleep apnea     Does not use c-pap since weight loss - 170 lbs    Uterine fibroid     Wears glasses        Past Surgical History:   Procedure Laterality Date    APPENDECTOMY      CARPAL TUNNEL RELEASE Bilateral      SECTION      CHOLECYSTECTOMY      COLONOSCOPY N/A 2019    Procedure: COLONOSCOPY;  Surgeon: Anselmo Blackwell MD;  [facility];  Service: Endoscopy;  Laterality: N/A;    EPIDURAL STEROID  INJECTION INTO LUMBAR SPINE N/A 6/7/2019    Procedure: Injection-steroid-epidural-lumbar;  Surgeon: Yariel Ramirez MD;  [facility];  Service: Pain Management;  Laterality: N/A;  L3-4    ESOPHAGOGASTRODUODENOSCOPY N/A 11/27/2019    Procedure: EGD (ESOPHAGOGASTRODUODENOSCOPY);  Surgeon: Anselmo Blackwell MD;  Location: [facility] ENDO;  Service: Endoscopy;  Laterality: N/A;    FRACTURE SURGERY      ankle    gastric sleve      HYSTERECTOMY  2014    endo and fibroids    HYSTERECTOMY      JOINT REPLACEMENT Left 11/13/2018    KNEE ARTHROPLASTY Left 11/13/2018    Procedure: ARTHROPLASTY, KNEE;  Surgeon: Feliberto Cardenas MD;  Location: [facility] OR;  Service: Orthopedics;  Laterality: Left;    KNEE ARTHROSCOPY W/ MENISCECTOMY Right 10/25/2019    Procedure: ARTHROSCOPY, KNEE, WITH MENISCECTOMY;  Surgeon: Feliberto Cardenas MD;  Location: [facility] OR;  Service: Orthopedics;  Laterality: Right;    KNEE SURGERY      LUMBAR EPIDURAL INJECTION      OOPHORECTOMY Left     LSO    RADIAL NERVE Right     RECONSTRUCTION OF MEDIAL PATELLOFEMORAL LIGAMENT OF RIGHT KNEE Right 10/25/2019    Procedure: RECONSTRUCTION, LIGAMENT, MEDIAL PATELLOFEMORAL, RIGHT;  Surgeon: Feliberto Cardenas MD;  Location: [facility] OR;  Service: Orthopedics;  Laterality: Right;    SINUS SURGERY      UPPER GASTROINTESTINAL ENDOSCOPY  11/27/2019    Dr. Blackwell; mild schatzki ring-dilated; gastritis; bx in process       Review of patient's allergies indicates:   Allergen Reactions    Clarithromycin Swelling and Other (See Comments)     DIFFICULTY SWALLOWING  DIFFICULTY SWALLOWING    Pcn [penicillins] Anaphylaxis    Sulfa (sulfonamide antibiotics) Hives    Wellbutrin [bupropion hcl] Shortness Of Breath and Anaphylaxis    Betadine [povidone-iodine] Hives     Swelling      Cefprozil Hives    Iodinated contrast media Hives    Latex, natural rubber Rash    Sulfamethoxazole-trimethoprim Nausea And Vomiting    Iodine Hives and Swelling    Latex  Hives and Swelling       Current Facility-Administered Medications on File Prior to Encounter   Medication    lactated ringers infusion    lidocaine (PF) 10 mg/ml (1%) injection 10 mg     Current Outpatient Medications on File Prior to Encounter   Medication Sig    amitriptyline (ELAVIL) 25 MG tablet TAKE ONE TABLET BY MOUTH EVERY NIGHT FOR 2 WEEKS THEN INCREASE TO 2 TABLETS AT BEDTIME    celecoxib (CELEBREX) 200 MG capsule Take 200 mg by mouth every evening.     cyanocobalamin, vitamin B-12, 1,000 mcg/mL Kit Inject 1 mL into the muscle every 21 days.    erenumab-aooe 140 mg/mL AtIn Inject 140 mg into the skin every 28 days.    fluoxetine (PROZAC) 20 MG capsule every evening.     ibuprofen (ADVIL,MOTRIN) 800 MG tablet every 6 (six) hours as needed.     ketorolac (TORADOL) 30 mg/mL injection Use 30 mg IM q6 hours prn. Provide the patient with IM syringes and needles.    oxyCODONE (OXYCONTIN) 10 mg 12 hr tablet Take 10 mg by mouth every 12 (twelve) hours.    pantoprazole (PROTONIX) 40 MG tablet Take 1 tablet (40 mg total) by mouth 2 (two) times daily.    tiZANidine (ZANAFLEX) 4 MG tablet Take 1 tablet (4 mg total) by mouth every 8 (eight) hours as needed.    cetirizine (ZYRTEC) 5 MG tablet Take 5 mg by mouth as needed.     epinephrine (EPIPEN) 0.3 mg/0.3 mL (1:1,000) AtIn 0.3 mg daily as needed.     hydrOXYzine pamoate (VISTARIL) 25 MG Cap TAKE ONE CAPSULE BY MOUTH 3 TIMES A DAY AS NEEDED FOR ANXIETY    lorazepam (ATIVAN) 0.5 MG tablet Take 0.5 mg by mouth every 4 (four) hours as needed.     promethazine (PHENERGAN) 25 MG tablet Take 1 tablet (25 mg total) by mouth every 6 (six) hours as needed for Nausea. Take 1 tablet by mouth every 6 (six) hours as needed (migraine). -MAY CAUSE DROWSINESS-    VOLTAREN 1 % Gel daily as needed.     [DISCONTINUED] ibuprofen/D5W 250mL Take 800 mg by mouth.     Family History     Problem Relation (Age of Onset)    Breast cancer Maternal Aunt (46)    Esophageal cancer  Maternal Grandmother    Heart disease Mother, Father        Tobacco Use    Smoking status: Current Every Day Smoker     Packs/day: 0.25     Years: 20.00     Pack years: 5.00     Types: Cigarettes     Last attempt to quit: 2018     Years since quittin.5    Smokeless tobacco: Never Used   Substance and Sexual Activity    Alcohol use: Yes     Alcohol/week: 0.0 standard drinks     Comment: occasionally    Drug use: Not Currently     Types: Other-see comments     Comment: no    Sexual activity: Yes     Partners: Male     Review of Systems   Constitutional: Negative for appetite change, chills, diaphoresis, fatigue and fever.   HENT: Negative for ear discharge, ear pain and facial swelling.    Eyes: Negative for pain and redness.   Respiratory: Negative for cough and shortness of breath.    Cardiovascular: Negative for chest pain and palpitations.        History of prior pulmonary emboli in 2018   Gastrointestinal: Negative for abdominal distention, abdominal pain, blood in stool, constipation, diarrhea, nausea, rectal pain and vomiting.   Endocrine: Negative for polydipsia and polyphagia.   Genitourinary: Negative for difficulty urinating, dysuria, flank pain and hematuria.   Musculoskeletal: Positive for arthralgias. Negative for neck pain and neck stiffness.        Right knee pain   Skin: Positive for wound. Negative for color change.        Right knee surgical dressing   Allergic/Immunologic: Negative for food allergies.   Neurological: Negative for syncope, facial asymmetry, speech difficulty, weakness and numbness.   Hematological: Does not bruise/bleed easily.   Psychiatric/Behavioral: Negative for agitation, behavioral problems, confusion, hallucinations and suicidal ideas. The patient is not nervous/anxious.      Objective:     Vital Signs (Most Recent):  Temp: 97.1 °F (36.2 °C) (20 1527)  Pulse: 72 (20 1527)  Resp: 18 (20 1527)  BP: (!) 92/49 (20 1527)  SpO2: (!) 94 %  (06/16/20 5927) Vital Signs (24h Range):  Temp:  [97 °F (36.1 °C)-97.9 °F (36.6 °C)] 97.1 °F (36.2 °C)  Pulse:  [54-72] 72  Resp:  [10-23] 18  SpO2:  [94 %-100 %] 94 %  BP: ()/(49-69) 92/49     Weight: 111.1 kg (245 lb)  Body mass index is 33.23 kg/m².    Physical Exam  Constitutional:       General: She is not in acute distress.     Appearance: Normal appearance.   HENT:      Head: Normocephalic.      Mouth/Throat:      Mouth: Mucous membranes are moist.   Eyes:      General:         Right eye: No discharge.         Left eye: No discharge.      Conjunctiva/sclera: Conjunctivae normal.      Pupils: Pupils are equal, round, and reactive to light.   Neck:      Musculoskeletal: Normal range of motion and neck supple.   Cardiovascular:      Rate and Rhythm: Normal rate and regular rhythm.      Pulses: Normal pulses.      Heart sounds: Normal heart sounds.   Pulmonary:      Effort: Pulmonary effort is normal. No respiratory distress.      Breath sounds: Normal breath sounds. No wheezing or rales.   Abdominal:      General: Bowel sounds are normal. There is no distension.      Palpations: Abdomen is soft.      Tenderness: There is no abdominal tenderness. There is no guarding.   Genitourinary:     Comments: Not examined  Musculoskeletal: Normal range of motion.      Comments: Right lower extremity range of motion not tested   Skin:     General: Skin is warm and dry.      Capillary Refill: Capillary refill takes less than 2 seconds.   Neurological:      General: No focal deficit present.      Mental Status: She is alert and oriented to person, place, and time. Mental status is at baseline.      Cranial Nerves: No cranial nerve deficit.   Psychiatric:         Mood and Affect: Mood normal.         Behavior: Behavior normal.         Thought Content: Thought content normal.         Judgment: Judgment normal.           CRANIAL NERVES     CN III, IV, VI   Pupils are equal, round, and reactive to light.        Results for  orders placed or performed in visit on 06/14/20   COVID-19 Routine Screening   Result Value Ref Range    SARS-CoV2 (COVID-19) Qualitative PCR Not Detected Not Detected         Assessment/Plan:     * Arthritis of right knee  Status post right knee arthroplasty-  Orders as per surgeon-Dr. Crisostomo  Physical therapy and occupational therapy consulted  P.r.n. pain medication  Fall precautions    Please note patient has a history of prior pulmonary emboli and previously failed treatment with Xarelto and must be on Pradaxa 150 mg p.o. b.i.d. for discharge    Generalized anxiety disorder  Continue home medication      History of pulmonary embolism-  Please note patient has a history of prior pulmonary emboli and previously seen by Dr. Pina on 02/20/2019-patient previously failed treatment with Xarelto and must be on Pradaxa 150 mg p.o. b.i.d. for discharge to prevent recurrence of DVT/ pulmonary emboli      Nicotine dependence  Smoking cessation education  Add nicotine patch      Mild episode of recurrent major depressive disorder  Continue home medications        VTE Risk Mitigation (From admission, onward)         Ordered     dabigatran etexilate capsule 150 mg  2 times daily      06/16/20 1739     IP VTE HIGH RISK PATIENT  Once      06/16/20 1512     Reason for No Pharmacological VTE Prophylaxis  Once     Question:  Reasons:  Answer:  Already adequately anticoagulated on oral Anticoagulants    06/16/20 1512     Place sequential compression device  Until discontinued      06/16/20 1136               Time spent seeing patient( greater than 1/2 spent in direct contact) : 58 minutes    SCHUYLER Rivera  Department of Hospital Medicine   [facility]

## 2020-06-16 NOTE — ANESTHESIA PROCEDURE NOTES
Right Adductor Canal     Patient location during procedure: pre-op   Block not for primary anesthetic.  Reason for block: at surgeon's request and post-op pain management   Post-op Pain Location: right knee replacement  Start time: 6/16/2020 7:05 AM  Timeout: 6/16/2020 7:05 AM   End time: 6/16/2020 7:10 AM    Staffing  Authorizing Provider: Jose Marks MD  Performing Provider: Jose Marks MD    Preanesthetic Checklist  Completed: patient identified, site marked, surgical consent, pre-op evaluation, timeout performed, IV checked, risks and benefits discussed and monitors and equipment checked  Peripheral Block  Patient position: supine  Prep: ChloraPrep  Patient monitoring: heart rate, cardiac monitor, continuous pulse ox, continuous capnometry and frequent blood pressure checks  Block type: adductor canal  Laterality: right  Injection technique: single shot  Needle  Needle type: Stimuplex   Needle gauge: 21 G  Needle length: 4 in  Needle localization: anatomical landmarks and ultrasound guidance  Needle insertion depth: 4 cm   -ultrasound image captured on disc.  Assessment  Injection assessment: negative aspiration, negative parasthesia and local visualized surrounding nerve  Paresthesia pain: none  Heart rate change: no  Slow fractionated injection: yes  Additional Notes  VSS.  [facility] RN monitoring vitals throughout procedure.  Patient tolerated procedure well.     Exparel 20ml + Bupivicaine 0.5% 10ml dosed under [place] Guidance.

## 2020-06-16 NOTE — SUBJECTIVE & OBJECTIVE
Past Medical History:   Diagnosis Date    Abnormal Pap smear of cervix     Arthritis     OA    Back pain     Cancer     skin cancer to back    Depression     Endometriosis     Full dentures     GERD (gastroesophageal reflux disease)     Migraine     Pulmonary embolism     Seizures     Sleep apnea     Does not use c-pap since weight loss - 170 lbs    Uterine fibroid     Wears glasses        Past Surgical History:   Procedure Laterality Date    APPENDECTOMY      CARPAL TUNNEL RELEASE Bilateral      SECTION      CHOLECYSTECTOMY      COLONOSCOPY N/A 2019    Procedure: COLONOSCOPY;  Surgeon: Anselmo Blackwell MD;  Location: [facility] ENDO;  Service: Endoscopy;  Laterality: N/A;    EPIDURAL STEROID INJECTION INTO LUMBAR SPINE N/A 2019    Procedure: Injection-steroid-epidural-lumbar;  Surgeon: Yariel Ramirez MD;  Location: [facility] OR;  Service: Pain Management;  Laterality: N/A;  L3-4    ESOPHAGOGASTRODUODENOSCOPY N/A 2019    Procedure: EGD (ESOPHAGOGASTRODUODENOSCOPY);  Surgeon: Anselmo Blackwell MD;  Location: [facility];  Service: Endoscopy;  Laterality: N/A;    FRACTURE SURGERY      ankle    gastric sleve      HYSTERECTOMY      endo and fibroids    HYSTERECTOMY      JOINT REPLACEMENT Left 2018    KNEE ARTHROPLASTY Left 2018    Procedure: ARTHROPLASTY, KNEE;  Surgeon: Feliberto Cardenas MD;  Location: [facility] OR;  Service: Orthopedics;  Laterality: Left;    KNEE ARTHROSCOPY W/ MENISCECTOMY Right 10/25/2019    Procedure: ARTHROSCOPY, KNEE, WITH MENISCECTOMY;  Surgeon: Feliberto Cardenas MD;  Location: [facility] OR;  Service: Orthopedics;  Laterality: Right;    KNEE SURGERY      LUMBAR EPIDURAL INJECTION      OOPHORECTOMY Left     LSO    RADIAL NERVE Right     RECONSTRUCTION OF MEDIAL PATELLOFEMORAL LIGAMENT OF RIGHT KNEE Right 10/25/2019    Procedure: RECONSTRUCTION, LIGAMENT, MEDIAL PATELLOFEMORAL, RIGHT;  Surgeon: Feliberto Cardenas MD;  Location:  [facility] OR;  Service: Orthopedics;  Laterality: Right;    SINUS SURGERY      UPPER GASTROINTESTINAL ENDOSCOPY  11/27/2019    Dr. Blackwell; mild schatzki ring-dilated; gastritis; bx in process       Review of patient's allergies indicates:   Allergen Reactions    Clarithromycin Swelling and Other (See Comments)     DIFFICULTY SWALLOWING  DIFFICULTY SWALLOWING    Pcn [penicillins] Anaphylaxis    Sulfa (sulfonamide antibiotics) Hives    Wellbutrin [bupropion hcl] Shortness Of Breath and Anaphylaxis    Betadine [povidone-iodine] Hives     Swelling      Cefprozil Hives    Iodinated contrast media Hives    Latex, natural rubber Rash    Sulfamethoxazole-trimethoprim Nausea And Vomiting    Iodine Hives and Swelling    Latex Hives and Swelling       Current Facility-Administered Medications on File Prior to Encounter   Medication    lactated ringers infusion    lidocaine (PF) 10 mg/ml (1%) injection 10 mg     Current Outpatient Medications on File Prior to Encounter   Medication Sig    amitriptyline (ELAVIL) 25 MG tablet TAKE ONE TABLET BY MOUTH EVERY NIGHT FOR 2 WEEKS THEN INCREASE TO 2 TABLETS AT BEDTIME    celecoxib (CELEBREX) 200 MG capsule Take 200 mg by mouth every evening.     cyanocobalamin, vitamin B-12, 1,000 mcg/mL Kit Inject 1 mL into the muscle every 21 days.    erenumab-aooe 140 mg/mL AtIn Inject 140 mg into the skin every 28 days.    fluoxetine (PROZAC) 20 MG capsule every evening.     ibuprofen (ADVIL,MOTRIN) 800 MG tablet every 6 (six) hours as needed.     ketorolac (TORADOL) 30 mg/mL injection Use 30 mg IM q6 hours prn. Provide the patient with IM syringes and needles.    oxyCODONE (OXYCONTIN) 10 mg 12 hr tablet Take 10 mg by mouth every 12 (twelve) hours.    pantoprazole (PROTONIX) 40 MG tablet Take 1 tablet (40 mg total) by mouth 2 (two) times daily.    tiZANidine (ZANAFLEX) 4 MG tablet Take 1 tablet (4 mg total) by mouth every 8 (eight) hours as needed.    cetirizine (ZYRTEC) 5 MG  tablet Take 5 mg by mouth as needed.     epinephrine (EPIPEN) 0.3 mg/0.3 mL (1:1,000) AtIn 0.3 mg daily as needed.     hydrOXYzine pamoate (VISTARIL) 25 MG Cap TAKE ONE CAPSULE BY MOUTH 3 TIMES A DAY AS NEEDED FOR ANXIETY    lorazepam (ATIVAN) 0.5 MG tablet Take 0.5 mg by mouth every 4 (four) hours as needed.     promethazine (PHENERGAN) 25 MG tablet Take 1 tablet (25 mg total) by mouth every 6 (six) hours as needed for Nausea. Take 1 tablet by mouth every 6 (six) hours as needed (migraine). -MAY CAUSE DROWSINESS-    VOLTAREN 1 % Gel daily as needed.     [DISCONTINUED] ibuprofen/D5W 250mL Take 800 mg by mouth.     Family History     Problem Relation (Age of Onset)    Breast cancer Maternal Aunt (46)    Esophageal cancer Maternal Grandmother    Heart disease Mother, Father        Tobacco Use    Smoking status: Current Every Day Smoker     Packs/day: 0.25     Years: 20.00     Pack years: 5.00     Types: Cigarettes     Last attempt to quit: 2018     Years since quittin.5    Smokeless tobacco: Never Used   Substance and Sexual Activity    Alcohol use: Yes     Alcohol/week: 0.0 standard drinks     Comment: occasionally    Drug use: Not Currently     Types: Other-see comments     Comment: no    Sexual activity: Yes     Partners: Male     Review of Systems   Constitutional: Negative for appetite change, chills, diaphoresis, fatigue and fever.   HENT: Negative for ear discharge, ear pain and facial swelling.    Eyes: Negative for pain and redness.   Respiratory: Negative for cough and shortness of breath.    Cardiovascular: Negative for chest pain and palpitations.        History of prior pulmonary emboli in 2018   Gastrointestinal: Negative for abdominal distention, abdominal pain, blood in stool, constipation, diarrhea, nausea, rectal pain and vomiting.   Endocrine: Negative for polydipsia and polyphagia.   Genitourinary: Negative for difficulty urinating, dysuria, flank pain and hematuria.    Musculoskeletal: Positive for arthralgias. Negative for neck pain and neck stiffness.        Right knee pain   Skin: Positive for wound. Negative for color change.        Right knee surgical dressing   Allergic/Immunologic: Negative for food allergies.   Neurological: Negative for syncope, facial asymmetry, speech difficulty, weakness and numbness.   Hematological: Does not bruise/bleed easily.   Psychiatric/Behavioral: Negative for agitation, behavioral problems, confusion, hallucinations and suicidal ideas. The patient is not nervous/anxious.      Objective:     Vital Signs (Most Recent):  Temp: 97.1 °F (36.2 °C) (06/16/20 1527)  Pulse: 72 (06/16/20 1527)  Resp: 18 (06/16/20 1527)  BP: (!) 92/49 (06/16/20 1527)  SpO2: (!) 94 % (06/16/20 1527) Vital Signs (24h Range):  Temp:  [97 °F (36.1 °C)-97.9 °F (36.6 °C)] 97.1 °F (36.2 °C)  Pulse:  [54-72] 72  Resp:  [10-23] 18  SpO2:  [94 %-100 %] 94 %  BP: ()/(49-69) 92/49     Weight: 111.1 kg (245 lb)  Body mass index is 33.23 kg/m².    Physical Exam  Constitutional:       General: She is not in acute distress.     Appearance: Normal appearance.   HENT:      Head: Normocephalic.      Mouth/Throat:      Mouth: Mucous membranes are moist.   Eyes:      General:         Right eye: No discharge.         Left eye: No discharge.      Conjunctiva/sclera: Conjunctivae normal.      Pupils: Pupils are equal, round, and reactive to light.   Neck:      Musculoskeletal: Normal range of motion and neck supple.   Cardiovascular:      Rate and Rhythm: Normal rate and regular rhythm.      Pulses: Normal pulses.      Heart sounds: Normal heart sounds.   Pulmonary:      Effort: Pulmonary effort is normal. No respiratory distress.      Breath sounds: Normal breath sounds. No wheezing or rales.   Abdominal:      General: Bowel sounds are normal. There is no distension.      Palpations: Abdomen is soft.      Tenderness: There is no abdominal tenderness. There is no guarding.    Genitourinary:     Comments: Not examined  Musculoskeletal: Normal range of motion.      Comments: Right lower extremity range of motion not tested   Skin:     General: Skin is warm and dry.      Capillary Refill: Capillary refill takes less than 2 seconds.   Neurological:      General: No focal deficit present.      Mental Status: She is alert and oriented to person, place, and time. Mental status is at baseline.      Cranial Nerves: No cranial nerve deficit.   Psychiatric:         Mood and Affect: Mood normal.         Behavior: Behavior normal.         Thought Content: Thought content normal.         Judgment: Judgment normal.           CRANIAL NERVES     CN III, IV, VI   Pupils are equal, round, and reactive to light.        Results for orders placed or performed in visit on 06/14/20   COVID-19 Routine Screening   Result Value Ref Range    SARS-CoV2 (COVID-19) Qualitative PCR Not Detected Not Detected

## 2020-06-16 NOTE — OP NOTE
[facility]  Discharge Note  Short Stay    Admit Date: 6/16/2020    Discharge Date and Time: 6/16/2020    Attending Physician: Feliberto Cardenas MD     Discharge Provider: Feliberto Cardenas    Diagnoses:  Active Hospital Problems    Diagnosis  POA    *Arthritis of right knee [M17.11]  Yes      Resolved Hospital Problems   No resolved problems to display.       Discharged Condition: good    Hospital Course: Patient was admitted for an outpatient procedure and tolerated the procedure well with no complications.    Final Diagnoses: Same as principal problem.    Disposition: Home or Self Care    Follow up/Patient Instructions:    Medications:  Reconciled Home Medications:      Medication List      START taking these medications    aspirin 81 MG EC tablet  Commonly known as: ECOTRIN  Take 1 tablet (81 mg total) by mouth 2 (two) times daily.     HYDROcodone-acetaminophen 5-325 mg per tablet  Commonly known as: NORCO  Take 1 tablet by mouth every 6 (six) hours as needed for Pain.        CONTINUE taking these medications    AIMOVIG AUTOINJECTOR 140 mg/mL Atin  Generic drug: erenumab-aooe  Inject 140 mg into the skin every 28 days.     amitriptyline 25 MG tablet  Commonly known as: ELAVIL  TAKE ONE TABLET BY MOUTH EVERY NIGHT FOR 2 WEEKS THEN INCREASE TO 2 TABLETS AT BEDTIME     celecoxib 200 MG capsule  Commonly known as: CeleBREX  Take 200 mg by mouth every evening.     cetirizine 5 MG tablet  Commonly known as: ZYRTEC  Take 5 mg by mouth as needed.     cyanocobalamin (vitamin B-12) 1,000 mcg/mL Kit  Inject 1 mL into the muscle every 21 days.     EPINEPHrine 0.3 mg/0.3 mL Atin  Commonly known as: EPIPEN  0.3 mg daily as needed.     FLUoxetine 20 MG capsule  every evening.     hydrOXYzine pamoate 25 MG Cap  Commonly known as: VISTARIL  TAKE ONE CAPSULE BY MOUTH 3 TIMES A DAY AS NEEDED FOR ANXIETY     ibuprofen 800 MG tablet  Commonly known as: ADVIL,MOTRIN  every 6 (six) hours as needed.      ketorolac 30 mg/mL injection  Commonly known as: TORADOL  Use 30 mg IM q6 hours prn. Provide the patient with IM syringes and needles.     LORazepam 0.5 MG tablet  Commonly known as: ATIVAN  Take 0.5 mg by mouth every 4 (four) hours as needed.     oxyCODONE 10 mg 12 hr tablet  Commonly known as: OXYCONTIN  Take 10 mg by mouth every 12 (twelve) hours.     pantoprazole 40 MG tablet  Commonly known as: PROTONIX  Take 1 tablet (40 mg total) by mouth 2 (two) times daily.     promethazine 25 MG tablet  Commonly known as: PHENERGAN  Take 1 tablet (25 mg total) by mouth every 6 (six) hours as needed for Nausea. Take 1 tablet by mouth every 6 (six) hours as needed (migraine). -MAY CAUSE DROWSINESS-     tiZANidine 4 MG tablet  Commonly known as: ZANAFLEX  Take 1 tablet (4 mg total) by mouth every 8 (eight) hours as needed.     VOLTAREN 1 % Gel  Generic drug: diclofenac sodium  daily as needed.          Discharge Procedure Orders   Diet Adult Regular     Keep surgical extremity elevated     Ice to affected area     Call MD for:  temperature >100.4     Call MD for:  severe uncontrolled pain     Call MD for:  redness, tenderness, or signs of infection (pain, swelling, redness, odor or green/yellow discharge around incision site)     Remove dressing in 24 hours     Change dressing (specify)   Order Comments: Dressing change: One time per day using Waterproof Bandaids.     Activity as tolerated     Shower on day dressing removed (No bath)     Weight bearing restrictions (specify):     Follow-up Information     Feliberto Cardenas MD In 2 weeks.    Specialties: Sports Medicine, Orthopedic Surgery  Why: For suture removal  Contact information:  [address] [place] [address] [address]  [place] [address]  [phone]                   Discharge Procedure Orders (must include Diet, Follow-up, Activity):   Discharge Procedure Orders (must include Diet, Follow-up, Activity)   Diet Adult Regular     Keep surgical extremity elevated     Ice to  affected area     Call MD for:  temperature >100.4     Call MD for:  severe uncontrolled pain     Call MD for:  redness, tenderness, or signs of infection (pain, swelling, redness, odor or green/yellow discharge around incision site)     Remove dressing in 24 hours     Change dressing (specify)   Order Comments: Dressing change: One time per day using Waterproof Bandaids.     Activity as tolerated     Shower on day dressing removed (No bath)     Weight bearing restrictions (specify):

## 2020-06-16 NOTE — PT/OT/SLP EVAL
Physical Therapy Evaluation    Patient Name:  Aleyda Costa   MRN:  [id number]    Recommendations:     Discharge Recommendations:  home health PT   Discharge Equipment Recommendations: none   Barriers to discharge: None    Assessment:     Aleyda Costa is a 39 y.o. female admitted with a medical diagnosis of Arthritis of right knee.  She presents with the following impairments/functional limitations:  weakness, impaired endurance, impaired sensation, impaired functional mobilty, gait instability, impaired balance, decreased lower extremity function, pain, decreased ROM, orthopedic precautions. Patient is agreeable to PT evaluation. She is POD #0 R TKA. She has a medical history of depression, GERD, migraines, L TKA 11/2018 with PE, seizures, and sleep apnea. She lives with her  with 5 stairs to enter with a left rail. She requires SBA for supine <> sit and CGA for sit <> stand. She ambulated 250' with RW, CGA, and 2 standing rest breaks. She reports 8/10 R knee pain with RN aware.     Rehab Prognosis: Good; patient would benefit from acute skilled PT services to address these deficits and reach maximum level of function.    Recent Surgery: Procedure(s) (LRB):  ARTHROPLASTY, KNEE (Right) Day of Surgery    Plan:     During this hospitalization, patient to be seen BID to address the identified rehab impairments via gait training, therapeutic activities, therapeutic exercises and progress toward the following goals:    · Plan of Care Expires:  07/16/20    Subjective     Chief Complaint: 8/10 R knee pain  Patient/Family Comments/goals: use the bathroom; she has been to the bathroom 2x, but is unable to urinate  Pain/Comfort:  · Pain Rating 1: 8/10  · Location - Side 1: Right  · Location 1: knee  · Pain Addressed 1: Nurse notified(no pain medication available at this time)    Patients cultural, spiritual, Protestant conflicts given the current situation:      Living Environment:  Pt lives with spouse  with 5 steps to enter with L railing  Prior to admission, patients level of function was independent. She does not use an AD at baseline. She does not work. She denies any recent PT. Equipment used at home: none.  DME owned (not currently used): rolling walker and bedside commode.  Upon discharge, patient will have assistance from spouse.    Objective:     Communicated with RN Eve prior to session.  Patient found HOB elevated with cryotherapy, SCD, peripheral IV  upon PT entry to room.    General Precautions: Standard, fall   Orthopedic Precautions:RLE weight bearing as tolerated   Braces: N/A     Exams:  · RLE ROM: ~60 degrees of flexion  · LLE Strength: WFL    Functional Mobility:  · Bed Mobility:     · Supine to Sit: stand by assistance  · Transfers:     · Sit to Stand:  contact guard assistance with rolling walker  · Gait: 250' RW, CGA, 2 standing rest breaks  · Balance: Good    Therapeutic Activities and Exercises:   Patient was educated on the importance of OOB activity during hospital stay, safe transfers and ambulation, D/C planning, RLE WBAT    RLE therex: QS, SLR x20 reps; SAQ x5 reps with patient declining d/t pain    AM-PAC 6 CLICK MOBILITY  Total Score:21     Patient left HOB elevated with all lines intact, call button in reach, bed alarm on, RN notified and  present.    GOALS:   Multidisciplinary Problems     Physical Therapy Goals        Problem: Physical Therapy Goal    Goal Priority Disciplines Outcome Goal Variances Interventions   Physical Therapy Goal     PT, PT/OT      Description: Goals to be met by: 2020     Patient will increase functional independence with mobility by performin. Supine to sit with Supervision  2. Sit to stand transfer with Supervision  3. Bed to chair transfer with Supervision using Rolling Walker  4. Gait  x 250 feet with Supervision using Rolling Walker.   5. Ascend/descend 5 stairs with left Handrails Supervision.  6. Lower extremity exercise program  x20 reps per handout, with independence                     History:     Past Medical History:   Diagnosis Date    Abnormal Pap smear of cervix     Arthritis     OA    Back pain     Cancer     skin cancer to back    Depression     Endometriosis     Full dentures     GERD (gastroesophageal reflux disease)     Migraine     Pulmonary embolism     Seizures     Sleep apnea     Does not use c-pap since weight loss - 170 lbs    Uterine fibroid     Wears glasses        Past Surgical History:   Procedure Laterality Date    APPENDECTOMY      CARPAL TUNNEL RELEASE Bilateral      SECTION      CHOLECYSTECTOMY      COLONOSCOPY N/A 2019    Procedure: COLONOSCOPY;  Surgeon: Anselmo Blackwell MD;  Location: [facility] ENDO;  Service: Endoscopy;  Laterality: N/A;    EPIDURAL STEROID INJECTION INTO LUMBAR SPINE N/A 2019    Procedure: Injection-steroid-epidural-lumbar;  Surgeon: Yariel Ramirez MD;  [facility];  Service: Pain Management;  Laterality: N/A;  L3-4    ESOPHAGOGASTRODUODENOSCOPY N/A 2019    Procedure: EGD (ESOPHAGOGASTRODUODENOSCOPY);  Surgeon: Anselmo Blackwell MD;  Location: [facility];  Service: Endoscopy;  Laterality: N/A;    FRACTURE SURGERY      ankle    gastric sleve      HYSTERECTOMY      endo and fibroids    HYSTERECTOMY      JOINT REPLACEMENT Left 2018    KNEE ARTHROPLASTY Left 2018    Procedure: ARTHROPLASTY, KNEE;  Surgeon: Feliberto Cardenas MD;  Location: [facility] OR;  Service: Orthopedics;  Laterality: Left;    KNEE ARTHROSCOPY W/ MENISCECTOMY Right 10/25/2019    Procedure: ARTHROSCOPY, KNEE, WITH MENISCECTOMY;  Surgeon: Feliberto Cardenas MD;  Location: [facility] OR;  Service: Orthopedics;  Laterality: Right;    KNEE SURGERY      LUMBAR EPIDURAL INJECTION      OOPHORECTOMY Left     LSO    RADIAL NERVE Right     RECONSTRUCTION OF MEDIAL PATELLOFEMORAL LIGAMENT OF RIGHT KNEE Right 10/25/2019    Procedure: RECONSTRUCTION, LIGAMENT, MEDIAL  PATELLOFEMORAL, RIGHT;  Surgeon: Feliberto Cardenas MD;  [facility];  Service: Orthopedics;  Laterality: Right;    SINUS SURGERY      UPPER GASTROINTESTINAL ENDOSCOPY  11/27/2019    Dr. Blackwell; mild schatzki ring-dilated; gastritis; bx in process       Time Tracking:     PT Received On: 06/16/20  PT Start Time: 1308     PT Stop Time: 1340  PT Total Time (min): 32 min     Billable Minutes: Evaluation 5, Gait Training 17 and Therapeutic Exercise 10      Chandrika Simental, PT  06/16/2020

## 2020-06-16 NOTE — H&P
Past Medical History:   Diagnosis Date    Abnormal Pap smear of cervix     Arthritis     OA    Back pain     Cancer     skin cancer to back    Depression     Endometriosis     Full dentures     GERD (gastroesophageal reflux disease)     Migraine     Pulmonary embolism     Seizures     Sleep apnea     Does not use c-pap since weight loss - 170 lbs    Uterine fibroid     Wears glasses            Past Surgical History:   Procedure Laterality Date    APPENDECTOMY      CARPAL TUNNEL RELEASE Bilateral      SECTION      CHOLECYSTECTOMY      COLONOSCOPY N/A 2019    Procedure: COLONOSCOPY; Surgeon: Anselmo Blackwell MD; Location: [facility] ENDO; Service: Endoscopy; Laterality: N/A;    EPIDURAL STEROID INJECTION INTO LUMBAR SPINE N/A 2019    Procedure: Injection-steroid-epidural-lumbar; Surgeon: Yariel Ramirez MD; Location: [facility] OR; Service: Pain Management; Laterality: N/A; L3-4    ESOPHAGOGASTRODUODENOSCOPY N/A 2019    Procedure: EGD (ESOPHAGOGASTRODUODENOSCOPY); Surgeon: Anselmo Blackwell MD; Location: [facility] ENDO; Service: Endoscopy; Laterality: N/A;    FRACTURE SURGERY      ankle    gastric sleve      HYSTERECTOMY      endo and fibroids    HYSTERECTOMY      JOINT REPLACEMENT Left 2018    KNEE ARTHROPLASTY Left 2018    Procedure: ARTHROPLASTY, KNEE; Surgeon: Feliberto Cardenas MD; Location: [facility] OR; Service: Orthopedics; Laterality: Left;    KNEE ARTHROSCOPY W/ MENISCECTOMY Right 10/25/2019    Procedure: ARTHROSCOPY, KNEE, WITH MENISCECTOMY; Surgeon: Feliberto Cardenas MD; Location: [facility] OR; Service: Orthopedics; Laterality: Right;    KNEE SURGERY      LUMBAR EPIDURAL INJECTION      OOPHORECTOMY Left     LSO    RADIAL NERVE Right     RECONSTRUCTION OF MEDIAL PATELLOFEMORAL LIGAMENT OF RIGHT KNEE Right 10/25/2019    Procedure: RECONSTRUCTION, LIGAMENT, MEDIAL PATELLOFEMORAL, RIGHT; Surgeon: Feliberto Cardenas MD; Location: [facility] OR; Service:  Orthopedics; Laterality: Right;    SINUS SURGERY      UPPER GASTROINTESTINAL ENDOSCOPY  11/27/2019    Dr. Blackwell; mild schatzki ring-dilated; gastritis; bx in process          Current Outpatient Medications   Medication Sig    amitriptyline (ELAVIL) 25 MG tablet TAKE ONE TABLET BY MOUTH EVERY NIGHT FOR 2 WEEKS THEN INCREASE TO 2 TABLETS AT BEDTIME    celecoxib (CELEBREX) 200 MG capsule Take 200 mg by mouth every evening.     cetirizine (ZYRTEC) 5 MG tablet Take 5 mg by mouth as needed.     cyanocobalamin, vitamin B-12, 1,000 mcg/mL Kit Inject 1 mL into the muscle every 21 days.    epinephrine (EPIPEN) 0.3 mg/0.3 mL (1:1,000) AtIn 0.3 mg daily as needed.     erenumab-aooe 140 mg/mL AtIn Inject 140 mg into the skin every 28 days.    fluoxetine (PROZAC) 20 MG capsule every evening.     hydrOXYzine pamoate (VISTARIL) 25 MG Cap TAKE ONE CAPSULE BY MOUTH 3 TIMES A DAY AS NEEDED FOR ANXIETY    ibuprofen (ADVIL,MOTRIN) 800 MG tablet every 6 (six) hours as needed.     ibuprofen/D5W 250mL Take 800 mg by mouth.    ketorolac (TORADOL) 30 mg/mL injection Use 30 mg IM q6 hours prn. Provide the patient with IM syringes and needles.    lorazepam (ATIVAN) 0.5 MG tablet Take 0.5 mg by mouth every 4 (four) hours as needed.     oxyCODONE (OXYCONTIN) 10 mg 12 hr tablet Take 10 mg by mouth every 12 (twelve) hours.    pantoprazole (PROTONIX) 40 MG tablet Take 1 tablet (40 mg total) by mouth 2 (two) times daily.    predniSONE (DELTASONE) 50 MG Tab Take 1 tablet (50 mg total) by mouth On call Procedure.    promethazine (PHENERGAN) 25 MG tablet Take 1 tablet (25 mg total) by mouth every 6 (six) hours as needed for Nausea. Take 1 tablet by mouth every 6 (six) hours as needed (migraine). -MAY CAUSE DROWSINESS-    tiZANidine (ZANAFLEX) 4 MG tablet Take 1 tablet (4 mg total) by mouth every 8 (eight) hours as needed.    VOLTAREN 1 % Gel daily as needed.          Current Facility-Administered Medications   Medication     onabotulinumtoxina injection 200 Units         Facility-Administered Medications Ordered in Other Visits   Medication    lactated ringers infusion    lidocaine (PF) 10 mg/ml (1%) injection 10 mg           Review of patient's allergies indicates:   Allergen Reactions    Clarithromycin Swelling and Other (See Comments)     DIFFICULTY SWALLOWING   DIFFICULTY SWALLOWING    Pcn [penicillins] Anaphylaxis    Sulfa (sulfonamide antibiotics) Hives    Wellbutrin [bupropion hcl] Shortness Of Breath and Anaphylaxis    Betadine [povidone-iodine] Hives     Swelling     Cefprozil Hives    Iodinated contrast media Hives    Latex, natural rubber Rash    Sulfamethoxazole-trimethoprim Nausea And Vomiting    Iodine Hives and Swelling    Latex Hives and Swelling           Family History   Problem Relation Age of Onset    Heart disease Mother     Heart disease Father     Esophageal cancer Maternal Grandmother     Breast cancer Maternal Aunt 46    Colon cancer Neg Hx     Stomach cancer Neg Hx     Ovarian cancer Neg Hx      Social History                                                                                                                                                                                                                                                                                                                    Chief Complaint:        Chief Complaint   Patient presents with    Knee Pain     s/p right knee scope 10/25/19    Date of surgery: October 25, 2019 right allograft MPFL reconstruction   History of present illness: This is a 39-year-old female underwent right allograft MPFL reconstruction for chronic patellar instability. Patient has severe arthritic change of her patellofemoral joint unfortunately. Rest of her knee actually looked pretty good. Having a little pain and still has some grinding. Pain is 6/10. It some exercises on her own but it does not seem to help. Having more pain in  the ankle due to her not walking correctly. Cannot go up and down steps. Has trouble getting out of chairs. She has tried Celebrex and other anti-inflammatories without great success.   Review of Systems:   Musculoskeletal: See HPI   Physical Examination:   Vital Signs:       Vitals:    02/03/20 0905   BP: (!) 125/58   Pulse: 73   Body mass index is 33.23 kg/m².   This a well-developed, well nourished patient in no acute distress. They are alert and oriented and cooperative to examination. Pt. walks without an antalgic gait.   Examination of the right knee shows well-healed surgical incisions. No erythema or drainage. Full range of motion. No calf pain. Negative Homans sign. Significant patellofemoral crepitus. Range of motion is 0-130 degrees. Stable to varus and valgus stress.   Heart is regular rate without obvious murmurs   Normal respiratory effort without audible wheezing   Abdomen is soft and nontender   X-rays: X-rays of the right knee is ordered and reviewed which show significant patellofemoral arthritis. Well-maintained tibial femoral joint space. Well-aligned left total knee arthroplasty   Assessment: Right patellofemoral arthritis   Plan: I reviewed the findings with her today. We talked about continue with physical therapy and conservative management versus arthroplasty. We talked about patellofemoral joint replacement versus total old knee replacement. Patient does not want a patellofemoral placement. She has done some research and rather do the whole thing if she is having a major surgery like that. Plan will be for a Francis CR right total knee arthroplasty. Risks, benefits, and alternatives to the procedure were explained to the patient including but not limited to damage to nerves, arteries, blood vessels, bones, tendons, ligaments, stiffness, instability, infection, DVT, PE, as well as general anesthetic complications including seizure, stroke, heart attack and even death. The patient understood  these risks and wished to proceed and signed the informed consent.   This note was created using [employer] voice recognition software that occasionally misinterpreted phrases or words.

## 2020-06-16 NOTE — PLAN OF CARE
[facility] via [facility] system for authorization and acceptance.       06/16/20 1545   Post-Acute Status   Post-Acute Authorization Home Health   Home Health Status Referrals Sent   Patient choice form signed by patient/caregiver List with quality metrics by geographic area provided

## 2020-06-16 NOTE — ASSESSMENT & PLAN NOTE
Status post right knee arthroplasty-  Orders as per surgeon-Dr. Crisostomo  Physical therapy and occupational therapy consulted  P.r.n. pain medication  Fall precautions    Please note patient has a history of prior pulmonary emboli and previously failed treatment with Xarelto and must be on Pradaxa 150 mg p.o. b.i.d. for discharge

## 2020-06-16 NOTE — HOSPITAL COURSE
This patient was observed by hospital medicine after undergoing RIGHT TKA with Dr. Crisostomo on 6/16/20.  Patient worked with PT/OT post-operatively who recommended Home health which was ordered. Her pain was controlled with oral narcotics as prescribed by orthopedist.  She was discharged home after cleared by orthopedist on pradaxa 150mg BID for 30 days as per hematology Dr. Smith recommendations for VTE prophylaxis, as well as oral narcotics per orthopedic orders. Fall precautions were discussed with patient and family. Return precautions were discussed at length. Patient to follow up with primary care provider on discharge for any medical needs     Physical Exam:  AAO x 4, pleasant  HRRR; lungs CTA.  RIGHT knee bandage CDI, +palp pulses, sensation intact    Please note patient has a history of prior pulmonary emboli and previously seen by Dr. Pina on 02/20/2019-patient previously failed treatment with Xarelto and must be on Pradaxa 150 mg p.o. b.i.d. for discharge to prevent recurrence of DVT/ pulmonary emboli

## 2020-06-16 NOTE — TRANSFER OF CARE
Anesthesia Transfer of Care Note    Patient: Aleyda Costa    Procedure(s) Performed: Procedure(s) (LRB):  ARTHROPLASTY, KNEE (Right)    Patient location: PACU    Anesthesia Type: spinal and MAC    Transport from OR: Transported from OR on 2-3 L/min O2 by NC with adequate spontaneous ventilation    Post pain: adequate analgesia    Post assessment: no apparent anesthetic complications    Post vital signs: stable    Level of consciousness: awake    Nausea/Vomiting: no nausea/vomiting    Complications: none    Transfer of care protocol was followed      Last vitals:   Visit Vitals  BP (!) 103/56   Pulse (!) 57   Temp 36.6 °C (97.9 °F) (Temporal)   Resp 15   Ht 6' (1.829 m)   Wt 111.1 kg (245 lb)   SpO2 95%   Breastfeeding No   BMI 33.23 kg/m²

## 2020-06-16 NOTE — PLAN OF CARE
Patient is stable at this time.  VSS. Anesthesiologist at patient's bedside and is ok for patient to transfer to the floor.  Dressings clean, dry and intact to right knee.  Pain is 0/10.  Cryotherapy in place.  No complaints of nausea or vomiting.  Patient tolerating po intake well.  Message sent to Dr Rivas, hospitalist.

## 2020-06-16 NOTE — ANESTHESIA PREPROCEDURE EVALUATION
06/16/2020  Aleyda Costa is a 39 y.o., female.    Anesthesia Evaluation    I have reviewed the Patient Summary Reports.    I have reviewed the Nursing Notes.    I have reviewed the Medications.     Review of Systems  Anesthesia Hx:  No problems with previous Anesthesia    Social:  Smoker    Hematology/Oncology:         -- Cancer in past history:   Cardiovascular:  Cardiovascular Normal     Pulmonary:   Sleep Apnea    Education provided regarding risk of obstructive sleep apnea     Renal/:  Renal/ Normal     Hepatic/GI:   GERD, well controlled    Musculoskeletal:   Arthritis     Neurological:   Neuromuscular Disease, Headaches Seizures, well controlled    Endocrine:  Endocrine Normal    Psych:   Psychiatric History depression          Physical Exam  General:  Well nourished    Airway/Jaw/Neck:  Airway Findings: Mouth Opening: Normal Tongue: Normal  General Airway Assessment: Adult  Oropharynx Findings:  Mallampati: II  Jaw/Neck Findings:  Neck ROM: Normal ROM     Eyes/Ears/Nose:  Eyes/Ears/Nose Findings:    Dental:  Dental Findings:   Chest/Lungs:  Chest/Lungs Findings: Normal Respiratory Rate     Heart/Vascular:  Heart Findings: Rate: Normal  Rhythm: Regular Rhythm        Mental Status:  Mental Status Findings:  Cooperative, Alert and Oriented         Anesthesia Plan  Type of Anesthesia, risks & benefits discussed:  Anesthesia Type:  general, spinal  Patient's Preference:   Intra-op Monitoring Plan: standard ASA monitors  Intra-op Monitoring Plan Comments:   Post Op Pain Control Plan: multimodal analgesia and peripheral nerve block  Post Op Pain Control Plan Comments:   Induction:   IV  Beta Blocker:  Patient is not currently on a Beta-Blocker (No further documentation required).       Informed Consent: Patient understands risks and agrees with Anesthesia plan.  Questions answered. Anesthesia  consent signed with patient.  ASA Score: 2     Day of Surgery Review of History & Physical:            Ready For Surgery From Anesthesia Perspective.

## 2020-06-16 NOTE — PLAN OF CARE
Pt has remained free from injury this shift.  She has ambulated in [place] with PT and to restroom with staff.  She has voided since surgery.  She is eating and drinking well.  She has been medicated for pain and spasm during this shift.  Neuro checks are good, she denies any numbness or tingling.  She has scd and plexi pulse on and will be started on po blood thinner this evening.  She has cryotherapy on.  Dressing intact with no bleeding noted.  She was educated on constipation and dvt with verbal understanding.  IS at bedside she was encouraged and educated on use.  She was encouraged to call for assistance or needs with return demonstration on use.

## 2020-06-16 NOTE — ANESTHESIA POSTPROCEDURE EVALUATION
Anesthesia Post Evaluation    Patient: Aleyda Costa    Procedure(s) Performed: Procedure(s) (LRB):  ARTHROPLASTY, KNEE (Right)    Final Anesthesia Type: spinal    Patient location during evaluation: PACU  Patient participation: Yes- Able to Participate  Level of consciousness: awake and alert and oriented  Post-procedure vital signs: reviewed and stable  Pain management: adequate  Airway patency: patent    PONV status at discharge: No PONV  Anesthetic complications: no      Cardiovascular status: blood pressure returned to baseline and stable  Respiratory status: unassisted and spontaneous ventilation  Hydration status: euvolemic  Follow-up not needed.          Vitals Value Taken Time   BP 92/49 06/16/20 1527   Temp 36.2 °C (97.1 °F) 06/16/20 1527   Pulse 72 06/16/20 1527   Resp 18 06/16/20 1527   SpO2 94 % 06/16/20 1527         Event Time   Out of Recovery 10:20:00         Pain/Claudine Score: Pain Rating Prior to Med Admin: 8 (6/16/2020  5:43 PM)  Pain Rating Post Med Admin: 7 (6/16/2020  2:59 PM)  Claudine Score: 10 (6/16/2020 10:00 AM)

## 2020-06-16 NOTE — HPI
This is a 39-year-old female who has a past medical history of depression, GERD, pulmonary emboli in 2018, sleep apnea, prior gastric sleeve, nicotine dependence, and osteoarthritis of the right knee.  Today patient underwent a right knee arthroplasty done by Dr. Crisostomo.  Patient tolerated procedure well and is currently post procedure.

## 2020-06-17 ENCOUNTER — TELEPHONE (OUTPATIENT)
Dept: PHARMACY | Facility: CLINIC | Age: 40
End: 2020-06-17

## 2020-06-17 LAB
ANION GAP SERPL CALC-SCNC: 8 MMOL/L (ref 8–16)
BASOPHILS # BLD AUTO: 0.04 K/UL (ref 0–0.2)
BASOPHILS NFR BLD: 0.3 % (ref 0–1.9)
BUN SERPL-MCNC: 7 MG/DL (ref 6–20)
CALCIUM SERPL-MCNC: 8.4 MG/DL (ref 8.7–10.5)
CHLORIDE SERPL-SCNC: 105 MMOL/L (ref 95–110)
CO2 SERPL-SCNC: 25 MMOL/L (ref 23–29)
CREAT SERPL-MCNC: 0.7 MG/DL (ref 0.5–1.4)
DIFFERENTIAL METHOD: ABNORMAL
EOSINOPHIL # BLD AUTO: 0 K/UL (ref 0–0.5)
EOSINOPHIL NFR BLD: 0.2 % (ref 0–8)
ERYTHROCYTE [DISTWIDTH] IN BLOOD BY AUTOMATED COUNT: 14.1 % (ref 11.5–14.5)
EST. GFR  (AFRICAN AMERICAN): >60 ML/MIN/1.73 M^2
EST. GFR  (NON AFRICAN AMERICAN): >60 ML/MIN/1.73 M^2
GLUCOSE SERPL-MCNC: 112 MG/DL (ref 70–110)
HCT VFR BLD AUTO: 33.9 % (ref 37–48.5)
HGB BLD-MCNC: 11 G/DL (ref 12–16)
IMM GRANULOCYTES # BLD AUTO: 0.07 K/UL (ref 0–0.04)
IMM GRANULOCYTES NFR BLD AUTO: 0.5 % (ref 0–0.5)
LYMPHOCYTES # BLD AUTO: 2.1 K/UL (ref 1–4.8)
LYMPHOCYTES NFR BLD: 15.5 % (ref 18–48)
MCH RBC QN AUTO: 31.3 PG (ref 27–31)
MCHC RBC AUTO-ENTMCNC: 32.4 G/DL (ref 32–36)
MCV RBC AUTO: 97 FL (ref 82–98)
MONOCYTES # BLD AUTO: 1.1 K/UL (ref 0.3–1)
MONOCYTES NFR BLD: 8.3 % (ref 4–15)
NEUTROPHILS # BLD AUTO: 10.2 K/UL (ref 1.8–7.7)
NEUTROPHILS NFR BLD: 75.2 % (ref 38–73)
NRBC BLD-RTO: 0 /100 WBC
PLATELET # BLD AUTO: 214 K/UL (ref 150–350)
PMV BLD AUTO: 10.1 FL (ref 9.2–12.9)
POTASSIUM SERPL-SCNC: 4.2 MMOL/L (ref 3.5–5.1)
RBC # BLD AUTO: 3.51 M/UL (ref 4–5.4)
SODIUM SERPL-SCNC: 138 MMOL/L (ref 136–145)
WBC # BLD AUTO: 13.55 K/UL (ref 3.9–12.7)

## 2020-06-17 PROCEDURE — 25000003 PHARM REV CODE 250: Performed by: ANESTHESIOLOGY

## 2020-06-17 PROCEDURE — 97110 THERAPEUTIC EXERCISES: CPT

## 2020-06-17 PROCEDURE — 25000003 PHARM REV CODE 250: Performed by: HOSPITALIST

## 2020-06-17 PROCEDURE — 80048 BASIC METABOLIC PNL TOTAL CA: CPT

## 2020-06-17 PROCEDURE — 85025 COMPLETE CBC W/AUTO DIFF WBC: CPT

## 2020-06-17 PROCEDURE — 97116 GAIT TRAINING THERAPY: CPT

## 2020-06-17 PROCEDURE — 36415 COLL VENOUS BLD VENIPUNCTURE: CPT

## 2020-06-17 PROCEDURE — 25000003 PHARM REV CODE 250: Performed by: NURSE PRACTITIONER

## 2020-06-17 RX ORDER — DABIGATRAN ETEXILATE 150 MG/1
150 CAPSULE ORAL 2 TIMES DAILY
Qty: 60 CAPSULE | Refills: 0 | Status: ON HOLD | OUTPATIENT
Start: 2020-06-17 | End: 2020-06-23 | Stop reason: HOSPADM

## 2020-06-17 RX ORDER — OXYCODONE AND ACETAMINOPHEN 5; 325 MG/1; MG/1
1 TABLET ORAL EVERY 6 HOURS PRN
Qty: 40 TABLET | Refills: 0 | Status: SHIPPED | OUTPATIENT
Start: 2020-06-17 | End: 2020-07-09 | Stop reason: SDUPTHER

## 2020-06-17 RX ADMIN — OXYCODONE HYDROCHLORIDE 15 MG: 10 TABLET ORAL at 07:06

## 2020-06-17 RX ADMIN — OXYCODONE HYDROCHLORIDE 10 MG: 10 TABLET, FILM COATED, EXTENDED RELEASE ORAL at 08:06

## 2020-06-17 RX ADMIN — DABIGATRAN ETEXILATE MESYLATE 150 MG: 75 CAPSULE ORAL at 08:06

## 2020-06-17 RX ADMIN — CARISOPRODOL 350 MG: 350 TABLET ORAL at 08:06

## 2020-06-17 RX ADMIN — PREGABALIN 75 MG: 75 CAPSULE ORAL at 08:06

## 2020-06-17 RX ADMIN — PANTOPRAZOLE SODIUM 40 MG: 40 TABLET, DELAYED RELEASE ORAL at 08:06

## 2020-06-17 NOTE — PLAN OF CARE
Pt is cleared from CM for D/C.       06/17/20 1002   Final Note   Assessment Type Final Discharge Note   Anticipated Discharge Disposition Home-Health   Hospital Follow Up  Appt(s) scheduled? Yes

## 2020-06-17 NOTE — DISCHARGE SUMMARY
[facility] Medicine  Discharge Summary      Patient Name: Aleyda Costa  MRN: [id number]  Admission Date: 6/16/2020  Hospital Length of Stay: 1 days  Discharge Date and Time: 6/17/2020 11:00 AM  Attending Physician: No att. providers found   Discharging Provider: Hailey Ocampo NP  Primary Care Provider: Darlene Hayden MD      HPI:   This is a 39-year-old female who has a past medical history of depression, GERD, pulmonary emboli in 2018, sleep apnea, prior gastric sleeve, nicotine dependence, and osteoarthritis of the right knee.  Today patient underwent a right knee arthroplasty done by Dr. Crisostomo.  Patient tolerated procedure well and is currently post procedure.           Procedure(s) (LRB):  ARTHROPLASTY, KNEE (Right)      Hospital Course:   This patient was observed by [facility] medicine after undergoing RIGHT TKA with Dr. Crisostomo on 6/16/20.  Patient worked with PT/OT post-operatively who recommended Home health which was ordered. Her pain was controlled with oral narcotics as prescribed by orthopedist.  She was discharged home after cleared by orthopedist on pradaxa 150mg BID for 30 days as per hematology Dr. Smith recommendations for VTE prophylaxis, as well as oral narcotics per orthopedic orders. Fall precautions were discussed with patient and family. Return precautions were discussed at length. Patient to follow up with primary care provider on discharge for any medical needs     Physical Exam:  AAO x 4, pleasant  HRRR; lungs CTA.  RIGHT knee bandage CDI, +palp pulses, sensation intact    Please note patient has a history of prior pulmonary emboli and previously seen by Dr. Pina on 02/20/2019-patient previously failed treatment with Xarelto and must be on Pradaxa 150 mg p.o. b.i.d. for discharge to prevent recurrence of DVT/ pulmonary emboli     Consults:     No new Assessment & Plan notes have been filed under this hospital service since the last note was  generated.  Service: Hospital Medicine    Final Active Diagnoses:    Diagnosis Date Noted POA    PRINCIPAL PROBLEM:  Arthritis of right knee [M17.11] 05/05/2020 Yes    History of pulmonary embolism [Z86.711] 06/16/2020 Yes    Generalized anxiety disorder [F41.1] 06/16/2020 Yes    Nicotine dependence [F17.200] 11/19/2018 Yes    Mild episode of recurrent major depressive disorder [F33.0] 11/13/2018 Yes      Problems Resolved During this Admission:       Discharged Condition: good    Disposition: Home or Self Care    Follow Up:  Follow-up Information     Feliberto Cardenas MD On 6/29/2020.    Specialties: Sports Medicine, Orthopedic Surgery  Why: For suture removal. apt in avs  Contact information:  [address] [place] [address] [address]  [place] [address]  [phone]             [facility].    Specialties: Physical Therapy, Home Health Services  Why: Home Health  Contact information:  [address]  [address]  [place] [address]  [phone]                 Patient Instructions:      Diet Adult Regular     Diet Cardiac     Keep surgical extremity elevated     Ice to affected area     Call MD for:  temperature >100.4     Call MD for:  severe uncontrolled pain     Call MD for:  redness, tenderness, or signs of infection (pain, swelling, redness, odor or green/yellow discharge around incision site)     Remove dressing in 24 hours     Change dressing (specify)   Order Comments: Dressing change: One time per day using Waterproof Bandaids.     Notify your health care provider if you experience any of the following:  temperature >100.4     Notify your health care provider if you experience any of the following:  persistent nausea and vomiting or diarrhea     Notify your health care provider if you experience any of the following:  severe uncontrolled pain     Notify your health care provider if you experience any of the following:  redness, tenderness, or signs of infection (pain, swelling, redness, odor  or green/yellow discharge around incision site)     Notify your health care provider if you experience any of the following:  difficulty breathing or increased cough     Notify your health care provider if you experience any of the following:  increased confusion or weakness     Notify your health care provider if you experience any of the following:  persistent dizziness, light-headedness, or visual disturbances     SUBSEQUENT HOME HEALTH ORDERS   Order Comments: Subsequent Home Health Orders    Current Medications:  Current Facility-Administered Medications:  amitriptyline tablet 50 mg, 50 mg, Oral, QHS, IRA AlstonP, 50 mg at 06/16/20 2141  carisoprodoL tablet 350 mg, 350 mg, Oral, QID PRN, Pantera Rivas MD, 350 mg at 06/17/20 0829  celecoxib capsule 200 mg, 200 mg, Oral, QHS, Jaylyn Rowland, IRAP, 200 mg at 06/16/20 2140  dabigatran etexilate capsule 150 mg, 150 mg, Oral, BID, IRA AlstonP, 150 mg at 06/17/20 0824  dextrose 5 % and 0.9 % NaCl infusion, , Intravenous, Continuous, Feliberto Cardenas MD, Stopped at 06/16/20 1800  docusate sodium capsule 100 mg, 100 mg, Oral, Q12H, Feliberto Cardenas MD  FLUoxetine capsule 20 mg, 20 mg, Oral, QHS, Jaylyn Rowland, FNP, 20 mg at 06/16/20 2141  hydrOXYzine pamoate capsule 25 mg, 25 mg, Oral, TID PRN, SCHUYLER Alston  loperamide capsule 2 mg, 2 mg, Oral, Continuous PRN, Feliberto Cardenas MD  LORazepam tablet 0.5 mg, 0.5 mg, Oral, Q4H PRN, Pantera Rivas MD  mupirocin 2 % ointment 1 g, 1 g, Nasal, BID, Feliberto Cardenas MD, 1 g at 06/16/20 2142  nicotine 21 mg/24 hr 1 patch, 1 patch, Transdermal, Daily, SCHUYLER Alston  ondansetron injection 4 mg, 4 mg, Intravenous, Q4H PRN, SCHUYLER Alston  oxyCODONE 12 hr tablet 10 mg, 10 mg, Oral, Q12H, Jose Marks MD, 10 mg at 06/17/20 0824  oxyCODONE immediate release tablet 15 mg, 15 mg, Oral, Q3H PRN, Jose Marks MD, 15 mg at 06/17/20  0724  oxyCODONE immediate release tablet 5 mg, 5 mg, Oral, Q3H PRN, Jose Marks MD  oxyCODONE immediate release tablet Tab 10 mg, 10 mg, Oral, Q3H PRN, Jose Marks MD  pantoprazole EC tablet 40 mg, 40 mg, Oral, BID, Jaylyn Rowland, SCHUYLER, 40 mg at 06/17/20 0824  pregabalin capsule 75 mg, 75 mg, Oral, BID, Jose Marks MD, 75 mg at 06/17/20 0825  sodium chloride 0.9% flush 10 mL, 10 mL, Intravenous, PRN, Feliberto Cardenas MD    Facility-Administered Medications Ordered in Other Encounters:  lactated ringers infusion, , Intravenous, Continuous, Anjel Hernandez MD, Stopped at 10/25/19 1115  lidocaine (PF) 10 mg/ml (1%) injection 10 mg, 1 mL, Intradermal, Once, Anjel Hernandez MD        Nursing:   N/A    Diet:   cardiac diet    Activities:   activity as tolerated    Referrals/ Consults  Physical Therapy to evaluate and treat. Evaluate for home safety and equipment needs; Establish/upgrade home exercise program. Perform / instruct on therapeutic exercises, gait training, transfer training, and Range of Motion.  Occupational Therapy to evaluate and treat. Evaluate home environment for safety and equipment needs. Perform/Instruct on transfers, ADL training, ROM, and therapeutic exercises.    Home Health Aide:  Nursing Three times weekly, Physical Therapy Three times weekly and Occupational Therapy Three times weekly     Order Specific Question Answer Comments   What Home Health Agency is the patient currently using? Other/External      Activity as tolerated     Shower on day dressing removed (No bath)     Weight bearing restrictions (specify):     Activity as tolerated       Significant Diagnostic Studies: Labs:   BMP:   Recent Labs   Lab 06/17/20  0406   *      K 4.2      CO2 25   BUN 7   CREATININE 0.7   CALCIUM 8.4*    and CBC   Recent Labs   Lab 06/17/20  0406   WBC 13.55*   HGB 11.0*   HCT 33.9*          Pending Diagnostic Studies:     None        "  Medications:  Reconciled Home Medications:      Medication List      START taking these medications    dabigatran etexilate 150 mg Cap  Commonly known as: PRADAXA  Take 1 capsule (150 mg total) by mouth 2 (two) times daily. "Do NOT break, chew, or open capsules."     HYDROcodone-acetaminophen 5-325 mg per tablet  Commonly known as: NORCO  Take 1 tablet by mouth every 6 (six) hours as needed for Pain.     oxyCODONE-acetaminophen 5-325 mg per tablet  Commonly known as: PERCOCET  Take 1 tablet by mouth every 6 (six) hours as needed.        CONTINUE taking these medications    AIMOVIG AUTOINJECTOR 140 mg/mL Atin  Generic drug: erenumab-aooe  Inject 140 mg into the skin every 28 days.     amitriptyline 25 MG tablet  Commonly known as: ELAVIL  TAKE ONE TABLET BY MOUTH EVERY NIGHT FOR 2 WEEKS THEN INCREASE TO 2 TABLETS AT BEDTIME     celecoxib 200 MG capsule  Commonly known as: CeleBREX  Take 200 mg by mouth every evening.     cetirizine 5 MG tablet  Commonly known as: ZYRTEC  Take 5 mg by mouth as needed.     cyanocobalamin (vitamin B-12) 1,000 mcg/mL Kit  Inject 1 mL into the muscle every 21 days.     EPINEPHrine 0.3 mg/0.3 mL Atin  Commonly known as: EPIPEN  0.3 mg daily as needed.     FLUoxetine 20 MG capsule  every evening.     hydrOXYzine pamoate 25 MG Cap  Commonly known as: VISTARIL  TAKE ONE CAPSULE BY MOUTH 3 TIMES A DAY AS NEEDED FOR ANXIETY     ibuprofen 800 MG tablet  Commonly known as: ADVIL,MOTRIN  every 6 (six) hours as needed.     ketorolac 30 mg/mL injection  Commonly known as: TORADOL  Use 30 mg IM q6 hours prn. Provide the patient with IM syringes and needles.     LORazepam 0.5 MG tablet  Commonly known as: ATIVAN  Take 0.5 mg by mouth every 4 (four) hours as needed.     oxyCODONE 10 mg 12 hr tablet  Commonly known as: OXYCONTIN  Take 10 mg by mouth every 12 (twelve) hours.     pantoprazole 40 MG tablet  Commonly known as: PROTONIX  Take 1 tablet (40 mg total) by mouth 2 (two) times daily.   "   promethazine 25 MG tablet  Commonly known as: PHENERGAN  Take 1 tablet (25 mg total) by mouth every 6 (six) hours as needed for Nausea. Take 1 tablet by mouth every 6 (six) hours as needed (migraine). -MAY CAUSE DROWSINESS-     tiZANidine 4 MG tablet  Commonly known as: ZANAFLEX  Take 1 tablet (4 mg total) by mouth every 8 (eight) hours as needed.     VOLTAREN 1 % Gel  Generic drug: diclofenac sodium  daily as needed.            Indwelling Lines/Drains at time of discharge:   Lines/Drains/Airways     None                 Time spent on the discharge of patient: 26 minutes  Patient was seen and examined on the date of discharge and determined to be suitable for discharge.         Hailey Ocampo NP  Department of Hospital Medicine  [facility]

## 2020-06-17 NOTE — RESPIRATORY THERAPY
06/16/20 1933   Patient Assessment/Suction   Level of Consciousness (AVPU) alert   Respiratory Effort Unlabored;Normal   Expansion/Accessory Muscles/Retractions no use of accessory muscles;no retractions;expansion symmetric   All Lung Fields Breath Sounds clear;equal bilaterally   Rhythm/Pattern, Respiratory unlabored;pattern regular;depth regular   Cough Frequency no cough   PRE-TX-O2   O2 Device (Oxygen Therapy) room air   SpO2 95 %   Pulse Oximetry Type Intermittent   $ Pulse Oximetry - Multiple Charge Pulse Oximetry - Multiple   Pulse 75   Resp 18

## 2020-06-17 NOTE — DISCHARGE INSTRUCTIONS
1.Diet: Ice chips, clear liquids, and then diet as tolerated. Drink plenty of liquids.  2.Ice the area at least three times a day (20 minutes per session).  3.Elevate the extremity above the level of the heart to help reduce swelling.  4.Pain medication can be taken every four to six hours as needed. It is helpful to take pain medication prior to physical therapy.  Use Tylenol or anti-inflammatories for pain as much as possible.  Pain medication is for pain not responsive to over-the-counter medications.  5.Any activity that requires precise thinking or accuracy should be avoided for a minimum of 72 hours after surgery and while on narcotic pain medication. This includes operating machinery and/or driving a vehicle.  6.All sutures/staples will be removed approximately 14 days from the time of surgery. Leave steri-strips (skin tapes) in place until sutures are removed.  7. If skin glue is used instead of stitches, do not apply ointments or solutions to the incision. Keep the incision dry. The skin glue will peel off in 3-4 weeks.  8. Change dressing on the first post-op day. Use gauze for the first 3 days, then start using Band-Aids over the incision sites.   9. All casts, splints, braces, slings, crutches, abduction pillows, etc... Are to be worn as instructed. Crutches are just to be used as needed. If not needed for soreness or balance, may weight bear as tolerated without the crutches.  10. Keep the incision dry for 10-14 days. A waterproof dressing (purchase at [employer], [employer], etc) can be used to shower. No bath, pool, hot tub until instructed.  11. Call [phone] with any questions or concerns.    You are to take pradaxa 150mg twice daily for 30 days after surgery to prevent blood clots.  Please obtain and take an over-the-counter stool softener while taking oral narcotics postoperatively to prevent constipation.  Please be careful postoperatively to avoid falls.    Thank you for choosing [facility] for  your medical care. The primary doctor who is taking care of you at the time of your discharge is Pantera Rivas MD.     You were admitted to the hospital with Arthritis of right knee.     Please note your discharge instructions, including diet/activity restrictions, follow-up appointments, and medication changes.  If you have any questions about your medical issues, prescriptions, or any other questions, please feel free to contact the [facility] Medicine Dept at [phone] and we will help.    If you are previously with Home health, outpatient PT/OT or under a therapy program, you are cleared to return to those programs.    Please direct all long term medication refills and follow up to your primary care provider, Darlene Hayden MD. Thank you again for letting us take care of your health care needs.    Please note the following discharge instructions per your discharging physician-  Hailey Ocampo NP

## 2020-06-17 NOTE — NURSING
Discharge instructions given and discussed in detail with pt.  Aquacel dressing in place with zero bleeding noted.  IV removed, she is eating and drinking.  She is voiding ad sheridan.  She has ambulated with PT around unit with no difficulty with walker.  She has her own walker and was taken to private car with all personal belongings.  No paper RX sent with pt all were called in and there was a change to pain medication and I addressed it during the discharge.

## 2020-06-17 NOTE — PLAN OF CARE
Patient AAOx4. Afebrile. Dressing/bandage to R knee CDI. Cryo to site. SCD/foot pump on. Neurovasc checks q4 w/ positive pulses bilat. Moderate pain control with scheduled and PRN medication. Tolerating diet. Ambulated with walker around unit this evening. 1-person assist to BR. Patient resting quietly. Safety maintained and needs addressed. Monitoring.

## 2020-06-17 NOTE — RESPIRATORY THERAPY
06/17/20 1100   Tobacco Cessation Intervention   Do you use any type of tobacco product? Yes   Are you interested in quitting use of tobacco products? Thinking about quitting   Pt is a [place] resident and a current daily smoker.  Pt was educated on smoking cessation and states she is thinking about quitting.  Information about the [place] smoking cessation program given to Pt.

## 2020-06-17 NOTE — PLAN OF CARE
6/16/2020 3:53:53 PM Accepted  Darlene [email or web address]       06/17/20 0828   Post-Acute Status   Post-Acute Authorization Home Health   Home Health Status Set-up Complete

## 2020-06-17 NOTE — PT/OT/SLP PROGRESS
Physical Therapy Treatment    Patient Name:  Aleyda Costa   MRN:  [id number]    Recommendations:     Discharge Recommendations:  home health PT   Discharge Equipment Recommendations: none   Barriers to discharge: None    Assessment:     Aleyda Costa is a 39 y.o. female admitted with a medical diagnosis of Arthritis of right knee.  She presents with the following impairments/functional limitations:  weakness, impaired endurance, impaired functional mobilty, gait instability, impaired balance, decreased lower extremity function, pain, decreased ROM, edema, orthopedic precautions .  Patient agreeable to PT treatment this morning.  Patient presented standing coming from rest room with nursing so proceeded to ambulate x 300 feet with RW with SBA and up/down 5 steps with hand rail with SBA..  Patient scheduled for discharge this morning and will be ready.    Rehab Prognosis: Good; patient would benefit from acute skilled PT services to address these deficits and reach maximum level of function.    Recent Surgery: Procedure(s) (LRB):  ARTHROPLASTY, KNEE (Right) 1 Day Post-Op    Plan:     During this hospitalization, patient to be seen BID to address the identified rehab impairments via gait training, therapeutic activities, therapeutic exercises and progress toward the following goals:    · Plan of Care Expires:  07/16/20    Subjective     Chief Complaint: pain  Patient/Family Comments/goals: go home  Pain/Comfort:  · Pain Rating 1: 7/10  · Location - Side 1: Right  · Location - Orientation 1: lower  · Location 1: knee  · Pain Addressed 1: Cessation of Activity, Reposition  · Pain Rating Post-Intervention 1: 6/10      Objective:     Communicated with nurse prior to session.  Patient found standing with RW with cryotherapy upon PT entry to room.     General Precautions: Standard, fall   Orthopedic Precautions:RLE weight bearing as tolerated   Braces:       Functional Mobility:  · Transfers:     · Sit to Stand:   stand by assistance with rolling walker  · Gait: x 300 feet with RW with SBA  · Stairs:  Pt ascended/descended 5 stair(s) with No Assistive Device with bilateral handrails with Stand-by Assistance.       AM-PAC 6 CLICK MOBILITY          Therapeutic Activities and Exercises:   exercise to include toe raises, LAQ, hip flexion and isometric hip abd.  All done bilateral LE 2 x 10 reps with 3 second hold on isometrics.    Patient left up in chair with call button in reach and nurse notified..    GOALS:   Multidisciplinary Problems     Physical Therapy Goals        Problem: Physical Therapy Goal    Goal Priority Disciplines Outcome Goal Variances Interventions   Physical Therapy Goal     PT, PT/OT      Description: Goals to be met by: 2020     Patient will increase functional independence with mobility by performin. Supine to sit with Supervision  2. Sit to stand transfer with Supervision  3. Bed to chair transfer with Supervision using Rolling Walker  4. Gait  x 250 feet with Supervision using Rolling Walker.   5. Ascend/descend 5 stairs with left Handrails Supervision.  6. Lower extremity exercise program x20 reps per handout, with independence                     Time Tracking:     PT Received On: 20  PT Start Time: 729     PT Stop Time: 754  PT Total Time (min): 25 min     Billable Minutes: Gait Training 15 and Therapeutic Exercise 10       PT/PTA: PT     PTA Visit Number: 0     Chris Megilligan, PT  2020

## 2020-06-17 NOTE — PT/OT/SLP DISCHARGE
"Occupational Therapy Discharge Summary    Aleyda Costa  MRN: [id number]   Principal Problem: Arthritis of right knee      Patient Discharged from acute Occupational Therapy on 6/17/2020.    Assessment:      Patient is appropriate for next level of care - home with home health OT/PT. Patient found lying supine with cryotherapy and bed alarm; pt fully dressing with tennis shoes and bags packed at bedside; patient reports that she has not needed help with ADL, familiar with use of RW (as already owned as well as already owns raised toilet seat with grab bars and hip kit. Patient also reports that she will have assistance of spouse and her 4 children.     Objective:     GOALS:   Multidisciplinary Problems     Occupational Therapy Goals     Not on file                Reasons for Discontinuation of Therapy Services  Patient fully dressed and reporting she did not require any assistance to dress and has been able to toilet from toilet level with no assistance; pt reports she already owns RW, raised toilet with grab bars and hip kit; pt reports she deferred TTB and will sponge bathe until able to shower from tub/shower. OTR providing instruction/education regarding safe/correct walker management, reinforcing education/instruction regarding adaptive dressing techniques using hip kit items, having Supervision for bathing/dressing tasks, and safety awareness/fall prevention education/instruction - clear walk ways within home of throw rugs, mats, cords, etc, managing pets, etc - pt verbalizes/demonstrates understanding and in agreement when appropriate; pt reports that "that is what  and kids are doing today - getting things cleaned up/cleared before I get home."      Plan:     Patient Discharged to: Home with Home Health Service    RIKKI Espinoza LOTR  6/17/2020  "

## 2020-06-18 ENCOUNTER — TELEPHONE (OUTPATIENT)
Dept: ORTHOPEDICS | Facility: CLINIC | Age: 40
End: 2020-06-18

## 2020-06-18 ENCOUNTER — TELEPHONE (OUTPATIENT)
Dept: MEDSURG UNIT | Facility: HOSPITAL | Age: 40
End: 2020-06-18

## 2020-06-18 RX ORDER — CYCLOBENZAPRINE HCL 10 MG
10 TABLET ORAL 3 TIMES DAILY PRN
Qty: 30 TABLET | Refills: 0 | Status: SHIPPED | OUTPATIENT
Start: 2020-06-18 | End: 2020-06-21 | Stop reason: CLARIF

## 2020-06-18 NOTE — TELEPHONE ENCOUNTER
----- Message from Ted Grewal sent at 6/18/2020 11:20 AM CDT -----  Contact: Leonila/[facility]  Leonila called in regarding patient.  Leonila stated she was asked to call office regarding patients uncontrolled pain.  Leonila's stated she is at patients home & would like to speak to nurse to discuss at [phone]

## 2020-06-18 NOTE — TELEPHONE ENCOUNTER
----- Message from Jolene Hamm sent at 6/18/2020 10:02 AM CDT -----  Contact: Daisy emery/[facility] [phone]  Sabra is requesting the patients discharged notes please get this faxed to [phone]. Thanks

## 2020-06-18 NOTE — TELEPHONE ENCOUNTER
Called and spoke with HH nurse and she advised that the patients knee did not look abnormal. She has normal post operative swelling in the knee and warmth due to surgery. Patient was c/o spasms in the leg and lots of pain. Advised that per Dr. Cardenas, patient is okay to take 2 Percocet 5/325mg tabs every 4 hours for pain. Advised her to make sure patient is taking the Pradaxa to prevent any blood clots. Also, advised her to contact us back if patient's patient is not controlled by the medication change or if she develops any calf pain, tenderness, swelling or redness. She verbalized understanding.

## 2020-06-19 VITALS
WEIGHT: 245 LBS | DIASTOLIC BLOOD PRESSURE: 57 MMHG | BODY MASS INDEX: 33.18 KG/M2 | SYSTOLIC BLOOD PRESSURE: 112 MMHG | TEMPERATURE: 98 F | HEIGHT: 72 IN | OXYGEN SATURATION: 96 % | RESPIRATION RATE: 18 BRPM | HEART RATE: 68 BPM

## 2020-06-21 ENCOUNTER — HOSPITAL ENCOUNTER (OUTPATIENT)
Facility: HOSPITAL | Age: 40
Discharge: HOME | End: 2020-06-23
Attending: EMERGENCY MEDICINE | Admitting: INTERNAL MEDICINE
Payer: COMMERCIAL

## 2020-06-21 DIAGNOSIS — I82.409 DVT (DEEP VENOUS THROMBOSIS): ICD-10-CM

## 2020-06-21 DIAGNOSIS — I82.401 ACUTE DEEP VEIN THROMBOSIS (DVT) OF RIGHT LOWER EXTREMITY, UNSPECIFIED VEIN: ICD-10-CM

## 2020-06-21 PROBLEM — Z96.651 S/P TOTAL KNEE ARTHROPLASTY, RIGHT: Status: ACTIVE | Noted: 2018-11-13

## 2020-06-21 LAB
ANION GAP SERPL CALC-SCNC: 9 MMOL/L (ref 8–16)
BASOPHILS # BLD AUTO: 0.03 K/UL (ref 0–0.2)
BASOPHILS NFR BLD: 0.4 % (ref 0–1.9)
BUN SERPL-MCNC: 10 MG/DL (ref 6–20)
CALCIUM SERPL-MCNC: 9.3 MG/DL (ref 8.7–10.5)
CHLORIDE SERPL-SCNC: 101 MMOL/L (ref 95–110)
CO2 SERPL-SCNC: 27 MMOL/L (ref 23–29)
CREAT SERPL-MCNC: 0.8 MG/DL (ref 0.5–1.4)
CRP SERPL-MCNC: 204.3 MG/L (ref 0–8.2)
DIFFERENTIAL METHOD: ABNORMAL
EOSINOPHIL # BLD AUTO: 0.3 K/UL (ref 0–0.5)
EOSINOPHIL NFR BLD: 3.9 % (ref 0–8)
ERYTHROCYTE [DISTWIDTH] IN BLOOD BY AUTOMATED COUNT: 13.6 % (ref 11.5–14.5)
ERYTHROCYTE [SEDIMENTATION RATE] IN BLOOD BY WESTERGREN METHOD: 80 MM/HR (ref 0–20)
EST. GFR  (AFRICAN AMERICAN): >60 ML/MIN/1.73 M^2
EST. GFR  (NON AFRICAN AMERICAN): >60 ML/MIN/1.73 M^2
FERRITIN SERPL-MCNC: 180 NG/ML (ref 20–300)
GLUCOSE SERPL-MCNC: 101 MG/DL (ref 70–110)
HCT VFR BLD AUTO: 32.3 % (ref 37–48.5)
HGB BLD-MCNC: 10.6 G/DL (ref 12–16)
IMM GRANULOCYTES # BLD AUTO: 0.03 K/UL (ref 0–0.04)
IMM GRANULOCYTES NFR BLD AUTO: 0.4 % (ref 0–0.5)
LDH SERPL L TO P-CCNC: 247 U/L (ref 110–260)
LYMPHOCYTES # BLD AUTO: 2 K/UL (ref 1–4.8)
LYMPHOCYTES NFR BLD: 25.6 % (ref 18–48)
MCH RBC QN AUTO: 31.5 PG (ref 27–31)
MCHC RBC AUTO-ENTMCNC: 32.8 G/DL (ref 32–36)
MCV RBC AUTO: 96 FL (ref 82–98)
MONOCYTES # BLD AUTO: 0.7 K/UL (ref 0.3–1)
MONOCYTES NFR BLD: 9.5 % (ref 4–15)
NEUTROPHILS # BLD AUTO: 4.7 K/UL (ref 1.8–7.7)
NEUTROPHILS NFR BLD: 60.2 % (ref 38–73)
NRBC BLD-RTO: 0 /100 WBC
PLATELET # BLD AUTO: 243 K/UL (ref 150–350)
PMV BLD AUTO: 9.9 FL (ref 9.2–12.9)
POTASSIUM SERPL-SCNC: 3.6 MMOL/L (ref 3.5–5.1)
RBC # BLD AUTO: 3.36 M/UL (ref 4–5.4)
SARS-COV-2 RDRP RESP QL NAA+PROBE: NEGATIVE
SODIUM SERPL-SCNC: 137 MMOL/L (ref 136–145)
WBC # BLD AUTO: 7.71 K/UL (ref 3.9–12.7)

## 2020-06-21 PROCEDURE — 82728 ASSAY OF FERRITIN: CPT

## 2020-06-21 PROCEDURE — 63600175 PHARM REV CODE 636 W HCPCS: Performed by: NURSE PRACTITIONER

## 2020-06-21 PROCEDURE — 36415 COLL VENOUS BLD VENIPUNCTURE: CPT

## 2020-06-21 PROCEDURE — 99215 OFFICE O/P EST HI 40 MIN: CPT | Mod: ,,, | Performed by: INTERNAL MEDICINE

## 2020-06-21 PROCEDURE — 85025 COMPLETE CBC W/AUTO DIFF WBC: CPT

## 2020-06-21 PROCEDURE — 83010 ASSAY OF HAPTOGLOBIN QUANT: CPT

## 2020-06-21 PROCEDURE — 83615 LACTATE (LD) (LDH) ENZYME: CPT

## 2020-06-21 PROCEDURE — 96374 THER/PROPH/DIAG INJ IV PUSH: CPT

## 2020-06-21 PROCEDURE — 99215 PR OFFICE/OUTPT VISIT, EST, LEVL V, 40-54 MIN: ICD-10-PCS | Mod: ,,, | Performed by: INTERNAL MEDICINE

## 2020-06-21 PROCEDURE — 96372 THER/PROPH/DIAG INJ SC/IM: CPT | Mod: 59

## 2020-06-21 PROCEDURE — 63600175 PHARM REV CODE 636 W HCPCS: Performed by: INTERNAL MEDICINE

## 2020-06-21 PROCEDURE — 25000003 PHARM REV CODE 250: Performed by: NURSE PRACTITIONER

## 2020-06-21 PROCEDURE — 84238 ASSAY NONENDOCRINE RECEPTOR: CPT

## 2020-06-21 PROCEDURE — U0002 COVID-19 LAB TEST NON-CDC: HCPCS

## 2020-06-21 PROCEDURE — 63600175 PHARM REV CODE 636 W HCPCS: Performed by: EMERGENCY MEDICINE

## 2020-06-21 PROCEDURE — 96376 TX/PRO/DX INJ SAME DRUG ADON: CPT

## 2020-06-21 PROCEDURE — G0378 HOSPITAL OBSERVATION PER HR: HCPCS

## 2020-06-21 PROCEDURE — 85651 RBC SED RATE NONAUTOMATED: CPT

## 2020-06-21 PROCEDURE — 86140 C-REACTIVE PROTEIN: CPT

## 2020-06-21 PROCEDURE — 80048 BASIC METABOLIC PNL TOTAL CA: CPT

## 2020-06-21 PROCEDURE — 99285 EMERGENCY DEPT VISIT HI MDM: CPT | Mod: 25

## 2020-06-21 PROCEDURE — 83540 ASSAY OF IRON: CPT

## 2020-06-21 RX ORDER — TIZANIDINE 4 MG/1
4 TABLET ORAL EVERY 6 HOURS PRN
COMMUNITY

## 2020-06-21 RX ORDER — FLUOXETINE HCL 20 MG
CAPSULE ORAL
COMMUNITY
Start: 2020-06-01 | End: 2020-06-21 | Stop reason: CLARIF

## 2020-06-21 RX ORDER — ENOXAPARIN SODIUM 100 MG/ML
100 INJECTION SUBCUTANEOUS
Status: COMPLETED | OUTPATIENT
Start: 2020-06-21 | End: 2020-06-21

## 2020-06-21 RX ORDER — TIZANIDINE 4 MG/1
4 TABLET ORAL EVERY 6 HOURS PRN
Status: DISCONTINUED | OUTPATIENT
Start: 2020-06-21 | End: 2020-06-23 | Stop reason: HOSPADM

## 2020-06-21 RX ORDER — SODIUM CHLORIDE 0.9 % (FLUSH) 0.9 %
10 SYRINGE (ML) INJECTION
Status: DISCONTINUED | OUTPATIENT
Start: 2020-06-21 | End: 2020-06-23 | Stop reason: HOSPADM

## 2020-06-21 RX ORDER — HYDROXYZINE PAMOATE 25 MG/1
25 CAPSULE ORAL EVERY 8 HOURS PRN
Status: DISCONTINUED | OUTPATIENT
Start: 2020-06-21 | End: 2020-06-23 | Stop reason: HOSPADM

## 2020-06-21 RX ORDER — ERENUMAB-AOOE 140 MG/ML
INJECTION, SOLUTION SUBCUTANEOUS
Status: ON HOLD | COMMUNITY
Start: 2020-05-18 | End: 2020-10-06

## 2020-06-21 RX ORDER — IBUPROFEN 200 MG
24 TABLET ORAL
Status: DISCONTINUED | OUTPATIENT
Start: 2020-06-21 | End: 2020-06-23 | Stop reason: HOSPADM

## 2020-06-21 RX ORDER — OXYCODONE HCL 10 MG/1
10 TABLET, FILM COATED, EXTENDED RELEASE ORAL EVERY 12 HOURS
Status: DISCONTINUED | OUTPATIENT
Start: 2020-06-21 | End: 2020-06-23 | Stop reason: HOSPADM

## 2020-06-21 RX ORDER — CELECOXIB 200 MG/1
CAPSULE ORAL
COMMUNITY
Start: 2020-06-01 | End: 2020-06-21 | Stop reason: CLARIF

## 2020-06-21 RX ORDER — ACETAMINOPHEN 325 MG/1
650 TABLET ORAL EVERY 4 HOURS PRN
Status: DISCONTINUED | OUTPATIENT
Start: 2020-06-21 | End: 2020-06-23 | Stop reason: HOSPADM

## 2020-06-21 RX ORDER — GLUCAGON 1 MG
1 KIT INJECTION
Status: DISCONTINUED | OUTPATIENT
Start: 2020-06-21 | End: 2020-06-23 | Stop reason: HOSPADM

## 2020-06-21 RX ORDER — HYDROMORPHONE HYDROCHLORIDE 1 MG/ML
0.5 INJECTION, SOLUTION INTRAMUSCULAR; INTRAVENOUS; SUBCUTANEOUS ONCE
Status: COMPLETED | OUTPATIENT
Start: 2020-06-21 | End: 2020-06-21

## 2020-06-21 RX ORDER — HYDROMORPHONE HYDROCHLORIDE 2 MG/ML
1 INJECTION, SOLUTION INTRAMUSCULAR; INTRAVENOUS; SUBCUTANEOUS EVERY 4 HOURS PRN
Status: DISCONTINUED | OUTPATIENT
Start: 2020-06-21 | End: 2020-06-22

## 2020-06-21 RX ORDER — CELECOXIB 100 MG/1
200 CAPSULE ORAL NIGHTLY
Status: DISCONTINUED | OUTPATIENT
Start: 2020-06-21 | End: 2020-06-23 | Stop reason: HOSPADM

## 2020-06-21 RX ORDER — ENOXAPARIN SODIUM 150 MG/ML
1 INJECTION SUBCUTANEOUS
Status: DISCONTINUED | OUTPATIENT
Start: 2020-06-21 | End: 2020-06-23 | Stop reason: HOSPADM

## 2020-06-21 RX ORDER — AMITRIPTYLINE HYDROCHLORIDE 50 MG/1
50 TABLET, FILM COATED ORAL NIGHTLY
Status: DISCONTINUED | OUTPATIENT
Start: 2020-06-21 | End: 2020-06-23 | Stop reason: HOSPADM

## 2020-06-21 RX ORDER — IBUPROFEN 200 MG
16 TABLET ORAL
Status: DISCONTINUED | OUTPATIENT
Start: 2020-06-21 | End: 2020-06-23 | Stop reason: HOSPADM

## 2020-06-21 RX ORDER — ONDANSETRON 2 MG/ML
4 INJECTION INTRAMUSCULAR; INTRAVENOUS EVERY 6 HOURS PRN
Status: DISCONTINUED | OUTPATIENT
Start: 2020-06-21 | End: 2020-06-23 | Stop reason: HOSPADM

## 2020-06-21 RX ORDER — OXYCODONE AND ACETAMINOPHEN 10; 325 MG/1; MG/1
1 TABLET ORAL EVERY 4 HOURS PRN
Status: DISCONTINUED | OUTPATIENT
Start: 2020-06-21 | End: 2020-06-22

## 2020-06-21 RX ORDER — HYDROMORPHONE HYDROCHLORIDE 2 MG/ML
1 INJECTION, SOLUTION INTRAMUSCULAR; INTRAVENOUS; SUBCUTANEOUS
Status: COMPLETED | OUTPATIENT
Start: 2020-06-21 | End: 2020-06-21

## 2020-06-21 RX ORDER — PANTOPRAZOLE SODIUM 40 MG/1
40 TABLET, DELAYED RELEASE ORAL 2 TIMES DAILY
Status: DISCONTINUED | OUTPATIENT
Start: 2020-06-21 | End: 2020-06-23 | Stop reason: HOSPADM

## 2020-06-21 RX ORDER — FLUOXETINE HYDROCHLORIDE 20 MG/1
40 CAPSULE ORAL NIGHTLY
Status: DISCONTINUED | OUTPATIENT
Start: 2020-06-21 | End: 2020-06-23 | Stop reason: HOSPADM

## 2020-06-21 RX ORDER — HYDROMORPHONE HYDROCHLORIDE 2 MG/ML
0.5 INJECTION, SOLUTION INTRAMUSCULAR; INTRAVENOUS; SUBCUTANEOUS EVERY 4 HOURS PRN
Status: DISCONTINUED | OUTPATIENT
Start: 2020-06-21 | End: 2020-06-23 | Stop reason: HOSPADM

## 2020-06-21 RX ORDER — OXYCODONE HYDROCHLORIDE 10 MG/1
TABLET ORAL
COMMUNITY
Start: 2020-06-01 | End: 2020-06-21 | Stop reason: CLARIF

## 2020-06-21 RX ADMIN — HYDROMORPHONE HYDROCHLORIDE 1 MG: 2 INJECTION, SOLUTION INTRAMUSCULAR; INTRAVENOUS; SUBCUTANEOUS at 11:06

## 2020-06-21 RX ADMIN — CELECOXIB 200 MG: 100 CAPSULE ORAL at 09:06

## 2020-06-21 RX ADMIN — ENOXAPARIN SODIUM 100 MG: 100 INJECTION SUBCUTANEOUS at 02:06

## 2020-06-21 RX ADMIN — ENOXAPARIN SODIUM 105 MG: 150 INJECTION SUBCUTANEOUS at 02:06

## 2020-06-21 RX ADMIN — TIZANIDINE 4 MG: 4 TABLET ORAL at 11:06

## 2020-06-21 RX ADMIN — TIZANIDINE 4 MG: 4 TABLET ORAL at 05:06

## 2020-06-21 RX ADMIN — TIZANIDINE 4 MG: 4 TABLET ORAL at 07:06

## 2020-06-21 RX ADMIN — HYDROMORPHONE HYDROCHLORIDE 0.5 MG: 1 INJECTION, SOLUTION INTRAMUSCULAR; INTRAVENOUS; SUBCUTANEOUS at 10:06

## 2020-06-21 RX ADMIN — AMITRIPTYLINE HYDROCHLORIDE 50 MG: 50 TABLET, FILM COATED ORAL at 09:06

## 2020-06-21 RX ADMIN — HYDROMORPHONE HYDROCHLORIDE 1 MG: 2 INJECTION, SOLUTION INTRAMUSCULAR; INTRAVENOUS; SUBCUTANEOUS at 03:06

## 2020-06-21 RX ADMIN — PANTOPRAZOLE SODIUM 40 MG: 40 TABLET, DELAYED RELEASE ORAL at 09:06

## 2020-06-21 RX ADMIN — PANTOPRAZOLE SODIUM 40 MG: 40 TABLET, DELAYED RELEASE ORAL at 08:06

## 2020-06-21 RX ADMIN — OXYCODONE HYDROCHLORIDE 10 MG: 10 TABLET, FILM COATED, EXTENDED RELEASE ORAL at 09:06

## 2020-06-21 RX ADMIN — HYDROMORPHONE HYDROCHLORIDE 1 MG: 2 INJECTION, SOLUTION INTRAMUSCULAR; INTRAVENOUS; SUBCUTANEOUS at 01:06

## 2020-06-21 RX ADMIN — OXYCODONE HYDROCHLORIDE 10 MG: 10 TABLET, FILM COATED, EXTENDED RELEASE ORAL at 12:06

## 2020-06-21 RX ADMIN — HYDROMORPHONE HYDROCHLORIDE 1 MG: 2 INJECTION, SOLUTION INTRAMUSCULAR; INTRAVENOUS; SUBCUTANEOUS at 07:06

## 2020-06-21 RX ADMIN — OXYCODONE HYDROCHLORIDE AND ACETAMINOPHEN 1 TABLET: 10; 325 TABLET ORAL at 05:06

## 2020-06-21 RX ADMIN — OXYCODONE HYDROCHLORIDE AND ACETAMINOPHEN 1 TABLET: 10; 325 TABLET ORAL at 09:06

## 2020-06-21 RX ADMIN — FLUOXETINE HYDROCHLORIDE 40 MG: 20 CAPSULE ORAL at 09:06

## 2020-06-21 NOTE — ED TRIAGE NOTES
Aleyda Plasencia Costa is here with right knee pain S/P knee replacement on Tuesday; got worse today, throbbing/sharp pain.

## 2020-06-21 NOTE — SUBJECTIVE & OBJECTIVE
Interval History: notes reviewed. Pt having severe pain to right knee and post calf 9/10 intensity, throbbing quality, worsens with movement, improves with pain meds. She reports compliance with her pradaxa at home. She denies f/c/n/v/d. Pt takes oxycontin 10mg po bid and prn percocet at home. Advised pt I will resume these meds to help with pain control.     Review of Systems   Constitutional: Negative for chills, fatigue and fever.   HENT: Negative for congestion, sore throat and trouble swallowing.    Eyes: Negative for photophobia and visual disturbance.   Respiratory: Negative for cough and shortness of breath.    Cardiovascular: Positive for leg swelling. Negative for chest pain and palpitations.   Gastrointestinal: Negative for abdominal pain, diarrhea, nausea and vomiting.   Genitourinary: Negative for difficulty urinating, dysuria and frequency.   Musculoskeletal: Positive for arthralgias, gait problem and joint swelling.   Skin: Positive for color change and wound. Negative for rash.   Neurological: Negative for dizziness, weakness and light-headedness.   Psychiatric/Behavioral: Negative for agitation, behavioral problems and confusion.   All other systems reviewed and are negative.    Objective:     Vital Signs (Most Recent):  Temp: 97.5 °F (36.4 °C) (06/21/20 1128)  Pulse: 61 (06/21/20 1128)  Resp: 18 (06/21/20 1128)  BP: (!) 101/53 (06/21/20 1128)  SpO2: (!) 93 % (06/21/20 1128) Vital Signs (24h Range):  Temp:  [96.9 °F (36.1 °C)-98.3 °F (36.8 °C)] 97.5 °F (36.4 °C)  Pulse:  [61-70] 61  Resp:  [16-18] 18  SpO2:  [93 %-100 %] 93 %  BP: ()/(52-62) 101/53     Weight: 111.1 kg (245 lb)  Body mass index is 33.23 kg/m².  No intake or output data in the 24 hours ending 06/21/20 1346   Physical Exam  Vitals signs and nursing note reviewed.   Constitutional:       General: She is not in acute distress.     Appearance: She is well-developed. She is not ill-appearing or diaphoretic.   HENT:      Head:  Normocephalic and atraumatic.      Right Ear: External ear normal.      Left Ear: External ear normal.      Nose: Nose normal.      Mouth/Throat:      Mouth: Mucous membranes are moist.      Pharynx: Oropharynx is clear.   Eyes:      Conjunctiva/sclera: Conjunctivae normal.      Pupils: Pupils are equal, round, and reactive to light.   Neck:      Musculoskeletal: Normal range of motion and neck supple.      Vascular: No JVD.   Cardiovascular:      Rate and Rhythm: Normal rate and regular rhythm.      Heart sounds: Normal heart sounds. No murmur.   Pulmonary:      Effort: Pulmonary effort is normal. No respiratory distress.      Breath sounds: Normal breath sounds. No stridor. No wheezing, rhonchi or rales.   Abdominal:      General: Bowel sounds are normal. There is no distension.      Palpations: Abdomen is soft.      Tenderness: There is no abdominal tenderness.   Musculoskeletal:         General: Swelling and tenderness present.      Right lower leg: Edema present.      Comments: Limited ROM to right knee secondary to swelling and pain with movement, +3 edema to RLE with scattered ecchymosis noted from post calf down to ankle. Pedal pulse +2. Surgical incision - edges well approx, no erythema or drainage.    Lymphadenopathy:      Cervical: No cervical adenopathy.   Skin:     General: Skin is warm and dry.      Capillary Refill: Capillary refill takes 2 to 3 seconds.      Findings: Bruising present. No erythema.   Neurological:      Mental Status: She is alert and oriented to person, place, and time.   Psychiatric:         Behavior: Behavior normal.         Thought Content: Thought content normal.         Significant Labs:   CBC:   Recent Labs   Lab 06/21/20  0110   WBC 7.71   HGB 10.6*   HCT 32.3*        CMP:   Recent Labs   Lab 06/21/20  0110      K 3.6      CO2 27      BUN 10   CREATININE 0.8   CALCIUM 9.3   ANIONGAP 9   EGFRNONAA >60     All pertinent labs within the past 24 hours  have been reviewed.    Significant Imaging: I have reviewed all pertinent imaging results/findings within the past 24 hours.

## 2020-06-21 NOTE — ASSESSMENT & PLAN NOTE
Health hazards associated with cigarette smoking were reviewed with patient and cessation was encouraged. Nicotine replacement and counseling options were discussed.  Spent 3 minutes counseling on cessation, patient not ready to quit.  Declines nicotine patch.

## 2020-06-21 NOTE — HOSPITAL COURSE
R knee has appropriate warmth without significant knee effusion or drainage. Has a lot of bruising and swelling of the posterior thigh knee have any even down to the ankle and foot.

## 2020-06-21 NOTE — PROGRESS NOTES
[facility] Hospital Medicine  Progress Note    Patient Name: Aleyda Costa  MRN: [id number]  Patient Class: OP- Observation   Admission Date: 6/21/2020  Length of Stay: 0 days  Attending Physician: Richy Brown MD  Primary Care Provider: Darlene Hayden MD        Subjective:     Principal Problem:DVT (deep venous thrombosis)        HPI:  Aleyda Costa is a 39-year-old female with a past medical history significant for depression, GERD, pulmonary emboli in 2018, sleep apnea, prior gastric sleeve, and nicotine dependence who underwent a right knee arthroplasty per Dr. Crisostomo on 6/16/20.  She returns to the ED today with c/o increased pain to the RLE and increased edema. Patient previously failed treatment for pulmonary emoli with Xarelto and was placed on Pradaxa 150 mg p.o. b.i.d. at discharge.  Pt reports compliance with her medications; however, ultrasound of the RLE demonstrates a DVT.  Denies SOB, chest pain, abd pain, N/V or fevers.  She was administered full dose lovenox while in the ED.  She will be placed in observation for continued monitoring and treatment.     Overview/Hospital Course:  No notes on file    Interval History: notes reviewed. Pt having severe pain to right knee and post calf 9/10 intensity, throbbing quality, worsens with movement, improves with pain meds. She reports compliance with her pradaxa at home. She denies f/c/n/v/d. Pt takes oxycontin 10mg po bid and prn percocet at home. Advised pt I will resume these meds to help with pain control.     Review of Systems   Constitutional: Negative for chills, fatigue and fever.   HENT: Negative for congestion, sore throat and trouble swallowing.    Eyes: Negative for photophobia and visual disturbance.   Respiratory: Negative for cough and shortness of breath.    Cardiovascular: Positive for leg swelling. Negative for chest pain and palpitations.   Gastrointestinal: Negative for abdominal pain, diarrhea,  nausea and vomiting.   Genitourinary: Negative for difficulty urinating, dysuria and frequency.   Musculoskeletal: Positive for arthralgias, gait problem and joint swelling.   Skin: Positive for color change and wound. Negative for rash.   Neurological: Negative for dizziness, weakness and light-headedness.   Psychiatric/Behavioral: Negative for agitation, behavioral problems and confusion.   All other systems reviewed and are negative.    Objective:     Vital Signs (Most Recent):  Temp: 97.5 °F (36.4 °C) (06/21/20 1128)  Pulse: 61 (06/21/20 1128)  Resp: 18 (06/21/20 1128)  BP: (!) 101/53 (06/21/20 1128)  SpO2: (!) 93 % (06/21/20 1128) Vital Signs (24h Range):  Temp:  [96.9 °F (36.1 °C)-98.3 °F (36.8 °C)] 97.5 °F (36.4 °C)  Pulse:  [61-70] 61  Resp:  [16-18] 18  SpO2:  [93 %-100 %] 93 %  BP: ()/(52-62) 101/53     Weight: 111.1 kg (245 lb)  Body mass index is 33.23 kg/m².  No intake or output data in the 24 hours ending 06/21/20 1346   Physical Exam  Vitals signs and nursing note reviewed.   Constitutional:       General: She is not in acute distress.     Appearance: She is well-developed. She is not ill-appearing or diaphoretic.   HENT:      Head: Normocephalic and atraumatic.      Right Ear: External ear normal.      Left Ear: External ear normal.      Nose: Nose normal.      Mouth/Throat:      Mouth: Mucous membranes are moist.      Pharynx: Oropharynx is clear.   Eyes:      Conjunctiva/sclera: Conjunctivae normal.      Pupils: Pupils are equal, round, and reactive to light.   Neck:      Musculoskeletal: Normal range of motion and neck supple.      Vascular: No JVD.   Cardiovascular:      Rate and Rhythm: Normal rate and regular rhythm.      Heart sounds: Normal heart sounds. No murmur.   Pulmonary:      Effort: Pulmonary effort is normal. No respiratory distress.      Breath sounds: Normal breath sounds. No stridor. No wheezing, rhonchi or rales.   Abdominal:      General: Bowel sounds are normal. There is  no distension.      Palpations: Abdomen is soft.      Tenderness: There is no abdominal tenderness.   Musculoskeletal:         General: Swelling and tenderness present.      Right lower leg: Edema present.      Comments: Limited ROM to right knee secondary to swelling and pain with movement, +3 edema to RLE with scattered ecchymosis noted from post calf down to ankle. Pedal pulse +2. Surgical incision - edges well approx, no erythema or drainage.    Lymphadenopathy:      Cervical: No cervical adenopathy.   Skin:     General: Skin is warm and dry.      Capillary Refill: Capillary refill takes 2 to 3 seconds.      Findings: Bruising present. No erythema.   Neurological:      Mental Status: She is alert and oriented to person, place, and time.   Psychiatric:         Behavior: Behavior normal.         Thought Content: Thought content normal.         Significant Labs:   CBC:   Recent Labs   Lab 06/21/20  0110   WBC 7.71   HGB 10.6*   HCT 32.3*        CMP:   Recent Labs   Lab 06/21/20  0110      K 3.6      CO2 27      BUN 10   CREATININE 0.8   CALCIUM 9.3   ANIONGAP 9   EGFRNONAA >60     All pertinent labs within the past 24 hours have been reviewed.    Significant Imaging: I have reviewed all pertinent imaging results/findings within the past 24 hours.      Assessment/Plan:      * DVT (deep venous thrombosis)  Pt with prior hx PE/DVT followed by Dr. Pina, on Pradaxa.  Failed prior treatment with xarelto.  Lovenox 1mg/kg given in ED; continue q12hr  Consult hematology for further recommendations.  Monitor telemetry      Generalized anxiety disorder  Continue chronic medications.      Nicotine dependence  Health hazards associated with cigarette smoking were reviewed with patient and cessation was encouraged. Nicotine replacement and counseling options were discussed.  Spent 3 minutes counseling on cessation, patient not ready to quit.  Declines nicotine patch.    S/P total knee arthroplasty,  right  Pt underwent R TKA on 6/16/20; returns with DVT R popliteal vein.   Ortho consulted  Follow ortho recs        VTE Risk Mitigation (From admission, onward)         Ordered     enoxaparin injection 105 mg  Every 12 hours (non-standard times)      06/21/20 0313     IP VTE HIGH RISK PATIENT  Once      06/21/20 0313                      Hailey Ocampo NP  Department of Hospital Medicine   [facility]

## 2020-06-21 NOTE — HPI
Aleyda Costa is a 39-year-old female with a past medical history significant for depression, GERD, pulmonary emboli in 2018, sleep apnea, prior gastric sleeve, and nicotine dependence who underwent a right knee arthroplasty per Dr. Crisostomo on 6/16/20.  She returns to the ED today with c/o increased pain to the RLE and increased edema. Patient previously failed treatment for pulmonary emoli with Xarelto and was placed on Pradaxa 150 mg p.o. b.i.d. at discharge.  Pt reports compliance with her medications; however, ultrasound of the RLE demonstrates a DVT.  Denies SOB, chest pain, abd pain, N/V or fevers.  She was administered full dose lovenox while in the ED.  She will be placed in observation for continued monitoring and treatment.

## 2020-06-21 NOTE — PLAN OF CARE
POC/Meds reviewed, pt verbalized understanding. Vitals stable. Afebrile. Tele In place-NSR. Up with x1 assist and walker to BR.  PRN pain medication administered for complaints of pain with moderate relief. R leg swollen, red, and warm. Repositions self. Hourly/Q2hr rounding performed, safety maintained. Bed in lowest position, wheels locked, SR up x2, call light in easy reach. No  complaints at this time. Will continue to monitor.

## 2020-06-21 NOTE — SUBJECTIVE & OBJECTIVE
Past Medical History:   Diagnosis Date    Abnormal Pap smear of cervix     Arthritis     OA    Back pain     Cancer     skin cancer to back    Depression     Endometriosis     Full dentures     GERD (gastroesophageal reflux disease)     Migraine     Pulmonary embolism     Seizures     Sleep apnea     Does not use c-pap since weight loss - 170 lbs    Uterine fibroid     Wears glasses        Past Surgical History:   Procedure Laterality Date    APPENDECTOMY      CARPAL TUNNEL RELEASE Bilateral      SECTION      CHOLECYSTECTOMY      COLONOSCOPY N/A 2019    Procedure: COLONOSCOPY;  Surgeon: Anselmo Blackwell MD;  Location: [facility] ENDO;  Service: Endoscopy;  Laterality: N/A;    EPIDURAL STEROID INJECTION INTO LUMBAR SPINE N/A 2019    Procedure: Injection-steroid-epidural-lumbar;  Surgeon: Yariel Ramirez MD;  Location: [facility] OR;  Service: Pain Management;  Laterality: N/A;  L3-4    ESOPHAGOGASTRODUODENOSCOPY N/A 2019    Procedure: EGD (ESOPHAGOGASTRODUODENOSCOPY);  Surgeon: Anselmo Blackwell MD;  Location: [facility] ENDO;  Service: Endoscopy;  Laterality: N/A;    FRACTURE SURGERY      ankle    gastric sleve      HYSTERECTOMY      endo and fibroids    HYSTERECTOMY      JOINT REPLACEMENT Left 2018    KNEE ARTHROPLASTY Left 2018    Procedure: ARTHROPLASTY, KNEE;  Surgeon: Feliberto Cardenas MD;  Location: [facility] OR;  Service: Orthopedics;  Laterality: Left;    KNEE ARTHROPLASTY Right 2020    Procedure: ARTHROPLASTY, KNEE;  Surgeon: Feliberto Cardenas MD;  Location: [facility] OR;  Service: Orthopedics;  Laterality: Right;    KNEE ARTHROSCOPY W/ MENISCECTOMY Right 10/25/2019    Procedure: ARTHROSCOPY, KNEE, WITH MENISCECTOMY;  Surgeon: Feliberto Cardenas MD;  Location: [facility] OR;  Service: Orthopedics;  Laterality: Right;    KNEE SURGERY      LUMBAR EPIDURAL INJECTION      OOPHORECTOMY Left     LSO    RADIAL NERVE Right     RECONSTRUCTION OF MEDIAL  "PATELLOFEMORAL LIGAMENT OF RIGHT KNEE Right 10/25/2019    Procedure: RECONSTRUCTION, LIGAMENT, MEDIAL PATELLOFEMORAL, RIGHT;  Surgeon: Feliberto Cardenas MD;  Location: [facility];  Service: Orthopedics;  Laterality: Right;    SINUS SURGERY      UPPER GASTROINTESTINAL ENDOSCOPY  11/27/2019    Dr. Blackwell; mild schatzki ring-dilated; gastritis; bx in process       Review of patient's allergies indicates:   Allergen Reactions    Clarithromycin Swelling and Other (See Comments)     DIFFICULTY SWALLOWING  DIFFICULTY SWALLOWING    Pcn [penicillins] Anaphylaxis    Sulfa (sulfonamide antibiotics) Hives    Wellbutrin [bupropion hcl] Shortness Of Breath and Anaphylaxis    Betadine [povidone-iodine] Hives     Swelling      Cefprozil Hives    Iodinated contrast media Hives    Latex, natural rubber Rash    Sulfamethoxazole-trimethoprim Nausea And Vomiting    Iodine Hives and Swelling    Latex Hives and Swelling       Current Facility-Administered Medications on File Prior to Encounter   Medication    lactated ringers infusion    lidocaine (PF) 10 mg/ml (1%) injection 10 mg     Current Outpatient Medications on File Prior to Encounter   Medication Sig    AIMOVIG AUTOINJECTOR 140 mg/mL AtIn     amitriptyline (ELAVIL) 25 MG tablet TAKE ONE TABLET BY MOUTH EVERY NIGHT FOR 2 WEEKS THEN INCREASE TO 2 TABLETS AT BEDTIME    celecoxib (CELEBREX) 200 MG capsule Take 200 mg by mouth every evening.     cyanocobalamin, vitamin B-12, 1,000 mcg/mL Kit Inject 1 mL into the muscle every 21 days.    dabigatran etexilate (PRADAXA) 150 mg Cap Take 1 capsule (150 mg total) by mouth 2 (two) times daily. "Do NOT break, chew, or open capsules."    erenumab-aooe 140 mg/mL AtIn Inject 140 mg into the skin every 28 days.    fluoxetine (PROZAC) 20 MG capsule every evening.     oxyCODONE-acetaminophen (PERCOCET) 5-325 mg per tablet Take 1 tablet by mouth every 6 (six) hours as needed.    pantoprazole (PROTONIX) 40 MG tablet Take 1 " tablet (40 mg total) by mouth 2 (two) times daily.    tiZANidine (ZANAFLEX) 4 MG tablet Take 4 mg by mouth every 6 (six) hours as needed.    [DISCONTINUED] celecoxib (CELEBREX) 200 MG capsule     [DISCONTINUED] oxyCODONE (ROXICODONE) 10 mg Tab immediate release tablet     [DISCONTINUED] PROZAC 20 mg capsule     cetirizine (ZYRTEC) 5 MG tablet Take 5 mg by mouth as needed.     epinephrine (EPIPEN) 0.3 mg/0.3 mL (1:1,000) AtIn 0.3 mg daily as needed.     hydrOXYzine pamoate (VISTARIL) 25 MG Cap TAKE ONE CAPSULE BY MOUTH 3 TIMES A DAY AS NEEDED FOR ANXIETY    ibuprofen (ADVIL,MOTRIN) 800 MG tablet every 6 (six) hours as needed.     ketorolac (TORADOL) 30 mg/mL injection Use 30 mg IM q6 hours prn. Provide the patient with IM syringes and needles.    lorazepam (ATIVAN) 0.5 MG tablet Take 0.5 mg by mouth every 4 (four) hours as needed.     oxyCODONE (OXYCONTIN) 10 mg 12 hr tablet Take 10 mg by mouth every 12 (twelve) hours.    promethazine (PHENERGAN) 25 MG tablet Take 1 tablet (25 mg total) by mouth every 6 (six) hours as needed for Nausea. Take 1 tablet by mouth every 6 (six) hours as needed (migraine). -MAY CAUSE DROWSINESS-    VOLTAREN 1 % Gel daily as needed.     [DISCONTINUED] cyclobenzaprine (FLEXERIL) 10 MG tablet Take 1 tablet (10 mg total) by mouth 3 (three) times daily as needed for Muscle spasms.    [DISCONTINUED] HYDROcodone-acetaminophen (NORCO) 5-325 mg per tablet Take 1 tablet by mouth every 6 (six) hours as needed for Pain.     Family History     Problem Relation (Age of Onset)    Breast cancer Maternal Aunt (46)    Esophageal cancer Maternal Grandmother    Heart disease Mother, Father        Tobacco Use    Smoking status: Current Every Day Smoker     Packs/day: 0.25     Years: 20.00     Pack years: 5.00     Types: Cigarettes     Last attempt to quit: 2018     Years since quittin.6    Smokeless tobacco: Never Used   Substance and Sexual Activity    Alcohol use: Yes      Alcohol/week: 0.0 standard drinks     Comment: occasionally    Drug use: Not Currently     Types: Other-see comments     Comment: no    Sexual activity: Yes     Partners: Male     Review of Systems   Constitutional: Negative for appetite change, chills, fatigue and fever.   HENT: Negative for congestion and rhinorrhea.    Eyes: Negative for photophobia and visual disturbance.   Respiratory: Negative for cough, shortness of breath and wheezing.    Cardiovascular: Positive for leg swelling (RLE). Negative for chest pain and palpitations.   Gastrointestinal: Negative for abdominal pain, diarrhea, nausea and vomiting.   Endocrine: Negative for polydipsia and polyuria.   Genitourinary: Negative for dysuria and hematuria.   Musculoskeletal: Positive for arthralgias, gait problem and joint swelling.   Skin: Positive for color change (RLE) and wound (surgical incision). Negative for rash.   Neurological: Negative for dizziness, weakness and headaches.   Hematological: Bruises/bleeds easily.   Psychiatric/Behavioral: Negative for agitation and confusion.   All other systems reviewed and are negative.    Objective:     Vital Signs (Most Recent):  Temp: 96.9 °F (36.1 °C) (06/21/20 0307)  Pulse: 61 (06/21/20 0307)  Resp: 16 (06/21/20 0307)  BP: (!) 123/58 (06/21/20 0307)  SpO2: 100 % (06/21/20 0307) Vital Signs (24h Range):  Temp:  [96.9 °F (36.1 °C)-98.3 °F (36.8 °C)] 96.9 °F (36.1 °C)  Pulse:  [61-70] 61  Resp:  [16-18] 16  SpO2:  [96 %-100 %] 100 %  BP: ()/(52-58) 123/58     Weight: 111.1 kg (245 lb)  Body mass index is 33.23 kg/m².    Physical Exam  Vitals signs and nursing note reviewed.   Constitutional:       Appearance: Normal appearance. She is well-developed. She is obese.   HENT:      Head: Normocephalic and atraumatic.      Mouth/Throat:      Mouth: Mucous membranes are moist.   Eyes:      Extraocular Movements: Extraocular movements intact.      Conjunctiva/sclera: Conjunctivae normal.      Pupils: Pupils  are equal, round, and reactive to light.   Neck:      Musculoskeletal: Normal range of motion and neck supple.   Cardiovascular:      Rate and Rhythm: Normal rate and regular rhythm.      Heart sounds: Normal heart sounds.   Pulmonary:      Effort: Pulmonary effort is normal. No respiratory distress.      Breath sounds: Normal breath sounds.   Abdominal:      General: Bowel sounds are normal.      Palpations: Abdomen is soft.   Genitourinary:     Comments: deferred  Musculoskeletal:         General: Swelling (RLE) and tenderness (RLE) present.      Comments: Decreased ROM RLE   Skin:     General: Skin is warm and dry.      Findings: Erythema (RLE) present.   Neurological:      General: No focal deficit present.      Mental Status: She is alert and oriented to person, place, and time.   Psychiatric:         Mood and Affect: Mood normal.         Behavior: Behavior normal.         Thought Content: Thought content normal.         Judgment: Judgment normal.           CRANIAL NERVES     CN III, IV, VI   Pupils are equal, round, and reactive to light.       Significant Labs:   CBC:   Recent Labs   Lab 06/21/20  0110   WBC 7.71   HGB 10.6*   HCT 32.3*        CMP:   Recent Labs   Lab 06/21/20  0110      K 3.6      CO2 27      BUN 10   CREATININE 0.8   CALCIUM 9.3   ANIONGAP 9   EGFRNONAA >60       Significant Imaging: US RLE:  Nonocclusive thrombus within the right popliteal vein as above.

## 2020-06-21 NOTE — SUBJECTIVE & OBJECTIVE
Past Medical History:   Diagnosis Date    Abnormal Pap smear of cervix     Arthritis     OA    Back pain     Cancer     skin cancer to back    Depression     Endometriosis     Full dentures     GERD (gastroesophageal reflux disease)     Migraine     Pulmonary embolism     Seizures     Sleep apnea     Does not use c-pap since weight loss - 170 lbs    Uterine fibroid     Wears glasses        Past Surgical History:   Procedure Laterality Date    APPENDECTOMY      CARPAL TUNNEL RELEASE Bilateral      SECTION      CHOLECYSTECTOMY      COLONOSCOPY N/A 2019    Procedure: COLONOSCOPY;  Surgeon: Anselmo Blackwell MD;  Location: [facility] ENDO;  Service: Endoscopy;  Laterality: N/A;    EPIDURAL STEROID INJECTION INTO LUMBAR SPINE N/A 2019    Procedure: Injection-steroid-epidural-lumbar;  Surgeon: Yariel Ramirez MD;  Location: [facility] OR;  Service: Pain Management;  Laterality: N/A;  L3-4    ESOPHAGOGASTRODUODENOSCOPY N/A 2019    Procedure: EGD (ESOPHAGOGASTRODUODENOSCOPY);  Surgeon: Anselmo Blackwell MD;  Location: [facility] ENDO;  Service: Endoscopy;  Laterality: N/A;    FRACTURE SURGERY      ankle    gastric sleve      HYSTERECTOMY      endo and fibroids    HYSTERECTOMY      JOINT REPLACEMENT Left 2018    KNEE ARTHROPLASTY Left 2018    Procedure: ARTHROPLASTY, KNEE;  Surgeon: Feliberto Cardenas MD;  Location: [facility] OR;  Service: Orthopedics;  Laterality: Left;    KNEE ARTHROPLASTY Right 2020    Procedure: ARTHROPLASTY, KNEE;  Surgeon: Feliberto Cardenas MD;  Location: [facility] OR;  Service: Orthopedics;  Laterality: Right;    KNEE ARTHROSCOPY W/ MENISCECTOMY Right 10/25/2019    Procedure: ARTHROSCOPY, KNEE, WITH MENISCECTOMY;  Surgeon: Feliberto Cardenas MD;  Location: [facility] OR;  Service: Orthopedics;  Laterality: Right;    KNEE SURGERY      LUMBAR EPIDURAL INJECTION      OOPHORECTOMY Left     LSO    RADIAL NERVE Right     RECONSTRUCTION OF MEDIAL  PATELLOFEMORAL LIGAMENT OF RIGHT KNEE Right 10/25/2019    Procedure: RECONSTRUCTION, LIGAMENT, MEDIAL PATELLOFEMORAL, RIGHT;  Surgeon: Feliberto Cardenas MD;  [facility]: [facility];  Service: Orthopedics;  Laterality: Right;    SINUS SURGERY      UPPER GASTROINTESTINAL ENDOSCOPY  11/27/2019    Dr. Blackwell; mild schatzki ring-dilated; gastritis; bx in process       Review of patient's allergies indicates:   Allergen Reactions    Clarithromycin Swelling and Other (See Comments)     DIFFICULTY SWALLOWING  DIFFICULTY SWALLOWING    Pcn [penicillins] Anaphylaxis    Sulfa (sulfonamide antibiotics) Hives    Wellbutrin [bupropion hcl] Shortness Of Breath and Anaphylaxis    Betadine [povidone-iodine] Hives     Swelling      Cefprozil Hives    Iodinated contrast media Hives    Latex, natural rubber Rash    Sulfamethoxazole-trimethoprim Nausea And Vomiting    Iodine Hives and Swelling    Latex Hives and Swelling       Current Facility-Administered Medications   Medication    acetaminophen tablet 650 mg    amitriptyline tablet 50 mg    celecoxib capsule 200 mg    dextrose 50% injection 12.5 g    dextrose 50% injection 25 g    enoxaparin injection 105 mg    FLUoxetine capsule 40 mg    glucagon (human recombinant) injection 1 mg    glucose chewable tablet 16 g    glucose chewable tablet 24 g    hydromorphone (PF) injection 0.5 mg    hydromorphone (PF) injection 1 mg    hydrOXYzine pamoate capsule 25 mg    ondansetron injection 4 mg    pantoprazole EC tablet 40 mg    sodium chloride 0.9% flush 10 mL    tiZANidine tablet 4 mg     Facility-Administered Medications Ordered in Other Encounters   Medication    lactated ringers infusion    lidocaine (PF) 10 mg/ml (1%) injection 10 mg     Family History     Problem Relation (Age of Onset)    Breast cancer Maternal Aunt (46)    Esophageal cancer Maternal Grandmother    Heart disease Mother, Father        Tobacco Use    Smoking status: Current Every Day  Smoker     Packs/day: 0.25     Years: 20.00     Pack years: 5.00     Types: Cigarettes     Last attempt to quit: 2018     Years since quittin.6    Smokeless tobacco: Never Used   Substance and Sexual Activity    Alcohol use: Yes     Alcohol/week: 0.0 standard drinks     Comment: occasionally    Drug use: Not Currently     Types: Other-see comments     Comment: no    Sexual activity: Yes     Partners: Male     Review of Systems   Constitution: Negative for chills and fever.   HENT: Negative for congestion.    Eyes: Negative for blurred vision.   Cardiovascular: Negative for chest pain and syncope.   Respiratory: Negative for cough and shortness of breath.    Endocrine: Negative for polyuria.   Hematologic/Lymphatic: Positive for bleeding problem. Bruises/bleeds easily.   Skin: Negative for rash.   Musculoskeletal: Positive for joint pain and joint swelling. Negative for falls, muscle cramps, muscle weakness and myalgias.   Gastrointestinal: Negative for abdominal pain, nausea and vomiting.   Genitourinary: Negative for flank pain.   Neurological: Negative for numbness and seizures.   Psychiatric/Behavioral: Negative for altered mental status.   Allergic/Immunologic: Negative for persistent infections.     Objective:     Vital Signs (Most Recent):  Temp: 98.1 °F (36.7 °C) (20 0732)  Pulse: 68 (20 07)  Resp: 16 (20)  BP: 114/62 (20 07)  SpO2: 99 % (20 07) Vital Signs (24h Range):  Temp:  [96.9 °F (36.1 °C)-98.3 °F (36.8 °C)] 98.1 °F (36.7 °C)  Pulse:  [61-70] 68  Resp:  [16-18] 16  SpO2:  [96 %-100 %] 99 %  BP: ()/(52-62) 114/62     Weight: 111.1 kg (245 lb)  Height: 6' (182.9 cm)  Body mass index is 33.23 kg/m².    No intake or output data in the 24 hours ending 20 1057    General    Nursing note and vitals reviewed.  Constitutional: She is oriented to person, place, and time. She appears well-developed and well-nourished. No distress.   HENT:   Head:  Normocephalic and atraumatic.   Eyes: EOM are normal.   Cardiovascular: Normal rate.    Pulmonary/Chest: Effort normal.   Abdominal: Soft.   Neurological: She is alert and oriented to person, place, and time.   Psychiatric: Her behavior is normal.           Right Knee Exam   Right knee exam is normal.    Left Knee Exam     Inspection   Erythema: absent  Swelling: present  Effusion: absent    Range of Motion   Extension: 50   Flexion: 110     Other   Sensation: normal    Comments:  Patient has swelling and bruising entire posterior leg. It involves the thigh, popliteal fossa, calf and ankle.     Painful to straighten knee. Less warmth than the last few days around knee.     Muscle Strength   Left Lower Extremity   Quadriceps:  4/5     Vascular Exam       Left Pulses  Dorsalis Pedis:      2+          Edema  Left Lower Leg: present      Significant Labs:   BMP:   Recent Labs   Lab 06/21/20 0110         K 3.6      CO2 27   BUN 10   CREATININE 0.8   CALCIUM 9.3     CBC:   Recent Labs   Lab 06/21/20 0110   WBC 7.71   HGB 10.6*   HCT 32.3*        Coagulation: No results for input(s): LABPROT, INR, APTT in the last 48 hours.  CRP:   Recent Labs   Lab 06/21/20 0110   .3*     All pertinent labs within the past 24 hours have been reviewed.    Significant Imaging: U/S: I have reviewed all pertinent results/findings and my personal findings are:  Popliteal vein DVT

## 2020-06-21 NOTE — ASSESSMENT & PLAN NOTE
Pt underwent R TKA on 6/16/20; returns with DVT R popliteal vein.   Ortho consulted  Follow ortho recs

## 2020-06-21 NOTE — H&P
[facility] Medicine  History & Physical    Patient Name: Aleyda Costa  MRN: [id number]  Admission Date: 2020  Attending Physician: Richy Brown MD   Primary Care Provider: Darlene Hayden MD         Patient information was obtained from patient, past medical records and ER records.     Subjective:     Principal Problem:DVT (deep venous thrombosis)    Chief Complaint:   Chief Complaint   Patient presents with    Knee Pain     to right knee S/P replacement on Tuesday        HPI: Aleyda Costa is a 39-year-old female with a past medical history significant for depression, GERD, pulmonary emboli in 2018, sleep apnea, prior gastric sleeve, and nicotine dependence who underwent a right knee arthroplasty per Dr. Crisostomo on 20.  She returns to the ED today with c/o increased pain to the RLE and increased edema. Patient previously failed treatment for pulmonary emoli with Xarelto and was placed on Pradaxa 150 mg p.o. b.i.d. at discharge.  Pt reports compliance with her medications; however, ultrasound of the RLE demonstrates a DVT.  Denies SOB, chest pain, abd pain, N/V or fevers.  She was administered full dose lovenox while in the ED.  She will be placed in observation for continued monitoring and treatment.     Past Medical History:   Diagnosis Date    Abnormal Pap smear of cervix     Arthritis     OA    Back pain     Cancer     skin cancer to back    Depression     Endometriosis     Full dentures     GERD (gastroesophageal reflux disease)     Migraine     Pulmonary embolism 2018    Seizures     Sleep apnea     Does not use c-pap since weight loss - 170 lbs    Uterine fibroid     Wears glasses        Past Surgical History:   Procedure Laterality Date    APPENDECTOMY      CARPAL TUNNEL RELEASE Bilateral      SECTION      CHOLECYSTECTOMY      COLONOSCOPY N/A 2019    Procedure: COLONOSCOPY;  Surgeon: Anselmo Blackwell MD;  Location:  [facility] ENDO;  Service: Endoscopy;  Laterality: N/A;    EPIDURAL STEROID INJECTION INTO LUMBAR SPINE N/A 6/7/2019    Procedure: Injection-steroid-epidural-lumbar;  Surgeon: Yariel Ramirez MD;  Location: [facility] OR;  Service: Pain Management;  Laterality: N/A;  L3-4    ESOPHAGOGASTRODUODENOSCOPY N/A 11/27/2019    Procedure: EGD (ESOPHAGOGASTRODUODENOSCOPY);  Surgeon: Anselmo Blackwell MD;  Location: [facility] ENDO;  Service: Endoscopy;  Laterality: N/A;    FRACTURE SURGERY      ankle    gastric sleve      HYSTERECTOMY  2014    endo and fibroids    HYSTERECTOMY      JOINT REPLACEMENT Left 11/13/2018    KNEE ARTHROPLASTY Left 11/13/2018    Procedure: ARTHROPLASTY, KNEE;  Surgeon: Feliberto Cardenas MD;  Location: [facility] OR;  Service: Orthopedics;  Laterality: Left;    KNEE ARTHROPLASTY Right 6/16/2020    Procedure: ARTHROPLASTY, KNEE;  Surgeon: Feliberto Cardenas MD;  Location: [facility] OR;  Service: Orthopedics;  Laterality: Right;    KNEE ARTHROSCOPY W/ MENISCECTOMY Right 10/25/2019    Procedure: ARTHROSCOPY, KNEE, WITH MENISCECTOMY;  Surgeon: Feliberto Cardenas MD;  Location: [facility] OR;  Service: Orthopedics;  Laterality: Right;    KNEE SURGERY      LUMBAR EPIDURAL INJECTION      OOPHORECTOMY Left     LSO    RADIAL NERVE Right     RECONSTRUCTION OF MEDIAL PATELLOFEMORAL LIGAMENT OF RIGHT KNEE Right 10/25/2019    Procedure: RECONSTRUCTION, LIGAMENT, MEDIAL PATELLOFEMORAL, RIGHT;  Surgeon: Feliberto Cardenas MD;  Location: [facility] OR;  Service: Orthopedics;  Laterality: Right;    SINUS SURGERY      UPPER GASTROINTESTINAL ENDOSCOPY  11/27/2019    Dr. Blackwell; mild schatzki ring-dilated; gastritis; bx in process       Review of patient's allergies indicates:   Allergen Reactions    Clarithromycin Swelling and Other (See Comments)     DIFFICULTY SWALLOWING  DIFFICULTY SWALLOWING    Pcn [penicillins] Anaphylaxis    Sulfa (sulfonamide antibiotics) Hives    Wellbutrin [bupropion hcl] Shortness Of Breath and  "Anaphylaxis    Betadine [povidone-iodine] Hives     Swelling      Cefprozil Hives    Iodinated contrast media Hives    Latex, natural rubber Rash    Sulfamethoxazole-trimethoprim Nausea And Vomiting    Iodine Hives and Swelling    Latex Hives and Swelling       Current Facility-Administered Medications on File Prior to Encounter   Medication    lactated ringers infusion    lidocaine (PF) 10 mg/ml (1%) injection 10 mg     Current Outpatient Medications on File Prior to Encounter   Medication Sig    AIMOVIG AUTOINJECTOR 140 mg/mL AtIn     amitriptyline (ELAVIL) 25 MG tablet TAKE ONE TABLET BY MOUTH EVERY NIGHT FOR 2 WEEKS THEN INCREASE TO 2 TABLETS AT BEDTIME    celecoxib (CELEBREX) 200 MG capsule Take 200 mg by mouth every evening.     cyanocobalamin, vitamin B-12, 1,000 mcg/mL Kit Inject 1 mL into the muscle every 21 days.    dabigatran etexilate (PRADAXA) 150 mg Cap Take 1 capsule (150 mg total) by mouth 2 (two) times daily. "Do NOT break, chew, or open capsules."    erenumab-aooe 140 mg/mL AtIn Inject 140 mg into the skin every 28 days.    fluoxetine (PROZAC) 20 MG capsule every evening.     oxyCODONE-acetaminophen (PERCOCET) 5-325 mg per tablet Take 1 tablet by mouth every 6 (six) hours as needed.    pantoprazole (PROTONIX) 40 MG tablet Take 1 tablet (40 mg total) by mouth 2 (two) times daily.    tiZANidine (ZANAFLEX) 4 MG tablet Take 4 mg by mouth every 6 (six) hours as needed.    [DISCONTINUED] celecoxib (CELEBREX) 200 MG capsule     [DISCONTINUED] oxyCODONE (ROXICODONE) 10 mg Tab immediate release tablet     [DISCONTINUED] PROZAC 20 mg capsule     cetirizine (ZYRTEC) 5 MG tablet Take 5 mg by mouth as needed.     epinephrine (EPIPEN) 0.3 mg/0.3 mL (1:1,000) AtIn 0.3 mg daily as needed.     hydrOXYzine pamoate (VISTARIL) 25 MG Cap TAKE ONE CAPSULE BY MOUTH 3 TIMES A DAY AS NEEDED FOR ANXIETY    ibuprofen (ADVIL,MOTRIN) 800 MG tablet every 6 (six) hours as needed.     ketorolac " (TORADOL) 30 mg/mL injection Use 30 mg IM q6 hours prn. Provide the patient with IM syringes and needles.    lorazepam (ATIVAN) 0.5 MG tablet Take 0.5 mg by mouth every 4 (four) hours as needed.     oxyCODONE (OXYCONTIN) 10 mg 12 hr tablet Take 10 mg by mouth every 12 (twelve) hours.    promethazine (PHENERGAN) 25 MG tablet Take 1 tablet (25 mg total) by mouth every 6 (six) hours as needed for Nausea. Take 1 tablet by mouth every 6 (six) hours as needed (migraine). -MAY CAUSE DROWSINESS-    VOLTAREN 1 % Gel daily as needed.     [DISCONTINUED] cyclobenzaprine (FLEXERIL) 10 MG tablet Take 1 tablet (10 mg total) by mouth 3 (three) times daily as needed for Muscle spasms.    [DISCONTINUED] HYDROcodone-acetaminophen (NORCO) 5-325 mg per tablet Take 1 tablet by mouth every 6 (six) hours as needed for Pain.     Family History     Problem Relation (Age of Onset)    Breast cancer Maternal Aunt (46)    Esophageal cancer Maternal Grandmother    Heart disease Mother, Father        Tobacco Use    Smoking status: Current Every Day Smoker     Packs/day: 0.25     Years: 20.00     Pack years: 5.00     Types: Cigarettes     Last attempt to quit: 2018     Years since quittin.6    Smokeless tobacco: Never Used   Substance and Sexual Activity    Alcohol use: Yes     Alcohol/week: 0.0 standard drinks     Comment: occasionally    Drug use: Not Currently     Types: Other-see comments     Comment: no    Sexual activity: Yes     Partners: Male     Review of Systems   Constitutional: Negative for appetite change, chills, fatigue and fever.   HENT: Negative for congestion and rhinorrhea.    Eyes: Negative for photophobia and visual disturbance.   Respiratory: Negative for cough, shortness of breath and wheezing.    Cardiovascular: Positive for leg swelling (RLE). Negative for chest pain and palpitations.   Gastrointestinal: Negative for abdominal pain, diarrhea, nausea and vomiting.   Endocrine: Negative for polydipsia and  polyuria.   Genitourinary: Negative for dysuria and hematuria.   Musculoskeletal: Positive for arthralgias, gait problem and joint swelling.   Skin: Positive for color change (RLE) and wound (surgical incision). Negative for rash.   Neurological: Negative for dizziness, weakness and headaches.   Hematological: Bruises/bleeds easily.   Psychiatric/Behavioral: Negative for agitation and confusion.   All other systems reviewed and are negative.    Objective:     Vital Signs (Most Recent):  Temp: 96.9 °F (36.1 °C) (06/21/20 0307)  Pulse: 61 (06/21/20 0307)  Resp: 16 (06/21/20 0307)  BP: (!) 123/58 (06/21/20 0307)  SpO2: 100 % (06/21/20 0307) Vital Signs (24h Range):  Temp:  [96.9 °F (36.1 °C)-98.3 °F (36.8 °C)] 96.9 °F (36.1 °C)  Pulse:  [61-70] 61  Resp:  [16-18] 16  SpO2:  [96 %-100 %] 100 %  BP: ()/(52-58) 123/58     Weight: 111.1 kg (245 lb)  Body mass index is 33.23 kg/m².    Physical Exam  Vitals signs and nursing note reviewed.   Constitutional:       Appearance: Normal appearance. She is well-developed. She is obese.   HENT:      Head: Normocephalic and atraumatic.      Mouth/Throat:      Mouth: Mucous membranes are moist.   Eyes:      Extraocular Movements: Extraocular movements intact.      Conjunctiva/sclera: Conjunctivae normal.      Pupils: Pupils are equal, round, and reactive to light.   Neck:      Musculoskeletal: Normal range of motion and neck supple.   Cardiovascular:      Rate and Rhythm: Normal rate and regular rhythm.      Heart sounds: Normal heart sounds.   Pulmonary:      Effort: Pulmonary effort is normal. No respiratory distress.      Breath sounds: Normal breath sounds.   Abdominal:      General: Bowel sounds are normal.      Palpations: Abdomen is soft.   Genitourinary:     Comments: deferred  Musculoskeletal:         General: Swelling (RLE) and tenderness (RLE) present.      Comments: Decreased ROM RLE   Skin:     General: Skin is warm and dry.      Findings: Erythema (RLE) present.    Neurological:      General: No focal deficit present.      Mental Status: She is alert and oriented to person, place, and time.   Psychiatric:         Mood and Affect: Mood normal.         Behavior: Behavior normal.         Thought Content: Thought content normal.         Judgment: Judgment normal.           CRANIAL NERVES     CN III, IV, VI   Pupils are equal, round, and reactive to light.       Significant Labs:   CBC:   Recent Labs   Lab 06/21/20  0110   WBC 7.71   HGB 10.6*   HCT 32.3*        CMP:   Recent Labs   Lab 06/21/20  0110      K 3.6      CO2 27      BUN 10   CREATININE 0.8   CALCIUM 9.3   ANIONGAP 9   EGFRNONAA >60       Significant Imaging: US RLE:  Nonocclusive thrombus within the right popliteal vein as above.    Assessment/Plan:     * DVT (deep venous thrombosis)  Pt with prior hx PE/DVT followed by Dr. Pina, on Pradaxa.  Failed prior treatment with xarelto.  Lovenox 1mg/kg given in ED; continue q12hr  Consult hematology for further recommendations.  Monitor telemetry      Generalized anxiety disorder  Continue chronic medications.      Nicotine dependence  Health hazards associated with cigarette smoking were reviewed with patient and cessation was encouraged. Nicotine replacement and counseling options were discussed.  Spent 3 minutes counseling on cessation, patient not ready to quit.  Declines nicotine patch.    S/P total knee arthroplasty, right  Pt underwent R TKA on 6/16/20; returns with DVT R popliteal vein.   Consult ortho for further recommendations.        VTE Risk Mitigation (From admission, onward)         Ordered     enoxaparin injection 105 mg  Every 12 hours (non-standard times)      06/21/20 0313     IP VTE HIGH RISK PATIENT  Once      06/21/20 0313                   SCHUYLER Lino  Department of Hospital Medicine   [facility]

## 2020-06-21 NOTE — ED PROVIDER NOTES
Encounter Date: 2020    SCRIBE #1 NOTE: Leilani PUTNAM am scribing for, and in the presence of, Jaswinder Damon MD.       History     Chief Complaint   Patient presents with    Knee Pain     to right knee S/P replacement on Tuesday       Time seen by provider: 1:03 AM on 2020    Aleyda Costa is a 39 y.o. female with PMHx of PE (2018) who presents to the ED with c/o worsening right knee pain and swelling s/p knee replacement 4 days ago. Patient states that her knee feels warm. She also c/o right lower leg pain and swelling which began yesterday and is worse today. The patient denies fever, nausea, shortness of breath, or any other symptoms at this time. Pt states that she failed Zarelto in the past and is currently on Pradaxa. No other pertinent PSHx.    The history is provided by the patient.     Review of patient's allergies indicates:   Allergen Reactions    Clarithromycin Swelling and Other (See Comments)     DIFFICULTY SWALLOWING  DIFFICULTY SWALLOWING    Pcn [penicillins] Anaphylaxis    Sulfa (sulfonamide antibiotics) Hives    Wellbutrin [bupropion hcl] Shortness Of Breath and Anaphylaxis    Betadine [povidone-iodine] Hives     Swelling      Cefprozil Hives    Iodinated contrast media Hives    Latex, natural rubber Rash    Sulfamethoxazole-trimethoprim Nausea And Vomiting    Iodine Hives and Swelling    Latex Hives and Swelling     Past Medical History:   Diagnosis Date    Abnormal Pap smear of cervix     Arthritis     OA    Back pain     Cancer     skin cancer to back    Depression     Endometriosis     Full dentures     GERD (gastroesophageal reflux disease)     Migraine     Pulmonary embolism 2018    Seizures     Sleep apnea     Does not use c-pap since weight loss - 170 lbs    Uterine fibroid     Wears glasses      Past Surgical History:   Procedure Laterality Date    APPENDECTOMY      CARPAL TUNNEL RELEASE Bilateral      SECTION      CHOLECYSTECTOMY       COLONOSCOPY N/A 12/30/2019    Procedure: COLONOSCOPY;  Surgeon: Anselmo Blackwell MD;  Location: [facility] ENDO;  Service: Endoscopy;  Laterality: N/A;    EPIDURAL STEROID INJECTION INTO LUMBAR SPINE N/A 6/7/2019    Procedure: Injection-steroid-epidural-lumbar;  Surgeon: Yariel Ramirez MD;  Location: [facility] OR;  Service: Pain Management;  Laterality: N/A;  L3-4    ESOPHAGOGASTRODUODENOSCOPY N/A 11/27/2019    Procedure: EGD (ESOPHAGOGASTRODUODENOSCOPY);  Surgeon: Anselmo Blackwell MD;  Location: [facility] ENDO;  Service: Endoscopy;  Laterality: N/A;    FRACTURE SURGERY      ankle    gastric sleve      HYSTERECTOMY  2014    endo and fibroids    HYSTERECTOMY      JOINT REPLACEMENT Left 11/13/2018    KNEE ARTHROPLASTY Left 11/13/2018    Procedure: ARTHROPLASTY, KNEE;  Surgeon: Feliberto Cardenas MD;  Location: [facility] OR;  Service: Orthopedics;  Laterality: Left;    KNEE ARTHROPLASTY Right 6/16/2020    Procedure: ARTHROPLASTY, KNEE;  Surgeon: Feliberto Cardenas MD;  Location: [facility] OR;  Service: Orthopedics;  Laterality: Right;    KNEE ARTHROSCOPY W/ MENISCECTOMY Right 10/25/2019    Procedure: ARTHROSCOPY, KNEE, WITH MENISCECTOMY;  Surgeon: Feliberto Cardenas MD;  Location: [facility] OR;  Service: Orthopedics;  Laterality: Right;    KNEE SURGERY      LUMBAR EPIDURAL INJECTION      OOPHORECTOMY Left     LSO    RADIAL NERVE Right     RECONSTRUCTION OF MEDIAL PATELLOFEMORAL LIGAMENT OF RIGHT KNEE Right 10/25/2019    Procedure: RECONSTRUCTION, LIGAMENT, MEDIAL PATELLOFEMORAL, RIGHT;  Surgeon: Feliberto Cardenas MD;  Location: [facility] OR;  Service: Orthopedics;  Laterality: Right;    SINUS SURGERY      UPPER GASTROINTESTINAL ENDOSCOPY  11/27/2019    Dr. Blackwell; mild schatzki ring-dilated; gastritis; bx in process     Family History   Problem Relation Age of Onset    Heart disease Mother     Heart disease Father     Esophageal cancer Maternal Grandmother     Breast cancer Maternal Aunt 46    Colon cancer Neg  Hx     Stomach cancer Neg Hx     Ovarian cancer Neg Hx      Social History     Tobacco Use    Smoking status: Current Every Day Smoker     Packs/day: 0.25     Years: 20.00     Pack years: 5.00     Types: Cigarettes     Last attempt to quit: 2018     Years since quittin.6    Smokeless tobacco: Never Used   Substance Use Topics    Alcohol use: Yes     Alcohol/week: 0.0 standard drinks     Comment: occasionally    Drug use: Not Currently     Types: Other-see comments     Comment: no     Review of Systems   Constitutional: Negative for fever.   HENT: Negative for sore throat.    Respiratory: Negative for shortness of breath.    Cardiovascular: Positive for leg swelling. Negative for chest pain and palpitations.   Gastrointestinal: Negative for nausea.   Genitourinary: Negative for dysuria.   Musculoskeletal: Positive for arthralgias and joint swelling. Negative for back pain.   Skin: Negative for rash.   Neurological: Negative for weakness.   Hematological: Bruises/bleeds easily.       Physical Exam     Initial Vitals [20 0048]   BP Pulse Resp Temp SpO2   (!) 113/58 62 18 98.3 °F (36.8 °C) 99 %      MAP       --         Physical Exam    Nursing note and vitals reviewed.  Constitutional: She appears well-developed and well-nourished. She is not diaphoretic. No distress.   HENT:   Head: Normocephalic and atraumatic.   Eyes: EOM are normal.   Neck: Normal range of motion. Neck supple.   Cardiovascular: Normal rate, regular rhythm and normal heart sounds. Exam reveals no gallop and no friction rub.    No murmur heard.  Pulmonary/Chest: Breath sounds normal. No respiratory distress. She has no wheezes. She has no rhonchi. She has no rales.   Musculoskeletal: Tenderness present.      Right knee: She exhibits decreased range of motion and swelling. Tenderness found.      Right lower leg: She exhibits tenderness and swelling.      Comments: Right knee feels warm. Right knee swelling with decreased ROM.  Right knee incision is intact without dehiscence. Right calf swelling and tenderness.   Neurological: She is alert and oriented to person, place, and time.   Skin: Skin is warm and dry.   Psychiatric: She has a normal mood and affect. Her behavior is normal. Judgment and thought content normal.         ED Course   Procedures  Labs Reviewed   CBC W/ AUTO DIFFERENTIAL - Abnormal; Notable for the following components:       Result Value    RBC 3.36 (*)     Hemoglobin 10.6 (*)     Hematocrit 32.3 (*)     Mean Corpuscular Hemoglobin 31.5 (*)     All other components within normal limits   C-REACTIVE PROTEIN - Abnormal; Notable for the following components:    .3 (*)     All other components within normal limits   BASIC METABOLIC PANEL   SEDIMENTATION RATE   SARS-COV-2 RNA AMPLIFICATION, QUAL          Imaging Results          US Lower Extremity Veins Right (In process)                  Medical Decision Making:   History:   Old Medical Records: I decided to obtain old medical records.  Clinical Tests:   Lab Tests: Ordered and Reviewed  Radiological Study: Ordered and Reviewed  Other:   I have discussed this case with another health care provider.       <> Summary of the Discussion: Dr. Cardenas orthopedics agrees with ultrasound            Scribe Attestation:   Scribe #1: I performed the above scribed service and the documentation accurately describes the services I performed. I attest to the accuracy of the note.    I, Dr. Damon, personally performed the services described in this documentation. All medical record entries made by the scribe were at my direction and in my presence.  I have reviewed the chart and agree that the record reflects my personal performance and is accurate and complete.3:09 AM 06/21/2020            ED Course as of Jun 21 0213   Sun Jun 21, 2020 0101 BP(!): 113/58 [EF]   0101 Temp: 98.3 °F (36.8 °C) [EF]   0101 Temp src: Oral [EF]   0101 Pulse: 62 [EF]   0101 Resp: 18 [EF]   0101 SpO2: 99  % [EF]   0204 IMPRESSION:  Nonocclusive thrombus within the right popliteal vein as above.  Thank you for allowing us to participate in the care of your patient.  Dictated and Authenticated by: Jose Acuna MD  06/21/2020 2:03 AM Central Time ([place] & [place])   CRP(!): 204.3 [EF]   0207 Franceski to admit for RLE DVT    [EF]      ED Course User Index  [EF] Jaswinder Damon MD                Clinical Impression:       ICD-10-CM ICD-9-CM   1. DVT (deep venous thrombosis)  I82.409 453.40             ED Disposition Condition    Observation                    39-year-old female status post right TKA presents with warmth swelling pain to the right knee and also pain and pressure to the popliteal fossa and calf.  Ultrasound demonstrates nonocclusive thrombus.  Case was discussed with Orthopedics.  Think less likely to be infection.  CRP probably elevated secondary to recent surgery.  Orthopedics can evaluate in the morning.       Jaswinder Damon MD  06/21/20 1598

## 2020-06-21 NOTE — PLAN OF CARE
Plan of care reviewed with patient. Patient verbalized complete understanding. Lovenox administered as scheduled. Orthopedic and Hematology consult complete. Patient resting RLE, keeping it as straight as possible. Patient complains of pain. Pain control regimine re-evaluated by NP. PRN pain medicine administered as needed. All fall precautions maintained. Bed in lowest position, locked, call light within reach. Side rails up x's 2. Slip resistant socks maintained.

## 2020-06-21 NOTE — CONSULTS
Chief complaint:  Right lower extremity deep venous thrombosis    History of present illness:  The patient is a 39-year-old white female known to my partner, Dr. Pina, for prior difficulties with 400 thromboembolism following left knee surgery.  Patient was initially anticoagulated with Xarelto but had incomplete resolution of thrombosis.  She was subsequently anticoagulated with Pradaxa and had been chronically anticoagulated after after thrombophilia workup was remarkable only for the presence of weakly positive lupus inhibitor.  Patient has subsequently undergone right knee surgery on Tuesday.  Following discharge, she began to experience pain swelling right lower extremity.  She reports being relatively immobile but had been placed back on Pradaxa following hospital discharge. Lower extremity venous Doppler revealed presence of deep venous thrombosis.  Consultation requested regarding thrombophilia.  No other complaints for pertinent findings on a 14 point review systems.    Past medical history:  1.  Pulmonary thromboembolism as detailed above  2.  Abnormal Pap smear.  3.  Degenerative joint disease  4.  Optimally, back pain  5.  Status post resection skin cancer  6.  Endometriosis  7.  Edentulous  8.  Gastroesophageal reflux disease/hiatal hernia  9.  Migraine headache  10.  Seizure disorder  11.  Obstructive sleep apnea off CPAP  12.  History of uterine fibroid  13.  Status post appendectomy  14.  Status post carpal tunnel release  15.  Status post   16.  Status post cholecystectomy  17.  Status post KHUSHBOO  18.  History of right ankle fracture  19.  Status post left total knee arthroplasty  20.  Status post hysterectomy  21.  Status post surgical reconstruction and medial patellofemoral ligament the right knee  22.  Status post sinus surgery    Allergies:  Documented EMR and reviewed    Medications:  1.  Amitriptyline 50 mg nightly  2.  Celecoxib 200 mg nightly  3.  Lovenox 1 milligram/kilograms subcu  every 12 hr  4.  Floxin 40 mg nightly  5.  Oxycodone sustained release 10 mg every 12 hr dose  6.  Protonix 40 mg daily  7.  Tylenol as needed  8.  Dilaudid as needed  9.  Hydroxyzine 25 mg every 8 hr as needed  10.  Zofran as needed  11.  Oxycodone-acetaminophen  mg as needed  12.  Tizanidine as needed    Family/social history:  Patient is a former half pack per day smoker who has not consume tobacco since November of 2018.  Social alcohol use.  Family history remarkable for coronary disease.    Physical examination:  Patient is a well-developed, well-nourished, white female, in no acute distress, who is alert and oriented x4.    VITAL SIGNS:  Acutely ill the patient is afebrile with a temp of 97.5°, blood pressure 101/53, pulse 61, respirations 18, oxygen saturation of 93%, weight 111 kg.  HEENT: Normocephalic, atraumatic. Oral mucosa pink and moist. Lips without   lesions. Tongue midline. Oropharynx clear. Nonicteric sclerae.   NECK: Supple, no adenopathy. No carotid bruits, thyromegaly or thyroid nodule.   HEART: Regular rate and rhythm without murmur, gallop or rub.   LUNGS: Clear to auscultation bilaterally. Normal respiratory effort.   ABDOMEN: Soft, nontender, nondistended with positive normoactive bowel sounds, no hepatosplenomegaly.   EXTREMITIES:  Patient has significant left lower extremity edema extending from the toes to the level of the knee.  The patient's calf is tender to palpation.  Surgical scar from recent right knee surgery is well approximated, healed, and free from signs of local infection.  Distal pulses are intact.   AXILLAE AND GROIN: No palpable pathologic lymphadenopathy is appreciated.   SKIN: Intact/turgor normal   NEUROLOGIC:  No focal deficits appreciated.    Laboratory:  White count 7.7, hemoglobin 10.6, hematocrit 32.3, platelets 243.  Absolute neutrophil count is 4700.  Sed rate 80.  .3.  Sodium 137, potassium 3.6, chloride 101, CO2 27, BUN 10, creatinine 0.8, glucose  101, calcium 9.3, GFR greater than 60.    Microbiology:  COVID-19 testing returned negative.    Venous Doppler of the right lower extremity:  Partially occlusive thrombus involving popliteal vein.    Impression:  1.  Right lower extremity deep venous thrombosis following surgery/associated with a period of relative immobility.  However, this took place while patient was fully anticoagulated with Pradaxa.  2.  Prior history of weakly positive lupus inhibitor.  3.  History of pulmonary thromboembolism as detailed above  4.  Acute blood-loss anemia.    Plan:  1.  Continue full-dose Lovenox for now.  2.  Obtain LDH and haptoglobin.  3.  Obtain iron studies and supplement as indicated.  4.  Would consider changing patient to Eliquis once she is ready to transition away from Lovenox.  5.  Would consider repeat testing for lupus anticoagulant in the outpatient setting once acute inflammation from patient's current thrombus has resolved.  6.  Dr. Pina to re-evaluate patient Monday.    yumiko Saul

## 2020-06-21 NOTE — ASSESSMENT & PLAN NOTE
Pt underwent R TKA on 6/16/20; returns with DVT R popliteal vein.   Consult ortho for further recommendations.

## 2020-06-21 NOTE — PLAN OF CARE
SW spoke with patient to complete a discharge planning assessment. Patient presents as alert and oriented x 4, pleasant, good eye contact, well groomed, recall good, concentration/judgement good, average intelligence, calm, communicative, cooperative, and asking and answering questions appropriately. SW verified all information on demographic sheet is correct. Patient reports living with  and 2 teenage children and teenage niece and nephew  and that she is independent with ADLs at this time and does not drive. Patient reports  will transport pt home.    Patient reports does have home health. Pt reports currently in services with [facility] [place]. Pt choice form signed. Patient reports that she is not receiving outside resources. Patient reports having a rolling walker, grab bars, Wheelchair, rised toilet, and glucometer. Patient reports Darlene Hayden MD as pt's PCP and has [employer] as their insurance. Patient reports receiving medications from [facility] in [place] and reports that she is able to afford medications at this time and at time of discharge. Patient reports that she is not on dialysis at this time. Patient reports that she is not receiving services with the coumadin clinic. Patient reports has been admitted to the hospital within the last 30 days.    Patient reports does not have a Living Will or Healthcare Power of . Patient indicates mental health issues. Pt reports having depression and takes Prozac.    Patient expressed no other needs at this time. SW remains available.       06/21/20 1152   Discharge Assessment   Assessment Type Discharge Planning Assessment   Confirmed/corrected address and phone number on facesheet? Yes   Assessment information obtained from? Patient   Communicated expected length of stay with patient/caregiver no   Prior to hospitilization cognitive status: Alert/Oriented   Prior to hospitalization functional status:  Independent;Assistive Equipment   Current cognitive status: Alert/Oriented   Current Functional Status: Independent;Assistive Equipment   Lives With child(darrell), dependent;spouse   Able to Return to Prior Arrangements yes   Is patient able to care for self after discharge? Yes   Who are your caregiver(s) and their phone number(s)? Yunior Plasencia (father) [phone]   Patient's perception of discharge disposition home health   Readmission Within the Last 30 Days other (see comments)  (Pt reports having surgery and now admitted for DVT)   Patient currently being followed by outpatient case management? No   Patient currently receives any other outside agency services? Yes   Name and contact number of agency or person providing outside services [facility] [phone]   Is it the patient/care giver preference to resume care with the current outside agency? Yes   Equipment Currently Used at Home raised toilet;walker, rolling;grab bar;glucometer   Part D Coverage Pt has private insurance   Do you have any problems affording any of your prescribed medications? No   Is the patient taking medications as prescribed? yes   Does the patient have transportation home? Yes   Transportation Anticipated family or friend will provide   Dialysis Name and Scheduled days N/A   Does the patient receive services at the Coumadin Clinic? No   Discharge Plan A Home;Home Health   Discharge Plan B Home;Home Health   DME Needed Upon Discharge  none   Patient/Family in Agreement with Plan yes

## 2020-06-21 NOTE — ASSESSMENT & PLAN NOTE
Pt with prior hx PE/DVT followed by Dr. Pina, on Pradaxa.  Failed prior treatment with xarelto.  Lovenox 1mg/kg given in ED; continue q12hr  Consult hematology for further recommendations.  Monitor telemetry

## 2020-06-21 NOTE — CONSULTS
[facility]  Orthopedics  Consult Note    Patient Name: Aleyda Costa  MRN: [id number]  Admission Date: 6/21/2020  Hospital Length of Stay: 0 days  Attending Provider: Richy Brown MD  Primary Care Provider: Darlene Hayden MD    Patient information was obtained from patient, past medical records and ER records.     Inpatient consult to Orthopedic Surgery  Consult performed by: Feliberto Cardenas MD  Consult ordered by: SCHUYLER Caceres  Reason for consult: R TKA        Subjective:     Principal Problem:DVT (deep venous thrombosis)    Chief Complaint:   Chief Complaint   Patient presents with    Knee Pain     to right knee S/P replacement on Tuesday        HPI: Aleyda Costa is a 39-year-old female with a past medical history significant for depression, GERD, pulmonary emboli in 2018, sleep apnea, prior gastric sleeve, and nicotine dependence who underwent a right knee arthroplasty per Dr. Crisostomo on 6/16/20.  She returns to the ED today with c/o increased pain to the RLE and increased edema. Patient previously failed treatment for pulmonary emoli with Xarelto and was placed on Pradaxa 150 mg p.o. b.i.d. at discharge.  Pt reports compliance with her medications; however, ultrasound of the RLE demonstrates a DVT.  Denies SOB, chest pain, abd pain, N/V or fevers.  She was administered full dose lovenox while in the ED.  She will be placed in observation for continued monitoring and treatment.     Past Medical History:   Diagnosis Date    Abnormal Pap smear of cervix     Arthritis     OA    Back pain     Cancer     skin cancer to back    Depression     Endometriosis     Full dentures     GERD (gastroesophageal reflux disease)     Migraine     Pulmonary embolism 2018    Seizures     Sleep apnea     Does not use c-pap since weight loss - 170 lbs    Uterine fibroid     Wears glasses        Past Surgical History:   Procedure Laterality Date    APPENDECTOMY       CARPAL TUNNEL RELEASE Bilateral      SECTION      CHOLECYSTECTOMY      COLONOSCOPY N/A 2019    Procedure: COLONOSCOPY;  Surgeon: Anselmo Blackwell MD;  Location: [facility] ENDO;  Service: Endoscopy;  Laterality: N/A;    EPIDURAL STEROID INJECTION INTO LUMBAR SPINE N/A 2019    Procedure: Injection-steroid-epidural-lumbar;  Surgeon: Yariel Ramirez MD;  Location: [facility] OR;  Service: Pain Management;  Laterality: N/A;  L3-4    ESOPHAGOGASTRODUODENOSCOPY N/A 2019    Procedure: EGD (ESOPHAGOGASTRODUODENOSCOPY);  Surgeon: Anselmo Blackwell MD;  Location: [facility] ENDO;  Service: Endoscopy;  Laterality: N/A;    FRACTURE SURGERY      ankle    gastric sleve      HYSTERECTOMY      endo and fibroids    HYSTERECTOMY      JOINT REPLACEMENT Left 2018    KNEE ARTHROPLASTY Left 2018    Procedure: ARTHROPLASTY, KNEE;  Surgeon: Feliberto Cardenas MD;  Location: [facility] OR;  Service: Orthopedics;  Laterality: Left;    KNEE ARTHROPLASTY Right 2020    Procedure: ARTHROPLASTY, KNEE;  Surgeon: Feliberto Cardenas MD;  Location: [facility] OR;  Service: Orthopedics;  Laterality: Right;    KNEE ARTHROSCOPY W/ MENISCECTOMY Right 10/25/2019    Procedure: ARTHROSCOPY, KNEE, WITH MENISCECTOMY;  Surgeon: Feliberto Cardenas MD;  Location: [facility] OR;  Service: Orthopedics;  Laterality: Right;    KNEE SURGERY      LUMBAR EPIDURAL INJECTION      OOPHORECTOMY Left     LSO    RADIAL NERVE Right     RECONSTRUCTION OF MEDIAL PATELLOFEMORAL LIGAMENT OF RIGHT KNEE Right 10/25/2019    Procedure: RECONSTRUCTION, LIGAMENT, MEDIAL PATELLOFEMORAL, RIGHT;  Surgeon: Feliberto Cardenas MD;  Location: [facility] OR;  Service: Orthopedics;  Laterality: Right;    SINUS SURGERY      UPPER GASTROINTESTINAL ENDOSCOPY  2019    Dr. Blackwell; mild schatzki ring-dilated; gastritis; bx in process       Review of patient's allergies indicates:   Allergen Reactions    Clarithromycin Swelling and Other (See Comments)      DIFFICULTY SWALLOWING  DIFFICULTY SWALLOWING    Pcn [penicillins] Anaphylaxis    Sulfa (sulfonamide antibiotics) Hives    Wellbutrin [bupropion hcl] Shortness Of Breath and Anaphylaxis    Betadine [povidone-iodine] Hives     Swelling      Cefprozil Hives    Iodinated contrast media Hives    Latex, natural rubber Rash    Sulfamethoxazole-trimethoprim Nausea And Vomiting    Iodine Hives and Swelling    Latex Hives and Swelling       Current Facility-Administered Medications   Medication    acetaminophen tablet 650 mg    amitriptyline tablet 50 mg    celecoxib capsule 200 mg    dextrose 50% injection 12.5 g    dextrose 50% injection 25 g    enoxaparin injection 105 mg    FLUoxetine capsule 40 mg    glucagon (human recombinant) injection 1 mg    glucose chewable tablet 16 g    glucose chewable tablet 24 g    hydromorphone (PF) injection 0.5 mg    hydromorphone (PF) injection 1 mg    hydrOXYzine pamoate capsule 25 mg    ondansetron injection 4 mg    pantoprazole EC tablet 40 mg    sodium chloride 0.9% flush 10 mL    tiZANidine tablet 4 mg     Facility-Administered Medications Ordered in Other Encounters   Medication    lactated ringers infusion    lidocaine (PF) 10 mg/ml (1%) injection 10 mg     Family History     Problem Relation (Age of Onset)    Breast cancer Maternal Aunt (46)    Esophageal cancer Maternal Grandmother    Heart disease Mother, Father        Tobacco Use    Smoking status: Current Every Day Smoker     Packs/day: 0.25     Years: 20.00     Pack years: 5.00     Types: Cigarettes     Last attempt to quit: 2018     Years since quittin.6    Smokeless tobacco: Never Used   Substance and Sexual Activity    Alcohol use: Yes     Alcohol/week: 0.0 standard drinks     Comment: occasionally    Drug use: Not Currently     Types: Other-see comments     Comment: no    Sexual activity: Yes     Partners: Male     Review of Systems   Constitution: Negative for chills and  fever.   HENT: Negative for congestion.    Eyes: Negative for blurred vision.   Cardiovascular: Negative for chest pain and syncope.   Respiratory: Negative for cough and shortness of breath.    Endocrine: Negative for polyuria.   Hematologic/Lymphatic: Positive for bleeding problem. Bruises/bleeds easily.   Skin: Negative for rash.   Musculoskeletal: Positive for joint pain and joint swelling. Negative for falls, muscle cramps, muscle weakness and myalgias.   Gastrointestinal: Negative for abdominal pain, nausea and vomiting.   Genitourinary: Negative for flank pain.   Neurological: Negative for numbness and seizures.   Psychiatric/Behavioral: Negative for altered mental status.   Allergic/Immunologic: Negative for persistent infections.     Objective:     Vital Signs (Most Recent):  Temp: 98.1 °F (36.7 °C) (06/21/20 0732)  Pulse: 68 (06/21/20 0732)  Resp: 16 (06/21/20 0732)  BP: 114/62 (06/21/20 0732)  SpO2: 99 % (06/21/20 0732) Vital Signs (24h Range):  Temp:  [96.9 °F (36.1 °C)-98.3 °F (36.8 °C)] 98.1 °F (36.7 °C)  Pulse:  [61-70] 68  Resp:  [16-18] 16  SpO2:  [96 %-100 %] 99 %  BP: ()/(52-62) 114/62     Weight: 111.1 kg (245 lb)  Height: 6' (182.9 cm)  Body mass index is 33.23 kg/m².    No intake or output data in the 24 hours ending 06/21/20 1057    General    Nursing note and vitals reviewed.  Constitutional: She is oriented to person, place, and time. She appears well-developed and well-nourished. No distress.   HENT:   Head: Normocephalic and atraumatic.   Eyes: EOM are normal.   Cardiovascular: Normal rate.    Pulmonary/Chest: Effort normal.   Abdominal: Soft.   Neurological: She is alert and oriented to person, place, and time.   Psychiatric: Her behavior is normal.           Right Knee Exam   Right knee exam is normal.    Left Knee Exam     Inspection   Erythema: absent  Swelling: present  Effusion: absent    Range of Motion   Extension: 50   Flexion: 110     Other   Sensation: normal    Comments:   Patient has swelling and bruising entire posterior leg. It involves the thigh, popliteal fossa, calf and ankle.     Painful to straighten knee. Less warmth than the last few days around knee.     Muscle Strength   Left Lower Extremity   Quadriceps:  4/5     Vascular Exam       Left Pulses  Dorsalis Pedis:      2+          Edema  Left Lower Leg: present      Significant Labs:   BMP:   Recent Labs   Lab 06/21/20 0110         K 3.6      CO2 27   BUN 10   CREATININE 0.8   CALCIUM 9.3     CBC:   Recent Labs   Lab 06/21/20 0110   WBC 7.71   HGB 10.6*   HCT 32.3*        Coagulation: No results for input(s): LABPROT, INR, APTT in the last 48 hours.  CRP:   Recent Labs   Lab 06/21/20 0110   .3*     All pertinent labs within the past 24 hours have been reviewed.    Significant Imaging: U/S: I have reviewed all pertinent results/findings and my personal findings are:  Popliteal vein DVT    Assessment/Plan:     * DVT (deep venous thrombosis)  Heme consult for recurrent DVT despite Pradaxa. Already had PE while on Eliquis.     S/P total knee arthroplasty, right  Will hold off on PT. Want her knee and leg resting for now to let swelling and bruising subside. I want her to get it as straight as possible and leave it there.         Thank you for your consult. I will follow-up with patient. Please contact us if you have any additional questions.    Feliberto Cardenas MD  Orthopedics  [facility]

## 2020-06-22 LAB
ANION GAP SERPL CALC-SCNC: 9 MMOL/L (ref 8–16)
BASOPHILS # BLD AUTO: 0.03 K/UL (ref 0–0.2)
BASOPHILS NFR BLD: 0.5 % (ref 0–1.9)
BUN SERPL-MCNC: 10 MG/DL (ref 6–20)
CALCIUM SERPL-MCNC: 9.2 MG/DL (ref 8.7–10.5)
CHLORIDE SERPL-SCNC: 101 MMOL/L (ref 95–110)
CO2 SERPL-SCNC: 29 MMOL/L (ref 23–29)
CREAT SERPL-MCNC: 0.7 MG/DL (ref 0.5–1.4)
DIFFERENTIAL METHOD: ABNORMAL
EOSINOPHIL # BLD AUTO: 0.3 K/UL (ref 0–0.5)
EOSINOPHIL NFR BLD: 4.5 % (ref 0–8)
ERYTHROCYTE [DISTWIDTH] IN BLOOD BY AUTOMATED COUNT: 13.7 % (ref 11.5–14.5)
EST. GFR  (AFRICAN AMERICAN): >60 ML/MIN/1.73 M^2
EST. GFR  (NON AFRICAN AMERICAN): >60 ML/MIN/1.73 M^2
GLUCOSE SERPL-MCNC: 97 MG/DL (ref 70–110)
HAPTOGLOB SERPL-MCNC: 262 MG/DL (ref 30–250)
HCT VFR BLD AUTO: 31.7 % (ref 37–48.5)
HGB BLD-MCNC: 10 G/DL (ref 12–16)
IMM GRANULOCYTES # BLD AUTO: 0.03 K/UL (ref 0–0.04)
IMM GRANULOCYTES NFR BLD AUTO: 0.5 % (ref 0–0.5)
IRON SERPL-MCNC: 37 UG/DL (ref 30–160)
LYMPHOCYTES # BLD AUTO: 2 K/UL (ref 1–4.8)
LYMPHOCYTES NFR BLD: 32.9 % (ref 18–48)
MAGNESIUM SERPL-MCNC: 2.2 MG/DL (ref 1.6–2.6)
MCH RBC QN AUTO: 30.2 PG (ref 27–31)
MCHC RBC AUTO-ENTMCNC: 31.5 G/DL (ref 32–36)
MCV RBC AUTO: 96 FL (ref 82–98)
MONOCYTES # BLD AUTO: 0.7 K/UL (ref 0.3–1)
MONOCYTES NFR BLD: 10.9 % (ref 4–15)
NEUTROPHILS # BLD AUTO: 3 K/UL (ref 1.8–7.7)
NEUTROPHILS NFR BLD: 50.7 % (ref 38–73)
NRBC BLD-RTO: 0 /100 WBC
PLATELET # BLD AUTO: 258 K/UL (ref 150–350)
PMV BLD AUTO: 9.8 FL (ref 9.2–12.9)
POTASSIUM SERPL-SCNC: 4.1 MMOL/L (ref 3.5–5.1)
RBC # BLD AUTO: 3.31 M/UL (ref 4–5.4)
SATURATED IRON: 13 % (ref 20–50)
SODIUM SERPL-SCNC: 139 MMOL/L (ref 136–145)
TOTAL IRON BINDING CAPACITY: 293 UG/DL (ref 250–450)
TRANSFERRIN SERPL-MCNC: 198 MG/DL (ref 200–375)
WBC # BLD AUTO: 5.96 K/UL (ref 3.9–12.7)

## 2020-06-22 PROCEDURE — 63600175 PHARM REV CODE 636 W HCPCS: Performed by: NURSE PRACTITIONER

## 2020-06-22 PROCEDURE — 96376 TX/PRO/DX INJ SAME DRUG ADON: CPT

## 2020-06-22 PROCEDURE — G0378 HOSPITAL OBSERVATION PER HR: HCPCS

## 2020-06-22 PROCEDURE — 80048 BASIC METABOLIC PNL TOTAL CA: CPT

## 2020-06-22 PROCEDURE — 36415 COLL VENOUS BLD VENIPUNCTURE: CPT

## 2020-06-22 PROCEDURE — 25000003 PHARM REV CODE 250: Performed by: NURSE PRACTITIONER

## 2020-06-22 PROCEDURE — 85025 COMPLETE CBC W/AUTO DIFF WBC: CPT

## 2020-06-22 PROCEDURE — 96372 THER/PROPH/DIAG INJ SC/IM: CPT | Mod: 59

## 2020-06-22 PROCEDURE — 63600175 PHARM REV CODE 636 W HCPCS: Performed by: INTERNAL MEDICINE

## 2020-06-22 PROCEDURE — 83735 ASSAY OF MAGNESIUM: CPT

## 2020-06-22 RX ORDER — HYDROMORPHONE HYDROCHLORIDE 2 MG/1
2 TABLET ORAL
Status: DISCONTINUED | OUTPATIENT
Start: 2020-06-22 | End: 2020-06-23 | Stop reason: HOSPADM

## 2020-06-22 RX ADMIN — OXYCODONE HYDROCHLORIDE 10 MG: 10 TABLET, FILM COATED, EXTENDED RELEASE ORAL at 08:06

## 2020-06-22 RX ADMIN — HYDROMORPHONE HYDROCHLORIDE 1 MG: 2 INJECTION, SOLUTION INTRAMUSCULAR; INTRAVENOUS; SUBCUTANEOUS at 06:06

## 2020-06-22 RX ADMIN — PANTOPRAZOLE SODIUM 40 MG: 40 TABLET, DELAYED RELEASE ORAL at 08:06

## 2020-06-22 RX ADMIN — CELECOXIB 200 MG: 100 CAPSULE ORAL at 08:06

## 2020-06-22 RX ADMIN — HYDROMORPHONE HYDROCHLORIDE 0.5 MG: 2 INJECTION, SOLUTION INTRAMUSCULAR; INTRAVENOUS; SUBCUTANEOUS at 02:06

## 2020-06-22 RX ADMIN — ENOXAPARIN SODIUM 105 MG: 150 INJECTION SUBCUTANEOUS at 02:06

## 2020-06-22 RX ADMIN — HYDROMORPHONE HYDROCHLORIDE 0.5 MG: 2 INJECTION, SOLUTION INTRAMUSCULAR; INTRAVENOUS; SUBCUTANEOUS at 12:06

## 2020-06-22 RX ADMIN — HYDROMORPHONE HYDROCHLORIDE 0.5 MG: 2 INJECTION, SOLUTION INTRAMUSCULAR; INTRAVENOUS; SUBCUTANEOUS at 10:06

## 2020-06-22 RX ADMIN — OXYCODONE HYDROCHLORIDE 10 MG: 10 TABLET, FILM COATED, EXTENDED RELEASE ORAL at 09:06

## 2020-06-22 RX ADMIN — ACETAMINOPHEN 650 MG: 325 TABLET ORAL at 02:06

## 2020-06-22 RX ADMIN — FLUOXETINE HYDROCHLORIDE 40 MG: 20 CAPSULE ORAL at 08:06

## 2020-06-22 RX ADMIN — HYDROMORPHONE HYDROCHLORIDE 0.5 MG: 2 INJECTION, SOLUTION INTRAMUSCULAR; INTRAVENOUS; SUBCUTANEOUS at 06:06

## 2020-06-22 RX ADMIN — HYDROMORPHONE HYDROCHLORIDE 0.5 MG: 2 INJECTION, SOLUTION INTRAMUSCULAR; INTRAVENOUS; SUBCUTANEOUS at 11:06

## 2020-06-22 RX ADMIN — PANTOPRAZOLE SODIUM 40 MG: 40 TABLET, DELAYED RELEASE ORAL at 09:06

## 2020-06-22 RX ADMIN — AMITRIPTYLINE HYDROCHLORIDE 50 MG: 50 TABLET, FILM COATED ORAL at 08:06

## 2020-06-22 RX ADMIN — OXYCODONE HYDROCHLORIDE AND ACETAMINOPHEN 1 TABLET: 10; 325 TABLET ORAL at 02:06

## 2020-06-22 NOTE — PLAN OF CARE
Patient continues to complain of pain to RLE.  Medicated with prn pain meds as available.  Patient encouraged to keep RLE straight and to rest it per MD orders.

## 2020-06-22 NOTE — ASSESSMENT & PLAN NOTE
Pt with prior hx PE/DVT followed by Dr. Pina, on Pradaxa.  Failed prior treatment with xarelto.  Lovenox 1mg/kg given in ED; continue q12hr  Awaiting further rec by hematololgy  Monitor telemetry

## 2020-06-22 NOTE — SUBJECTIVE & OBJECTIVE
Interval History: notes reviewed. Pt resting comfortably in bed, aroused easily to verbal stimuli. Pt reports pain is still present to right lower ext 9/10 intensity at worse, throbbing quality, worsens with movement, improves with pain meds. She denies cp, sob, n/v. She is shade diet well.     Review of Systems   Constitutional: Negative for chills, fatigue and fever.   HENT: Negative for congestion, sore throat and trouble swallowing.    Respiratory: Negative for cough, chest tightness and shortness of breath.    Cardiovascular: Positive for leg swelling. Negative for chest pain and palpitations.   Gastrointestinal: Negative for abdominal pain, diarrhea, nausea and vomiting.   Genitourinary: Negative for difficulty urinating, dysuria and urgency.   Musculoskeletal: Positive for arthralgias and gait problem. Negative for back pain.   Neurological: Negative for dizziness, weakness and light-headedness.   Psychiatric/Behavioral: Negative for agitation, behavioral problems and confusion.   All other systems reviewed and are negative.    Objective:     Vital Signs (Most Recent):  Temp: 99.5 °F (37.5 °C) (06/22/20 1154)  Pulse: 74 (06/22/20 1154)  Resp: 15 (06/22/20 0812)  BP: 117/61 (06/22/20 1154)  SpO2: 98 % (06/22/20 1154) Vital Signs (24h Range):  Temp:  [97.9 °F (36.6 °C)-99.5 °F (37.5 °C)] 99.5 °F (37.5 °C)  Pulse:  [63-74] 74  Resp:  [15-18] 15  SpO2:  [92 %-98 %] 98 %  BP: ()/(43-68) 117/61     Weight: 119.9 kg (264 lb 5.3 oz)  Body mass index is 35.85 kg/m².    Intake/Output Summary (Last 24 hours) at 6/22/2020 1234  Last data filed at 6/22/2020 0451  Gross per 24 hour   Intake 240 ml   Output --   Net 240 ml      Physical Exam  Vitals signs and nursing note reviewed.   Constitutional:       General: She is not in acute distress.     Appearance: She is well-developed. She is not diaphoretic.   HENT:      Head: Normocephalic and atraumatic.      Right Ear: External ear normal.      Left Ear: External ear  normal.      Nose: Nose normal.      Mouth/Throat:      Mouth: Mucous membranes are moist.      Pharynx: Oropharynx is clear.   Eyes:      Conjunctiva/sclera: Conjunctivae normal.      Pupils: Pupils are equal, round, and reactive to light.   Neck:      Musculoskeletal: Normal range of motion and neck supple.      Vascular: No JVD.   Cardiovascular:      Rate and Rhythm: Normal rate and regular rhythm.      Pulses: Normal pulses.      Heart sounds: Normal heart sounds. No murmur.   Pulmonary:      Effort: Pulmonary effort is normal. No respiratory distress.      Breath sounds: Normal breath sounds. No wheezing or rhonchi.   Abdominal:      General: Bowel sounds are normal. There is no distension.      Palpations: Abdomen is soft.      Tenderness: There is no abdominal tenderness.   Musculoskeletal:         General: Swelling and tenderness present.      Right lower leg: Edema present.      Comments: +3 edema to RLE with scattered ecchymosis. Surgical incision to ant knee CDI, edges well approx, no drainage or erythema. Limited ROM to right knee secondary to pain with movement   Skin:     General: Skin is warm and dry.      Capillary Refill: Capillary refill takes 2 to 3 seconds.      Findings: Bruising present. No erythema or rash.   Neurological:      General: No focal deficit present.      Mental Status: She is alert and oriented to person, place, and time.      Cranial Nerves: No cranial nerve deficit.      Sensory: No sensory deficit.   Psychiatric:         Mood and Affect: Mood normal.         Behavior: Behavior normal.         Thought Content: Thought content normal.         Significant Labs:   CBC:   Recent Labs   Lab 06/21/20  0110 06/22/20  0507   WBC 7.71 5.96   HGB 10.6* 10.0*   HCT 32.3* 31.7*    258     CMP:   Recent Labs   Lab 06/21/20  0110 06/22/20  0507    139   K 3.6 4.1    101   CO2 27 29    97   BUN 10 10   CREATININE 0.8 0.7   CALCIUM 9.3 9.2   ANIONGAP 9 9   EGFRNONAA  >60 >60     All pertinent labs within the past 24 hours have been reviewed.    Significant Imaging: I have reviewed all pertinent imaging results/findings within the past 24 hours.

## 2020-06-22 NOTE — ASSESSMENT & PLAN NOTE
Heme consult for recurrent DVT despite Pradaxa. Already had PE while on Eliquis.   Awaiting Yovani.

## 2020-06-22 NOTE — ASSESSMENT & PLAN NOTE
Pt underwent R TKA on 6/16/20; returns with DVT R popliteal vein.   Ortho consulted  Follow ortho recs  IV and oral pain meds

## 2020-06-22 NOTE — PLAN OF CARE
Pt aaox4 Pt complains with right knee pain. Md ordered Ice packs to knee intermittent. She is up to bathroom only . Telemetry shows Normal sinus IV patent with no redness or drainage. Bed in low position, call bell with in reach, hourly rounds maintained.

## 2020-06-22 NOTE — RESPIRATORY THERAPY
06/22/20 0950   Tobacco Cessation Intervention   Do you use any type of tobacco product? Yes   Are you interested in quitting use of tobacco products? Thinking about quitting   Pt is a [place] resident and a current daily smoker.  Pt was educated on smoking cessation and is thinking about quitting.  Information about the [place] smoking cessation program given to Pt.

## 2020-06-22 NOTE — PROGRESS NOTES
[facility] Hospital Medicine  Progress Note    Patient Name: Aleyda Costa  MRN: [id number]  Patient Class: OP- Observation   Admission Date: 6/21/2020  Length of Stay: 0 days  Attending Physician: Richy Brown MD  Primary Care Provider: Darlene Hayden MD        Subjective:     Principal Problem:DVT (deep venous thrombosis)        HPI:  Aleyda Costa is a 39-year-old female with a past medical history significant for depression, GERD, pulmonary emboli in 2018, sleep apnea, prior gastric sleeve, and nicotine dependence who underwent a right knee arthroplasty per Dr. Crisostomo on 6/16/20.  She returns to the ED today with c/o increased pain to the RLE and increased edema. Patient previously failed treatment for pulmonary emoli with Xarelto and was placed on Pradaxa 150 mg p.o. b.i.d. at discharge.  Pt reports compliance with her medications; however, ultrasound of the RLE demonstrates a DVT.  Denies SOB, chest pain, abd pain, N/V or fevers.  She was administered full dose lovenox while in the ED.  She will be placed in observation for continued monitoring and treatment.     Overview/Hospital Course:  No notes on file    Interval History: notes reviewed. Pt resting comfortably in bed, aroused easily to verbal stimuli. Pt reports pain is still present to right lower ext 9/10 intensity at worse, throbbing quality, worsens with movement, improves with pain meds. She denies cp, sob, n/v. She is shade diet well.     Review of Systems   Constitutional: Negative for chills, fatigue and fever.   HENT: Negative for congestion, sore throat and trouble swallowing.    Respiratory: Negative for cough, chest tightness and shortness of breath.    Cardiovascular: Positive for leg swelling. Negative for chest pain and palpitations.   Gastrointestinal: Negative for abdominal pain, diarrhea, nausea and vomiting.   Genitourinary: Negative for difficulty urinating, dysuria and urgency.    Musculoskeletal: Positive for arthralgias and gait problem. Negative for back pain.   Neurological: Negative for dizziness, weakness and light-headedness.   Psychiatric/Behavioral: Negative for agitation, behavioral problems and confusion.   All other systems reviewed and are negative.    Objective:     Vital Signs (Most Recent):  Temp: 99.5 °F (37.5 °C) (06/22/20 1154)  Pulse: 74 (06/22/20 1154)  Resp: 15 (06/22/20 0812)  BP: 117/61 (06/22/20 1154)  SpO2: 98 % (06/22/20 1154) Vital Signs (24h Range):  Temp:  [97.9 °F (36.6 °C)-99.5 °F (37.5 °C)] 99.5 °F (37.5 °C)  Pulse:  [63-74] 74  Resp:  [15-18] 15  SpO2:  [92 %-98 %] 98 %  BP: ()/(43-68) 117/61     Weight: 119.9 kg (264 lb 5.3 oz)  Body mass index is 35.85 kg/m².    Intake/Output Summary (Last 24 hours) at 6/22/2020 1234  Last data filed at 6/22/2020 0451  Gross per 24 hour   Intake 240 ml   Output --   Net 240 ml      Physical Exam  Vitals signs and nursing note reviewed.   Constitutional:       General: She is not in acute distress.     Appearance: She is well-developed. She is not diaphoretic.   HENT:      Head: Normocephalic and atraumatic.      Right Ear: External ear normal.      Left Ear: External ear normal.      Nose: Nose normal.      Mouth/Throat:      Mouth: Mucous membranes are moist.      Pharynx: Oropharynx is clear.   Eyes:      Conjunctiva/sclera: Conjunctivae normal.      Pupils: Pupils are equal, round, and reactive to light.   Neck:      Musculoskeletal: Normal range of motion and neck supple.      Vascular: No JVD.   Cardiovascular:      Rate and Rhythm: Normal rate and regular rhythm.      Pulses: Normal pulses.      Heart sounds: Normal heart sounds. No murmur.   Pulmonary:      Effort: Pulmonary effort is normal. No respiratory distress.      Breath sounds: Normal breath sounds. No wheezing or rhonchi.   Abdominal:      General: Bowel sounds are normal. There is no distension.      Palpations: Abdomen is soft.      Tenderness:  There is no abdominal tenderness.   Musculoskeletal:         General: Swelling and tenderness present.      Right lower leg: Edema present.      Comments: +3 edema to RLE with scattered ecchymosis. Surgical incision to ant knee CDI, edges well approx, no drainage or erythema. Limited ROM to right knee secondary to pain with movement   Skin:     General: Skin is warm and dry.      Capillary Refill: Capillary refill takes 2 to 3 seconds.      Findings: Bruising present. No erythema or rash.   Neurological:      General: No focal deficit present.      Mental Status: She is alert and oriented to person, place, and time.      Cranial Nerves: No cranial nerve deficit.      Sensory: No sensory deficit.   Psychiatric:         Mood and Affect: Mood normal.         Behavior: Behavior normal.         Thought Content: Thought content normal.         Significant Labs:   CBC:   Recent Labs   Lab 06/21/20  0110 06/22/20  0507   WBC 7.71 5.96   HGB 10.6* 10.0*   HCT 32.3* 31.7*    258     CMP:   Recent Labs   Lab 06/21/20 0110 06/22/20  0507    139   K 3.6 4.1    101   CO2 27 29    97   BUN 10 10   CREATININE 0.8 0.7   CALCIUM 9.3 9.2   ANIONGAP 9 9   EGFRNONAA >60 >60     All pertinent labs within the past 24 hours have been reviewed.    Significant Imaging: I have reviewed all pertinent imaging results/findings within the past 24 hours.      Assessment/Plan:      * DVT (deep venous thrombosis)  Pt with prior hx PE/DVT followed by Dr. Pina, on Pradaxa.  Failed prior treatment with xarelto.  Lovenox 1mg/kg given in ED; continue q12hr  Awaiting further rec by hematololgy  Monitor telemetry      Generalized anxiety disorder  Continue chronic medications.      Nicotine dependence  Health hazards associated with cigarette smoking were reviewed with patient and cessation was encouraged. Nicotine replacement and counseling options were discussed.  Spent 3 minutes counseling on cessation, patient not ready to  quit.  Declines nicotine patch.    S/P total knee arthroplasty, right  Pt underwent R TKA on 6/16/20; returns with DVT R popliteal vein.   Ortho consulted  Follow ortho recs  IV and oral pain meds        VTE Risk Mitigation (From admission, onward)         Ordered     enoxaparin injection 105 mg  Every 12 hours (non-standard times)      06/21/20 0313     IP VTE HIGH RISK PATIENT  Once      06/21/20 0313                      Hailey Ocampo NP  Department of Hospital Medicine   [facility]

## 2020-06-22 NOTE — SUBJECTIVE & OBJECTIVE
Principal Problem:DVT (deep venous thrombosis)    Principal Orthopedic Problem: R TKA    Interval History: Getting nervous about not moving knee enough and getting stiff.     Review of patient's allergies indicates:   Allergen Reactions    Clarithromycin Swelling and Other (See Comments)     DIFFICULTY SWALLOWING  DIFFICULTY SWALLOWING    Pcn [penicillins] Anaphylaxis    Sulfa (sulfonamide antibiotics) Hives    Wellbutrin [bupropion hcl] Shortness Of Breath and Anaphylaxis    Betadine [povidone-iodine] Hives     Swelling      Cefprozil Hives    Iodinated contrast media Hives    Latex, natural rubber Rash    Sulfamethoxazole-trimethoprim Nausea And Vomiting    Iodine Hives and Swelling    Latex Hives and Swelling       Current Facility-Administered Medications   Medication    acetaminophen tablet 650 mg    amitriptyline tablet 50 mg    celecoxib capsule 200 mg    dextrose 50% injection 12.5 g    dextrose 50% injection 25 g    enoxaparin injection 105 mg    FLUoxetine capsule 40 mg    glucagon (human recombinant) injection 1 mg    glucose chewable tablet 16 g    glucose chewable tablet 24 g    hydromorphone (PF) injection 0.5 mg    hydromorphone (PF) injection 1 mg    HYDROmorphone tablet 2 mg    hydrOXYzine pamoate capsule 25 mg    ondansetron injection 4 mg    oxyCODONE 12 hr tablet 10 mg    pantoprazole EC tablet 40 mg    sodium chloride 0.9% flush 10 mL    tiZANidine tablet 4 mg     Facility-Administered Medications Ordered in Other Encounters   Medication    lactated ringers infusion    lidocaine (PF) 10 mg/ml (1%) injection 10 mg     Objective:     Vital Signs (Most Recent):  Temp: 99.5 °F (37.5 °C) (06/22/20 1154)  Pulse: 74 (06/22/20 1154)  Resp: 15 (06/22/20 0812)  BP: 117/61 (06/22/20 1154)  SpO2: 98 % (06/22/20 1154) Vital Signs (24h Range):  Temp:  [97.9 °F (36.6 °C)-99.5 °F (37.5 °C)] 99.5 °F (37.5 °C)  Pulse:  [63-74] 74  Resp:  [15-18] 15  SpO2:  [92 %-98 %] 98 %  BP:  ()/(43-68) 117/61     Weight: 119.9 kg (264 lb 5.3 oz)  Height: 6' (182.9 cm)  Body mass index is 35.85 kg/m².      Intake/Output Summary (Last 24 hours) at 6/22/2020 1244  Last data filed at 6/22/2020 0451  Gross per 24 hour   Intake 240 ml   Output --   Net 240 ml       General    Nursing note and vitals reviewed.  Constitutional: She is oriented to person, place, and time. She appears well-developed and well-nourished. No distress.   HENT:   Head: Normocephalic and atraumatic.   Eyes: EOM are normal.   Cardiovascular: Normal rate.    Pulmonary/Chest: Effort normal.   Abdominal: Soft.   Neurological: She is alert and oriented to person, place, and time.   Psychiatric: Her behavior is normal.           Right Knee Exam     Inspection   Erythema: absent  Swelling: present  Effusion: present    Range of Motion   Extension: 50   Flexion: 110     Other   Sensation: normal    Comments:  Inc c/d/i    Leg is warm in general with swelling and bruising of posterior RLE    Left Knee Exam   Left knee exam is normal.    Muscle Strength   Right Lower Extremity   Quadriceps:  4/5     Vascular Exam     Right Pulses  Dorsalis Pedis:      2+              Significant Labs:   BMP:   Recent Labs   Lab 06/22/20  0507   GLU 97      K 4.1      CO2 29   BUN 10   CREATININE 0.7   CALCIUM 9.2   MG 2.2     CBC:   Recent Labs   Lab 06/21/20  0110 06/22/20  0507   WBC 7.71 5.96   HGB 10.6* 10.0*   HCT 32.3* 31.7*    258     Coagulation: No results for input(s): LABPROT, INR, APTT in the last 48 hours.  All pertinent labs within the past 24 hours have been reviewed.    Significant Imaging: None

## 2020-06-22 NOTE — PROGRESS NOTES
[facility]  Orthopedics  Progress Note    Patient Name: Aleyda Costa  MRN: [id number]  Admission Date: 6/21/2020  Hospital Length of Stay: 0 days  Attending Provider: Richy Brown MD  Primary Care Provider: Darlene Hayden MD    Subjective:     Principal Problem:DVT (deep venous thrombosis)    Principal Orthopedic Problem: R TKA    Interval History: Getting nervous about not moving knee enough and getting stiff.     Review of patient's allergies indicates:   Allergen Reactions    Clarithromycin Swelling and Other (See Comments)     DIFFICULTY SWALLOWING  DIFFICULTY SWALLOWING    Pcn [penicillins] Anaphylaxis    Sulfa (sulfonamide antibiotics) Hives    Wellbutrin [bupropion hcl] Shortness Of Breath and Anaphylaxis    Betadine [povidone-iodine] Hives     Swelling      Cefprozil Hives    Iodinated contrast media Hives    Latex, natural rubber Rash    Sulfamethoxazole-trimethoprim Nausea And Vomiting    Iodine Hives and Swelling    Latex Hives and Swelling       Current Facility-Administered Medications   Medication    acetaminophen tablet 650 mg    amitriptyline tablet 50 mg    celecoxib capsule 200 mg    dextrose 50% injection 12.5 g    dextrose 50% injection 25 g    enoxaparin injection 105 mg    FLUoxetine capsule 40 mg    glucagon (human recombinant) injection 1 mg    glucose chewable tablet 16 g    glucose chewable tablet 24 g    hydromorphone (PF) injection 0.5 mg    hydromorphone (PF) injection 1 mg    HYDROmorphone tablet 2 mg    hydrOXYzine pamoate capsule 25 mg    ondansetron injection 4 mg    oxyCODONE 12 hr tablet 10 mg    pantoprazole EC tablet 40 mg    sodium chloride 0.9% flush 10 mL    tiZANidine tablet 4 mg     Facility-Administered Medications Ordered in Other Encounters   Medication    lactated ringers infusion    lidocaine (PF) 10 mg/ml (1%) injection 10 mg     Objective:     Vital Signs (Most Recent):  Temp: 99.5 °F (37.5 °C)  (06/22/20 1154)  Pulse: 74 (06/22/20 1154)  Resp: 15 (06/22/20 0812)  BP: 117/61 (06/22/20 1154)  SpO2: 98 % (06/22/20 1154) Vital Signs (24h Range):  Temp:  [97.9 °F (36.6 °C)-99.5 °F (37.5 °C)] 99.5 °F (37.5 °C)  Pulse:  [63-74] 74  Resp:  [15-18] 15  SpO2:  [92 %-98 %] 98 %  BP: ()/(43-68) 117/61     Weight: 119.9 kg (264 lb 5.3 oz)  Height: 6' (182.9 cm)  Body mass index is 35.85 kg/m².      Intake/Output Summary (Last 24 hours) at 6/22/2020 1244  Last data filed at 6/22/2020 0451  Gross per 24 hour   Intake 240 ml   Output --   Net 240 ml       General    Nursing note and vitals reviewed.  Constitutional: She is oriented to person, place, and time. She appears well-developed and well-nourished. No distress.   HENT:   Head: Normocephalic and atraumatic.   Eyes: EOM are normal.   Cardiovascular: Normal rate.    Pulmonary/Chest: Effort normal.   Abdominal: Soft.   Neurological: She is alert and oriented to person, place, and time.   Psychiatric: Her behavior is normal.           Right Knee Exam     Inspection   Erythema: absent  Swelling: present  Effusion: present    Range of Motion   Extension: 50   Flexion: 110     Other   Sensation: normal    Comments:  Inc c/d/i    Leg is warm in general with swelling and bruising of posterior RLE    Left Knee Exam   Left knee exam is normal.    Muscle Strength   Right Lower Extremity   Quadriceps:  4/5     Vascular Exam     Right Pulses  Dorsalis Pedis:      2+              Significant Labs:   BMP:   Recent Labs   Lab 06/22/20  0507   GLU 97      K 4.1      CO2 29   BUN 10   CREATININE 0.7   CALCIUM 9.2   MG 2.2     CBC:   Recent Labs   Lab 06/21/20  0110 06/22/20  0507   WBC 7.71 5.96   HGB 10.6* 10.0*   HCT 32.3* 31.7*    258     Coagulation: No results for input(s): LABPROT, INR, APTT in the last 48 hours.  All pertinent labs within the past 24 hours have been reviewed.    Significant Imaging: None    Assessment/Plan:     * DVT (deep venous  thrombosis)  Heme consult for recurrent DVT despite Pradaxa. Already had PE while on Eliquis.   Awaiting Yovani.    S/P total knee arthroplasty, right  Will start PT.  Changed pain meds to more oral stuff and less IV.           Feliberto Cardenas MD  Orthopedics  [facility]

## 2020-06-23 ENCOUNTER — TELEPHONE (OUTPATIENT)
Dept: PHARMACY | Facility: CLINIC | Age: 40
End: 2020-06-23

## 2020-06-23 VITALS
WEIGHT: 264.31 LBS | BODY MASS INDEX: 35.8 KG/M2 | TEMPERATURE: 98 F | HEIGHT: 72 IN | HEART RATE: 66 BPM | OXYGEN SATURATION: 96 % | RESPIRATION RATE: 16 BRPM | DIASTOLIC BLOOD PRESSURE: 59 MMHG | SYSTOLIC BLOOD PRESSURE: 110 MMHG

## 2020-06-23 LAB
ANION GAP SERPL CALC-SCNC: 7 MMOL/L (ref 8–16)
BASOPHILS # BLD AUTO: 0.04 K/UL (ref 0–0.2)
BASOPHILS NFR BLD: 0.7 % (ref 0–1.9)
BUN SERPL-MCNC: 8 MG/DL (ref 6–20)
CALCIUM SERPL-MCNC: 8.9 MG/DL (ref 8.7–10.5)
CHLORIDE SERPL-SCNC: 103 MMOL/L (ref 95–110)
CO2 SERPL-SCNC: 28 MMOL/L (ref 23–29)
CREAT SERPL-MCNC: 0.7 MG/DL (ref 0.5–1.4)
DIFFERENTIAL METHOD: ABNORMAL
EOSINOPHIL # BLD AUTO: 0.3 K/UL (ref 0–0.5)
EOSINOPHIL NFR BLD: 4.7 % (ref 0–8)
ERYTHROCYTE [DISTWIDTH] IN BLOOD BY AUTOMATED COUNT: 13.6 % (ref 11.5–14.5)
EST. GFR  (AFRICAN AMERICAN): >60 ML/MIN/1.73 M^2
EST. GFR  (NON AFRICAN AMERICAN): >60 ML/MIN/1.73 M^2
GLUCOSE SERPL-MCNC: 105 MG/DL (ref 70–110)
HCT VFR BLD AUTO: 30.4 % (ref 37–48.5)
HGB BLD-MCNC: 9.8 G/DL (ref 12–16)
IMM GRANULOCYTES # BLD AUTO: 0.03 K/UL (ref 0–0.04)
IMM GRANULOCYTES NFR BLD AUTO: 0.5 % (ref 0–0.5)
LYMPHOCYTES # BLD AUTO: 2.1 K/UL (ref 1–4.8)
LYMPHOCYTES NFR BLD: 35.8 % (ref 18–48)
MAGNESIUM SERPL-MCNC: 2.2 MG/DL (ref 1.6–2.6)
MCH RBC QN AUTO: 30.5 PG (ref 27–31)
MCHC RBC AUTO-ENTMCNC: 32.2 G/DL (ref 32–36)
MCV RBC AUTO: 95 FL (ref 82–98)
MONOCYTES # BLD AUTO: 0.6 K/UL (ref 0.3–1)
MONOCYTES NFR BLD: 10.8 % (ref 4–15)
NEUTROPHILS # BLD AUTO: 2.7 K/UL (ref 1.8–7.7)
NEUTROPHILS NFR BLD: 47.5 % (ref 38–73)
NRBC BLD-RTO: 0 /100 WBC
PLATELET # BLD AUTO: 230 K/UL (ref 150–350)
PMV BLD AUTO: 9.5 FL (ref 9.2–12.9)
POTASSIUM SERPL-SCNC: 3.7 MMOL/L (ref 3.5–5.1)
RBC # BLD AUTO: 3.21 M/UL (ref 4–5.4)
SODIUM SERPL-SCNC: 138 MMOL/L (ref 136–145)
WBC # BLD AUTO: 5.73 K/UL (ref 3.9–12.7)

## 2020-06-23 PROCEDURE — 63600175 PHARM REV CODE 636 W HCPCS: Performed by: INTERNAL MEDICINE

## 2020-06-23 PROCEDURE — 25000003 PHARM REV CODE 250: Performed by: ORTHOPAEDIC SURGERY

## 2020-06-23 PROCEDURE — 96372 THER/PROPH/DIAG INJ SC/IM: CPT | Mod: 59

## 2020-06-23 PROCEDURE — 85025 COMPLETE CBC W/AUTO DIFF WBC: CPT

## 2020-06-23 PROCEDURE — 63600175 PHARM REV CODE 636 W HCPCS: Performed by: NURSE PRACTITIONER

## 2020-06-23 PROCEDURE — 96376 TX/PRO/DX INJ SAME DRUG ADON: CPT

## 2020-06-23 PROCEDURE — 25000003 PHARM REV CODE 250: Performed by: NURSE PRACTITIONER

## 2020-06-23 PROCEDURE — G0378 HOSPITAL OBSERVATION PER HR: HCPCS

## 2020-06-23 PROCEDURE — 97116 GAIT TRAINING THERAPY: CPT

## 2020-06-23 PROCEDURE — 80048 BASIC METABOLIC PNL TOTAL CA: CPT

## 2020-06-23 PROCEDURE — 36415 COLL VENOUS BLD VENIPUNCTURE: CPT

## 2020-06-23 PROCEDURE — 83735 ASSAY OF MAGNESIUM: CPT

## 2020-06-23 PROCEDURE — 97162 PT EVAL MOD COMPLEX 30 MIN: CPT

## 2020-06-23 RX ORDER — ENOXAPARIN SODIUM 150 MG/ML
1 INJECTION SUBCUTANEOUS EVERY 12 HOURS
Qty: 42 ML | Refills: 0 | Status: ON HOLD | OUTPATIENT
Start: 2020-06-23 | End: 2020-06-29 | Stop reason: HOSPADM

## 2020-06-23 RX ORDER — HYDROMORPHONE HYDROCHLORIDE 2 MG/1
2 TABLET ORAL EVERY 4 HOURS PRN
Qty: 40 TABLET | Refills: 0 | Status: ON HOLD | OUTPATIENT
Start: 2020-06-23 | End: 2020-10-06

## 2020-06-23 RX ADMIN — PANTOPRAZOLE SODIUM 40 MG: 40 TABLET, DELAYED RELEASE ORAL at 08:06

## 2020-06-23 RX ADMIN — HYDROMORPHONE HYDROCHLORIDE 0.5 MG: 2 INJECTION, SOLUTION INTRAMUSCULAR; INTRAVENOUS; SUBCUTANEOUS at 02:06

## 2020-06-23 RX ADMIN — HYDROMORPHONE HYDROCHLORIDE 0.5 MG: 2 INJECTION, SOLUTION INTRAMUSCULAR; INTRAVENOUS; SUBCUTANEOUS at 07:06

## 2020-06-23 RX ADMIN — ENOXAPARIN SODIUM 105 MG: 150 INJECTION SUBCUTANEOUS at 02:06

## 2020-06-23 RX ADMIN — OXYCODONE HYDROCHLORIDE 10 MG: 10 TABLET, FILM COATED, EXTENDED RELEASE ORAL at 08:06

## 2020-06-23 RX ADMIN — HYDROMORPHONE HYDROCHLORIDE 2 MG: 2 TABLET ORAL at 11:06

## 2020-06-23 NOTE — PLAN OF CARE
Problem: Physical Therapy Goal  Goal: Physical Therapy Goal  Description: Goals to be met by: 2020     Patient will increase functional independence with mobility by performin. Supine to sit with Supervision  2. Sit to stand transfer with Supervision  3. Bed to chair transfer with Supervision using Rolling Walker  4. Gait  x 250 feet with Supervision using Rolling Walker.   5. Ascend/descend 5 stairs with left Handrails Supervision   Outcome: Ongoing, Progressing

## 2020-06-23 NOTE — PLAN OF CARE
No acute events overnight. Alert and oriented when awake, slept between care. Patient understands and agrees with plan of care. Vitals stable and within patient's baseline since admission on room air. Diligent coughing, deep breathing encouraged. Pain controlled on current regimen. No nausea reported overnight. Urine output adequate overnight. No BM overnight. On bedrest, Up only to use toilet. Instructed to call for help as needed, call light in reach. Bed in lowest position and brake set. Frequent rounds made for patient's safety. See flowsheets for detailed assessment. White board in patient's room updated accordingly. Continuing to monitor closely.

## 2020-06-23 NOTE — DISCHARGE INSTRUCTIONS
You are to take lovenox injections twice daily for blood clot to your right leg.  Please obtain and take an over-the-counter stool softener while taking oral narcotics postoperatively to prevent constipation.  Please be careful postoperatively to avoid falls.    Thank you for choosing [facility] for your medical care. The primary doctor who is taking care of you at the time of your discharge is Richy Brown MD.     You were admitted to the hospital with DVT (deep venous thrombosis).     Please note your discharge instructions, including diet/activity restrictions, follow-up appointments, and medication changes.  If you have any questions about your medical issues, prescriptions, or any other questions, please feel free to contact the [facility] Medicine Dept at [phone] and we will help.    If you are previously with Home health, outpatient PT/OT or under a therapy program, you are cleared to return to those programs.    Please direct all long term medication refills and follow up to your primary care provider, Darlene Hayden MD. Thank you again for letting us take care of your health care needs.    Please note the following discharge instructions per your discharging physician-  Hailey Ocampo NP

## 2020-06-23 NOTE — HOSPITAL COURSE
Pt admitted for right leg DVT. Pt underwent right TKA by Dr. Crisostomo on 6/16. Pt was discharged home post op on pradaxa due to previous h/o DVT and had failed eliquis in the past. Pt returned on 6/21 for increased pain and swelling to right leg post op. Pt found to have DVT despite compliance with pradaxa at home. Pt was initiated on lovenox. Hematology and orthopedics was consulted. Pt was monitored closely in hospital and right leg pain and swelling improved. Pt progressed well with PT and was cleared for discharge by orthopedics. New pain script for oral dilaudid was sent to pharmacy by Dr. Crisostomo. Hematology rec cont lovenox on discharge and this was arranged. Pts HH, PT/OT was resumed also on discharge. Pt verbalized understanding of discharge instructions and return precautions.

## 2020-06-23 NOTE — PLAN OF CARE
The pt is accepted at [facility] via [facility]. Discharge destination booked. Adina Pierce, [facility]   06/23/20 1354   Post-Acute Status   Post-Acute Authorization Home Health   Home Health Status Set-up Complete       Date Status/Notes Created By   6/23/2020 1:54:39 PM Completed  Adina Pierce  6/23/2020 1:51:28 PM Accepted  Mary [email or web address]@[facility]AC  6/23/2020 1:51:20 PM Note: This patient belongs to our [place] office. I will forward the information.  Mary [email or web address]@[facility]  6/23/2020 1:48:08 PM New: Please resume services. Thanks  Adina Pierce

## 2020-06-23 NOTE — PLAN OF CARE
Pt already has apts scheduled with Dr. Pina and Dr. Cardenas. AVS updated.     Hospital follow up scheduled with PCP. AVS updated.

## 2020-06-23 NOTE — PT/OT/SLP EVAL
Physical Therapy Evaluation    Patient Name:  Aleyda Costa   MRN:  [id number]    Recommendations:     Discharge Recommendations:  home health PT   Discharge Equipment Recommendations: none   Barriers to discharge: None    Assessment:     Aleyda Costa is a 39 y.o. female admitted with a medical diagnosis of DVT (deep venous thrombosis).  She presents with the following impairments/functional limitations:  weakness, impaired endurance, impaired functional mobilty, gait instability, impaired balance, decreased lower extremity function, pain, decreased ROM, edema, orthopedic precautions. Patient is agreeable to PT evaluation. She had a R TKA on 6/16/2020 with Dr. Crisostomo. She did well with PT during previous hospitalization and left POD #1. Patient reports after she was discharged she was having R knee pain and has not been weightbearing on her RLE with gait. She also notes her  puts her sock on her right foot for her. She does state she has been getting HHPT. She requires SBA for transfers. Initially patient is NWB with standing. She is agreeable to try TTWB on RLE. She ambulated 30' with RW, SBA, and RLE TTWB. She initially agrees to sit up in chair, but then requests to return to bed with an ice pack. She was educated to perform quad sets as tolerated to work on increasing knee extension.     Rehab Prognosis: Fair; patient would benefit from acute skilled PT services to address these deficits and reach maximum level of function.    Recent Surgery: * No surgery found *      Plan:     During this hospitalization, patient to be seen BID to address the identified rehab impairments via gait training, therapeutic activities, therapeutic exercises and progress toward the following goals:    · Plan of Care Expires:  07/23/20    Subjective     Chief Complaint: R knee pain  Patient/Family Comments/goals: decrease pain  Pain/Comfort:  · Pain Rating 1: 7/10  · Location - Side 1: Right  · Location 1:  knee  · Pain Addressed 1: Pre-medicate for activity  · Pain Rating Post-Intervention 1: 9/10  · Pain Addressed 2: Nurse notified    Patients cultural, spiritual, Zoroastrianism conflicts given the current situation:      Living Environment:  Patient lives with spouse and children with 5 FIONA and L rail  Prior to admission, patients level of function was decreased since D/C from hospital last week. She has not been putting weight through her RLE.  Equipment used at home: walker, rolling, grab bar, wheelchair, raised toilet.  DME owned (not currently used): none.  Upon discharge, patient will have assistance from family.    Objective:     Communicated with ANTHONY Reyes prior to session.  Patient found HOB elevated with bed alarm, telemetry  upon PT entry to room.    General Precautions: Standard, fall   Orthopedic Precautions:RLE weight bearing as tolerated   Braces: N/A     Exams:  · Skin Integrity/Edema:      · -       Skin integrity: Bruising of right ankle and heel with patient reporting pain  · RLE ROM: lacking ~30 degrees of knee extension; 90 degrees of knee flexion  · LLE Strength: WFL    Functional Mobility:  · Bed Mobility:     · Supine to Sit: stand by assistance  · Transfers:     · Sit to Stand:  stand by assistance with rolling walker  · Gait: 30' RW, CBA, patient only agreeable to TTWB on RLE  · Balance: Fair    Therapeutic Activities and Exercises:   Patient was educated on the importance of OOB activity during hospital stay, safe transfers and ambulation, QS for knee extension    AM-PAC 6 CLICK MOBILITY  Total Score:23     Patient left HOB elevated with all lines intact, call button in reach, bed alarm on, RN notified and ice on R knee.    GOALS:   Multidisciplinary Problems     Physical Therapy Goals        Problem: Physical Therapy Goal    Goal Priority Disciplines Outcome Goal Variances Interventions   Physical Therapy Goal     PT, PT/OT      Description: Goals to be met by: 7/23/2020     Patient will  increase functional independence with mobility by performin. Supine to sit with Supervision  2. Sit to stand transfer with Supervision  3. Bed to chair transfer with Supervision using Rolling Walker  4. Gait  x 250 feet with Supervision using Rolling Walker.   5. Ascend/descend 5 stairs with left Handrails Supervision                    History:     Past Medical History:   Diagnosis Date    Abnormal Pap smear of cervix     Arthritis     OA    Back pain     Cancer     skin cancer to back    Depression     Endometriosis     Full dentures     GERD (gastroesophageal reflux disease)     Migraine     Pulmonary embolism     Seizures     Sleep apnea     Does not use c-pap since weight loss - 170 lbs    Uterine fibroid     Wears glasses        Past Surgical History:   Procedure Laterality Date    APPENDECTOMY      CARPAL TUNNEL RELEASE Bilateral      SECTION      CHOLECYSTECTOMY      COLONOSCOPY N/A 2019    Procedure: COLONOSCOPY;  Surgeon: Anselmo Blackwell MD;  Location: [facility];  Service: Endoscopy;  Laterality: N/A;    EPIDURAL STEROID INJECTION INTO LUMBAR SPINE N/A 2019    Procedure: Injection-steroid-epidural-lumbar;  Surgeon: Yariel Ramirez MD;  [facility];  Service: Pain Management;  Laterality: N/A;  L3-4    ESOPHAGOGASTRODUODENOSCOPY N/A 2019    Procedure: EGD (ESOPHAGOGASTRODUODENOSCOPY);  Surgeon: Anselmo Blackwell MD;  Location: [facility];  Service: Endoscopy;  Laterality: N/A;    FRACTURE SURGERY      ankle    gastric sleve      HYSTERECTOMY      endo and fibroids    HYSTERECTOMY      JOINT REPLACEMENT Left 2018    KNEE ARTHROPLASTY Left 2018    Procedure: ARTHROPLASTY, KNEE;  Surgeon: Feliberto Cardenas MD;  Location: [facility];  Service: Orthopedics;  Laterality: Left;    KNEE ARTHROPLASTY Right 2020    Procedure: ARTHROPLASTY, KNEE;  Surgeon: Feliberto Cardenas MD;  Location: [facility] OR;  Service: Orthopedics;   Laterality: Right;    KNEE ARTHROSCOPY W/ MENISCECTOMY Right 10/25/2019    Procedure: ARTHROSCOPY, KNEE, WITH MENISCECTOMY;  Surgeon: Feliberto Cardenas MD;  Location: [facility] OR;  Service: Orthopedics;  Laterality: Right;    KNEE SURGERY      LUMBAR EPIDURAL INJECTION      OOPHORECTOMY Left     LSO    RADIAL NERVE Right     RECONSTRUCTION OF MEDIAL PATELLOFEMORAL LIGAMENT OF RIGHT KNEE Right 10/25/2019    Procedure: RECONSTRUCTION, LIGAMENT, MEDIAL PATELLOFEMORAL, RIGHT;  Surgeon: Feliberto Cardenas MD;  Location: [facility] OR;  Service: Orthopedics;  Laterality: Right;    SINUS SURGERY      UPPER GASTROINTESTINAL ENDOSCOPY  11/27/2019    Dr. Blackwell; mild schatzki ring-dilated; gastritis; bx in process       Time Tracking:     PT Received On: 06/23/20  PT Start Time: 0933     PT Stop Time: 0956  PT Total Time (min): 23 min     Billable Minutes: Evaluation 10 and Gait Training 13      Chandrika Simental, PT  06/23/2020

## 2020-06-23 NOTE — PLAN OF CARE
The pt is discharging with resumption of  services with [facility]. The pt is clear by CM for discharge . Adina Pierce, KRISTINA     06/23/20 1355   Final Note   Assessment Type Final Discharge Note   Anticipated Discharge Disposition Home-[facility]

## 2020-06-23 NOTE — PLAN OF CARE
"Plan of care review with pt, pt states "understanding," no redness/swelling noted to IV site, right knee with +3-4 edema palpated with sterile strips and bruising open to air, + saloni palpable pedal pulses, ambulates with walker and stand- by assist, pt hops on left leg, states "It hurts when I attempt to put weight on it," will continue to monitor, observe and note any changes, safety maintain    1440-Pt states "understanding," to d/c instructions, follow up visits and new medication administration, telemetry and IV removed, pt tolerated well, pt packed personal belongings per self    1510-escorted to main lobby by staff, to home per private vehicle  "

## 2020-06-23 NOTE — DISCHARGE SUMMARY
[facility] Medicine  Discharge Summary      Patient Name: Aleyda Costa  MRN: [id number]  Admission Date: 6/21/2020  Hospital Length of Stay: 0 days  Discharge Date and Time: 6/23/2020  3:11 PM  Attending Physician: No att. providers found   Discharging Provider: Hailey Ocampo NP  Primary Care Provider: Darlene Hayden MD      HPI:   Aleyda Costa is a 39-year-old female with a past medical history significant for depression, GERD, pulmonary emboli in 2018, sleep apnea, prior gastric sleeve, and nicotine dependence who underwent a right knee arthroplasty per Dr. Crisostomo on 6/16/20.  She returns to the ED today with c/o increased pain to the RLE and increased edema. Patient previously failed treatment for pulmonary emoli with Xarelto and was placed on Pradaxa 150 mg p.o. b.i.d. at discharge.  Pt reports compliance with her medications; however, ultrasound of the RLE demonstrates a DVT.  Denies SOB, chest pain, abd pain, N/V or fevers.  She was administered full dose lovenox while in the ED.  She will be placed in observation for continued monitoring and treatment.     * No surgery found *      Hospital Course:   Pt admitted for right leg DVT. Pt underwent right TKA by Dr. Crisostomo on 6/16. Pt was discharged home post op on pradaxa due to previous h/o DVT and had failed eliquis in the past. Pt returned on 6/21 for increased pain and swelling to right leg post op. Pt found to have DVT despite compliance with pradaxa at home. Pt was initiated on lovenox. Hematology and orthopedics was consulted. Pt was monitored closely in hospital and right leg pain and swelling improved. Pt progressed well with PT and was cleared for discharge by orthopedics. New pain script for oral dilaudid was sent to pharmacy by Dr. Crisostomo. Hematology rec cont lovenox on discharge and this was arranged. Pts HH, PT/OT was resumed also on discharge. Pt verbalized understanding of discharge instructions  and return precautions.      Consults:   Consults (From admission, onward)        Status Ordering Provider     Inpatient consult to Hematology  Once     Provider:  Patricio Whitlock MD    Acknowledged CHAYITO RAMIREZ     Inpatient consult to Orthopedic Surgery  Once     Provider:  Feliberto Cardenas MD    Completed CHAYITO RAMIREZ          No new Assessment & Plan notes have been filed under this hospital service since the last note was generated.  Service: Hospital Medicine    Final Active Diagnoses:    Diagnosis Date Noted POA    PRINCIPAL PROBLEM:  DVT (deep venous thrombosis) [I82.409] 06/21/2020 Yes    Generalized anxiety disorder [F41.1] 06/16/2020 Yes    Nicotine dependence [F17.200] 11/19/2018 Yes    S/P total knee arthroplasty, right [Z96.651] 11/13/2018 Not Applicable      Problems Resolved During this Admission:       Discharged Condition: good    Disposition: Home or Self Care    Follow Up:  Follow-up Information     Darlene Hayden MD On 7/6/2020.    Specialty: Family Medicine  Why: 9:15 am for hospital follow up  Contact information:  [address] [facility] [place] [address]  [phone]             Rabia Pina MD In 2 weeks.    Specialty: Hematology and Oncology  Why: to recheck your symptoms  Contact information:  [address] [facility]  [address]  [place] [address]  [phone]             Feliberto Cardenas MD In 2 weeks.    Specialties: Sports Medicine, Orthopedic Surgery  Why: to recheck your symptoms  Contact information:  [address] [place] [address] [address]  [place] [address]  [phone]             [facility] [place].    Specialties: Physical Therapy, Home Health Services  Why: Home Health  Contact information:  [address]  [place] [address]  [phone]                 Patient Instructions:      Diet Adult Regular     Notify your health care provider if you experience any of the following:  temperature >100.4     Notify your  health care provider if you experience any of the following:  persistent nausea and vomiting or diarrhea     Notify your health care provider if you experience any of the following:  severe uncontrolled pain     Notify your health care provider if you experience any of the following:  redness, tenderness, or signs of infection (pain, swelling, redness, odor or green/yellow discharge around incision site)     Notify your health care provider if you experience any of the following:  difficulty breathing or increased cough     Notify your health care provider if you experience any of the following:  persistent dizziness, light-headedness, or visual disturbances     Notify your health care provider if you experience any of the following:  increased confusion or weakness     SUBSEQUENT HOME HEALTH ORDERS   Order Comments: Subsequent Home Health Orders    Current Medications:  Current Facility-Administered Medications:  acetaminophen tablet 650 mg, 650 mg, Oral, Q4H PRN, Emeli Ruelas, IRAP, 650 mg at 06/22/20 1425  amitriptyline tablet 50 mg, 50 mg, Oral, QHS, Emeli Ruelas, IRAP, 50 mg at 06/22/20 2051  celecoxib capsule 200 mg, 200 mg, Oral, QHS, Emeli Ruelas, FNP, 200 mg at 06/22/20 2052  dextrose 50% injection 12.5 g, 12.5 g, Intravenous, PRN, Emeli Ruelas, FNP  dextrose 50% injection 25 g, 25 g, Intravenous, PRN, Emeli Ruelas, FNP  enoxaparin injection 105 mg, 1 mg/kg, Subcutaneous, Q12H, IRA CaceresP, 105 mg at 06/23/20 0249  FLUoxetine capsule 40 mg, 40 mg, Oral, QHS, Emeli Ruelas, FNP, 40 mg at 06/22/20 2052  glucagon (human recombinant) injection 1 mg, 1 mg, Intramuscular, PRN, Emeli Ruelas, IRAP  glucose chewable tablet 16 g, 16 g, Oral, PRN, Emeli Ruelas, IRAP  glucose chewable tablet 24 g, 24 g, Oral, PRN, Emeli Ruelas, FNP  hydromorphone (PF) injection 0.5 mg, 0.5 mg, Intravenous, Q4H PRN, Richy Brown MD, 0.5 mg at  06/23/20 0736  HYDROmorphone tablet 2 mg, 2 mg, Oral, Q3H PRN, Feliberto Cardenas MD, 2 mg at 06/23/20 1140  hydrOXYzine pamoate capsule 25 mg, 25 mg, Oral, Q8H PRN, Emeli Ruelas, SCHUYLER  ondansetron injection 4 mg, 4 mg, Intravenous, Q6H PRN, SCHUYLER Caceres  oxyCODONE 12 hr tablet 10 mg, 10 mg, Oral, Q12H, Hailey Ocampo NP, 10 mg at 06/23/20 0850  pantoprazole EC tablet 40 mg, 40 mg, Oral, BID, SCHUYLER Caceres, 40 mg at 06/23/20 0850  sodium chloride 0.9% flush 10 mL, 10 mL, Intravenous, PRN, Emeli Ruelas, SCHUYLER  tiZANidine tablet 4 mg, 4 mg, Oral, Q6H PRN, SCHUYLER Caceres, 4 mg at 06/21/20 2323    Facility-Administered Medications Ordered in Other Encounters:  lactated ringers infusion, , Intravenous, Continuous, Anjel Hernandez MD, Stopped at 10/25/19 1115  lidocaine (PF) 10 mg/ml (1%) injection 10 mg, 1 mL, Intradermal, Once, Anjel Hernandez MD        Nursing:   N/A    Diet:   regular diet    Activities:   activity as tolerated    Referrals/ Consults  Physical Therapy to evaluate and treat. Evaluate for home safety and equipment needs; Establish/upgrade home exercise program. Perform / instruct on therapeutic exercises, gait training, transfer training, and Range of Motion.  Occupational Therapy to evaluate and treat. Evaluate home environment for safety and equipment needs. Perform/Instruct on transfers, ADL training, ROM, and therapeutic exercises.    Home Health Aide:  Nursing Three times weekly, Physical Therapy Three times weekly and Occupational Therapy Three times weekly     Order Specific Question Answer Comments   What Home Health Agency is the patient currently using? Other/External      Activity as tolerated       Significant Diagnostic Studies: Labs:   CMP   Recent Labs   Lab 06/22/20  0507 06/23/20  0457    138   K 4.1 3.7    103   CO2 29 28   GLU 97 105   BUN 10 8   CREATININE 0.7 0.7   CALCIUM 9.2 8.9   ANIONGAP 9 7*    ESTGFRAFRICA >60 >60   EGFRNONAA >60 >60   , CBC   Recent Labs   Lab 06/22/20  0507 06/23/20  0457   WBC 5.96 5.73   HGB 10.0* 9.8*   HCT 31.7* 30.4*    230    and All labs within the past 24 hours have been reviewed    US Lower Extremity Veins Right [[id number]] Resulted: 06/21/20 0612   Order Status: Completed Updated: 06/21/20 0614   Narrative:     EXAMINATION:   US LOWER EXTREMITY VEINS RIGHT     CLINICAL HISTORY:   leg pain;     TECHNIQUE:   Duplex and color flow Doppler evaluation and graded compression of the right lower extremity veins was performed.     COMPARISON:   None     FINDINGS:   Right thigh veins: There is nonocclusive intraluminal thrombus of the popliteal vein.  The common femoral, femoral, , upper greater saphenous, and deep femoral veins are patent and free of thrombus.  These veins are normally compressible and have normal phasic flow and augmentation response.     Right calf veins: The visualized calf veins are patent.     Contralateral CFV: The contralateral (left) common femoral vein is patent and free of thrombus.     Miscellaneous: None    Impression:       Nonocclusive intraluminal thrombus of the popliteal vein.     The preliminary and final reports are concordant.          Pending Diagnostic Studies:     Procedure Component Value Units Date/Time    Soluble Transferrin Receptor [[id number]] Collected: 06/21/20 1421    Order Status: Sent Lab Status: In process Updated: 06/21/20 1446    Specimen: Blood          Medications:  Reconciled Home Medications:      Medication List      START taking these medications    enoxaparin 120 mg/0.8 mL Syrg  Commonly known as: LOVENOX  Inject 0.7 mLs (105 mg total) into the skin every 12 (twelve) hours.     HYDROmorphone 2 MG tablet  Commonly known as: DILAUDID  Take 1 tablet (2 mg total) by mouth every 4 (four) hours as needed for Pain.        CONTINUE taking these medications    * AIMOVIG AUTOINJECTOR 140 mg/mL Atin  Generic drug:  erenumab-aooe  Inject 140 mg into the skin every 28 days.     * AIMOVIG AUTOINJECTOR 140 mg/mL Atin  Generic drug: erenumab-aooe     amitriptyline 25 MG tablet  Commonly known as: ELAVIL  TAKE ONE TABLET BY MOUTH EVERY NIGHT FOR 2 WEEKS THEN INCREASE TO 2 TABLETS AT BEDTIME     cetirizine 5 MG tablet  Commonly known as: ZYRTEC  Take 5 mg by mouth as needed.     cyanocobalamin (vitamin B-12) 1,000 mcg/mL Kit  Inject 1 mL into the muscle every 21 days.     EPINEPHrine 0.3 mg/0.3 mL Atin  Commonly known as: EPIPEN  0.3 mg daily as needed.     FLUoxetine 20 MG capsule  every evening.     hydrOXYzine pamoate 25 MG Cap  Commonly known as: VISTARIL  TAKE ONE CAPSULE BY MOUTH 3 TIMES A DAY AS NEEDED FOR ANXIETY     LORazepam 0.5 MG tablet  Commonly known as: ATIVAN  Take 0.5 mg by mouth every 4 (four) hours as needed.     oxyCODONE 10 mg 12 hr tablet  Commonly known as: OXYCONTIN  Take 10 mg by mouth every 12 (twelve) hours.     oxyCODONE-acetaminophen 5-325 mg per tablet  Commonly known as: PERCOCET  Take 1 tablet by mouth every 6 (six) hours as needed.     pantoprazole 40 MG tablet  Commonly known as: PROTONIX  Take 1 tablet (40 mg total) by mouth 2 (two) times daily.     promethazine 25 MG tablet  Commonly known as: PHENERGAN  Take 1 tablet (25 mg total) by mouth every 6 (six) hours as needed for Nausea. Take 1 tablet by mouth every 6 (six) hours as needed (migraine). -MAY CAUSE DROWSINESS-     tiZANidine 4 MG tablet  Commonly known as: ZANAFLEX  Take 4 mg by mouth every 6 (six) hours as needed.         * This list has 2 medication(s) that are the same as other medications prescribed for you. Read the directions carefully, and ask your doctor or other care provider to review them with you.            STOP taking these medications    celecoxib 200 MG capsule  Commonly known as: CeleBREX     dabigatran etexilate 150 mg Cap  Commonly known as: PRADAXA     ibuprofen 800 MG tablet  Commonly known as: ADVIL,MOTRIN      ketorolac 30 mg/mL injection  Commonly known as: TORADOL     VOLTAREN 1 % Gel  Generic drug: diclofenac sodium            Indwelling Lines/Drains at time of discharge:   Lines/Drains/Airways     None                 Time spent on the discharge of patient: 29 minutes  Patient was seen and examined on the date of discharge and determined to be suitable for discharge.         Hailey Ocampo NP  Department of Hospital Medicine  [facility]

## 2020-06-23 NOTE — PLAN OF CARE
I sent the pts HH orders and HH packet to [facility] via [facility]. Pts AVS updated. CM following. Adina Pierce LCSW     06/23/20 5292   Post-Acute Status   Post-Acute Authorization Home Health   Home Health Status Referrals Sent   Patient choice form signed by patient/caregiver List with quality metrics by geographic area provided

## 2020-06-24 ENCOUNTER — HOSPITAL ENCOUNTER (INPATIENT)
Facility: HOSPITAL | Age: 40
LOS: 3 days | Discharge: ANOTHER HOSPITAL | DRG: 300 | End: 2020-06-29
Attending: EMERGENCY MEDICINE | Admitting: INTERNAL MEDICINE
Payer: COMMERCIAL

## 2020-06-24 ENCOUNTER — TELEPHONE (OUTPATIENT)
Dept: ORTHOPEDICS | Facility: CLINIC | Age: 40
End: 2020-06-24

## 2020-06-24 ENCOUNTER — TELEPHONE (OUTPATIENT)
Dept: MEDSURG UNIT | Facility: HOSPITAL | Age: 40
End: 2020-06-24

## 2020-06-24 DIAGNOSIS — I82.409 DVT (DEEP VENOUS THROMBOSIS): ICD-10-CM

## 2020-06-24 DIAGNOSIS — M79.604 PAIN OF RIGHT LOWER EXTREMITY: ICD-10-CM

## 2020-06-24 DIAGNOSIS — R07.9 CHEST PAIN: ICD-10-CM

## 2020-06-24 DIAGNOSIS — R79.89 ELEVATED TROPONIN: Primary | ICD-10-CM

## 2020-06-24 PROBLEM — R52 INTRACTABLE PAIN: Status: ACTIVE | Noted: 2020-06-24

## 2020-06-24 LAB
ALBUMIN SERPL BCP-MCNC: 3.6 G/DL (ref 3.5–5.2)
ALP SERPL-CCNC: 108 U/L (ref 55–135)
ALT SERPL W/O P-5'-P-CCNC: 21 U/L (ref 10–44)
ANION GAP SERPL CALC-SCNC: 11 MMOL/L (ref 8–16)
AST SERPL-CCNC: 23 U/L (ref 10–40)
BASOPHILS # BLD AUTO: 0.06 K/UL (ref 0–0.2)
BASOPHILS NFR BLD: 0.8 % (ref 0–1.9)
BILIRUB SERPL-MCNC: 0.5 MG/DL (ref 0.1–1)
BUN SERPL-MCNC: 7 MG/DL (ref 6–20)
CALCIUM SERPL-MCNC: 9.7 MG/DL (ref 8.7–10.5)
CHLORIDE SERPL-SCNC: 102 MMOL/L (ref 95–110)
CO2 SERPL-SCNC: 27 MMOL/L (ref 23–29)
CREAT SERPL-MCNC: 0.8 MG/DL (ref 0.5–1.4)
CRP SERPL-MCNC: 90.4 MG/L (ref 0–8.2)
DIFFERENTIAL METHOD: ABNORMAL
EOSINOPHIL # BLD AUTO: 0.3 K/UL (ref 0–0.5)
EOSINOPHIL NFR BLD: 3.8 % (ref 0–8)
ERYTHROCYTE [DISTWIDTH] IN BLOOD BY AUTOMATED COUNT: 13.5 % (ref 11.5–14.5)
ERYTHROCYTE [SEDIMENTATION RATE] IN BLOOD BY WESTERGREN METHOD: 110 MM/HR (ref 0–20)
EST. GFR  (AFRICAN AMERICAN): >60 ML/MIN/1.73 M^2
EST. GFR  (NON AFRICAN AMERICAN): >60 ML/MIN/1.73 M^2
GLUCOSE SERPL-MCNC: 96 MG/DL (ref 70–110)
HCT VFR BLD AUTO: 34.8 % (ref 37–48.5)
HGB BLD-MCNC: 11.2 G/DL (ref 12–16)
IMM GRANULOCYTES # BLD AUTO: 0.04 K/UL (ref 0–0.04)
IMM GRANULOCYTES NFR BLD AUTO: 0.5 % (ref 0–0.5)
INR PPP: 1 (ref 0.8–1.2)
LYMPHOCYTES # BLD AUTO: 2.2 K/UL (ref 1–4.8)
LYMPHOCYTES NFR BLD: 29 % (ref 18–48)
MCH RBC QN AUTO: 30.7 PG (ref 27–31)
MCHC RBC AUTO-ENTMCNC: 32.2 G/DL (ref 32–36)
MCV RBC AUTO: 95 FL (ref 82–98)
MONOCYTES # BLD AUTO: 0.7 K/UL (ref 0.3–1)
MONOCYTES NFR BLD: 9.6 % (ref 4–15)
NEUTROPHILS # BLD AUTO: 4.4 K/UL (ref 1.8–7.7)
NEUTROPHILS NFR BLD: 56.3 % (ref 38–73)
NRBC BLD-RTO: 0 /100 WBC
PLATELET # BLD AUTO: 250 K/UL (ref 150–350)
PMV BLD AUTO: 9.6 FL (ref 9.2–12.9)
POTASSIUM SERPL-SCNC: 3.4 MMOL/L (ref 3.5–5.1)
PROT SERPL-MCNC: 8.2 G/DL (ref 6–8.4)
PROTHROMBIN TIME: 11.4 SEC (ref 9–12.5)
RBC # BLD AUTO: 3.65 M/UL (ref 4–5.4)
SARS-COV-2 RDRP RESP QL NAA+PROBE: NEGATIVE
SODIUM SERPL-SCNC: 140 MMOL/L (ref 136–145)
TROPONIN I SERPL DL<=0.01 NG/ML-MCNC: <0.006 NG/ML (ref 0–0.03)
WBC # BLD AUTO: 7.72 K/UL (ref 3.9–12.7)

## 2020-06-24 PROCEDURE — 96375 TX/PRO/DX INJ NEW DRUG ADDON: CPT

## 2020-06-24 PROCEDURE — 85651 RBC SED RATE NONAUTOMATED: CPT

## 2020-06-24 PROCEDURE — 63600175 PHARM REV CODE 636 W HCPCS: Performed by: NURSE PRACTITIONER

## 2020-06-24 PROCEDURE — 63600175 PHARM REV CODE 636 W HCPCS: Performed by: EMERGENCY MEDICINE

## 2020-06-24 PROCEDURE — 85025 COMPLETE CBC W/AUTO DIFF WBC: CPT

## 2020-06-24 PROCEDURE — G0378 HOSPITAL OBSERVATION PER HR: HCPCS

## 2020-06-24 PROCEDURE — 96372 THER/PROPH/DIAG INJ SC/IM: CPT | Mod: 59

## 2020-06-24 PROCEDURE — 25000003 PHARM REV CODE 250: Performed by: NURSE PRACTITIONER

## 2020-06-24 PROCEDURE — 93005 ELECTROCARDIOGRAM TRACING: CPT

## 2020-06-24 PROCEDURE — U0002 COVID-19 LAB TEST NON-CDC: HCPCS

## 2020-06-24 PROCEDURE — 96374 THER/PROPH/DIAG INJ IV PUSH: CPT

## 2020-06-24 PROCEDURE — 85610 PROTHROMBIN TIME: CPT

## 2020-06-24 PROCEDURE — 80053 COMPREHEN METABOLIC PANEL: CPT

## 2020-06-24 PROCEDURE — 86140 C-REACTIVE PROTEIN: CPT

## 2020-06-24 PROCEDURE — 96376 TX/PRO/DX INJ SAME DRUG ADON: CPT

## 2020-06-24 PROCEDURE — 84484 ASSAY OF TROPONIN QUANT: CPT

## 2020-06-24 PROCEDURE — 99285 EMERGENCY DEPT VISIT HI MDM: CPT | Mod: 25

## 2020-06-24 PROCEDURE — 36415 COLL VENOUS BLD VENIPUNCTURE: CPT

## 2020-06-24 RX ORDER — SODIUM,POTASSIUM PHOSPHATES 280-250MG
2 POWDER IN PACKET (EA) ORAL
Status: DISCONTINUED | OUTPATIENT
Start: 2020-06-24 | End: 2020-06-29 | Stop reason: HOSPADM

## 2020-06-24 RX ORDER — BISACODYL 10 MG
10 SUPPOSITORY, RECTAL RECTAL DAILY PRN
Status: DISCONTINUED | OUTPATIENT
Start: 2020-06-24 | End: 2020-06-29 | Stop reason: HOSPADM

## 2020-06-24 RX ORDER — POLYETHYLENE GLYCOL 3350 17 G/17G
17 POWDER, FOR SOLUTION ORAL DAILY
Status: DISCONTINUED | OUTPATIENT
Start: 2020-06-25 | End: 2020-06-29 | Stop reason: HOSPADM

## 2020-06-24 RX ORDER — ENOXAPARIN SODIUM 150 MG/ML
1 INJECTION SUBCUTANEOUS EVERY 12 HOURS
Status: DISCONTINUED | OUTPATIENT
Start: 2020-06-24 | End: 2020-06-29 | Stop reason: HOSPADM

## 2020-06-24 RX ORDER — ONDANSETRON 2 MG/ML
4 INJECTION INTRAMUSCULAR; INTRAVENOUS
Status: COMPLETED | OUTPATIENT
Start: 2020-06-24 | End: 2020-06-24

## 2020-06-24 RX ORDER — LANOLIN ALCOHOL/MO/W.PET/CERES
800 CREAM (GRAM) TOPICAL
Status: DISCONTINUED | OUTPATIENT
Start: 2020-06-24 | End: 2020-06-29 | Stop reason: HOSPADM

## 2020-06-24 RX ORDER — HYDROMORPHONE HYDROCHLORIDE 1 MG/ML
1 INJECTION, SOLUTION INTRAMUSCULAR; INTRAVENOUS; SUBCUTANEOUS EVERY 4 HOURS PRN
Status: DISCONTINUED | OUTPATIENT
Start: 2020-06-24 | End: 2020-06-26

## 2020-06-24 RX ORDER — ONDANSETRON 2 MG/ML
4 INJECTION INTRAMUSCULAR; INTRAVENOUS EVERY 6 HOURS PRN
Status: DISCONTINUED | OUTPATIENT
Start: 2020-06-24 | End: 2020-06-29 | Stop reason: HOSPADM

## 2020-06-24 RX ORDER — POTASSIUM CHLORIDE 20 MEQ/15ML
60 SOLUTION ORAL
Status: DISCONTINUED | OUTPATIENT
Start: 2020-06-24 | End: 2020-06-28

## 2020-06-24 RX ORDER — SODIUM CHLORIDE 0.9 % (FLUSH) 0.9 %
10 SYRINGE (ML) INJECTION
Status: DISCONTINUED | OUTPATIENT
Start: 2020-06-24 | End: 2020-06-29 | Stop reason: HOSPADM

## 2020-06-24 RX ORDER — AMITRIPTYLINE HYDROCHLORIDE 50 MG/1
50 TABLET, FILM COATED ORAL NIGHTLY
Status: DISCONTINUED | OUTPATIENT
Start: 2020-06-24 | End: 2020-06-29 | Stop reason: HOSPADM

## 2020-06-24 RX ORDER — TIZANIDINE 4 MG/1
4 TABLET ORAL EVERY 6 HOURS PRN
Status: DISCONTINUED | OUTPATIENT
Start: 2020-06-24 | End: 2020-06-29 | Stop reason: HOSPADM

## 2020-06-24 RX ORDER — POTASSIUM CHLORIDE 20 MEQ/15ML
40 SOLUTION ORAL
Status: DISCONTINUED | OUTPATIENT
Start: 2020-06-24 | End: 2020-06-28

## 2020-06-24 RX ORDER — OXYCODONE HCL 10 MG/1
10 TABLET, FILM COATED, EXTENDED RELEASE ORAL EVERY 12 HOURS
Status: DISCONTINUED | OUTPATIENT
Start: 2020-06-24 | End: 2020-06-29 | Stop reason: HOSPADM

## 2020-06-24 RX ORDER — PANTOPRAZOLE SODIUM 40 MG/1
40 TABLET, DELAYED RELEASE ORAL 2 TIMES DAILY
Status: DISCONTINUED | OUTPATIENT
Start: 2020-06-24 | End: 2020-06-29 | Stop reason: HOSPADM

## 2020-06-24 RX ORDER — PROMETHAZINE HYDROCHLORIDE 25 MG/1
25 TABLET ORAL EVERY 6 HOURS PRN
Status: DISCONTINUED | OUTPATIENT
Start: 2020-06-24 | End: 2020-06-29 | Stop reason: HOSPADM

## 2020-06-24 RX ORDER — HYDROMORPHONE HYDROCHLORIDE 1 MG/ML
1 INJECTION, SOLUTION INTRAMUSCULAR; INTRAVENOUS; SUBCUTANEOUS EVERY 6 HOURS PRN
Status: DISCONTINUED | OUTPATIENT
Start: 2020-06-24 | End: 2020-06-24

## 2020-06-24 RX ORDER — HYDROMORPHONE HYDROCHLORIDE 2 MG/ML
1 INJECTION, SOLUTION INTRAMUSCULAR; INTRAVENOUS; SUBCUTANEOUS
Status: COMPLETED | OUTPATIENT
Start: 2020-06-24 | End: 2020-06-24

## 2020-06-24 RX ORDER — OXYCODONE AND ACETAMINOPHEN 5; 325 MG/1; MG/1
1 TABLET ORAL EVERY 6 HOURS PRN
Status: DISCONTINUED | OUTPATIENT
Start: 2020-06-24 | End: 2020-06-29 | Stop reason: HOSPADM

## 2020-06-24 RX ORDER — CETIRIZINE HYDROCHLORIDE 5 MG/1
5 TABLET ORAL DAILY
Status: DISCONTINUED | OUTPATIENT
Start: 2020-06-25 | End: 2020-06-29 | Stop reason: HOSPADM

## 2020-06-24 RX ORDER — LORAZEPAM 0.5 MG/1
0.5 TABLET ORAL EVERY 4 HOURS PRN
Status: DISCONTINUED | OUTPATIENT
Start: 2020-06-24 | End: 2020-06-29 | Stop reason: HOSPADM

## 2020-06-24 RX ORDER — ACETAMINOPHEN 325 MG/1
650 TABLET ORAL EVERY 4 HOURS PRN
Status: DISCONTINUED | OUTPATIENT
Start: 2020-06-24 | End: 2020-06-29 | Stop reason: HOSPADM

## 2020-06-24 RX ORDER — FLUOXETINE HYDROCHLORIDE 20 MG/1
20 CAPSULE ORAL NIGHTLY
Status: DISCONTINUED | OUTPATIENT
Start: 2020-06-24 | End: 2020-06-29 | Stop reason: HOSPADM

## 2020-06-24 RX ADMIN — ENOXAPARIN SODIUM 105 MG: 120 INJECTION SUBCUTANEOUS at 09:06

## 2020-06-24 RX ADMIN — LORAZEPAM 0.5 MG: 0.5 TABLET ORAL at 09:06

## 2020-06-24 RX ADMIN — PANTOPRAZOLE SODIUM 40 MG: 40 TABLET, DELAYED RELEASE ORAL at 09:06

## 2020-06-24 RX ADMIN — HYDROMORPHONE HYDROCHLORIDE 1 MG: 2 INJECTION, SOLUTION INTRAMUSCULAR; INTRAVENOUS; SUBCUTANEOUS at 03:06

## 2020-06-24 RX ADMIN — HYDROMORPHONE HYDROCHLORIDE 1 MG: 1 INJECTION, SOLUTION INTRAMUSCULAR; INTRAVENOUS; SUBCUTANEOUS at 11:06

## 2020-06-24 RX ADMIN — FLUOXETINE HYDROCHLORIDE 20 MG: 20 CAPSULE ORAL at 09:06

## 2020-06-24 RX ADMIN — OXYCODONE HYDROCHLORIDE AND ACETAMINOPHEN 1 TABLET: 5; 325 TABLET ORAL at 07:06

## 2020-06-24 RX ADMIN — ONDANSETRON 4 MG: 2 INJECTION INTRAMUSCULAR; INTRAVENOUS at 11:06

## 2020-06-24 RX ADMIN — HYDROMORPHONE HYDROCHLORIDE 1 MG: 2 INJECTION, SOLUTION INTRAMUSCULAR; INTRAVENOUS; SUBCUTANEOUS at 05:06

## 2020-06-24 RX ADMIN — ONDANSETRON 4 MG: 2 INJECTION INTRAMUSCULAR; INTRAVENOUS at 03:06

## 2020-06-24 RX ADMIN — ONDANSETRON 4 MG: 2 INJECTION INTRAMUSCULAR; INTRAVENOUS at 05:06

## 2020-06-24 RX ADMIN — HYDROMORPHONE HYDROCHLORIDE 1 MG: 1 INJECTION, SOLUTION INTRAMUSCULAR; INTRAVENOUS; SUBCUTANEOUS at 06:06

## 2020-06-24 RX ADMIN — OXYCODONE HYDROCHLORIDE 10 MG: 10 TABLET, FILM COATED, EXTENDED RELEASE ORAL at 09:06

## 2020-06-24 RX ADMIN — AMITRIPTYLINE HYDROCHLORIDE 50 MG: 50 TABLET, FILM COATED ORAL at 09:06

## 2020-06-24 NOTE — TELEPHONE ENCOUNTER
----- Message from Radha Hahn MA sent at 6/24/2020  1:05 PM CDT -----  Contact: PATTIE marie  Call back   Pain , nausea

## 2020-06-24 NOTE — HPI
This  is a 39-year-old female with a past medical history significant for depression, GERD, pulmonary emboli in 2018, sleep apnea, prior gastric sleeve, and nicotine dependence presenting today for increased pain to right LE. Pt underwent a right knee arthroplasty per Dr. Crisostomo on 6/16/20. Patient previously failed treatment for pulmonary emboli with Xarelto and was placed on Pradaxa 150 mg p.o. b.i.d. at discharge 6/17 per hematologist rec at that time.  Pt presented back to ED on 6/21 for increasing pain and swelling to right leg. Pt reported compliance with her pradaxa and medications; however, ultrasound of the RLE on that date demonstrated a right nonocclusive popliteal DVT. Pt was readmitted and placed on full dose lovenox. Pt was discharged home on 6/23 on lovenox and pain was controlled with oral meds at that time. Dr. Crisostomo provided pt with oral dilaudid pain script on d/c.  Patient returns to ED today complaints of intractable pain to right leg that is not controlled with home meds. She denies worsening swelling to leg. She describes the pain as right knee and post lower leg location, 10/10 intensity, pressure quality, worsens with movement, assoc with nausea secondary to severe pain, improved after receiving IV dilaudid in ED. She denies fever, chills, cp and sob. Repeat US done in ED confirms DVT stable. CRP improving and WBC wnl. Dr. Crisostomo consulted from ED and rec admission for pain control.

## 2020-06-24 NOTE — SUBJECTIVE & OBJECTIVE
Past Medical History:   Diagnosis Date    Abnormal Pap smear of cervix     Arthritis     OA    Back pain     Cancer     skin cancer to back    Depression     Endometriosis     Full dentures     GERD (gastroesophageal reflux disease)     Migraine     Pulmonary embolism     Seizures     Sleep apnea     Does not use c-pap since weight loss - 170 lbs    Uterine fibroid     Wears glasses        Past Surgical History:   Procedure Laterality Date    APPENDECTOMY      CARPAL TUNNEL RELEASE Bilateral      SECTION      CHOLECYSTECTOMY      COLONOSCOPY N/A 2019    Procedure: COLONOSCOPY;  Surgeon: Anselmo Blackwell MD;  Location: [facility] ENDO;  Service: Endoscopy;  Laterality: N/A;    EPIDURAL STEROID INJECTION INTO LUMBAR SPINE N/A 2019    Procedure: Injection-steroid-epidural-lumbar;  Surgeon: Yariel Ramirez MD;  Location[facility] OR;  Service: Pain Management;  Laterality: N/A;  L3-4    ESOPHAGOGASTRODUODENOSCOPY N/A 2019    Procedure: EGD (ESOPHAGOGASTRODUODENOSCOPY);  Surgeon: Anselmo Blackwell MD;  Location: [facility] ENDO;  Service: Endoscopy;  Laterality: N/A;    FRACTURE SURGERY      ankle    gastric sleve      HYSTERECTOMY      endo and fibroids    HYSTERECTOMY      JOINT REPLACEMENT Left 2018    KNEE ARTHROPLASTY Left 2018    Procedure: ARTHROPLASTY, KNEE;  Surgeon: Feliberto Cardenas MD;  Location: [facility] OR;  Service: Orthopedics;  Laterality: Left;    KNEE ARTHROPLASTY Right 2020    Procedure: ARTHROPLASTY, KNEE;  Surgeon: Feliberto Cardenas MD;  Location: [facility] OR;  Service: Orthopedics;  Laterality: Right;    KNEE ARTHROSCOPY W/ MENISCECTOMY Right 10/25/2019    Procedure: ARTHROSCOPY, KNEE, WITH MENISCECTOMY;  Surgeon: Feliberto Cardenas MD;  Location: [facility] OR;  Service: Orthopedics;  Laterality: Right;    KNEE SURGERY      LUMBAR EPIDURAL INJECTION      OOPHORECTOMY Left     LSO    RADIAL NERVE Right     RECONSTRUCTION OF MEDIAL  PATELLOFEMORAL LIGAMENT OF RIGHT KNEE Right 10/25/2019    Procedure: RECONSTRUCTION, LIGAMENT, MEDIAL PATELLOFEMORAL, RIGHT;  Surgeon: Feliberto Cardenas MD;  Location: [facility];  Service: Orthopedics;  Laterality: Right;    SINUS SURGERY      UPPER GASTROINTESTINAL ENDOSCOPY  11/27/2019    Dr. Blackwell; mild schatzki ring-dilated; gastritis; bx in process       Review of patient's allergies indicates:   Allergen Reactions    Clarithromycin Swelling and Other (See Comments)     DIFFICULTY SWALLOWING  DIFFICULTY SWALLOWING    Pcn [penicillins] Anaphylaxis    Sulfa (sulfonamide antibiotics) Hives    Wellbutrin [bupropion hcl] Shortness Of Breath and Anaphylaxis    Betadine [povidone-iodine] Hives     Swelling      Cefprozil Hives    Iodinated contrast media Hives    Latex, natural rubber Rash    Sulfamethoxazole-trimethoprim Nausea And Vomiting    Iodine Hives and Swelling    Latex Hives and Swelling       Current Facility-Administered Medications on File Prior to Encounter   Medication    lactated ringers infusion    lidocaine (PF) 10 mg/ml (1%) injection 10 mg     Current Outpatient Medications on File Prior to Encounter   Medication Sig    AIMOVIG AUTOINJECTOR 140 mg/mL AtIn     amitriptyline (ELAVIL) 25 MG tablet TAKE ONE TABLET BY MOUTH EVERY NIGHT FOR 2 WEEKS THEN INCREASE TO 2 TABLETS AT BEDTIME    cetirizine (ZYRTEC) 5 MG tablet Take 5 mg by mouth as needed.     cyanocobalamin, vitamin B-12, 1,000 mcg/mL Kit Inject 1 mL into the muscle every 21 days.    enoxaparin (LOVENOX) 120 mg/0.8 mL Syrg Inject 0.7 mLs (105 mg total) into the skin every 12 (twelve) hours.    epinephrine (EPIPEN) 0.3 mg/0.3 mL (1:1,000) AtIn 0.3 mg daily as needed.     erenumab-aooe 140 mg/mL AtIn Inject 140 mg into the skin every 28 days.    fluoxetine (PROZAC) 20 MG capsule every evening.     HYDROmorphone (DILAUDID) 2 MG tablet Take 1 tablet (2 mg total) by mouth every 4 (four) hours as needed for Pain.     hydrOXYzine pamoate (VISTARIL) 25 MG Cap TAKE ONE CAPSULE BY MOUTH 3 TIMES A DAY AS NEEDED FOR ANXIETY    lorazepam (ATIVAN) 0.5 MG tablet Take 0.5 mg by mouth every 4 (four) hours as needed.     oxyCODONE (OXYCONTIN) 10 mg 12 hr tablet Take 10 mg by mouth every 12 (twelve) hours.    oxyCODONE-acetaminophen (PERCOCET) 5-325 mg per tablet Take 1 tablet by mouth every 6 (six) hours as needed.    pantoprazole (PROTONIX) 40 MG tablet Take 1 tablet (40 mg total) by mouth 2 (two) times daily.    promethazine (PHENERGAN) 25 MG tablet Take 1 tablet (25 mg total) by mouth every 6 (six) hours as needed for Nausea. Take 1 tablet by mouth every 6 (six) hours as needed (migraine). -MAY CAUSE DROWSINESS-    tiZANidine (ZANAFLEX) 4 MG tablet Take 4 mg by mouth every 6 (six) hours as needed.     Family History     Problem Relation (Age of Onset)    Breast cancer Maternal Aunt (46)    Esophageal cancer Maternal Grandmother    Heart disease Mother, Father        Tobacco Use    Smoking status: Current Every Day Smoker     Packs/day: 0.25     Years: 20.00     Pack years: 5.00     Types: Cigarettes     Last attempt to quit: 2018     Years since quittin.6    Smokeless tobacco: Never Used   Substance and Sexual Activity    Alcohol use: Yes     Alcohol/week: 0.0 standard drinks     Comment: occasionally    Drug use: Not Currently     Types: Other-see comments     Comment: no    Sexual activity: Yes     Partners: Male     Review of Systems   Constitutional: Negative for chills, fatigue and fever.   HENT: Negative for congestion, sore throat and trouble swallowing.    Eyes: Negative for photophobia and visual disturbance.   Respiratory: Negative for cough, chest tightness, shortness of breath and wheezing.    Cardiovascular: Positive for leg swelling. Negative for chest pain and palpitations.   Gastrointestinal: Positive for nausea. Negative for abdominal pain, diarrhea and vomiting.   Endocrine: Negative for polyphagia  and polyuria.   Genitourinary: Negative for difficulty urinating, dysuria and urgency.   Musculoskeletal: Positive for arthralgias, gait problem and joint swelling. Negative for back pain.   Skin: Positive for color change and wound. Negative for pallor and rash.        Bruising to RLE, surgical wound to right ant knee   Neurological: Negative for dizziness, weakness and light-headedness.   Hematological: Negative for adenopathy.   Psychiatric/Behavioral: Negative for agitation, behavioral problems and confusion.   All other systems reviewed and are negative.    Objective:     Vital Signs (Most Recent):  Temp: 98.4 °F (36.9 °C) (06/24/20 1433)  Pulse: 75 (06/24/20 1549)  Resp: 16 (06/24/20 1727)  BP: 139/71 (06/24/20 1549)  SpO2: 100 % (06/24/20 1549) Vital Signs (24h Range):  Temp:  [98.4 °F (36.9 °C)] 98.4 °F (36.9 °C)  Pulse:  [70-75] 75  Resp:  [16-18] 16  SpO2:  [100 %] 100 %  BP: (139-148)/(67-71) 139/71     Weight: 111.1 kg (245 lb)  Body mass index is 33.23 kg/m².    Physical Exam  Vitals signs and nursing note reviewed.   Constitutional:       General: She is not in acute distress.     Appearance: Normal appearance. She is well-developed. She is not ill-appearing, toxic-appearing or diaphoretic.   HENT:      Head: Normocephalic and atraumatic.      Right Ear: External ear normal.      Left Ear: External ear normal.      Nose: Nose normal.      Mouth/Throat:      Mouth: Mucous membranes are moist.      Pharynx: Oropharynx is clear.   Eyes:      Conjunctiva/sclera: Conjunctivae normal.      Pupils: Pupils are equal, round, and reactive to light.   Neck:      Musculoskeletal: Normal range of motion and neck supple. No muscular tenderness.      Vascular: No JVD.   Cardiovascular:      Rate and Rhythm: Normal rate and regular rhythm.      Pulses: Normal pulses.      Heart sounds: Normal heart sounds. No murmur.   Pulmonary:      Effort: Pulmonary effort is normal. No respiratory distress.      Breath sounds:  Normal breath sounds. No stridor. No wheezing, rhonchi or rales.   Abdominal:      General: Bowel sounds are normal. There is no distension.      Palpations: Abdomen is soft.      Tenderness: There is no abdominal tenderness.   Musculoskeletal:      Comments: L;imited ROM to right knee secondary to pain with movement. +3 edema to right leg with scattered ecchymosis to thigh, knee and ankle. Surgical incision to ant knee CDI, no erythema or drainage   Lymphadenopathy:      Cervical: No cervical adenopathy.   Skin:     General: Skin is warm and dry.      Capillary Refill: Capillary refill takes 2 to 3 seconds.      Coloration: Skin is not jaundiced.      Findings: Bruising present. No erythema, lesion or rash.   Neurological:      General: No focal deficit present.      Mental Status: She is alert and oriented to person, place, and time.      Sensory: No sensory deficit.      Motor: No weakness.   Psychiatric:         Mood and Affect: Mood normal.         Behavior: Behavior normal.         Thought Content: Thought content normal.           CRANIAL NERVES     CN III, IV, VI   Pupils are equal, round, and reactive to light.       Significant Labs:   CBC:   Recent Labs   Lab 06/23/20  0457 06/24/20  1516   WBC 5.73 7.72   HGB 9.8* 11.2*   HCT 30.4* 34.8*    250     CMP:   Recent Labs   Lab 06/23/20  0457 06/24/20  1516    140   K 3.7 3.4*    102   CO2 28 27    96   BUN 8 7   CREATININE 0.7 0.8   CALCIUM 8.9 9.7   PROT  --  8.2   ALBUMIN  --  3.6   BILITOT  --  0.5   ALKPHOS  --  108   AST  --  23   ALT  --  21   ANIONGAP 7* 11   EGFRNONAA >60 >60     All pertinent labs within the past 24 hours have been reviewed.    Significant Imaging: I have reviewed all pertinent imaging results/findings within the past 24 hours.

## 2020-06-24 NOTE — ASSESSMENT & PLAN NOTE
Pt post op right TKA with right leg DVT  IV dilaudid prn  Cont oral pain meds - percocet and oxycontin  Consult Dr. Crisostomo for further pain regimen - pt may benefit from duragesic patch?

## 2020-06-24 NOTE — H&P
[facility] Medicine  History & Physical    Patient Name: Aleyda Costa  MRN: [id number]  Admission Date: 6/24/2020  Attending Physician: Darryl Wise MD   Primary Care Provider: Darlene Hayden MD         Patient information was obtained from patient, past medical records and ER records.     Subjective:     Principal Problem:<principal problem not specified>    Chief Complaint:   Chief Complaint   Patient presents with    Post-op Problem     R knee replacement Tuesday, unable to bear weight on RLE        HPI: Aleyda Costa is a 39-year-old female with a past medical history significant for depression, GERD, pulmonary emboli in 2018, sleep apnea, prior gastric sleeve, and nicotine dependence presenting today for increased pain to right LE. Pt underwent a right knee arthroplasty per Dr. Crisostomo on 6/16/20. Patient previously failed treatment for pulmonary emboli with Xarelto and was placed on Pradaxa 150 mg p.o. b.i.d. at discharge 6/17 per hematologist rec at that time.  Pt presented back to ED on 6/21 for increasing pain and swelling to right leg. Pt reported compliance with her pradaxa and medications; however, ultrasound of the RLE on that date demonstrated a right nonocclusive popliteal DVT. Pt was readmitted and placed on full dose lovenox. Pt was discharged home on 6/23 on lovenox and pain was controlled with oral meds at that time. Dr. Crisostomo provided pt with oral dilaudid pain script on d/c.  Pt returns to ED today c/o intractable pain to right leg that is not controlled with home meds. She denies worsening swelling to leg. She describes the pain as right knee and post lower leg location, 10/10 intensity, pressure quality, worsens with movement, assoc with nausea secondary to severe pain, improved after receiving IV dilaudid in ED. She denies fever, chills, cp and sob. Repeat US done in ED confirms DVT stable. CRP improving and WBC wnl. Dr. Crisostomo consulted from ED  and rec admission for pain control.      Past Medical History:   Diagnosis Date    Abnormal Pap smear of cervix     Arthritis     OA    Back pain     Cancer     skin cancer to back    Depression     Endometriosis     Full dentures     GERD (gastroesophageal reflux disease)     Migraine     Pulmonary embolism     Seizures     Sleep apnea     Does not use c-pap since weight loss - 170 lbs    Uterine fibroid     Wears glasses        Past Surgical History:   Procedure Laterality Date    APPENDECTOMY      CARPAL TUNNEL RELEASE Bilateral      SECTION      CHOLECYSTECTOMY      COLONOSCOPY N/A 2019    Procedure: COLONOSCOPY;  Surgeon: Anselmo Blackwell MD;  Location: [facility] ENDO;  Service: Endoscopy;  Laterality: N/A;    EPIDURAL STEROID INJECTION INTO LUMBAR SPINE N/A 2019    Procedure: Injection-steroid-epidural-lumbar;  Surgeon: Yariel Ramirez MD;  Location: [facility] OR;  Service: Pain Management;  Laterality: N/A;  L3-4    ESOPHAGOGASTRODUODENOSCOPY N/A 2019    Procedure: EGD (ESOPHAGOGASTRODUODENOSCOPY);  Surgeon: Anselmo Blackwell MD;  Location: [facility] ENDO;  Service: Endoscopy;  Laterality: N/A;    FRACTURE SURGERY      ankle    gastric sleve      HYSTERECTOMY      endo and fibroids    HYSTERECTOMY      JOINT REPLACEMENT Left 2018    KNEE ARTHROPLASTY Left 2018    Procedure: ARTHROPLASTY, KNEE;  Surgeon: Feliberto Cardenas MD;  Location: [facility] OR;  Service: Orthopedics;  Laterality: Left;    KNEE ARTHROPLASTY Right 2020    Procedure: ARTHROPLASTY, KNEE;  Surgeon: Feliberto Cardenas MD;  Location: [facility] OR;  Service: Orthopedics;  Laterality: Right;    KNEE ARTHROSCOPY W/ MENISCECTOMY Right 10/25/2019    Procedure: ARTHROSCOPY, KNEE, WITH MENISCECTOMY;  Surgeon: Feliberto Cardenas MD;  Location: [facility] OR;  Service: Orthopedics;  Laterality: Right;    KNEE SURGERY      LUMBAR EPIDURAL INJECTION      OOPHORECTOMY Left     LSO    RADIAL NERVE  Right     RECONSTRUCTION OF MEDIAL PATELLOFEMORAL LIGAMENT OF RIGHT KNEE Right 10/25/2019    Procedure: RECONSTRUCTION, LIGAMENT, MEDIAL PATELLOFEMORAL, RIGHT;  Surgeon: Feliberto Cardenas MD;  Location: [facility];  Service: Orthopedics;  Laterality: Right;    SINUS SURGERY      UPPER GASTROINTESTINAL ENDOSCOPY  11/27/2019    Dr. Blackwell; mild schatzki ring-dilated; gastritis; bx in process       Review of patient's allergies indicates:   Allergen Reactions    Clarithromycin Swelling and Other (See Comments)     DIFFICULTY SWALLOWING  DIFFICULTY SWALLOWING    Pcn [penicillins] Anaphylaxis    Sulfa (sulfonamide antibiotics) Hives    Wellbutrin [bupropion hcl] Shortness Of Breath and Anaphylaxis    Betadine [povidone-iodine] Hives     Swelling      Cefprozil Hives    Iodinated contrast media Hives    Latex, natural rubber Rash    Sulfamethoxazole-trimethoprim Nausea And Vomiting    Iodine Hives and Swelling    Latex Hives and Swelling       Current Facility-Administered Medications on File Prior to Encounter   Medication    lactated ringers infusion    lidocaine (PF) 10 mg/ml (1%) injection 10 mg     Current Outpatient Medications on File Prior to Encounter   Medication Sig    AIMOVIG AUTOINJECTOR 140 mg/mL AtIn     amitriptyline (ELAVIL) 25 MG tablet TAKE ONE TABLET BY MOUTH EVERY NIGHT FOR 2 WEEKS THEN INCREASE TO 2 TABLETS AT BEDTIME    cetirizine (ZYRTEC) 5 MG tablet Take 5 mg by mouth as needed.     cyanocobalamin, vitamin B-12, 1,000 mcg/mL Kit Inject 1 mL into the muscle every 21 days.    enoxaparin (LOVENOX) 120 mg/0.8 mL Syrg Inject 0.7 mLs (105 mg total) into the skin every 12 (twelve) hours.    epinephrine (EPIPEN) 0.3 mg/0.3 mL (1:1,000) AtIn 0.3 mg daily as needed.     erenumab-aooe 140 mg/mL AtIn Inject 140 mg into the skin every 28 days.    fluoxetine (PROZAC) 20 MG capsule every evening.     HYDROmorphone (DILAUDID) 2 MG tablet Take 1 tablet (2 mg total) by mouth every 4  (four) hours as needed for Pain.    hydrOXYzine pamoate (VISTARIL) 25 MG Cap TAKE ONE CAPSULE BY MOUTH 3 TIMES A DAY AS NEEDED FOR ANXIETY    lorazepam (ATIVAN) 0.5 MG tablet Take 0.5 mg by mouth every 4 (four) hours as needed.     oxyCODONE (OXYCONTIN) 10 mg 12 hr tablet Take 10 mg by mouth every 12 (twelve) hours.    oxyCODONE-acetaminophen (PERCOCET) 5-325 mg per tablet Take 1 tablet by mouth every 6 (six) hours as needed.    pantoprazole (PROTONIX) 40 MG tablet Take 1 tablet (40 mg total) by mouth 2 (two) times daily.    promethazine (PHENERGAN) 25 MG tablet Take 1 tablet (25 mg total) by mouth every 6 (six) hours as needed for Nausea. Take 1 tablet by mouth every 6 (six) hours as needed (migraine). -MAY CAUSE DROWSINESS-    tiZANidine (ZANAFLEX) 4 MG tablet Take 4 mg by mouth every 6 (six) hours as needed.     Family History     Problem Relation (Age of Onset)    Breast cancer Maternal Aunt (46)    Esophageal cancer Maternal Grandmother    Heart disease Mother, Father        Tobacco Use    Smoking status: Current Every Day Smoker     Packs/day: 0.25     Years: 20.00     Pack years: 5.00     Types: Cigarettes     Last attempt to quit: 2018     Years since quittin.6    Smokeless tobacco: Never Used   Substance and Sexual Activity    Alcohol use: Yes     Alcohol/week: 0.0 standard drinks     Comment: occasionally    Drug use: Not Currently     Types: Other-see comments     Comment: no    Sexual activity: Yes     Partners: Male     Review of Systems   Constitutional: Negative for chills, fatigue and fever.   HENT: Negative for congestion, sore throat and trouble swallowing.    Eyes: Negative for photophobia and visual disturbance.   Respiratory: Negative for cough, chest tightness, shortness of breath and wheezing.    Cardiovascular: Positive for leg swelling. Negative for chest pain and palpitations.   Gastrointestinal: Positive for nausea. Negative for abdominal pain, diarrhea and vomiting.    Endocrine: Negative for polyphagia and polyuria.   Genitourinary: Negative for difficulty urinating, dysuria and urgency.   Musculoskeletal: Positive for arthralgias, gait problem and joint swelling. Negative for back pain.   Skin: Positive for color change and wound. Negative for pallor and rash.        Bruising to RLE, surgical wound to right ant knee   Neurological: Negative for dizziness, weakness and light-headedness.   Hematological: Negative for adenopathy.   Psychiatric/Behavioral: Negative for agitation, behavioral problems and confusion.   All other systems reviewed and are negative.    Objective:     Vital Signs (Most Recent):  Temp: 98.4 °F (36.9 °C) (06/24/20 1433)  Pulse: 75 (06/24/20 1549)  Resp: 16 (06/24/20 1727)  BP: 139/71 (06/24/20 1549)  SpO2: 100 % (06/24/20 1549) Vital Signs (24h Range):  Temp:  [98.4 °F (36.9 °C)] 98.4 °F (36.9 °C)  Pulse:  [70-75] 75  Resp:  [16-18] 16  SpO2:  [100 %] 100 %  BP: (139-148)/(67-71) 139/71     Weight: 111.1 kg (245 lb)  Body mass index is 33.23 kg/m².    Physical Exam  Vitals signs and nursing note reviewed.   Constitutional:       General: She is not in acute distress.     Appearance: Normal appearance. She is well-developed. She is not ill-appearing, toxic-appearing or diaphoretic.   HENT:      Head: Normocephalic and atraumatic.      Right Ear: External ear normal.      Left Ear: External ear normal.      Nose: Nose normal.      Mouth/Throat:      Mouth: Mucous membranes are moist.      Pharynx: Oropharynx is clear.   Eyes:      Conjunctiva/sclera: Conjunctivae normal.      Pupils: Pupils are equal, round, and reactive to light.   Neck:      Musculoskeletal: Normal range of motion and neck supple. No muscular tenderness.      Vascular: No JVD.   Cardiovascular:      Rate and Rhythm: Normal rate and regular rhythm.      Pulses: Normal pulses.      Heart sounds: Normal heart sounds. No murmur.   Pulmonary:      Effort: Pulmonary effort is normal. No  respiratory distress.      Breath sounds: Normal breath sounds. No stridor. No wheezing, rhonchi or rales.   Abdominal:      General: Bowel sounds are normal. There is no distension.      Palpations: Abdomen is soft.      Tenderness: There is no abdominal tenderness.   Musculoskeletal:      Comments: L;imited ROM to right knee secondary to pain with movement. +3 edema to right leg with scattered ecchymosis to thigh, knee and ankle. Surgical incision to ant knee CDI, no erythema or drainage   Lymphadenopathy:      Cervical: No cervical adenopathy.   Skin:     General: Skin is warm and dry.      Capillary Refill: Capillary refill takes 2 to 3 seconds.      Coloration: Skin is not jaundiced.      Findings: Bruising present. No erythema, lesion or rash.   Neurological:      General: No focal deficit present.      Mental Status: She is alert and oriented to person, place, and time.      Sensory: No sensory deficit.      Motor: No weakness.   Psychiatric:         Mood and Affect: Mood normal.         Behavior: Behavior normal.         Thought Content: Thought content normal.           CRANIAL NERVES     CN III, IV, VI   Pupils are equal, round, and reactive to light.       Significant Labs:   CBC:   Recent Labs   Lab 06/23/20  0457 06/24/20  1516   WBC 5.73 7.72   HGB 9.8* 11.2*   HCT 30.4* 34.8*    250     CMP:   Recent Labs   Lab 06/23/20  0457 06/24/20  1516    140   K 3.7 3.4*    102   CO2 28 27    96   BUN 8 7   CREATININE 0.7 0.8   CALCIUM 8.9 9.7   PROT  --  8.2   ALBUMIN  --  3.6   BILITOT  --  0.5   ALKPHOS  --  108   AST  --  23   ALT  --  21   ANIONGAP 7* 11   EGFRNONAA >60 >60     All pertinent labs within the past 24 hours have been reviewed.    Significant Imaging: I have reviewed all pertinent imaging results/findings within the past 24 hours.    Assessment/Plan:     DVT (deep venous thrombosis)  US today reviewed - no worsening of DVT  Cont full dose lovenox  Telemetry  monitoring        Intractable pain  Pt post op right TKA with right leg DVT  IV dilaudid prn  Cont oral pain meds - percocet and oxycontin  Consult Dr. Crisostomo for further pain regimen - pt may benefit from duragesic patch?        Generalized anxiety disorder  Chronic, stable  Resume home meds      History of pulmonary embolism  Currently on lovenox for DVT      S/P total knee arthroplasty, right  Right TKA on 6/16 with Dr. Crisostomo  Consult PT/OT  Fall precautions  Pt on lovenox currently        VTE Risk Mitigation (From admission, onward)    None   Full dose lovenox          Hailey Ocampo NP  Department of Hospital Medicine   [facility]

## 2020-06-24 NOTE — ED TRIAGE NOTES
Pt to er c/o R knee pain today--had replacement done on Tuesday--denies new trauma and numbness/tingling--unable to bear weight on it

## 2020-06-24 NOTE — NURSING
Pt. Arrived to floor via wheelchair from ED to . VSS. Pt. States pain level of 6/10 radiating to her R knee. R knee incision noted with no redness, warmth; a little swelling noted. +2 pulses to RLE/LLE. Pt. Voices no other concerns at this time. Per Ed nurseTomeka, ED doctor consulted Dr. Cardenas. Will continue to monitor.

## 2020-06-24 NOTE — TELEPHONE ENCOUNTER
Called and spoke with HH nurse and she advised that patient called and is complaining or severe nausea and vomiting. She is also c/o intractable pain, unable to bear weight and wants to go to ER. Advised to send patient to ER per request. She verbalized understanding.

## 2020-06-24 NOTE — ED PROVIDER NOTES
Encounter Date: 6/24/2020    SCRIBE #1 NOTE: I, Zulema Serrano am scribing for, and in the presence of, Casey Campos MD.       History     Chief Complaint   Patient presents with    Post-op Problem     R knee replacement Tuesday, unable to bear weight on RLE     Time seen by provider: 2:54 PM on 06/24/2020    Aleyda Costa is a 39 y.o. female with a PMHx of seizures, back pain, PE with failed treatment with Xarelto, and GERD who presents to the ED with an onset of pain to the RLE s/p knee replacement x8 days ago by Dr. Wright. The patient was being treated with pradaxa but developed DVT. The patient was seen here x3 days ago for similar complaints and admitted for right leg non inclusive DVT palpable vein, and placed on Lovenox injections. Patient reports pain has increased since being discharged. States pain used to only occur upon standing on it, but now hurts all the time. The patient denies any injury. The patient denies new bruising or any other symptoms at this time. PSHx of appendectomy, knee surgery, cholecystectomy, gastric sleeve, knee arthoplasty, joint replacement, colonoscopy, and EGD.      The history is provided by the patient.     Review of patient's allergies indicates:   Allergen Reactions    Clarithromycin Swelling and Other (See Comments)     DIFFICULTY SWALLOWING  DIFFICULTY SWALLOWING    Pcn [penicillins] Anaphylaxis    Sulfa (sulfonamide antibiotics) Hives    Wellbutrin [bupropion hcl] Shortness Of Breath and Anaphylaxis    Betadine [povidone-iodine] Hives     Swelling      Cefprozil Hives    Iodinated contrast media Hives    Latex, natural rubber Rash    Sulfamethoxazole-trimethoprim Nausea And Vomiting    Iodine Hives and Swelling    Latex Hives and Swelling     Past Medical History:   Diagnosis Date    Abnormal Pap smear of cervix     Arthritis     OA    Back pain     Cancer     skin cancer to back    Depression     Endometriosis     Full dentures     GERD  (gastroesophageal reflux disease)     Migraine     Pulmonary embolism 2018    Seizures     Sleep apnea     Does not use c-pap since weight loss - 170 lbs    Uterine fibroid     Wears glasses      Past Surgical History:   Procedure Laterality Date    APPENDECTOMY      CARPAL TUNNEL RELEASE Bilateral      SECTION      CHOLECYSTECTOMY      COLONOSCOPY N/A 2019    Procedure: COLONOSCOPY;  Surgeon: Anselmo Blackwell MD;  Location: [facility] ENDO;  Service: Endoscopy;  Laterality: N/A;    EPIDURAL STEROID INJECTION INTO LUMBAR SPINE N/A 2019    Procedure: Injection-steroid-epidural-lumbar;  Surgeon: Yariel Ramirez MD;  Location: [facility] OR;  Service: Pain Management;  Laterality: N/A;  L3-4    ESOPHAGOGASTRODUODENOSCOPY N/A 2019    Procedure: EGD (ESOPHAGOGASTRODUODENOSCOPY);  Surgeon: Anselmo Blackwell MD;  Location: [facility];  Service: Endoscopy;  Laterality: N/A;    FRACTURE SURGERY      ankle    gastric sleve      HYSTERECTOMY  2014    endo and fibroids    HYSTERECTOMY      JOINT REPLACEMENT Left 2018    KNEE ARTHROPLASTY Left 2018    Procedure: ARTHROPLASTY, KNEE;  Surgeon: Feliberto Cardenas MD;  Location: [facility] OR;  Service: Orthopedics;  Laterality: Left;    KNEE ARTHROPLASTY Right 2020    Procedure: ARTHROPLASTY, KNEE;  Surgeon: Feliberto Cardenas MD;  Location: [facility] OR;  Service: Orthopedics;  Laterality: Right;    KNEE ARTHROSCOPY W/ MENISCECTOMY Right 10/25/2019    Procedure: ARTHROSCOPY, KNEE, WITH MENISCECTOMY;  Surgeon: Feliberto Cardenas MD;  Location: [facility] OR;  Service: Orthopedics;  Laterality: Right;    KNEE SURGERY      LUMBAR EPIDURAL INJECTION      OOPHORECTOMY Left     LSO    RADIAL NERVE Right     RECONSTRUCTION OF MEDIAL PATELLOFEMORAL LIGAMENT OF RIGHT KNEE Right 10/25/2019    Procedure: RECONSTRUCTION, LIGAMENT, MEDIAL PATELLOFEMORAL, RIGHT;  Surgeon: Feliberto Cardenas MD;  Location: [facility] OR;  Service: Orthopedics;   Laterality: Right;    SINUS SURGERY      UPPER GASTROINTESTINAL ENDOSCOPY  2019    Dr. Blackwell; mild schatzki ring-dilated; gastritis; bx in process     Family History   Problem Relation Age of Onset    Heart disease Mother     Heart disease Father     Esophageal cancer Maternal Grandmother     Breast cancer Maternal Aunt 46    Colon cancer Neg Hx     Stomach cancer Neg Hx     Ovarian cancer Neg Hx      Social History     Tobacco Use    Smoking status: Current Every Day Smoker     Packs/day: 0.25     Years: 20.00     Pack years: 5.00     Types: Cigarettes     Last attempt to quit: 2018     Years since quittin.6    Smokeless tobacco: Never Used   Substance Use Topics    Alcohol use: Yes     Alcohol/week: 0.0 standard drinks     Comment: occasionally    Drug use: Not Currently     Types: Other-see comments     Comment: no     Review of Systems   Constitutional: Negative for fever.   HENT: Negative for sore throat.    Respiratory: Negative for shortness of breath.    Cardiovascular: Negative for chest pain.   Gastrointestinal: Negative for nausea.   Genitourinary: Negative for dysuria.   Musculoskeletal: Positive for arthralgias and gait problem. Negative for back pain.   Skin: Negative for rash.   Neurological: Negative for weakness.   Hematological: Does not bruise/bleed easily.       Physical Exam     Initial Vitals [20 1433]   BP Pulse Resp Temp SpO2   (!) 148/67 70 18 98.4 °F (36.9 °C) 100 %      MAP       --         Physical Exam    Nursing note and vitals reviewed.  Constitutional: She appears well-developed and well-nourished.  Non-toxic appearance. No distress.   HENT:   Head: Normocephalic and atraumatic.   Eyes: EOM are normal. Pupils are equal, round, and reactive to light.   Neck: Normal range of motion. Neck supple. No neck rigidity. No JVD present.   Cardiovascular: Normal rate, regular rhythm, normal heart sounds and intact distal pulses. Exam reveals no gallop and no  friction rub.    No murmur heard.  Pulses:       Carotid pulses are 2+ on the right side and 2+ on the left side.       Radial pulses are 2+ on the right side and 2+ on the left side.        Femoral pulses are 2+ on the right side and 2+ on the left side.       Popliteal pulses are 2+ on the right side and 2+ on the left side.        Dorsalis pedis pulses are 2+ on the right side and 2+ on the left side.        Posterior tibial pulses are 2+ on the right side and 2+ on the left side.   Pulmonary/Chest: Breath sounds normal. She has no wheezes. She has no rhonchi. She has no rales.   Abdominal: Soft. Bowel sounds are normal. She exhibits no distension. There is no abdominal tenderness. There is no rebound and no guarding.   Musculoskeletal: Normal range of motion. Edema present.      Right hip: She exhibits swelling.      Comments: Swelling of the RLE with bruising from proximal knee down to the foot. Swelling and warmth of right knee without erythema. Wound intact with strips in place. No wound dehiscence. Complains of pain mainly instep of right foot below medial malleolus in area of bruising and swelling but no warmth and dorsal lateral aspect of right foot.   Neurological: She is alert and oriented to person, place, and time. She has normal strength and normal reflexes. No cranial nerve deficit or sensory deficit. She exhibits normal muscle tone. Coordination normal. GCS eye subscore is 4. GCS verbal subscore is 5. GCS motor subscore is 6.   Skin: Skin is warm and dry. No erythema.   Psychiatric: She has a normal mood and affect. Her speech is normal and behavior is normal. She is not actively hallucinating.         ED Course   Procedures  Labs Reviewed   CBC W/ AUTO DIFFERENTIAL - Abnormal; Notable for the following components:       Result Value    RBC 3.65 (*)     Hemoglobin 11.2 (*)     Hematocrit 34.8 (*)     All other components within normal limits   COMPREHENSIVE METABOLIC PANEL - Abnormal; Notable for  the following components:    Potassium 3.4 (*)     All other components within normal limits   C-REACTIVE PROTEIN - Abnormal; Notable for the following components:    CRP 90.4 (*)     All other components within normal limits   SEDIMENTATION RATE - Abnormal; Notable for the following components:    Sed Rate 110 (*)     All other components within normal limits   SARS-COV-2 RNA AMPLIFICATION, QUAL          Imaging Results          US Lower Extremity Veins Right (Final result)  Result time 06/24/20 15:47:00    Final result by Jose Salas Jr., MD (06/24/20 15:47:00)                 Impression:      Nonocclusive deep vein thrombosis is noted in the right popliteal vein.  The rest of the veins of the leg are clear of thrombus.      Electronically signed by: Jose Salas MD  Date:    06/24/2020  Time:    15:47             Narrative:    EXAMINATION:  US LOWER EXTREMITY VEINS RIGHT    CLINICAL HISTORY:  Acute embolism and thrombosis of unspecified deep veins of unspecified lower extremity    TECHNIQUE:  Duplex and color flow Doppler evaluation and graded compression of the right lower extremity veins was performed.    COMPARISON:  None    FINDINGS:  Right thigh veins: The common femoral, femoral,, upper greater saphenous, and deep femoral veins are patent and free of thrombus. The veins are normally compressible and have normal phasic flow and augmentation response.    There is nonocclusive deep vein thrombosis identified in the popliteal vein.  Thrombus in the trifurcation veins in the calf is not seen.                                 Medical Decision Making:   History:   Old Medical Records: I decided to obtain old medical records.  Initial Assessment:   Patient is a 39-year-old woman who presents emergency department with complaints of increased pain and swelling to her right leg.  She has a history of PE that failed treatment Xarelto and was placed on Pradaxa at which time she developed a DVT while on Pradaxa  after her knee replacement.  She was then hospitalized and placed on Lovenox.  She has been compliant with Lovenox and has had some small progression of her DVT with a significant increase in symptoms.  Discussed with Hospital Medicine with Orthopedics who suggest admission for pain control.  Patient did take her Lovenox already today.  Clinical Tests:   Radiological Study: Ordered and Reviewed            Scribe Attestation:   Scribe #1: I performed the above scribed service and the documentation accurately describes the services I performed. I attest to the accuracy of the note.     I, Andres Velez, personally performed the services described in this documentation. All medical record entries made by the scribe were at my direction and in my presence.  I have reviewed the chart and agree that the record reflects my personal performance and is accurate and complete. Casey Campos MD.                      Clinical Impression:       ICD-10-CM ICD-9-CM   1. DVT (deep venous thrombosis)  I82.409 453.40         Disposition:   Disposition: Placed in Observation     ED Disposition Condition    Observation                           Casey Campos MD  06/24/20 [id number]

## 2020-06-25 DIAGNOSIS — I82.431 DEEP VEIN THROMBOSIS (DVT) OF POPLITEAL VEIN OF RIGHT LOWER EXTREMITY, UNSPECIFIED CHRONICITY: Primary | ICD-10-CM

## 2020-06-25 PROBLEM — E87.6 HYPOKALEMIA: Status: ACTIVE | Noted: 2020-06-25

## 2020-06-25 PROBLEM — M79.604 PAIN OF RIGHT LOWER EXTREMITY: Status: ACTIVE | Noted: 2020-06-25

## 2020-06-25 PROBLEM — R07.9 CHEST PAIN: Status: ACTIVE | Noted: 2020-06-25

## 2020-06-25 PROBLEM — E87.6 HYPOKALEMIA: Status: RESOLVED | Noted: 2020-06-25 | Resolved: 2020-06-25

## 2020-06-25 PROBLEM — R52 INTRACTABLE PAIN: Status: RESOLVED | Noted: 2020-06-24 | Resolved: 2020-06-25

## 2020-06-25 LAB
ANION GAP SERPL CALC-SCNC: 14 MMOL/L (ref 8–16)
BASOPHILS # BLD AUTO: 0.03 K/UL (ref 0–0.2)
BASOPHILS NFR BLD: 0.3 % (ref 0–1.9)
BUN SERPL-MCNC: 8 MG/DL (ref 6–20)
CALCIUM SERPL-MCNC: 9.6 MG/DL (ref 8.7–10.5)
CHLORIDE SERPL-SCNC: 100 MMOL/L (ref 95–110)
CO2 SERPL-SCNC: 23 MMOL/L (ref 23–29)
CREAT SERPL-MCNC: 0.7 MG/DL (ref 0.5–1.4)
D DIMER PPP IA.FEU-MCNC: 2.58 MG/L FEU
DIFFERENTIAL METHOD: ABNORMAL
EOSINOPHIL # BLD AUTO: 0 K/UL (ref 0–0.5)
EOSINOPHIL NFR BLD: 0 % (ref 0–8)
ERYTHROCYTE [DISTWIDTH] IN BLOOD BY AUTOMATED COUNT: 13.6 % (ref 11.5–14.5)
EST. GFR  (AFRICAN AMERICAN): >60 ML/MIN/1.73 M^2
EST. GFR  (NON AFRICAN AMERICAN): >60 ML/MIN/1.73 M^2
GLUCOSE SERPL-MCNC: 162 MG/DL (ref 70–110)
HCT VFR BLD AUTO: 35.2 % (ref 37–48.5)
HGB BLD-MCNC: 11.4 G/DL (ref 12–16)
IMM GRANULOCYTES # BLD AUTO: 0.08 K/UL (ref 0–0.04)
IMM GRANULOCYTES NFR BLD AUTO: 0.7 % (ref 0–0.5)
LYMPHOCYTES # BLD AUTO: 0.8 K/UL (ref 1–4.8)
LYMPHOCYTES NFR BLD: 6.5 % (ref 18–48)
MCH RBC QN AUTO: 29.9 PG (ref 27–31)
MCHC RBC AUTO-ENTMCNC: 32.4 G/DL (ref 32–36)
MCV RBC AUTO: 92 FL (ref 82–98)
MONOCYTES # BLD AUTO: 0.8 K/UL (ref 0.3–1)
MONOCYTES NFR BLD: 6.3 % (ref 4–15)
NEUTROPHILS # BLD AUTO: 10.3 K/UL (ref 1.8–7.7)
NEUTROPHILS NFR BLD: 86.2 % (ref 38–73)
NRBC BLD-RTO: 0 /100 WBC
PLATELET # BLD AUTO: 216 K/UL (ref 150–350)
PMV BLD AUTO: 9.7 FL (ref 9.2–12.9)
POCT GLUCOSE: 158 MG/DL (ref 70–110)
POTASSIUM SERPL-SCNC: 3.8 MMOL/L (ref 3.5–5.1)
RBC # BLD AUTO: 3.81 M/UL (ref 4–5.4)
SODIUM SERPL-SCNC: 137 MMOL/L (ref 136–145)
TROPONIN I SERPL DL<=0.01 NG/ML-MCNC: 0.07 NG/ML (ref 0–0.03)
TROPONIN I SERPL DL<=0.01 NG/ML-MCNC: 0.12 NG/ML (ref 0–0.03)
TROPONIN I SERPL DL<=0.01 NG/ML-MCNC: 0.18 NG/ML (ref 0–0.03)
WBC # BLD AUTO: 11.91 K/UL (ref 3.9–12.7)

## 2020-06-25 PROCEDURE — 97161 PT EVAL LOW COMPLEX 20 MIN: CPT

## 2020-06-25 PROCEDURE — 96375 TX/PRO/DX INJ NEW DRUG ADDON: CPT

## 2020-06-25 PROCEDURE — 25000242 PHARM REV CODE 250 ALT 637 W/ HCPCS: Performed by: NURSE PRACTITIONER

## 2020-06-25 PROCEDURE — 25000003 PHARM REV CODE 250: Performed by: NURSE PRACTITIONER

## 2020-06-25 PROCEDURE — 25500020 PHARM REV CODE 255

## 2020-06-25 PROCEDURE — 63600175 PHARM REV CODE 636 W HCPCS: Performed by: NURSE PRACTITIONER

## 2020-06-25 PROCEDURE — 80048 BASIC METABOLIC PNL TOTAL CA: CPT

## 2020-06-25 PROCEDURE — G0378 HOSPITAL OBSERVATION PER HR: HCPCS

## 2020-06-25 PROCEDURE — 97116 GAIT TRAINING THERAPY: CPT

## 2020-06-25 PROCEDURE — 84484 ASSAY OF TROPONIN QUANT: CPT | Mod: 91

## 2020-06-25 PROCEDURE — 93005 ELECTROCARDIOGRAM TRACING: CPT

## 2020-06-25 PROCEDURE — 36415 COLL VENOUS BLD VENIPUNCTURE: CPT

## 2020-06-25 PROCEDURE — 85379 FIBRIN DEGRADATION QUANT: CPT

## 2020-06-25 PROCEDURE — 85025 COMPLETE CBC W/AUTO DIFF WBC: CPT

## 2020-06-25 PROCEDURE — 96372 THER/PROPH/DIAG INJ SC/IM: CPT | Mod: 59

## 2020-06-25 PROCEDURE — 96376 TX/PRO/DX INJ SAME DRUG ADON: CPT

## 2020-06-25 PROCEDURE — 97165 OT EVAL LOW COMPLEX 30 MIN: CPT

## 2020-06-25 RX ORDER — DIPHENHYDRAMINE HYDROCHLORIDE 50 MG/ML
50 INJECTION INTRAMUSCULAR; INTRAVENOUS ONCE
Status: DISCONTINUED | OUTPATIENT
Start: 2020-06-25 | End: 2020-06-25

## 2020-06-25 RX ORDER — ASPIRIN 325 MG
325 TABLET ORAL DAILY
Status: DISCONTINUED | OUTPATIENT
Start: 2020-06-26 | End: 2020-06-27

## 2020-06-25 RX ORDER — DIPHENHYDRAMINE HYDROCHLORIDE 50 MG/ML
50 INJECTION INTRAMUSCULAR; INTRAVENOUS ONCE
Status: COMPLETED | OUTPATIENT
Start: 2020-06-25 | End: 2020-06-25

## 2020-06-25 RX ORDER — NITROGLYCERIN 0.4 MG/1
0.4 TABLET SUBLINGUAL EVERY 5 MIN PRN
Status: DISCONTINUED | OUTPATIENT
Start: 2020-06-25 | End: 2020-06-29 | Stop reason: HOSPADM

## 2020-06-25 RX ADMIN — THERA TABS 1 TABLET: TAB at 12:06

## 2020-06-25 RX ADMIN — ENOXAPARIN SODIUM 105 MG: 120 INJECTION SUBCUTANEOUS at 09:06

## 2020-06-25 RX ADMIN — PANTOPRAZOLE SODIUM 40 MG: 40 TABLET, DELAYED RELEASE ORAL at 09:06

## 2020-06-25 RX ADMIN — ONDANSETRON 4 MG: 2 INJECTION INTRAMUSCULAR; INTRAVENOUS at 01:06

## 2020-06-25 RX ADMIN — PROMETHAZINE HYDROCHLORIDE 25 MG: 25 TABLET ORAL at 12:06

## 2020-06-25 RX ADMIN — HYDROMORPHONE HYDROCHLORIDE 1 MG: 1 INJECTION, SOLUTION INTRAMUSCULAR; INTRAVENOUS; SUBCUTANEOUS at 02:06

## 2020-06-25 RX ADMIN — HYDROMORPHONE HYDROCHLORIDE 1 MG: 1 INJECTION, SOLUTION INTRAMUSCULAR; INTRAVENOUS; SUBCUTANEOUS at 10:06

## 2020-06-25 RX ADMIN — OXYCODONE HYDROCHLORIDE AND ACETAMINOPHEN 1 TABLET: 5; 325 TABLET ORAL at 03:06

## 2020-06-25 RX ADMIN — OXYCODONE HYDROCHLORIDE 10 MG: 10 TABLET, FILM COATED, EXTENDED RELEASE ORAL at 10:06

## 2020-06-25 RX ADMIN — ONDANSETRON 4 MG: 2 INJECTION INTRAMUSCULAR; INTRAVENOUS at 06:06

## 2020-06-25 RX ADMIN — NITROGLYCERIN 0.4 MG: 0.4 TABLET, ORALLY DISINTEGRATING SUBLINGUAL at 06:06

## 2020-06-25 RX ADMIN — PANTOPRAZOLE SODIUM 40 MG: 40 TABLET, DELAYED RELEASE ORAL at 10:06

## 2020-06-25 RX ADMIN — FLUOXETINE HYDROCHLORIDE 20 MG: 20 CAPSULE ORAL at 09:06

## 2020-06-25 RX ADMIN — HYDROMORPHONE HYDROCHLORIDE 1 MG: 1 INJECTION, SOLUTION INTRAMUSCULAR; INTRAVENOUS; SUBCUTANEOUS at 06:06

## 2020-06-25 RX ADMIN — ONDANSETRON 4 MG: 2 INJECTION INTRAMUSCULAR; INTRAVENOUS at 11:06

## 2020-06-25 RX ADMIN — IOHEXOL 75 ML: 350 INJECTION, SOLUTION INTRAVENOUS at 05:06

## 2020-06-25 RX ADMIN — AMITRIPTYLINE HYDROCHLORIDE 50 MG: 50 TABLET, FILM COATED ORAL at 09:06

## 2020-06-25 RX ADMIN — HYDROMORPHONE HYDROCHLORIDE 1 MG: 1 INJECTION, SOLUTION INTRAMUSCULAR; INTRAVENOUS; SUBCUTANEOUS at 11:06

## 2020-06-25 RX ADMIN — PROMETHAZINE HYDROCHLORIDE 25 MG: 25 TABLET ORAL at 10:06

## 2020-06-25 RX ADMIN — HYDROCORTISONE SODIUM SUCCINATE 200 MG: 100 INJECTION, POWDER, FOR SOLUTION INTRAMUSCULAR; INTRAVENOUS at 12:06

## 2020-06-25 RX ADMIN — CETIRIZINE HYDROCHLORIDE 5 MG: 5 TABLET, FILM COATED ORAL at 10:06

## 2020-06-25 RX ADMIN — ENOXAPARIN SODIUM 105 MG: 120 INJECTION SUBCUTANEOUS at 10:06

## 2020-06-25 RX ADMIN — DIPHENHYDRAMINE HYDROCHLORIDE 50 MG: 50 INJECTION INTRAMUSCULAR; INTRAVENOUS at 04:06

## 2020-06-25 RX ADMIN — LORAZEPAM 0.5 MG: 0.5 TABLET ORAL at 12:06

## 2020-06-25 RX ADMIN — ACETAMINOPHEN 650 MG: 325 TABLET ORAL at 07:06

## 2020-06-25 RX ADMIN — OXYCODONE HYDROCHLORIDE 10 MG: 10 TABLET, FILM COATED, EXTENDED RELEASE ORAL at 09:06

## 2020-06-25 NOTE — SUBJECTIVE & OBJECTIVE
Interval History:       Review of Systems   Constitutional: Positive for activity change, appetite change and fatigue. Negative for chills and fever.   HENT: Negative for congestion, ear discharge, ear pain, facial swelling, sore throat and trouble swallowing.    Eyes: Negative for pain and redness.   Respiratory: Negative for cough, chest tightness, shortness of breath and wheezing.    Cardiovascular: Positive for chest pain and leg swelling. Negative for palpitations.   Gastrointestinal: Positive for nausea and vomiting. Negative for abdominal distention, abdominal pain, blood in stool, constipation and diarrhea.   Endocrine: Negative for polydipsia and polyphagia.   Genitourinary: Negative for difficulty urinating, dysuria, flank pain, hematuria and urgency.   Musculoskeletal: Positive for arthralgias, gait problem and joint swelling. Negative for back pain, neck pain and neck stiffness.   Skin: Positive for color change and wound. Negative for pallor and rash.        Bruising to RLE, surgical wound to right knee   Allergic/Immunologic: Negative for food allergies.   Neurological: Negative for seizures, facial asymmetry, speech difficulty and weakness.   Hematological: Does not bruise/bleed easily.   Psychiatric/Behavioral: Negative for agitation, behavioral problems, confusion, dysphoric mood and suicidal ideas. The patient is not nervous/anxious.    All other systems reviewed and are negative.    Objective:     Vital Signs (Most Recent):  Temp: (OUT OF ROOM.) (06/25/20 1500)  Pulse: 63 (06/25/20 1101)  Resp: 16 (06/25/20 1419)  BP: 129/60 (06/25/20 1101)  SpO2: 95 % (06/25/20 1143) Vital Signs (24h Range):  Temp:  [97.3 °F (36.3 °C)-98.3 °F (36.8 °C)] 97.8 °F (36.6 °C)  Pulse:  [59-76] 63  Resp:  [16-20] 16  SpO2:  [91 %-100 %] 95 %  BP: (119-153)/(56-71) 129/60     Weight: 111.9 kg (246 lb 9.6 oz)  Body mass index is 33.44 kg/m².    Physical Exam  Vitals signs and nursing note reviewed.   Constitutional:        General: She is not in acute distress.     Appearance: Normal appearance. She is well-developed. She is not ill-appearing, toxic-appearing or diaphoretic.   HENT:      Head: Normocephalic and atraumatic.      Mouth/Throat:      Mouth: Mucous membranes are moist.   Eyes:      General:         Right eye: No discharge.         Left eye: No discharge.      Conjunctiva/sclera: Conjunctivae normal.      Pupils: Pupils are equal, round, and reactive to light.   Neck:      Musculoskeletal: Normal range of motion and neck supple. No neck rigidity or muscular tenderness.      Vascular: No JVD.   Cardiovascular:      Rate and Rhythm: Normal rate and regular rhythm.      Pulses: Normal pulses.      Heart sounds: Normal heart sounds. No murmur.   Pulmonary:      Effort: Pulmonary effort is normal. No respiratory distress.      Breath sounds: Normal breath sounds. No stridor. No wheezing, rhonchi or rales.   Abdominal:      General: Bowel sounds are normal. There is no distension.      Palpations: Abdomen is soft.      Tenderness: There is no abdominal tenderness. There is no guarding.   Genitourinary:     Comments: Not examined  Musculoskeletal:         General: Swelling present. No tenderness.      Right lower leg: Edema present.      Comments: L;imited ROM to right knee secondary to pain with movement. +3 edema to right leg with scattered ecchymosis to thigh, knee and ankle. Surgical incision to ant knee CDI, no erythema or drainage   Skin:     General: Skin is warm and dry.      Capillary Refill: Capillary refill takes 2 to 3 seconds.      Coloration: Skin is not jaundiced.      Findings: Bruising present. No erythema, lesion or rash.      Comments: Bruising right lower extremity including foot   Neurological:      General: No focal deficit present.      Mental Status: She is alert and oriented to person, place, and time. Mental status is at baseline.      Cranial Nerves: No cranial nerve deficit.      Sensory: No sensory  deficit.   Psychiatric:         Mood and Affect: Mood normal.         Behavior: Behavior normal.         Thought Content: Thought content normal.         Judgment: Judgment normal.           CRANIAL NERVES     CN III, IV, VI   Pupils are equal, round, and reactive to light.    Past Medical History:   Diagnosis Date    Abnormal Pap smear of cervix     Arthritis     OA    Back pain     Cancer     skin cancer to back    Depression     Endometriosis     Full dentures     GERD (gastroesophageal reflux disease)     Migraine     Pulmonary embolism     Seizures     Sleep apnea     Does not use c-pap since weight loss - 170 lbs    Uterine fibroid     Wears glasses        Past Surgical History:   Procedure Laterality Date    APPENDECTOMY      CARPAL TUNNEL RELEASE Bilateral      SECTION      CHOLECYSTECTOMY      COLONOSCOPY N/A 2019    Procedure: COLONOSCOPY;  Surgeon: Anselmo Blackwell MD;  Location: [facility] ENDO;  Service: Endoscopy;  Laterality: N/A;    EPIDURAL STEROID INJECTION INTO LUMBAR SPINE N/A 2019    Procedure: Injection-steroid-epidural-lumbar;  Surgeon: Yariel Ramirez MD;  Location: [facility] OR;  Service: Pain Management;  Laterality: N/A;  L3-4    ESOPHAGOGASTRODUODENOSCOPY N/A 2019    Procedure: EGD (ESOPHAGOGASTRODUODENOSCOPY);  Surgeon: Anselmo Blackwell MD;  Location: [facility] ENDO;  Service: Endoscopy;  Laterality: N/A;    FRACTURE SURGERY      ankle    gastric sleve      HYSTERECTOMY      endo and fibroids    HYSTERECTOMY      JOINT REPLACEMENT Left 2018    KNEE ARTHROPLASTY Left 2018    Procedure: ARTHROPLASTY, KNEE;  Surgeon: Feliberto Cardenas MD;  Location: [facility] OR;  Service: Orthopedics;  Laterality: Left;    KNEE ARTHROPLASTY Right 2020    Procedure: ARTHROPLASTY, KNEE;  Surgeon: Feliberto Cardenas MD;  Location: [facility] OR;  Service: Orthopedics;  Laterality: Right;    KNEE ARTHROSCOPY W/ MENISCECTOMY Right 10/25/2019     Procedure: ARTHROSCOPY, KNEE, WITH MENISCECTOMY;  Surgeon: Feliberto Cardenas MD;  Location: [facility] OR;  Service: Orthopedics;  Laterality: Right;    KNEE SURGERY      LUMBAR EPIDURAL INJECTION      OOPHORECTOMY Left     LSO    RADIAL NERVE Right     RECONSTRUCTION OF MEDIAL PATELLOFEMORAL LIGAMENT OF RIGHT KNEE Right 10/25/2019    Procedure: RECONSTRUCTION, LIGAMENT, MEDIAL PATELLOFEMORAL, RIGHT;  Surgeon: Feliberto Cardenas MD;  Location: [facility] OR;  Service: Orthopedics;  Laterality: Right;    SINUS SURGERY      UPPER GASTROINTESTINAL ENDOSCOPY  11/27/2019    Dr. Blackwell; mild schatzki ring-dilated; gastritis; bx in process       Review of patient's allergies indicates:   Allergen Reactions    Clarithromycin Swelling and Other (See Comments)     DIFFICULTY SWALLOWING  DIFFICULTY SWALLOWING    Pcn [penicillins] Anaphylaxis    Sulfa (sulfonamide antibiotics) Hives    Wellbutrin [bupropion hcl] Shortness Of Breath and Anaphylaxis    Betadine [povidone-iodine] Hives     Swelling      Cefprozil Hives    Iodinated contrast media Hives    Latex, natural rubber Rash    Sulfamethoxazole-trimethoprim Nausea And Vomiting    Iodine Hives and Swelling    Latex Hives and Swelling       Current Facility-Administered Medications on File Prior to Encounter   Medication    lactated ringers infusion    lidocaine (PF) 10 mg/ml (1%) injection 10 mg     Current Outpatient Medications on File Prior to Encounter   Medication Sig    amitriptyline (ELAVIL) 25 MG tablet TAKE ONE TABLET BY MOUTH EVERY NIGHT FOR 2 WEEKS THEN INCREASE TO 2 TABLETS AT BEDTIME    cetirizine (ZYRTEC) 5 MG tablet Take 5 mg by mouth as needed.     cyanocobalamin, vitamin B-12, 1,000 mcg/mL Kit Inject 1 mL into the muscle every 21 days.    enoxaparin (LOVENOX) 120 mg/0.8 mL Syrg Inject 0.7 mLs (105 mg total) into the skin every 12 (twelve) hours.    erenumab-aooe 140 mg/mL AtIn Inject 140 mg into the skin every 28 days.    fluoxetine  (PROZAC) 20 MG capsule every evening.     HYDROmorphone (DILAUDID) 2 MG tablet Take 1 tablet (2 mg total) by mouth every 4 (four) hours as needed for Pain.    hydrOXYzine pamoate (VISTARIL) 25 MG Cap TAKE ONE CAPSULE BY MOUTH 3 TIMES A DAY AS NEEDED FOR ANXIETY    lorazepam (ATIVAN) 0.5 MG tablet Take 0.5 mg by mouth every 4 (four) hours as needed.     oxyCODONE (OXYCONTIN) 10 mg 12 hr tablet Take 10 mg by mouth every 12 (twelve) hours.    oxyCODONE-acetaminophen (PERCOCET) 5-325 mg per tablet Take 1 tablet by mouth every 6 (six) hours as needed.    pantoprazole (PROTONIX) 40 MG tablet Take 1 tablet (40 mg total) by mouth 2 (two) times daily.    promethazine (PHENERGAN) 25 MG tablet Take 1 tablet (25 mg total) by mouth every 6 (six) hours as needed for Nausea. Take 1 tablet by mouth every 6 (six) hours as needed (migraine). -MAY CAUSE DROWSINESS-    tiZANidine (ZANAFLEX) 4 MG tablet Take 4 mg by mouth every 6 (six) hours as needed.    AIMOVIG AUTOINJECTOR 140 mg/mL AtIn     epinephrine (EPIPEN) 0.3 mg/0.3 mL (1:1,000) AtIn 0.3 mg daily as needed.      Family History     Problem Relation (Age of Onset)    Breast cancer Maternal Aunt (46)    Esophageal cancer Maternal Grandmother    Heart disease Mother, Father        Tobacco Use    Smoking status: Current Every Day Smoker     Packs/day: 0.25     Years: 20.00     Pack years: 5.00     Types: Cigarettes     Last attempt to quit: 2018     Years since quittin.6    Smokeless tobacco: Never Used   Substance and Sexual Activity    Alcohol use: Yes     Alcohol/week: 0.0 standard drinks     Comment: occasionally    Drug use: Not Currently     Types: Other-see comments     Comment: no    Sexual activity: Yes     Partners: Male     Review of Systems   Constitutional: Negative for chills, fatigue and fever.   HENT: Negative for congestion, sore throat and trouble swallowing.    Eyes: Negative for photophobia and visual disturbance.   Respiratory: Negative  for cough, chest tightness, shortness of breath and wheezing.    Cardiovascular: Positive for leg swelling. Negative for chest pain and palpitations.   Gastrointestinal: Positive for nausea. Negative for abdominal pain, diarrhea and vomiting.   Endocrine: Negative for polyphagia and polyuria.   Genitourinary: Negative for difficulty urinating, dysuria and urgency.   Musculoskeletal: Positive for arthralgias, gait problem and joint swelling. Negative for back pain.   Skin: Positive for color change and wound. Negative for pallor and rash.        Bruising to RLE, surgical wound to right ant knee   Neurological: Negative for dizziness, weakness and light-headedness.   Hematological: Negative for adenopathy.   Psychiatric/Behavioral: Negative for agitation, behavioral problems and confusion.   All other systems reviewed and are negative.    Objective:     Vital Signs (Most Recent):  Temp: (OUT OF ROOM.) (06/25/20 1500)  Pulse: 63 (06/25/20 1101)  Resp: 16 (06/25/20 1419)  BP: 129/60 (06/25/20 1101)  SpO2: 95 % (06/25/20 1143) Vital Signs (24h Range):  Temp:  [97.3 °F (36.3 °C)-98.3 °F (36.8 °C)] 97.8 °F (36.6 °C)  Pulse:  [59-76] 63  Resp:  [16-20] 16  SpO2:  [91 %-100 %] 95 %  BP: (119-153)/(56-71) 129/60     Weight: 111.9 kg (246 lb 9.6 oz)  Body mass index is 33.44 kg/m².    Physical Exam  Vitals signs and nursing note reviewed.   Constitutional:       General: She is not in acute distress.     Appearance: Normal appearance. She is well-developed. She is not ill-appearing, toxic-appearing or diaphoretic.   HENT:      Head: Normocephalic and atraumatic.      Right Ear: External ear normal.      Left Ear: External ear normal.      Nose: Nose normal.      Mouth/Throat:      Mouth: Mucous membranes are moist.      Pharynx: Oropharynx is clear.   Eyes:      Conjunctiva/sclera: Conjunctivae normal.      Pupils: Pupils are equal, round, and reactive to light.   Neck:      Musculoskeletal: Normal range of motion and neck  supple. No muscular tenderness.      Vascular: No JVD.   Cardiovascular:      Rate and Rhythm: Normal rate and regular rhythm.      Pulses: Normal pulses.      Heart sounds: Normal heart sounds. No murmur.   Pulmonary:      Effort: Pulmonary effort is normal. No respiratory distress.      Breath sounds: Normal breath sounds. No stridor. No wheezing, rhonchi or rales.   Abdominal:      General: Bowel sounds are normal. There is no distension.      Palpations: Abdomen is soft.      Tenderness: There is no abdominal tenderness.   Musculoskeletal:      Comments: L;imited ROM to right knee secondary to pain with movement. +3 edema to right leg with scattered ecchymosis to thigh, knee and ankle. Surgical incision to ant knee CDI, no erythema or drainage   Lymphadenopathy:      Cervical: No cervical adenopathy.   Skin:     General: Skin is warm and dry.      Capillary Refill: Capillary refill takes 2 to 3 seconds.      Coloration: Skin is not jaundiced.      Findings: Bruising present. No erythema, lesion or rash.   Neurological:      General: No focal deficit present.      Mental Status: She is alert and oriented to person, place, and time.      Sensory: No sensory deficit.      Motor: No weakness.   Psychiatric:         Mood and Affect: Mood normal.         Behavior: Behavior normal.         Thought Content: Thought content normal.           CRANIAL NERVES     CN III, IV, VI   Pupils are equal, round, and reactive to light.       Significant Labs:   CBC:   Recent Labs   Lab 06/24/20  1516 06/25/20  0524   WBC 7.72 11.91   HGB 11.2* 11.4*   HCT 34.8* 35.2*    216     CMP:   Recent Labs   Lab 06/24/20  1516 06/25/20  0524    137   K 3.4* 3.8    100   CO2 27 23   GLU 96 162*   BUN 7 8   CREATININE 0.8 0.7   CALCIUM 9.7 9.6   PROT 8.2  --    ALBUMIN 3.6  --    BILITOT 0.5  --    ALKPHOS 108  --    AST 23  --    ALT 21  --    ANIONGAP 11 14   EGFRNONAA >60 >60     All pertinent labs within the past 24 hours  have been reviewed.    Significant Imaging: I have reviewed all pertinent imaging results/findings within the past 24 hours.     Significant Labs:   CBC:   Recent Labs   Lab 06/24/20  1516 06/25/20  0524   WBC 7.72 11.91   HGB 11.2* 11.4*   HCT 34.8* 35.2*    216     CMP:   Recent Labs   Lab 06/24/20  1516 06/25/20  0524    137   K 3.4* 3.8    100   CO2 27 23   GLU 96 162*   BUN 7 8   CREATININE 0.8 0.7   CALCIUM 9.7 9.6   PROT 8.2  --    ALBUMIN 3.6  --    BILITOT 0.5  --    ALKPHOS 108  --    AST 23  --    ALT 21  --    ANIONGAP 11 14   EGFRNONAA >60 >60     All pertinent labs within the past 24 hours have been reviewed.    Significant Imaging: I have reviewed all pertinent imaging results/findings within the past 24 hours.

## 2020-06-25 NOTE — PLAN OF CARE
POC reviewed and updated. Verbalized understanding. Patient monitored throughout shift. Pt co of chest pain. Prn pain medications administered as ordered. Afrebrile and VSS. Left knee incision cdi. No co of pain to left calf area. Pt had a few episodes of vomiting. And prn meds were administered as ordered for NV. Bed left in low position, call light within reach, and bed alarm on. All needs met. Will continue to monitor.

## 2020-06-25 NOTE — NURSING
Pt has increasing and continual co of chest pain accompanied by vomiting. Ileana NP was contacted again. NP ordered dilaudid frequency change from Q6H to Q4H and oxygen. And both were administered as well as prn zofran.

## 2020-06-25 NOTE — PROGRESS NOTES
Patient seen earlier today and reported chest pain earlier this morning but none this afternoon.  Patient with mildly elevated troponin and elevated D-dimer.  Patient initially felt possible pulmonary emboli as patient recently diagnosed with DVT of the lower extremity.  However CTA of the chest came back negative.  Troponins remain mildly elevated but are trending upward.    Patient discussed with Dr. Wise.  Consult cardiology-Dr. Kaushik Guthrie.    1850 patient discussed with staff nurse who  reports patient having intermittent complaints of chest pain.  Will repeat EKG.  Monitor patient closely for any signs of decline.  Continue to trend troponins. Consult called into Dr Guthrie.  Patient remains medically stable at this time.

## 2020-06-25 NOTE — PROGRESS NOTES
[facility]  Orthopedics  Progress Note    Patient Name: Aleyda Costa  MRN: [id number]  Admission Date: 6/24/2020  Hospital Length of Stay: 0 days  Attending Provider: Darryl Wise MD  Primary Care Provider: Darlene Hayden MD    Subjective:     Principal Problem:<principal problem not specified>    Principal Orthopedic Problem: R TKA    Interval History: Pain more in the foot and ankle. Cant really put weight on it without a lot of pain and it is really bruised and swollen    Review of patient's allergies indicates:   Allergen Reactions    Clarithromycin Swelling and Other (See Comments)     DIFFICULTY SWALLOWING  DIFFICULTY SWALLOWING    Pcn [penicillins] Anaphylaxis    Sulfa (sulfonamide antibiotics) Hives    Wellbutrin [bupropion hcl] Shortness Of Breath and Anaphylaxis    Betadine [povidone-iodine] Hives     Swelling      Cefprozil Hives    Iodinated contrast media Hives    Latex, natural rubber Rash    Sulfamethoxazole-trimethoprim Nausea And Vomiting    Iodine Hives and Swelling    Latex Hives and Swelling       Current Facility-Administered Medications   Medication    acetaminophen tablet 650 mg    amitriptyline tablet 50 mg    bisacodyL suppository 10 mg    cetirizine tablet 5 mg    enoxaparin injection 105 mg    FLUoxetine capsule 20 mg    HYDROmorphone injection 1 mg    LORazepam tablet 0.5 mg    magnesium oxide tablet 800 mg    magnesium oxide tablet 800 mg    ondansetron injection 4 mg    oxyCODONE 12 hr tablet 10 mg    oxyCODONE-acetaminophen 5-325 mg per tablet 1 tablet    pantoprazole EC tablet 40 mg    polyethylene glycol packet 17 g    potassium chloride 10% oral solution 40 mEq    potassium chloride 10% oral solution 40 mEq    potassium chloride 10% oral solution 60 mEq    potassium, sodium phosphates 280-160-250 mg packet 2 packet    potassium, sodium phosphates 280-160-250 mg packet 2 packet    potassium, sodium phosphates 280-160-250 mg  packet 2 packet    promethazine tablet 25 mg    sodium chloride 0.9% flush 10 mL    tiZANidine tablet 4 mg     Facility-Administered Medications Ordered in Other Encounters   Medication    lactated ringers infusion    lidocaine (PF) 10 mg/ml (1%) injection 10 mg     Objective:     Vital Signs (Most Recent):  Temp: 98.3 °F (36.8 °C) (06/25/20 0730)  Pulse: (!) 59 (06/25/20 0730)  Resp: 18 (06/25/20 0730)  BP: 138/62 (06/25/20 0730)  SpO2: 97 % (06/25/20 0730) Vital Signs (24h Range):  Temp:  [97.3 °F (36.3 °C)-98.4 °F (36.9 °C)] 98.3 °F (36.8 °C)  Pulse:  [59-76] 59  Resp:  [16-20] 18  SpO2:  [93 %-100 %] 97 %  BP: (119-153)/(56-71) 138/62     Weight: 111.9 kg (246 lb 9.6 oz)  Height: 6' (182.9 cm)  Body mass index is 33.44 kg/m².      Intake/Output Summary (Last 24 hours) at 6/25/2020 0739  Last data filed at 6/25/2020 0606  Gross per 24 hour   Intake 120 ml   Output 300 ml   Net -180 ml       General    Nursing note and vitals reviewed.  Constitutional: She is oriented to person, place, and time. She appears well-developed and well-nourished. No distress.   HENT:   Head: Normocephalic and atraumatic.   Eyes: EOM are normal.   Cardiovascular: Normal rate.    Pulmonary/Chest: Effort normal.   Abdominal: Soft.   Neurological: She is alert and oriented to person, place, and time.   Psychiatric: Her behavior is normal.           Right Knee Exam     Inspection   Erythema: absent  Swelling: present  Effusion: present    Range of Motion   Extension: 0   Flexion: 100     Other   Sensation: normal    Comments:  Dsg c/d/i    Left Knee Exam   Left knee exam is normal.    Muscle Strength   Right Lower Extremity   Quadriceps:  4/5     Vascular Exam     Right Pulses  Dorsalis Pedis:      2+              Significant Labs:   CBC:   Recent Labs   Lab 06/24/20  1516 06/25/20  0524   WBC 7.72 11.91   HGB 11.2* 11.4*   HCT 34.8* 35.2*    216     CMP:   Recent Labs   Lab 06/24/20  1516 06/25/20  0524    137   K 3.4*  3.8    100   CO2 27 23   GLU 96 162*   BUN 7 8   CREATININE 0.8 0.7   CALCIUM 9.7 9.6   PROT 8.2  --    ALBUMIN 3.6  --    BILITOT 0.5  --    ALKPHOS 108  --    AST 23  --    ALT 21  --    ANIONGAP 11 14   EGFRNONAA >60 >60     Coagulation:   Recent Labs   Lab 06/24/20  1516   LABPROT 11.4   INR 1.0     CRP:   Recent Labs   Lab 06/24/20  1516   CRP 90.4*     All pertinent labs within the past 24 hours have been reviewed.    Significant Imaging: None    Assessment/Plan:     S/P total knee arthroplasty, right  Pain control.   Nausea control.  Prop up ankle and ice.  Start PT.           Feliberto Cardenas MD  Orthopedics  [facility]

## 2020-06-25 NOTE — NURSING
Pt co of chest pain & tightness across mid chest. Joann Tejeda NP was contacted and made aware. NP place stat orders for troponin labs and 12-lead EKG as well as cardiac monitoring. Labs resulted normal and ekg sinus tyrone.

## 2020-06-25 NOTE — PROGRESS NOTES
[facility] Medicine  Progress Note    Patient Name: Aleyda Costa  MRN: [id number]  Patient Class: OP- Observation   Admission Date: 6/24/2020  Length of Stay: 0 days  Attending Physician: Darryl Wise MD  Primary Care Provider: Darlene Hayden MD      Subjective:     Principal Problem:  Intractable right lower extremity pain, DVT right lower extremity, failed outpatient oral anticoagulant, history of prior pulmonary emboli the past, chest pain, elevated D-dimer, elevated troponin, recent right total knee arthroplasty    HPI:  This  is a 39-year-old female with a past medical history significant for depression, GERD, pulmonary emboli in 2018, sleep apnea, prior gastric sleeve, and nicotine dependence presenting today for increased pain to right LE. Pt underwent a right knee arthroplasty per Dr. Crisostomo on 6/16/20. Patient previously failed treatment for pulmonary emboli with Xarelto and was placed on Pradaxa 150 mg p.o. b.i.d. at discharge 6/17 per hematologist rec at that time.  Pt presented back to ED on 6/21 for increasing pain and swelling to right leg. Pt reported compliance with her pradaxa and medications; however, ultrasound of the RLE on that date demonstrated a right nonocclusive popliteal DVT. Pt was readmitted and placed on full dose lovenox. Pt was discharged home on 6/23 on lovenox and pain was controlled with oral meds at that time. Dr. Crisostomo provided pt with oral dilaudid pain script on d/c.  Patient returns to ED today complaints of intractable pain to right leg that is not controlled with home meds. She denies worsening swelling to leg. She describes the pain as right knee and post lower leg location, 10/10 intensity, pressure quality, worsens with movement, assoc with nausea secondary to severe pain, improved after receiving IV dilaudid in ED. She denies fever, chills, cp and sob. Repeat US done in ED confirms DVT stable. CRP improving and WBC wnl. Dr. Crisostomo  consulted from ED and rec admission for pain control.      Overview/Hospital Course:  The patient was monitored closely during her stay.  She received p.r.n. pain medication.  Orthopedics were consulted.  Patient had complaints of chest pain during her stay.  She was kept on continuous telemetry monitoring where she remains sinus rhythm.  Her troponins were trended.  Her initial troponin was negative however her 2nd troponin was elevated.  Patient was given full-dose aspirin.  She was noted to have an elevated D-dimer and a CTA of the chest was ordered.  Patient was continued on full-dose Lovenox 1 milligram/kilogram subcu q.12 hours.  A right lower extremity arterial ultrasound was ordered to evaluate patient's circulation due to her ongoing severe right lower extremity pain.     Interval History:  Patient with complaints of chest pain.  Patient also to found to have elevated D-dimer and also new elevated troponin.  CTA of the chest currently pending to evaluate for pulmonary emboli.  Patient remains on full-dose subcu Lovenox.  Trend troponins.  If CTA is negative for pulmonary emboli then will need to consult Cardiology for cardiac workup.  Also patient with continued elevated troponins and if found to have pulmonary emboli will need echocardiogram to evaluate for possible cardiac strain.       Review of Systems   Constitutional: Positive for activity change, appetite change and fatigue. Negative for chills and fever.   HENT: Negative for congestion, ear discharge, ear pain, facial swelling, sore throat and trouble swallowing.    Eyes: Negative for pain and redness.   Respiratory: Negative for cough, chest tightness, shortness of breath and wheezing.    Cardiovascular: Positive for chest pain and leg swelling. Negative for palpitations.   Gastrointestinal: Positive for nausea and vomiting. Negative for abdominal distention, abdominal pain, blood in stool, constipation and diarrhea.   Endocrine: Negative for  polydipsia and polyphagia.   Genitourinary: Negative for difficulty urinating, dysuria, flank pain, hematuria and urgency.   Musculoskeletal: Positive for arthralgias, gait problem and joint swelling. Negative for back pain, neck pain and neck stiffness.   Skin: Positive for color change and wound. Negative for pallor and rash.        Bruising to RLE, surgical wound to right knee   Allergic/Immunologic: Negative for food allergies.   Neurological: Negative for seizures, facial asymmetry, speech difficulty and weakness.   Hematological: Does not bruise/bleed easily.   Psychiatric/Behavioral: Negative for agitation, behavioral problems, confusion, dysphoric mood and suicidal ideas. The patient is not nervous/anxious.    All other systems reviewed and are negative.    Objective:     Vital Signs (Most Recent):  Temp: (OUT OF ROOM.) (06/25/20 1500)  Pulse: 63 (06/25/20 1101)  Resp: 16 (06/25/20 1419)  BP: 129/60 (06/25/20 1101)  SpO2: 95 % (06/25/20 1143) Vital Signs (24h Range):  Temp:  [97.3 °F (36.3 °C)-98.3 °F (36.8 °C)] 97.8 °F (36.6 °C)  Pulse:  [59-76] 63  Resp:  [16-20] 16  SpO2:  [91 %-100 %] 95 %  BP: (119-153)/(56-71) 129/60     Weight: 111.9 kg (246 lb 9.6 oz)  Body mass index is 33.44 kg/m².    Physical Exam  Vitals signs and nursing note reviewed.   Constitutional:       General: She is not in acute distress.     Appearance: Normal appearance. She is well-developed. She is not ill-appearing, toxic-appearing or diaphoretic.   HENT:      Head: Normocephalic and atraumatic.      Mouth/Throat:      Mouth: Mucous membranes are moist.   Eyes:      General:         Right eye: No discharge.         Left eye: No discharge.      Conjunctiva/sclera: Conjunctivae normal.      Pupils: Pupils are equal, round, and reactive to light.   Neck:      Musculoskeletal: Normal range of motion and neck supple. No neck rigidity or muscular tenderness.      Vascular: No JVD.   Cardiovascular:      Rate and Rhythm: Normal rate and  regular rhythm.      Pulses: Normal pulses.      Heart sounds: Normal heart sounds. No murmur.   Pulmonary:      Effort: Pulmonary effort is normal. No respiratory distress.      Breath sounds: Normal breath sounds. No stridor. No wheezing, rhonchi or rales.   Abdominal:      General: Bowel sounds are normal. There is no distension.      Palpations: Abdomen is soft.      Tenderness: There is no abdominal tenderness. There is no guarding.   Genitourinary:     Comments: Not examined  Musculoskeletal:         General: Swelling present. No tenderness.      Right lower leg: Edema present.      Comments: L;imited ROM to right knee secondary to pain with movement. +3 edema to right leg with scattered ecchymosis to thigh, knee and ankle. Surgical incision to ant knee CDI, no erythema or drainage   Skin:     General: Skin is warm and dry.      Capillary Refill: Capillary refill takes 2 to 3 seconds.      Coloration: Skin is not jaundiced.      Findings: Bruising present. No erythema, lesion or rash.      Comments: Bruising right lower extremity including foot   Neurological:      General: No focal deficit present.      Mental Status: She is alert and oriented to person, place, and time. Mental status is at baseline.      Cranial Nerves: No cranial nerve deficit.      Sensory: No sensory deficit.   Psychiatric:         Mood and Affect: Mood normal.         Behavior: Behavior normal.         Thought Content: Thought content normal.         Judgment: Judgment normal.           CRANIAL NERVES     CN III, IV, VI   Pupils are equal, round, and reactive to light.    Past Medical History:   Diagnosis Date    Abnormal Pap smear of cervix     Arthritis     OA    Back pain     Cancer     skin cancer to back    Depression     Endometriosis     Full dentures     GERD (gastroesophageal reflux disease)     Migraine     Pulmonary embolism 2018    Seizures     Sleep apnea     Does not use c-pap since weight loss - 170 lbs     Uterine fibroid     Wears glasses        Past Surgical History:   Procedure Laterality Date    APPENDECTOMY      CARPAL TUNNEL RELEASE Bilateral      SECTION      CHOLECYSTECTOMY      COLONOSCOPY N/A 2019    Procedure: COLONOSCOPY;  Surgeon: Anselmo Blackwell MD;  Location: [facility] ENDO;  Service: Endoscopy;  Laterality: N/A;    EPIDURAL STEROID INJECTION INTO LUMBAR SPINE N/A 2019    Procedure: Injection-steroid-epidural-lumbar;  Surgeon: Yariel Ramirez MD;  Location: [facility] OR;  Service: Pain Management;  Laterality: N/A;  L3-4    ESOPHAGOGASTRODUODENOSCOPY N/A 2019    Procedure: EGD (ESOPHAGOGASTRODUODENOSCOPY);  Surgeon: Anselmo Blackwell MD;  Location: [facility] ENDO;  Service: Endoscopy;  Laterality: N/A;    FRACTURE SURGERY      ankle    gastric sleve      HYSTERECTOMY      endo and fibroids    HYSTERECTOMY      JOINT REPLACEMENT Left 2018    KNEE ARTHROPLASTY Left 2018    Procedure: ARTHROPLASTY, KNEE;  Surgeon: Feliberto Cardenas MD;  Location: [facility] OR;  Service: Orthopedics;  Laterality: Left;    KNEE ARTHROPLASTY Right 2020    Procedure: ARTHROPLASTY, KNEE;  Surgeon: Feliberto Cardenas MD;  Location: [facility] OR;  Service: Orthopedics;  Laterality: Right;    KNEE ARTHROSCOPY W/ MENISCECTOMY Right 10/25/2019    Procedure: ARTHROSCOPY, KNEE, WITH MENISCECTOMY;  Surgeon: Feliberto Cardenas MD;  Location: [facility] OR;  Service: Orthopedics;  Laterality: Right;    KNEE SURGERY      LUMBAR EPIDURAL INJECTION      OOPHORECTOMY Left     LSO    RADIAL NERVE Right     RECONSTRUCTION OF MEDIAL PATELLOFEMORAL LIGAMENT OF RIGHT KNEE Right 10/25/2019    Procedure: RECONSTRUCTION, LIGAMENT, MEDIAL PATELLOFEMORAL, RIGHT;  Surgeon: Feliberto Cardenas MD;  Location: [facility] OR;  Service: Orthopedics;  Laterality: Right;    SINUS SURGERY      UPPER GASTROINTESTINAL ENDOSCOPY  2019    Dr. Blackwell; mild schatzki ring-dilated; gastritis; bx in process        Review of patient's allergies indicates:   Allergen Reactions    Clarithromycin Swelling and Other (See Comments)     DIFFICULTY SWALLOWING  DIFFICULTY SWALLOWING    Pcn [penicillins] Anaphylaxis    Sulfa (sulfonamide antibiotics) Hives    Wellbutrin [bupropion hcl] Shortness Of Breath and Anaphylaxis    Betadine [povidone-iodine] Hives     Swelling      Cefprozil Hives    Iodinated contrast media Hives    Latex, natural rubber Rash    Sulfamethoxazole-trimethoprim Nausea And Vomiting    Iodine Hives and Swelling    Latex Hives and Swelling       Current Facility-Administered Medications on File Prior to Encounter   Medication    lactated ringers infusion    lidocaine (PF) 10 mg/ml (1%) injection 10 mg     Current Outpatient Medications on File Prior to Encounter   Medication Sig    amitriptyline (ELAVIL) 25 MG tablet TAKE ONE TABLET BY MOUTH EVERY NIGHT FOR 2 WEEKS THEN INCREASE TO 2 TABLETS AT BEDTIME    cetirizine (ZYRTEC) 5 MG tablet Take 5 mg by mouth as needed.     cyanocobalamin, vitamin B-12, 1,000 mcg/mL Kit Inject 1 mL into the muscle every 21 days.    enoxaparin (LOVENOX) 120 mg/0.8 mL Syrg Inject 0.7 mLs (105 mg total) into the skin every 12 (twelve) hours.    erenumab-aooe 140 mg/mL AtIn Inject 140 mg into the skin every 28 days.    fluoxetine (PROZAC) 20 MG capsule every evening.     HYDROmorphone (DILAUDID) 2 MG tablet Take 1 tablet (2 mg total) by mouth every 4 (four) hours as needed for Pain.    hydrOXYzine pamoate (VISTARIL) 25 MG Cap TAKE ONE CAPSULE BY MOUTH 3 TIMES A DAY AS NEEDED FOR ANXIETY    lorazepam (ATIVAN) 0.5 MG tablet Take 0.5 mg by mouth every 4 (four) hours as needed.     oxyCODONE (OXYCONTIN) 10 mg 12 hr tablet Take 10 mg by mouth every 12 (twelve) hours.    oxyCODONE-acetaminophen (PERCOCET) 5-325 mg per tablet Take 1 tablet by mouth every 6 (six) hours as needed.    pantoprazole (PROTONIX) 40 MG tablet Take 1 tablet (40 mg total) by mouth 2 (two)  times daily.    promethazine (PHENERGAN) 25 MG tablet Take 1 tablet (25 mg total) by mouth every 6 (six) hours as needed for Nausea. Take 1 tablet by mouth every 6 (six) hours as needed (migraine). -MAY CAUSE DROWSINESS-    tiZANidine (ZANAFLEX) 4 MG tablet Take 4 mg by mouth every 6 (six) hours as needed.    AIMOVIG AUTOINJECTOR 140 mg/mL AtIn     epinephrine (EPIPEN) 0.3 mg/0.3 mL (1:1,000) AtIn 0.3 mg daily as needed.      Family History     Problem Relation (Age of Onset)    Breast cancer Maternal Aunt (46)    Esophageal cancer Maternal Grandmother    Heart disease Mother, Father        Tobacco Use    Smoking status: Current Every Day Smoker     Packs/day: 0.25     Years: 20.00     Pack years: 5.00     Types: Cigarettes     Last attempt to quit: 2018     Years since quittin.6    Smokeless tobacco: Never Used   Substance and Sexual Activity    Alcohol use: Yes     Alcohol/week: 0.0 standard drinks     Comment: occasionally    Drug use: Not Currently     Types: Other-see comments     Comment: no    Sexual activity: Yes     Partners: Male     Review of Systems   Constitutional: Negative for chills, fatigue and fever.   HENT: Negative for congestion, sore throat and trouble swallowing.    Eyes: Negative for photophobia and visual disturbance.   Respiratory: Negative for cough, chest tightness, shortness of breath and wheezing.    Cardiovascular: Positive for leg swelling. Negative for chest pain and palpitations.   Gastrointestinal: Positive for nausea. Negative for abdominal pain, diarrhea and vomiting.   Endocrine: Negative for polyphagia and polyuria.   Genitourinary: Negative for difficulty urinating, dysuria and urgency.   Musculoskeletal: Positive for arthralgias, gait problem and joint swelling. Negative for back pain.   Skin: Positive for color change and wound. Negative for pallor and rash.        Bruising to RLE, surgical wound to right ant knee   Neurological: Negative for dizziness,  weakness and light-headedness.   Hematological: Negative for adenopathy.   Psychiatric/Behavioral: Negative for agitation, behavioral problems and confusion.   All other systems reviewed and are negative.    Objective:     Vital Signs (Most Recent):  Temp: (OUT OF ROOM.) (06/25/20 1500)  Pulse: 63 (06/25/20 1101)  Resp: 16 (06/25/20 1419)  BP: 129/60 (06/25/20 1101)  SpO2: 95 % (06/25/20 1143) Vital Signs (24h Range):  Temp:  [97.3 °F (36.3 °C)-98.3 °F (36.8 °C)] 97.8 °F (36.6 °C)  Pulse:  [59-76] 63  Resp:  [16-20] 16  SpO2:  [91 %-100 %] 95 %  BP: (119-153)/(56-71) 129/60     Weight: 111.9 kg (246 lb 9.6 oz)  Body mass index is 33.44 kg/m².    Physical Exam  Vitals signs and nursing note reviewed.   Constitutional:       General: She is not in acute distress.     Appearance: Normal appearance. She is well-developed. She is not ill-appearing, toxic-appearing or diaphoretic.   HENT:      Head: Normocephalic and atraumatic.      Right Ear: External ear normal.      Left Ear: External ear normal.      Nose: Nose normal.      Mouth/Throat:      Mouth: Mucous membranes are moist.      Pharynx: Oropharynx is clear.   Eyes:      Conjunctiva/sclera: Conjunctivae normal.      Pupils: Pupils are equal, round, and reactive to light.   Neck:      Musculoskeletal: Normal range of motion and neck supple. No muscular tenderness.      Vascular: No JVD.   Cardiovascular:      Rate and Rhythm: Normal rate and regular rhythm.      Pulses: Normal pulses.      Heart sounds: Normal heart sounds. No murmur.   Pulmonary:      Effort: Pulmonary effort is normal. No respiratory distress.      Breath sounds: Normal breath sounds. No stridor. No wheezing, rhonchi or rales.   Abdominal:      General: Bowel sounds are normal. There is no distension.      Palpations: Abdomen is soft.      Tenderness: There is no abdominal tenderness.   Musculoskeletal:      Comments: L;imited ROM to right knee secondary to pain with movement. +3 edema to right  leg with scattered ecchymosis to thigh, knee and ankle. Surgical incision to ant knee CDI, no erythema or drainage   Lymphadenopathy:      Cervical: No cervical adenopathy.   Skin:     General: Skin is warm and dry.      Capillary Refill: Capillary refill takes 2 to 3 seconds.      Coloration: Skin is not jaundiced.      Findings: Bruising present. No erythema, lesion or rash.   Neurological:      General: No focal deficit present.      Mental Status: She is alert and oriented to person, place, and time.      Sensory: No sensory deficit.      Motor: No weakness.   Psychiatric:         Mood and Affect: Mood normal.         Behavior: Behavior normal.         Thought Content: Thought content normal.           CRANIAL NERVES     CN III, IV, VI   Pupils are equal, round, and reactive to light.       Significant Labs:   CBC:   Recent Labs   Lab 06/24/20  1516 06/25/20 0524   WBC 7.72 11.91   HGB 11.2* 11.4*   HCT 34.8* 35.2*    216     CMP:   Recent Labs   Lab 06/24/20 1516 06/25/20  0524    137   K 3.4* 3.8    100   CO2 27 23   GLU 96 162*   BUN 7 8   CREATININE 0.8 0.7   CALCIUM 9.7 9.6   PROT 8.2  --    ALBUMIN 3.6  --    BILITOT 0.5  --    ALKPHOS 108  --    AST 23  --    ALT 21  --    ANIONGAP 11 14   EGFRNONAA >60 >60     All pertinent labs within the past 24 hours have been reviewed.    Significant Imaging: I have reviewed all pertinent imaging results/findings within the past 24 hours.     Significant Labs:   CBC:   Recent Labs   Lab 06/24/20 1516 06/25/20 0524   WBC 7.72 11.91   HGB 11.2* 11.4*   HCT 34.8* 35.2*    216     CMP:   Recent Labs   Lab 06/24/20 1516 06/25/20  0524    137   K 3.4* 3.8    100   CO2 27 23   GLU 96 162*   BUN 7 8   CREATININE 0.8 0.7   CALCIUM 9.7 9.6   PROT 8.2  --    ALBUMIN 3.6  --    BILITOT 0.5  --    ALKPHOS 108  --    AST 23  --    ALT 21  --    ANIONGAP 11 14   EGFRNONAA >60 >60     All pertinent labs within the past 24 hours have been  reviewed.    Significant Imaging: I have reviewed all pertinent imaging results/findings within the past 24 hours.      Assessment/Plan:      Chest pain  With elevated troponin-  Patient also with elevated D-dimer-CTA of the chest results pending to see if patient may have pulmonary emboli  Telemetry  Add full-dose aspirin  Trend troponins-if remained elevated and patient has a negative CTA then will consult Cardiology for workup  Trend troponins and monitor closely for any signs of decline  EKG shows no significant ST elevation or depression at this time      DVT (deep venous thrombosis)  Repeat right lower extremity venous ultrasound showed- no worsening of DVT  Continue full dose lovenox-Hematology previously consulted due to failed oral anticoagulant and patient recommended to be on subq full-dose Lovenox for DVT  Telemetry monitoring  Evaluate patient for pulmonary emboli due to complaints of chest pain        Generalized anxiety disorder  Continue home medications      History of pulmonary embolism  Currently on lovenox for right lower extremity DVT  Repeat CTA of the chest in progress      Normocytic anemia  Multivitamin      S/P total knee arthroplasty, right  Right TKA on 6/16 with Dr. Crisostomo  Continue physical therapy and occupational therapy once medically cleared  Fall precautions  Continue full-dose anticoagulation with subq Lovenox        VTE Risk Mitigation (From admission, onward)         Ordered     enoxaparin injection 105 mg  Every 12 hours      06/24/20 1816     IP VTE HIGH RISK PATIENT  Once      06/24/20 1815                Time spent seeing patient( greater than 1/2 spent in direct contact) :  38 minutes      SCHUYLER Rivera  Department of Hospital Medicine   [facility]

## 2020-06-25 NOTE — ASSESSMENT & PLAN NOTE
Right TKA on 6/16 with Dr. Crisostomo  Continue physical therapy and occupational therapy once medically cleared  Fall precautions  Continue full-dose anticoagulation with subq Lovenox

## 2020-06-25 NOTE — SUBJECTIVE & OBJECTIVE
Principal Problem:<principal problem not specified>    Principal Orthopedic Problem: R TKA    Interval History: Pain more in the foot and ankle. Cant really put weight on it without a lot of pain and it is really bruised and swollen    Review of patient's allergies indicates:   Allergen Reactions    Clarithromycin Swelling and Other (See Comments)     DIFFICULTY SWALLOWING  DIFFICULTY SWALLOWING    Pcn [penicillins] Anaphylaxis    Sulfa (sulfonamide antibiotics) Hives    Wellbutrin [bupropion hcl] Shortness Of Breath and Anaphylaxis    Betadine [povidone-iodine] Hives     Swelling      Cefprozil Hives    Iodinated contrast media Hives    Latex, natural rubber Rash    Sulfamethoxazole-trimethoprim Nausea And Vomiting    Iodine Hives and Swelling    Latex Hives and Swelling       Current Facility-Administered Medications   Medication    acetaminophen tablet 650 mg    amitriptyline tablet 50 mg    bisacodyL suppository 10 mg    cetirizine tablet 5 mg    enoxaparin injection 105 mg    FLUoxetine capsule 20 mg    HYDROmorphone injection 1 mg    LORazepam tablet 0.5 mg    magnesium oxide tablet 800 mg    magnesium oxide tablet 800 mg    ondansetron injection 4 mg    oxyCODONE 12 hr tablet 10 mg    oxyCODONE-acetaminophen 5-325 mg per tablet 1 tablet    pantoprazole EC tablet 40 mg    polyethylene glycol packet 17 g    potassium chloride 10% oral solution 40 mEq    potassium chloride 10% oral solution 40 mEq    potassium chloride 10% oral solution 60 mEq    potassium, sodium phosphates 280-160-250 mg packet 2 packet    potassium, sodium phosphates 280-160-250 mg packet 2 packet    potassium, sodium phosphates 280-160-250 mg packet 2 packet    promethazine tablet 25 mg    sodium chloride 0.9% flush 10 mL    tiZANidine tablet 4 mg     Facility-Administered Medications Ordered in Other Encounters   Medication    lactated ringers infusion    lidocaine (PF) 10 mg/ml (1%) injection 10 mg      Objective:     Vital Signs (Most Recent):  Temp: 98.3 °F (36.8 °C) (06/25/20 0730)  Pulse: (!) 59 (06/25/20 0730)  Resp: 18 (06/25/20 0730)  BP: 138/62 (06/25/20 0730)  SpO2: 97 % (06/25/20 0730) Vital Signs (24h Range):  Temp:  [97.3 °F (36.3 °C)-98.4 °F (36.9 °C)] 98.3 °F (36.8 °C)  Pulse:  [59-76] 59  Resp:  [16-20] 18  SpO2:  [93 %-100 %] 97 %  BP: (119-153)/(56-71) 138/62     Weight: 111.9 kg (246 lb 9.6 oz)  Height: 6' (182.9 cm)  Body mass index is 33.44 kg/m².      Intake/Output Summary (Last 24 hours) at 6/25/2020 0739  Last data filed at 6/25/2020 0606  Gross per 24 hour   Intake 120 ml   Output 300 ml   Net -180 ml       General    Nursing note and vitals reviewed.  Constitutional: She is oriented to person, place, and time. She appears well-developed and well-nourished. No distress.   HENT:   Head: Normocephalic and atraumatic.   Eyes: EOM are normal.   Cardiovascular: Normal rate.    Pulmonary/Chest: Effort normal.   Abdominal: Soft.   Neurological: She is alert and oriented to person, place, and time.   Psychiatric: Her behavior is normal.           Right Knee Exam     Inspection   Erythema: absent  Swelling: present  Effusion: present    Range of Motion   Extension: 0   Flexion: 100     Other   Sensation: normal    Comments:  Dsg c/d/i    Left Knee Exam   Left knee exam is normal.    Muscle Strength   Right Lower Extremity   Quadriceps:  4/5     Vascular Exam     Right Pulses  Dorsalis Pedis:      2+              Significant Labs:   CBC:   Recent Labs   Lab 06/24/20  1516 06/25/20  0524   WBC 7.72 11.91   HGB 11.2* 11.4*   HCT 34.8* 35.2*    216     CMP:   Recent Labs   Lab 06/24/20  1516 06/25/20  0524    137   K 3.4* 3.8    100   CO2 27 23   GLU 96 162*   BUN 7 8   CREATININE 0.8 0.7   CALCIUM 9.7 9.6   PROT 8.2  --    ALBUMIN 3.6  --    BILITOT 0.5  --    ALKPHOS 108  --    AST 23  --    ALT 21  --    ANIONGAP 11 14   EGFRNONAA >60 >60     Coagulation:   Recent Labs   Lab  06/24/20  1516   LABPROT 11.4   INR 1.0     CRP:   Recent Labs   Lab 06/24/20  1516   CRP 90.4*     All pertinent labs within the past 24 hours have been reviewed.    Significant Imaging: None

## 2020-06-25 NOTE — ASSESSMENT & PLAN NOTE
With elevated troponin-  Patient also with elevated D-dimer-CTA of the chest results pending to see if patient may have pulmonary emboli  Telemetry  Add full-dose aspirin  Trend troponins-if remained elevated and patient has a negative CTA then will consult Cardiology for workup  Trend troponins and monitor closely for any signs of decline  EKG shows no significant ST elevation or depression at this time

## 2020-06-25 NOTE — PLAN OF CARE
CM completed discharge assessment with pt bedside. Pt verified information on facesheet as correct. Denies POA/LW. Reports living at listed address with spouse. PCP is Dr. Hayden. Pharm is [facility]. Reports being active with [facility] (pt disclosure completed). DME- rw, wc, raised toliet, glucometer. Reports being modified independent with ADLs. Reports that transportation to [facility] is provided by her spouse or father. Stated one of them will be providing transportation home upon DC. Verified insurance as [employer]. Pt was recently discharged from this facility on June 23rd with lovenox injections. Pt states she returned to hospital for pain control. DC plan is home with continued home health with [facility]. CM following.        06/25/20 1200   Discharge Assessment   Assessment Type Discharge Planning Assessment   Confirmed/corrected address and phone number on facesheet? Yes   Assessment information obtained from? Patient   Communicated expected length of stay with patient/caregiver yes   Prior to hospitilization cognitive status: Alert/Oriented   Prior to hospitalization functional status: Assistive Equipment   Current cognitive status: Alert/Oriented   Current Functional Status: Assistive Equipment   Lives With spouse   Able to Return to Prior Arrangements yes   Is patient able to care for self after discharge? Yes   Patient's perception of discharge disposition home health   Readmission Within the Last 30 Days previous discharge plan unsuccessful   If yes, most recent facility name: [facility]   Patient currently being followed by outpatient case management? No   Patient currently receives any other outside agency services? Yes   Name and contact number of agency or person providing outside services [facility]   Is it the patient/care giver preference to resume care with the current outside agency? Yes   Equipment Currently Used at Home walker, rolling;wheelchair;raised toilet;glucometer   Do you have  "any problems affording any of your prescribed medications? No   Is the patient taking medications as prescribed? yes   Does the patient have transportation home? Yes   Transportation Anticipated family or friend will provide   Does the patient receive services at the [facility]? No   Discharge Plan A Home Health   Discharge Plan B Home with family   DME Needed Upon Discharge  none   Patient/Family in Agreement with Plan yes   Readmission Questionnaire   At the time of your discharge, did someone talk to you about what your health problems were? Yes   At the time of discharge, did someone talk to you about what to watch out for regarding worsening of your health problem? Yes   At the time of discharge, did someone talk to you about what to do if you experienced worsening of your health problem? Yes   At the time of discharge, did someone talk to you about which medication to take when you left the hospital and which ones to stop taking? Yes   At the time of discharge, did someone talk to you about when and where to follow up with a doctor after you left the hospital? Yes   What do you believe caused you to be sick enough to be re-admitted? "pain in my knee was unbearable"   How often do you need to have someone help you when you read instructions, pamphlets, or other written material from your doctor or pharmacy? Never   Do you have problems taking your medications as prescribed? No   Do you have any problems affording any of  your prescribed medications? No   Do you have problems obtaining/receiving your medications? No   Does the patient have transportation to healthcare appointments? Yes   Does your caregiver provide all the help you need? Yes   Are you currently feeling confused? No   Are you currently having problems thinking? No   Are you currently having memory problems? No     "

## 2020-06-25 NOTE — ASSESSMENT & PLAN NOTE
Repeat right lower extremity venous ultrasound showed- no worsening of DVT  Continue full dose lovenox-Hematology previously consulted due to failed oral anticoagulant and patient recommended to be on subq full-dose Lovenox for DVT  Telemetry monitoring  Evaluate patient for pulmonary emboli due to complaints of chest pain       No

## 2020-06-25 NOTE — HOSPITAL COURSE
The patient was monitored closely during her stay.  She received p.r.n. pain medication.  Orthopedics were consulted.  Physical therapy and occupational therapy were consulted.  Patient's pain medication was titrated.  Patient had complaints of chest pain during her stay.  She was kept on continuous telemetry monitoring where she remains sinus rhythm.  Her troponins were trended.  Her initial troponin was negative however her 2nd troponin was elevated.  Patient was given full-dose aspirin.  She was noted to have an elevated D-dimer and a CTA of the chest was ordered.  Patient was continued on full-dose Lovenox 1 milligram/kilogram subcu q.12 hours.  A right lower extremity arterial ultrasound was ordered to evaluate patient's circulation due to her ongoing severe right lower extremity pain.  Patient CTA of the chest was negative for pulmonary emboli.  Her troponins continued to trend upward.  Cardiology was consulted.  Right lower extremity arterial ultrasound showed no significant stenosis.  Patient later reported mid epigastric reproducible pain.  She reported intermittent use of NSAIDs at home prior to admission.  Her home b.i.d. Protonix was continued.  She was also placed on Carafate.  Patient with noted prior history of gastric sleeve in 2014 and was encouraged to continue her bariatric diet.  Patient's troponins continue to trend upward and patient had persistent chest pain with ambulation.  Patient was discharged to [facility] for coronary angiogram on 06/29/2020.

## 2020-06-25 NOTE — PT/OT/SLP PROGRESS
Physical Therapy      Patient Name:  Aleyda Costa   MRN:  [id number]    Patient not seen today secondary to per RN patient going for CTA to rule out PE. Will follow-up 06/26/20.    Chandrika Simental, PT

## 2020-06-25 NOTE — HPI
Aleyda Costa is a 39-year-old female with a past medical history significant for depression, GERD, pulmonary emboli in 2018, sleep apnea, prior gastric sleeve, and nicotine dependence presenting today for increased pain to right LE. Pt underwent a right knee arthroplasty per Dr. Crisostomo on 6/16/20. Patient previously failed treatment for pulmonary emboli with Xarelto and was placed on Pradaxa 150 mg p.o. b.i.d. at discharge 6/17 per hematologist rec at that time.  Pt presented back to ED on 6/21 for increasing pain and swelling to right leg. Pt reported compliance with her pradaxa and medications; however, ultrasound of the RLE on that date demonstrated a right nonocclusive popliteal DVT. Pt was readmitted and placed on full dose lovenox. Pt was discharged home on 6/23 on lovenox and pain was controlled with oral meds at that time. Dr. Crisostomo provided pt with oral dilaudid pain script on d/c.  Pt returns to ED today c/o intractable pain to right leg that is not controlled with home meds. She denies worsening swelling to leg. She describes the pain as right knee and post lower leg location, 10/10 intensity, pressure quality, worsens with movement, assoc with nausea secondary to severe pain, improved after receiving IV dilaudid in ED. She denies fever, chills, cp and sob. Repeat US done in ED confirms DVT stable. CRP improving and WBC wnl. Dr. Crisostomo consulted from ED and rec admission for pain control.

## 2020-06-25 NOTE — PT/OT/SLP EVAL
Physical Therapy Evaluation    Patient Name:  Aleyda Costa   MRN:  [id number]    Recommendations:     Discharge Recommendations:  home health PT   Discharge Equipment Recommendations: none   Barriers to discharge: None    Assessment:     Aleyda Costa is a 39 y.o. female admitted with a medical diagnosis of <principal problem not specified>.  She presents with the following impairments/functional limitations:  weakness, impaired endurance, impaired functional mobilty, gait instability, impaired balance, decreased lower extremity function, pain, edema, orthopedic precautions. Patient is agreeable to PT evaluation. She had a R TKA on 6/16/2020. She was discharged POD #1 and this is her second hospital admission since being discharged post-operatively. She reports her  PT told her to come to the hospital because she was having nausea and chest pain. Patient also notes she was unable to weight bear on her RLE. Today she requires SBA for transfers. She ambulated 20' in room with RW and CGA putting a little weight through her RLE. She is agreeable to sitting up in chair with RLE elevated and ice applied.     Rehab Prognosis: Fair; patient would benefit from acute skilled PT services to address these deficits and reach maximum level of function.    Recent Surgery: * No surgery found *      Plan:     During this hospitalization, patient to be seen BID to address the identified rehab impairments via gait training, therapeutic activities, therapeutic exercises and progress toward the following goals:    · Plan of Care Expires:  07/25/20    Subjective     Chief Complaint: R knee pain  Patient/Family Comments/goals: decrease pain and nausea  Pain/Comfort:  · Pain Rating 1: 6/10  · Location - Side 1: Right  · Location 1: knee  · Pain Addressed 1: Reposition, Distraction    Patients cultural, spiritual, Yazidism conflicts given the current situation:      Living Environment:  Patient lives with her spouse and  children with 5 FIONA with left rail  Prior to admission, patients level of function was decreased. She has not been putting weight through her RLE. She did have HHPT 1x between admissions.  Equipment used at home: walker, rolling, grab bar, wheelchair, raised toilet.  DME owned (not currently used): none.  Upon discharge, patient will have assistance from family.    Objective:     Communicated with ANTHONY Pardo prior to session.  Patient found HOB elevated with bed alarm, telemetry  upon PT entry to room.    General Precautions: Standard, fall   Orthopedic Precautions:RLE weight bearing as tolerated   Braces: N/A     Exams:  · RLE ROM: ~-30 degrees of knee extension; 90 degrees of knee flexion  · LLE Strength: WFL    Functional Mobility:  · Bed Mobility:     · Supine to Sit: stand by assistance  · Transfers:     · Sit to Stand:  stand by assistance with rolling walker  · Gait: 20' RW, CGA, putting some weight through RLE  · Balance: Good    Therapeutic Activities and Exercises:   Patient was educated on the importance of OOB activity during hospital stay, safe transfers and ambulation    AM-PAC 6 CLICK MOBILITY  Total Score:23     Patient left up in chair with all lines intact, call button in reach, chair alarm on and RN notified.    GOALS:   Multidisciplinary Problems     Physical Therapy Goals        Problem: Physical Therapy Goal    Goal Priority Disciplines Outcome Goal Variances Interventions   Physical Therapy Goal     PT, PT/OT      Description: Goals to be met by: 2020     Patient will increase functional independence with mobility by performin. Supine to sit with Supervision  2. Sit to stand transfer with Supervision  3. Bed to chair transfer with Supervision using Rolling Walker  4. Gait  x 250 feet with Supervision using Rolling Walker.   5. Ascend/descend 5 stairs with left Handrails Supervision                     History:     Past Medical History:   Diagnosis Date    Abnormal Pap smear of  cervix     Arthritis     OA    Back pain     Cancer     skin cancer to back    Depression     Endometriosis     Full dentures     GERD (gastroesophageal reflux disease)     Migraine     Pulmonary embolism     Seizures     Sleep apnea     Does not use c-pap since weight loss - 170 lbs    Uterine fibroid     Wears glasses        Past Surgical History:   Procedure Laterality Date    APPENDECTOMY      CARPAL TUNNEL RELEASE Bilateral      SECTION      CHOLECYSTECTOMY      COLONOSCOPY N/A 2019    Procedure: COLONOSCOPY;  Surgeon: Anselmo Blackwell MD;  Location: [facility] ENDO;  Service: Endoscopy;  Laterality: N/A;    EPIDURAL STEROID INJECTION INTO LUMBAR SPINE N/A 2019    Procedure: Injection-steroid-epidural-lumbar;  Surgeon: Yariel Ramirez MD;  [facility] OR;  Service: Pain Management;  Laterality: N/A;  L3-4    ESOPHAGOGASTRODUODENOSCOPY N/A 2019    Procedure: EGD (ESOPHAGOGASTRODUODENOSCOPY);  Surgeon: Anselmo Blackwell MD;  Location: [facility] ENDO;  Service: Endoscopy;  Laterality: N/A;    FRACTURE SURGERY      ankle    gastric sleve      HYSTERECTOMY      endo and fibroids    HYSTERECTOMY      JOINT REPLACEMENT Left 2018    KNEE ARTHROPLASTY Left 2018    Procedure: ARTHROPLASTY, KNEE;  Surgeon: Feliberto Cardenas MD;  Location: [facility] OR;  Service: Orthopedics;  Laterality: Left;    KNEE ARTHROPLASTY Right 2020    Procedure: ARTHROPLASTY, KNEE;  Surgeon: Feliberto Cardenas MD;  Location: [facility] OR;  Service: Orthopedics;  Laterality: Right;    KNEE ARTHROSCOPY W/ MENISCECTOMY Right 10/25/2019    Procedure: ARTHROSCOPY, KNEE, WITH MENISCECTOMY;  Surgeon: Feliberto Cardenas MD;  Location: [facility] OR;  Service: Orthopedics;  Laterality: Right;    KNEE SURGERY      LUMBAR EPIDURAL INJECTION      OOPHORECTOMY Left     LSO    RADIAL NERVE Right     RECONSTRUCTION OF MEDIAL PATELLOFEMORAL LIGAMENT OF RIGHT KNEE Right 10/25/2019    Procedure:  RECONSTRUCTION, LIGAMENT, MEDIAL PATELLOFEMORAL, RIGHT;  Surgeon: Feliberto Cardenas MD;  [facility];  Service: Orthopedics;  Laterality: Right;    SINUS SURGERY      UPPER GASTROINTESTINAL ENDOSCOPY  11/27/2019    Dr. Blackwell; mild schatzki ring-dilated; gastritis; bx in process       Time Tracking:     PT Received On: 06/25/20  PT Start Time: 0928     PT Stop Time: 0952  PT Total Time (min): 24 min     Billable Minutes: Evaluation 10 and Gait Training 14      Chandrika Simental, PT  06/25/2020

## 2020-06-25 NOTE — NURSING
Pt had another episode of vomiting prn phenergan was administered. Pt was also flushed and clammy. VS and BG was checked. Afrebrile and glucose at 158.

## 2020-06-26 DIAGNOSIS — M25.561 RIGHT KNEE PAIN, UNSPECIFIED CHRONICITY: Primary | ICD-10-CM

## 2020-06-26 PROBLEM — Z98.84 HISTORY OF BARIATRIC SURGERY: Status: ACTIVE | Noted: 2020-06-26

## 2020-06-26 PROBLEM — K44.9 HIATAL HERNIA: Status: ACTIVE | Noted: 2020-06-26

## 2020-06-26 PROBLEM — J98.11 ATELECTASIS: Status: ACTIVE | Noted: 2020-06-26

## 2020-06-26 PROBLEM — R79.89 ELEVATED TROPONIN: Status: ACTIVE | Noted: 2020-06-26

## 2020-06-26 LAB
ANION GAP SERPL CALC-SCNC: 11 MMOL/L (ref 8–16)
AORTIC ROOT ANNULUS: 2.95 CM
AORTIC VALVE CUSP SEPERATION: 2.14 CM
ASCENDING AORTA: 2.71 CM
AV INDEX (PROSTH): 0.69
AV MEAN GRADIENT: 6 MMHG
AV PEAK GRADIENT: 10 MMHG
AV VALVE AREA: 2.27 CM2
AV VELOCITY RATIO: 0.62
BASOPHILS # BLD AUTO: 0.03 K/UL (ref 0–0.2)
BASOPHILS NFR BLD: 0.2 % (ref 0–1.9)
BSA FOR ECHO PROCEDURE: 2.38 M2
BUN SERPL-MCNC: 8 MG/DL (ref 6–20)
CALCIUM SERPL-MCNC: 9.5 MG/DL (ref 8.7–10.5)
CHLORIDE SERPL-SCNC: 100 MMOL/L (ref 95–110)
CK MB SERPL-MCNC: 3.7 NG/ML (ref 0.1–6.5)
CK MB SERPL-RTO: 5.4 % (ref 0–5)
CK SERPL-CCNC: 68 U/L (ref 20–180)
CK SERPL-CCNC: 68 U/L (ref 20–180)
CO2 SERPL-SCNC: 26 MMOL/L (ref 23–29)
CREAT SERPL-MCNC: 0.7 MG/DL (ref 0.5–1.4)
CV ECHO LV RWT: 0.4 CM
DIFFERENTIAL METHOD: ABNORMAL
DOP CALC AO PEAK VEL: 1.62 M/S
DOP CALC AO VTI: 35.69 CM
DOP CALC LVOT AREA: 3.3 CM2
DOP CALC LVOT DIAMETER: 2.05 CM
DOP CALC LVOT PEAK VEL: 1 M/S
DOP CALC LVOT STROKE VOLUME: 80.99 CM3
DOP CALCLVOT PEAK VEL VTI: 24.55 CM
E WAVE DECELERATION TIME: 268.1 MSEC
E/A RATIO: 1.22
E/E' RATIO: 11.2 M/S
ECHO LV POSTERIOR WALL: 0.99 CM (ref 0.6–1.1)
EOSINOPHIL # BLD AUTO: 0 K/UL (ref 0–0.5)
EOSINOPHIL NFR BLD: 0.3 % (ref 0–8)
ERYTHROCYTE [DISTWIDTH] IN BLOOD BY AUTOMATED COUNT: 13.7 % (ref 11.5–14.5)
EST. GFR  (AFRICAN AMERICAN): >60 ML/MIN/1.73 M^2
EST. GFR  (NON AFRICAN AMERICAN): >60 ML/MIN/1.73 M^2
FRACTIONAL SHORTENING: 33 % (ref 28–44)
GLUCOSE SERPL-MCNC: 128 MG/DL (ref 70–110)
HCT VFR BLD AUTO: 34.5 % (ref 37–48.5)
HGB BLD-MCNC: 11.2 G/DL (ref 12–16)
IMM GRANULOCYTES # BLD AUTO: 0.18 K/UL (ref 0–0.04)
IMM GRANULOCYTES NFR BLD AUTO: 1.4 % (ref 0–0.5)
INTERVENTRICULAR SEPTUM: 1.03 CM (ref 0.6–1.1)
IVRT: 111.32 MSEC
LA MAJOR: 4.91 CM
LA MINOR: 4.67 CM
LA WIDTH: 3.36 CM
LEFT ATRIUM SIZE: 4.05 CM
LEFT ATRIUM VOLUME INDEX: 23.8 ML/M2
LEFT ATRIUM VOLUME: 55.37 CM3
LEFT INTERNAL DIMENSION IN SYSTOLE: 3.32 CM (ref 2.1–4)
LEFT VENTRICLE DIASTOLIC VOLUME INDEX: 48.99 ML/M2
LEFT VENTRICLE DIASTOLIC VOLUME: 113.97 ML
LEFT VENTRICLE MASS INDEX: 77 G/M2
LEFT VENTRICLE SYSTOLIC VOLUME INDEX: 19.3 ML/M2
LEFT VENTRICLE SYSTOLIC VOLUME: 44.88 ML
LEFT VENTRICULAR INTERNAL DIMENSION IN DIASTOLE: 4.92 CM (ref 3.5–6)
LEFT VENTRICULAR MASS: 179.61 G
LIPASE SERPL-CCNC: 37 U/L (ref 4–60)
LV LATERAL E/E' RATIO: 12 M/S
LV SEPTAL E/E' RATIO: 10.5 M/S
LYMPHOCYTES # BLD AUTO: 1.3 K/UL (ref 1–4.8)
LYMPHOCYTES NFR BLD: 9.5 % (ref 18–48)
MCH RBC QN AUTO: 30.9 PG (ref 27–31)
MCHC RBC AUTO-ENTMCNC: 32.5 G/DL (ref 32–36)
MCV RBC AUTO: 95 FL (ref 82–98)
MONOCYTES # BLD AUTO: 0.9 K/UL (ref 0.3–1)
MONOCYTES NFR BLD: 6.7 % (ref 4–15)
MV A" WAVE DURATION": 77 MSEC
MV PEAK A VEL: 0.69 M/S
MV PEAK E VEL: 0.84 M/S
MV STENOSIS PRESSURE HALF TIME: 108.37 MS
MV VALVE AREA P 1/2 METHOD: 2.03 CM2
NEUTROPHILS # BLD AUTO: 10.8 K/UL (ref 1.8–7.7)
NEUTROPHILS NFR BLD: 81.9 % (ref 38–73)
NRBC BLD-RTO: 0 /100 WBC
PISA MRMAX VEL: 0.05 M/S
PISA RADIUS: 0.37 CM
PISA TR MAX VEL: 2.84 M/S
PISA TR VN NYQUIST: 0 M/S
PLATELET # BLD AUTO: 179 K/UL (ref 150–350)
PMV BLD AUTO: 10.3 FL (ref 9.2–12.9)
POTASSIUM SERPL-SCNC: 3.7 MMOL/L (ref 3.5–5.1)
PULM VEIN A" WAVE DURATION": 91 MSEC
PV PEAK VELOCITY: 1.26 CM/S
RA MAJOR: 4.93 CM
RA PRESSURE: 3 MMHG
RA WIDTH: 3.96 CM
RBC # BLD AUTO: 3.63 M/UL (ref 4–5.4)
RIGHT VENTRICULAR END-DIASTOLIC DIMENSION: 2.58 CM
SODIUM SERPL-SCNC: 137 MMOL/L (ref 136–145)
STFR SERPL-MCNC: 4.5 MG/L (ref 1.8–4.6)
STJ: 2.57 CM
TDI LATERAL: 0.07 M/S
TDI SEPTAL: 0.08 M/S
TDI: 0.08 M/S
TR MAX PG: 32 MMHG
TRICUSPID ANNULAR PLANE SYSTOLIC EXCURSION: 2.16 CM
TROPONIN I SERPL DL<=0.01 NG/ML-MCNC: 0.2 NG/ML (ref 0–0.03)
TROPONIN I SERPL DL<=0.01 NG/ML-MCNC: 0.49 NG/ML (ref 0–0.03)
TROPONIN I SERPL DL<=0.01 NG/ML-MCNC: 0.63 NG/ML (ref 0–0.03)
TV REST PULMONARY ARTERY PRESSURE: 35 MMHG
WBC # BLD AUTO: 13.22 K/UL (ref 3.9–12.7)

## 2020-06-26 PROCEDURE — 63600175 PHARM REV CODE 636 W HCPCS: Performed by: NURSE PRACTITIONER

## 2020-06-26 PROCEDURE — 25000003 PHARM REV CODE 250: Performed by: NURSE PRACTITIONER

## 2020-06-26 PROCEDURE — 94761 N-INVAS EAR/PLS OXIMETRY MLT: CPT

## 2020-06-26 PROCEDURE — 96372 THER/PROPH/DIAG INJ SC/IM: CPT | Mod: 59

## 2020-06-26 PROCEDURE — 96376 TX/PRO/DX INJ SAME DRUG ADON: CPT

## 2020-06-26 PROCEDURE — 82550 ASSAY OF CK (CPK): CPT

## 2020-06-26 PROCEDURE — 36415 COLL VENOUS BLD VENIPUNCTURE: CPT

## 2020-06-26 PROCEDURE — 80048 BASIC METABOLIC PNL TOTAL CA: CPT

## 2020-06-26 PROCEDURE — 93005 ELECTROCARDIOGRAM TRACING: CPT

## 2020-06-26 PROCEDURE — 84484 ASSAY OF TROPONIN QUANT: CPT | Mod: 91

## 2020-06-26 PROCEDURE — 84484 ASSAY OF TROPONIN QUANT: CPT

## 2020-06-26 PROCEDURE — 97116 GAIT TRAINING THERAPY: CPT

## 2020-06-26 PROCEDURE — 85025 COMPLETE CBC W/AUTO DIFF WBC: CPT

## 2020-06-26 PROCEDURE — 12000002 HC ACUTE/MED SURGE SEMI-PRIVATE ROOM

## 2020-06-26 PROCEDURE — 99900035 HC TECH TIME PER 15 MIN (STAT)

## 2020-06-26 PROCEDURE — 83690 ASSAY OF LIPASE: CPT

## 2020-06-26 PROCEDURE — 82553 CREATINE MB FRACTION: CPT

## 2020-06-26 RX ORDER — SUCRALFATE 1 G/10ML
1 SUSPENSION ORAL
Status: DISCONTINUED | OUTPATIENT
Start: 2020-06-26 | End: 2020-06-29 | Stop reason: HOSPADM

## 2020-06-26 RX ORDER — HYDROMORPHONE HYDROCHLORIDE 1 MG/ML
0.5 INJECTION, SOLUTION INTRAMUSCULAR; INTRAVENOUS; SUBCUTANEOUS EVERY 4 HOURS PRN
Status: DISCONTINUED | OUTPATIENT
Start: 2020-06-26 | End: 2020-06-27

## 2020-06-26 RX ADMIN — HYDROMORPHONE HYDROCHLORIDE 1 MG: 1 INJECTION, SOLUTION INTRAMUSCULAR; INTRAVENOUS; SUBCUTANEOUS at 03:06

## 2020-06-26 RX ADMIN — ENOXAPARIN SODIUM 105 MG: 120 INJECTION SUBCUTANEOUS at 08:06

## 2020-06-26 RX ADMIN — ENOXAPARIN SODIUM 105 MG: 120 INJECTION SUBCUTANEOUS at 09:06

## 2020-06-26 RX ADMIN — TIZANIDINE 4 MG: 4 TABLET ORAL at 09:06

## 2020-06-26 RX ADMIN — PANTOPRAZOLE SODIUM 40 MG: 40 TABLET, DELAYED RELEASE ORAL at 09:06

## 2020-06-26 RX ADMIN — OXYCODONE HYDROCHLORIDE 10 MG: 10 TABLET, FILM COATED, EXTENDED RELEASE ORAL at 08:06

## 2020-06-26 RX ADMIN — SUCRALFATE 1 G: 1 SUSPENSION ORAL at 09:06

## 2020-06-26 RX ADMIN — HYDROMORPHONE HYDROCHLORIDE 1 MG: 1 INJECTION, SOLUTION INTRAMUSCULAR; INTRAVENOUS; SUBCUTANEOUS at 11:06

## 2020-06-26 RX ADMIN — OXYCODONE HYDROCHLORIDE 10 MG: 10 TABLET, FILM COATED, EXTENDED RELEASE ORAL at 09:06

## 2020-06-26 RX ADMIN — PROMETHAZINE HYDROCHLORIDE 25 MG: 25 TABLET ORAL at 01:06

## 2020-06-26 RX ADMIN — AMITRIPTYLINE HYDROCHLORIDE 50 MG: 50 TABLET, FILM COATED ORAL at 09:06

## 2020-06-26 RX ADMIN — HYDROMORPHONE HYDROCHLORIDE 0.5 MG: 1 INJECTION, SOLUTION INTRAMUSCULAR; INTRAVENOUS; SUBCUTANEOUS at 11:06

## 2020-06-26 RX ADMIN — PROMETHAZINE HYDROCHLORIDE 25 MG: 25 TABLET ORAL at 08:06

## 2020-06-26 RX ADMIN — THERA TABS 1 TABLET: TAB at 08:06

## 2020-06-26 RX ADMIN — FLUOXETINE HYDROCHLORIDE 20 MG: 20 CAPSULE ORAL at 09:06

## 2020-06-26 RX ADMIN — HYDROMORPHONE HYDROCHLORIDE 0.5 MG: 1 INJECTION, SOLUTION INTRAMUSCULAR; INTRAVENOUS; SUBCUTANEOUS at 07:06

## 2020-06-26 RX ADMIN — CETIRIZINE HYDROCHLORIDE 5 MG: 5 TABLET, FILM COATED ORAL at 08:06

## 2020-06-26 RX ADMIN — PANTOPRAZOLE SODIUM 40 MG: 40 TABLET, DELAYED RELEASE ORAL at 08:06

## 2020-06-26 RX ADMIN — OXYCODONE HYDROCHLORIDE AND ACETAMINOPHEN 1 TABLET: 5; 325 TABLET ORAL at 04:06

## 2020-06-26 RX ADMIN — ACETAMINOPHEN 650 MG: 325 TABLET ORAL at 01:06

## 2020-06-26 RX ADMIN — ASPIRIN 325 MG ORAL TABLET 325 MG: 325 PILL ORAL at 08:06

## 2020-06-26 RX ADMIN — SUCRALFATE 1 G: 1 SUSPENSION ORAL at 04:06

## 2020-06-26 NOTE — CONSULTS
[facility]  Cardiology consultation           Patient Name: Aleyda Costa  MRN: [id number]  Admission Date: 6/24/2020  Attending Physician: Darryl Wise MD   Primary Care Provider: Darlene Hayden MD           Patient information was obtained from patient, past medical records and ER records.      Subjective:      Principal Problem:<principal problem not specified>     Chief Complaint:        Chief Complaint   Patient presents with    Post-op Problem       R knee replacement Tuesday, unable to bear weight on RLE         HPI: Aleyda Costa is a 39-year-old female with a past medical history significant for depression, GERD, pulmonary emboli in 2018, sleep apnea, prior gastric sleeve, and nicotine dependence presenting today for increased pain to right LE.   Pt underwent a right knee arthroplasty per Dr. Crisostomo on 6/16/20. Patient previously failed treatment for pulmonary emboli with Xarelto and was placed on Pradaxa 150 mg p.o. b.i.d. at discharge 6/17 per hematologist rec at that time.  Pt presented back to ED on 6/21 for increasing pain and swelling to right leg. Pt reported compliance with her pradaxa and medications; however, ultrasound of the RLE on that date demonstrated a right nonocclusive popliteal DVT. Pt was readmitted and placed on full dose lovenox. Pt was discharged home on 6/23 on lovenox and pain was controlled with oral meds at that time. Dr. Crisostomo provided pt with oral dilaudid pain script on d/c.  Pt returns to ED today c/o intractable pain to right leg that is not controlled with home meds. She denies worsening swelling to leg. She describes the pain as right knee and post lower leg location, 10/10 intensity, pressure quality, worsens with movement, assoc with nausea secondary to severe pain, improved after receiving IV dilaudid in ED. She denies fever, chills, cp and sob. Repeat US done in ED confirms DVT stable. CRP improving and WBC wnl.   Kranthi consulted from ED and rec admission for pain control.   the patient reports retrosternal chest discomfort that occasionally radiates into the interscapular area since 2020.  The pain is worse with breathing.  The pain lasted approximately 8 hr or so; she is pain-free now except when she takes a deep breath.  She vomited approximately 7 times last night-perhaps secondary to Dilaudid.  D-dimer is elevated at 2.53; CT of the chest was negative for pulmonary embolism.    The patient had left total knee arthroplasty in 2018.  She developed DVT in the left calf and pulmonary emboli associated with shortness of breath on Xarelto; she was switched to Pradaxa which she took for approximately 7 months-stopped  Perhaps in 2019..  Hypercoagulable workup by Dr. Pina is reportedly normal.  The patient is never had DVT with other prior surgeries including knee surgery.         Past Medical History:   Diagnosis Date    Abnormal Pap smear of cervix      Arthritis       OA    Back pain      Cancer       skin cancer to back    Depression      Endometriosis      Full dentures      GERD (gastroesophageal reflux disease)      Migraine      Pulmonary embolism     Seizures      Sleep apnea       Does not use c-pap since weight loss - 170 lbs    Uterine fibroid      Wears glasses                 Past Surgical History:   Procedure Laterality Date    APPENDECTOMY        CARPAL TUNNEL RELEASE Bilateral       SECTION        CHOLECYSTECTOMY        COLONOSCOPY N/A 2019     Procedure: COLONOSCOPY;  Surgeon: Anselmo Blackwell MD;  Location: [facility];  Service: Endoscopy;  Laterality: N/A;    EPIDURAL STEROID INJECTION INTO LUMBAR SPINE N/A 2019     Procedure: Injection-steroid-epidural-lumbar;  Surgeon: Yariel Ramirez MD;  Location: [facility] OR;  Service: Pain Management;  Laterality: N/A;  L3-4    ESOPHAGOGASTRODUODENOSCOPY N/A 2019     Procedure: EGD (ESOPHAGOGASTRODUODENOSCOPY);   Surgeon: Anselmo Blackwell MD;  Location: [facility] ENDO;  Service: Endoscopy;  Laterality: N/A;    FRACTURE SURGERY         ankle    gastric sleve        HYSTERECTOMY   2014     endo and fibroids    HYSTERECTOMY        JOINT REPLACEMENT Left 11/13/2018    KNEE ARTHROPLASTY Left 11/13/2018     Procedure: ARTHROPLASTY, KNEE;  Surgeon: Feliberto Cardenas MD;  Location: [facility] OR;  Service: Orthopedics;  Laterality: Left;    KNEE ARTHROPLASTY Right 6/16/2020     Procedure: ARTHROPLASTY, KNEE;  Surgeon: Feliberto Cardenas MD;  Location: [facility] OR;  Service: Orthopedics;  Laterality: Right;    KNEE ARTHROSCOPY W/ MENISCECTOMY Right 10/25/2019     Procedure: ARTHROSCOPY, KNEE, WITH MENISCECTOMY;  Surgeon: Feliberto Cardenas MD;  Location: [facility] OR;  Service: Orthopedics;  Laterality: Right;    KNEE SURGERY        LUMBAR EPIDURAL INJECTION        OOPHORECTOMY Left       LSO    RADIAL NERVE Right      RECONSTRUCTION OF MEDIAL PATELLOFEMORAL LIGAMENT OF RIGHT KNEE Right 10/25/2019     Procedure: RECONSTRUCTION, LIGAMENT, MEDIAL PATELLOFEMORAL, RIGHT;  Surgeon: Feliberto Cardenas MD;  Location: [facility] OR;  Service: Orthopedics;  Laterality: Right;    SINUS SURGERY        UPPER GASTROINTESTINAL ENDOSCOPY   11/27/2019     Dr. Blackwell; mild schatzki ring-dilated; gastritis; bx in process               Review of patient's allergies indicates:   Allergen Reactions    Clarithromycin Swelling and Other (See Comments)       DIFFICULTY SWALLOWING  DIFFICULTY SWALLOWING    Pcn [penicillins] Anaphylaxis    Sulfa (sulfonamide antibiotics) Hives    Wellbutrin [bupropion hcl] Shortness Of Breath and Anaphylaxis    Betadine [povidone-iodine] Hives       Swelling       Cefprozil Hives    Iodinated contrast media Hives    Latex, natural rubber Rash    Sulfamethoxazole-trimethoprim Nausea And Vomiting    Iodine Hives and Swelling    Latex Hives and Swelling             Current Facility-Administered Medications on  File Prior to Encounter   Medication    lactated ringers infusion    lidocaine (PF) 10 mg/ml (1%) injection 10 mg           Current Outpatient Medications on File Prior to Encounter   Medication Sig    AIMOVIG AUTOINJECTOR 140 mg/mL AtIn      amitriptyline (ELAVIL) 25 MG tablet TAKE ONE TABLET BY MOUTH EVERY NIGHT FOR 2 WEEKS THEN INCREASE TO 2 TABLETS AT BEDTIME    cetirizine (ZYRTEC) 5 MG tablet Take 5 mg by mouth as needed.     cyanocobalamin, vitamin B-12, 1,000 mcg/mL Kit Inject 1 mL into the muscle every 21 days.    enoxaparin (LOVENOX) 120 mg/0.8 mL Syrg Inject 0.7 mLs (105 mg total) into the skin every 12 (twelve) hours.    epinephrine (EPIPEN) 0.3 mg/0.3 mL (1:1,000) AtIn 0.3 mg daily as needed.     erenumab-aooe 140 mg/mL AtIn Inject 140 mg into the skin every 28 days.    fluoxetine (PROZAC) 20 MG capsule every evening.     HYDROmorphone (DILAUDID) 2 MG tablet Take 1 tablet (2 mg total) by mouth every 4 (four) hours as needed for Pain.    hydrOXYzine pamoate (VISTARIL) 25 MG Cap TAKE ONE CAPSULE BY MOUTH 3 TIMES A DAY AS NEEDED FOR ANXIETY    lorazepam (ATIVAN) 0.5 MG tablet Take 0.5 mg by mouth every 4 (four) hours as needed.     oxyCODONE (OXYCONTIN) 10 mg 12 hr tablet Take 10 mg by mouth every 12 (twelve) hours.    oxyCODONE-acetaminophen (PERCOCET) 5-325 mg per tablet Take 1 tablet by mouth every 6 (six) hours as needed.    pantoprazole (PROTONIX) 40 MG tablet Take 1 tablet (40 mg total) by mouth 2 (two) times daily.    promethazine (PHENERGAN) 25 MG tablet Take 1 tablet (25 mg total) by mouth every 6 (six) hours as needed for Nausea. Take 1 tablet by mouth every 6 (six) hours as needed (migraine). -MAY CAUSE DROWSINESS-    tiZANidine (ZANAFLEX) 4 MG tablet Take 4 mg by mouth every 6 (six) hours as needed.           Family History      Problem Relation (Age of Onset)     Breast cancer Maternal Aunt (46)     Esophageal cancer Maternal Grandmother     Heart disease Mother, Father     Brother 41 is in good health.  Brother 46 has a defibrillator; he had stents at age 42.  Mother  at age 56 of heart attack; she had a 1st heart attack in her early 50s.  Father is 72.            Tobacco Use    Smoking status: Current Every Day Smoker       Packs/day: 0.25       Years: 20.00       Pack years: 5.00       Types: Cigarettes       Last attempt to quit: 2018       Years since quittin.6    Smokeless tobacco: Never Used   Substance and Sexual Activity    Alcohol use: Yes       Alcohol/week: 0.0 standard drinks       Comment: occasionally    Drug use: Not Currently       Types: Other-see comments       Comment: no    Sexual activity: Yes       Partners: Male      Review of Systems   Constitutional: Negative for chills, fatigue and fever.   HENT: Negative for congestion, sore throat and trouble swallowing.    Eyes: Negative for photophobia and visual disturbance.   Respiratory: Negative for cough, chest tightness, shortness of breath and wheezing.    Cardiovascular: Positive for leg swelling. Negative for chest pain and palpitations.   Gastrointestinal: Positive for nausea. Negative for abdominal pain, diarrhea and vomiting.  The patient locks approximately 75 lb after gastric sleeve in .  She has since gained 40 lb back.  Endocrine: Negative for polyphagia and polyuria.   Genitourinary: Negative for difficulty urinating, dysuria and urgency.   Musculoskeletal: Positive for arthralgias, gait problem and joint swelling. Negative for back pain.   Skin: Positive for color change and wound. Negative for pallor and rash.        Bruising to RLE, surgical wound to right ant knee   Neurological: Negative for dizziness, weakness and light-headedness.   Hematological: Negative for adenopathy.   Psychiatric/Behavioral: Negative for agitation, behavioral problems and confusion.   All other systems reviewed and are negative.     Objective:      Vital Signs (Most Recent):  Temp: 98.4 °F (36.9  °C) (06/24/20 1433)  Pulse: 75 (06/24/20 1549)  Resp: 16 (06/24/20 1727)  BP: 139/71 (06/24/20 1549)  SpO2: 100 % (06/24/20 1549) Vital Signs (24h Range):  Temp:  [98.4 °F (36.9 °C)] 98.4 °F (36.9 °C)  Pulse:  [70-75] 75  Resp:  [16-18] 16  SpO2:  [100 %] 100 %  BP: (139-148)/(67-71) 139/71      Weight: 111.1 kg (245 lb)  Body mass index is 33.23 kg/m².     Physical Exam  Vitals signs and nursing note reviewed.   Constitutional:       General: She is not in acute distress.     Appearance: Normal appearance. She is well-developed. She is not ill-appearing, toxic-appearing or diaphoretic.   HENT:  A complete upper plate is noted     Head: Normocephalic and atraumatic.      Right Ear: External ear normal.      Left Ear: External ear normal.      Nose: Nose normal.      Mouth/Throat:      Mouth: Mucous membranes are moist.      Pharynx: Oropharynx is clear.   Eyes:      Conjunctiva/sclera: Conjunctivae normal.      Pupils: Pupils are equal, round, and reactive to light.   Neck:      Musculoskeletal: Normal range of motion and neck supple. No muscular tenderness.      Vascular: No JVD.   Cardiovascular:      Rate and Rhythm: Normal rate and regular rhythm.      Pulses: Normal pulses.      Heart sounds: Normal heart sounds. No murmur.   Pulmonary:      Effort: Pulmonary effort is normal. No respiratory distress.      Breath sounds: Normal breath sounds. No stridor. No wheezing, rhonchi or rales.   Abdominal:      General: Bowel sounds are normal. There is no distension.      Palpations: Abdomen is soft.      Tenderness: There is no abdominal tenderness.   Musculoskeletal:      Comments: L;imited ROM to right knee secondary to pain with movement. +3 edema to right leg with scattered ecchymosis to thigh, knee and ankle. Surgical incision to ant knee CDI, no erythema or drainage   Lymphadenopathy:      Cervical: No cervical adenopathy.   Skin:     General: Skin is warm and dry.      Capillary Refill: Capillary refill takes 2  to 3 seconds.      Coloration: Skin is not jaundiced.      Findings: Bruising present. No erythema, lesion or rash.   Neurological:      General: No focal deficit present.      Mental Status: She is alert and oriented to person, place, and time.      Sensory: No sensory deficit.      Motor: No weakness.   Psychiatric:         Mood and Affect: Mood normal.         Behavior: Behavior normal.         Thought Content: Thought content normal.                    Significant Labs:   CBC:        Recent Labs   Lab 06/23/20  0457 06/24/20  1516   WBC 5.73 7.72   HGB 9.8* 11.2*   HCT 30.4* 34.8*    250      CMP:        Recent Labs   Lab 06/23/20  0457 06/24/20  1516    140   K 3.7 3.4*    102   CO2 28 27    96   BUN 8 7   CREATININE 0.7 0.8   CALCIUM 8.9 9.7   PROT  --  8.2   ALBUMIN  --  3.6   BILITOT  --  0.5   ALKPHOS  --  108   AST  --  23   ALT  --  21   ANIONGAP 7* 11   EGFRNONAA >60 >60    Troponin 0.068, 0.119, 0.176  D-dimer 2.58    All pertinent labs within the past 24 hours have been reviewed.     Imaging Results          US Lower Extremity Veins Right (Final result)  Result time 06/24/20 15:47:00    Final result by Jose Salas Jr., MD (06/24/20 15:47:00)                 Impression:      Nonocclusive deep vein thrombosis is noted in the right popliteal vein.  The rest of the veins of the leg are clear of thrombus.      Electronically signed by: Jose Salas MD  Date:    06/24/2020  Time:    15:47             Narrative:    EXAMINATION:  US LOWER EXTREMITY VEINS RIGHT    CLINICAL HISTORY:  Acute embolism and thrombosis of unspecified deep veins of unspecified lower extremity    TECHNIQUE:  Duplex and color flow Doppler evaluation and graded compression of the right lower extremity veins was performed.    COMPARISON:  None    FINDINGS:  Right thigh veins: The common femoral, femoral,, upper greater saphenous, and deep femoral veins are patent and free of thrombus. The veins are  normally compressible and have normal phasic flow and augmentation response.    There is nonocclusive deep vein thrombosis identified in the popliteal vein.  Thrombus in the trifurcation veins in the calf is not seen.                                Assessment/Plan:      DVT (deep venous thrombosis)  US today reviewed - no worsening of DVT  Cont full dose lovenox  Telemetry monitoring   CTA negative for pulmonary emboli; mild basal atelectasis        Intractable pain  Pt post op right TKA with right leg DVT  IV dilaudid prn  Cont oral pain meds - percocet and oxycontin  Consult Dr. Crisostomo for further pain regimen - pt may benefit from duragesic patch?   Vomiting probably secondary to Dilaudid        Generalized anxiety disorder  Chronic, stable  Resume home meds        History of pulmonary embolism  Currently on lovenox for DVT        S/P total knee arthroplasty, right  Right TKA on 6/16 with Dr. Crisostomo  Consult PT/OT  Fall precautions  Pt on lovenox currently    Chest pain-pleuritic in nature ; elevated troponin   The patient's clinical picture is suggestive of pulmonary emboli; however CTA does not reveal any.  EKGs x3 are all within normal limits.  There is no pleural pericardial rub.  Echocardiogram in a.m.    Gastric sleeve 2014           VTE Risk Mitigation (From admission, onward)     None   Full dose lovenox            M Kaushik Guthrie MD [facility]  Cardiology  [facility]

## 2020-06-26 NOTE — PT/OT/SLP PROGRESS
Physical Therapy Treatment    Patient Name:  Aleyda Costa   MRN:  [id number]    Recommendations:     Discharge Recommendations:  home health PT, home health OT   Discharge Equipment Recommendations: none   Barriers to discharge: None    Assessment:     Aleyda Costa is a 39 y.o. female admitted with a medical diagnosis of <principal problem not specified>.  She presents with the following impairments/functional limitations:  weakness, impaired endurance, impaired functional mobilty, decreased lower extremity function, pain, edema, orthopedic precautions. Patient is agreeable to PT treatment. Chart review indicates that CTA of chest was (-) for PE. She requires SBA for transfers. She ambulated 60' with RW and CGA. She returned to bed with ice applied to R knee. She reports that she did not have any issue with stair ascent/descent upon last discharge and does not want stair training.     Rehab Prognosis: Good; patient would benefit from acute skilled PT services to address these deficits and reach maximum level of function.    Recent Surgery: * No surgery found *      Plan:     During this hospitalization, patient to be seen BID to address the identified rehab impairments via gait training, therapeutic activities, therapeutic exercises and progress toward the following goals:    · Plan of Care Expires:  07/25/20    Subjective     Chief Complaint: still nauseated   Patient/Family Comments/goals: get some apple juice  Pain/Comfort:  · Pain Rating 1: (not rated)  · Location - Side 1: Right  · Location 1: knee  · Pain Addressed 1: Reposition, Distraction      Objective:     Communicated with ANTHONY Abad prior to session.  Patient found HOB elevated with telemetry, bed alarm upon PT entry to room.     General Precautions: Standard, fall   Orthopedic Precautions:RLE weight bearing as tolerated   Braces: N/A     Functional Mobility:  · Bed Mobility:     · Supine to Sit: stand by assistance  · Transfers:     · Sit  to Stand:  stand by assistance with rolling walker  · Gait: 60' RW, CGA  · Balance: Good      AM-PAC 6 CLICK MOBILITY          Therapeutic Activities and Exercises:   Patient was educated on the importance of OOB activity during hospital stay, safe transfers and ambulation, D/C planning    Patient left HOB elevated with all lines intact, call button in reach, bed alarm on and RN notified..    GOALS:   Multidisciplinary Problems     Physical Therapy Goals        Problem: Physical Therapy Goal    Goal Priority Disciplines Outcome Goal Variances Interventions   Physical Therapy Goal     PT, PT/OT Ongoing, Progressing     Description: Goals to be met by: 2020     Patient will increase functional independence with mobility by performin. Supine to sit with Supervision  2. Sit to stand transfer with Supervision  3. Bed to chair transfer with Supervision using Rolling Walker  4. Gait  x 250 feet with Supervision using Rolling Walker.   5. Ascend/descend 5 stairs with left Handrails Supervision                     Time Tracking:     PT Received On: 20  PT Start Time: 929     PT Stop Time: 942  PT Total Time (min): 13 min     Billable Minutes: Gait Training 13    Treatment Type: Treatment  PT/PTA: PT     PTA Visit Number: 0     Chandrika Simental, PT  2020

## 2020-06-26 NOTE — PROGRESS NOTES
Progress Note  Cardiology    Admit Date: 6/24/2020   LOS: 0 days     Follow-up For:  Chest pain; elevated troponin; DVT    Scheduled Meds:   amitriptyline  50 mg Oral QHS    aspirin  325 mg Oral Daily    cetirizine  5 mg Oral Daily    enoxaparin  1 mg/kg Subcutaneous Q12H    FLUoxetine  20 mg Oral QHS    multivitamin  1 tablet Oral Daily    oxyCODONE  10 mg Oral Q12H    pantoprazole  40 mg Oral BID    polyethylene glycol  17 g Oral Daily    sucralfate  1 g Oral QID (AC & HS)     Continuous Infusions:  PRN Meds:acetaminophen, bisacodyL, HYDROmorphone, LORazepam, magnesium oxide, magnesium oxide, nitroGLYCERIN, ondansetron, oxyCODONE-acetaminophen, potassium chloride 10%, potassium chloride 10%, potassium chloride 10%, potassium, sodium phosphates, potassium, sodium phosphates, potassium, sodium phosphates, promethazine, sodium chloride 0.9%, tiZANidine    Review of patient's allergies indicates:   Allergen Reactions    Clarithromycin Swelling and Other (See Comments)     DIFFICULTY SWALLOWING  DIFFICULTY SWALLOWING    Pcn [penicillins] Anaphylaxis    Sulfa (sulfonamide antibiotics) Hives    Wellbutrin [bupropion hcl] Shortness Of Breath and Anaphylaxis    Betadine [povidone-iodine] Hives     Swelling      Cefprozil Hives    Iodinated contrast media Hives    Latex, natural rubber Rash    Sulfamethoxazole-trimethoprim Nausea And Vomiting    Iodine Hives and Swelling    Latex Hives and Swelling       SUBJECTIVE:     Interval History: Patient has complaints chest pain with respiration radiating to the back.  No shortness of breath.  Slept poorly.    Review of Systems  Respiratory: negative for cough, hemoptysis, sputum and wheezing  Cardiovascular: positive for chest pain and With respiration, negative for dyspnea, orthopnea, palpitations and paroxysmal nocturnal dyspnea    OBJECTIVE:     Vital Signs (Most Recent)  Temp: 98.1 °F (36.7 °C) (06/26/20 1156)  Pulse: 65 (06/26/20 1156)  Resp: 15 (06/26/20  1156)  BP: 129/62 (06/26/20 1156)  SpO2: 96 % (06/26/20 1227)    Vital Signs Range (Last 24H):  Temp:  [98 °F (36.7 °C)-98.9 °F (37.2 °C)]   Pulse:  [57-65]   Resp:  [14-18]   BP: (127-154)/(60-67)   SpO2:  [93 %-100 %]       Physical Exam:  Neck: no carotid bruit, no JVD and supple, symmetrical, trachea midline  Lungs: clear to auscultation bilaterally, normal respiratory effort  Heart: regular rate and rhythm, S1, S2 normal, no murmur, click, rub or gallop  Abdomen: abnormal findings:  Tenderness in the epigastrium persists  Extremities: Extremities normal, atraumatic, no cyanosis, clubbing, or edema    Recent Results (from the past 24 hour(s))   Troponin I    Collection Time: 06/25/20  3:48 PM   Result Value Ref Range    Troponin I 0.119 (H) 0.000 - 0.026 ng/mL   Troponin I    Collection Time: 06/25/20  7:42 PM   Result Value Ref Range    Troponin I 0.176 (H) 0.000 - 0.026 ng/mL   Basic Metabolic Panel (BMP)    Collection Time: 06/26/20  4:53 AM   Result Value Ref Range    Sodium 137 136 - 145 mmol/L    Potassium 3.7 3.5 - 5.1 mmol/L    Chloride 100 95 - 110 mmol/L    CO2 26 23 - 29 mmol/L    Glucose 128 (H) 70 - 110 mg/dL    BUN, Bld 8 6 - 20 mg/dL    Creatinine 0.7 0.5 - 1.4 mg/dL    Calcium 9.5 8.7 - 10.5 mg/dL    Anion Gap 11 8 - 16 mmol/L    eGFR if African American >60 >60 mL/min/1.73 m^2    eGFR if non African American >60 >60 mL/min/1.73 m^2   CBC with Automated Differential    Collection Time: 06/26/20  4:53 AM   Result Value Ref Range    WBC 13.22 (H) 3.90 - 12.70 K/uL    RBC 3.63 (L) 4.00 - 5.40 M/uL    Hemoglobin 11.2 (L) 12.0 - 16.0 g/dL    Hematocrit 34.5 (L) 37.0 - 48.5 %    Mean Corpuscular Volume 95 82 - 98 fL    Mean Corpuscular Hemoglobin 30.9 27.0 - 31.0 pg    Mean Corpuscular Hemoglobin Conc 32.5 32.0 - 36.0 g/dL    RDW 13.7 11.5 - 14.5 %    Platelets 179 150 - 350 K/uL    MPV 10.3 9.2 - 12.9 fL    Immature Granulocytes 1.4 (H) 0.0 - 0.5 %    Gran # (ANC) 10.8 (H) 1.8 - 7.7 K/uL    Immature  Grans (Abs) 0.18 (H) 0.00 - 0.04 K/uL    Lymph # 1.3 1.0 - 4.8 K/uL    Mono # 0.9 0.3 - 1.0 K/uL    Eos # 0.0 0.0 - 0.5 K/uL    Baso # 0.03 0.00 - 0.20 K/uL    nRBC 0 0 /100 WBC    Gran% 81.9 (H) 38.0 - 73.0 %    Lymph% 9.5 (L) 18.0 - 48.0 %    Mono% 6.7 4.0 - 15.0 %    Eosinophil% 0.3 0.0 - 8.0 %    Basophil% 0.2 0.0 - 1.9 %    Differential Method Automated    Troponin I    Collection Time: 06/26/20  4:54 AM   Result Value Ref Range    Troponin I 0.198 (H) 0.000 - 0.026 ng/mL   Echo Color Flow Doppler? Yes    Collection Time: 06/26/20  9:22 AM   Result Value Ref Range    Ascending aorta 2.71 cm    STJ 2.57 cm    AV mean gradient 6 mmHg    Ao peak jordin 1.62 m/s    Ao VTI 35.69 cm    IVRT 111.32 msec    IVS 1.03 0.6 - 1.1 cm    LA size 4.05 cm    Left Atrium Major Axis 4.91 cm    Left Atrium Minor Axis 4.67 cm    LVIDD 4.92 3.5 - 6.0 cm    LVIDS 3.32 2.1 - 4.0 cm    LVOT diameter 2.05 cm    LVOT peak VTI 24.55 cm    PW 0.99 0.6 - 1.1 cm    MV Peak A Jordin 0.69 m/s    E wave decelartion time 268.10 msec    MV Peak E Jordin 0.84 m/s    RA Major Axis 4.93 cm    RA Width 3.96 cm    RVDD 2.58 cm    TAPSE 2.16 cm    TR Max Jordin 2.84 m/s    LA WIDTH 3.36 cm    Ao root annulus 2.95 cm    AORTIC VALVE CUSP SEPERATION 2.14 cm    PV PEAK VELOCITY 1.26 cm/s    MV stenosis pressure 1/2 time 108.37 ms    LV Diastolic Volume 113.97 mL    LV Systolic Volume 44.88 mL    LVOT peak jordin 1.00 m/s    PISA TR VN Nyquist 0.00 m/s    Mr max jordin 0.05 m/s    Radius 0.37 cm    FS 33 %    LA volume 55.37 cm3    LV mass 179.61 g    Left Ventricle Relative Wall Thickness 0.40 cm    AV valve area 2.27 cm2    AV Velocity Ratio 0.62     AV index (prosthetic) 0.69     MV valve area p 1/2 method 2.03 cm2    E/A ratio 1.22     LVOT area 3.3 cm2    LVOT stroke volume 80.99 cm3    AV peak gradient 10 mmHg    LV Systolic Volume Index 19.3 mL/m2    LV Diastolic Volume Index 48.99 mL/m2    LA Volume Index 23.8 mL/m2    LV Mass Index 77 g/m2    Triscuspid Valve  "Regurgitation Peak Gradient 32 mmHg    BSA 2.38 m2    TDI SEPTAL 0.08 m/s    LV LATERAL E/E' RATIO 12.00 m/s    LV SEPTAL E/E' RATIO 10.50 m/s    Right Atrial Pressure (from IVC) 3 mmHg    TDI LATERAL 0.07 m/s    Mean e' 0.08 m/s    MV "A" wave duration 77.00 msec    Pulm vein "A" wave 91.00 msec    TV rest pulmonary artery pressure 35 mmHg    E/E' ratio 11.20 m/s   Troponin I    Collection Time: 06/26/20 12:44 PM   Result Value Ref Range    Troponin I 0.492 (H) 0.000 - 0.026 ng/mL   Lipase    Collection Time: 06/26/20 12:44 PM   Result Value Ref Range    Lipase 37 4 - 60 U/L       Diagnostic Results:  Labs: Reviewed  ECG: Reviewed    ASSESSMENT/PLAN:     The patient is doing better.  She has not skipped any doses of Pradaxa as outpatient.  Ambulated in the alvarenga.  If asymptomatic, the patient may be discharged .  See me in the office in 1 week.      "

## 2020-06-26 NOTE — NURSING
Pt ambulated in hallway short distance and was very short of breath. Pt reported dizziness, stabbing chest pain 7/10. Pt had to take multiple breaks when walking. JACOB Rowland NP notified.

## 2020-06-26 NOTE — PLAN OF CARE
Problem: Occupational Therapy Goal  Goal: Occupational Therapy Goal  Description: Goals to be met by: 7/2/2020     Patient will increase functional independence with ADLs by performing:    LE Dressing with Supervision with AE/AD.  Toileting from toilet with Supervision for hygiene and clothing management.   Toilet transfer to toilet with Supervision.    Outcome: Ongoing, Progressing  OT POC initiated and established.

## 2020-06-26 NOTE — SUBJECTIVE & OBJECTIVE
Interval History:  Patient continues to complain of intermittent chest pain.  Troponin trending upward.  Cardiology workup in progress.       Review of Systems   Constitutional: Positive for activity change, appetite change and fatigue. Negative for chills and fever.   HENT: Negative for congestion, ear discharge, ear pain, facial swelling, sore throat and trouble swallowing.    Eyes: Negative for pain and redness.   Respiratory: Negative for cough, chest tightness, shortness of breath and wheezing.    Cardiovascular: Positive for chest pain and leg swelling. Negative for palpitations.   Gastrointestinal: Positive for nausea. Negative for abdominal distention, abdominal pain, blood in stool, constipation, diarrhea and vomiting.   Endocrine: Negative for polydipsia and polyphagia.   Genitourinary: Negative for difficulty urinating, dysuria, flank pain, hematuria and urgency.   Musculoskeletal: Positive for arthralgias, gait problem and joint swelling. Negative for back pain, neck pain and neck stiffness.   Skin: Positive for color change and wound. Negative for pallor and rash.        Bruising to RLE, surgical wound to right knee   Allergic/Immunologic: Negative for food allergies.   Neurological: Negative for seizures, facial asymmetry, speech difficulty and weakness.   Hematological: Does not bruise/bleed easily.   Psychiatric/Behavioral: Negative for agitation, behavioral problems, confusion, dysphoric mood and suicidal ideas. The patient is not nervous/anxious.    All other systems reviewed and are negative.    Objective:     Vital Signs (Most Recent):  Temp: 96.3 °F (35.7 °C) (06/26/20 1622)  Pulse: 71 (06/26/20 1622)  Resp: 17 (06/26/20 1622)  BP: 135/63 (06/26/20 1622)  SpO2: 96 % (06/26/20 1622) Vital Signs (24h Range):  Temp:  [96.3 °F (35.7 °C)-98.9 °F (37.2 °C)] 96.3 °F (35.7 °C)  Pulse:  [57-71] 71  Resp:  [14-18] 17  SpO2:  [93 %-100 %] 96 %  BP: (127-154)/(60-67) 135/63     Weight: 111.6 kg (246  lb)  Body mass index is 33.36 kg/m².    Physical Exam  Vitals signs and nursing note reviewed.   Constitutional:       General: She is not in acute distress.     Appearance: Normal appearance. She is well-developed. She is not ill-appearing, toxic-appearing or diaphoretic.   HENT:      Head: Normocephalic and atraumatic.      Mouth/Throat:      Mouth: Mucous membranes are moist.   Eyes:      General:         Right eye: No discharge.         Left eye: No discharge.      Conjunctiva/sclera: Conjunctivae normal.      Pupils: Pupils are equal, round, and reactive to light.   Neck:      Musculoskeletal: Normal range of motion and neck supple. No neck rigidity or muscular tenderness.      Vascular: No JVD.   Cardiovascular:      Rate and Rhythm: Normal rate and regular rhythm.      Pulses: Normal pulses.      Heart sounds: Normal heart sounds. No murmur.   Pulmonary:      Effort: Pulmonary effort is normal. No respiratory distress.      Breath sounds: Normal breath sounds. No stridor. No wheezing, rhonchi or rales.   Abdominal:      General: Bowel sounds are normal. There is no distension.      Palpations: Abdomen is soft.      Tenderness: There is no abdominal tenderness. There is no guarding.   Genitourinary:     Comments: Not examined  Musculoskeletal:         General: Swelling present. No tenderness.      Right lower leg: Edema present.      Comments: L;imited ROM to right knee secondary to pain with movement. +3 edema to right leg with scattered ecchymosis to thigh, knee and ankle. Surgical incision to ant knee CDI, no erythema or drainage   Skin:     General: Skin is warm and dry.      Capillary Refill: Capillary refill takes 2 to 3 seconds.      Coloration: Skin is not jaundiced.      Findings: Bruising present. No erythema, lesion or rash.      Comments: Bruising right lower extremity including foot   Neurological:      General: No focal deficit present.      Mental Status: She is alert and oriented to person,  place, and time. Mental status is at baseline.      Cranial Nerves: No cranial nerve deficit.      Sensory: No sensory deficit.   Psychiatric:         Mood and Affect: Mood normal.         Behavior: Behavior normal.         Thought Content: Thought content normal.         Judgment: Judgment normal.           CRANIAL NERVES     CN III, IV, VI   Pupils are equal, round, and reactive to light.    Labs reviewed

## 2020-06-26 NOTE — NURSING
Spoke with Dr Guthrie with Cardiology regarding NM request for 6/27.  Notified Dr Guthrie that there isnt any nuclear med available on weekend.   He stated that the patient can have the Nuclear Stress on Monday.  I notified Jaylyn Rowland NP.  Also notified CM

## 2020-06-26 NOTE — PLAN OF CARE
Plan of care reviewed with patient, verbalizes understanding. VSS. Cardiac monitor in place, NSR. Pain/nausea controlled with PRN meds. Incision to right knee clean/dry/intact. Pending echocardiogram today. Safety maintained, bed in lowest position, wheels locked, call light in reach.

## 2020-06-26 NOTE — SUBJECTIVE & OBJECTIVE
Principal Problem:<principal problem not specified>    Principal Orthopedic Problem: R TKA    Interval History: Pain is better per her. Still working up chest pain.     Review of patient's allergies indicates:   Allergen Reactions    Clarithromycin Swelling and Other (See Comments)     DIFFICULTY SWALLOWING  DIFFICULTY SWALLOWING    Pcn [penicillins] Anaphylaxis    Sulfa (sulfonamide antibiotics) Hives    Wellbutrin [bupropion hcl] Shortness Of Breath and Anaphylaxis    Betadine [povidone-iodine] Hives     Swelling      Cefprozil Hives    Iodinated contrast media Hives    Latex, natural rubber Rash    Sulfamethoxazole-trimethoprim Nausea And Vomiting    Iodine Hives and Swelling    Latex Hives and Swelling       Current Facility-Administered Medications   Medication    acetaminophen tablet 650 mg    amitriptyline tablet 50 mg    aspirin tablet 325 mg    bisacodyL suppository 10 mg    cetirizine tablet 5 mg    enoxaparin injection 105 mg    FLUoxetine capsule 20 mg    HYDROmorphone injection 0.5 mg    LORazepam tablet 0.5 mg    magnesium oxide tablet 800 mg    magnesium oxide tablet 800 mg    multivitamin tablet    nitroGLYCERIN SL tablet 0.4 mg    ondansetron injection 4 mg    oxyCODONE 12 hr tablet 10 mg    oxyCODONE-acetaminophen 5-325 mg per tablet 1 tablet    pantoprazole EC tablet 40 mg    polyethylene glycol packet 17 g    potassium chloride 10% oral solution 40 mEq    potassium chloride 10% oral solution 40 mEq    potassium chloride 10% oral solution 60 mEq    potassium, sodium phosphates 280-160-250 mg packet 2 packet    potassium, sodium phosphates 280-160-250 mg packet 2 packet    potassium, sodium phosphates 280-160-250 mg packet 2 packet    promethazine tablet 25 mg    sodium chloride 0.9% flush 10 mL    sucralfate 100 mg/mL suspension 1 g    tiZANidine tablet 4 mg     Facility-Administered Medications Ordered in Other Encounters   Medication    lactated ringers  infusion    lidocaine (PF) 10 mg/ml (1%) injection 10 mg     Objective:     Vital Signs (Most Recent):  Temp: 98.1 °F (36.7 °C) (06/26/20 1156)  Pulse: 65 (06/26/20 1156)  Resp: 15 (06/26/20 1156)  BP: 129/62 (06/26/20 1156)  SpO2: 96 % (06/26/20 1227) Vital Signs (24h Range):  Temp:  [98 °F (36.7 °C)-98.9 °F (37.2 °C)] 98.1 °F (36.7 °C)  Pulse:  [57-65] 65  Resp:  [14-18] 15  SpO2:  [93 %-100 %] 96 %  BP: (127-154)/(60-67) 129/62     Weight: 111.6 kg (246 lb)  Height: 6' (182.9 cm)  Body mass index is 33.36 kg/m².      Intake/Output Summary (Last 24 hours) at 6/26/2020 1447  Last data filed at 6/25/2020 2200  Gross per 24 hour   Intake --   Output 480 ml   Net -480 ml       General    Nursing note and vitals reviewed.  Constitutional: She is oriented to person, place, and time. She appears well-developed and well-nourished. No distress.   HENT:   Head: Normocephalic and atraumatic.   Eyes: EOM are normal.   Cardiovascular: Normal rate.    Pulmonary/Chest: Effort normal.   Abdominal: Soft.   Neurological: She is alert and oriented to person, place, and time.   Psychiatric: Her behavior is normal.           Right Knee Exam     Inspection   Erythema: absent  Swelling: present  Effusion: present    Range of Motion   Extension: 0   Flexion: 100     Other   Sensation: normal    Comments:  Dsg c/d/i    Left Knee Exam   Left knee exam is normal.    Muscle Strength   Right Lower Extremity   Quadriceps:  4/5     Vascular Exam     Right Pulses  Dorsalis Pedis:      2+              Significant Labs:   CBC:   Recent Labs   Lab 06/24/20  1516 06/25/20  0524 06/26/20  0453   WBC 7.72 11.91 13.22*   HGB 11.2* 11.4* 11.2*   HCT 34.8* 35.2* 34.5*    216 179     CMP:   Recent Labs   Lab 06/24/20  1516 06/25/20  0524 06/26/20  0453    137 137   K 3.4* 3.8 3.7    100 100   CO2 27 23 26   GLU 96 162* 128*   BUN 7 8 8   CREATININE 0.8 0.7 0.7   CALCIUM 9.7 9.6 9.5   PROT 8.2  --   --    ALBUMIN 3.6  --   --    BILITOT  0.5  --   --    ALKPHOS 108  --   --    AST 23  --   --    ALT 21  --   --    ANIONGAP 11 14 11   EGFRNONAA >60 >60 >60     Coagulation:   Recent Labs   Lab 06/24/20  1516   LABPROT 11.4   INR 1.0     CRP:   Recent Labs   Lab 06/24/20  1516   CRP 90.4*     All pertinent labs within the past 24 hours have been reviewed.    Significant Imaging: None

## 2020-06-26 NOTE — PT/OT/SLP EVAL
"Occupational Therapy   Evaluation    Name: Aleyda Costa  MRN: [id number]  Admitting Diagnosis:  R knee pain, chest pain and nausea       Recommendations:     Discharge Recommendations: home health OT, home  Discharge Equipment Recommendations:  none  Barriers to discharge:  None    Assessment:     Aleyda Costa is a 39 y.o. female with a medical diagnosis of R knee pain, chest pain and nausea.  She presents with right knee arthroplasty per Dr. Crisostomo on 6/16/20, pt returned to ED 6/21 for increasing pain/swelling to R leg - ultrasound of the RLE on that date demonstrated a right nonocclusive popliteal DVT. Pt was discharged home on 6/23 on lovenox and oral pain meds. Pt returned to ED 6/24 complaining of pain to right leg that is not controlled with home meds. Past medical history of depression, GERD, pulmonary emboli in 2018, sleep apnea, prior gastric sleeve, and nicotine dependence. Patient reports Supervision to Mod (I) with ADL at home since knee surgery but reports that she would like to be able to do more for herself - when OTR inquiring examples, pt reports that she would like to be able to pick things up. Performance deficits affecting function: weakness, impaired endurance, impaired self care skills, impaired functional mobilty, gait instability, impaired balance, decreased lower extremity function, decreased safety awareness, pain, orthopedic precautions. Pt will have assistance of family upon discharge.     Rehab Prognosis: Good; patient would benefit from acute skilled OT services to address these deficits and reach maximum level of function.       Plan:     Patient to be seen 2 x/week to address the above listed problems via self-care/home management, therapeutic activities, therapeutic exercises  · Plan of Care Expires: 07/02/20  · Plan of Care Reviewed with: patient    Subjective     Chief Complaint: R LE pain  Patient/Family Comments/goals: "I want to be able to do even more. I hate " "being like this. I want to be able to pick things up from the floor."    Occupational Profile:  Living Environment: Lives with spouse in single story home   Previous level of function: Independent prior to R TKA on 6/16/20  Equipment Used at Home:  walker, rolling, raised toilet, wheelchair  Assistance upon Discharge: Family    Pain/Comfort:  · Pain Rating 1: (unrated)  · Location - Side 1: Right  · Location - Orientation 1: generalized  · Location 1: knee  · Pain Addressed 1: Distraction  · Pain Rating Post-Intervention 1: (unrated)    Patients cultural, spiritual, [employer]ian conflicts given the current situation: no    Objective:     Communicated with: Nurse prior to session.  Patient found up in bedside chair  with telemetry(chair alarm, ice pack to R knee) upon OT entry to room.    General Precautions: Standard, fall   Orthopedic Precautions:RLE weight bearing as tolerated   Braces: N/A     Occupational Performance:    Activities of Daily Living:  · Lower Body Dressing: moderate assistance - decreased independence due to pain    Cognitive/Visual Perceptual:  Cognitive/Psychosocial Skills:     -       Oriented to: Person, Place, Time and Situation   -       Follows Commands/attention:Follows multistep  commands  -       Communication: clear/fluent  -       Safety awareness/insight to disability: impaired     Physical Exam:  Upper Extremity Strength:    -       Right Upper Extremity: WNL  -       Left Upper Extremity: WNL    AMPAC 6 Click ADL:  AMPAC Total Score: 21    Treatment & Education:  - OTR providing reinforcement of education/instruction regarding OT role/POC, safety awareness/fall prevention, use of call button, bed/chair alarms; OTR providing and instructing on use of reacher to increase safety and independence with ADL/IADL - pt demonstrates use with Supervision for item retrieval x 5; EKG tech arriving for stat testing  Education:    Patient left up in bedside chair with all lines intact, call button " in reach, chair alarm on and EKG tech and nurse present    GOALS:   Multidisciplinary Problems     Occupational Therapy Goals        Problem: Occupational Therapy Goal    Goal Priority Disciplines Outcome Interventions   Occupational Therapy Goal     OT, PT/OT Ongoing, Progressing    Description: Goals to be met by: 2020     Patient will increase functional independence with ADLs by performing:    LE Dressing with Supervision with AE/AD.  Toileting from toilet with Supervision for hygiene and clothing management.   Toilet transfer to toilet with Supervision.                     History:     Past Medical History:   Diagnosis Date    Abnormal Pap smear of cervix     Arthritis     OA    Back pain     Cancer     skin cancer to back    Depression     Endometriosis     Full dentures     GERD (gastroesophageal reflux disease)     Migraine     Pulmonary embolism     Seizures     Sleep apnea     Does not use c-pap since weight loss - 170 lbs    Uterine fibroid     Wears glasses        Past Surgical History:   Procedure Laterality Date    APPENDECTOMY      CARPAL TUNNEL RELEASE Bilateral      SECTION      CHOLECYSTECTOMY      COLONOSCOPY N/A 2019    Procedure: COLONOSCOPY;  Surgeon: Anselmo Blackwell MD;  Location: [facility];  Service: Endoscopy;  Laterality: N/A;    EPIDURAL STEROID INJECTION INTO LUMBAR SPINE N/A 2019    Procedure: Injection-steroid-epidural-lumbar;  Surgeon: Yariel Ramirez MD;  Location: [facility];  Service: Pain Management;  Laterality: N/A;  L3-4    ESOPHAGOGASTRODUODENOSCOPY N/A 2019    Procedure: EGD (ESOPHAGOGASTRODUODENOSCOPY);  Surgeon: Anselmo Blackwell MD;  Location: [facility];  Service: Endoscopy;  Laterality: N/A;    FRACTURE SURGERY      ankle    gastric sleve      HYSTERECTOMY  2014    endo and fibroids    HYSTERECTOMY      JOINT REPLACEMENT Left 2018    KNEE ARTHROPLASTY Left 2018    Procedure: ARTHROPLASTY, KNEE;  Surgeon:  Feliberto Cardenas MD;  Location: [facility] OR;  Service: Orthopedics;  Laterality: Left;    KNEE ARTHROPLASTY Right 6/16/2020    Procedure: ARTHROPLASTY, KNEE;  Surgeon: Feliberto Cardenas MD;  Location: [facility] OR;  Service: Orthopedics;  Laterality: Right;    KNEE ARTHROSCOPY W/ MENISCECTOMY Right 10/25/2019    Procedure: ARTHROSCOPY, KNEE, WITH MENISCECTOMY;  Surgeon: Feliberto Cardenas MD;  Location: [facility] OR;  Service: Orthopedics;  Laterality: Right;    KNEE SURGERY      LUMBAR EPIDURAL INJECTION      OOPHORECTOMY Left     LSO    RADIAL NERVE Right     RECONSTRUCTION OF MEDIAL PATELLOFEMORAL LIGAMENT OF RIGHT KNEE Right 10/25/2019    Procedure: RECONSTRUCTION, LIGAMENT, MEDIAL PATELLOFEMORAL, RIGHT;  Surgeon: Feliberto Cardenas MD;  Location: [facility] OR;  Service: Orthopedics;  Laterality: Right;    SINUS SURGERY      UPPER GASTROINTESTINAL ENDOSCOPY  11/27/2019    Dr. Blackwell; mild schatzki ring-dilated; gastritis; bx in process       Time Tracking:     OT Date of Treatment: 06/25/20  OT Start Time: 0950  OT Stop Time: 0958  OT Total Time (min): 8 min    Billable Minutes:Evaluation 8    RIKKI Espinoza LOTR  6/25/2020

## 2020-06-26 NOTE — PROGRESS NOTES
Nurse reports patient attempted to walk in the hallway however only ambulated a short distance when she began with complaints of 7/10 stabbing chest pain.  Patient also complained of shortness of breath but her O2 saturations were 95-96% on room air.  Nurse reports patient blood pressure and heart rate remained stable during episode.  Troponin continues to trend upward and now currently 0.632.  Patient discussed with Dr. Kaushik Guthrie.  Will add CK and CK-MB and EKG now.  Also will repeat CK CK-MB, EKG and troponin in a.m..  Will monitor closely for any signs of decline and placed in patient NPO after midnight in case patient may need cardiac catheterization in the a.m.

## 2020-06-26 NOTE — NURSING
Patient aao x 4. Plan of care reviewed with patient, verbalized understanding. Nausea continuous with 2 small emesis episodes noted. Complains of RLE pain, controlled with prn medication. Chest pain noted throughout day. CTA of chest today. EKG completed. Nitroglycerin given, mild to moderate relief obtained per patient. Lovenox given as ordered. Patient closely monitored. Hourly rounding. Bed in lowst psoitin and call light within reach.

## 2020-06-26 NOTE — PROGRESS NOTES
Troponin continues to trend upward.  Patient discussed with Dr. Kaushik Guthrie.  Patient scheduled for Lexiscan stress test in the morning for 10:00 a.m..  Cardiology Department notified.

## 2020-06-26 NOTE — PLAN OF CARE
Pt alert and oriented. Prn pain medication given as needed. Echo and EKG done. Pt ambulatory with stand by assist to bathroom. Still complaining of intermittent chest pain. Room air. Safety maintained. Will continue to monitor.

## 2020-06-26 NOTE — PROGRESS NOTES
[facility]  Orthopedics  Progress Note    Patient Name: Aleyda Costa  MRN: [id number]  Admission Date: 6/24/2020  Hospital Length of Stay: 0 days  Attending Provider: Darryl Wise MD  Primary Care Provider: Darlene Hayden MD    Subjective:     Principal Problem:<principal problem not specified>    Principal Orthopedic Problem: R TKA    Interval History: Pain is better per her. Still working up chest pain.     Review of patient's allergies indicates:   Allergen Reactions    Clarithromycin Swelling and Other (See Comments)     DIFFICULTY SWALLOWING  DIFFICULTY SWALLOWING    Pcn [penicillins] Anaphylaxis    Sulfa (sulfonamide antibiotics) Hives    Wellbutrin [bupropion hcl] Shortness Of Breath and Anaphylaxis    Betadine [povidone-iodine] Hives     Swelling      Cefprozil Hives    Iodinated contrast media Hives    Latex, natural rubber Rash    Sulfamethoxazole-trimethoprim Nausea And Vomiting    Iodine Hives and Swelling    Latex Hives and Swelling       Current Facility-Administered Medications   Medication    acetaminophen tablet 650 mg    amitriptyline tablet 50 mg    aspirin tablet 325 mg    bisacodyL suppository 10 mg    cetirizine tablet 5 mg    enoxaparin injection 105 mg    FLUoxetine capsule 20 mg    HYDROmorphone injection 0.5 mg    LORazepam tablet 0.5 mg    magnesium oxide tablet 800 mg    magnesium oxide tablet 800 mg    multivitamin tablet    nitroGLYCERIN SL tablet 0.4 mg    ondansetron injection 4 mg    oxyCODONE 12 hr tablet 10 mg    oxyCODONE-acetaminophen 5-325 mg per tablet 1 tablet    pantoprazole EC tablet 40 mg    polyethylene glycol packet 17 g    potassium chloride 10% oral solution 40 mEq    potassium chloride 10% oral solution 40 mEq    potassium chloride 10% oral solution 60 mEq    potassium, sodium phosphates 280-160-250 mg packet 2 packet    potassium, sodium phosphates 280-160-250 mg packet 2 packet    potassium, sodium  phosphates 280-160-250 mg packet 2 packet    promethazine tablet 25 mg    sodium chloride 0.9% flush 10 mL    sucralfate 100 mg/mL suspension 1 g    tiZANidine tablet 4 mg     Facility-Administered Medications Ordered in Other Encounters   Medication    lactated ringers infusion    lidocaine (PF) 10 mg/ml (1%) injection 10 mg     Objective:     Vital Signs (Most Recent):  Temp: 98.1 °F (36.7 °C) (06/26/20 1156)  Pulse: 65 (06/26/20 1156)  Resp: 15 (06/26/20 1156)  BP: 129/62 (06/26/20 1156)  SpO2: 96 % (06/26/20 1227) Vital Signs (24h Range):  Temp:  [98 °F (36.7 °C)-98.9 °F (37.2 °C)] 98.1 °F (36.7 °C)  Pulse:  [57-65] 65  Resp:  [14-18] 15  SpO2:  [93 %-100 %] 96 %  BP: (127-154)/(60-67) 129/62     Weight: 111.6 kg (246 lb)  Height: 6' (182.9 cm)  Body mass index is 33.36 kg/m².      Intake/Output Summary (Last 24 hours) at 6/26/2020 1447  Last data filed at 6/25/2020 2200  Gross per 24 hour   Intake --   Output 480 ml   Net -480 ml       General    Nursing note and vitals reviewed.  Constitutional: She is oriented to person, place, and time. She appears well-developed and well-nourished. No distress.   HENT:   Head: Normocephalic and atraumatic.   Eyes: EOM are normal.   Cardiovascular: Normal rate.    Pulmonary/Chest: Effort normal.   Abdominal: Soft.   Neurological: She is alert and oriented to person, place, and time.   Psychiatric: Her behavior is normal.           Right Knee Exam     Inspection   Erythema: absent  Swelling: present  Effusion: present    Range of Motion   Extension: 0   Flexion: 100     Other   Sensation: normal    Comments:  Dsg c/d/i    Left Knee Exam   Left knee exam is normal.    Muscle Strength   Right Lower Extremity   Quadriceps:  4/5     Vascular Exam     Right Pulses  Dorsalis Pedis:      2+              Significant Labs:   CBC:   Recent Labs   Lab 06/24/20  1516 06/25/20  0524 06/26/20  0453   WBC 7.72 11.91 13.22*   HGB 11.2* 11.4* 11.2*   HCT 34.8* 35.2* 34.5*    216  179     CMP:   Recent Labs   Lab 06/24/20  1516 06/25/20  0524 06/26/20  0453    137 137   K 3.4* 3.8 3.7    100 100   CO2 27 23 26   GLU 96 162* 128*   BUN 7 8 8   CREATININE 0.8 0.7 0.7   CALCIUM 9.7 9.6 9.5   PROT 8.2  --   --    ALBUMIN 3.6  --   --    BILITOT 0.5  --   --    ALKPHOS 108  --   --    AST 23  --   --    ALT 21  --   --    ANIONGAP 11 14 11   EGFRNONAA >60 >60 >60     Coagulation:   Recent Labs   Lab 06/24/20  1516   LABPROT 11.4   INR 1.0     CRP:   Recent Labs   Lab 06/24/20  1516   CRP 90.4*     All pertinent labs within the past 24 hours have been reviewed.    Significant Imaging: None    Assessment/Plan:     S/P total knee arthroplasty, right  Pain control.   Nausea control.  Prop up ankle and ice.  Start PT.           Feliberto Cardenas MD  Orthopedics  [facility]

## 2020-06-26 NOTE — PROGRESS NOTES
[facility] Medicine  Progress Note    Patient Name: Aleyda Costa  MRN: [id number]  Patient Class: IP- Inpatient   Admission Date: 6/24/2020  Length of Stay: 0 days  Attending Physician: Darryl Wise MD  Primary Care Provider: Darlene Hayden MD      Subjective:     Principal Problem:  Left lower extremity pain, chest pain with elevated troponin    HPI:  This  is a 39-year-old female with a past medical history significant for depression, GERD, pulmonary emboli in 2018, sleep apnea, prior gastric sleeve, and nicotine dependence presenting today for increased pain to right LE. Pt underwent a right knee arthroplasty per Dr. Crisostomo on 6/16/20. Patient previously failed treatment for pulmonary emboli with Xarelto and was placed on Pradaxa 150 mg p.o. b.i.d. at discharge 6/17 per hematologist rec at that time.  Pt presented back to ED on 6/21 for increasing pain and swelling to right leg. Pt reported compliance with her pradaxa and medications; however, ultrasound of the RLE on that date demonstrated a right nonocclusive popliteal DVT. Pt was readmitted and placed on full dose lovenox. Pt was discharged home on 6/23 on lovenox and pain was controlled with oral meds at that time. Dr. Crisostomo provided pt with oral dilaudid pain script on d/c.  Patient returns to ED today complaints of intractable pain to right leg that is not controlled with home meds. She denies worsening swelling to leg. She describes the pain as right knee and post lower leg location, 10/10 intensity, pressure quality, worsens with movement, assoc with nausea secondary to severe pain, improved after receiving IV dilaudid in ED. She denies fever, chills, cp and sob. Repeat US done in ED confirms DVT stable. CRP improving and WBC wnl. Dr. Crisostomo consulted from ED and rec admission for pain control.      Overview/Hospital Course:  The patient was monitored closely during her stay.  She received p.r.n. pain medication.   Orthopedics were consulted.  Patient had complaints of chest pain during her stay.  She was kept on continuous telemetry monitoring where she remains sinus rhythm.  Her troponins were trended.  Her initial troponin was negative however her 2nd troponin was elevated.  Patient was given full-dose aspirin.  She was noted to have an elevated D-dimer and a CTA of the chest was ordered.  Patient was continued on full-dose Lovenox 1 milligram/kilogram subcu q.12 hours.  A right lower extremity arterial ultrasound was ordered to evaluate patient's circulation due to her ongoing severe right lower extremity pain.  Patient CTA of the chest was negative for pulmonary emboli.  Her troponins continued to trend upward.  Cardiology was consulted.     Interval History:  Patient continues to complain of intermittent chest pain.  Troponin trending upward.  Cardiology workup in progress.       Review of Systems   Constitutional: Positive for activity change, appetite change and fatigue. Negative for chills and fever.   HENT: Negative for congestion, ear discharge, ear pain, facial swelling, sore throat and trouble swallowing.    Eyes: Negative for pain and redness.   Respiratory: Negative for cough, chest tightness, shortness of breath and wheezing.    Cardiovascular: Positive for chest pain and leg swelling. Negative for palpitations.   Gastrointestinal: Positive for nausea. Negative for abdominal distention, abdominal pain, blood in stool, constipation, diarrhea and vomiting.   Endocrine: Negative for polydipsia and polyphagia.   Genitourinary: Negative for difficulty urinating, dysuria, flank pain, hematuria and urgency.   Musculoskeletal: Positive for arthralgias, gait problem and joint swelling. Negative for back pain, neck pain and neck stiffness.   Skin: Positive for color change and wound. Negative for pallor and rash.        Bruising to RLE, surgical wound to right knee   Allergic/Immunologic: Negative for food allergies.    Neurological: Negative for seizures, facial asymmetry, speech difficulty and weakness.   Hematological: Does not bruise/bleed easily.   Psychiatric/Behavioral: Negative for agitation, behavioral problems, confusion, dysphoric mood and suicidal ideas. The patient is not nervous/anxious.    All other systems reviewed and are negative.    Objective:     Vital Signs (Most Recent):  Temp: 96.3 °F (35.7 °C) (06/26/20 1622)  Pulse: 71 (06/26/20 1622)  Resp: 17 (06/26/20 1622)  BP: 135/63 (06/26/20 1622)  SpO2: 96 % (06/26/20 1622) Vital Signs (24h Range):  Temp:  [96.3 °F (35.7 °C)-98.9 °F (37.2 °C)] 96.3 °F (35.7 °C)  Pulse:  [57-71] 71  Resp:  [14-18] 17  SpO2:  [93 %-100 %] 96 %  BP: (127-154)/(60-67) 135/63     Weight: 111.6 kg (246 lb)  Body mass index is 33.36 kg/m².    Physical Exam  Vitals signs and nursing note reviewed.   Constitutional:       General: She is not in acute distress.     Appearance: Normal appearance. She is well-developed. She is not ill-appearing, toxic-appearing or diaphoretic.   HENT:      Head: Normocephalic and atraumatic.      Mouth/Throat:      Mouth: Mucous membranes are moist.   Eyes:      General:         Right eye: No discharge.         Left eye: No discharge.      Conjunctiva/sclera: Conjunctivae normal.      Pupils: Pupils are equal, round, and reactive to light.   Neck:      Musculoskeletal: Normal range of motion and neck supple. No neck rigidity or muscular tenderness.      Vascular: No JVD.   Cardiovascular:      Rate and Rhythm: Normal rate and regular rhythm.      Pulses: Normal pulses.      Heart sounds: Normal heart sounds. No murmur.   Pulmonary:      Effort: Pulmonary effort is normal. No respiratory distress.      Breath sounds: Normal breath sounds. No stridor. No wheezing, rhonchi or rales.   Abdominal:      General: Bowel sounds are normal. There is no distension.      Palpations: Abdomen is soft.      Tenderness: There is no abdominal tenderness. There is no guarding.    Genitourinary:     Comments: Not examined  Musculoskeletal:         General: Swelling present. No tenderness.      Right lower leg: Edema present.      Comments: L;imited ROM to right knee secondary to pain with movement. +3 edema to right leg with scattered ecchymosis to thigh, knee and ankle. Surgical incision to ant knee CDI, no erythema or drainage   Skin:     General: Skin is warm and dry.      Capillary Refill: Capillary refill takes 2 to 3 seconds.      Coloration: Skin is not jaundiced.      Findings: Bruising present. No erythema, lesion or rash.      Comments: Bruising right lower extremity including foot   Neurological:      General: No focal deficit present.      Mental Status: She is alert and oriented to person, place, and time. Mental status is at baseline.      Cranial Nerves: No cranial nerve deficit.      Sensory: No sensory deficit.   Psychiatric:         Mood and Affect: Mood normal.         Behavior: Behavior normal.         Thought Content: Thought content normal.         Judgment: Judgment normal.           CRANIAL NERVES     CN III, IV, VI   Pupils are equal, round, and reactive to light.    Labs reviewed      Assessment/Plan:      Hiatal hernia  Continue PPI      Atelectasis  Encourage incentive spirometer      History of bariatric surgery  History of prior gastric sleeve surgery      Chest pain  With elevated troponin-  Patient also with elevated D-dimer-CTA of the chest were negative for pulmonary emboli Telemetry  Continue full-dose aspirin  Cardiology consulted for evaluation  Continue to Trend troponins and monitor closely for any signs of decline  EKG shows no significant ST elevation or depression at this time  Keep patient NPO after midnight for possible further cardiac workup in the morning -Dr. Kaushik Guthrie to evaluate      DVT (deep venous thrombosis)  Repeat right lower extremity venous ultrasound showed- no worsening of DVT  Continue full dose lovenox-Hematology previously  consulted due to failed oral anticoagulant and patient recommended to be on subq full-dose Lovenox for DVT  Telemetry monitoring  Evaluate patient for pulmonary emboli due to complaints of chest pain        Generalized anxiety disorder  Continue home medications      History of pulmonary embolism-  Acute pulmonary emboli excluded  Currently on lovenox for right lower extremity DVT  Repeat CTA of the chest was negative for pulmonary emboli    Nicotine dependence  Smoking cessation education > 3 minutes      Normocytic anemia  Continue Multivitamin      S/P total knee arthroplasty, right  Right TKA on 6/16 with Dr. Crisostomo  Continue physical therapy and occupational therapy once medically cleared  Fall precautions  Continue full-dose anticoagulation with subq Lovenox        VTE Risk Mitigation (From admission, onward)         Ordered     enoxaparin injection 105 mg  Every 12 hours      06/24/20 1816     IP VTE HIGH RISK PATIENT  Once      06/24/20 1815                Time spent seeing patient( greater than 1/2 spent in direct contact) : 43 minutes      SCHUYLER Rivera  Department of Hospital Medicine   [facility]

## 2020-06-27 PROBLEM — E87.6 HYPOKALEMIA: Status: ACTIVE | Noted: 2020-06-27

## 2020-06-27 LAB
ALBUMIN SERPL BCP-MCNC: 3.4 G/DL (ref 3.5–5.2)
ALP SERPL-CCNC: 87 U/L (ref 55–135)
ALT SERPL W/O P-5'-P-CCNC: 14 U/L (ref 10–44)
ANION GAP SERPL CALC-SCNC: 10 MMOL/L (ref 8–16)
ANION GAP SERPL CALC-SCNC: 12 MMOL/L (ref 8–16)
AST SERPL-CCNC: 20 U/L (ref 10–40)
BACTERIA #/AREA URNS HPF: ABNORMAL /HPF
BASOPHILS # BLD AUTO: 0.07 K/UL (ref 0–0.2)
BASOPHILS NFR BLD: 0.6 % (ref 0–1.9)
BILIRUB SERPL-MCNC: 0.6 MG/DL (ref 0.1–1)
BILIRUB UR QL STRIP: NEGATIVE
BUN SERPL-MCNC: 10 MG/DL (ref 6–20)
BUN SERPL-MCNC: 10 MG/DL (ref 6–20)
CALCIUM SERPL-MCNC: 9.4 MG/DL (ref 8.7–10.5)
CALCIUM SERPL-MCNC: 9.7 MG/DL (ref 8.7–10.5)
CHLORIDE SERPL-SCNC: 100 MMOL/L (ref 95–110)
CHLORIDE SERPL-SCNC: 100 MMOL/L (ref 95–110)
CK MB SERPL-MCNC: 1.3 NG/ML (ref 0.1–6.5)
CK MB SERPL-RTO: 2 % (ref 0–5)
CK SERPL-CCNC: 65 U/L (ref 20–180)
CK SERPL-CCNC: 65 U/L (ref 20–180)
CLARITY UR: ABNORMAL
CO2 SERPL-SCNC: 26 MMOL/L (ref 23–29)
CO2 SERPL-SCNC: 27 MMOL/L (ref 23–29)
COLOR UR: YELLOW
CREAT SERPL-MCNC: 0.7 MG/DL (ref 0.5–1.4)
CREAT SERPL-MCNC: 0.7 MG/DL (ref 0.5–1.4)
DIFFERENTIAL METHOD: ABNORMAL
EOSINOPHIL # BLD AUTO: 0.2 K/UL (ref 0–0.5)
EOSINOPHIL NFR BLD: 1.8 % (ref 0–8)
ERYTHROCYTE [DISTWIDTH] IN BLOOD BY AUTOMATED COUNT: 13.7 % (ref 11.5–14.5)
EST. GFR  (AFRICAN AMERICAN): >60 ML/MIN/1.73 M^2
EST. GFR  (AFRICAN AMERICAN): >60 ML/MIN/1.73 M^2
EST. GFR  (NON AFRICAN AMERICAN): >60 ML/MIN/1.73 M^2
EST. GFR  (NON AFRICAN AMERICAN): >60 ML/MIN/1.73 M^2
GLUCOSE SERPL-MCNC: 100 MG/DL (ref 70–110)
GLUCOSE SERPL-MCNC: 102 MG/DL (ref 70–110)
GLUCOSE UR QL STRIP: NEGATIVE
HCT VFR BLD AUTO: 35 % (ref 37–48.5)
HGB BLD-MCNC: 11.1 G/DL (ref 12–16)
HGB UR QL STRIP: ABNORMAL
IMM GRANULOCYTES # BLD AUTO: 0.1 K/UL (ref 0–0.04)
IMM GRANULOCYTES NFR BLD AUTO: 0.9 % (ref 0–0.5)
KETONES UR QL STRIP: NEGATIVE
LEUKOCYTE ESTERASE UR QL STRIP: ABNORMAL
LYMPHOCYTES # BLD AUTO: 3 K/UL (ref 1–4.8)
LYMPHOCYTES NFR BLD: 25.7 % (ref 18–48)
MAGNESIUM SERPL-MCNC: 2 MG/DL (ref 1.6–2.6)
MCH RBC QN AUTO: 29.4 PG (ref 27–31)
MCHC RBC AUTO-ENTMCNC: 31.7 G/DL (ref 32–36)
MCV RBC AUTO: 93 FL (ref 82–98)
MICROSCOPIC COMMENT: ABNORMAL
MONOCYTES # BLD AUTO: 0.8 K/UL (ref 0.3–1)
MONOCYTES NFR BLD: 7.2 % (ref 4–15)
NEUTROPHILS # BLD AUTO: 7.4 K/UL (ref 1.8–7.7)
NEUTROPHILS NFR BLD: 63.8 % (ref 38–73)
NITRITE UR QL STRIP: NEGATIVE
NRBC BLD-RTO: 0 /100 WBC
PH UR STRIP: 7 [PH] (ref 5–8)
PLATELET # BLD AUTO: 180 K/UL (ref 150–350)
PMV BLD AUTO: 9.9 FL (ref 9.2–12.9)
POTASSIUM SERPL-SCNC: 3.4 MMOL/L (ref 3.5–5.1)
POTASSIUM SERPL-SCNC: 3.4 MMOL/L (ref 3.5–5.1)
PROT SERPL-MCNC: 7.6 G/DL (ref 6–8.4)
PROT UR QL STRIP: NEGATIVE
RBC # BLD AUTO: 3.77 M/UL (ref 4–5.4)
RBC #/AREA URNS HPF: 7 /HPF (ref 0–4)
SODIUM SERPL-SCNC: 137 MMOL/L (ref 136–145)
SODIUM SERPL-SCNC: 138 MMOL/L (ref 136–145)
SP GR UR STRIP: 1.01 (ref 1–1.03)
SQUAMOUS #/AREA URNS HPF: 4 /HPF
TROPONIN I SERPL DL<=0.01 NG/ML-MCNC: 0.35 NG/ML (ref 0–0.03)
URN SPEC COLLECT METH UR: ABNORMAL
UROBILINOGEN UR STRIP-ACNC: 1 EU/DL
WBC # BLD AUTO: 11.64 K/UL (ref 3.9–12.7)
WBC #/AREA URNS HPF: 2 /HPF (ref 0–5)

## 2020-06-27 PROCEDURE — 81000 URINALYSIS NONAUTO W/SCOPE: CPT

## 2020-06-27 PROCEDURE — 97116 GAIT TRAINING THERAPY: CPT | Mod: CQ

## 2020-06-27 PROCEDURE — 82550 ASSAY OF CK (CPK): CPT

## 2020-06-27 PROCEDURE — 63600175 PHARM REV CODE 636 W HCPCS: Performed by: NURSE PRACTITIONER

## 2020-06-27 PROCEDURE — 84484 ASSAY OF TROPONIN QUANT: CPT

## 2020-06-27 PROCEDURE — 82553 CREATINE MB FRACTION: CPT

## 2020-06-27 PROCEDURE — 36415 COLL VENOUS BLD VENIPUNCTURE: CPT

## 2020-06-27 PROCEDURE — 25000242 PHARM REV CODE 250 ALT 637 W/ HCPCS: Performed by: NURSE PRACTITIONER

## 2020-06-27 PROCEDURE — 83735 ASSAY OF MAGNESIUM: CPT

## 2020-06-27 PROCEDURE — 94799 UNLISTED PULMONARY SVC/PX: CPT

## 2020-06-27 PROCEDURE — 85025 COMPLETE CBC W/AUTO DIFF WBC: CPT

## 2020-06-27 PROCEDURE — 80053 COMPREHEN METABOLIC PANEL: CPT

## 2020-06-27 PROCEDURE — 25000003 PHARM REV CODE 250: Performed by: NURSE PRACTITIONER

## 2020-06-27 PROCEDURE — 93005 ELECTROCARDIOGRAM TRACING: CPT

## 2020-06-27 PROCEDURE — 94761 N-INVAS EAR/PLS OXIMETRY MLT: CPT

## 2020-06-27 PROCEDURE — 80048 BASIC METABOLIC PNL TOTAL CA: CPT

## 2020-06-27 PROCEDURE — 12000002 HC ACUTE/MED SURGE SEMI-PRIVATE ROOM

## 2020-06-27 PROCEDURE — 25000003 PHARM REV CODE 250: Performed by: SPECIALIST

## 2020-06-27 RX ORDER — POTASSIUM CHLORIDE 7.45 MG/ML
10 INJECTION INTRAVENOUS
Status: DISPENSED | OUTPATIENT
Start: 2020-06-27 | End: 2020-06-27

## 2020-06-27 RX ORDER — NAPROXEN SODIUM 220 MG/1
81 TABLET, FILM COATED ORAL DAILY
Status: DISCONTINUED | OUTPATIENT
Start: 2020-06-27 | End: 2020-06-29 | Stop reason: HOSPADM

## 2020-06-27 RX ORDER — HYDROMORPHONE HYDROCHLORIDE 1 MG/ML
0.2 INJECTION, SOLUTION INTRAMUSCULAR; INTRAVENOUS; SUBCUTANEOUS EVERY 6 HOURS PRN
Status: DISCONTINUED | OUTPATIENT
Start: 2020-06-27 | End: 2020-06-29 | Stop reason: HOSPADM

## 2020-06-27 RX ORDER — ATORVASTATIN CALCIUM 40 MG/1
40 TABLET, FILM COATED ORAL NIGHTLY
Status: DISCONTINUED | OUTPATIENT
Start: 2020-06-27 | End: 2020-06-29 | Stop reason: HOSPADM

## 2020-06-27 RX ORDER — METOPROLOL TARTRATE 25 MG/1
25 TABLET, FILM COATED ORAL 2 TIMES DAILY
Status: DISCONTINUED | OUTPATIENT
Start: 2020-06-27 | End: 2020-06-29 | Stop reason: HOSPADM

## 2020-06-27 RX ADMIN — TIZANIDINE 4 MG: 4 TABLET ORAL at 07:06

## 2020-06-27 RX ADMIN — OXYCODONE HYDROCHLORIDE 10 MG: 10 TABLET, FILM COATED, EXTENDED RELEASE ORAL at 01:06

## 2020-06-27 RX ADMIN — THERA TABS 1 TABLET: TAB at 12:06

## 2020-06-27 RX ADMIN — OXYCODONE HYDROCHLORIDE 10 MG: 10 TABLET, FILM COATED, EXTENDED RELEASE ORAL at 08:06

## 2020-06-27 RX ADMIN — SUCRALFATE 1 G: 1 SUSPENSION ORAL at 12:06

## 2020-06-27 RX ADMIN — PANTOPRAZOLE SODIUM 40 MG: 40 TABLET, DELAYED RELEASE ORAL at 08:06

## 2020-06-27 RX ADMIN — ENOXAPARIN SODIUM 105 MG: 120 INJECTION SUBCUTANEOUS at 09:06

## 2020-06-27 RX ADMIN — HYDROMORPHONE HYDROCHLORIDE 0.5 MG: 1 INJECTION, SOLUTION INTRAMUSCULAR; INTRAVENOUS; SUBCUTANEOUS at 08:06

## 2020-06-27 RX ADMIN — METOPROLOL TARTRATE 25 MG: 25 TABLET, FILM COATED ORAL at 07:06

## 2020-06-27 RX ADMIN — OXYCODONE HYDROCHLORIDE AND ACETAMINOPHEN 1 TABLET: 5; 325 TABLET ORAL at 04:06

## 2020-06-27 RX ADMIN — HYDROMORPHONE HYDROCHLORIDE 0.2 MG: 1 INJECTION, SOLUTION INTRAMUSCULAR; INTRAVENOUS; SUBCUTANEOUS at 07:06

## 2020-06-27 RX ADMIN — AMITRIPTYLINE HYDROCHLORIDE 50 MG: 50 TABLET, FILM COATED ORAL at 08:06

## 2020-06-27 RX ADMIN — ONDANSETRON 4 MG: 2 INJECTION INTRAMUSCULAR; INTRAVENOUS at 04:06

## 2020-06-27 RX ADMIN — LIDOCAINE HYDROCHLORIDE: 20 SOLUTION ORAL; TOPICAL at 12:06

## 2020-06-27 RX ADMIN — HYDROMORPHONE HYDROCHLORIDE 0.5 MG: 1 INJECTION, SOLUTION INTRAMUSCULAR; INTRAVENOUS; SUBCUTANEOUS at 12:06

## 2020-06-27 RX ADMIN — ATORVASTATIN CALCIUM 40 MG: 40 TABLET, FILM COATED ORAL at 08:06

## 2020-06-27 RX ADMIN — NITROGLYCERIN 0.4 MG: 0.4 TABLET, ORALLY DISINTEGRATING SUBLINGUAL at 09:06

## 2020-06-27 RX ADMIN — SUCRALFATE 1 G: 1 SUSPENSION ORAL at 08:06

## 2020-06-27 RX ADMIN — FLUOXETINE HYDROCHLORIDE 20 MG: 20 CAPSULE ORAL at 08:06

## 2020-06-27 RX ADMIN — CETIRIZINE HYDROCHLORIDE 5 MG: 5 TABLET, FILM COATED ORAL at 12:06

## 2020-06-27 RX ADMIN — POTASSIUM CHLORIDE 10 MEQ: 7.46 INJECTION, SOLUTION INTRAVENOUS at 11:06

## 2020-06-27 RX ADMIN — OXYCODONE HYDROCHLORIDE AND ACETAMINOPHEN 1 TABLET: 5; 325 TABLET ORAL at 10:06

## 2020-06-27 RX ADMIN — HYDROMORPHONE HYDROCHLORIDE 0.5 MG: 1 INJECTION, SOLUTION INTRAMUSCULAR; INTRAVENOUS; SUBCUTANEOUS at 03:06

## 2020-06-27 RX ADMIN — ENOXAPARIN SODIUM 105 MG: 120 INJECTION SUBCUTANEOUS at 08:06

## 2020-06-27 RX ADMIN — ASPIRIN 81 MG CHEWABLE TABLET 81 MG: 81 TABLET CHEWABLE at 07:06

## 2020-06-27 NOTE — PLAN OF CARE
Pt alert and oriented. Ambulatory to bathroom. Prn pain medication given. No new complaints. Safety maintained. Updated patient on plan of care. Will continue to monitor.

## 2020-06-27 NOTE — PROGRESS NOTES
[facility] Medicine  Progress Note    Patient Name: Aleyda Costa  MRN: [id number]  Patient Class: IP- Inpatient   Admission Date: 6/24/2020  Length of Stay: 1 days  Attending Physician: Darryl Wise MD  Primary Care Provider: Darlene Hayden MD      Subjective:     Principal Problem:  Intractable right lower extremity pain-much improved since admission, chest pain with elevated troponin    HPI:  This  is a 39-year-old female with a past medical history significant for depression, GERD, pulmonary emboli in 2018, sleep apnea, prior gastric sleeve, and nicotine dependence presenting today for increased pain to right LE. Pt underwent a right knee arthroplasty per Dr. Crisostomo on 6/16/20. Patient previously failed treatment for pulmonary emboli with Xarelto and was placed on Pradaxa 150 mg p.o. b.i.d. at discharge 6/17 per hematologist rec at that time.  Pt presented back to ED on 6/21 for increasing pain and swelling to right leg. Pt reported compliance with her pradaxa and medications; however, ultrasound of the RLE on that date demonstrated a right nonocclusive popliteal DVT. Pt was readmitted and placed on full dose lovenox. Pt was discharged home on 6/23 on lovenox and pain was controlled with oral meds at that time. Dr. Crisostomo provided pt with oral dilaudid pain script on d/c.  Patient returns to ED today complaints of intractable pain to right leg that is not controlled with home meds. She denies worsening swelling to leg. She describes the pain as right knee and post lower leg location, 10/10 intensity, pressure quality, worsens with movement, assoc with nausea secondary to severe pain, improved after receiving IV dilaudid in ED. She denies fever, chills, cp and sob. Repeat US done in ED confirms DVT stable. CRP improving and WBC wnl. Dr. Crisostomo consulted from ED and rec admission for pain control.      Overview/Hospital Course:  The patient was monitored closely during her  stay.  She received p.r.n. pain medication.  Orthopedics were consulted.  Physical therapy and occupational therapy were consulted.  Patient's pain medication was titrated.  Patient had complaints of chest pain during her stay.  She was kept on continuous telemetry monitoring where she remains sinus rhythm.  Her troponins were trended.  Her initial troponin was negative however her 2nd troponin was elevated.  Patient was given full-dose aspirin.  She was noted to have an elevated D-dimer and a CTA of the chest was ordered.  Patient was continued on full-dose Lovenox 1 milligram/kilogram subcu q.12 hours.  A right lower extremity arterial ultrasound was ordered to evaluate patient's circulation due to her ongoing severe right lower extremity pain.  Patient CTA of the chest was negative for pulmonary emboli.  Her troponins continued to trend upward.  Cardiology was consulted.  Right lower extremity arterial ultrasound showed no significant stenosis.  Patient later reported mid epigastric reproducible pain.  She reported intermittent use of NSAIDs at home prior to admission.  Her home b.i.d. Protonix was continued.  She was also placed on Carafate.  Patient with noted prior history of gastric sleeve in 2014 and was encouraged to continue her bariatric diet.     Interval History:  Patient reports improvement in pain to right lower extremity.  IV Dilaudid dose decreased.  Patient reports some improvement in her chest pain since yesterday.  Cardiology workup in progress.  Patient does report intermittent NSAID use at home prior to admission.  Continue b.i.d. Protonix and Carafate recently added.       Review of Systems   Constitutional: Positive for activity change, appetite change and fatigue. Negative for chills and fever.   HENT: Negative for congestion, ear discharge, ear pain, facial swelling, sore throat and trouble swallowing.    Eyes: Negative for pain and redness.   Respiratory: Negative for cough, chest  tightness, shortness of breath and wheezing.    Cardiovascular: Positive for chest pain and leg swelling. Negative for palpitations.   Gastrointestinal: Positive for nausea. Negative for abdominal distention, abdominal pain, blood in stool, constipation, diarrhea and vomiting.        No further vomiting noted this time  Patient eating chips and drinking Coke at the bedside   Endocrine: Negative for polydipsia and polyphagia.   Genitourinary: Negative for difficulty urinating, dysuria, flank pain, hematuria and urgency.   Musculoskeletal: Positive for arthralgias and gait problem. Negative for back pain, neck pain and neck stiffness.   Skin: Positive for color change and wound. Negative for pallor and rash.        Bruising to RLE improving, surgical wound to right knee   Allergic/Immunologic: Negative for food allergies.   Neurological: Negative for seizures, facial asymmetry, speech difficulty and weakness.   Hematological: Does not bruise/bleed easily.   Psychiatric/Behavioral: Negative for agitation, behavioral problems, confusion, dysphoric mood and suicidal ideas. The patient is not nervous/anxious.    All other systems reviewed and are negative.    Objective:     Vital Signs (Most Recent):  Temp: 97.6 °F (36.4 °C) (06/27/20 1146)  Pulse: 70 (06/27/20 1146)  Resp: 19 (06/27/20 1303)  BP: 120/60 (06/27/20 1146)  SpO2: 96 % (06/27/20 1146) Vital Signs (24h Range):  Temp:  [96.3 °F (35.7 °C)-99.4 °F (37.4 °C)] 97.6 °F (36.4 °C)  Pulse:  [67-73] 70  Resp:  [16-19] 19  SpO2:  [94 %-99 %] 96 %  BP: (118-135)/(57-63) 120/60     Weight: 111.6 kg (246 lb)  Body mass index is 33.36 kg/m².    Physical Exam  Vitals signs and nursing note reviewed.   Constitutional:       General: She is not in acute distress.     Appearance: Normal appearance. She is well-developed. She is not ill-appearing, toxic-appearing or diaphoretic.   HENT:      Head: Normocephalic and atraumatic.      Mouth/Throat:      Mouth: Mucous membranes are  moist.   Eyes:      General:         Right eye: No discharge.         Left eye: No discharge.      Conjunctiva/sclera: Conjunctivae normal.      Pupils: Pupils are equal, round, and reactive to light.   Neck:      Musculoskeletal: Normal range of motion and neck supple. No neck rigidity or muscular tenderness.      Vascular: No JVD.   Cardiovascular:      Rate and Rhythm: Normal rate and regular rhythm.      Pulses: Normal pulses.      Heart sounds: Normal heart sounds. No murmur.   Pulmonary:      Effort: Pulmonary effort is normal. No respiratory distress.      Breath sounds: Normal breath sounds. No stridor. No wheezing, rhonchi or rales.   Abdominal:      General: Bowel sounds are normal. There is no distension.      Palpations: Abdomen is soft.      Tenderness: There is no abdominal tenderness. There is no guarding.   Genitourinary:     Comments: Not examined  Musculoskeletal:         General: Swelling present. No tenderness.      Right lower leg: Edema present.      Comments: Right lower extremity bruising and edema-much improved since admission   Skin:     General: Skin is warm and dry.      Capillary Refill: Capillary refill takes 2 to 3 seconds.      Coloration: Skin is not jaundiced.      Findings: Bruising present. No erythema, lesion or rash.      Comments: Bruising right lower extremity including foot-improved   Neurological:      General: No focal deficit present.      Mental Status: She is alert and oriented to person, place, and time. Mental status is at baseline.      Cranial Nerves: No cranial nerve deficit.      Sensory: No sensory deficit.   Psychiatric:         Mood and Affect: Mood normal.         Behavior: Behavior normal.         Thought Content: Thought content normal.         Judgment: Judgment normal.           CRANIAL NERVES     CN III, IV, VI   Pupils are equal, round, and reactive to light.    Labs reviewed      Assessment/Plan:      Elevated troponin  Telemetry  Trend  levels  Cardiology consulted      Hiatal hernia  Continue PPI      Atelectasis  Encourage incentive spirometer      History of bariatric surgery  History of prior gastric sleeve surgery-  Patient encouraged to follow  bariatric diet      Pain of right lower extremity  Patient reports overall improvement in the pain to her right lower extremity since admission  Decrease IV Dilaudid  Continue physical therapy and occupational therapy  Fall precautions  Patient will need home health at discharge  Right lower extremity arterial ultrasound showed no significant stenosis      Chest pain  With elevated troponin-  Patient also with elevated [id number] CTA of the chest was negative for pulmonary emboli   Telemetry  Continue full-dose aspirin for now till acute cardiac issues can be excluded  Cardiology consulted-workup in progress  Trend troponins and monitor closely for any signs of decline  EKG shows no significant ST elevation or depression at this time      DVT (deep venous thrombosis)  Repeat right lower extremity venous ultrasound showed- no worsening of DVT  Continue full dose lovenox-Hematology previously consulted due to failed oral anticoagulant and patient recommended to be on subq full-dose Lovenox for DVT  Telemetry         Generalized anxiety disorder  Continue home medications      History of pulmonary embolism  Currently on lovenox for right lower extremity DVT  Repeat CTA of the chest was negative for pulmonary emboli      Nicotine dependence  Smoking cessation education > 3 minutes      Normocytic anemia  Continue Multivitamin      S/P total knee arthroplasty, right  Status post Right TKA on 6/16 with Dr. Crisostomo-  Continue physical therapy and occupational therapy   Fall precautions  Continue full-dose anticoagulation with subq Lovenox        VTE Risk Mitigation (From admission, onward)         Ordered     enoxaparin injection 105 mg  Every 12 hours      06/24/20 1816     IP VTE HIGH RISK PATIENT  Once       06/24/20 1811                Time spent seeing patient( greater than 1/2 spent in direct contact) : 32 minutes      SCHUYLER Rivera  Department of Hospital Medicine   [facility]

## 2020-06-27 NOTE — ASSESSMENT & PLAN NOTE
With elevated troponin-  Patient also with elevated [id number] CTA of the chest was negative for pulmonary emboli   Telemetry  Continue full-dose aspirin for now till acute cardiac issues can be excluded  Cardiology consulted-workup in progress  Trend troponins and monitor closely for any signs of decline  EKG shows no significant ST elevation or depression at this time

## 2020-06-27 NOTE — PROGRESS NOTES
Progress Note  Cardiology    Admit Date: 6/24/2020   LOS: 1 day     Follow-up For:  Chest pain; elevated troponin    Scheduled Meds:   amitriptyline  50 mg Oral QHS    aspirin  325 mg Oral Daily    cetirizine  5 mg Oral Daily    enoxaparin  1 mg/kg Subcutaneous Q12H    FLUoxetine  20 mg Oral QHS    multivitamin  1 tablet Oral Daily    oxyCODONE  10 mg Oral Q12H    pantoprazole  40 mg Oral BID    polyethylene glycol  17 g Oral Daily    sucralfate  1 g Oral QID (AC & HS)     Continuous Infusions:  PRN Meds:acetaminophen, bisacodyL, HYDROmorphone, LORazepam, magnesium oxide, magnesium oxide, nitroGLYCERIN, ondansetron, oxyCODONE-acetaminophen, potassium chloride 10%, potassium chloride 10%, potassium chloride 10%, potassium, sodium phosphates, potassium, sodium phosphates, potassium, sodium phosphates, promethazine, sodium chloride 0.9%, tiZANidine    Review of patient's allergies indicates:   Allergen Reactions    Clarithromycin Swelling and Other (See Comments)     DIFFICULTY SWALLOWING  DIFFICULTY SWALLOWING    Pcn [penicillins] Anaphylaxis    Sulfa (sulfonamide antibiotics) Hives    Wellbutrin [bupropion hcl] Shortness Of Breath and Anaphylaxis    Betadine [povidone-iodine] Hives     Swelling      Cefprozil Hives    Iodinated contrast media Hives    Latex, natural rubber Rash    Sulfamethoxazole-trimethoprim Nausea And Vomiting    Iodine Hives and Swelling    Latex Hives and Swelling       SUBJECTIVE:     Interval History: Patient has complaints chest discomfort with ambulation again today.  She had chest discomfort after my visit yesterday with ambulation as well.  She has mild chest discomfort with a deep breath additionally.    Review of Systems  Respiratory: negative for cough, hemoptysis, sputum and wheezing  Cardiovascular: negative for chest pressure/discomfort, dyspnea, orthopnea, palpitations and paroxysmal nocturnal dyspnea  Had chest discomfort with exertion.    OBJECTIVE:     Vital  Signs (Most Recent)  Temp: 98.4 °F (36.9 °C) (06/27/20 1603)  Pulse: 86 (06/27/20 1603)  Resp: 19 (06/27/20 1625)  BP: (!) 109/58 (06/27/20 1603)  SpO2: (!) 93 % (06/27/20 1603)    Vital Signs Range (Last 24H):  Temp:  [97.6 °F (36.4 °C)-99.4 °F (37.4 °C)]   Pulse:  [67-86]   Resp:  [16-19]   BP: (109-127)/(57-60)   SpO2:  [93 %-99 %]       Physical Exam:  Neck: no carotid bruit, no JVD and supple, symmetrical, trachea midline  Lungs: clear to auscultation bilaterally, normal respiratory effort  Heart: regular rate and rhythm, S1, S2 normal, no murmur, click, rub or gallop  Abdomen: soft, non-tender; bowel sounds normal; no masses,  no organomegaly  Extremities: Extremities normal, atraumatic, no cyanosis, clubbing, or edema  Epigastric tenderness on palpation    Recent Results (from the past 24 hour(s))   Basic Metabolic Panel (BMP)    Collection Time: 06/27/20  4:25 AM   Result Value Ref Range    Sodium 138 136 - 145 mmol/L    Potassium 3.4 (L) 3.5 - 5.1 mmol/L    Chloride 100 95 - 110 mmol/L    CO2 26 23 - 29 mmol/L    Glucose 100 70 - 110 mg/dL    BUN, Bld 10 6 - 20 mg/dL    Creatinine 0.7 0.5 - 1.4 mg/dL    Calcium 9.4 8.7 - 10.5 mg/dL    Anion Gap 12 8 - 16 mmol/L    eGFR if African American >60 >60 mL/min/1.73 m^2    eGFR if non African American >60 >60 mL/min/1.73 m^2   CK    Collection Time: 06/27/20  4:25 AM   Result Value Ref Range    CPK 65 20 - 180 U/L   CK-MB    Collection Time: 06/27/20  4:25 AM   Result Value Ref Range    CPK 65 20 - 180 U/L    CPK MB 1.3 0.1 - 6.5 ng/mL    MB% 2.0 0.0 - 5.0 %   Troponin I    Collection Time: 06/27/20  4:25 AM   Result Value Ref Range    Troponin I 0.349 (H) 0.000 - 0.026 ng/mL   CBC auto differential    Collection Time: 06/27/20  4:25 AM   Result Value Ref Range    WBC 11.64 3.90 - 12.70 K/uL    RBC 3.77 (L) 4.00 - 5.40 M/uL    Hemoglobin 11.1 (L) 12.0 - 16.0 g/dL    Hematocrit 35.0 (L) 37.0 - 48.5 %    Mean Corpuscular Volume 93 82 - 98 fL    Mean Corpuscular  Hemoglobin 29.4 27.0 - 31.0 pg    Mean Corpuscular Hemoglobin Conc 31.7 (L) 32.0 - 36.0 g/dL    RDW 13.7 11.5 - 14.5 %    Platelets 180 150 - 350 K/uL    MPV 9.9 9.2 - 12.9 fL    Immature Granulocytes 0.9 (H) 0.0 - 0.5 %    Gran # (ANC) 7.4 1.8 - 7.7 K/uL    Immature Grans (Abs) 0.10 (H) 0.00 - 0.04 K/uL    Lymph # 3.0 1.0 - 4.8 K/uL    Mono # 0.8 0.3 - 1.0 K/uL    Eos # 0.2 0.0 - 0.5 K/uL    Baso # 0.07 0.00 - 0.20 K/uL    nRBC 0 0 /100 WBC    Gran% 63.8 38.0 - 73.0 %    Lymph% 25.7 18.0 - 48.0 %    Mono% 7.2 4.0 - 15.0 %    Eosinophil% 1.8 0.0 - 8.0 %    Basophil% 0.6 0.0 - 1.9 %    Differential Method Automated    Comprehensive metabolic panel    Collection Time: 06/27/20  4:25 AM   Result Value Ref Range    Sodium 137 136 - 145 mmol/L    Potassium 3.4 (L) 3.5 - 5.1 mmol/L    Chloride 100 95 - 110 mmol/L    CO2 27 23 - 29 mmol/L    Glucose 102 70 - 110 mg/dL    BUN, Bld 10 6 - 20 mg/dL    Creatinine 0.7 0.5 - 1.4 mg/dL    Calcium 9.7 8.7 - 10.5 mg/dL    Total Protein 7.6 6.0 - 8.4 g/dL    Albumin 3.4 (L) 3.5 - 5.2 g/dL    Total Bilirubin 0.6 0.1 - 1.0 mg/dL    Alkaline Phosphatase 87 55 - 135 U/L    AST 20 10 - 40 U/L    ALT 14 10 - 44 U/L    Anion Gap 10 8 - 16 mmol/L    eGFR if African American >60 >60 mL/min/1.73 m^2    eGFR if non African American >60 >60 mL/min/1.73 m^2   Magnesium    Collection Time: 06/27/20  4:25 AM   Result Value Ref Range    Magnesium 2.0 1.6 - 2.6 mg/dL   Urinalysis, Reflex to Urine Culture Urine, Clean Catch    Collection Time: 06/27/20  9:45 AM    Specimen: Urine   Result Value Ref Range    Specimen UA Urine, Clean Catch     Color, UA Yellow Yellow, Straw, Kelley    Appearance, UA Hazy (A) Clear    pH, UA 7.0 5.0 - 8.0    Specific Gravity, UA 1.010 1.005 - 1.030    Protein, UA Negative Negative    Glucose, UA Negative Negative    Ketones, UA Negative Negative    Bilirubin (UA) Negative Negative    Occult Blood UA 2+ (A) Negative    Nitrite, UA Negative Negative    Urobilinogen, UA 1.0  <2.0 EU/dL    Leukocytes, UA Trace (A) Negative   Urinalysis Microscopic    Collection Time: 06/27/20  9:45 AM   Result Value Ref Range    RBC, UA 7 (H) 0 - 4 /hpf    WBC, UA 2 0 - 5 /hpf    Bacteria Few (A) None-Occ /hpf    Squam Epithel, UA 4 /hpf    Microscopic Comment SEE COMMENT        Diagnostic Results:  Labs: Reviewed  ECG: Reviewed    ASSESSMENT/PLAN:     CK-MB and total CPK x2 are within normal limits.  Troponin 0.632 yesterday and 0.349 this morning.  EKG this morning is within normal limits again.    The best course of action would be coronary arteriography.  Patient's symptoms are suggestive of  CAD.  Will plan coronary arteriogram for 06/29/2020-rather than this week ; if PCI required-OR availability is an issue.  Aspirin 81 mg daily.  Lipid profile in a.m..  Statin.  Continue Lovenox

## 2020-06-27 NOTE — PLAN OF CARE
Pt AAO x 4. Pt ambulates with standby assistance. Pt voids in toilet w/o difficulty. Room air. Tele in use - SR/ST with ambulation. PRN pain medication administered; SL nitroglycerin administered for chest pain. EKG obtained and cardio consulted for chest pain as well. Pt afebrile. PIV cdi. Steri-strips intact. Call bell in reach, bed locked, bed alarm on, and fall band and non skid socks on. Will continue to monitor.

## 2020-06-27 NOTE — PLAN OF CARE
"Plan of care reviewed with pt. States "understanding." Pt is AAOx4. IV site is without redness and swelling. VSS. PRN pain medication given for pain. Tele in place. NPO maintained.  Bed in low position, bed alarm set, call light in reach. Instructed to call staff for assistance. Will continue to monitor, observe and note any changes. Safety maintained.   "

## 2020-06-27 NOTE — ASSESSMENT & PLAN NOTE
Currently on lovenox for right lower extremity DVT  Repeat CTA of the chest was negative for pulmonary emboli

## 2020-06-27 NOTE — PROGRESS NOTES
Received message from the nurse at the patient was having some 7/10 chest pain.  Instructed nurse to check an EKG, monitor VS, and to give the p.r.n. nitroglycerin previously ordered.  Will also try a dose of GI cocktail.

## 2020-06-27 NOTE — ASSESSMENT & PLAN NOTE
Patient reports overall improvement in the pain to her right lower extremity since admission  Decrease IV Dilaudid  Continue physical therapy and occupational therapy  Fall precautions  Patient will need home health at discharge  Right lower extremity arterial ultrasound showed no significant stenosis

## 2020-06-27 NOTE — PT/OT/SLP PROGRESS
Physical Therapy Treatment    Patient Name:  Aleyda Costa   MRN:  [id number]    Recommendations:     Discharge Recommendations:  home health PT, home health OT   Discharge Equipment Recommendations: none   Barriers to discharge: None    Assessment:     Aleyda Costa is a 39 y.o. female admitted with a medical diagnosis of <principal problem not specified>.  She presents with the following impairments/functional limitations:  weakness, impaired endurance, impaired self care skills, impaired functional mobilty, gait instability, decreased lower extremity function, pain, orthopedic precautions . Tolerated treatment. Reports CP that has been bothersome ( nurses aware, cleared patient to mobilize). Ambulated in hallway with rw and CGA for safety.    Rehab Prognosis: Good; patient would benefit from acute skilled PT services to address these deficits and reach maximum level of function.    Recent Surgery: * No surgery found *      Plan:     During this hospitalization, patient to be seen BID(daily Sat and Sun) to address the identified rehab impairments via gait training, therapeutic activities, therapeutic exercises and progress toward the following goals:    · Plan of Care Expires:  07/25/20    Subjective     Chief Complaint: pain  Patient/Family Comments/goals: to return home  Pain/Comfort:  · Pain Rating 1: 5/10  · Location - Side 1: Right  · Location - Orientation 1: generalized  · Location 1: knee  · Pain Addressed 1: Pre-medicate for activity, Reposition, Nurse notified      Objective:     Communicated with nurse Matta prior to session.  Patient found supine with bed alarm, telemetry upon PT entry to room.     General Precautions: Standard, fall   Orthopedic Precautions:RLE weight bearing as tolerated   Braces:       Functional Mobility:  · Bed Mobility:     · Rolling Right: contact guard assistance  · Supine to Sit: contact guard assistance  · Transfers:     · Sit to Stand:  contact guard assistance  with rolling walker  · Gait: 60' + 30' with rw and CGA      AM-PAC 6 CLICK MOBILITY          Therapeutic Activities and Exercises:   Ambulated slowly in hallway. Seated rest required after 60'.   Returned to room , sat up in chair.    Patient left up in chair with all lines intact, call button in reach, chair alarm on and nurse Sigrid notified..    GOALS:   Multidisciplinary Problems     Physical Therapy Goals        Problem: Physical Therapy Goal    Goal Priority Disciplines Outcome Goal Variances Interventions   Physical Therapy Goal     PT, PT/OT Ongoing, Progressing     Description: Goals to be met by: 2020     Patient will increase functional independence with mobility by performin. Supine to sit with Supervision  2. Sit to stand transfer with Supervision  3. Bed to chair transfer with Supervision using Rolling Walker  4. Gait  x 250 feet with Supervision using Rolling Walker.   5. Ascend/descend 5 stairs with left Handrails Supervision                     Time Tracking:     PT Received On: 20  PT Start Time: 0830     PT Stop Time: 0845  PT Total Time (min): 15 min     Billable Minutes: Gait Training 15min    Treatment Type: Treatment  PT/PTA: PTA     PTA Visit Number: 1     Arabella Zhang PTA  2020

## 2020-06-27 NOTE — SUBJECTIVE & OBJECTIVE
Interval History:  Patient reports improvement in pain to right lower extremity.  IV Dilaudid dose decreased.  Patient reports some improvement in her chest pain since yesterday.  Cardiology workup in progress.  Patient does report intermittent NSAID use at home prior to admission.  Continue b.i.d. Protonix and Carafate recently added.       Review of Systems   Constitutional: Positive for activity change, appetite change and fatigue. Negative for chills and fever.   HENT: Negative for congestion, ear discharge, ear pain, facial swelling, sore throat and trouble swallowing.    Eyes: Negative for pain and redness.   Respiratory: Negative for cough, chest tightness, shortness of breath and wheezing.    Cardiovascular: Positive for chest pain and leg swelling. Negative for palpitations.   Gastrointestinal: Positive for nausea. Negative for abdominal distention, abdominal pain, blood in stool, constipation, diarrhea and vomiting.        No further vomiting noted this time  Patient eating chips and drinking Coke at the bedside   Endocrine: Negative for polydipsia and polyphagia.   Genitourinary: Negative for difficulty urinating, dysuria, flank pain, hematuria and urgency.   Musculoskeletal: Positive for arthralgias and gait problem. Negative for back pain, neck pain and neck stiffness.   Skin: Positive for color change and wound. Negative for pallor and rash.        Bruising to RLE improving, surgical wound to right knee   Allergic/Immunologic: Negative for food allergies.   Neurological: Negative for seizures, facial asymmetry, speech difficulty and weakness.   Hematological: Does not bruise/bleed easily.   Psychiatric/Behavioral: Negative for agitation, behavioral problems, confusion, dysphoric mood and suicidal ideas. The patient is not nervous/anxious.    All other systems reviewed and are negative.    Objective:     Vital Signs (Most Recent):  Temp: 97.6 °F (36.4 °C) (06/27/20 1146)  Pulse: 70 (06/27/20  1146)  Resp: 19 (06/27/20 1303)  BP: 120/60 (06/27/20 1146)  SpO2: 96 % (06/27/20 1146) Vital Signs (24h Range):  Temp:  [96.3 °F (35.7 °C)-99.4 °F (37.4 °C)] 97.6 °F (36.4 °C)  Pulse:  [67-73] 70  Resp:  [16-19] 19  SpO2:  [94 %-99 %] 96 %  BP: (118-135)/(57-63) 120/60     Weight: 111.6 kg (246 lb)  Body mass index is 33.36 kg/m².    Physical Exam  Vitals signs and nursing note reviewed.   Constitutional:       General: She is not in acute distress.     Appearance: Normal appearance. She is well-developed. She is not ill-appearing, toxic-appearing or diaphoretic.   HENT:      Head: Normocephalic and atraumatic.      Mouth/Throat:      Mouth: Mucous membranes are moist.   Eyes:      General:         Right eye: No discharge.         Left eye: No discharge.      Conjunctiva/sclera: Conjunctivae normal.      Pupils: Pupils are equal, round, and reactive to light.   Neck:      Musculoskeletal: Normal range of motion and neck supple. No neck rigidity or muscular tenderness.      Vascular: No JVD.   Cardiovascular:      Rate and Rhythm: Normal rate and regular rhythm.      Pulses: Normal pulses.      Heart sounds: Normal heart sounds. No murmur.   Pulmonary:      Effort: Pulmonary effort is normal. No respiratory distress.      Breath sounds: Normal breath sounds. No stridor. No wheezing, rhonchi or rales.   Abdominal:      General: Bowel sounds are normal. There is no distension.      Palpations: Abdomen is soft.      Tenderness: There is no abdominal tenderness. There is no guarding.   Genitourinary:     Comments: Not examined  Musculoskeletal:         General: Swelling present. No tenderness.      Right lower leg: Edema present.      Comments: Right lower extremity bruising and edema-much improved since admission   Skin:     General: Skin is warm and dry.      Capillary Refill: Capillary refill takes 2 to 3 seconds.      Coloration: Skin is not jaundiced.      Findings: Bruising present. No erythema, lesion or rash.       Comments: Bruising right lower extremity including foot-improved   Neurological:      General: No focal deficit present.      Mental Status: She is alert and oriented to person, place, and time. Mental status is at baseline.      Cranial Nerves: No cranial nerve deficit.      Sensory: No sensory deficit.   Psychiatric:         Mood and Affect: Mood normal.         Behavior: Behavior normal.         Thought Content: Thought content normal.         Judgment: Judgment normal.           CRANIAL NERVES     CN III, IV, VI   Pupils are equal, round, and reactive to light.    Labs reviewed

## 2020-06-27 NOTE — ASSESSMENT & PLAN NOTE
Status post Right TKA on 6/16 with Dr. Crisostomo-  Continue physical therapy and occupational therapy   Fall precautions  Continue full-dose anticoagulation with subq Lovenox

## 2020-06-27 NOTE — PLAN OF CARE
Problem: Physical Therapy Goal  Goal: Physical Therapy Goal  Description: Goals to be met by: 2020     Patient will increase functional independence with mobility by performin. Supine to sit with Supervision  2. Sit to stand transfer with Supervision  3. Bed to chair transfer with Supervision using Rolling Walker  4. Gait  x 250 feet with Supervision using Rolling Walker.   5. Ascend/descend 5 stairs with left Handrails Supervision    Outcome: Ongoing, Progressing   Ambulate with rw and assistance for safety.

## 2020-06-27 NOTE — CARE UPDATE
06/27/20 0831   PRE-TX-O2   O2 Device (Oxygen Therapy) room air   SpO2 (!) 94 %   Pulse Oximetry Type Intermittent   $ Pulse Oximetry - Multiple Charge Pulse Oximetry - Multiple   Pulse 73   Resp 18

## 2020-06-27 NOTE — ASSESSMENT & PLAN NOTE
Repeat right lower extremity venous ultrasound showed- no worsening of DVT  Continue full dose lovenox-Hematology previously consulted due to failed oral anticoagulant and patient recommended to be on subq full-dose Lovenox for DVT  Telemetry

## 2020-06-28 LAB
ANION GAP SERPL CALC-SCNC: 12 MMOL/L (ref 8–16)
BASOPHILS # BLD AUTO: 0.06 K/UL (ref 0–0.2)
BASOPHILS NFR BLD: 0.6 % (ref 0–1.9)
BUN SERPL-MCNC: 11 MG/DL (ref 6–20)
CALCIUM SERPL-MCNC: 9.2 MG/DL (ref 8.7–10.5)
CHLORIDE SERPL-SCNC: 101 MMOL/L (ref 95–110)
CHOLEST SERPL-MCNC: 156 MG/DL (ref 120–199)
CHOLEST/HDLC SERPL: 4.1 {RATIO} (ref 2–5)
CO2 SERPL-SCNC: 26 MMOL/L (ref 23–29)
CREAT SERPL-MCNC: 0.8 MG/DL (ref 0.5–1.4)
DIFFERENTIAL METHOD: ABNORMAL
EOSINOPHIL # BLD AUTO: 0.3 K/UL (ref 0–0.5)
EOSINOPHIL NFR BLD: 3 % (ref 0–8)
ERYTHROCYTE [DISTWIDTH] IN BLOOD BY AUTOMATED COUNT: 13.8 % (ref 11.5–14.5)
EST. GFR  (AFRICAN AMERICAN): >60 ML/MIN/1.73 M^2
EST. GFR  (NON AFRICAN AMERICAN): >60 ML/MIN/1.73 M^2
GLUCOSE SERPL-MCNC: 104 MG/DL (ref 70–110)
HCT VFR BLD AUTO: 34.8 % (ref 37–48.5)
HDLC SERPL-MCNC: 38 MG/DL (ref 40–75)
HDLC SERPL: 24.4 % (ref 20–50)
HGB BLD-MCNC: 11.3 G/DL (ref 12–16)
IMM GRANULOCYTES # BLD AUTO: 0.14 K/UL (ref 0–0.04)
IMM GRANULOCYTES NFR BLD AUTO: 1.3 % (ref 0–0.5)
LDLC SERPL CALC-MCNC: 89.6 MG/DL (ref 63–159)
LYMPHOCYTES # BLD AUTO: 3.3 K/UL (ref 1–4.8)
LYMPHOCYTES NFR BLD: 31.2 % (ref 18–48)
MCH RBC QN AUTO: 30.8 PG (ref 27–31)
MCHC RBC AUTO-ENTMCNC: 32.5 G/DL (ref 32–36)
MCV RBC AUTO: 95 FL (ref 82–98)
MONOCYTES # BLD AUTO: 0.8 K/UL (ref 0.3–1)
MONOCYTES NFR BLD: 7.4 % (ref 4–15)
NEUTROPHILS # BLD AUTO: 6 K/UL (ref 1.8–7.7)
NEUTROPHILS NFR BLD: 56.5 % (ref 38–73)
NONHDLC SERPL-MCNC: 118 MG/DL
NRBC BLD-RTO: 0 /100 WBC
PLATELET # BLD AUTO: 186 K/UL (ref 150–350)
PMV BLD AUTO: 10 FL (ref 9.2–12.9)
POTASSIUM SERPL-SCNC: 3.3 MMOL/L (ref 3.5–5.1)
RBC # BLD AUTO: 3.67 M/UL (ref 4–5.4)
SODIUM SERPL-SCNC: 139 MMOL/L (ref 136–145)
TRIGL SERPL-MCNC: 142 MG/DL (ref 30–150)
WBC # BLD AUTO: 10.58 K/UL (ref 3.9–12.7)

## 2020-06-28 PROCEDURE — 36415 COLL VENOUS BLD VENIPUNCTURE: CPT

## 2020-06-28 PROCEDURE — 80061 LIPID PANEL: CPT

## 2020-06-28 PROCEDURE — 63700000 PHARM REV CODE 250 ALT 637 W/O HCPCS: Performed by: NURSE PRACTITIONER

## 2020-06-28 PROCEDURE — 80048 BASIC METABOLIC PNL TOTAL CA: CPT

## 2020-06-28 PROCEDURE — 25000003 PHARM REV CODE 250: Performed by: NURSE PRACTITIONER

## 2020-06-28 PROCEDURE — 63600175 PHARM REV CODE 636 W HCPCS: Performed by: NURSE PRACTITIONER

## 2020-06-28 PROCEDURE — 63600175 PHARM REV CODE 636 W HCPCS: Performed by: SPECIALIST

## 2020-06-28 PROCEDURE — 12000002 HC ACUTE/MED SURGE SEMI-PRIVATE ROOM

## 2020-06-28 PROCEDURE — 94799 UNLISTED PULMONARY SVC/PX: CPT

## 2020-06-28 PROCEDURE — 85025 COMPLETE CBC W/AUTO DIFF WBC: CPT

## 2020-06-28 PROCEDURE — 25000003 PHARM REV CODE 250: Performed by: SPECIALIST

## 2020-06-28 RX ORDER — SODIUM CHLORIDE 9 MG/ML
INJECTION, SOLUTION INTRAVENOUS ONCE
Status: CANCELLED | OUTPATIENT
Start: 2020-06-29

## 2020-06-28 RX ORDER — DIPHENHYDRAMINE HCL 25 MG
50 CAPSULE ORAL ONCE
Status: CANCELLED | OUTPATIENT
Start: 2020-06-28

## 2020-06-28 RX ADMIN — OXYCODONE HYDROCHLORIDE AND ACETAMINOPHEN 1 TABLET: 5; 325 TABLET ORAL at 12:06

## 2020-06-28 RX ADMIN — POTASSIUM CHLORIDE 40 MEQ: 20 SOLUTION ORAL at 10:06

## 2020-06-28 RX ADMIN — PANTOPRAZOLE SODIUM 40 MG: 40 TABLET, DELAYED RELEASE ORAL at 09:06

## 2020-06-28 RX ADMIN — POTASSIUM CHLORIDE 40 MEQ: 20 SOLUTION ORAL at 06:06

## 2020-06-28 RX ADMIN — CETIRIZINE HYDROCHLORIDE 5 MG: 5 TABLET, FILM COATED ORAL at 09:06

## 2020-06-28 RX ADMIN — OXYCODONE HYDROCHLORIDE AND ACETAMINOPHEN 1 TABLET: 5; 325 TABLET ORAL at 06:06

## 2020-06-28 RX ADMIN — SUCRALFATE 1 G: 1 SUSPENSION ORAL at 11:06

## 2020-06-28 RX ADMIN — ONDANSETRON 4 MG: 2 INJECTION INTRAMUSCULAR; INTRAVENOUS at 09:06

## 2020-06-28 RX ADMIN — THERA TABS 1 TABLET: TAB at 09:06

## 2020-06-28 RX ADMIN — OXYCODONE HYDROCHLORIDE 10 MG: 10 TABLET, FILM COATED, EXTENDED RELEASE ORAL at 09:06

## 2020-06-28 RX ADMIN — ATORVASTATIN CALCIUM 40 MG: 40 TABLET, FILM COATED ORAL at 09:06

## 2020-06-28 RX ADMIN — FLUOXETINE HYDROCHLORIDE 20 MG: 20 CAPSULE ORAL at 09:06

## 2020-06-28 RX ADMIN — SUCRALFATE 1 G: 1 SUSPENSION ORAL at 06:06

## 2020-06-28 RX ADMIN — PREDNISONE 50 MG: 20 TABLET ORAL at 12:06

## 2020-06-28 RX ADMIN — HYDROMORPHONE HYDROCHLORIDE 0.2 MG: 1 INJECTION, SOLUTION INTRAMUSCULAR; INTRAVENOUS; SUBCUTANEOUS at 01:06

## 2020-06-28 RX ADMIN — ONDANSETRON 4 MG: 2 INJECTION INTRAMUSCULAR; INTRAVENOUS at 01:06

## 2020-06-28 RX ADMIN — HYDROMORPHONE HYDROCHLORIDE 0.2 MG: 1 INJECTION, SOLUTION INTRAMUSCULAR; INTRAVENOUS; SUBCUTANEOUS at 11:06

## 2020-06-28 RX ADMIN — PREDNISONE 50 MG: 20 TABLET ORAL at 11:06

## 2020-06-28 RX ADMIN — HYDROMORPHONE HYDROCHLORIDE 0.2 MG: 1 INJECTION, SOLUTION INTRAMUSCULAR; INTRAVENOUS; SUBCUTANEOUS at 02:06

## 2020-06-28 RX ADMIN — HYDROMORPHONE HYDROCHLORIDE 0.2 MG: 1 INJECTION, SOLUTION INTRAMUSCULAR; INTRAVENOUS; SUBCUTANEOUS at 07:06

## 2020-06-28 RX ADMIN — AMITRIPTYLINE HYDROCHLORIDE 50 MG: 50 TABLET, FILM COATED ORAL at 09:06

## 2020-06-28 RX ADMIN — ONDANSETRON 4 MG: 2 INJECTION INTRAMUSCULAR; INTRAVENOUS at 02:06

## 2020-06-28 RX ADMIN — LORAZEPAM 0.5 MG: 0.5 TABLET ORAL at 09:06

## 2020-06-28 RX ADMIN — OXYCODONE HYDROCHLORIDE AND ACETAMINOPHEN 1 TABLET: 5; 325 TABLET ORAL at 07:06

## 2020-06-28 RX ADMIN — PREDNISONE 50 MG: 20 TABLET ORAL at 05:06

## 2020-06-28 RX ADMIN — ASPIRIN 81 MG CHEWABLE TABLET 81 MG: 81 TABLET CHEWABLE at 09:06

## 2020-06-28 RX ADMIN — TIZANIDINE 4 MG: 4 TABLET ORAL at 07:06

## 2020-06-28 RX ADMIN — ENOXAPARIN SODIUM 105 MG: 120 INJECTION SUBCUTANEOUS at 09:06

## 2020-06-28 RX ADMIN — METOPROLOL TARTRATE 25 MG: 25 TABLET, FILM COATED ORAL at 09:06

## 2020-06-28 RX ADMIN — ENOXAPARIN SODIUM 105 MG: 120 INJECTION SUBCUTANEOUS at 11:06

## 2020-06-28 NOTE — RESPIRATORY THERAPY
06/28/20 0703   PRE-TX-O2   O2 Device (Oxygen Therapy) room air   SpO2 (!) 94 %   Pulse Oximetry Type Intermittent   Incentive Spirometer   $ Incentive Spirometer Charges done with encouragement   Administration (IS) proper technique demonstrated   Number of Repetitions (IS) 10   Level Incentive Spirometer (mL) 1500   Patient Tolerance (IS) good

## 2020-06-28 NOTE — PLAN OF CARE
Alert and oriented X 4. Poc reviewed with pt. Pt verbalized understanding. Pt is NPO past midnight for cardiac cath in am. Potassium replacement completed. C/O pain managed with PRN pain medication. Incision to right knee intact. Rounding completed for safety. Bed in low position and locked. Call light within reach.

## 2020-06-28 NOTE — PROGRESS NOTES
Progress Note  Cardiology    Admit Date: 6/24/2020   LOS: 2 days     Follow-up For:  ACS    Scheduled Meds:   amitriptyline  50 mg Oral QHS    aspirin  81 mg Oral Daily    atorvastatin  40 mg Oral QHS    cetirizine  5 mg Oral Daily    enoxaparin  1 mg/kg Subcutaneous Q12H    FLUoxetine  20 mg Oral QHS    metoprolol tartrate  25 mg Oral BID    multivitamin  1 tablet Oral Daily    oxyCODONE  10 mg Oral Q12H    pantoprazole  40 mg Oral BID    polyethylene glycol  17 g Oral Daily    predniSONE  50 mg Oral Q6H    sucralfate  1 g Oral QID (AC & HS)     Continuous Infusions:  PRN Meds:acetaminophen, bisacodyL, HYDROmorphone, LORazepam, magnesium oxide, magnesium oxide, nitroGLYCERIN, ondansetron, oxyCODONE-acetaminophen, potassium chloride 10%, potassium chloride 10%, potassium chloride 10%, potassium, sodium phosphates, potassium, sodium phosphates, potassium, sodium phosphates, promethazine, sodium chloride 0.9%, tiZANidine    Review of patient's allergies indicates:   Allergen Reactions    Clarithromycin Swelling and Other (See Comments)     DIFFICULTY SWALLOWING  DIFFICULTY SWALLOWING    Pcn [penicillins] Anaphylaxis    Sulfa (sulfonamide antibiotics) Hives    Wellbutrin [bupropion hcl] Shortness Of Breath and Anaphylaxis    Betadine [povidone-iodine] Hives     Swelling      Cefprozil Hives    Iodinated contrast media Hives    Latex, natural rubber Rash    Sulfamethoxazole-trimethoprim Nausea And Vomiting    Iodine Hives and Swelling    Latex Hives and Swelling       SUBJECTIVE:     Interval History: Patient has complaints of RSCP with ambulation last evening.    Review of Systems  Respiratory: negative for cough, hemoptysis, sputum and wheezing  Cardiovascular: positive for chest pain    OBJECTIVE:     Vital Signs (Most Recent)  Temp: 97.6 °F (36.4 °C) (06/28/20 1128)  Pulse: 66 (06/28/20 1128)  Resp: 18 (06/28/20 1128)  BP: (!) 95/50 (06/28/20 1128)  SpO2: 95 % (06/28/20 1128)    Vital Signs  Range (Last 24H):  Temp:  [97.6 °F (36.4 °C)-98.8 °F (37.1 °C)]   Pulse:  [62-86]   Resp:  [16-20]   BP: ()/(50-58)   SpO2:  [93 %-98 %]       Physical Exam:  Neck: no carotid bruit, no JVD and supple, symmetrical, trachea midline  Lungs: clear to auscultation bilaterally, normal respiratory effort  Heart: regular rate and rhythm, S1, S2 normal, no murmur, click, rub or gallop  Tender epigastrium.  Ecchymosis heel/foot    Recent Results (from the past 24 hour(s))   Basic Metabolic Panel (BMP)    Collection Time: 06/28/20  4:37 AM   Result Value Ref Range    Sodium 139 136 - 145 mmol/L    Potassium 3.3 (L) 3.5 - 5.1 mmol/L    Chloride 101 95 - 110 mmol/L    CO2 26 23 - 29 mmol/L    Glucose 104 70 - 110 mg/dL    BUN, Bld 11 6 - 20 mg/dL    Creatinine 0.8 0.5 - 1.4 mg/dL    Calcium 9.2 8.7 - 10.5 mg/dL    Anion Gap 12 8 - 16 mmol/L    eGFR if African American >60 >60 mL/min/1.73 m^2    eGFR if non African American >60 >60 mL/min/1.73 m^2   CBC with Automated Differential    Collection Time: 06/28/20  4:37 AM   Result Value Ref Range    WBC 10.58 3.90 - 12.70 K/uL    RBC 3.67 (L) 4.00 - 5.40 M/uL    Hemoglobin 11.3 (L) 12.0 - 16.0 g/dL    Hematocrit 34.8 (L) 37.0 - 48.5 %    Mean Corpuscular Volume 95 82 - 98 fL    Mean Corpuscular Hemoglobin 30.8 27.0 - 31.0 pg    Mean Corpuscular Hemoglobin Conc 32.5 32.0 - 36.0 g/dL    RDW 13.8 11.5 - 14.5 %    Platelets 186 150 - 350 K/uL    MPV 10.0 9.2 - 12.9 fL    Immature Granulocytes 1.3 (H) 0.0 - 0.5 %    Gran # (ANC) 6.0 1.8 - 7.7 K/uL    Immature Grans (Abs) 0.14 (H) 0.00 - 0.04 K/uL    Lymph # 3.3 1.0 - 4.8 K/uL    Mono # 0.8 0.3 - 1.0 K/uL    Eos # 0.3 0.0 - 0.5 K/uL    Baso # 0.06 0.00 - 0.20 K/uL    nRBC 0 0 /100 WBC    Gran% 56.5 38.0 - 73.0 %    Lymph% 31.2 18.0 - 48.0 %    Mono% 7.4 4.0 - 15.0 %    Eosinophil% 3.0 0.0 - 8.0 %    Basophil% 0.6 0.0 - 1.9 %    Differential Method Automated    Lipid Panel    Collection Time: 06/28/20  4:37 AM   Result Value Ref  Range    Cholesterol 156 120 - 199 mg/dL    Triglycerides 142 30 - 150 mg/dL    HDL 38 (L) 40 - 75 mg/dL    LDL Cholesterol 89.6 63.0 - 159.0 mg/dL    Hdl/Cholesterol Ratio 24.4 20.0 - 50.0 %    Total Cholesterol/HDL Ratio 4.1 2.0 - 5.0    Non-HDL Cholesterol 118 mg/dL       Diagnostic Results:  Labs: Reviewed  .    ASSESSMENT/PLAN:     Angio ,possible PCI in am. Prednisone for dye allergy - start today.  Transfer to [facility] at 7 am.

## 2020-06-28 NOTE — NURSING
Pt will go to [facility].  Arrival Time to be 730.  [facility] to arrange ambulance for transport.please call report on the patient to the [facility] BEFORE the patient leaves.  The phone number to [facility] heart center [phone].  [facility] Shazia Choi notified.  Pt to have 2 large bore PIV's and hibiclens shower prior to transport.

## 2020-06-28 NOTE — PLAN OF CARE
"Plan of care reviewed with pt. States "understanding." Pt is AAOx4. IV site is without redness and swelling. VSS. PRN pain medication given for pain. Tele in place.  Bed in low position, bed alarm set, call light in reach. Instructed to call staff for assistance. Will continue to monitor, observe and note any changes. Safety maintained.   "

## 2020-06-28 NOTE — PT/OT/SLP PROGRESS
Physical Therapy      Patient Name:  Aleyda Costa   MRN:  [id number]    Patient not seen today secondary to just had pain meds and too groggy to get up. Will follow-up 6-29-29.    Laura Hahn, PTA

## 2020-06-28 NOTE — SUBJECTIVE & OBJECTIVE
Interval History:  Patient continues with intermittent complaints of chest pain with exertion.  Patient scheduled for coronary angiogram at [facility] tomorrow.       Review of Systems   Constitutional: Positive for activity change, appetite change and fatigue. Negative for chills and fever.   HENT: Negative for congestion, ear discharge, ear pain, facial swelling, sore throat and trouble swallowing.    Eyes: Negative for pain and redness.   Respiratory: Negative for cough, chest tightness, shortness of breath and wheezing.    Cardiovascular: Positive for chest pain and leg swelling. Negative for palpitations.   Gastrointestinal: Positive for nausea. Negative for abdominal distention, abdominal pain, blood in stool, constipation, diarrhea and vomiting.        No further vomiting noted this time     Endocrine: Negative for polydipsia and polyphagia.   Genitourinary: Negative for difficulty urinating, dysuria, flank pain, hematuria and urgency.   Musculoskeletal: Positive for arthralgias and gait problem. Negative for back pain, neck pain and neck stiffness.   Skin: Positive for color change and wound. Negative for pallor and rash.        Bruising to RLE improving, surgical wound to right knee   Allergic/Immunologic: Negative for food allergies.   Neurological: Negative for seizures, facial asymmetry, speech difficulty and weakness.   Hematological: Does not bruise/bleed easily.   Psychiatric/Behavioral: Negative for agitation, behavioral problems, confusion, dysphoric mood and suicidal ideas. The patient is not nervous/anxious.    All other systems reviewed and are negative.    Objective:     Vital Signs (Most Recent):  Temp: 97.6 °F (36.4 °C) (06/28/20 1128)  Pulse: 66 (06/28/20 1128)  Resp: 18 (06/28/20 1128)  BP: (!) 95/50 (06/28/20 1128)  SpO2: 95 % (06/28/20 1128) Vital Signs (24h Range):  Temp:  [97.6 °F (36.4 °C)-98.8 °F (37.1 °C)] 97.6 °F (36.4 °C)  Pulse:  [62-86] 66  Resp:  [16-20] 18  SpO2:  [93  %-98 %] 95 %  BP: ()/(50-58) 95/50     Weight: 111.6 kg (246 lb)  Body mass index is 33.36 kg/m².    Physical Exam  Vitals signs and nursing note reviewed.   Constitutional:       General: She is not in acute distress.     Appearance: Normal appearance. She is well-developed. She is not ill-appearing, toxic-appearing or diaphoretic.   HENT:      Head: Normocephalic and atraumatic.      Mouth/Throat:      Mouth: Mucous membranes are moist.   Eyes:      General:         Right eye: No discharge.         Left eye: No discharge.      Conjunctiva/sclera: Conjunctivae normal.      Pupils: Pupils are equal, round, and reactive to light.   Neck:      Musculoskeletal: Normal range of motion and neck supple. No neck rigidity or muscular tenderness.      Vascular: No JVD.   Cardiovascular:      Rate and Rhythm: Normal rate and regular rhythm.      Pulses: Normal pulses.      Heart sounds: Normal heart sounds. No murmur.   Pulmonary:      Effort: Pulmonary effort is normal. No respiratory distress.      Breath sounds: Normal breath sounds. No stridor. No wheezing, rhonchi or rales.   Abdominal:      General: Bowel sounds are normal. There is no distension.      Palpations: Abdomen is soft.      Tenderness: There is no abdominal tenderness. There is no guarding.   Genitourinary:     Comments: Not examined  Musculoskeletal:         General: Swelling present. No tenderness.      Right lower leg: Edema present.      Comments: Right lower extremity bruising and edema-much improved since admission   Skin:     General: Skin is warm and dry.      Capillary Refill: Capillary refill takes 2 to 3 seconds.      Coloration: Skin is not jaundiced.      Findings: Bruising present. No erythema, lesion or rash.      Comments: Bruising right lower extremity including foot-improved   Neurological:      General: No focal deficit present.      Mental Status: She is alert and oriented to person, place, and time. Mental status is at baseline.       Cranial Nerves: No cranial nerve deficit.      Sensory: No sensory deficit.   Psychiatric:         Mood and Affect: Mood normal.         Behavior: Behavior normal.         Thought Content: Thought content normal.         Judgment: Judgment normal.           CRANIAL NERVES     CN III, IV, VI   Pupils are equal, round, and reactive to light.    Labs reviewed

## 2020-06-28 NOTE — ASSESSMENT & PLAN NOTE
With elevated troponin-  Patient also with elevated [id number] CTA of the chest was negative for pulmonary emboli   Telemetry  Continue full-dose aspirin for now till acute cardiac issues can be excluded  Cardiology consulted-workup in progress  Trend troponins and monitor closely for any signs of decline  EKG shows no significant ST elevation or depression at this time    Patient for coronary angiogram tomorrow at [facility]-NPO after midnight

## 2020-06-28 NOTE — ASSESSMENT & PLAN NOTE
Patient reports overall improvement in the pain to her right lower extremity since admission  Decrease IV Dilaudid as tolerated-IV Dilaudid has been titrated downward over the past few days  Continue physical therapy and occupational therapy  Fall precautions  Patient will need home health at discharge  Right lower extremity arterial ultrasound showed no significant stenosis

## 2020-06-28 NOTE — PROGRESS NOTES
[facility] Medicine  Progress Note    Patient Name: Aleyda Costa  MRN: [id number]  Patient Class: IP- Inpatient   Admission Date: 6/24/2020  Length of Stay: 2 days  Attending Physician: Darryl Wise MD  Primary Care Provider: Darlene Hayden MD      Subjective:     Principal Problem:Pain of right lower extremity, DVT right lower extremity, recent right TKA, chest pain with elevated troponin    HPI:  This  is a 39-year-old female with a past medical history significant for depression, GERD, pulmonary emboli in 2018, sleep apnea, prior gastric sleeve, and nicotine dependence presenting today for increased pain to right LE. Pt underwent a right knee arthroplasty per Dr. Crisostomo on 6/16/20. Patient previously failed treatment for pulmonary emboli with Xarelto and was placed on Pradaxa 150 mg p.o. b.i.d. at discharge 6/17 per hematologist rec at that time.  Pt presented back to ED on 6/21 for increasing pain and swelling to right leg. Pt reported compliance with her pradaxa and medications; however, ultrasound of the RLE on that date demonstrated a right nonocclusive popliteal DVT. Pt was readmitted and placed on full dose lovenox. Pt was discharged home on 6/23 on lovenox and pain was controlled with oral meds at that time. Dr. Crisostomo provided pt with oral dilaudid pain script on d/c.  Patient returns to ED today complaints of intractable pain to right leg that is not controlled with home meds. She denies worsening swelling to leg. She describes the pain as right knee and post lower leg location, 10/10 intensity, pressure quality, worsens with movement, assoc with nausea secondary to severe pain, improved after receiving IV dilaudid in ED. She denies fever, chills, cp and sob. Repeat US done in ED confirms DVT stable. CRP improving and WBC wnl. Dr. Crisostomo consulted from ED and rec admission for pain control.      Overview/Hospital Course:  The patient was monitored closely during  her stay.  She received p.r.n. pain medication.  Orthopedics were consulted.  Physical therapy and occupational therapy were consulted.  Patient's pain medication was titrated.  Patient had complaints of chest pain during her stay.  She was kept on continuous telemetry monitoring where she remains sinus rhythm.  Her troponins were trended.  Her initial troponin was negative however her 2nd troponin was elevated.  Patient was given full-dose aspirin.  She was noted to have an elevated D-dimer and a CTA of the chest was ordered.  Patient was continued on full-dose Lovenox 1 milligram/kilogram subcu q.12 hours.  A right lower extremity arterial ultrasound was ordered to evaluate patient's circulation due to her ongoing severe right lower extremity pain.  Patient CTA of the chest was negative for pulmonary emboli.  Her troponins continued to trend upward.  Cardiology was consulted.  Right lower extremity arterial ultrasound showed no significant stenosis.  Patient later reported mid epigastric reproducible pain.  She reported intermittent use of NSAIDs at home prior to admission.  Her home b.i.d. Protonix was continued.  She was also placed on Carafate.  Patient with noted prior history of gastric sleeve in 2014 and was encouraged to continue her bariatric diet.  Patient's troponins continue to trend upward and patient had persistent chest pain with ambulation.  Patient was scheduled for coronary angiogram for 06/29/2020.     Interval History:  Patient continues with intermittent complaints of chest pain with exertion.  Patient scheduled for coronary angiogram at [facility] tomorrow.       Review of Systems   Constitutional: Positive for activity change, appetite change and fatigue. Negative for chills and fever.   HENT: Negative for congestion, ear discharge, ear pain, facial swelling, sore throat and trouble swallowing.    Eyes: Negative for pain and redness.   Respiratory: Negative for cough, chest tightness,  shortness of breath and wheezing.    Cardiovascular: Positive for chest pain and leg swelling. Negative for palpitations.   Gastrointestinal: Positive for nausea. Negative for abdominal distention, abdominal pain, blood in stool, constipation, diarrhea and vomiting.        No further vomiting noted this time     Endocrine: Negative for polydipsia and polyphagia.   Genitourinary: Negative for difficulty urinating, dysuria, flank pain, hematuria and urgency.   Musculoskeletal: Positive for arthralgias and gait problem. Negative for back pain, neck pain and neck stiffness.   Skin: Positive for color change and wound. Negative for pallor and rash.        Bruising to RLE improving, surgical wound to right knee   Allergic/Immunologic: Negative for food allergies.   Neurological: Negative for seizures, facial asymmetry, speech difficulty and weakness.   Hematological: Does not bruise/bleed easily.   Psychiatric/Behavioral: Negative for agitation, behavioral problems, confusion, dysphoric mood and suicidal ideas. The patient is not nervous/anxious.    All other systems reviewed and are negative.    Objective:     Vital Signs (Most Recent):  Temp: 97.6 °F (36.4 °C) (06/28/20 1128)  Pulse: 66 (06/28/20 1128)  Resp: 18 (06/28/20 1128)  BP: (!) 95/50 (06/28/20 1128)  SpO2: 95 % (06/28/20 1128) Vital Signs (24h Range):  Temp:  [97.6 °F (36.4 °C)-98.8 °F (37.1 °C)] 97.6 °F (36.4 °C)  Pulse:  [62-86] 66  Resp:  [16-20] 18  SpO2:  [93 %-98 %] 95 %  BP: ()/(50-58) 95/50     Weight: 111.6 kg (246 lb)  Body mass index is 33.36 kg/m².    Physical Exam  Vitals signs and nursing note reviewed.   Constitutional:       General: She is not in acute distress.     Appearance: Normal appearance. She is well-developed. She is not ill-appearing, toxic-appearing or diaphoretic.   HENT:      Head: Normocephalic and atraumatic.      Mouth/Throat:      Mouth: Mucous membranes are moist.   Eyes:      General:         Right eye: No discharge.          Left eye: No discharge.      Conjunctiva/sclera: Conjunctivae normal.      Pupils: Pupils are equal, round, and reactive to light.   Neck:      Musculoskeletal: Normal range of motion and neck supple. No neck rigidity or muscular tenderness.      Vascular: No JVD.   Cardiovascular:      Rate and Rhythm: Normal rate and regular rhythm.      Pulses: Normal pulses.      Heart sounds: Normal heart sounds. No murmur.   Pulmonary:      Effort: Pulmonary effort is normal. No respiratory distress.      Breath sounds: Normal breath sounds. No stridor. No wheezing, rhonchi or rales.   Abdominal:      General: Bowel sounds are normal. There is no distension.      Palpations: Abdomen is soft.      Tenderness: There is no abdominal tenderness. There is no guarding.   Genitourinary:     Comments: Not examined  Musculoskeletal:         General: Swelling present. No tenderness.      Right lower leg: Edema present.      Comments: Right lower extremity bruising and edema-much improved since admission   Skin:     General: Skin is warm and dry.      Capillary Refill: Capillary refill takes 2 to 3 seconds.      Coloration: Skin is not jaundiced.      Findings: Bruising present. No erythema, lesion or rash.      Comments: Bruising right lower extremity including foot-improved   Neurological:      General: No focal deficit present.      Mental Status: She is alert and oriented to person, place, and time. Mental status is at baseline.      Cranial Nerves: No cranial nerve deficit.      Sensory: No sensory deficit.   Psychiatric:         Mood and Affect: Mood normal.         Behavior: Behavior normal.         Thought Content: Thought content normal.         Judgment: Judgment normal.           CRANIAL NERVES     CN III, IV, VI   Pupils are equal, round, and reactive to light.    Labs reviewed      Assessment/Plan:      * Pain of right lower extremity  Patient reports overall improvement in the pain to her right lower extremity since  admission  Decrease IV Dilaudid as tolerated-IV Dilaudid has been titrated downward over the past few days  Continue physical therapy and occupational therapy  Fall precautions  Patient will need home health at discharge  Right lower extremity arterial ultrasound showed no significant stenosis      Hypokalemia  Monitor  Supplement as needed      Elevated troponin  Telemetry  Trend levels  Cardiology consulted      Hiatal hernia  Continue PPI      Atelectasis  Continue to encourage incentive spirometer      History of bariatric surgery  History of prior gastric sleeve surgery-  Patient encouraged to follow  bariatric diet      Chest pain  With elevated troponin-  Patient also with elevated [id number] CTA of the chest was negative for pulmonary emboli   Telemetry  Continue full-dose aspirin for now till acute cardiac issues can be excluded  Cardiology consulted-workup in progress  Trend troponins and monitor closely for any signs of decline  EKG shows no significant ST elevation or depression at this time    Patient for coronary angiogram tomorrow at [facility] after midnight      DVT (deep venous thrombosis)  Repeat right lower extremity venous ultrasound showed- no worsening of DVT  Continue full dose lovenox-Hematology previously consulted due to failed oral anticoagulant and patient recommended to be on subq full-dose Lovenox for DVT  Telemetry         Generalized anxiety disorder  Continue home medications      History of pulmonary embolism  Currently on lovenox for right lower extremity DVT  Repeat CTA of the chest was negative for pulmonary emboli      Nicotine dependence  Smoking cessation education > 3 minutes      Normocytic anemia  Continue Multivitamin      S/P total knee arthroplasty, right  Status post Right TKA on 6/16 with Dr. Crisostomo-  Continue physical therapy and occupational therapy   Fall precautions  Continue full-dose anticoagulation with subq Lovenox        VTE Risk Mitigation  (From admission, onward)         Ordered     enoxaparin injection 105 mg  Every 12 hours      06/24/20 1816     IP VTE HIGH RISK PATIENT  Once      06/24/20 1815                Time spent seeing patient( greater than 1/2 spent in direct contact) : 28 minutes        SCHUYLER Rivera  Department of Hospital Medicine   [facility]

## 2020-06-29 ENCOUNTER — HOSPITAL ENCOUNTER (OUTPATIENT)
Facility: HOSPITAL | Age: 40
Discharge: HOME | End: 2020-07-01
Attending: SPECIALIST | Admitting: INTERNAL MEDICINE
Payer: COMMERCIAL

## 2020-06-29 VITALS
RESPIRATION RATE: 16 BRPM | DIASTOLIC BLOOD PRESSURE: 56 MMHG | OXYGEN SATURATION: 97 % | WEIGHT: 246 LBS | HEART RATE: 62 BPM | TEMPERATURE: 98 F | HEIGHT: 72 IN | SYSTOLIC BLOOD PRESSURE: 110 MMHG | BODY MASS INDEX: 33.32 KG/M2

## 2020-06-29 DIAGNOSIS — I24.9 ACS (ACUTE CORONARY SYNDROME): ICD-10-CM

## 2020-06-29 DIAGNOSIS — M19.071 ARTHRITIS OF ANKLE, RIGHT: ICD-10-CM

## 2020-06-29 DIAGNOSIS — R79.89 ELEVATED TROPONIN: ICD-10-CM

## 2020-06-29 DIAGNOSIS — R07.9 CHEST PAIN SYNDROME: Primary | ICD-10-CM

## 2020-06-29 DIAGNOSIS — R07.1 CHEST PAIN ON BREATHING: ICD-10-CM

## 2020-06-29 DIAGNOSIS — R07.9 CHEST PAIN: ICD-10-CM

## 2020-06-29 PROBLEM — I20.89 ANGINA OF EFFORT: Status: ACTIVE | Noted: 2020-06-29

## 2020-06-29 LAB
ANION GAP SERPL CALC-SCNC: 9 MMOL/L (ref 8–16)
BASOPHILS # BLD AUTO: 0.03 K/UL (ref 0–0.2)
BASOPHILS NFR BLD: 0.2 % (ref 0–1.9)
BUN SERPL-MCNC: 10 MG/DL (ref 6–20)
CALCIUM SERPL-MCNC: 9.5 MG/DL (ref 8.7–10.5)
CATH EF ESTIMATED: 60 %
CHLORIDE SERPL-SCNC: 103 MMOL/L (ref 95–110)
CO2 SERPL-SCNC: 25 MMOL/L (ref 23–29)
CREAT SERPL-MCNC: 0.7 MG/DL (ref 0.5–1.4)
DIFFERENTIAL METHOD: ABNORMAL
EOSINOPHIL # BLD AUTO: 0 K/UL (ref 0–0.5)
EOSINOPHIL NFR BLD: 0 % (ref 0–8)
ERYTHROCYTE [DISTWIDTH] IN BLOOD BY AUTOMATED COUNT: 13.3 % (ref 11.5–14.5)
EST. GFR  (AFRICAN AMERICAN): >60 ML/MIN/1.73 M^2
EST. GFR  (NON AFRICAN AMERICAN): >60 ML/MIN/1.73 M^2
GLUCOSE SERPL-MCNC: 139 MG/DL (ref 70–110)
HCT VFR BLD AUTO: 35.3 % (ref 37–48.5)
HGB BLD-MCNC: 11.8 G/DL (ref 12–16)
IMM GRANULOCYTES # BLD AUTO: 0.17 K/UL (ref 0–0.04)
IMM GRANULOCYTES NFR BLD AUTO: 1.1 % (ref 0–0.5)
LYMPHOCYTES # BLD AUTO: 1.5 K/UL (ref 1–4.8)
LYMPHOCYTES NFR BLD: 9.9 % (ref 18–48)
MCH RBC QN AUTO: 30.4 PG (ref 27–31)
MCHC RBC AUTO-ENTMCNC: 33.4 G/DL (ref 32–36)
MCV RBC AUTO: 91 FL (ref 82–98)
MONOCYTES # BLD AUTO: 0.4 K/UL (ref 0.3–1)
MONOCYTES NFR BLD: 2.6 % (ref 4–15)
NEUTROPHILS # BLD AUTO: 13.1 K/UL (ref 1.8–7.7)
NEUTROPHILS NFR BLD: 86.2 % (ref 38–73)
NRBC BLD-RTO: 0 /100 WBC
PLATELET # BLD AUTO: 255 K/UL (ref 150–350)
PMV BLD AUTO: 10.2 FL (ref 9.2–12.9)
POTASSIUM SERPL-SCNC: 4 MMOL/L (ref 3.5–5.1)
RBC # BLD AUTO: 3.88 M/UL (ref 4–5.4)
SODIUM SERPL-SCNC: 137 MMOL/L (ref 136–145)
WBC # BLD AUTO: 15.14 K/UL (ref 3.9–12.7)

## 2020-06-29 PROCEDURE — 96360 HYDRATION IV INFUSION INIT: CPT | Mod: 59

## 2020-06-29 PROCEDURE — C1894 INTRO/SHEATH, NON-LASER: HCPCS | Performed by: SPECIALIST

## 2020-06-29 PROCEDURE — G0378 HOSPITAL OBSERVATION PER HR: HCPCS

## 2020-06-29 PROCEDURE — C1760 CLOSURE DEV, VASC: HCPCS | Performed by: SPECIALIST

## 2020-06-29 PROCEDURE — 25000003 PHARM REV CODE 250: Performed by: INTERNAL MEDICINE

## 2020-06-29 PROCEDURE — C1887 CATHETER, GUIDING: HCPCS | Performed by: SPECIALIST

## 2020-06-29 PROCEDURE — 94761 N-INVAS EAR/PLS OXIMETRY MLT: CPT

## 2020-06-29 PROCEDURE — 96361 HYDRATE IV INFUSION ADD-ON: CPT

## 2020-06-29 PROCEDURE — 63600175 PHARM REV CODE 636 W HCPCS: Performed by: SPECIALIST

## 2020-06-29 PROCEDURE — C1769 GUIDE WIRE: HCPCS | Performed by: SPECIALIST

## 2020-06-29 PROCEDURE — 94760 N-INVAS EAR/PLS OXIMETRY 1: CPT

## 2020-06-29 PROCEDURE — 80048 BASIC METABOLIC PNL TOTAL CA: CPT

## 2020-06-29 PROCEDURE — 36415 COLL VENOUS BLD VENIPUNCTURE: CPT

## 2020-06-29 PROCEDURE — 96372 THER/PROPH/DIAG INJ SC/IM: CPT | Mod: 59

## 2020-06-29 PROCEDURE — 99900035 HC TECH TIME PER 15 MIN (STAT)

## 2020-06-29 PROCEDURE — C1725 CATH, TRANSLUMIN NON-LASER: HCPCS | Performed by: SPECIALIST

## 2020-06-29 PROCEDURE — 25000003 PHARM REV CODE 250: Performed by: NURSE PRACTITIONER

## 2020-06-29 PROCEDURE — 99152 MOD SED SAME PHYS/QHP 5/>YRS: CPT | Performed by: SPECIALIST

## 2020-06-29 PROCEDURE — 25000003 PHARM REV CODE 250: Performed by: SPECIALIST

## 2020-06-29 PROCEDURE — 25500020 PHARM REV CODE 255: Performed by: SPECIALIST

## 2020-06-29 PROCEDURE — 93458 L HRT ARTERY/VENTRICLE ANGIO: CPT | Performed by: SPECIALIST

## 2020-06-29 PROCEDURE — 85025 COMPLETE CBC W/AUTO DIFF WBC: CPT

## 2020-06-29 PROCEDURE — 99153 MOD SED SAME PHYS/QHP EA: CPT | Performed by: SPECIALIST

## 2020-06-29 DEVICE — DEVICE CLOSURE  ANGIO-SEAL 6FR 610130: Type: IMPLANTABLE DEVICE | Site: CORONARY | Status: FUNCTIONAL

## 2020-06-29 RX ORDER — DIPHENHYDRAMINE HYDROCHLORIDE 50 MG/ML
INJECTION INTRAMUSCULAR; INTRAVENOUS
Status: DISCONTINUED | OUTPATIENT
Start: 2020-06-29 | End: 2020-06-29 | Stop reason: HOSPADM

## 2020-06-29 RX ORDER — MIDAZOLAM HYDROCHLORIDE 1 MG/ML
INJECTION INTRAMUSCULAR; INTRAVENOUS
Status: DISCONTINUED | OUTPATIENT
Start: 2020-06-29 | End: 2020-06-29 | Stop reason: HOSPADM

## 2020-06-29 RX ORDER — TIZANIDINE 4 MG/1
4 TABLET ORAL EVERY 8 HOURS PRN
Status: DISCONTINUED | OUTPATIENT
Start: 2020-06-29 | End: 2020-06-30

## 2020-06-29 RX ORDER — CLOPIDOGREL BISULFATE 75 MG/1
75 TABLET ORAL DAILY
Status: DISCONTINUED | OUTPATIENT
Start: 2020-06-30 | End: 2020-07-01 | Stop reason: HOSPADM

## 2020-06-29 RX ORDER — AMITRIPTYLINE HYDROCHLORIDE 25 MG/1
50 TABLET, FILM COATED ORAL NIGHTLY
Status: DISCONTINUED | OUTPATIENT
Start: 2020-06-29 | End: 2020-07-01 | Stop reason: HOSPADM

## 2020-06-29 RX ORDER — HYDROCODONE BITARTRATE AND ACETAMINOPHEN 5; 325 MG/1; MG/1
1 TABLET ORAL EVERY 4 HOURS PRN
Status: DISCONTINUED | OUTPATIENT
Start: 2020-06-29 | End: 2020-06-30

## 2020-06-29 RX ORDER — ACETAMINOPHEN 325 MG/1
650 TABLET ORAL EVERY 4 HOURS PRN
Status: DISCONTINUED | OUTPATIENT
Start: 2020-06-29 | End: 2020-07-01 | Stop reason: HOSPADM

## 2020-06-29 RX ORDER — ALPRAZOLAM 0.25 MG/1
0.25 TABLET ORAL EVERY 8 HOURS PRN
Status: DISCONTINUED | OUTPATIENT
Start: 2020-06-29 | End: 2020-06-29

## 2020-06-29 RX ORDER — IODIXANOL 320 MG/ML
INJECTION, SOLUTION INTRAVASCULAR
Status: DISCONTINUED | OUTPATIENT
Start: 2020-06-29 | End: 2020-06-29 | Stop reason: HOSPADM

## 2020-06-29 RX ORDER — ATORVASTATIN CALCIUM 40 MG/1
40 TABLET, FILM COATED ORAL NIGHTLY
Status: DISCONTINUED | OUTPATIENT
Start: 2020-06-29 | End: 2020-07-01 | Stop reason: HOSPADM

## 2020-06-29 RX ORDER — CETIRIZINE HYDROCHLORIDE 5 MG/1
5 TABLET ORAL DAILY
Status: DISCONTINUED | OUTPATIENT
Start: 2020-06-29 | End: 2020-06-29

## 2020-06-29 RX ORDER — OXYCODONE HYDROCHLORIDE 5 MG/1
10 TABLET ORAL EVERY 12 HOURS
Status: DISCONTINUED | OUTPATIENT
Start: 2020-06-29 | End: 2020-07-01 | Stop reason: HOSPADM

## 2020-06-29 RX ORDER — FLUOXETINE HYDROCHLORIDE 20 MG/1
20 CAPSULE ORAL DAILY
Status: DISCONTINUED | OUTPATIENT
Start: 2020-06-29 | End: 2020-07-01 | Stop reason: HOSPADM

## 2020-06-29 RX ORDER — CLOPIDOGREL BISULFATE 75 MG/1
300 TABLET ORAL ONCE
Status: COMPLETED | OUTPATIENT
Start: 2020-06-29 | End: 2020-06-29

## 2020-06-29 RX ORDER — CETIRIZINE HYDROCHLORIDE 5 MG/1
5 TABLET ORAL DAILY
Status: DISCONTINUED | OUTPATIENT
Start: 2020-06-29 | End: 2020-07-01 | Stop reason: HOSPADM

## 2020-06-29 RX ORDER — LORAZEPAM 0.5 MG/1
0.5 TABLET ORAL EVERY 6 HOURS PRN
Status: DISCONTINUED | OUTPATIENT
Start: 2020-06-29 | End: 2020-07-01 | Stop reason: HOSPADM

## 2020-06-29 RX ORDER — METOPROLOL TARTRATE 25 MG/1
50 TABLET, FILM COATED ORAL 2 TIMES DAILY
Status: DISCONTINUED | OUTPATIENT
Start: 2020-06-29 | End: 2020-06-30

## 2020-06-29 RX ORDER — LIDOCAINE HYDROCHLORIDE 10 MG/ML
INJECTION, SOLUTION EPIDURAL; INFILTRATION; INTRACAUDAL; PERINEURAL
Status: DISCONTINUED | OUTPATIENT
Start: 2020-06-29 | End: 2020-06-29 | Stop reason: HOSPADM

## 2020-06-29 RX ORDER — FENTANYL CITRATE 50 UG/ML
INJECTION, SOLUTION INTRAMUSCULAR; INTRAVENOUS
Status: DISCONTINUED | OUTPATIENT
Start: 2020-06-29 | End: 2020-06-29 | Stop reason: HOSPADM

## 2020-06-29 RX ORDER — HYDROMORPHONE HYDROCHLORIDE 1 MG/ML
INJECTION, SOLUTION INTRAMUSCULAR; INTRAVENOUS; SUBCUTANEOUS
Status: DISCONTINUED | OUTPATIENT
Start: 2020-06-29 | End: 2020-06-29 | Stop reason: HOSPADM

## 2020-06-29 RX ORDER — NITROGLYCERIN 0.4 MG/1
0.4 TABLET SUBLINGUAL EVERY 5 MIN PRN
Status: DISCONTINUED | OUTPATIENT
Start: 2020-06-29 | End: 2020-07-01 | Stop reason: HOSPADM

## 2020-06-29 RX ORDER — ONDANSETRON 2 MG/ML
4 INJECTION INTRAMUSCULAR; INTRAVENOUS EVERY 12 HOURS PRN
Status: DISCONTINUED | OUTPATIENT
Start: 2020-06-29 | End: 2020-07-01 | Stop reason: HOSPADM

## 2020-06-29 RX ORDER — SODIUM CHLORIDE 9 MG/ML
INJECTION, SOLUTION INTRAVENOUS CONTINUOUS
Status: DISCONTINUED | OUTPATIENT
Start: 2020-06-29 | End: 2020-06-29

## 2020-06-29 RX ORDER — PANTOPRAZOLE SODIUM 40 MG/1
40 TABLET, DELAYED RELEASE ORAL DAILY
Status: DISCONTINUED | OUTPATIENT
Start: 2020-06-29 | End: 2020-06-30

## 2020-06-29 RX ORDER — PROMETHAZINE HYDROCHLORIDE 25 MG/1
25 TABLET ORAL EVERY 6 HOURS PRN
Status: DISCONTINUED | OUTPATIENT
Start: 2020-06-29 | End: 2020-06-30

## 2020-06-29 RX ADMIN — OXYCODONE HYDROCHLORIDE 10 MG: 5 TABLET ORAL at 08:06

## 2020-06-29 RX ADMIN — PREDNISONE 50 MG: 20 TABLET ORAL at 05:06

## 2020-06-29 RX ADMIN — FLUOXETINE 20 MG: 20 CAPSULE ORAL at 08:06

## 2020-06-29 RX ADMIN — SODIUM CHLORIDE: 0.9 INJECTION, SOLUTION INTRAVENOUS at 06:06

## 2020-06-29 RX ADMIN — HYDROCODONE BITARTRATE AND ACETAMINOPHEN 1 TABLET: 5; 325 TABLET ORAL at 12:06

## 2020-06-29 RX ADMIN — OXYCODONE HYDROCHLORIDE AND ACETAMINOPHEN 1 TABLET: 5; 325 TABLET ORAL at 05:06

## 2020-06-29 RX ADMIN — ATORVASTATIN CALCIUM 40 MG: 40 TABLET, FILM COATED ORAL at 08:06

## 2020-06-29 RX ADMIN — AMITRIPTYLINE HYDROCHLORIDE 50 MG: 25 TABLET, FILM COATED ORAL at 08:06

## 2020-06-29 RX ADMIN — HYDROCODONE BITARTRATE AND ACETAMINOPHEN 1 TABLET: 5; 325 TABLET ORAL at 05:06

## 2020-06-29 RX ADMIN — PANTOPRAZOLE SODIUM 40 MG: 40 TABLET, DELAYED RELEASE ORAL at 08:06

## 2020-06-29 RX ADMIN — METOPROLOL TARTRATE 50 MG: 25 TABLET, FILM COATED ORAL at 08:06

## 2020-06-29 RX ADMIN — SODIUM CHLORIDE: 0.9 INJECTION, SOLUTION INTRAVENOUS at 12:06

## 2020-06-29 RX ADMIN — CLOPIDOGREL BISULFATE 300 MG: 75 TABLET, FILM COATED ORAL at 01:06

## 2020-06-29 RX ADMIN — HYDROCODONE BITARTRATE AND ACETAMINOPHEN 1 TABLET: 5; 325 TABLET ORAL at 11:06

## 2020-06-29 RX ADMIN — CETIRIZINE HYDROCHLORIDE 5 MG: 5 TABLET ORAL at 08:06

## 2020-06-29 NOTE — NURSING
Spoke to Darlene at the [facility].  [facility] transport to be called for stat to [facility] heart center.  Spoke with HS at [facility], Ketan and she will let the heart center know the patient will be arriving shortly.

## 2020-06-29 NOTE — Clinical Note
Catheter is inserted into the left ventricle. Angiography performed of the LV in multiple views. Angiography performed via power injection with 24 mL contrast at 12 mL/sPower injection PSI was 700.. pigtail

## 2020-06-29 NOTE — SUBJECTIVE & OBJECTIVE
Past Medical History:   Diagnosis Date    Abnormal Pap smear of cervix     Arthritis     OA    Back pain     Cancer     skin cancer to back    Depression     Endometriosis     Full dentures     GERD (gastroesophageal reflux disease)     Migraine     Pulmonary embolism     Seizures     Sleep apnea     Does not use c-pap since weight loss - 170 lbs    Uterine fibroid     Wears glasses        Past Surgical History:   Procedure Laterality Date    APPENDECTOMY      CARPAL TUNNEL RELEASE Bilateral      SECTION      CHOLECYSTECTOMY      COLONOSCOPY N/A 2019    Procedure: COLONOSCOPY;  Surgeon: Anselmo Blackwell MD;  Location: [facility] ENDO;  Service: Endoscopy;  Laterality: N/A;    EPIDURAL STEROID INJECTION INTO LUMBAR SPINE N/A 2019    Procedure: Injection-steroid-epidural-lumbar;  Surgeon: Yariel Ramirez MD;  Location: [facility] OR;  Service: Pain Management;  Laterality: N/A;  L3-4    ESOPHAGOGASTRODUODENOSCOPY N/A 2019    Procedure: EGD (ESOPHAGOGASTRODUODENOSCOPY);  Surgeon: Anselmo Blackwell MD;  Location: [facility] ENDO;  Service: Endoscopy;  Laterality: N/A;    FRACTURE SURGERY      ankle    gastric sleve      HYSTERECTOMY      endo and fibroids    HYSTERECTOMY      JOINT REPLACEMENT Left 2018    KNEE ARTHROPLASTY Left 2018    Procedure: ARTHROPLASTY, KNEE;  Surgeon: Feliberto Cardenas MD;  Location: [facility] OR;  Service: Orthopedics;  Laterality: Left;    KNEE ARTHROPLASTY Right 2020    Procedure: ARTHROPLASTY, KNEE;  Surgeon: Feliberto Cardenas MD;  Location: [facility] OR;  Service: Orthopedics;  Laterality: Right;    KNEE ARTHROSCOPY W/ MENISCECTOMY Right 10/25/2019    Procedure: ARTHROSCOPY, KNEE, WITH MENISCECTOMY;  Surgeon: Feliberto Cardenas MD;  Location: [facility] OR;  Service: Orthopedics;  Laterality: Right;    KNEE SURGERY      LUMBAR EPIDURAL INJECTION      OOPHORECTOMY Left     LSO    RADIAL NERVE Right     RECONSTRUCTION OF MEDIAL  PATELLOFEMORAL LIGAMENT OF RIGHT KNEE Right 10/25/2019    Procedure: RECONSTRUCTION, LIGAMENT, MEDIAL PATELLOFEMORAL, RIGHT;  Surgeon: Feliberto Cardenas MD;  Location: [facility];  Service: Orthopedics;  Laterality: Right;    SINUS SURGERY      UPPER GASTROINTESTINAL ENDOSCOPY  11/27/2019    Dr. Blackwell; mild schatzki ring-dilated; gastritis; bx in process       Review of patient's allergies indicates:   Allergen Reactions    Clarithromycin Swelling and Other (See Comments)     DIFFICULTY SWALLOWING  DIFFICULTY SWALLOWING    Pcn [penicillins] Anaphylaxis    Sulfa (sulfonamide antibiotics) Hives    Wellbutrin [bupropion hcl] Shortness Of Breath and Anaphylaxis    Betadine [povidone-iodine] Hives     Swelling      Cefprozil Hives    Iodinated contrast media Hives    Latex, natural rubber Rash    Sulfamethoxazole-trimethoprim Nausea And Vomiting    Iodine Hives and Swelling    Latex Hives and Swelling       Current Facility-Administered Medications on File Prior to Encounter   Medication    lactated ringers infusion    lidocaine (PF) 10 mg/ml (1%) injection 10 mg    [COMPLETED] predniSONE tablet 50 mg    [DISCONTINUED] acetaminophen tablet 650 mg    [DISCONTINUED] amitriptyline tablet 50 mg    [DISCONTINUED] aspirin chewable tablet 81 mg    [DISCONTINUED] atorvastatin tablet 40 mg    [DISCONTINUED] bisacodyL suppository 10 mg    [DISCONTINUED] cetirizine tablet 5 mg    [DISCONTINUED] enoxaparin injection 105 mg    [DISCONTINUED] FLUoxetine capsule 20 mg    [DISCONTINUED] HYDROmorphone injection 0.2 mg    [DISCONTINUED] LORazepam tablet 0.5 mg    [DISCONTINUED] magnesium oxide tablet 800 mg    [DISCONTINUED] magnesium oxide tablet 800 mg    [DISCONTINUED] metoprolol tartrate (LOPRESSOR) tablet 25 mg    [DISCONTINUED] multivitamin tablet    [DISCONTINUED] nitroGLYCERIN SL tablet 0.4 mg    [DISCONTINUED] ondansetron injection 4 mg    [DISCONTINUED] oxyCODONE 12 hr tablet 10 mg     [DISCONTINUED] oxyCODONE-acetaminophen 5-325 mg per tablet 1 tablet    [DISCONTINUED] pantoprazole EC tablet 40 mg    [DISCONTINUED] polyethylene glycol packet 17 g    [DISCONTINUED] potassium, sodium phosphates 280-160-250 mg packet 2 packet    [DISCONTINUED] potassium, sodium phosphates 280-160-250 mg packet 2 packet    [DISCONTINUED] potassium, sodium phosphates 280-160-250 mg packet 2 packet    [DISCONTINUED] promethazine tablet 25 mg    [DISCONTINUED] sodium chloride 0.9% flush 10 mL    [DISCONTINUED] sucralfate 100 mg/mL suspension 1 g    [DISCONTINUED] tiZANidine tablet 4 mg     Current Outpatient Medications on File Prior to Encounter   Medication Sig    AIMOVIG AUTOINJECTOR 140 mg/mL AtIn     amitriptyline (ELAVIL) 25 MG tablet TAKE ONE TABLET BY MOUTH EVERY NIGHT FOR 2 WEEKS THEN INCREASE TO 2 TABLETS AT BEDTIME    cetirizine (ZYRTEC) 5 MG tablet Take 5 mg by mouth as needed.     cyanocobalamin, vitamin B-12, 1,000 mcg/mL Kit Inject 1 mL into the muscle every 21 days.    erenumab-aooe 140 mg/mL AtIn Inject 140 mg into the skin every 28 days.    fluoxetine (PROZAC) 20 MG capsule every evening.     HYDROmorphone (DILAUDID) 2 MG tablet Take 1 tablet (2 mg total) by mouth every 4 (four) hours as needed for Pain.    hydrOXYzine pamoate (VISTARIL) 25 MG Cap TAKE ONE CAPSULE BY MOUTH 3 TIMES A DAY AS NEEDED FOR ANXIETY    lorazepam (ATIVAN) 0.5 MG tablet Take 0.5 mg by mouth every 4 (four) hours as needed.     oxyCODONE (OXYCONTIN) 10 mg 12 hr tablet Take 10 mg by mouth every 12 (twelve) hours.    oxyCODONE-acetaminophen (PERCOCET) 5-325 mg per tablet Take 1 tablet by mouth every 6 (six) hours as needed.    pantoprazole (PROTONIX) 40 MG tablet Take 1 tablet (40 mg total) by mouth 2 (two) times daily.    promethazine (PHENERGAN) 25 MG tablet Take 1 tablet (25 mg total) by mouth every 6 (six) hours as needed for Nausea. Take 1 tablet by mouth every 6 (six) hours as needed (migraine). -MAY  CAUSE DROWSINESS-    tiZANidine (ZANAFLEX) 4 MG tablet Take 4 mg by mouth every 6 (six) hours as needed.    [DISCONTINUED] enoxaparin (LOVENOX) 120 mg/0.8 mL Syrg Inject 0.7 mLs (105 mg total) into the skin every 12 (twelve) hours.    [DISCONTINUED] epinephrine (EPIPEN) 0.3 mg/0.3 mL (1:1,000) AtIn 0.3 mg daily as needed.      Family History     Problem Relation (Age of Onset)    Breast cancer Maternal Aunt (46)    Esophageal cancer Maternal Grandmother    Heart disease Mother, Father        Tobacco Use    Smoking status: Current Every Day Smoker     Packs/day: 0.25     Years: 20.00     Pack years: 5.00     Types: Cigarettes     Last attempt to quit: 2018     Years since quittin.6    Smokeless tobacco: Never Used   Substance and Sexual Activity    Alcohol use: Yes     Alcohol/week: 0.0 standard drinks     Comment: occasionally    Drug use: Not Currently     Types: Other-see comments     Comment: no    Sexual activity: Yes     Partners: Male     Review of Systems   Constitutional: Positive for diaphoresis and fatigue. Negative for chills and fever.   HENT: Negative.    Eyes: Negative.    Respiratory: Positive for chest tightness.    Cardiovascular: Positive for chest pain.   Gastrointestinal: Negative.    Endocrine: Negative.    Genitourinary: Negative.    Musculoskeletal: Positive for arthralgias, gait problem, myalgias and neck pain.        S/P right knee replacement   Skin: Positive for wound.        knee   Neurological: Positive for dizziness and weakness.   Hematological: Negative.    Psychiatric/Behavioral: Positive for decreased concentration and sleep disturbance. Negative for hallucinations, self-injury and suicidal ideas. The patient is nervous/anxious.      Objective:     Vital Signs (Most Recent):  Temp: 97.8 °F (36.6 °C) (20 1600)  Pulse: 64 (20 1600)  Resp: 16 (20 1728)  BP: (!) 128/57 (20 1600)  SpO2: 99 % (20 1600) Vital Signs (24h Range):  Temp:  [97.7  °F (36.5 °C)-98.5 °F (36.9 °C)] 97.8 °F (36.6 °C)  Pulse:  [60-84] 64  Resp:  [12-19] 16  SpO2:  [93 %-99 %] 99 %  BP: ()/(52-60) 128/57     Weight: 111.6 kg (245 lb 15.8 oz)  Body mass index is 33.36 kg/m².    Physical Exam  Vitals signs reviewed.   Constitutional:       General: She is not in acute distress.     Appearance: She is not ill-appearing.   HENT:      Head: Normocephalic and atraumatic.      Comments: piercings     Nose: Nose normal.      Mouth/Throat:      Mouth: Mucous membranes are moist.   Eyes:      Extraocular Movements: Extraocular movements intact.      Pupils: Pupils are equal, round, and reactive to light.   Neck:      Musculoskeletal: Normal range of motion and neck supple.   Cardiovascular:      Rate and Rhythm: Normal rate and regular rhythm.      Pulses: Normal pulses.      Heart sounds: Normal heart sounds.   Pulmonary:      Effort: Pulmonary effort is normal.      Breath sounds: Normal breath sounds.   Abdominal:      General: Abdomen is flat. Bowel sounds are normal.      Palpations: Abdomen is soft.   Musculoskeletal:      Comments: Right knee S/P surgery will not move due to pain   Skin:     General: Skin is warm.   Neurological:      General: No focal deficit present.      Mental Status: She is alert and oriented to person, place, and time.   Psychiatric:         Mood and Affect: Mood normal.           CRANIAL NERVES     CN III, IV, VI   Pupils are equal, round, and reactive to light.       Significant Labs:   Blood Culture: No results for input(s): LABBLOO in the last 48 hours.  BMP:   Recent Labs   Lab 06/29/20 0426   *      K 4.0      CO2 25   BUN 10   CREATININE 0.7   CALCIUM 9.5     CBC:   Recent Labs   Lab 06/28/20 0437 06/29/20 0426   WBC 10.58 15.14*   HGB 11.3* 11.8*   HCT 34.8* 35.3*    255     CMP:   Recent Labs   Lab 06/28/20 0437 06/29/20 0426    137   K 3.3* 4.0    103   CO2 26 25    139*   BUN 11 10   CREATININE  0.8 0.7   CALCIUM 9.2 9.5   ANIONGAP 12 9   EGFRNONAA >60 >60     Cardiac Markers: No results for input(s): CKMB, MYOGLOBIN, BNP, TROPISTAT in the last 48 hours.  Coagulation: No results for input(s): PT, INR, APTT in the last 48 hours.  Lactic Acid: No results for input(s): LACTATE in the last 48 hours.  Lipid Panel:   Recent Labs   Lab 06/28/20  0437   CHOL 156   HDL 38*   LDLCALC 89.6   TRIG 142   CHOLHDL 24.4     Magnesium: No results for input(s): MG in the last 48 hours.  POCT Glucose: No results for input(s): POCTGLUCOSE in the last 48 hours.  Troponin: No results for input(s): TROPONINI in the last 48 hours.  TSH: No results for input(s): TSH in the last 4320 hours.  Urine Culture: No results for input(s): LABURIN in the last 48 hours.  Urine Studies: No results for input(s): COLORU, APPEARANCEUA, PHUR, SPECGRAV, PROTEINUA, GLUCUA, KETONESU, BILIRUBINUA, OCCULTUA, NITRITE, UROBILINOGEN, LEUKOCYTESUR, RBCUA, WBCUA, BACTERIA, SQUAMEPITHEL, HYALINECASTS in the last 48 hours.    Invalid input(s): WINSOME    Significant Imaging: I have reviewed all pertinent imaging results/findings within the past 24 hours.

## 2020-06-29 NOTE — NURSING
Report called to [facility] at 0635. Pt has 2 20 gauge IVs to the R FA and received Hibiclens bath this AM. EMS to arrive approx 0730 for transport. Pt has been NPO since midnight. Pain controlled. No SOB or CP throughout night. VSS, pt has been hypotensive since arrival. No changes noted.

## 2020-06-29 NOTE — PLAN OF CARE
Vital signs, cardiac and lab monitoring. IV hydration Possible discharge in am . MD consult in am. Increase activity as tolerated. Bed alarm on. Watch for falls. Watch for sign and symptoms of bleeding. Breathing treatments and adjust oxygen as needed. Strict I & O. Daily weights.

## 2020-06-29 NOTE — CARE UPDATE
06/29/20 0648   Patient Assessment/Suction   Level of Consciousness (AVPU) alert   Respiratory Effort Normal;Unlabored   PRE-TX-O2   O2 Device (Oxygen Therapy) room air   SpO2 98 %   Pulse Oximetry Type Intermittent   $ Pulse Oximetry - Multiple Charge Pulse Oximetry - Multiple   Pulse 64   Resp 18   Incentive Spirometer   $ Incentive Spirometer Charges done independently per patient   Administration (IS) self-administered   Number of Repetitions (IS) 10   Level Incentive Spirometer (mL) 2500   Patient Tolerance (IS) good   Does IS well and able to self administer, continuing to monitor.

## 2020-06-29 NOTE — HPI
HPI:   This  is a 39-year-old female with a past medical history significant for depression, GERD, pulmonary emboli in 2018, sleep apnea, prior gastric sleeve, and nicotine dependence presenting today for increased pain to right LE. Pt underwent a right knee arthroplasty per Dr. Crisostomo on 6/16/20. Patient previously failed treatment for pulmonary emboli with Xarelto and was placed on Pradaxa 150 mg p.o. b.i.d. at discharge 6/17 per hematologist rec at that time.  Pt presented back to ED on 6/21 for increasing pain and swelling to right leg. Pt reported compliance with her pradaxa and medications; however, ultrasound of the RLE on that date demonstrated a right nonocclusive popliteal DVT. Pt was readmitted and placed on full dose lovenox. Pt was discharged home on 6/23 on lovenox and pain was controlled with oral meds at that time. Dr. Crisostomo provided pt with oral dilaudid pain script on d/c.  Patient returns to ED today complaints of intractable pain to right leg that is not controlled with home meds. She denies worsening swelling to leg. She describes the pain as right knee and post lower leg location, 10/10 intensity, pressure quality, worsens with movement, assoc with nausea secondary to severe pain, improved after receiving IV dilaudid in ED. She denies fever, chills, cp and sob. Repeat US done in ED confirms DVT stable. CRP improving and WBC wnl. Dr. Crisostomo consulted from ED and rec admission for pain control.       * No surgery found *       Hospital Course:   The patient was monitored closely during her stay.  She received p.r.n. pain medication.  Orthopedics were consulted.  Physical therapy and occupational therapy were consulted.  Patient's pain medication was titrated.  Patient had complaints of chest pain during her stay.  She was kept on continuous telemetry monitoring where she remains sinus rhythm.  Her troponins were trended.  Her initial troponin was negative however her 2nd troponin was  elevated.  Patient was given full-dose aspirin.  She was noted to have an elevated D-dimer and a CTA of the chest was ordered.  Patient was continued on full-dose Lovenox 1 milligram/kilogram subcu q.12 hours.  A right lower extremity arterial ultrasound was ordered to evaluate patient's circulation due to her ongoing severe right lower extremity pain.  Patient CTA of the chest was negative for pulmonary emboli.  Her troponins continued to trend upward.  Cardiology was consulted.  Right lower extremity arterial ultrasound showed no significant stenosis.  Patient later reported mid epigastric reproducible pain.  She reported intermittent use of NSAIDs at home prior to admission.  Her home b.i.d. Protonix was continued.  She was also placed on Carafate.  Patient with noted prior history of gastric sleeve in 2014 and was encouraged to continue her bariatric diet.  Patient's troponins continue to trend upward and patient had persistent chest pain with ambulation.  Patient was discharged to [facility] for coronary angiogram on 06/29/2020.      Consults:  Cardiology -Dr. Kaushik Guthrie

## 2020-06-29 NOTE — DISCHARGE SUMMARY
[facility] Hospital Medicine  Discharge Summary    Patient Name: Aleyda Costa  MRN: [id number]  Admission Date: 6/24/2020  Hospital Length of Stay: 3 days  Discharge Date and Time:  06/29/2020 1:17 PM  Attending Physician: Padmini att. providers found   Discharging Provider: SCHUYLER Rivera  Primary Care Provider: Darlene Hayden MD    HPI:   This  is a 39-year-old female with a past medical history significant for depression, GERD, pulmonary emboli in 2018, sleep apnea, prior gastric sleeve, and nicotine dependence presenting today for increased pain to right LE. Pt underwent a right knee arthroplasty per Dr. Crisostomo on 6/16/20. Patient previously failed treatment for pulmonary emboli with Xarelto and was placed on Pradaxa 150 mg p.o. b.i.d. at discharge 6/17 per hematologist rec at that time.  Pt presented back to ED on 6/21 for increasing pain and swelling to right leg. Pt reported compliance with her pradaxa and medications; however, ultrasound of the RLE on that date demonstrated a right nonocclusive popliteal DVT. Pt was readmitted and placed on full dose lovenox. Pt was discharged home on 6/23 on lovenox and pain was controlled with oral meds at that time. Dr. Crisostomo provided pt with oral dilaudid pain script on d/c.  Patient returns to ED today complaints of intractable pain to right leg that is not controlled with home meds. She denies worsening swelling to leg. She describes the pain as right knee and post lower leg location, 10/10 intensity, pressure quality, worsens with movement, assoc with nausea secondary to severe pain, improved after receiving IV dilaudid in ED. She denies fever, chills, cp and sob. Repeat US done in ED confirms DVT stable. CRP improving and WBC wnl. Dr. Crisostomo consulted from ED and rec admission for pain control.      * No surgery found *      Hospital Course:   The patient was monitored closely during her stay.  She received p.r.n. pain medication.   Orthopedics were consulted.  Physical therapy and occupational therapy were consulted.  Patient's pain medication was titrated.  Patient had complaints of chest pain during her stay.  She was kept on continuous telemetry monitoring where she remains sinus rhythm.  Her troponins were trended.  Her initial troponin was negative however her 2nd troponin was elevated.  Patient was given full-dose aspirin.  She was noted to have an elevated D-dimer and a CTA of the chest was ordered.  Patient was continued on full-dose Lovenox 1 milligram/kilogram subcu q.12 hours.  A right lower extremity arterial ultrasound was ordered to evaluate patient's circulation due to her ongoing severe right lower extremity pain.  Patient CTA of the chest was negative for pulmonary emboli.  Her troponins continued to trend upward.  Cardiology was consulted.  Right lower extremity arterial ultrasound showed no significant stenosis.  Patient later reported mid epigastric reproducible pain.  She reported intermittent use of NSAIDs at home prior to admission.  Her home b.i.d. Protonix was continued.  She was also placed on Carafate.  Patient with noted prior history of gastric sleeve in 2014 and was encouraged to continue her bariatric diet.  Patient's troponins continue to trend upward and patient had persistent chest pain with ambulation.  Patient was discharged to [facility] for coronary angiogram on 06/29/2020.     Consults:  Cardiology -Dr. Kaushik Guthrie    Service: Hospital Medicine    Final Active Diagnoses:    Diagnosis Date Noted POA    PRINCIPAL PROBLEM:  Pain of right lower extremity [M79.604] 06/25/2020 Yes    Angina of effort [I20.8] 06/29/2020 Yes    ACS (acute coronary syndrome) [I24.9] 06/29/2020 No    Hypokalemia [E87.6] 06/27/2020 Yes    History of bariatric surgery [Z98.84] 06/26/2020 Not Applicable    Atelectasis [J98.11] 06/26/2020 Yes    Hiatal hernia [K44.9] 06/26/2020 Yes    Elevated troponin [R79.89]  06/26/2020 No    Chest pain [R07.9] 06/25/2020 No    DVT (deep venous thrombosis) [I82.409] 06/21/2020 Yes    Generalized anxiety disorder [F41.1] 06/16/2020 Yes    History of pulmonary embolism [Z86.711] 06/16/2020 Yes    Normocytic anemia [D64.9] 11/19/2018 Yes    Nicotine dependence [F17.200] 11/19/2018 Yes    S/P total knee arthroplasty, right [Z96.651] 11/13/2018 Not Applicable      Problems Resolved During this Admission:    Diagnosis Date Noted Date Resolved POA    Hypokalemia [E87.6] 06/25/2020 06/25/2020 Yes    Intractable pain [R52] 06/24/2020 06/25/2020 Yes       Discharged Condition: stable    Disposition: Discharged to Other [facility]      Patient Instructions:   Patient discharged to [facility] for coronary angiogram      Significant Diagnostic Studies:    CMP   Recent Labs   Lab 06/28/20 0437 06/29/20  0426    137   K 3.3* 4.0    103   CO2 26 25    139*   BUN 11 10   CREATININE 0.8 0.7   CALCIUM 9.2 9.5   ANIONGAP 12 9   ESTGFRAFRICA >60 >60   EGFRNONAA >60 >60   CBC   Recent Labs   Lab 06/28/20 0437 06/29/20  0426   WBC 10.58 15.14*   HGB 11.3* 11.8*   HCT 34.8* 35.3*    255     Lab Results   Component Value Date    IRON 37 06/21/2020    TIBC 293 06/21/2020    FERRITIN 180 06/21/2020      Saturated iron 13   Transferrin 198  Ferritin 180    Sed rate 110    PT 11.4 INR 1.0    D-dimer 2.58    Magnesium 2.0    Lipase 37    CRP 90.4    Lab Results   Component Value Date    CHOL 156 06/28/2020    CHOL 196 11/21/2018     Lab Results   Component Value Date    HDL 38 (L) 06/28/2020    HDL 39 (L) 11/21/2018     Lab Results   Component Value Date    LDLCALC 89.6 06/28/2020    LDLCALC 128.6 11/21/2018     Lab Results   Component Value Date    TRIG 142 06/28/2020    TRIG 142 11/21/2018     Lab Results   Component Value Date    CHOLHDL 24.4 06/28/2020    CHOLHDL 19.9 (L) 11/21/2018     Highest troponin up to 0.632    CPK 68 CPK MB 3.7 MB  percentage 5.4 troponin 0.632    CPK 65 CPK MB 1.3 MB percentage 2.0 troponin 0.349    Contains abnormal data Urinalysis, Reflex to Urine Culture Urine, Clean Catch  Status:  Final result   Ref Range & Units 06/27/20 0945   Specimen UA  Urine, Clean Catch    Color, UA Yellow, Straw, Kelley Yellow    Appearance, UA Clear HazyAbnormal     pH, UA 5.0 - 8.0 7.0    Specific Gravity, UA 1.005 - 1.030 1.010    Protein, UA Negative Negative    Comment: Recommend a 24 hour urine protein or a urine   protein/creatinine ratio if globulin induced proteinuria is   clinically suspected.    Glucose, UA Negative Negative    Ketones, UA Negative Negative    Bilirubin (UA) Negative Negative    Occult Blood UA Negative 2+Abnormal     Nitrite, UA Negative Negative    Urobilinogen, UA <2.0 EU/dL 1.0    Leukocytes, UA Negative TraceAbnormal     Resulting Agency  [facility]   Narrative    Preferred Collection Type->Urine, Clean Catch   Specimen Source->Urine        Specimen Collected: 06/27/20 0945 Last Resulted: 06/27/20 1017           Transthoracic echo (TTE) complete  Order# [id number]  Reading physician: Jm Guthrie MD Ordering physician: Jm Guthrie MD Study date: 6/26/20   Reason for Exam  Priority: Routine  Comments:    Staff    Performing Sonographer   Johnna Mcguire    Vitals    Height Weight BMI (Calculated) BSA (Calculated - sq m) BP   6' (1.829 m) 111.6 kg (246 lb) 33.4 2.38 sq meters 154/67      Conclusion    · Normal left ventricular systolic function. The estimated ejection fraction is 56%.  · Grade I (mild) left ventricular diastolic dysfunction consistent with impaired relaxation.  · Mild left atrial enlargement.  · Mild mitral regurgitation.  · Mild to moderate tricuspid regurgitation.  · Normal central venous pressure (3 mmHg).  · The estimated PA systolic pressure is 35 mmHg.      Study Details    A complete echo was performed using complete 2D, color flow Doppler and spectral Doppler. During the study, the  "apical, parasternal and subcostal views were captured. This was a portable study performed at the patient's bedside.   Echocardiography Findings    Left Ventricle Normal ejection fraction at 56%.  Normal left ventricular cavity size. Normal wall thickness observed. Grade I (mild) left ventricular diastolic dysfunction consistent with impaired relaxation. Normal left atrial pressure.   Right Ventricle Normal cavity size.   Left Atrium There is mild left atrial enlargement. Left atrial volume index is 23.8 mL/m2.   Right Atrium There is normal right atrial size.   Aortic Valve Aortic valve sclerosis is mild. There is no aortic stenosis. No regurgitation.   Mitral Valve Mild regurgitation.   Tricuspid Valve Mild to moderate regurgitation. The estimated PA systolic pressure is 35 mmHg.   IVC/SVC Normal central venous pressure (3 mm Hg).   Pericardium No pericardial effusion.   2D Measurements    Dimensions   LVIDD 4.92 cm      LVIDS 3.32 cm      IVS 1.03 cm      PW 0.99 cm      FS 33 %      LA size 4.05 cm      LA volume 55.37 cm3      LA Volume Index 23.8 mL/m2      LV mass 179.61 g       Dimensions   RVDD 2.58 cm      TAPSE 2.16 cm      RA Width 3.96 cm      RA Major Axis 4.93 cm       Aortic Root - End Diastolic   Ao root annulus 2.95 cm      STJ 2.57 cm      Ascending aorta 2.71 cm           Doppler Measurements - Diastolic Filling    Diastolic Filling   E/A ratio 1.22       IVRT 111.32 msec      Mean e' 0.08 m/s       E wave decelartion time 268.1 msec      MV "A" wave duration 77 msec      Pulm vein "A" wave 91 msec      E/E' ratio 11.2 m/s         Doppler Measurements - Aortic Valve    Stenosis   LVOT diameter 2.05 cm      LVOT area 3.3 cm2      LVOT peak natasha 1 m/s      LVOT peak VTI 24.55 cm      Ao peak natasha 1.62 m/s      Ao VTI 35.69 cm      AV valve area 2.27 cm2      AV mean gradient 6 mmHg      AV peak gradient 10 mmHg      AV Velocity Ratio 0.62       AV index (prosthetic) 0.69             Doppler " Measurements - Mitral Valve    Stenosis   MV valve area p 1/2 method 2.03 cm2       PISA-MS   MV Peak E Jordin 0.84 m/s       PISA-MR   Radius 0.37 cm      Mr max jordin 0.05 m/s           Doppler Measurements - Shunt Ratio    Shunt Ratio   LVOT stroke volume 80.99 cm3          Doppler Measurements - Tricuspid Valve    PISA   PISA TR VN Nyquist 0 m/s      TR Max Jordin 2.84 m/s       Stress Evaluation   TV rest pulmonary artery pressure 35 mmHg           Wall Scoring    Resting Score Index: 1.00              The left ventricular wall motion is normal.               Result Image Hyperlink     Show images for Echo Color Flow Doppler? Yes       Medications  (Filter: Administrations occurring from 06/26/20 0901 to 06/26/20 0922)  None   Signed    Electronically signed by Jm Guthrie MD on 6/26/20 at 1502 CDT     Radiology Results (last 7 days)    Procedure Component Value Units Date/Time   US Lower Extrem Arteries Right with CHI [[id number]] Resulted: 06/27/20 1029   Order Status: Completed Updated: 06/27/20 1031   Narrative:     EXAMINATION:   US ARTERIAL LOWER EXTREMITY RIGHT WITH CHI (XPD)-    CLINICAL HISTORY:   RLE pain- please do right lower extremity arterial ultrasound;     TECHNIQUE:   Right lower extremity arterial duplex ultrasound examination performed. Multiple gray scale and color doppler images were obtained in addition to waveform analysis.  Ankle-brachial indices were calculated for both lower extremities.     COMPARISON:   06/24/2020     FINDINGS:   The ankle brachial index on the right is 0.81 and on the left is 0.7.     The peak systolic velocities on the right are as follows, in centimeters/second:     Common femoral artery: 120     Superficial femoral artery, proximal: 116     Superficial femoral artery, mid portion: 114     Superficial femoral artery, distal: 85     Popliteal artery: 102     Posterior tibial artery: 75     Anterior tibial artery: 69     Normal arterial waveforms are demonstrated.     Impression:       Ankle-brachial index of 0.81 on the right and 0.7 on the left.     No hemodynamically significant stenosis demonstrated in the right lower extremity arterial system.       Electronically signed by: Dominik Sexton   Date: 06/27/2020   Time: 10:29   CTA Chest Non Coronary [[id number]] Resulted: 06/25/20 1728   Order Status: Completed Updated: 06/25/20 1731   Narrative:     EXAMINATION:   CTA CHEST NON CORONARY     CLINICAL HISTORY:   PE suspected, intermediate prob, positive D-dimer;     TECHNIQUE:   Low dose axial images, sagittal and coronal reformations were obtained from the thoracic inlet to the lung bases following the IV administration of 100 mL of Omnipaque 350.  Contrast timing was optimized to evaluate the pulmonary arteries.  MIP images were performed.     COMPARISON:   Chest x-ray of June 4, 2020.  CTA chest of April 6, 2019.     FINDINGS:   There is no CTA evidence of pulmonary embolus.  Opacification of the pulmonary arteries is adequate although there is motion defect.  The aorta appears intact without dissection or aneurysm.  The heart and great vessels are of normal size and contour.  Enlargement or aneurysm is not seen.  A small hiatal hernia is noted.  Significant adenopathy is not noted.     There is dependent atelectasis at the posterior lung fields bilaterally.  Within the lung fields no mass or nodule is identified.  The upper lung fields are clear.  No pneumothorax or pleural effusion is seen.  The patient has had a gastric sleeve procedure.    Impression:       No CTA evidence of pulmonary embolus.  Dependent atelectasis seen at the lung bases.  Small hiatal hernia.  Prior gastric sleeve procedure       Electronically signed by: Jose Salas MD   Date: 06/25/2020   Time: 17:28   US Lower Extremity Veins Right [[id number]] Resulted: 06/24/20 1547   Order Status: Completed Updated: 06/24/20 1549   Narrative:     EXAMINATION:   US LOWER EXTREMITY VEINS RIGHT     CLINICAL  HISTORY:   Acute embolism and thrombosis of unspecified deep veins of unspecified lower extremity     TECHNIQUE:   Duplex and color flow Doppler evaluation and graded compression of the right lower extremity veins was performed.     COMPARISON:   None     FINDINGS:   Right thigh veins: The common femoral, femoral,, upper greater saphenous, and deep femoral veins are patent and free of thrombus. The veins are normally compressible and have normal phasic flow and augmentation response.     There is nonocclusive deep vein thrombosis identified in the popliteal vein.  Thrombus in the trifurcation veins in the calf is not seen.    Impression:       Nonocclusive deep vein thrombosis is noted in the right popliteal vein.  The rest of the veins of the leg are clear of thrombus.       Electronically signed by: Jose Salas MD   Date: 06/24/2020   Time: 15:47         Pending Diagnostic Studies:     Procedure Component Value Units Date/Time    EKG 12-lead [[id number]]     Order Status: Sent Lab Status: No result     EKG 12-lead [[id number]]     Order Status: Sent Lab Status: No result          Medications:  Reconciled Home Medications:      Medication List      ASK your doctor about these medications    * AIMOVIG AUTOINJECTOR 140 mg/mL Atin  Generic drug: erenumab-aooe  Inject 140 mg into the skin every 28 days.     * AIMOVIG AUTOINJECTOR 140 mg/mL Atin  Generic drug: erenumab-aooe     amitriptyline 25 MG tablet  Commonly known as: ELAVIL  TAKE ONE TABLET BY MOUTH EVERY NIGHT FOR 2 WEEKS THEN INCREASE TO 2 TABLETS AT BEDTIME     cetirizine 5 MG tablet  Commonly known as: ZYRTEC  Take 5 mg by mouth as needed.     cyanocobalamin (vitamin B-12) 1,000 mcg/mL Kit  Inject 1 mL into the muscle every 21 days.     enoxaparin 120 mg/0.8 mL Syrg  Commonly known as: LOVENOX  Inject 0.7 mLs (105 mg total) into the skin every 12 (twelve) hours.     EPINEPHrine 0.3 mg/0.3 mL Atin  Commonly known as: EPIPEN  0.3 mg daily as needed.      FLUoxetine 20 MG capsule  every evening.     HYDROmorphone 2 MG tablet  Commonly known as: DILAUDID  Take 1 tablet (2 mg total) by mouth every 4 (four) hours as needed for Pain.     hydrOXYzine pamoate 25 MG Cap  Commonly known as: VISTARIL  TAKE ONE CAPSULE BY MOUTH 3 TIMES A DAY AS NEEDED FOR ANXIETY     LORazepam 0.5 MG tablet  Commonly known as: ATIVAN  Take 0.5 mg by mouth every 4 (four) hours as needed.     oxyCODONE 10 mg 12 hr tablet  Commonly known as: OXYCONTIN  Take 10 mg by mouth every 12 (twelve) hours.     oxyCODONE-acetaminophen 5-325 mg per tablet  Commonly known as: PERCOCET  Take 1 tablet by mouth every 6 (six) hours as needed.     pantoprazole 40 MG tablet  Commonly known as: PROTONIX  Take 1 tablet (40 mg total) by mouth 2 (two) times daily.     promethazine 25 MG tablet  Commonly known as: PHENERGAN  Take 1 tablet (25 mg total) by mouth every 6 (six) hours as needed for Nausea. Take 1 tablet by mouth every 6 (six) hours as needed (migraine). -MAY CAUSE DROWSINESS-     tiZANidine 4 MG tablet  Commonly known as: ZANAFLEX  Take 4 mg by mouth every 6 (six) hours as needed.         * This list has 2 medication(s) that are the same as other medications prescribed for you. Read the directions carefully, and ask your doctor or other care provider to review them with you.                Indwelling Lines/Drains at time of discharge:   Lines/Drains/Airways     None                 Time spent on the discharge of patient: 58 minutes  Patient was seen and examined on the date of discharge and determined to be suitable for discharge.       Jaylyn Rowland, SCHUYLER  Department of Hospital Medicine  [facility]

## 2020-06-29 NOTE — PLAN OF CARE
Per medical record notes. Pt transferred to [facility] for Lt Heart Cath.       06/29/20 0918   Final Note   Assessment Type Final Discharge Note   Anticipated Discharge Disposition Short Term

## 2020-06-29 NOTE — RESPIRATORY THERAPY
06/28/20 2001   Patient Assessment/Suction   Level of Consciousness (AVPU) alert   Respiratory Effort Unlabored   PRE-TX-O2   O2 Device (Oxygen Therapy) room air   SpO2 97 %   Incentive Spirometer   $ Incentive Spirometer Charges done with encouragement   Administration (IS) proper technique demonstrated   Number of Repetitions (IS) 10   Level Incentive Spirometer (mL) 2500   Patient Tolerance (IS) good

## 2020-06-29 NOTE — H&P
[facility] Medicine  History & Physical    Patient Name: Aleyda Costa  MRN: [id number]  Admission Date: 6/29/2020  Attending Physician: Nabeel Asencio MD  Primary Care Provider: Darlene Hayden MD         Patient information was obtained from patient, past medical records and ER records.     Subjective:     Principal Problem:<principal problem not specified>    Chief Complaint:   Chief Complaint   Patient presents with    Leg Pain        HPI: HPI:   This  is a 39-year-old female with a past medical history significant for depression, GERD, pulmonary emboli in 2018, sleep apnea, prior gastric sleeve, and nicotine dependence presenting today for increased pain to right LE. Pt underwent a right knee arthroplasty per Dr. Crisostomo on 6/16/20. Patient previously failed treatment for pulmonary emboli with Xarelto and was placed on Pradaxa 150 mg p.o. b.i.d. at discharge 6/17 per hematologist rec at that time.  Pt presented back to ED on 6/21 for increasing pain and swelling to right leg. Pt reported compliance with her pradaxa and medications; however, ultrasound of the RLE on that date demonstrated a right nonocclusive popliteal DVT. Pt was readmitted and placed on full dose lovenox. Pt was discharged home on 6/23 on lovenox and pain was controlled with oral meds at that time. Dr. Crisostomo provided pt with oral dilaudid pain script on d/c.  Patient returns to ED today complaints of intractable pain to right leg that is not controlled with home meds. She denies worsening swelling to leg. She describes the pain as right knee and post lower leg location, 10/10 intensity, pressure quality, worsens with movement, assoc with nausea secondary to severe pain, improved after receiving IV dilaudid in ED. She denies fever, chills, cp and sob. Repeat US done in ED confirms DVT stable. CRP improving and WBC wnl. Dr. Crisostomo consulted from ED and rec admission for pain control.       * No surgery found *        Hospital Course:   The patient was monitored closely during her stay.  She received p.r.n. pain medication.  Orthopedics were consulted.  Physical therapy and occupational therapy were consulted.  Patient's pain medication was titrated.  Patient had complaints of chest pain during her stay.  She was kept on continuous telemetry monitoring where she remains sinus rhythm.  Her troponins were trended.  Her initial troponin was negative however her 2nd troponin was elevated.  Patient was given full-dose aspirin.  She was noted to have an elevated D-dimer and a CTA of the chest was ordered.  Patient was continued on full-dose Lovenox 1 milligram/kilogram subcu q.12 hours.  A right lower extremity arterial ultrasound was ordered to evaluate patient's circulation due to her ongoing severe right lower extremity pain.  Patient CTA of the chest was negative for pulmonary emboli.  Her troponins continued to trend upward.  Cardiology was consulted.  Right lower extremity arterial ultrasound showed no significant stenosis.  Patient later reported mid epigastric reproducible pain.  She reported intermittent use of NSAIDs at home prior to admission.  Her home b.i.d. Protonix was continued.  She was also placed on Carafate.  Patient with noted prior history of gastric sleeve in 2014 and was encouraged to continue her bariatric diet.  Patient's troponins continue to trend upward and patient had persistent chest pain with ambulation.  Patient was discharged to [facility] for coronary angiogram on 06/29/2020.      Consults:  Cardiology -Dr. Kauhsik Guthrie    Past Medical History:   Diagnosis Date    Abnormal Pap smear of cervix     Arthritis     OA    Back pain     Cancer     skin cancer to back    Depression     Endometriosis     Full dentures     GERD (gastroesophageal reflux disease)     Migraine     Pulmonary embolism 2018    Seizures     Sleep apnea     Does not use c-pap since weight loss - 170 lbs     Uterine fibroid     Wears glasses        Past Surgical History:   Procedure Laterality Date    APPENDECTOMY      CARPAL TUNNEL RELEASE Bilateral      SECTION      CHOLECYSTECTOMY      COLONOSCOPY N/A 2019    Procedure: COLONOSCOPY;  Surgeon: Anselmo Blackwell MD;  Location: [facility] ENDO;  Service: Endoscopy;  Laterality: N/A;    EPIDURAL STEROID INJECTION INTO LUMBAR SPINE N/A 2019    Procedure: Injection-steroid-epidural-lumbar;  Surgeon: Yariel Ramirez MD;  Location: [facility] OR;  Service: Pain Management;  Laterality: N/A;  L3-4    ESOPHAGOGASTRODUODENOSCOPY N/A 2019    Procedure: EGD (ESOPHAGOGASTRODUODENOSCOPY);  Surgeon: Anselmo Blackwell MD;  Location: [facility] ENDO;  Service: Endoscopy;  Laterality: N/A;    FRACTURE SURGERY      ankle    gastric sleve      HYSTERECTOMY      endo and fibroids    HYSTERECTOMY      JOINT REPLACEMENT Left 2018    KNEE ARTHROPLASTY Left 2018    Procedure: ARTHROPLASTY, KNEE;  Surgeon: Feliberto Cardenas MD;  Location: [facility] OR;  Service: Orthopedics;  Laterality: Left;    KNEE ARTHROPLASTY Right 2020    Procedure: ARTHROPLASTY, KNEE;  Surgeon: Feliberto Cardenas MD;  Location: [facility] OR;  Service: Orthopedics;  Laterality: Right;    KNEE ARTHROSCOPY W/ MENISCECTOMY Right 10/25/2019    Procedure: ARTHROSCOPY, KNEE, WITH MENISCECTOMY;  Surgeon: Feliberto Cardenas MD;  Location: [facility] OR;  Service: Orthopedics;  Laterality: Right;    KNEE SURGERY      LUMBAR EPIDURAL INJECTION      OOPHORECTOMY Left     LSO    RADIAL NERVE Right     RECONSTRUCTION OF MEDIAL PATELLOFEMORAL LIGAMENT OF RIGHT KNEE Right 10/25/2019    Procedure: RECONSTRUCTION, LIGAMENT, MEDIAL PATELLOFEMORAL, RIGHT;  Surgeon: Feliberto Cardenas MD;  Location: [facility] OR;  Service: Orthopedics;  Laterality: Right;    SINUS SURGERY      UPPER GASTROINTESTINAL ENDOSCOPY  2019    Dr. Blackwell; mild schatzki ring-dilated; gastritis; bx in process        Review of patient's allergies indicates:   Allergen Reactions    Clarithromycin Swelling and Other (See Comments)     DIFFICULTY SWALLOWING  DIFFICULTY SWALLOWING    Pcn [penicillins] Anaphylaxis    Sulfa (sulfonamide antibiotics) Hives    Wellbutrin [bupropion hcl] Shortness Of Breath and Anaphylaxis    Betadine [povidone-iodine] Hives     Swelling      Cefprozil Hives    Iodinated contrast media Hives    Latex, natural rubber Rash    Sulfamethoxazole-trimethoprim Nausea And Vomiting    Iodine Hives and Swelling    Latex Hives and Swelling       Current Facility-Administered Medications on File Prior to Encounter   Medication    lactated ringers infusion    lidocaine (PF) 10 mg/ml (1%) injection 10 mg    [COMPLETED] predniSONE tablet 50 mg    [DISCONTINUED] acetaminophen tablet 650 mg    [DISCONTINUED] amitriptyline tablet 50 mg    [DISCONTINUED] aspirin chewable tablet 81 mg    [DISCONTINUED] atorvastatin tablet 40 mg    [DISCONTINUED] bisacodyL suppository 10 mg    [DISCONTINUED] cetirizine tablet 5 mg    [DISCONTINUED] enoxaparin injection 105 mg    [DISCONTINUED] FLUoxetine capsule 20 mg    [DISCONTINUED] HYDROmorphone injection 0.2 mg    [DISCONTINUED] LORazepam tablet 0.5 mg    [DISCONTINUED] magnesium oxide tablet 800 mg    [DISCONTINUED] magnesium oxide tablet 800 mg    [DISCONTINUED] metoprolol tartrate (LOPRESSOR) tablet 25 mg    [DISCONTINUED] multivitamin tablet    [DISCONTINUED] nitroGLYCERIN SL tablet 0.4 mg    [DISCONTINUED] ondansetron injection 4 mg    [DISCONTINUED] oxyCODONE 12 hr tablet 10 mg    [DISCONTINUED] oxyCODONE-acetaminophen 5-325 mg per tablet 1 tablet    [DISCONTINUED] pantoprazole EC tablet 40 mg    [DISCONTINUED] polyethylene glycol packet 17 g    [DISCONTINUED] potassium, sodium phosphates 280-160-250 mg packet 2 packet    [DISCONTINUED] potassium, sodium phosphates 280-160-250 mg packet 2 packet    [DISCONTINUED] potassium, sodium  phosphates 280-160-250 mg packet 2 packet    [DISCONTINUED] promethazine tablet 25 mg    [DISCONTINUED] sodium chloride 0.9% flush 10 mL    [DISCONTINUED] sucralfate 100 mg/mL suspension 1 g    [DISCONTINUED] tiZANidine tablet 4 mg     Current Outpatient Medications on File Prior to Encounter   Medication Sig    AIMOVIG AUTOINJECTOR 140 mg/mL AtIn     amitriptyline (ELAVIL) 25 MG tablet TAKE ONE TABLET BY MOUTH EVERY NIGHT FOR 2 WEEKS THEN INCREASE TO 2 TABLETS AT BEDTIME    cetirizine (ZYRTEC) 5 MG tablet Take 5 mg by mouth as needed.     cyanocobalamin, vitamin B-12, 1,000 mcg/mL Kit Inject 1 mL into the muscle every 21 days.    erenumab-aooe 140 mg/mL AtIn Inject 140 mg into the skin every 28 days.    fluoxetine (PROZAC) 20 MG capsule every evening.     HYDROmorphone (DILAUDID) 2 MG tablet Take 1 tablet (2 mg total) by mouth every 4 (four) hours as needed for Pain.    hydrOXYzine pamoate (VISTARIL) 25 MG Cap TAKE ONE CAPSULE BY MOUTH 3 TIMES A DAY AS NEEDED FOR ANXIETY    lorazepam (ATIVAN) 0.5 MG tablet Take 0.5 mg by mouth every 4 (four) hours as needed.     oxyCODONE (OXYCONTIN) 10 mg 12 hr tablet Take 10 mg by mouth every 12 (twelve) hours.    oxyCODONE-acetaminophen (PERCOCET) 5-325 mg per tablet Take 1 tablet by mouth every 6 (six) hours as needed.    pantoprazole (PROTONIX) 40 MG tablet Take 1 tablet (40 mg total) by mouth 2 (two) times daily.    promethazine (PHENERGAN) 25 MG tablet Take 1 tablet (25 mg total) by mouth every 6 (six) hours as needed for Nausea. Take 1 tablet by mouth every 6 (six) hours as needed (migraine). -MAY CAUSE DROWSINESS-    tiZANidine (ZANAFLEX) 4 MG tablet Take 4 mg by mouth every 6 (six) hours as needed.    [DISCONTINUED] enoxaparin (LOVENOX) 120 mg/0.8 mL Syrg Inject 0.7 mLs (105 mg total) into the skin every 12 (twelve) hours.    [DISCONTINUED] epinephrine (EPIPEN) 0.3 mg/0.3 mL (1:1,000) AtIn 0.3 mg daily as needed.      Family History     Problem  Relation (Age of Onset)    Breast cancer Maternal Aunt (46)    Esophageal cancer Maternal Grandmother    Heart disease Mother, Father        Tobacco Use    Smoking status: Current Every Day Smoker     Packs/day: 0.25     Years: 20.00     Pack years: 5.00     Types: Cigarettes     Last attempt to quit: 2018     Years since quittin.6    Smokeless tobacco: Never Used   Substance and Sexual Activity    Alcohol use: Yes     Alcohol/week: 0.0 standard drinks     Comment: occasionally    Drug use: Not Currently     Types: Other-see comments     Comment: no    Sexual activity: Yes     Partners: Male     Review of Systems   Constitutional: Positive for diaphoresis and fatigue. Negative for chills and fever.   HENT: Negative.    Eyes: Negative.    Respiratory: Positive for chest tightness.    Cardiovascular: Positive for chest pain.   Gastrointestinal: Negative.    Endocrine: Negative.    Genitourinary: Negative.    Musculoskeletal: Positive for arthralgias, gait problem, myalgias and neck pain.        S/P right knee replacement   Skin: Positive for wound.        knee   Neurological: Positive for dizziness and weakness.   Hematological: Negative.    Psychiatric/Behavioral: Positive for decreased concentration and sleep disturbance. Negative for hallucinations, self-injury and suicidal ideas. The patient is nervous/anxious.      Objective:     Vital Signs (Most Recent):  Temp: 97.8 °F (36.6 °C) (20 1600)  Pulse: 64 (20 1600)  Resp: 16 (20 1728)  BP: (!) 128/57 (20 1600)  SpO2: 99 % (20 1600) Vital Signs (24h Range):  Temp:  [97.7 °F (36.5 °C)-98.5 °F (36.9 °C)] 97.8 °F (36.6 °C)  Pulse:  [60-84] 64  Resp:  [12-19] 16  SpO2:  [93 %-99 %] 99 %  BP: ()/(52-60) 128/57     Weight: 111.6 kg (245 lb 15.8 oz)  Body mass index is 33.36 kg/m².    Physical Exam  Vitals signs reviewed.   Constitutional:       General: She is not in acute distress.     Appearance: She is not ill-appearing.    HENT:      Head: Normocephalic and atraumatic.      Comments: piercings     Nose: Nose normal.      Mouth/Throat:      Mouth: Mucous membranes are moist.   Eyes:      Extraocular Movements: Extraocular movements intact.      Pupils: Pupils are equal, round, and reactive to light.   Neck:      Musculoskeletal: Normal range of motion and neck supple.   Cardiovascular:      Rate and Rhythm: Normal rate and regular rhythm.      Pulses: Normal pulses.      Heart sounds: Normal heart sounds.   Pulmonary:      Effort: Pulmonary effort is normal.      Breath sounds: Normal breath sounds.   Abdominal:      General: Abdomen is flat. Bowel sounds are normal.      Palpations: Abdomen is soft.   Musculoskeletal:      Comments: Right knee S/P surgery will not move due to pain   Skin:     General: Skin is warm.   Neurological:      General: No focal deficit present.      Mental Status: She is alert and oriented to person, place, and time.   Psychiatric:         Mood and Affect: Mood normal.           CRANIAL NERVES     CN III, IV, VI   Pupils are equal, round, and reactive to light.       Significant Labs:   Blood Culture: No results for input(s): LABBLOO in the last 48 hours.  BMP:   Recent Labs   Lab 06/29/20 0426   *      K 4.0      CO2 25   BUN 10   CREATININE 0.7   CALCIUM 9.5     CBC:   Recent Labs   Lab 06/28/20 0437 06/29/20 0426   WBC 10.58 15.14*   HGB 11.3* 11.8*   HCT 34.8* 35.3*    255     CMP:   Recent Labs   Lab 06/28/20 0437 06/29/20 0426    137   K 3.3* 4.0    103   CO2 26 25    139*   BUN 11 10   CREATININE 0.8 0.7   CALCIUM 9.2 9.5   ANIONGAP 12 9   EGFRNONAA >60 >60     Cardiac Markers: No results for input(s): CKMB, MYOGLOBIN, BNP, TROPISTAT in the last 48 hours.  Coagulation: No results for input(s): PT, INR, APTT in the last 48 hours.  Lactic Acid: No results for input(s): LACTATE in the last 48 hours.  Lipid Panel:   Recent Labs   Lab 06/28/20 0437   CHOL  156   HDL 38*   LDLCALC 89.6   TRIG 142   CHOLHDL 24.4     Magnesium: No results for input(s): MG in the last 48 hours.  POCT Glucose: No results for input(s): POCTGLUCOSE in the last 48 hours.  Troponin: No results for input(s): TROPONINI in the last 48 hours.  TSH: No results for input(s): TSH in the last 4320 hours.  Urine Culture: No results for input(s): LABURIN in the last 48 hours.  Urine Studies: No results for input(s): COLORU, APPEARANCEUA, PHUR, SPECGRAV, PROTEINUA, GLUCUA, KETONESU, BILIRUBINUA, OCCULTUA, NITRITE, UROBILINOGEN, LEUKOCYTESUR, RBCUA, WBCUA, BACTERIA, SQUAMEPITHEL, HYALINECASTS in the last 48 hours.    Invalid input(s): WRIGHTSUR    Significant Imaging: I have reviewed all pertinent imaging results/findings within the past 24 hours.    Assessment/Plan:   Pt had an angiogram this date that was negative for CAD  ++++  She has 2 blood clots in the right calf and may begin lovenox 1 mg/kg q 12 6/30/2020 per Dr Cleveland Pina Hematology is to see the patient  PRINCIPAL PROBLEM:  Pain of right lower extremity [M79.604] 06/25/2020 Yes     Angina of effort [I20.8] 06/29/2020 Yes    ACS (acute coronary syndrome) [I24.9] 06/29/2020 No    Hypokalemia [E87.6] 06/27/2020 Yes    History of bariatric surgery [Z98.84] 06/26/2020 Not Applicable    Atelectasis [J98.11] 06/26/2020 Yes    Hiatal hernia [K44.9] 06/26/2020 Yes    Elevated troponin [R79.89] 06/26/2020 No    Chest pain [R07.9] 06/25/2020 No    DVT (deep venous thrombosis) [I82.409] 06/21/2020 Yes    Generalized anxiety disorder [F41.1] 06/16/2020 Yes    History of pulmonary embolism [Z86.711] 06/16/2020 Yes    Normocytic anemia [D64.9] 11/19/2018 Yes    Nicotine dependence [F17.200] 11/19/2018 Yes    S/P total knee arthroplasty, right [Z96.651] 11/13/2018 Not Applicable           No notes have been filed under this hospital service.  Service: Hospital Medicine    VTE Risk Mitigation (From admission, onward)    None             Nabeel  JUSTIN Asencio MD  Department of Hospital Medicine   [facility]

## 2020-06-29 NOTE — BRIEF OP NOTE
Post coronary angiogram/cardiac catheterization note:  Widely patent left main, left anterior descending, left circumflex and right coronary arteries.  Ostial disease of the 2nd septal , ramus intermedius and obtuse marginal branch-all small 1.5 mm vessels.  Medical management; Plavix aspirin metoprolol, statin.

## 2020-06-30 ENCOUNTER — TELEPHONE (OUTPATIENT)
Dept: MEDSURG UNIT | Facility: HOSPITAL | Age: 40
End: 2020-06-30

## 2020-06-30 LAB
ANION GAP SERPL CALC-SCNC: 12 MMOL/L (ref 8–16)
BASOPHILS # BLD AUTO: 0.06 K/UL (ref 0–0.2)
BASOPHILS NFR BLD: 0.4 % (ref 0–1.9)
BUN SERPL-MCNC: 16 MG/DL (ref 6–20)
CALCIUM SERPL-MCNC: 8.4 MG/DL (ref 8.7–10.5)
CHLORIDE SERPL-SCNC: 104 MMOL/L (ref 95–110)
CK MB SERPL-MCNC: 1.4 NG/ML (ref 0.1–6.5)
CO2 SERPL-SCNC: 24 MMOL/L (ref 23–29)
CREAT SERPL-MCNC: 0.7 MG/DL (ref 0.5–1.4)
DIFFERENTIAL METHOD: ABNORMAL
EOSINOPHIL # BLD AUTO: 0.1 K/UL (ref 0–0.5)
EOSINOPHIL NFR BLD: 0.4 % (ref 0–8)
ERYTHROCYTE [DISTWIDTH] IN BLOOD BY AUTOMATED COUNT: 14 % (ref 11.5–14.5)
EST. GFR  (AFRICAN AMERICAN): >60 ML/MIN/1.73 M^2
EST. GFR  (NON AFRICAN AMERICAN): >60 ML/MIN/1.73 M^2
GLUCOSE SERPL-MCNC: 108 MG/DL (ref 70–110)
HCT VFR BLD AUTO: 32.6 % (ref 37–48.5)
HGB BLD-MCNC: 10.6 G/DL (ref 12–16)
IMM GRANULOCYTES # BLD AUTO: 0.18 K/UL (ref 0–0.04)
IMM GRANULOCYTES NFR BLD AUTO: 1.3 % (ref 0–0.5)
LYMPHOCYTES # BLD AUTO: 3.8 K/UL (ref 1–4.8)
LYMPHOCYTES NFR BLD: 26.8 % (ref 18–48)
MCH RBC QN AUTO: 30.2 PG (ref 27–31)
MCHC RBC AUTO-ENTMCNC: 32.5 G/DL (ref 32–36)
MCV RBC AUTO: 93 FL (ref 82–98)
MONOCYTES # BLD AUTO: 0.8 K/UL (ref 0.3–1)
MONOCYTES NFR BLD: 6 % (ref 4–15)
NEUTROPHILS # BLD AUTO: 9.2 K/UL (ref 1.8–7.7)
NEUTROPHILS NFR BLD: 65.1 % (ref 38–73)
NRBC BLD-RTO: 0 /100 WBC
PLATELET # BLD AUTO: 272 K/UL (ref 150–350)
PMV BLD AUTO: 10.3 FL (ref 9.2–12.9)
POTASSIUM SERPL-SCNC: 3.3 MMOL/L (ref 3.5–5.1)
RBC # BLD AUTO: 3.51 M/UL (ref 4–5.4)
SODIUM SERPL-SCNC: 140 MMOL/L (ref 136–145)
TROPONIN I SERPL DL<=0.01 NG/ML-MCNC: 0.09 NG/ML
WBC # BLD AUTO: 14.07 K/UL (ref 3.9–12.7)

## 2020-06-30 PROCEDURE — 36415 COLL VENOUS BLD VENIPUNCTURE: CPT

## 2020-06-30 PROCEDURE — 63600175 PHARM REV CODE 636 W HCPCS: Performed by: SPECIALIST

## 2020-06-30 PROCEDURE — 96372 THER/PROPH/DIAG INJ SC/IM: CPT | Mod: 59

## 2020-06-30 PROCEDURE — 93005 ELECTROCARDIOGRAM TRACING: CPT | Performed by: SPECIALIST

## 2020-06-30 PROCEDURE — 82553 CREATINE MB FRACTION: CPT

## 2020-06-30 PROCEDURE — G0378 HOSPITAL OBSERVATION PER HR: HCPCS

## 2020-06-30 PROCEDURE — 99215 OFFICE O/P EST HI 40 MIN: CPT | Mod: ,,, | Performed by: INTERNAL MEDICINE

## 2020-06-30 PROCEDURE — 25000003 PHARM REV CODE 250: Performed by: INTERNAL MEDICINE

## 2020-06-30 PROCEDURE — 85025 COMPLETE CBC W/AUTO DIFF WBC: CPT

## 2020-06-30 PROCEDURE — 84484 ASSAY OF TROPONIN QUANT: CPT

## 2020-06-30 PROCEDURE — 25000003 PHARM REV CODE 250: Performed by: SPECIALIST

## 2020-06-30 PROCEDURE — 80048 BASIC METABOLIC PNL TOTAL CA: CPT

## 2020-06-30 PROCEDURE — 25000003 PHARM REV CODE 250: Performed by: STUDENT IN AN ORGANIZED HEALTH CARE EDUCATION/TRAINING PROGRAM

## 2020-06-30 PROCEDURE — 99215 PR OFFICE/OUTPT VISIT, EST, LEVL V, 40-54 MIN: ICD-10-PCS | Mod: ,,, | Performed by: INTERNAL MEDICINE

## 2020-06-30 PROCEDURE — 25000003 PHARM REV CODE 250: Performed by: ORTHOPAEDIC SURGERY

## 2020-06-30 RX ORDER — PANTOPRAZOLE SODIUM 40 MG/1
40 TABLET, DELAYED RELEASE ORAL 2 TIMES DAILY
Status: DISCONTINUED | OUTPATIENT
Start: 2020-06-30 | End: 2020-07-01 | Stop reason: HOSPADM

## 2020-06-30 RX ORDER — ENOXAPARIN SODIUM 150 MG/ML
110 INJECTION SUBCUTANEOUS
Status: DISCONTINUED | OUTPATIENT
Start: 2020-06-30 | End: 2020-06-30

## 2020-06-30 RX ORDER — ENOXAPARIN SODIUM 100 MG/ML
30 INJECTION SUBCUTANEOUS
Status: DISCONTINUED | OUTPATIENT
Start: 2020-06-30 | End: 2020-06-30

## 2020-06-30 RX ORDER — ENOXAPARIN SODIUM 100 MG/ML
80 INJECTION SUBCUTANEOUS
Status: DISCONTINUED | OUTPATIENT
Start: 2020-06-30 | End: 2020-06-30

## 2020-06-30 RX ORDER — HYDROMORPHONE HYDROCHLORIDE 2 MG/1
2 TABLET ORAL EVERY 4 HOURS PRN
Status: DISCONTINUED | OUTPATIENT
Start: 2020-06-30 | End: 2020-07-01 | Stop reason: HOSPADM

## 2020-06-30 RX ORDER — ENOXAPARIN SODIUM 100 MG/ML
160 INJECTION SUBCUTANEOUS
Status: DISCONTINUED | OUTPATIENT
Start: 2020-07-01 | End: 2020-07-01 | Stop reason: HOSPADM

## 2020-06-30 RX ORDER — ENOXAPARIN SODIUM 100 MG/ML
50 INJECTION SUBCUTANEOUS ONCE
Status: COMPLETED | OUTPATIENT
Start: 2020-06-30 | End: 2020-06-30

## 2020-06-30 RX ORDER — ASPIRIN 81 MG/1
81 TABLET ORAL DAILY
Status: DISCONTINUED | OUTPATIENT
Start: 2020-07-01 | End: 2020-07-01 | Stop reason: HOSPADM

## 2020-06-30 RX ADMIN — FLUOXETINE 20 MG: 20 CAPSULE ORAL at 08:06

## 2020-06-30 RX ADMIN — OXYCODONE HYDROCHLORIDE 10 MG: 5 TABLET ORAL at 08:06

## 2020-06-30 RX ADMIN — ENOXAPARIN SODIUM 50 MG: 100 INJECTION SUBCUTANEOUS at 05:06

## 2020-06-30 RX ADMIN — HYDROMORPHONE HYDROCHLORIDE 2 MG: 2 TABLET ORAL at 05:06

## 2020-06-30 RX ADMIN — PANTOPRAZOLE SODIUM 40 MG: 40 TABLET, DELAYED RELEASE ORAL at 06:06

## 2020-06-30 RX ADMIN — CLOPIDOGREL BISULFATE 75 MG: 75 TABLET, FILM COATED ORAL at 08:06

## 2020-06-30 RX ADMIN — ATORVASTATIN CALCIUM 40 MG: 40 TABLET, FILM COATED ORAL at 08:06

## 2020-06-30 RX ADMIN — ENOXAPARIN SODIUM 30 MG: 30 INJECTION SUBCUTANEOUS at 11:06

## 2020-06-30 RX ADMIN — ENOXAPARIN SODIUM 80 MG: 80 INJECTION, SOLUTION INTRAVENOUS; SUBCUTANEOUS at 11:06

## 2020-06-30 RX ADMIN — PANTOPRAZOLE SODIUM 40 MG: 40 TABLET, DELAYED RELEASE ORAL at 09:06

## 2020-06-30 RX ADMIN — AMITRIPTYLINE HYDROCHLORIDE 50 MG: 25 TABLET, FILM COATED ORAL at 08:06

## 2020-06-30 RX ADMIN — HYDROCODONE BITARTRATE AND ACETAMINOPHEN 1 TABLET: 5; 325 TABLET ORAL at 02:06

## 2020-06-30 RX ADMIN — LORAZEPAM 0.5 MG: 0.5 TABLET ORAL at 10:06

## 2020-06-30 RX ADMIN — CETIRIZINE HYDROCHLORIDE 5 MG: 5 TABLET ORAL at 08:06

## 2020-06-30 RX ADMIN — HYDROCODONE BITARTRATE AND ACETAMINOPHEN 1 TABLET: 5; 325 TABLET ORAL at 06:06

## 2020-06-30 NOTE — RESPIRATORY THERAPY
06/29/20 2210   Patient Assessment/Suction   Level of Consciousness (AVPU) alert   Respiratory Effort Unlabored   Expansion/Accessory Muscles/Retractions expansion symmetric;no retractions;no use of accessory muscles   Rhythm/Pattern, Respiratory no shortness of breath reported;pattern regular;unlabored   PRE-TX-O2   O2 Device (Oxygen Therapy) nasal cannula   Flow (L/min) 0   SpO2 95 %   Pulse Oximetry Type Intermittent   Pulse 64   Resp 14   Respiratory Interventions   Cough And Deep Breathing done with encouragement   Breathing Techniques/Airway Clearance deep/controlled cough encouraged;diaphragmatic breathing promoted   Respiratory Evaluation   $ Care Plan Tech Time 15 min   Evaluation For   (care plan)   Admitting Diagnosis Chest pain   Home Oxygen   Has Home Oxygen? No   Home Aerosol, MDI, DPI, and Other Treatments/Therapies   Home Respiratory Therapy Per Patient/Review of Chart No

## 2020-06-30 NOTE — PROGRESS NOTES
[facility]  Orthopedics  Progress Note    Patient Name: Aleyda Costa  MRN: [id number]  Admission Date: 6/29/2020  Hospital Length of Stay: 0 days  Attending Provider: John Rosenthal MD  Primary Care Provider: Darlene Hayden MD  Follow-up For: Procedure(s) (LRB):  Left heart cath (Left)    Post-Operative Day: 1 Day Post-Op  Subjective:     Principal Problem:<principal problem not specified>    Principal Orthopedic Problem: R TKA    Interval History: Doing much better. Hopefully home tomorrow.     Review of patient's allergies indicates:   Allergen Reactions    Clarithromycin Swelling and Other (See Comments)     DIFFICULTY SWALLOWING  DIFFICULTY SWALLOWING    Pcn [penicillins] Anaphylaxis    Sulfa (sulfonamide antibiotics) Hives    Wellbutrin [bupropion hcl] Shortness Of Breath and Anaphylaxis    Betadine [povidone-iodine] Hives     Swelling      Cefprozil Hives    Iodinated contrast media Hives    Latex, natural rubber Rash    Sulfamethoxazole-trimethoprim Nausea And Vomiting    Iodine Hives and Swelling    Latex Hives and Swelling       Current Facility-Administered Medications   Medication    acetaminophen tablet 650 mg    amitriptyline tablet 50 mg    atorvastatin tablet 40 mg    cetirizine tablet 5 mg    clopidogreL tablet 75 mg    enoxaparin injection 80 mg    And    enoxaparin injection 30 mg    FLUoxetine capsule 20 mg    HYDROcodone-acetaminophen 5-325 mg per tablet 1 tablet    LORazepam tablet 0.5 mg    metoprolol tartrate (LOPRESSOR) tablet 50 mg    nitroGLYCERIN SL tablet 0.4 mg    ondansetron injection 4 mg    oxyCODONE immediate release tablet 10 mg    pantoprazole EC tablet 40 mg    promethazine tablet 25 mg    tiZANidine tablet 4 mg     Facility-Administered Medications Ordered in Other Encounters   Medication    lactated ringers infusion    lidocaine (PF) 10 mg/ml (1%) injection 10 mg     Objective:     Vital Signs (Most Recent):  Temp: 98.5 °F (36.9  °C) (06/30/20 1600)  Pulse: 69 (06/30/20 1600)  Resp: 18 (06/30/20 1600)  BP: (!) 102/51 (06/30/20 1600)  SpO2: 96 % (06/30/20 1600) Vital Signs (24h Range):  Temp:  [97.6 °F (36.4 °C)-98.5 °F (36.9 °C)] 98.5 °F (36.9 °C)  Pulse:  [55-75] 69  Resp:  [14-18] 18  SpO2:  [95 %-100 %] 96 %  BP: ()/(51-69) 102/51     Weight: 111.6 kg (245 lb 15.8 oz)  Height: 6' (182.9 cm)  Body mass index is 33.36 kg/m².      Intake/Output Summary (Last 24 hours) at 6/30/2020 1652  Last data filed at 6/30/2020 0800  Gross per 24 hour   Intake 1200 ml   Output 200 ml   Net 1000 ml       General    Nursing note and vitals reviewed.  Constitutional: She is oriented to person, place, and time. She appears well-developed and well-nourished. No distress.   HENT:   Head: Normocephalic and atraumatic.   Eyes: EOM are normal.   Cardiovascular: Normal rate.    Pulmonary/Chest: Effort normal.   Abdominal: Soft.   Neurological: She is alert and oriented to person, place, and time.   Psychiatric: Her behavior is normal.           Right Knee Exam     Inspection   Erythema: absent  Swelling: present  Effusion: present    Range of Motion   Extension: 20   Flexion: 100     Other   Sensation: normal    Comments:  Inc c/d/i    Left Knee Exam   Left knee exam is normal.    Muscle Strength   Right Lower Extremity   Quadriceps:  4/5     Vascular Exam     Right Pulses  Dorsalis Pedis:      2+              Significant Labs:   CBC:   Recent Labs   Lab 06/29/20  0426 06/30/20  0530   WBC 15.14* 14.07*   HGB 11.8* 10.6*   HCT 35.3* 32.6*    272     CMP:   Recent Labs   Lab 06/29/20  0426 06/30/20  0530    140   K 4.0 3.3*    104   CO2 25 24   * 108   BUN 10 16   CREATININE 0.7 0.7   CALCIUM 9.5 8.4*   ANIONGAP 9 12   EGFRNONAA >60 >60.0     Coagulation: No results for input(s): LABPROT, INR, APTT in the last 48 hours.  All pertinent labs within the past 24 hours have been reviewed.    Significant Imaging: None    Assessment/Plan:      S/P total knee arthroplasty, right  Cont with PT.  Follow up with me in 2 weeks. Will  a boot for her ankle pain upon discharge.   Will change her prn meds for pain.           Feliberto Cardenas MD  Orthopedics  [facility]

## 2020-06-30 NOTE — CONSULTS
[facility]     Subjective:      PATIENT:   Aleyda Costa  :    1980  MR#:    [id number]  DATE OF VISIT:  2020    Chief Complaint:     Patient ID: Aleyda Costa is a 39 y.o. female     Pt was last seen by me in 2019 to obtain results from a hypercoagulable workup which was negative . Her PMH is significant for  a total left knee replacement in 2018 when a few days later she presented to ER with increasing SOB. Workup revealed bilateral PE. SHe was placed on xarelto. SHe had an ultrasound of legs which was negative.Repeat CTA  In 2019 revealed resolution of PE. Anticoagulation was discontinued.     She underwent right knee arthroplasty May 5 2020 and developed pain and swelling in her right leg. She was placed on pradaxa 150 mg po BID at discharge. Pt developed pain in her right leg and was readmitted 2020 and discharged home 2020. She represented to ER 2020 with Right leg pain.  US performed 2020 revealed non occlusive thrombus of the popliteal vein. My partner  had seen the patient and she received Lovenox which was changed to pradaxa at discharge on  .She again presented to ER with right leg pain .   Repeat US was performed 2020 which showed no change in the non occlusive DVT of the right popliteal vein. Er note reports significant  bruising from the right proximal knee to the foot with no surrounding erythema  She was placed on therapeutic lovenox.  CTA chest was negative 2020.   She developed chest pain and was discharged to [facility] on  for coronary angiogram and tent was placed by Dr Guthrie who was kind enough to call me and inform me of patient's situation. SHe is tolerating plavix and lovenox at this time.     Patient information was obtained from patient, past medical records and ER records.   Review of Systems   CONSTITUTIONAL: No fevers, chills, night sweats, wt. loss, appetite  changes  SKIN: no rashes or itching  ENT: No headaches, head trauma, vision changes, or eye pain  LYMPH NODES: None noticed   CV: No chest pain, palpitations.   RESP: No dyspnea on exertion, cough, wheezing, or hemoptysis  GI: No nausea, emesis, diarrhea, constipation, melena, hematochezia, pain.   : No dysuria, hematuria, urgency, or frequency   HEME: No easy bruising, bleeding problems  PSYCHIATRIC: No depression, anxiety, psychosis, hallucinations.  NEURO: No seizures, memory loss, dizziness or difficulty sleeping  MSK: + arthralgias or joint swelling    Oncology History    No history exists.     [unfilled]  Past Medical History:   Diagnosis Date    Abnormal Pap smear of cervix     Arthritis     OA    Back pain     Cancer     skin cancer to back    Depression     Endometriosis     Full dentures     GERD (gastroesophageal reflux disease)     Migraine     Pulmonary embolism     Seizures     Sleep apnea     Does not use c-pap since weight loss - 170 lbs    Uterine fibroid     Wears glasses        Family History   Problem Relation Age of Onset    Heart disease Mother     Heart disease Father     Esophageal cancer Maternal Grandmother     Breast cancer Maternal Aunt 46    Colon cancer Neg Hx     Stomach cancer Neg Hx     Ovarian cancer Neg Hx        Past Surgical History:   Procedure Laterality Date    ANGIOGRAM, CORONARY, WITH LEFT HEART CATHETERIZATION Left 2020    Procedure: Left heart cath;  Surgeon: Jm Guthrie MD;  Location[facility] CATH[facility] LAB;  Service: Cardiology;  Laterality: Left;    APPENDECTOMY      CARPAL TUNNEL RELEASE Bilateral      SECTION      CHOLECYSTECTOMY      COLONOSCOPY N/A 2019    Procedure: COLONOSCOPY;  Surgeon: Anselmo Blackwell MD;  Location: [facility];  Service: Endoscopy;  Laterality: N/A;    EPIDURAL STEROID INJECTION INTO LUMBAR SPINE N/A 2019    Procedure: Injection-steroid-epidural-lumbar;  Surgeon: Yariel Ramirez  MD;  [facility];  Service: Pain Management;  Laterality: N/A;  L3-4    ESOPHAGOGASTRODUODENOSCOPY N/A 11/27/2019    Procedure: EGD (ESOPHAGOGASTRODUODENOSCOPY);  Surgeon: Anselmo Blackwell MD;  Location: [facility];  Service: Endoscopy;  Laterality: N/A;    FRACTURE SURGERY      ankle    gastric sleve      HYSTERECTOMY  2014    endo and fibroids    HYSTERECTOMY      JOINT REPLACEMENT Left 11/13/2018    KNEE ARTHROPLASTY Left 11/13/2018    Procedure: ARTHROPLASTY, KNEE;  Surgeon: Feliberto Cardenas MD;  Location: [facility] OR;  Service: Orthopedics;  Laterality: Left;    KNEE ARTHROPLASTY Right 6/16/2020    Procedure: ARTHROPLASTY, KNEE;  Surgeon: Feliberto Cardenas MD;  Location: [facility] OR;  Service: Orthopedics;  Laterality: Right;    KNEE ARTHROSCOPY W/ MENISCECTOMY Right 10/25/2019    Procedure: ARTHROSCOPY, KNEE, WITH MENISCECTOMY;  Surgeon: Feliberto Cardenas MD;  Location: [facility] OR;  Service: Orthopedics;  Laterality: Right;    KNEE SURGERY      LUMBAR EPIDURAL INJECTION      OOPHORECTOMY Left     LSO    RADIAL NERVE Right     RECONSTRUCTION OF MEDIAL PATELLOFEMORAL LIGAMENT OF RIGHT KNEE Right 10/25/2019    Procedure: RECONSTRUCTION, LIGAMENT, MEDIAL PATELLOFEMORAL, RIGHT;  Surgeon: Feliberto Cardenas MD;  Location: [facility] OR;  Service: Orthopedics;  Laterality: Right;    SINUS SURGERY      UPPER GASTROINTESTINAL ENDOSCOPY  11/27/2019    Dr. Blackwell; mild schatzki ring-dilated; gastritis; bx in process       Social History     Socioeconomic History    Marital status:      Spouse name: Not on file    Number of children: Not on file    Years of education: Not on file    Highest education level: Not on file   Occupational History    Not on file   Social Needs    Financial resource strain: Not on file    Food insecurity     Worry: Not on file     Inability: Not on file    Transportation needs     Medical: Not on file     Non-medical: Not on file   Tobacco Use    Smoking  status: Current Every Day Smoker     Packs/day: 0.25     Years: 20.00     Pack years: 5.00     Types: Cigarettes     Last attempt to quit: 2018     Years since quittin.6    Smokeless tobacco: Never Used   Substance and Sexual Activity    Alcohol use: Yes     Alcohol/week: 0.0 standard drinks     Comment: occasionally    Drug use: Not Currently     Types: Other-see comments     Comment: no    Sexual activity: Yes     Partners: Male   Lifestyle    Physical activity     Days per week: Not on file     Minutes per session: Not on file    Stress: Not on file   Relationships    Social connections     Talks on phone: Not on file     Gets together: Not on file     Attends [employer] service: Not on file     Active member of club or organization: Not on file     Attends meetings of clubs or organizations: Not on file     Relationship status: Not on file   Other Topics Concern    Not on file   Social History Narrative    Not on file         Current Facility-Administered Medications:     acetaminophen tablet 650 mg, 650 mg, Oral, Q4H PRN, Jm Guthrie MD    amitriptyline tablet 50 mg, 50 mg, Oral, QHS, Nabeel Asencio MD, 50 mg at 20    atorvastatin tablet 40 mg, 40 mg, Oral, QHS, Jm Guthrie MD, 40 mg at 20    cetirizine tablet 5 mg, 5 mg, Oral, Daily, Nabeel Asencio MD, 5 mg at 20    clopidogreL tablet 75 mg, 75 mg, Oral, Daily, Jm Guthrie MD    FLUoxetine capsule 20 mg, 20 mg, Oral, Daily, Nabeel Asencio MD, 20 mg at 20    HYDROcodone-acetaminophen 5-325 mg per tablet 1 tablet, 1 tablet, Oral, Q4H PRN, Jm Guthrie MD, 1 tablet at 20    LORazepam tablet 0.5 mg, 0.5 mg, Oral, Q6H PRN, Nabeel Asencio MD    metoprolol tartrate (LOPRESSOR) tablet 50 mg, 50 mg, Oral, BID, Jm Guthrie MD, 50 mg at 20    nitroGLYCERIN SL tablet 0.4 mg, 0.4 mg, Sublingual, Q5 Min PRN, Jm Guthrie MD     ondansetron injection 4 mg, 4 mg, Intravenous, Q12H PRN, Jm Guthrie MD    oxyCODONE immediate release tablet 10 mg, 10 mg, Oral, Q12H, Nabeel Asencio MD, 10 mg at 06/29/20 2039    pantoprazole EC tablet 40 mg, 40 mg, Oral, Daily, Nabeel Asencio MD, 40 mg at 06/30/20 0603    promethazine tablet 25 mg, 25 mg, Oral, Q6H PRN, Nabeel Asencio MD    tiZANidine tablet 4 mg, 4 mg, Oral, Q8H PRN, Nabeel Asencio MD    Facility-Administered Medications Ordered in Other Encounters:     lactated ringers infusion, , Intravenous, Continuous, Anjel Hernandez MD, Stopped at 10/25/19 1115    lidocaine (PF) 10 mg/ml (1%) injection 10 mg, 1 mL, Intradermal, Once, Anjel Hernandez MD    Review of patient's allergies indicates:   Allergen Reactions    Clarithromycin Swelling and Other (See Comments)     DIFFICULTY SWALLOWING  DIFFICULTY SWALLOWING    Pcn [penicillins] Anaphylaxis    Sulfa (sulfonamide antibiotics) Hives    Wellbutrin [bupropion hcl] Shortness Of Breath and Anaphylaxis    Betadine [povidone-iodine] Hives     Swelling      Cefprozil Hives    Iodinated contrast media Hives    Latex, natural rubber Rash    Sulfamethoxazole-trimethoprim Nausea And Vomiting    Iodine Hives and Swelling    Latex Hives and Swelling     All medications and past history have been reviewed.    Objective:     Vitals:  BP (!) 99/54 (BP Location: Left arm, Patient Position: Lying)   Pulse 60   Temp 97.9 °F (36.6 °C) (Oral)   Resp 18   Ht 6' (1.829 m)   Wt 111.6 kg (245 lb 15.8 oz)   SpO2 95%   Breastfeeding No   BMI 33.36 kg/m²    Body surface area is 2.38 meters squared.    Weight Readings:  Wt Readings from Last 5 Encounters:   06/29/20 111.6 kg (245 lb 15.8 oz)   06/26/20 111.6 kg (246 lb)   06/29/20 111.6 kg (246 lb)   06/22/20 119.9 kg (264 lb 5.3 oz)   06/16/20 111.1 kg (245 lb)        Physical Exam  Height / weight / VS reviewed  GENERAL.: Well-developed, well-nourished, in no acute distress  PSYCH:  pleasant affect, no anxiety, no depression  HEENT: Normocephalic, lids intact, conjunctiva pink, ,Normal auricula, nasal septum, dentition, gums and mucosa ,OP clear,no palatal pallor  NECK: Supple,trachea midline, no palpable abnormalities  LYMPHATICS: No cervical or axillary adenopathy  RESPIRATORY: CTAB, no wheezes, rubs or rhonchi  Good respiratory effort without any retractions or diaphragmatic movement  CARDIOVASCULAR: no JVD, S1 / S2 with RRR, no murmur, no rub   ABDOMEN: NTND, normal bowel sounds, no palpable HSM or mass  EXTREMITIES: No cyanosis, no clubbing, + edema, no joint effusion  NEUROLOGICAL: alert and oriented x 3, cranial nerves grossly intact  SKIN: Warm, dry, no rash or lesions noted, no tenting, no petechiae or ecchymosis    Labs:  Lab Results   Component Value Date    WBC 14.07 (H) 06/30/2020    WBC 15.14 (H) 06/29/2020    WBC 10.58 06/28/2020    RBC 3.51 (L) 06/30/2020    RBC 3.88 (L) 06/29/2020    RBC 3.67 (L) 06/28/2020    HGB 10.6 (L) 06/30/2020    HGB 11.8 (L) 06/29/2020    HGB 11.3 (L) 06/28/2020    HCT 32.6 (L) 06/30/2020    HCT 35.3 (L) 06/29/2020    HCT 34.8 (L) 06/28/2020    MCV 93 06/30/2020    MCV 91 06/29/2020    MCV 95 06/28/2020     06/30/2020     06/29/2020     06/28/2020    MCH 30.2 06/30/2020    MCH 30.4 06/29/2020    MCH 30.8 06/28/2020    MCHC 32.5 06/30/2020    MCHC 33.4 06/29/2020    MCHC 32.5 06/28/2020    RDW 14.0 06/30/2020    RDW 13.3 06/29/2020    RDW 13.8 06/28/2020     Lab Results   Component Value Date     06/30/2020     06/29/2020     06/28/2020    K 3.3 (L) 06/30/2020    K 4.0 06/29/2020    K 3.3 (L) 06/28/2020     06/30/2020     06/29/2020     06/28/2020    CO2 24 06/30/2020    CO2 25 06/29/2020    CO2 26 06/28/2020    BUN 16 06/30/2020    BUN 10 06/29/2020    BUN 11 06/28/2020    CREATININE 0.7 06/30/2020    CREATININE 0.7 06/29/2020    CREATININE 0.8 06/28/2020     06/30/2020     (H)  06/29/2020     06/28/2020    CALCIUM 8.4 (L) 06/30/2020    CALCIUM 9.5 06/29/2020    CALCIUM 9.2 06/28/2020    MG 2.0 06/27/2020    MG 2.2 06/23/2020    MG 2.2 06/22/2020    PHOS 3.7 11/21/2018    PHOS 3.2 11/20/2018    PHOS 4.9 (H) 11/19/2018    ALKPHOS 87 06/27/2020    ALKPHOS 108 06/24/2020    ALKPHOS 99 12/10/2019    PROT 7.6 06/27/2020    PROT 8.2 06/24/2020    PROT 7.8 12/10/2019    ALBUMIN 3.4 (L) 06/27/2020    ALBUMIN 3.6 06/24/2020    ALBUMIN 3.9 12/10/2019    BILITOT 0.6 06/27/2020    BILITOT 0.5 06/24/2020    BILITOT 0.3 12/10/2019    AST 20 06/27/2020    AST 23 06/24/2020    AST 18 12/10/2019    ALT 14 06/27/2020    ALT 21 06/24/2020    ALT 18 12/10/2019     No results found for: TSH  Lab Results   Component Value Date     06/21/2020      All lab results and imaging results have been reviewed and discussed with the patient.     Assessment/Plan:       ICD-10-CM ICD-9-CM   1. Chest pain syndrome  R07.9 786.50   2. Arthritis of ankle, right  M19.071 716.97   3. Elevated troponin  R79.89 790.6   4. ACS (acute coronary syndrome)  I24.9 411.1   5. Chest pain  R07.9 786.50           Procedure related DVT of right lower extremity. In reviewing this patient's chart she has not failed pradaxa. She developed a procedural related thrombosis and her ultrasound indicated no change in non occlusive clot. Given her recent cardiac presentation for now I recommend Lovenox 1.5 mg/kg sc daily. Anti X a levels will be folowed closely. SHe does not have an inherited thrombophilia and despite having procedural related thrombotic events it is not recommended that she be on lifelong anticoagulation.   Sincerely,  Rabia Pina MD    Electronically signed by: Rabia Pina MD

## 2020-06-30 NOTE — SUBJECTIVE & OBJECTIVE
Principal Problem:<principal problem not specified>    Principal Orthopedic Problem: R TKA    Interval History: Doing much better. Hopefully home tomorrow.     Review of patient's allergies indicates:   Allergen Reactions    Clarithromycin Swelling and Other (See Comments)     DIFFICULTY SWALLOWING  DIFFICULTY SWALLOWING    Pcn [penicillins] Anaphylaxis    Sulfa (sulfonamide antibiotics) Hives    Wellbutrin [bupropion hcl] Shortness Of Breath and Anaphylaxis    Betadine [povidone-iodine] Hives     Swelling      Cefprozil Hives    Iodinated contrast media Hives    Latex, natural rubber Rash    Sulfamethoxazole-trimethoprim Nausea And Vomiting    Iodine Hives and Swelling    Latex Hives and Swelling       Current Facility-Administered Medications   Medication    acetaminophen tablet 650 mg    amitriptyline tablet 50 mg    atorvastatin tablet 40 mg    cetirizine tablet 5 mg    clopidogreL tablet 75 mg    enoxaparin injection 80 mg    And    enoxaparin injection 30 mg    FLUoxetine capsule 20 mg    HYDROcodone-acetaminophen 5-325 mg per tablet 1 tablet    LORazepam tablet 0.5 mg    metoprolol tartrate (LOPRESSOR) tablet 50 mg    nitroGLYCERIN SL tablet 0.4 mg    ondansetron injection 4 mg    oxyCODONE immediate release tablet 10 mg    pantoprazole EC tablet 40 mg    promethazine tablet 25 mg    tiZANidine tablet 4 mg     Facility-Administered Medications Ordered in Other Encounters   Medication    lactated ringers infusion    lidocaine (PF) 10 mg/ml (1%) injection 10 mg     Objective:     Vital Signs (Most Recent):  Temp: 98.5 °F (36.9 °C) (06/30/20 1600)  Pulse: 69 (06/30/20 1600)  Resp: 18 (06/30/20 1600)  BP: (!) 102/51 (06/30/20 1600)  SpO2: 96 % (06/30/20 1600) Vital Signs (24h Range):  Temp:  [97.6 °F (36.4 °C)-98.5 °F (36.9 °C)] 98.5 °F (36.9 °C)  Pulse:  [55-75] 69  Resp:  [14-18] 18  SpO2:  [95 %-100 %] 96 %  BP: ()/(51-69) 102/51     Weight: 111.6 kg (245 lb 15.8  oz)  Height: 6' (182.9 cm)  Body mass index is 33.36 kg/m².      Intake/Output Summary (Last 24 hours) at 6/30/2020 1652  Last data filed at 6/30/2020 0800  Gross per 24 hour   Intake 1200 ml   Output 200 ml   Net 1000 ml       General    Nursing note and vitals reviewed.  Constitutional: She is oriented to person, place, and time. She appears well-developed and well-nourished. No distress.   HENT:   Head: Normocephalic and atraumatic.   Eyes: EOM are normal.   Cardiovascular: Normal rate.    Pulmonary/Chest: Effort normal.   Abdominal: Soft.   Neurological: She is alert and oriented to person, place, and time.   Psychiatric: Her behavior is normal.           Right Knee Exam     Inspection   Erythema: absent  Swelling: present  Effusion: present    Range of Motion   Extension: 20   Flexion: 100     Other   Sensation: normal    Comments:  Inc c/d/i    Left Knee Exam   Left knee exam is normal.    Muscle Strength   Right Lower Extremity   Quadriceps:  4/5     Vascular Exam     Right Pulses  Dorsalis Pedis:      2+              Significant Labs:   CBC:   Recent Labs   Lab 06/29/20  0426 06/30/20  0530   WBC 15.14* 14.07*   HGB 11.8* 10.6*   HCT 35.3* 32.6*    272     CMP:   Recent Labs   Lab 06/29/20  0426 06/30/20  0530    140   K 4.0 3.3*    104   CO2 25 24   * 108   BUN 10 16   CREATININE 0.7 0.7   CALCIUM 9.5 8.4*   ANIONGAP 9 12   EGFRNONAA >60 >60.0     Coagulation: No results for input(s): LABPROT, INR, APTT in the last 48 hours.  All pertinent labs within the past 24 hours have been reviewed.    Significant Imaging: None

## 2020-06-30 NOTE — HOSPITAL COURSE
S/p R TKA. Doing better with ROM. Still with a lot of ankle pain. Pain better controlled and nausea and vomiting improved

## 2020-06-30 NOTE — ASSESSMENT & PLAN NOTE
Cont with PT.  Follow up with me in 2 weeks. Will  a boot for her ankle pain upon discharge.   Will change her prn meds for pain.

## 2020-06-30 NOTE — PROGRESS NOTES
Progress Note  Cardiology    Admit Date: 6/29/2020   LOS: 0 days     Follow-up For:  ACS, elevated troponin, chest discomfort; right knee replacement; right leg DVT-nonocclusive, no pulmonary emboli on CTA    Scheduled Meds:   amitriptyline  50 mg Oral QHS    atorvastatin  40 mg Oral QHS    cetirizine  5 mg Oral Daily    clopidogreL  75 mg Oral Daily    enoxaparin  105 mg Subcutaneous Q24H    FLUoxetine  20 mg Oral Daily    metoprolol tartrate  50 mg Oral BID    oxyCODONE  10 mg Oral Q12H    pantoprazole  40 mg Oral Daily     Continuous Infusions:  PRN Meds:acetaminophen, HYDROcodone-acetaminophen, LORazepam, nitroGLYCERIN, ondansetron, promethazine, tiZANidine    Review of patient's allergies indicates:   Allergen Reactions    Clarithromycin Swelling and Other (See Comments)     DIFFICULTY SWALLOWING  DIFFICULTY SWALLOWING    Pcn [penicillins] Anaphylaxis    Sulfa (sulfonamide antibiotics) Hives    Wellbutrin [bupropion hcl] Shortness Of Breath and Anaphylaxis    Betadine [povidone-iodine] Hives     Swelling      Cefprozil Hives    Iodinated contrast media Hives    Latex, natural rubber Rash    Sulfamethoxazole-trimethoprim Nausea And Vomiting    Iodine Hives and Swelling    Latex Hives and Swelling       SUBJECTIVE:     Interval History: Patient has no complaint of chest pain or tightness.  She is ambulating in the room.    Review of Systems  Respiratory: negative for cough, hemoptysis, sputum and wheezing  Cardiovascular: negative for chest pressure/discomfort, dyspnea, orthopnea, palpitations and paroxysmal nocturnal dyspnea    OBJECTIVE:     Vital Signs (Most Recent)  Temp: 97.9 °F (36.6 °C) (06/30/20 0715)  Pulse: 60 (06/30/20 0715)  Resp: 18 (06/30/20 0850)  BP: (!) 99/54 (06/30/20 0715)  SpO2: 95 % (06/30/20 0715)    Vital Signs Range (Last 24H):  Temp:  [97.6 °F (36.4 °C)-98 °F (36.7 °C)]   Pulse:  [60-84]   Resp:  [12-19]   BP: ()/(53-69)   SpO2:  [95 %-100 %]       Physical  Exam:  Neck: no carotid bruit, no JVD and supple, symmetrical, trachea midline  Lungs: clear to auscultation bilaterally, normal respiratory effort  Heart: regular rate and rhythm, S1, S2 normal, no murmur, click, rub or gallop  Abdomen: soft, non-tender; bowel sounds normal; no masses,  no organomegaly  Extremities: Extremities normal, atraumatic, no cyanosis, clubbing, or edema  Left groin looks good; no hematoma  Recent Results (from the past 24 hour(s))   Cardiac catheterization    Collection Time: 06/29/20 11:52 AM   Result Value Ref Range    Cath EF Estimated 60 %   Basic metabolic panel    Collection Time: 06/30/20  5:30 AM   Result Value Ref Range    Sodium 140 136 - 145 mmol/L    Potassium 3.3 (L) 3.5 - 5.1 mmol/L    Chloride 104 95 - 110 mmol/L    CO2 24 23 - 29 mmol/L    Glucose 108 70 - 110 mg/dL    BUN, Bld 16 6 - 20 mg/dL    Creatinine 0.7 0.5 - 1.4 mg/dL    Calcium 8.4 (L) 8.7 - 10.5 mg/dL    Anion Gap 12 8 - 16 mmol/L    eGFR if African American >60.0 >60 mL/min/1.73 m^2    eGFR if non African American >60.0 >60 mL/min/1.73 m^2   CBC auto differential    Collection Time: 06/30/20  5:30 AM   Result Value Ref Range    WBC 14.07 (H) 3.90 - 12.70 K/uL    RBC 3.51 (L) 4.00 - 5.40 M/uL    Hemoglobin 10.6 (L) 12.0 - 16.0 g/dL    Hematocrit 32.6 (L) 37.0 - 48.5 %    Mean Corpuscular Volume 93 82 - 98 fL    Mean Corpuscular Hemoglobin 30.2 27.0 - 31.0 pg    Mean Corpuscular Hemoglobin Conc 32.5 32.0 - 36.0 g/dL    RDW 14.0 11.5 - 14.5 %    Platelets 272 150 - 350 K/uL    MPV 10.3 9.2 - 12.9 fL    Immature Granulocytes 1.3 (H) 0.0 - 0.5 %    Gran # (ANC) 9.2 (H) 1.8 - 7.7 K/uL    Immature Grans (Abs) 0.18 (H) 0.00 - 0.04 K/uL    Lymph # 3.8 1.0 - 4.8 K/uL    Mono # 0.8 0.3 - 1.0 K/uL    Eos # 0.1 0.0 - 0.5 K/uL    Baso # 0.06 0.00 - 0.20 K/uL    nRBC 0 0 /100 WBC    Gran% 65.1 38.0 - 73.0 %    Lymph% 26.8 18.0 - 48.0 %    Mono% 6.0 4.0 - 15.0 %    Eosinophil% 0.4 0.0 - 8.0 %    Basophil% 0.4 0.0 - 1.9 %     Differential Method Automated    Troponin I in a.m.    Collection Time: 06/30/20  5:30 AM   Result Value Ref Range    Troponin I 0.090 (HH) <=0.040 ng/mL   CK-MB in a.m.    Collection Time: 06/30/20  5:30 AM   Result Value Ref Range    CPK MB 1.4 0.1 - 6.5 ng/mL       Diagnostic Results:  Labs: Reviewed  ECG: Reviewed    ASSESSMENT/PLAN:     ECG shows no acute changes.  Troponin is trending down.  Appreciate Dr. Pina;s  Input.  Lovenox 110 mg subcu now; if no bleeding from the groin will discharge on 1.5 milligram/kg body weight daily and follow-up with Dr. Pina as an outpatient.  Aspirin 81 mg.  Plavix 75 mg.  Metoprolol ER 50 or 100 mg daily depending on heart rate and blood pressure later today.  Coronary stenoses involving ostium of obtuse marginal branch ramus intermedius and diagonal after bifurcation-all small vessels, not ideally amenable to PCI.  Medical management.  See me in the office in 1 week.

## 2020-07-01 ENCOUNTER — TELEPHONE (OUTPATIENT)
Dept: HEMATOLOGY/ONCOLOGY | Facility: CLINIC | Age: 40
End: 2020-07-01

## 2020-07-01 VITALS
OXYGEN SATURATION: 95 % | SYSTOLIC BLOOD PRESSURE: 95 MMHG | TEMPERATURE: 99 F | WEIGHT: 250.25 LBS | BODY MASS INDEX: 33.89 KG/M2 | DIASTOLIC BLOOD PRESSURE: 53 MMHG | HEIGHT: 72 IN | RESPIRATION RATE: 18 BRPM | HEART RATE: 61 BPM

## 2020-07-01 PROBLEM — Z96.651 S/P TOTAL KNEE ARTHROPLASTY, RIGHT: Status: RESOLVED | Noted: 2018-11-13 | Resolved: 2020-07-01

## 2020-07-01 PROBLEM — R07.9 CHEST PAIN SYNDROME: Status: RESOLVED | Noted: 2020-06-29 | Resolved: 2020-07-01

## 2020-07-01 PROCEDURE — 25000003 PHARM REV CODE 250: Performed by: ORTHOPAEDIC SURGERY

## 2020-07-01 PROCEDURE — G0378 HOSPITAL OBSERVATION PER HR: HCPCS

## 2020-07-01 PROCEDURE — 25000003 PHARM REV CODE 250: Performed by: STUDENT IN AN ORGANIZED HEALTH CARE EDUCATION/TRAINING PROGRAM

## 2020-07-01 PROCEDURE — 25000003 PHARM REV CODE 250: Performed by: INTERNAL MEDICINE

## 2020-07-01 PROCEDURE — 25000003 PHARM REV CODE 250: Performed by: SPECIALIST

## 2020-07-01 RX ORDER — ENOXAPARIN SODIUM 150 MG/ML
105 INJECTION SUBCUTANEOUS EVERY 12 HOURS
Qty: 19.6 ML | Refills: 0 | Status: SHIPPED | OUTPATIENT
Start: 2020-07-01 | End: 2020-07-14

## 2020-07-01 RX ADMIN — ASPIRIN 81 MG: 81 TABLET, DELAYED RELEASE ORAL at 08:07

## 2020-07-01 RX ADMIN — HYDROMORPHONE HYDROCHLORIDE 2 MG: 2 TABLET ORAL at 03:07

## 2020-07-01 RX ADMIN — FLUOXETINE 20 MG: 20 CAPSULE ORAL at 08:07

## 2020-07-01 RX ADMIN — HYDROMORPHONE HYDROCHLORIDE 2 MG: 2 TABLET ORAL at 12:07

## 2020-07-01 RX ADMIN — PANTOPRAZOLE SODIUM 40 MG: 40 TABLET, DELAYED RELEASE ORAL at 08:07

## 2020-07-01 RX ADMIN — OXYCODONE HYDROCHLORIDE 10 MG: 5 TABLET ORAL at 08:07

## 2020-07-01 RX ADMIN — CETIRIZINE HYDROCHLORIDE 5 MG: 5 TABLET ORAL at 08:07

## 2020-07-01 RX ADMIN — CLOPIDOGREL BISULFATE 75 MG: 75 TABLET, FILM COATED ORAL at 08:07

## 2020-07-01 NOTE — PROGRESS NOTES
[facility] Medicine  Progress Note    Patient Name: Aleyda Costa  MRN: [id number]  Patient Class: OP- Observation   Admission Date: 6/29/2020  Length of Stay: 0 days  Attending Physician: John Rosenthal MD  Primary Care Provider: Darlene Hayden MD        Subjective:     Principal Problem:Chest pain syndrome        HPI:  HPI:   This  is a 39-year-old female with a past medical history significant for depression, GERD, pulmonary emboli in 2018, sleep apnea, prior gastric sleeve, and nicotine dependence presenting today for increased pain to right LE. Pt underwent a right knee arthroplasty per Dr. Crisostomo on 6/16/20. Patient previously failed treatment for pulmonary emboli with Xarelto and was placed on Pradaxa 150 mg p.o. b.i.d. at discharge 6/17 per hematologist rec at that time.  Pt presented back to ED on 6/21 for increasing pain and swelling to right leg. Pt reported compliance with her pradaxa and medications; however, ultrasound of the RLE on that date demonstrated a right nonocclusive popliteal DVT. Pt was readmitted and placed on full dose lovenox. Pt was discharged home on 6/23 on lovenox and pain was controlled with oral meds at that time. Dr. Crisostomo provided pt with oral dilaudid pain script on d/c.  Patient returns to ED today complaints of intractable pain to right leg that is not controlled with home meds. She denies worsening swelling to leg. She describes the pain as right knee and post lower leg location, 10/10 intensity, pressure quality, worsens with movement, assoc with nausea secondary to severe pain, improved after receiving IV dilaudid in ED. She denies fever, chills, cp and sob. Repeat US done in ED confirms DVT stable. CRP improving and WBC wnl. Dr. Crisostomo consulted from ED and rec admission for pain control.       * No surgery found *       Hospital Course:   The patient was monitored closely during her stay.  She received p.r.n. pain medication.  Orthopedics  were consulted.  Physical therapy and occupational therapy were consulted.  Patient's pain medication was titrated.  Patient had complaints of chest pain during her stay.  She was kept on continuous telemetry monitoring where she remains sinus rhythm.  Her troponins were trended.  Her initial troponin was negative however her 2nd troponin was elevated.  Patient was given full-dose aspirin.  She was noted to have an elevated D-dimer and a CTA of the chest was ordered.  Patient was continued on full-dose Lovenox 1 milligram/kilogram subcu q.12 hours.  A right lower extremity arterial ultrasound was ordered to evaluate patient's circulation due to her ongoing severe right lower extremity pain.  Patient CTA of the chest was negative for pulmonary emboli.  Her troponins continued to trend upward.  Cardiology was consulted.  Right lower extremity arterial ultrasound showed no significant stenosis.  Patient later reported mid epigastric reproducible pain.  She reported intermittent use of NSAIDs at home prior to admission.  Her home b.i.d. Protonix was continued.  She was also placed on Carafate.  Patient with noted prior history of gastric sleeve in 2014 and was encouraged to continue her bariatric diet.  Patient's troponins continue to trend upward and patient had persistent chest pain with ambulation.  Patient was discharged to [facility] for coronary angiogram on 06/29/2020.      Consults:  Cardiology -Dr. Kaushik Guthrie    Overview/Hospital Course:  06/30  Had Cardiac cath   Patient only want to go home Tmrw AM  Still c/o pain on legs  On variety of pain meds    Interval History:     Review of Systems   Constitutional: Negative for activity change and appetite change.   HENT: Negative for congestion and dental problem.    Eyes: Negative for discharge and itching.   Respiratory: Negative for shortness of breath.    Cardiovascular: Negative for chest pain.   Gastrointestinal: Negative for abdominal  distention and abdominal pain.   Endocrine: Negative for cold intolerance.   Genitourinary: Negative for difficulty urinating and dysuria.   Musculoskeletal: Negative for arthralgias and back pain.   Skin: Negative for color change.   Neurological: Negative for dizziness and facial asymmetry.   Hematological: Negative for adenopathy.   Psychiatric/Behavioral: Negative for agitation and behavioral problems.     Objective:     Vital Signs (Most Recent):  Temp: 99.2 °F (37.3 °C) (06/30/20 1922)  Pulse: 66 (06/30/20 1922)  Resp: 18 (06/30/20 1922)  BP: (!) 87/51 (06/30/20 1922)  SpO2: 95 % (06/30/20 1922) Vital Signs (24h Range):  Temp:  [97.6 °F (36.4 °C)-99.2 °F (37.3 °C)] 99.2 °F (37.3 °C)  Pulse:  [55-69] 66  Resp:  [14-18] 18  SpO2:  [95 %-96 %] 95 %  BP: ()/(51-69) 87/51     Weight: 111.6 kg (245 lb 15.8 oz)  Body mass index is 33.36 kg/m².    Intake/Output Summary (Last 24 hours) at 6/30/2020 2019  Last data filed at 6/30/2020 1330  Gross per 24 hour   Intake 1440 ml   Output 200 ml   Net 1240 ml      Physical Exam  Vitals signs and nursing note reviewed.   Constitutional:       General: She is not in acute distress.  HENT:      Right Ear: External ear normal.      Left Ear: External ear normal.      Nose: Nose normal.      Mouth/Throat:      Mouth: Mucous membranes are moist.   Eyes:      Pupils: Pupils are equal, round, and reactive to light.   Neck:      Musculoskeletal: Neck supple.   Cardiovascular:      Rate and Rhythm: Normal rate.   Pulmonary:      Effort: Pulmonary effort is normal.      Breath sounds: Normal breath sounds.   Abdominal:      General: Bowel sounds are normal.      Palpations: Abdomen is soft.   Musculoskeletal: Normal range of motion.   Skin:     General: Skin is warm.   Neurological:      Mental Status: She is alert and oriented to person, place, and time.         Significant Labs:   CBC:   Recent Labs   Lab 06/29/20  0426 06/30/20  0530   WBC 15.14* 14.07*   HGB 11.8* 10.6*   HCT  35.3* 32.6*    272     CMP:   Recent Labs   Lab 06/29/20  0426 06/30/20  0530    140   K 4.0 3.3*    104   CO2 25 24   * 108   BUN 10 16   CREATININE 0.7 0.7   CALCIUM 9.5 8.4*   ANIONGAP 9 12   EGFRNONAA >60 >60.0       Significant Imaging: I have reviewed all pertinent imaging results/findings within the past 24 hours.      Assessment/Plan:      * Chest pain syndrome  Admitted with chest pain  Cardiac cath showed Coronary stenoses involving ostium of obtuse marginal branch ramus intermedius and diagonal after bifurcation-all small vessels, not ideally amenable to PCI      History of bariatric surgery  Stable issue      DVT (deep venous thrombosis)  R Popliteal vein DVT  Now on SQ lovenox      History of pulmonary embolism  In 2018  Now on SQ lovenox      Nicotine dependence  Tobacco smoking cessation education provided      S/P total knee arthroplasty, right  Recent R Total knee replacement  No acute issues        VTE Risk Mitigation (From admission, onward)         Ordered     enoxaparin injection 160 mg  Every 24 hours (non-standard times)      06/30/20 1919                      John Rosenthal MD  Department of Hospital Medicine   [facility]

## 2020-07-01 NOTE — TELEPHONE ENCOUNTER
Pt informed of need for f/up c Dr Pina. Pt states she is unable to get labs done before seeing Dr Pina. Pt informed Dr Pina is out of office all next week. Lab appt set for 7/7, f/up scheduled for 7/14. Portal message sent to confirm, as connection was lost and message was left on pt voicemail.

## 2020-07-01 NOTE — ASSESSMENT & PLAN NOTE
Admitted with chest pain  Cardiac cath showed Coronary stenoses involving ostium of obtuse marginal branch ramus intermedius and diagonal after bifurcation-all small vessels, not ideally amenable to PCI

## 2020-07-01 NOTE — SUBJECTIVE & OBJECTIVE
Interval History:     Review of Systems   Constitutional: Negative for activity change and appetite change.   HENT: Negative for congestion and dental problem.    Eyes: Negative for discharge and itching.   Respiratory: Negative for shortness of breath.    Cardiovascular: Negative for chest pain.   Gastrointestinal: Negative for abdominal distention and abdominal pain.   Endocrine: Negative for cold intolerance.   Genitourinary: Negative for difficulty urinating and dysuria.   Musculoskeletal: Negative for arthralgias and back pain.   Skin: Negative for color change.   Neurological: Negative for dizziness and facial asymmetry.   Hematological: Negative for adenopathy.   Psychiatric/Behavioral: Negative for agitation and behavioral problems.     Objective:     Vital Signs (Most Recent):  Temp: 99.2 °F (37.3 °C) (06/30/20 1922)  Pulse: 66 (06/30/20 1922)  Resp: 18 (06/30/20 1922)  BP: (!) 87/51 (06/30/20 1922)  SpO2: 95 % (06/30/20 1922) Vital Signs (24h Range):  Temp:  [97.6 °F (36.4 °C)-99.2 °F (37.3 °C)] 99.2 °F (37.3 °C)  Pulse:  [55-69] 66  Resp:  [14-18] 18  SpO2:  [95 %-96 %] 95 %  BP: ()/(51-69) 87/51     Weight: 111.6 kg (245 lb 15.8 oz)  Body mass index is 33.36 kg/m².    Intake/Output Summary (Last 24 hours) at 6/30/2020 2019  Last data filed at 6/30/2020 1330  Gross per 24 hour   Intake 1440 ml   Output 200 ml   Net 1240 ml      Physical Exam  Vitals signs and nursing note reviewed.   Constitutional:       General: She is not in acute distress.  HENT:      Right Ear: External ear normal.      Left Ear: External ear normal.      Nose: Nose normal.      Mouth/Throat:      Mouth: Mucous membranes are moist.   Eyes:      Pupils: Pupils are equal, round, and reactive to light.   Neck:      Musculoskeletal: Neck supple.   Cardiovascular:      Rate and Rhythm: Normal rate.   Pulmonary:      Effort: Pulmonary effort is normal.      Breath sounds: Normal breath sounds.   Abdominal:      General: Bowel sounds  are normal.      Palpations: Abdomen is soft.   Musculoskeletal: Normal range of motion.   Skin:     General: Skin is warm.   Neurological:      Mental Status: She is alert and oriented to person, place, and time.         Significant Labs:   CBC:   Recent Labs   Lab 06/29/20 0426 06/30/20  0530   WBC 15.14* 14.07*   HGB 11.8* 10.6*   HCT 35.3* 32.6*    272     CMP:   Recent Labs   Lab 06/29/20 0426 06/30/20  0530    140   K 4.0 3.3*    104   CO2 25 24   * 108   BUN 10 16   CREATININE 0.7 0.7   CALCIUM 9.5 8.4*   ANIONGAP 9 12   EGFRNONAA >60 >60.0       Significant Imaging: I have reviewed all pertinent imaging results/findings within the past 24 hours.

## 2020-07-01 NOTE — PROGRESS NOTES
Cardiac Rehab     lAeyda Costa   [id number]   7/1/2020         Cardiac Rehab Phase Taught: Phase 1    Teaching Method: Verbal    Handouts: None    Educational Videos: None    Understanding:  Learning indicated by feedback and Verbalize understanding    Comments: pt in bed. Discussed CAD and risk factors, med..plavix, smoking cessation, heart healthy diet, exercise. Questions answered. Pt voiced understanding. Will follow for further education    Total Time Spent:15mins            Cookie Smith RN

## 2020-07-01 NOTE — PLAN OF CARE
07/01/20 1106   Final Note   Assessment Type Final Discharge Note   Anticipated Discharge Disposition Home

## 2020-07-01 NOTE — HOSPITAL COURSE
Patient admitted with chest pain  Patient had Cardiac cath which showed Coronary stenoses involving ostium of obtuse marginal branch ramus intermedius and diagonal after bifurcation-all small vessels, not ideally amenable to PCI  Patient has h/o Recent R Total knee replacement  She has Past H/o PE in 2018 and After recent knee surgery she was found to have R popliteal vein DVT.  Following NOAC was D/C and pt was started on SQ lovenox   Later pt was Discharged to Home and she will see Hematologist and cardiology MD as an OP

## 2020-07-02 NOTE — DISCHARGE SUMMARY
[facility] Medicine  Discharge Summary      Patient Name: Aleyda Costa  MRN: [id number]  Admission Date: 6/29/2020  Hospital Length of Stay: 0 days  Discharge Date and Time:  07/01/2020 11:14 PM  Attending Physician: Padmini att. providers found   Discharging Provider: John Rosenthal MD  Primary Care Provider: Darlene Hayden MD      HPI:   HPI:   This  is a 39-year-old female with a past medical history significant for depression, GERD, pulmonary emboli in 2018, sleep apnea, prior gastric sleeve, and nicotine dependence presenting today for increased pain to right LE. Pt underwent a right knee arthroplasty per Dr. Crisostomo on 6/16/20. Patient previously failed treatment for pulmonary emboli with Xarelto and was placed on Pradaxa 150 mg p.o. b.i.d. at discharge 6/17 per hematologist rec at that time.  Pt presented back to ED on 6/21 for increasing pain and swelling to right leg. Pt reported compliance with her pradaxa and medications; however, ultrasound of the RLE on that date demonstrated a right nonocclusive popliteal DVT. Pt was readmitted and placed on full dose lovenox. Pt was discharged home on 6/23 on lovenox and pain was controlled with oral meds at that time. Dr. Crisostomo provided pt with oral dilaudid pain script on d/c.  Patient returns to ED today complaints of intractable pain to right leg that is not controlled with home meds. She denies worsening swelling to leg. She describes the pain as right knee and post lower leg location, 10/10 intensity, pressure quality, worsens with movement, assoc with nausea secondary to severe pain, improved after receiving IV dilaudid in ED. She denies fever, chills, cp and sob. Repeat US done in ED confirms DVT stable. CRP improving and WBC wnl. Dr. Crisostomo consulted from ED and rec admission for pain control.       * No surgery found *       Hospital Course:   The patient was monitored closely during her stay.  She received p.r.n. pain medication.   Orthopedics were consulted.  Physical therapy and occupational therapy were consulted.  Patient's pain medication was titrated.  Patient had complaints of chest pain during her stay.  She was kept on continuous telemetry monitoring where she remains sinus rhythm.  Her troponins were trended.  Her initial troponin was negative however her 2nd troponin was elevated.  Patient was given full-dose aspirin.  She was noted to have an elevated D-dimer and a CTA of the chest was ordered.  Patient was continued on full-dose Lovenox 1 milligram/kilogram subcu q.12 hours.  A right lower extremity arterial ultrasound was ordered to evaluate patient's circulation due to her ongoing severe right lower extremity pain.  Patient CTA of the chest was negative for pulmonary emboli.  Her troponins continued to trend upward.  Cardiology was consulted.  Right lower extremity arterial ultrasound showed no significant stenosis.  Patient later reported mid epigastric reproducible pain.  She reported intermittent use of NSAIDs at home prior to admission.  Her home b.i.d. Protonix was continued.  She was also placed on Carafate.  Patient with noted prior history of gastric sleeve in 2014 and was encouraged to continue her bariatric diet.  Patient's troponins continue to trend upward and patient had persistent chest pain with ambulation.  Patient was discharged to [facility] for coronary angiogram on 06/29/2020.      Consults:  Cardiology -Dr. Kaushik Guthrie    Procedure(s) (LRB):  Left heart cath (Left)      Hospital Course:   Patient admitted with chest pain  Patient had Cardiac cath which showed Coronary stenoses involving ostium of obtuse marginal branch ramus intermedius and diagonal after bifurcation-all small vessels, not ideally amenable to PCI  Patient has h/o Recent R Total knee replacement  She has Past H/o PE in 2018 and After recent knee surgery she was found to have R popliteal vein DVT.  Following NOAC was D/C and  pt was started on SQ lovenox   Later pt was Discharged to Home and she will see Hematologist and cardiology MD as an OP      Consults:     No new Assessment & Plan notes have been filed under this hospital service since the last note was generated.  Service: Hospital Medicine    Final Active Diagnoses:    Diagnosis Date Noted POA    History of bariatric surgery [Z98.84] 06/26/2020 Not Applicable    DVT (deep venous thrombosis) [I82.409] 06/21/2020 Yes    History of pulmonary embolism [Z86.711] 06/16/2020 Yes    Nicotine dependence [F17.200] 11/19/2018 Yes    S/P total knee arthroplasty, right [Z96.651] 11/13/2018 Not Applicable      Problems Resolved During this Admission:    Diagnosis Date Noted Date Resolved POA    PRINCIPAL PROBLEM:  Chest pain syndrome [R07.9] 06/29/2020 07/01/2020 Yes       Discharged Condition: good    Disposition: Home or Self Care    Follow Up:  Follow-up Information     Rabia Pina MD In 1 week.    Specialty: Hematology and Oncology  Contact information:  [address] [place] [address] [id number] [phone]                 Patient Instructions:      Diet general     Diet Cardiac     Diet Cardiac     Call MD for:  temperature >100.4     Call MD for:  persistent nausea and vomiting     Call MD for:  severe uncontrolled pain     Call MD for:  difficulty breathing, headache or visual disturbances     Call MD for:  redness, tenderness, or signs of infection (pain, swelling, redness, odor or green/yellow discharge around incision site)     Call MD for:  hives     Call MD for:  persistent dizziness or light-headedness     Call MD for:  extreme fatigue     Remove dressing in 48 hours       Significant Diagnostic Studies: Labs:   CMP   Recent Labs   Lab 06/30/20  0530      K 3.3*      CO2 24      BUN 16   CREATININE 0.7   CALCIUM 8.4*   ANIONGAP 12   ESTGFRAFRICA >60.0   EGFRNONAA >60.0    and CBC   Recent Labs   Lab 06/30/20  0530   WBC 14.07*   HGB 10.6*   HCT  32.6*          Pending Diagnostic Studies:     None         Medications:  Reconciled Home Medications:      Medication List      CONTINUE taking these medications    * AIMOVIG AUTOINJECTOR 140 mg/mL Atin  Generic drug: erenumab-aooe  Inject 140 mg into the skin every 28 days.     * AIMOVIG AUTOINJECTOR 140 mg/mL Atin  Generic drug: erenumab-aooe     amitriptyline 25 MG tablet  Commonly known as: ELAVIL  TAKE ONE TABLET BY MOUTH EVERY NIGHT FOR 2 WEEKS THEN INCREASE TO 2 TABLETS AT BEDTIME     cetirizine 5 MG tablet  Commonly known as: ZYRTEC  Take 5 mg by mouth as needed.     cyanocobalamin (vitamin B-12) 1,000 mcg/mL Kit  Inject 1 mL into the muscle every 21 days.     enoxaparin 120 mg/0.8 mL Syrg  Commonly known as: LOVENOX  Inject 0.7 mLs (105 mg total) into the skin every 12 (twelve) hours. for 14 days     FLUoxetine 20 MG capsule  every evening.     HYDROmorphone 2 MG tablet  Commonly known as: DILAUDID  Take 1 tablet (2 mg total) by mouth every 4 (four) hours as needed for Pain.     hydrOXYzine pamoate 25 MG Cap  Commonly known as: VISTARIL  TAKE ONE CAPSULE BY MOUTH 3 TIMES A DAY AS NEEDED FOR ANXIETY     LORazepam 0.5 MG tablet  Commonly known as: ATIVAN  Take 0.5 mg by mouth every 4 (four) hours as needed.     oxyCODONE 10 mg 12 hr tablet  Commonly known as: OXYCONTIN  Take 10 mg by mouth every 12 (twelve) hours.     oxyCODONE-acetaminophen 5-325 mg per tablet  Commonly known as: PERCOCET  Take 1 tablet by mouth every 6 (six) hours as needed.     pantoprazole 40 MG tablet  Commonly known as: PROTONIX  Take 1 tablet (40 mg total) by mouth 2 (two) times daily.     promethazine 25 MG tablet  Commonly known as: PHENERGAN  Take 1 tablet (25 mg total) by mouth every 6 (six) hours as needed for Nausea. Take 1 tablet by mouth every 6 (six) hours as needed (migraine). -MAY CAUSE DROWSINESS-     tiZANidine 4 MG tablet  Commonly known as: ZANAFLEX  Take 4 mg by mouth every 6 (six) hours as needed.         *  This list has 2 medication(s) that are the same as other medications prescribed for you. Read the directions carefully, and ask your doctor or other care provider to review them with you.            STOP taking these medications    EPINEPHrine 0.3 mg/0.3 mL Atin  Commonly known as: EPIPEN            Indwelling Lines/Drains at time of discharge:   Lines/Drains/Airways     None                 Time spent on the discharge of patient: 25  minutes  Patient was seen and examined on the date of discharge and determined to be suitable for discharge.         John Rosenthal MD  Department of Hospital Medicine  [facility]

## 2020-07-03 DIAGNOSIS — Z51.81 VISIT FOR MONITORING LOVENOX THERAPY: Primary | ICD-10-CM

## 2020-07-03 DIAGNOSIS — Z79.01 VISIT FOR MONITORING LOVENOX THERAPY: Primary | ICD-10-CM

## 2020-07-06 ENCOUNTER — TELEPHONE (OUTPATIENT)
Dept: HEMATOLOGY/ONCOLOGY | Facility: CLINIC | Age: 40
End: 2020-07-06

## 2020-07-06 NOTE — TELEPHONE ENCOUNTER
This nurse spoke to patient and informed that Dr. Pina would like her to have labs today and visit with Kenny on Thursday.  Patient reports she does not have transportation for today or Wednesday.  Pt will keep lab appt scheduled for tomorrow and is scheduled c ABHIJIT Almaguer Thursday 7/9/2020 @ 2PM.  Patient verbalized understanding.  No further questions/concerns noted at this time.    ----- Message from Rabia Pina MD sent at 7/3/2020  7:58 AM CDT -----  Please schedule hospital discharge follow up with Kenny WEdnesday in office and have her do labs being anti Xa ar [facility] Monday before her visit WEdnesday    Rabia       65 yo male who presents to the ED with a cc of fall.  PMHx of aggressiveness, agitation, autism, bipolar affective disorder, BPH, GERD, hematuria, HLD, HTN, developmental delay, schizophrenia, paranoid type.  Pt is non verbal and is unable to provide history.  Brought in for weakness. Found to have acute anemia secondary to GI bleed and +dvt.     1. Gastrointestinal hemorrhage -   EGD: esophagitis; gastric and duodenal ulcer   -hgb remains stable s/p 3 units prbcs on 1/25  -c/w PPI BID    2. Anemia due to acute blood loss for GI Bleed.   -monitor cbc, transfuse as needed    3: +left femoral/popliteal DVT.   needs 3 months treatment started on Nov 12.    Now back on full dose AC.    if no bleeding tomorrow will send back to .     4. Mental retardation.   supportive care.     5 Bipolar affective disorder: c/w chronic meds    6. Schizophrenia, paranoid type c/w risperidone.     7.  hyperlipidemia. c/w simvastatin.    DVT ppx: on xarelto.

## 2020-07-07 ENCOUNTER — LAB VISIT (OUTPATIENT)
Dept: LAB | Facility: HOSPITAL | Age: 40
End: 2020-07-07
Attending: INTERNAL MEDICINE
Payer: COMMERCIAL

## 2020-07-07 DIAGNOSIS — I82.431 DEEP VEIN THROMBOSIS (DVT) OF POPLITEAL VEIN OF RIGHT LOWER EXTREMITY, UNSPECIFIED CHRONICITY: ICD-10-CM

## 2020-07-07 LAB — FACT X PPP CHRO-ACNC: 0.78 IU/ML (ref 0.3–0.7)

## 2020-07-07 PROCEDURE — 85520 HEPARIN ASSAY: CPT

## 2020-07-07 PROCEDURE — 36415 COLL VENOUS BLD VENIPUNCTURE: CPT

## 2020-07-08 ENCOUNTER — TELEPHONE (OUTPATIENT)
Dept: HEMATOLOGY/ONCOLOGY | Facility: CLINIC | Age: 40
End: 2020-07-08

## 2020-07-08 NOTE — TELEPHONE ENCOUNTER
LM informing pt of need to cancel duplicate appt patrick Cox scheduled for tomorrow. Pt also has appt c Dr Pina scheduled for Tuesday. ABHIJIT Cox would prefer pt see MD instead. Pt portal message also sent.

## 2020-07-09 ENCOUNTER — OFFICE VISIT (OUTPATIENT)
Dept: HEMATOLOGY/ONCOLOGY | Facility: CLINIC | Age: 40
End: 2020-07-09
Payer: COMMERCIAL

## 2020-07-09 ENCOUNTER — HOSPITAL ENCOUNTER (OUTPATIENT)
Dept: RADIOLOGY | Facility: HOSPITAL | Age: 40
Discharge: HOME | End: 2020-07-09
Attending: ORTHOPAEDIC SURGERY
Payer: COMMERCIAL

## 2020-07-09 ENCOUNTER — OFFICE VISIT (OUTPATIENT)
Dept: ORTHOPEDICS | Facility: CLINIC | Age: 40
End: 2020-07-09
Payer: COMMERCIAL

## 2020-07-09 VITALS
HEIGHT: 72 IN | RESPIRATION RATE: 18 BRPM | TEMPERATURE: 98 F | BODY MASS INDEX: 32.22 KG/M2 | WEIGHT: 237.88 LBS | HEART RATE: 82 BPM | SYSTOLIC BLOOD PRESSURE: 100 MMHG | DIASTOLIC BLOOD PRESSURE: 66 MMHG | OXYGEN SATURATION: 97 %

## 2020-07-09 VITALS — HEIGHT: 72 IN | TEMPERATURE: 98 F | BODY MASS INDEX: 33.86 KG/M2 | WEIGHT: 250 LBS

## 2020-07-09 DIAGNOSIS — M25.571 ACUTE RIGHT ANKLE PAIN: ICD-10-CM

## 2020-07-09 DIAGNOSIS — Z96.651 S/P TOTAL KNEE ARTHROPLASTY, RIGHT: Primary | ICD-10-CM

## 2020-07-09 DIAGNOSIS — M25.561 RIGHT KNEE PAIN, UNSPECIFIED CHRONICITY: ICD-10-CM

## 2020-07-09 DIAGNOSIS — I82.401 ACUTE DEEP VEIN THROMBOSIS (DVT) OF RIGHT LOWER EXTREMITY, UNSPECIFIED VEIN: Primary | ICD-10-CM

## 2020-07-09 PROCEDURE — 99024 POSTOP FOLLOW-UP VISIT: CPT | Mod: S$GLB,,, | Performed by: ORTHOPAEDIC SURGERY

## 2020-07-09 PROCEDURE — 3008F PR BODY MASS INDEX (BMI) DOCUMENTED: ICD-10-PCS | Mod: CPTII,S$GLB,, | Performed by: INTERNAL MEDICINE

## 2020-07-09 PROCEDURE — 99214 OFFICE O/P EST MOD 30 MIN: CPT | Mod: S$GLB,,, | Performed by: INTERNAL MEDICINE

## 2020-07-09 PROCEDURE — 99024 PR POST-OP FOLLOW-UP VISIT: ICD-10-PCS | Mod: S$GLB,,, | Performed by: ORTHOPAEDIC SURGERY

## 2020-07-09 PROCEDURE — 3008F BODY MASS INDEX DOCD: CPT | Mod: CPTII,S$GLB,, | Performed by: INTERNAL MEDICINE

## 2020-07-09 PROCEDURE — 99999 PR PBB SHADOW E&M-EST. PATIENT-LVL III: ICD-10-PCS | Mod: PBBFAC,,, | Performed by: ORTHOPAEDIC SURGERY

## 2020-07-09 PROCEDURE — 99214 PR OFFICE/OUTPT VISIT, EST, LEVL IV, 30-39 MIN: ICD-10-PCS | Mod: S$GLB,,, | Performed by: INTERNAL MEDICINE

## 2020-07-09 PROCEDURE — 73560 X-RAY EXAM OF KNEE 1 OR 2: CPT | Mod: TC,PN,RT

## 2020-07-09 PROCEDURE — 99999 PR PBB SHADOW E&M-EST. PATIENT-LVL V: ICD-10-PCS | Mod: PBBFAC,,, | Performed by: INTERNAL MEDICINE

## 2020-07-09 PROCEDURE — 99999 PR PBB SHADOW E&M-EST. PATIENT-LVL V: CPT | Mod: PBBFAC,,, | Performed by: INTERNAL MEDICINE

## 2020-07-09 PROCEDURE — 73560 X-RAY EXAM OF KNEE 1 OR 2: CPT | Mod: 26,RT,, | Performed by: RADIOLOGY

## 2020-07-09 PROCEDURE — 73560 XR KNEE 1 OR 2 VIEW RIGHT: ICD-10-PCS | Mod: 26,RT,, | Performed by: RADIOLOGY

## 2020-07-09 PROCEDURE — 99999 PR PBB SHADOW E&M-EST. PATIENT-LVL III: CPT | Mod: PBBFAC,,, | Performed by: ORTHOPAEDIC SURGERY

## 2020-07-09 RX ORDER — OXYCODONE AND ACETAMINOPHEN 5; 325 MG/1; MG/1
1 TABLET ORAL EVERY 6 HOURS PRN
Qty: 40 TABLET | Refills: 0 | Status: ON HOLD | OUTPATIENT
Start: 2020-07-09 | End: 2020-10-06

## 2020-07-09 RX ORDER — CLOPIDOGREL BISULFATE 75 MG/1
75 TABLET ORAL NIGHTLY
Status: ON HOLD | COMMUNITY
Start: 2020-07-08 | End: 2020-12-07 | Stop reason: HOSPADM

## 2020-07-09 RX ORDER — ATORVASTATIN CALCIUM 40 MG/1
40 TABLET, FILM COATED ORAL NIGHTLY
COMMUNITY
Start: 2020-07-01

## 2020-07-09 RX ORDER — DICYCLOMINE HYDROCHLORIDE 10 MG/1
10 CAPSULE ORAL
COMMUNITY
Start: 2020-07-06 | End: 2020-07-20

## 2020-07-09 NOTE — PROGRESS NOTES
CC How are my scans     Aleyda Costa is a 40 y.o.    Pt had undergone a total left knee replacement in November 2018 when a few days later she presented to ER with increasing SOB. Workup revealed bilateral PE. SHe was placed on xarelto. SHe had an ultrasound of legs which was negative.Repeat CTA revealed no resolution of PE when she saw me. I changed her to pradaxa. She is here to review repeat CTA . She mistakenly quit taking pradaxa a few weeks ago. She has no SOB or pain . No swelling of her legs   Pt had a gastric sleeve in 2014.   She is here for results of hyper coag workup in order for her to proceed with Right knee replacement     Hexagonal test negative   CT chest hiatal hernia was reported as a paraesophageal mass   Spoke with radiologist     Past Medical History:   Diagnosis Date    Abnormal Pap smear of cervix     Arthritis     OA    Back pain     Cancer     skin cancer to back    Depression     Endometriosis     Full dentures     GERD (gastroesophageal reflux disease)     Migraine     Pulmonary embolism 2018    Seizures     Sleep apnea     Does not use c-pap since weight loss - 170 lbs    Uterine fibroid     Wears glasses    She remains on Prozac for mood and B12 to help with energy      Current Outpatient Medications:     amitriptyline (ELAVIL) 25 MG tablet, TAKE ONE TABLET BY MOUTH EVERY NIGHT FOR 2 WEEKS THEN INCREASE TO 2 TABLETS AT BEDTIME, Disp: , Rfl:     atorvastatin (LIPITOR) 40 MG tablet, , Disp: , Rfl:     cetirizine (ZYRTEC) 5 MG tablet, Take 5 mg by mouth as needed. , Disp: , Rfl:     clopidogreL (PLAVIX) 75 mg tablet, , Disp: , Rfl:     cyanocobalamin, vitamin B-12, 1,000 mcg/mL Kit, Inject 1 mL into the muscle every 21 days., Disp: , Rfl:     dicyclomine (BENTYL) 10 MG capsule, Take 10 mg by mouth., Disp: , Rfl:     enoxaparin (LOVENOX) 120 mg/0.8 mL Syrg, Inject 0.7 mLs (105 mg total) into the skin every 12 (twelve) hours. for 14 days, Disp: 19.6 mL, Rfl:  0    erenumab-aooe 140 mg/mL AtIn, Inject 140 mg into the skin every 28 days., Disp: 1 mL, Rfl: 11    fluoxetine (PROZAC) 20 MG capsule, every evening. , Disp: , Rfl: 3    HYDROmorphone (DILAUDID) 2 MG tablet, Take 1 tablet (2 mg total) by mouth every 4 (four) hours as needed for Pain., Disp: 40 tablet, Rfl: 0    hydrOXYzine pamoate (VISTARIL) 25 MG Cap, TAKE ONE CAPSULE BY MOUTH 3 TIMES A DAY AS NEEDED FOR ANXIETY, Disp: , Rfl:     lorazepam (ATIVAN) 0.5 MG tablet, Take 0.5 mg by mouth every 4 (four) hours as needed. , Disp: , Rfl:     oxyCODONE (OXYCONTIN) 10 mg 12 hr tablet, Take 10 mg by mouth every 12 (twelve) hours., Disp: , Rfl:     pantoprazole (PROTONIX) 40 MG tablet, Take 1 tablet (40 mg total) by mouth 2 (two) times daily., Disp: 60 tablet, Rfl: 1    promethazine (PHENERGAN) 25 MG tablet, Take 1 tablet (25 mg total) by mouth every 6 (six) hours as needed for Nausea. Take 1 tablet by mouth every 6 (six) hours as needed (migraine). -MAY CAUSE DROWSINESS-, Disp: 20 tablet, Rfl: 3    tiZANidine (ZANAFLEX) 4 MG tablet, Take 4 mg by mouth every 6 (six) hours as needed., Disp: , Rfl:     AIMOVIG AUTOINJECTOR 140 mg/mL AtIn, , Disp: , Rfl:     oxyCODONE-acetaminophen (PERCOCET) 5-325 mg per tablet, Take 1 tablet by mouth every 6 (six) hours as needed. (Patient not taking: Reported on 2020), Disp: 40 tablet, Rfl: 0  No current facility-administered medications for this visit.     Facility-Administered Medications Ordered in Other Visits:     lactated ringers infusion, , Intravenous, Continuous, Anjel Hernandez MD, Stopped at 10/25/19 1110    lidocaine (PF) 10 mg/ml (1%) injection 10 mg, 1 mL, Intradermal, Once, Anjel Hernandez MD    Social History     Tobacco Use    Smoking status: Current Every Day Smoker     Packs/day: 0.25     Years: 20.00     Pack years: 5.00     Types: Cigarettes     Last attempt to quit: 2018     Years since quittin.6    Smokeless tobacco: Never Used    Substance Use Topics    Alcohol use: Yes     Alcohol/week: 0.0 standard drinks     Comment: occasionally    Drug use: Not Currently     Types: Other-see comments     Comment: no     Family History   Problem Relation Age of Onset    Heart disease Mother     Heart disease Father     Esophageal cancer Maternal Grandmother     Breast cancer Maternal Aunt 46    Colon cancer Neg Hx     Stomach cancer Neg Hx     Ovarian cancer Neg Hx        CONSTITUTIONAL: No fevers, chills, night sweats, wt. loss, appetite changes  SKIN: no rashes or itching  ENT: No headaches, head trauma,or eye pain , no discharge  LYMPH NODES: None noticed   CV: No chest pain, palpitations.   RESP: No dyspnea on exertion, cough, wheezing, or hemoptysis  GI:  Intermittent nausea, no emesis, diarrhea, constipation, melena, hematochezia, int bloating   : No dysuria, hematuria, urgency, or frequency   HEME: No easy bruising, bleeding problems  PSYCHIATRIC: No depression, anxiety, hallucinations.  NEURO: No seizures, memory loss, dizziness or difficulty sleeping       Vitals:    07/09/20 1350   BP: 100/66   Pulse: 82   Resp: 18   Temp: 97.9 °F (36.6 °C)       Awake alert no acute distress  Normocephalic atraumatic pupils equal round reactive to light extraocular muscle intact  No JVD  No respiratory distress  Regular rate  Soft nontender nondistended bowel sounds x4  Distal pulses intact  Right knee status post surgical repair.  Patient where cast in right lower extremity.  No rash no lesions.  Mild tenderness in the calf.  No nodule.   deferred  Normal affect        Lab Results   Component Value Date    WBC 14.07 (H) 06/30/2020    HGB 10.6 (L) 06/30/2020    HCT 32.6 (L) 06/30/2020    MCV 93 06/30/2020     06/30/2020     CMP  Sodium   Date Value Ref Range Status   06/30/2020 140 136 - 145 mmol/L Final     Potassium   Date Value Ref Range Status   06/30/2020 3.3 (L) 3.5 - 5.1 mmol/L Final     Chloride   Date Value Ref Range Status    06/30/2020 104 95 - 110 mmol/L Final     CO2   Date Value Ref Range Status   06/30/2020 24 23 - 29 mmol/L Final     Glucose   Date Value Ref Range Status   06/30/2020 108 70 - 110 mg/dL Final     BUN, Bld   Date Value Ref Range Status   06/30/2020 16 6 - 20 mg/dL Final     Creatinine   Date Value Ref Range Status   06/30/2020 0.7 0.5 - 1.4 mg/dL Final     Calcium   Date Value Ref Range Status   06/30/2020 8.4 (L) 8.7 - 10.5 mg/dL Final     Total Protein   Date Value Ref Range Status   06/27/2020 7.6 6.0 - 8.4 g/dL Final     Albumin   Date Value Ref Range Status   06/27/2020 3.4 (L) 3.5 - 5.2 g/dL Final     Total Bilirubin   Date Value Ref Range Status   06/27/2020 0.6 0.1 - 1.0 mg/dL Final     Comment:     For infants and newborns, interpretation of results should be based  on gestational age, weight and in agreement with clinical  observations.  Premature Infant recommended reference ranges:  Up to 24 hours.............<8.0 mg/dL  Up to 48 hours............<12.0 mg/dL  3-5 days..................<15.0 mg/dL  6-29 days.................<15.0 mg/dL       Alkaline Phosphatase   Date Value Ref Range Status   06/27/2020 87 55 - 135 U/L Final     AST   Date Value Ref Range Status   06/27/2020 20 10 - 40 U/L Final     ALT   Date Value Ref Range Status   06/27/2020 14 10 - 44 U/L Final     Anion Gap   Date Value Ref Range Status   06/30/2020 12 8 - 16 mmol/L Final     eGFR if    Date Value Ref Range Status   06/30/2020 >60.0 >60 mL/min/1.73 m^2 Final     eGFR if non    Date Value Ref Range Status   06/30/2020 >60.0 >60 mL/min/1.73 m^2 Final     Comment:     Calculation used to obtain the estimated glomerular filtration  rate (eGFR) is the CKD-EPI equation.      reviewed scans   No pain  No fall risk   Repeat labs negative    There are no diagnoses linked to this encounter. subcarinal adenopathy     Hypercoag workup is negative. Pt has no contraindication to surgery from a heme standpoint but  must be cleared by PCP for such.   Proper hydration encouraged with electrolytes after and before surgery  She has no antiphospholipid syndrome or lupus anticoagulant. She must stay hydrated : she is at risk of clotting if she remains dehydrated post gastric bypass.   She is to return to her pcp then DR Nicolas   Given the growth of the subcarinal lymph node from 1 cm to 1.6 cm , she needs a repeat CT chest with contrast in 4 months; history tobacco use   Explained she could see  Surgeon to discuss options for hiatal hernia repair since se is symptomatic at times     Thank you for allowing me to evaluate and participate in the care of this pleasant patient. Please fell free to call me with any questions or concerns.    WarmlyRabia MD    This note was dictated with Dragon and briefly proofread. Please excuse any sentences that may be unclear or words misspelled      [] Right lower extremity DVT status post knee surgery.  Prior to treatment to Pradaxa and Xarelto.  Patient is currently on Lovenox.  Ultrasound on 06/24/2020 shows nonocclusive DVT thrombus in the right popliteal veins.  Rest of the vein and the legs are clear of thrombus.  Today patient came to the clinic complaining of right lower extremity pain.  On physical exam there is no swelling no edema.  On palpation patient complete tenderness in the calf muscle area.  > given the history will repeat ultrasound right lower extremity DVT study.  Patient will having follow-up appointment with Dr. Pina next Tuesday.  Meanwhile I have reviewed hyper anti-Xa completed on 07/07/2020 which is within the therapeutic range.  At this time I recommend continue on current dose of Lovenox at home.

## 2020-07-09 NOTE — Clinical Note
Follow-up with Dr. Pina this coming Tuesday which she already have appointment.  At this in time patient may have repeat right lower extremity ultrasound DVT studies.  Continue on Lovenox as directed

## 2020-07-09 NOTE — PROGRESS NOTES
Past Medical History:   Diagnosis Date    Abnormal Pap smear of cervix     Arthritis     OA    Back pain     Cancer     skin cancer to back    Depression     Endometriosis     Full dentures     GERD (gastroesophageal reflux disease)     Migraine     Pulmonary embolism     Seizures     Sleep apnea     Does not use c-pap since weight loss - 170 lbs    Uterine fibroid     Wears glasses        Past Surgical History:   Procedure Laterality Date    ANGIOGRAM, CORONARY, WITH LEFT HEART CATHETERIZATION Left 2020    Procedure: Left heart cath;  Surgeon: Jm Guthrie MD;  Location: [facility] CATH/EP LAB;  Service: Cardiology;  Laterality: Left;    APPENDECTOMY      CARPAL TUNNEL RELEASE Bilateral      SECTION      CHOLECYSTECTOMY      COLONOSCOPY N/A 2019    Procedure: COLONOSCOPY;  Surgeon: Anselmo Blackwell MD;  Location: [facility] ENDO;  Service: Endoscopy;  Laterality: N/A;    EPIDURAL STEROID INJECTION INTO LUMBAR SPINE N/A 2019    Procedure: Injection-steroid-epidural-lumbar;  Surgeon: Yariel Ramirez MD;  Location: [facility] OR;  Service: Pain Management;  Laterality: N/A;  L3-4    ESOPHAGOGASTRODUODENOSCOPY N/A 2019    Procedure: EGD (ESOPHAGOGASTRODUODENOSCOPY);  Surgeon: Anselmo Blackwell MD;  Location: [facility] ENDO;  Service: Endoscopy;  Laterality: N/A;    FRACTURE SURGERY      ankle    gastric sleve      HYSTERECTOMY      endo and fibroids    HYSTERECTOMY      JOINT REPLACEMENT Left 2018    KNEE ARTHROPLASTY Left 2018    Procedure: ARTHROPLASTY, KNEE;  Surgeon: Feliberto Cardenas MD;  Location: [facility] OR;  Service: Orthopedics;  Laterality: Left;    KNEE ARTHROPLASTY Right 2020    Procedure: ARTHROPLASTY, KNEE;  Surgeon: Feliberto Cardenas MD;  Location: [facility] OR;  Service: Orthopedics;  Laterality: Right;    KNEE ARTHROSCOPY W/ MENISCECTOMY Right 10/25/2019    Procedure: ARTHROSCOPY, KNEE, WITH MENISCECTOMY;  Surgeon: Feliberto Perez  MD Ronald;  Location: [facility] OR;  Service: Orthopedics;  Laterality: Right;    KNEE SURGERY      LUMBAR EPIDURAL INJECTION      OOPHORECTOMY Left     LSO    RADIAL NERVE Right     RECONSTRUCTION OF MEDIAL PATELLOFEMORAL LIGAMENT OF RIGHT KNEE Right 10/25/2019    Procedure: RECONSTRUCTION, LIGAMENT, MEDIAL PATELLOFEMORAL, RIGHT;  Surgeon: Feliberto Cardenas MD;  Location: [facility] OR;  Service: Orthopedics;  Laterality: Right;    SINUS SURGERY      UPPER GASTROINTESTINAL ENDOSCOPY  11/27/2019    Dr. Blackwell; mild schatzki ring-dilated; gastritis; bx in process       Current Outpatient Medications   Medication Sig    AIMOVIG AUTOINJECTOR 140 mg/mL AtIn     amitriptyline (ELAVIL) 25 MG tablet TAKE ONE TABLET BY MOUTH EVERY NIGHT FOR 2 WEEKS THEN INCREASE TO 2 TABLETS AT BEDTIME    cetirizine (ZYRTEC) 5 MG tablet Take 5 mg by mouth as needed.     cyanocobalamin, vitamin B-12, 1,000 mcg/mL Kit Inject 1 mL into the muscle every 21 days.    enoxaparin (LOVENOX) 120 mg/0.8 mL Syrg Inject 0.7 mLs (105 mg total) into the skin every 12 (twelve) hours. for 14 days    erenumab-aooe 140 mg/mL AtIn Inject 140 mg into the skin every 28 days.    fluoxetine (PROZAC) 20 MG capsule every evening.     HYDROmorphone (DILAUDID) 2 MG tablet Take 1 tablet (2 mg total) by mouth every 4 (four) hours as needed for Pain.    hydrOXYzine pamoate (VISTARIL) 25 MG Cap TAKE ONE CAPSULE BY MOUTH 3 TIMES A DAY AS NEEDED FOR ANXIETY    lorazepam (ATIVAN) 0.5 MG tablet Take 0.5 mg by mouth every 4 (four) hours as needed.     oxyCODONE (OXYCONTIN) 10 mg 12 hr tablet Take 10 mg by mouth every 12 (twelve) hours.    oxyCODONE-acetaminophen (PERCOCET) 5-325 mg per tablet Take 1 tablet by mouth every 6 (six) hours as needed.    promethazine (PHENERGAN) 25 MG tablet Take 1 tablet (25 mg total) by mouth every 6 (six) hours as needed for Nausea. Take 1 tablet by mouth every 6 (six) hours as needed (migraine). -MAY CAUSE DROWSINESS-     tiZANidine (ZANAFLEX) 4 MG tablet Take 4 mg by mouth every 6 (six) hours as needed.    pantoprazole (PROTONIX) 40 MG tablet Take 1 tablet (40 mg total) by mouth 2 (two) times daily.     No current facility-administered medications for this visit.      Facility-Administered Medications Ordered in Other Visits   Medication    lactated ringers infusion    lidocaine (PF) 10 mg/ml (1%) injection 10 mg       Review of patient's allergies indicates:   Allergen Reactions    Clarithromycin Swelling and Other (See Comments)     DIFFICULTY SWALLOWING  DIFFICULTY SWALLOWING    Pcn [penicillins] Anaphylaxis    Sulfa (sulfonamide antibiotics) Hives    Wellbutrin [bupropion hcl] Shortness Of Breath and Anaphylaxis    Betadine [povidone-iodine] Hives     Swelling      Cefprozil Hives    Iodinated contrast media Hives    Latex, natural rubber Rash    Sulfamethoxazole-trimethoprim Nausea And Vomiting    Iodine Hives and Swelling    Latex Hives and Swelling       Family History   Problem Relation Age of Onset    Heart disease Mother     Heart disease Father     Esophageal cancer Maternal Grandmother     Breast cancer Maternal Aunt 46    Colon cancer Neg Hx     Stomach cancer Neg Hx     Ovarian cancer Neg Hx        Social History     Socioeconomic History    Marital status:      Spouse name: Not on file    Number of children: Not on file    Years of education: Not on file    Highest education level: Not on file   Occupational History    Not on file   Social Needs    Financial resource strain: Not on file    Food insecurity     Worry: Not on file     Inability: Not on file    Transportation needs     Medical: Not on file     Non-medical: Not on file   Tobacco Use    Smoking status: Current Every Day Smoker     Packs/day: 0.25     Years: 20.00     Pack years: 5.00     Types: Cigarettes     Last attempt to quit: 2018     Years since quittin.6    Smokeless tobacco: Never Used   Substance and  Sexual Activity    Alcohol use: Yes     Alcohol/week: 0.0 standard drinks     Comment: occasionally    Drug use: Not Currently     Types: Other-see comments     Comment: no    Sexual activity: Yes     Partners: Male   Lifestyle    Physical activity     Days per week: Not on file     Minutes per session: Not on file    Stress: Not on file   Relationships    Social connections     Talks on phone: Not on file     Gets together: Not on file     Attends Sabianism service: Not on file     Active member of club or organization: Not on file     Attends meetings of clubs or organizations: Not on file     Relationship status: Not on file   Other Topics Concern    Not on file   Social History Narrative    Not on file       Chief Complaint:   Chief Complaint   Patient presents with    Post-op Evaluation     s/p right knee TKA 6/16/20        Date of surgery:  June 16, 2020    History of present illness:  40-year-old female underwent right total knee arthroplasty.  Patient has had some complications with clotting issues in the past despite being on anticoagulation.  Having a lot of pain in her ankle.  This is limiting her ability to walk and do her therapy.  We tried putting her in a boot which has helped some.  Pain is a 9/10.      Review of Systems:    Musculoskeletal:  See HPI        Physical Examination:    Vital Signs:    Vitals:    07/09/20 1054   Temp: 97.5 °F (36.4 °C)       Body mass index is 33.91 kg/m².    This a well-developed, well nourished patient in no acute distress.  They are alert and oriented and cooperative to examination.  Pt. walks with a moderate antalgic gait    Examination the right knee shows well-healed surgical incision.  No erythema or drainage.  Patient has range of motion is about 20° to 90°.  A lot of pain and bruising in her ankle.  Significantly tender diffusely around the ankle.    X-rays:  X-rays of the right knee are ordered and reviewed which show well-aligned total knee  arthroplasty components without complication     Assessment::  Status post right total knee arthroplasty  Right ankle pain after surgery    Plan:  I reviewed the findings with her today.  I am going to get her in with Dr. Nuno tomorrow to evaluate her ankle further and see if there is something else that we can do to make her more comfortable so that she can start doing a little more in physical therapy.  I am worried she is going to get stiff by not being able to walk.  I encouraged her to continue to work particularly on extension of her her right knee.  She sees Hematology again today to try and figure out why she keeps having clots and blockages despite being on medication.    This note was created using [employer] voice recognition software that occasionally misinterpreted phrases or words.

## 2020-07-10 ENCOUNTER — OFFICE VISIT (OUTPATIENT)
Dept: ORTHOPEDICS | Facility: CLINIC | Age: 40
End: 2020-07-10
Payer: COMMERCIAL

## 2020-07-10 ENCOUNTER — HOSPITAL ENCOUNTER (OUTPATIENT)
Dept: RADIOLOGY | Facility: HOSPITAL | Age: 40
Discharge: HOME | End: 2020-07-10
Attending: ORTHOPAEDIC SURGERY
Payer: COMMERCIAL

## 2020-07-10 VITALS
DIASTOLIC BLOOD PRESSURE: 56 MMHG | HEART RATE: 75 BPM | SYSTOLIC BLOOD PRESSURE: 126 MMHG | HEIGHT: 72 IN | WEIGHT: 237.88 LBS | BODY MASS INDEX: 32.22 KG/M2 | TEMPERATURE: 98 F

## 2020-07-10 DIAGNOSIS — M19.071 ARTHRITIS OF RIGHT ANKLE: Primary | ICD-10-CM

## 2020-07-10 DIAGNOSIS — M25.571 ACUTE RIGHT ANKLE PAIN: Primary | ICD-10-CM

## 2020-07-10 DIAGNOSIS — M25.571 ACUTE RIGHT ANKLE PAIN: ICD-10-CM

## 2020-07-10 PROCEDURE — 20605 INTERMEDIATE JOINT ASPIRATION/INJECTION: R ANKLE: ICD-10-PCS | Mod: RT,S$GLB,, | Performed by: ORTHOPAEDIC SURGERY

## 2020-07-10 PROCEDURE — 99203 OFFICE O/P NEW LOW 30 MIN: CPT | Mod: 25,S$GLB,, | Performed by: ORTHOPAEDIC SURGERY

## 2020-07-10 PROCEDURE — 73610 X-RAY EXAM OF ANKLE: CPT | Mod: TC,PN,RT

## 2020-07-10 PROCEDURE — 99999 PR PBB SHADOW E&M-EST. PATIENT-LVL IV: ICD-10-PCS | Mod: PBBFAC,,, | Performed by: ORTHOPAEDIC SURGERY

## 2020-07-10 PROCEDURE — 99203 PR OFFICE/OUTPT VISIT, NEW, LEVL III, 30-44 MIN: ICD-10-PCS | Mod: 25,S$GLB,, | Performed by: ORTHOPAEDIC SURGERY

## 2020-07-10 PROCEDURE — 99999 PR PBB SHADOW E&M-EST. PATIENT-LVL IV: CPT | Mod: PBBFAC,,, | Performed by: ORTHOPAEDIC SURGERY

## 2020-07-10 PROCEDURE — 20605 DRAIN/INJ JOINT/BURSA W/O US: CPT | Mod: RT,S$GLB,, | Performed by: ORTHOPAEDIC SURGERY

## 2020-07-10 PROCEDURE — 73610 X-RAY EXAM OF ANKLE: CPT | Mod: 26,RT,, | Performed by: RADIOLOGY

## 2020-07-10 PROCEDURE — 73610 XR ANKLE COMPLETE 3 VIEW RIGHT: ICD-10-PCS | Mod: 26,RT,, | Performed by: RADIOLOGY

## 2020-07-10 RX ORDER — TRIAMCINOLONE ACETONIDE 40 MG/ML
40 INJECTION, SUSPENSION INTRA-ARTICULAR; INTRAMUSCULAR
Status: DISCONTINUED | OUTPATIENT
Start: 2020-07-10 | End: 2020-07-10 | Stop reason: HOSPADM

## 2020-07-10 RX ADMIN — TRIAMCINOLONE ACETONIDE 40 MG: 40 INJECTION, SUSPENSION INTRA-ARTICULAR; INTRAMUSCULAR at 09:07

## 2020-07-10 NOTE — LETTER
July 10, 2020      Feliberto Cardenas MD  [address] [place] [address] [address]  [place] [id number]           [facility]  [address] [place] [address] [address]  [place] [id number]  Phone: [phone]          Patient: Aleyda Costa   MR Number: [id number]   YOB: 1980   Date of Visit: 7/10/2020       Dear Dr. Feliberto Cardenas:    Thank you for referring Aleyda Costa to me for evaluation. Attached you will find relevant portions of my assessment and plan of care.    If you have questions, please do not hesitate to call me. I look forward to following Aleyda Costa along with you.    Sincerely,    Jesus Alberto Nuno MD    Enclosure  CC:  No Recipients    If you would like to receive this communication electronically, please contact [email or web address] or [phone] to request more information on [facility] [place] Link access.    For providers and/or their staff who would like to refer a patient to [facility], please contact us through our one-stop-shop provider referral line, Flory Mayfield, at [phone].    If you feel you have received this communication in error or would no longer like to receive these types of communications, please e-mail [email or web address]
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
## 2020-07-10 NOTE — PROCEDURES
Intermediate Joint Aspiration/Injection: R ankle    Date/Time: 7/10/2020 9:00 AM  Performed by: Jesus Alberto Nuno MD  Authorized by: Jesus Alberto Nuno MD     Consent Done?: Yes (Verbal)  Indications: Pain and joint swelling  Site marked: The procedure site was marked      Location:  Ankle  Site:  R ankle  Prep: Patient was prepped and draped in usual sterile fashion    Needle size:  22 G  Approach:  Anterolateral  Medications:  40 mg triamcinolone acetonide 40 mg/mL  Patient tolerance:  Patient tolerated the procedure well with no immediate complications

## 2020-07-14 ENCOUNTER — OFFICE VISIT (OUTPATIENT)
Dept: HEMATOLOGY/ONCOLOGY | Facility: CLINIC | Age: 40
End: 2020-07-14
Payer: COMMERCIAL

## 2020-07-14 VITALS
TEMPERATURE: 97 F | BODY MASS INDEX: 32.11 KG/M2 | HEART RATE: 72 BPM | WEIGHT: 236.75 LBS | SYSTOLIC BLOOD PRESSURE: 110 MMHG | RESPIRATION RATE: 18 BRPM | DIASTOLIC BLOOD PRESSURE: 59 MMHG | OXYGEN SATURATION: 97 %

## 2020-07-14 DIAGNOSIS — Z96.651 S/P TOTAL KNEE ARTHROPLASTY, RIGHT: Primary | ICD-10-CM

## 2020-07-14 DIAGNOSIS — I82.551 CHRONIC DEEP VEIN THROMBOSIS (DVT) OF RIGHT PERONEAL VEIN: ICD-10-CM

## 2020-07-14 DIAGNOSIS — Z79.01 MONITORING FOR ANTICOAGULANT USE: ICD-10-CM

## 2020-07-14 DIAGNOSIS — T14.8XXA: ICD-10-CM

## 2020-07-14 DIAGNOSIS — Z79.02 PLATELET INHIBITION DUE TO PLAVIX: ICD-10-CM

## 2020-07-14 DIAGNOSIS — Z51.81 MONITORING FOR ANTICOAGULANT USE: ICD-10-CM

## 2020-07-14 DIAGNOSIS — R60.9 EDEMA, UNSPECIFIED TYPE: ICD-10-CM

## 2020-07-14 PROCEDURE — 1125F PR PAIN SEVERITY QUANTIFIED, PAIN PRESENT: ICD-10-PCS | Mod: S$GLB,,, | Performed by: INTERNAL MEDICINE

## 2020-07-14 PROCEDURE — 99215 PR OFFICE/OUTPT VISIT, EST, LEVL V, 40-54 MIN: ICD-10-PCS | Mod: S$GLB,,, | Performed by: INTERNAL MEDICINE

## 2020-07-14 PROCEDURE — 99215 OFFICE O/P EST HI 40 MIN: CPT | Mod: S$GLB,,, | Performed by: INTERNAL MEDICINE

## 2020-07-14 PROCEDURE — 99999 PR PBB SHADOW E&M-EST. PATIENT-LVL IV: ICD-10-PCS | Mod: PBBFAC,,, | Performed by: INTERNAL MEDICINE

## 2020-07-14 PROCEDURE — 3008F BODY MASS INDEX DOCD: CPT | Mod: CPTII,S$GLB,, | Performed by: INTERNAL MEDICINE

## 2020-07-14 PROCEDURE — 3008F PR BODY MASS INDEX (BMI) DOCUMENTED: ICD-10-PCS | Mod: CPTII,S$GLB,, | Performed by: INTERNAL MEDICINE

## 2020-07-14 PROCEDURE — 99999 PR PBB SHADOW E&M-EST. PATIENT-LVL IV: CPT | Mod: PBBFAC,,, | Performed by: INTERNAL MEDICINE

## 2020-07-14 PROCEDURE — 1125F AMNT PAIN NOTED PAIN PRSNT: CPT | Mod: S$GLB,,, | Performed by: INTERNAL MEDICINE

## 2020-07-14 RX ORDER — DABIGATRAN ETEXILATE 75 MG/1
75 CAPSULE ORAL 2 TIMES DAILY
Qty: 60 CAPSULE | Refills: 1 | Status: ON HOLD | OUTPATIENT
Start: 2020-07-14 | End: 2020-07-20 | Stop reason: HOSPADM

## 2020-07-14 NOTE — PROGRESS NOTES
CC Can I stop loveniox   Aleyda Costa is a 40 y.o.    Pt had undergone a total left knee replacement in November 2018 when a few days later she presented to ER with increasing SOB. Workup revealed bilateral PE. SHe was placed on xarelto. SHe had an ultrasound of legs which was negative.Repeat CTA revealed no resolution of PE when she saw me. I changed her to pradaxa. She is here to review repeat CTA . She mistakenly quit taking pradaxa a few weeks ago. She has no SOB or pain . No swelling of her legs   Pt had a gastric sleeve in 2014.   She is here for results of hyper coag workup in order for her to proceed with Right knee replacement     Hexagonal test negative   CT chest hiatal hernia was reported as a paraesophageal mass   Spoke with radiologist       July 13 2020   Post fracture right   Lovenox     Past Medical History:   Diagnosis Date    Abnormal Pap smear of cervix     Arthritis     OA    Back pain     Cancer     skin cancer to back    Depression     Endometriosis     Full dentures     GERD (gastroesophageal reflux disease)     Migraine     Pulmonary embolism 2018    Seizures     Sleep apnea     Does not use c-pap since weight loss - 170 lbs    Uterine fibroid     Wears glasses    She remains on Prozac for mood and B12 to help with energy      Current Outpatient Medications:     AIMOVIG AUTOINJECTOR 140 mg/mL AtIn, , Disp: , Rfl:     amitriptyline (ELAVIL) 25 MG tablet, TAKE ONE TABLET BY MOUTH EVERY NIGHT FOR 2 WEEKS THEN INCREASE TO 2 TABLETS AT BEDTIME, Disp: , Rfl:     atorvastatin (LIPITOR) 40 MG tablet, , Disp: , Rfl:     cetirizine (ZYRTEC) 5 MG tablet, Take 5 mg by mouth as needed. , Disp: , Rfl:     clopidogreL (PLAVIX) 75 mg tablet, , Disp: , Rfl:     cyanocobalamin, vitamin B-12, 1,000 mcg/mL Kit, Inject 1 mL into the muscle every 21 days., Disp: , Rfl:     dicyclomine (BENTYL) 10 MG capsule, Take 10 mg by mouth., Disp: , Rfl:     enoxaparin (LOVENOX) 120 mg/0.8 mL  Syrg, Inject 0.7 mLs (105 mg total) into the skin every 12 (twelve) hours. for 14 days, Disp: 19.6 mL, Rfl: 0    erenumab-aooe 140 mg/mL AtIn, Inject 140 mg into the skin every 28 days., Disp: 1 mL, Rfl: 11    fluoxetine (PROZAC) 20 MG capsule, every evening. , Disp: , Rfl: 3    hydrOXYzine pamoate (VISTARIL) 25 MG Cap, TAKE ONE CAPSULE BY MOUTH 3 TIMES A DAY AS NEEDED FOR ANXIETY, Disp: , Rfl:     lorazepam (ATIVAN) 0.5 MG tablet, Take 0.5 mg by mouth every 4 (four) hours as needed. , Disp: , Rfl:     oxyCODONE (OXYCONTIN) 10 mg 12 hr tablet, Take 10 mg by mouth every 12 (twelve) hours., Disp: , Rfl:     oxyCODONE-acetaminophen (PERCOCET) 5-325 mg per tablet, Take 1 tablet by mouth every 6 (six) hours as needed., Disp: 40 tablet, Rfl: 0    pantoprazole (PROTONIX) 40 MG tablet, Take 1 tablet (40 mg total) by mouth 2 (two) times daily., Disp: 60 tablet, Rfl: 1    promethazine (PHENERGAN) 25 MG tablet, Take 1 tablet (25 mg total) by mouth every 6 (six) hours as needed for Nausea. Take 1 tablet by mouth every 6 (six) hours as needed (migraine). -MAY CAUSE DROWSINESS-, Disp: 20 tablet, Rfl: 3    tiZANidine (ZANAFLEX) 4 MG tablet, Take 4 mg by mouth every 6 (six) hours as needed., Disp: , Rfl:     HYDROmorphone (DILAUDID) 2 MG tablet, Take 1 tablet (2 mg total) by mouth every 4 (four) hours as needed for Pain. (Patient not taking: Reported on 7/10/2020), Disp: 40 tablet, Rfl: 0  No current facility-administered medications for this visit.     Facility-Administered Medications Ordered in Other Visits:     lactated ringers infusion, , Intravenous, Continuous, Anjel Hernandez MD, Stopped at 10/25/19 1115    lidocaine (PF) 10 mg/ml (1%) injection 10 mg, 1 mL, Intradermal, Once, Anjel Hernandez MD    Social History     Tobacco Use    Smoking status: Current Every Day Smoker     Packs/day: 0.25     Years: 20.00     Pack years: 5.00     Types: Cigarettes     Last attempt to quit: 11/13/2018     Years since  quittin.6    Smokeless tobacco: Never Used   Substance Use Topics    Alcohol use: Yes     Alcohol/week: 0.0 standard drinks     Comment: occasionally    Drug use: Not Currently     Types: Other-see comments     Comment: no     Family History   Problem Relation Age of Onset    Heart disease Mother     Heart disease Father     Esophageal cancer Maternal Grandmother     Breast cancer Maternal Aunt 46    Colon cancer Neg Hx     Stomach cancer Neg Hx     Ovarian cancer Neg Hx        CONSTITUTIONAL: No fevers, chills, night sweats, wt. loss, appetite changes  SKIN: no rashes or itching  ENT: No headaches, head trauma,or eye pain , no discharge  LYMPH NODES: None noticed   CV: No chest pain, palpitations.   RESP: No dyspnea on exertion, cough, wheezing, or hemoptysis  GI:  Intermittent nausea, no emesis, diarrhea, constipation, melena,++ hematoma in abdomen due to lovenox   : No dysuria, hematuria, urgency, or frequency   HEME: No easy bruising, bleeding problems  PSYCHIATRIC: No depression, anxiety, hallucinations.  NEURO: No seizures, memory loss, dizziness           Vitals:    20 0854   BP: (!) 110/59   Pulse: 72   Resp: 18   Temp: 97.3 °F (36.3 °C)       Height / weight / VS reviewed  GENERAL.: Well-developed, well-nourished, in no acute distress  PSYCH: pleasant affect, no anxiety, no depression  HEENT: Normocephalic, lids intact, conjunctiva pink, Normal auricula, nasal septum, dentition,  OP clear,no palatal pallor  NECK: Supple,trachea midline, no palpable abnormalities  LYMPHATICS: No cervical or axillary adenopathy  RESPIRATORY: CTAB, no wheezes, rubs or rhonchi  Good respiratory effort without any retractions or diaphragmatic movement  CARDIOVASCULAR: no JVD, S1 / S2 with RRR, no murmur, no rub,2+ capillary refill  ABDOMEN: NTND, normal bowel sounds, no palpable HSM or mass  EXTREMITIES: No cyanosis, no clubbing, no edema, no joint effusion  NEUROLOGICAL: alert and oriented x 3, cranial  nerves grossly intact  SKIN: Warm, dry, no rash or lesions noted   Boot right fgoot   Edema evident       Lab Results   Component Value Date    WBC 14.07 (H) 06/30/2020    HGB 10.6 (L) 06/30/2020    HCT 32.6 (L) 06/30/2020    MCV 93 06/30/2020     06/30/2020     CMP  Sodium   Date Value Ref Range Status   06/30/2020 140 136 - 145 mmol/L Final     Potassium   Date Value Ref Range Status   06/30/2020 3.3 (L) 3.5 - 5.1 mmol/L Final     Chloride   Date Value Ref Range Status   06/30/2020 104 95 - 110 mmol/L Final     CO2   Date Value Ref Range Status   06/30/2020 24 23 - 29 mmol/L Final     Glucose   Date Value Ref Range Status   06/30/2020 108 70 - 110 mg/dL Final     BUN, Bld   Date Value Ref Range Status   06/30/2020 16 6 - 20 mg/dL Final     Creatinine   Date Value Ref Range Status   06/30/2020 0.7 0.5 - 1.4 mg/dL Final     Calcium   Date Value Ref Range Status   06/30/2020 8.4 (L) 8.7 - 10.5 mg/dL Final     Total Protein   Date Value Ref Range Status   06/27/2020 7.6 6.0 - 8.4 g/dL Final     Albumin   Date Value Ref Range Status   06/27/2020 3.4 (L) 3.5 - 5.2 g/dL Final     Total Bilirubin   Date Value Ref Range Status   06/27/2020 0.6 0.1 - 1.0 mg/dL Final     Comment:     For infants and newborns, interpretation of results should be based  on gestational age, weight and in agreement with clinical  observations.  Premature Infant recommended reference ranges:  Up to 24 hours.............<8.0 mg/dL  Up to 48 hours............<12.0 mg/dL  3-5 days..................<15.0 mg/dL  6-29 days.................<15.0 mg/dL       Alkaline Phosphatase   Date Value Ref Range Status   06/27/2020 87 55 - 135 U/L Final     AST   Date Value Ref Range Status   06/27/2020 20 10 - 40 U/L Final     ALT   Date Value Ref Range Status   06/27/2020 14 10 - 44 U/L Final     Anion Gap   Date Value Ref Range Status   06/30/2020 12 8 - 16 mmol/L Final     eGFR if    Date Value Ref Range Status   06/30/2020 >60.0 >60  "mL/min/1.73 m^2 Final     eGFR if non    Date Value Ref Range Status   06/30/2020 >60.0 >60 mL/min/1.73 m^2 Final     Comment:     Calculation used to obtain the estimated glomerular filtration  rate (eGFR) is the CKD-EPI equation.      reviewed scans   No pain  No fall risk   Repeat labs negative    S/P [place] cemented CR total knee arthroplasty, right 6/16/20    Chronic deep vein thrombosis (DVT) of right peroneal vein  -     dabigatran etexilate (PRADAXA) 75 mg Cap; Take 1 capsule (75 mg total) by mouth 2 (two) times daily. "Do NOT break, chew, or open capsules."  Dispense: 60 capsule; Refill: 1  -     CBC auto differential; Standing  -     CMP; Future; Expected date: 07/14/2020    Fractured bone    Edema, unspecified type  -     CBC auto differential; Standing  -     CMP; Future; Expected date: 07/14/2020    Platelet inhibition due to Plavix  -     dabigatran etexilate (PRADAXA) 75 mg Cap; Take 1 capsule (75 mg total) by mouth 2 (two) times daily. "Do NOT break, chew, or open capsules."  Dispense: 60 capsule; Refill: 1  -     CBC auto differential; Standing  -     CMP; Future; Expected date: 07/14/2020    Monitoring for anticoagulant use  -     dabigatran etexilate (PRADAXA) 75 mg Cap; Take 1 capsule (75 mg total) by mouth 2 (two) times daily. "Do NOT break, chew, or open capsules."  Dispense: 60 capsule; Refill: 1  -     CBC auto differential; Standing  -     CMP; Future; Expected date: 07/14/2020       No need to repeat US just yet   Likely repeat in one month   subcarinal adenopathy will repeat ct chest   bariartric patient : explained proper hydration  DC lovenox  Start pradaxa asap and continue until ankle stabilizer is removed   RTC 1 month   No further ankle injections   No further steroid injections  Do not ibuprofen , nSAIDS alleve , aspirin   Proper hydration encouraged with electrolytes after and before surgery  Once recovered will need bone density scan   Unable to use knee walker " due to pian from prior knee replacement on left   She has no antiphospholipid syndrome or lupus anticoagulant. She must stay hydrated : she is at risk of clotting if she remains dehydrated post gastric bypass.   She is to return to her pcp then Dr Nicolas   Ankle Dr Morse post injection   Given the growth of the subcarinal lymph node from 1 cm to 1.6 cm , she needs a repeat CT chest with contrast in 4 months; history tobacco use   Explained she could see  Surgeon to discuss options for hiatal hernia repair since se is symptomatic at times   Keep right leg elevated at home   Start pradaxa ONLY 75 mg po bid as she is on plavix and aspirin because of cardiac issues  Unable to have cardiac stent due to location of blockage     SHE did not fail pradaxa       Thank you for allowing me to evaluate and participate in the care of this pleasant patient. Please fell free to call me with any questions or concerns.    Rabia Mcgowan MD    This note was dictated with Dragon and briefly proofread. Please excuse any sentences that may be unclear or words misspelled

## 2020-07-15 ENCOUNTER — TELEPHONE (OUTPATIENT)
Dept: HEMATOLOGY/ONCOLOGY | Facility: CLINIC | Age: 40
End: 2020-07-15

## 2020-07-15 NOTE — TELEPHONE ENCOUNTER
Portal message sent informing pt of f/up appts.     ----- Message from Jodee Ding RN sent at 7/15/2020  9:18 AM CDT -----    ----- Message -----  From: Rabia Pina MD  Sent: 7/14/2020   9:54 AM CDT  To: Jodee Ding RN    Rtc 1 month with cbc and cmp

## 2020-07-16 ENCOUNTER — HOSPITAL ENCOUNTER (OUTPATIENT)
Facility: HOSPITAL | Age: 40
Discharge: HOME | End: 2020-07-20
Attending: EMERGENCY MEDICINE | Admitting: INTERNAL MEDICINE
Payer: COMMERCIAL

## 2020-07-16 DIAGNOSIS — M25.569 KNEE PAIN: ICD-10-CM

## 2020-07-16 DIAGNOSIS — I82.401 ACUTE DEEP VEIN THROMBOSIS (DVT) OF RIGHT LOWER EXTREMITY, UNSPECIFIED VEIN: ICD-10-CM

## 2020-07-16 DIAGNOSIS — F17.200 NICOTINE DEPENDENCE, UNCOMPLICATED, UNSPECIFIED NICOTINE PRODUCT TYPE: Primary | ICD-10-CM

## 2020-07-16 DIAGNOSIS — M17.11 PATELLOFEMORAL ARTHRITIS OF RIGHT KNEE: ICD-10-CM

## 2020-07-16 DIAGNOSIS — M17.11 ARTHRITIS OF RIGHT KNEE: ICD-10-CM

## 2020-07-16 LAB
BASOPHILS # BLD AUTO: 0.04 K/UL (ref 0–0.2)
BASOPHILS NFR BLD: 0.4 % (ref 0–1.9)
DIFFERENTIAL METHOD: NORMAL
EOSINOPHIL # BLD AUTO: 0.2 K/UL (ref 0–0.5)
EOSINOPHIL NFR BLD: 1.6 % (ref 0–8)
ERYTHROCYTE [DISTWIDTH] IN BLOOD BY AUTOMATED COUNT: 14.2 % (ref 11.5–14.5)
HCT VFR BLD AUTO: 39.4 % (ref 37–48.5)
HGB BLD-MCNC: 13.1 G/DL (ref 12–16)
IMM GRANULOCYTES # BLD AUTO: 0.04 K/UL (ref 0–0.04)
IMM GRANULOCYTES NFR BLD AUTO: 0.4 % (ref 0–0.5)
LYMPHOCYTES # BLD AUTO: 2.8 K/UL (ref 1–4.8)
LYMPHOCYTES NFR BLD: 30.1 % (ref 18–48)
MCH RBC QN AUTO: 31 PG (ref 27–31)
MCHC RBC AUTO-ENTMCNC: 33.2 G/DL (ref 32–36)
MCV RBC AUTO: 93 FL (ref 82–98)
MONOCYTES # BLD AUTO: 0.9 K/UL (ref 0.3–1)
MONOCYTES NFR BLD: 9.7 % (ref 4–15)
NEUTROPHILS # BLD AUTO: 5.3 K/UL (ref 1.8–7.7)
NEUTROPHILS NFR BLD: 57.8 % (ref 38–73)
NRBC BLD-RTO: 0 /100 WBC
PLATELET # BLD AUTO: 267 K/UL (ref 150–350)
PMV BLD AUTO: 9.9 FL (ref 9.2–12.9)
RBC # BLD AUTO: 4.22 M/UL (ref 4–5.4)
WBC # BLD AUTO: 9.26 K/UL (ref 3.9–12.7)

## 2020-07-16 PROCEDURE — 36415 COLL VENOUS BLD VENIPUNCTURE: CPT

## 2020-07-16 PROCEDURE — U0002 COVID-19 LAB TEST NON-CDC: HCPCS

## 2020-07-16 PROCEDURE — 99285 EMERGENCY DEPT VISIT HI MDM: CPT | Mod: 25

## 2020-07-16 PROCEDURE — 80048 BASIC METABOLIC PNL TOTAL CA: CPT

## 2020-07-16 PROCEDURE — G0378 HOSPITAL OBSERVATION PER HR: HCPCS

## 2020-07-16 PROCEDURE — 83735 ASSAY OF MAGNESIUM: CPT

## 2020-07-16 PROCEDURE — 85025 COMPLETE CBC W/AUTO DIFF WBC: CPT

## 2020-07-16 RX ORDER — MORPHINE SULFATE 2 MG/ML
6 INJECTION, SOLUTION INTRAMUSCULAR; INTRAVENOUS ONCE
Status: COMPLETED | OUTPATIENT
Start: 2020-07-17 | End: 2020-07-17

## 2020-07-16 RX ORDER — ENOXAPARIN SODIUM 100 MG/ML
1 INJECTION SUBCUTANEOUS
Status: COMPLETED | OUTPATIENT
Start: 2020-07-16 | End: 2020-07-17

## 2020-07-17 LAB
ANION GAP SERPL CALC-SCNC: 11 MMOL/L (ref 8–16)
ANION GAP SERPL CALC-SCNC: 11 MMOL/L (ref 8–16)
BASOPHILS # BLD AUTO: 0.04 K/UL (ref 0–0.2)
BASOPHILS NFR BLD: 0.5 % (ref 0–1.9)
BUN SERPL-MCNC: 8 MG/DL (ref 6–20)
BUN SERPL-MCNC: 9 MG/DL (ref 6–20)
CALCIUM SERPL-MCNC: 9.4 MG/DL (ref 8.7–10.5)
CALCIUM SERPL-MCNC: 9.8 MG/DL (ref 8.7–10.5)
CHLORIDE SERPL-SCNC: 103 MMOL/L (ref 95–110)
CHLORIDE SERPL-SCNC: 103 MMOL/L (ref 95–110)
CO2 SERPL-SCNC: 25 MMOL/L (ref 23–29)
CO2 SERPL-SCNC: 25 MMOL/L (ref 23–29)
CREAT SERPL-MCNC: 0.7 MG/DL (ref 0.5–1.4)
CREAT SERPL-MCNC: 0.8 MG/DL (ref 0.5–1.4)
CRP SERPL-MCNC: 8.4 MG/L (ref 0–8.2)
DIFFERENTIAL METHOD: ABNORMAL
EOSINOPHIL # BLD AUTO: 0.2 K/UL (ref 0–0.5)
EOSINOPHIL NFR BLD: 2.4 % (ref 0–8)
ERYTHROCYTE [DISTWIDTH] IN BLOOD BY AUTOMATED COUNT: 14.4 % (ref 11.5–14.5)
EST. GFR  (AFRICAN AMERICAN): >60 ML/MIN/1.73 M^2
EST. GFR  (AFRICAN AMERICAN): >60 ML/MIN/1.73 M^2
EST. GFR  (NON AFRICAN AMERICAN): >60 ML/MIN/1.73 M^2
EST. GFR  (NON AFRICAN AMERICAN): >60 ML/MIN/1.73 M^2
GLUCOSE SERPL-MCNC: 103 MG/DL (ref 70–110)
GLUCOSE SERPL-MCNC: 91 MG/DL (ref 70–110)
HCT VFR BLD AUTO: 37.4 % (ref 37–48.5)
HGB BLD-MCNC: 11.9 G/DL (ref 12–16)
IMM GRANULOCYTES # BLD AUTO: 0.03 K/UL (ref 0–0.04)
IMM GRANULOCYTES NFR BLD AUTO: 0.4 % (ref 0–0.5)
LYMPHOCYTES # BLD AUTO: 2.7 K/UL (ref 1–4.8)
LYMPHOCYTES NFR BLD: 34.4 % (ref 18–48)
MAGNESIUM SERPL-MCNC: 2.1 MG/DL (ref 1.6–2.6)
MAGNESIUM SERPL-MCNC: 2.2 MG/DL (ref 1.6–2.6)
MCH RBC QN AUTO: 29.3 PG (ref 27–31)
MCHC RBC AUTO-ENTMCNC: 31.8 G/DL (ref 32–36)
MCV RBC AUTO: 92 FL (ref 82–98)
MONOCYTES # BLD AUTO: 0.7 K/UL (ref 0.3–1)
MONOCYTES NFR BLD: 9.5 % (ref 4–15)
NEUTROPHILS # BLD AUTO: 4.1 K/UL (ref 1.8–7.7)
NEUTROPHILS NFR BLD: 52.8 % (ref 38–73)
NRBC BLD-RTO: 0 /100 WBC
PLATELET # BLD AUTO: 261 K/UL (ref 150–350)
PMV BLD AUTO: 10.1 FL (ref 9.2–12.9)
POTASSIUM SERPL-SCNC: 3.5 MMOL/L (ref 3.5–5.1)
POTASSIUM SERPL-SCNC: 4.3 MMOL/L (ref 3.5–5.1)
RBC # BLD AUTO: 4.06 M/UL (ref 4–5.4)
SARS-COV-2 RDRP RESP QL NAA+PROBE: NEGATIVE
SODIUM SERPL-SCNC: 139 MMOL/L (ref 136–145)
SODIUM SERPL-SCNC: 139 MMOL/L (ref 136–145)
WBC # BLD AUTO: 7.82 K/UL (ref 3.9–12.7)

## 2020-07-17 PROCEDURE — 63600175 PHARM REV CODE 636 W HCPCS: Performed by: INTERNAL MEDICINE

## 2020-07-17 PROCEDURE — 96374 THER/PROPH/DIAG INJ IV PUSH: CPT

## 2020-07-17 PROCEDURE — 96372 THER/PROPH/DIAG INJ SC/IM: CPT | Mod: 59

## 2020-07-17 PROCEDURE — 83735 ASSAY OF MAGNESIUM: CPT

## 2020-07-17 PROCEDURE — 36415 COLL VENOUS BLD VENIPUNCTURE: CPT

## 2020-07-17 PROCEDURE — 99215 OFFICE O/P EST HI 40 MIN: CPT | Mod: ,,, | Performed by: INTERNAL MEDICINE

## 2020-07-17 PROCEDURE — 96376 TX/PRO/DX INJ SAME DRUG ADON: CPT

## 2020-07-17 PROCEDURE — 63600175 PHARM REV CODE 636 W HCPCS: Performed by: NURSE PRACTITIONER

## 2020-07-17 PROCEDURE — G0378 HOSPITAL OBSERVATION PER HR: HCPCS

## 2020-07-17 PROCEDURE — 86140 C-REACTIVE PROTEIN: CPT

## 2020-07-17 PROCEDURE — 63600175 PHARM REV CODE 636 W HCPCS: Performed by: EMERGENCY MEDICINE

## 2020-07-17 PROCEDURE — 25000003 PHARM REV CODE 250: Performed by: HOSPITALIST

## 2020-07-17 PROCEDURE — S4991 NICOTINE PATCH NONLEGEND: HCPCS | Performed by: NURSE PRACTITIONER

## 2020-07-17 PROCEDURE — 25000003 PHARM REV CODE 250: Performed by: NURSE PRACTITIONER

## 2020-07-17 PROCEDURE — 99215 PR OFFICE/OUTPT VISIT, EST, LEVL V, 40-54 MIN: ICD-10-PCS | Mod: ,,, | Performed by: INTERNAL MEDICINE

## 2020-07-17 PROCEDURE — 63600175 PHARM REV CODE 636 W HCPCS: Performed by: HOSPITALIST

## 2020-07-17 PROCEDURE — 80048 BASIC METABOLIC PNL TOTAL CA: CPT

## 2020-07-17 PROCEDURE — 85025 COMPLETE CBC W/AUTO DIFF WBC: CPT

## 2020-07-17 RX ORDER — FLUOXETINE HYDROCHLORIDE 20 MG/1
20 CAPSULE ORAL NIGHTLY
Status: DISCONTINUED | OUTPATIENT
Start: 2020-07-17 | End: 2020-07-20 | Stop reason: HOSPADM

## 2020-07-17 RX ORDER — AMITRIPTYLINE HYDROCHLORIDE 25 MG/1
25 TABLET, FILM COATED ORAL NIGHTLY
Status: DISCONTINUED | OUTPATIENT
Start: 2020-07-17 | End: 2020-07-20 | Stop reason: HOSPADM

## 2020-07-17 RX ORDER — IBUPROFEN 200 MG
1 TABLET ORAL DAILY
Status: DISCONTINUED | OUTPATIENT
Start: 2020-07-17 | End: 2020-07-17

## 2020-07-17 RX ORDER — TIZANIDINE 4 MG/1
4 TABLET ORAL EVERY 6 HOURS PRN
Status: DISCONTINUED | OUTPATIENT
Start: 2020-07-17 | End: 2020-07-20 | Stop reason: HOSPADM

## 2020-07-17 RX ORDER — GLUCAGON 1 MG
1 KIT INJECTION
Status: DISCONTINUED | OUTPATIENT
Start: 2020-07-17 | End: 2020-07-20 | Stop reason: HOSPADM

## 2020-07-17 RX ORDER — ONDANSETRON 2 MG/ML
4 INJECTION INTRAMUSCULAR; INTRAVENOUS EVERY 6 HOURS PRN
Status: DISCONTINUED | OUTPATIENT
Start: 2020-07-17 | End: 2020-07-20 | Stop reason: HOSPADM

## 2020-07-17 RX ORDER — ATORVASTATIN CALCIUM 40 MG/1
40 TABLET, FILM COATED ORAL NIGHTLY
Status: DISCONTINUED | OUTPATIENT
Start: 2020-07-17 | End: 2020-07-20 | Stop reason: HOSPADM

## 2020-07-17 RX ORDER — POTASSIUM CHLORIDE 20 MEQ/15ML
40 SOLUTION ORAL
Status: DISCONTINUED | OUTPATIENT
Start: 2020-07-17 | End: 2020-07-20 | Stop reason: HOSPADM

## 2020-07-17 RX ORDER — IBUPROFEN 200 MG
1 TABLET ORAL DAILY
Status: DISCONTINUED | OUTPATIENT
Start: 2020-07-17 | End: 2020-07-20 | Stop reason: HOSPADM

## 2020-07-17 RX ORDER — POTASSIUM CHLORIDE 20 MEQ/15ML
60 SOLUTION ORAL
Status: DISCONTINUED | OUTPATIENT
Start: 2020-07-17 | End: 2020-07-20 | Stop reason: HOSPADM

## 2020-07-17 RX ORDER — AMOXICILLIN 250 MG
1 CAPSULE ORAL 2 TIMES DAILY
Status: DISCONTINUED | OUTPATIENT
Start: 2020-07-17 | End: 2020-07-20 | Stop reason: HOSPADM

## 2020-07-17 RX ORDER — LANOLIN ALCOHOL/MO/W.PET/CERES
800 CREAM (GRAM) TOPICAL
Status: DISCONTINUED | OUTPATIENT
Start: 2020-07-17 | End: 2020-07-20 | Stop reason: HOSPADM

## 2020-07-17 RX ORDER — CLOPIDOGREL BISULFATE 75 MG/1
75 TABLET ORAL DAILY
Status: DISCONTINUED | OUTPATIENT
Start: 2020-07-17 | End: 2020-07-20 | Stop reason: HOSPADM

## 2020-07-17 RX ORDER — TALC
6 POWDER (GRAM) TOPICAL NIGHTLY PRN
Status: DISCONTINUED | OUTPATIENT
Start: 2020-07-17 | End: 2020-07-20 | Stop reason: HOSPADM

## 2020-07-17 RX ORDER — HYDROMORPHONE HYDROCHLORIDE 2 MG/1
2 TABLET ORAL
Status: DISCONTINUED | OUTPATIENT
Start: 2020-07-17 | End: 2020-07-20 | Stop reason: HOSPADM

## 2020-07-17 RX ORDER — HYDROXYZINE PAMOATE 25 MG/1
25 CAPSULE ORAL EVERY 8 HOURS PRN
Status: DISCONTINUED | OUTPATIENT
Start: 2020-07-17 | End: 2020-07-20 | Stop reason: HOSPADM

## 2020-07-17 RX ORDER — ACETAMINOPHEN 325 MG/1
650 TABLET ORAL EVERY 4 HOURS PRN
Status: DISCONTINUED | OUTPATIENT
Start: 2020-07-17 | End: 2020-07-20 | Stop reason: HOSPADM

## 2020-07-17 RX ORDER — MORPHINE SULFATE 4 MG/ML
4 INJECTION, SOLUTION INTRAMUSCULAR; INTRAVENOUS EVERY 4 HOURS PRN
Status: DISCONTINUED | OUTPATIENT
Start: 2020-07-17 | End: 2020-07-17

## 2020-07-17 RX ORDER — ENOXAPARIN SODIUM 100 MG/ML
1 INJECTION SUBCUTANEOUS
Status: DISCONTINUED | OUTPATIENT
Start: 2020-07-17 | End: 2020-07-20 | Stop reason: HOSPADM

## 2020-07-17 RX ORDER — PANTOPRAZOLE SODIUM 40 MG/1
40 TABLET, DELAYED RELEASE ORAL 2 TIMES DAILY
Status: DISCONTINUED | OUTPATIENT
Start: 2020-07-17 | End: 2020-07-20 | Stop reason: HOSPADM

## 2020-07-17 RX ORDER — IBUPROFEN 200 MG
24 TABLET ORAL
Status: DISCONTINUED | OUTPATIENT
Start: 2020-07-17 | End: 2020-07-20 | Stop reason: HOSPADM

## 2020-07-17 RX ORDER — IBUPROFEN 200 MG
16 TABLET ORAL
Status: DISCONTINUED | OUTPATIENT
Start: 2020-07-17 | End: 2020-07-20 | Stop reason: HOSPADM

## 2020-07-17 RX ORDER — SODIUM CHLORIDE 0.9 % (FLUSH) 0.9 %
10 SYRINGE (ML) INJECTION
Status: DISCONTINUED | OUTPATIENT
Start: 2020-07-17 | End: 2020-07-20 | Stop reason: HOSPADM

## 2020-07-17 RX ORDER — MORPHINE SULFATE 4 MG/ML
4 INJECTION, SOLUTION INTRAMUSCULAR; INTRAVENOUS EVERY 4 HOURS PRN
Status: DISCONTINUED | OUTPATIENT
Start: 2020-07-17 | End: 2020-07-18

## 2020-07-17 RX ADMIN — ATORVASTATIN CALCIUM 40 MG: 40 TABLET, FILM COATED ORAL at 08:07

## 2020-07-17 RX ADMIN — AMITRIPTYLINE HYDROCHLORIDE 25 MG: 25 TABLET, FILM COATED ORAL at 02:07

## 2020-07-17 RX ADMIN — ATORVASTATIN CALCIUM 40 MG: 40 TABLET, FILM COATED ORAL at 02:07

## 2020-07-17 RX ADMIN — NICOTINE 1 PATCH: 14 PATCH, EXTENDED RELEASE TRANSDERMAL at 06:07

## 2020-07-17 RX ADMIN — FLUOXETINE 20 MG: 20 CAPSULE ORAL at 08:07

## 2020-07-17 RX ADMIN — MORPHINE SULFATE 4 MG: 4 INJECTION, SOLUTION INTRAMUSCULAR; INTRAVENOUS at 05:07

## 2020-07-17 RX ADMIN — FLUOXETINE 20 MG: 20 CAPSULE ORAL at 02:07

## 2020-07-17 RX ADMIN — AMITRIPTYLINE HYDROCHLORIDE 25 MG: 25 TABLET, FILM COATED ORAL at 08:07

## 2020-07-17 RX ADMIN — ENOXAPARIN SODIUM 100 MG: 100 INJECTION SUBCUTANEOUS at 10:07

## 2020-07-17 RX ADMIN — MORPHINE SULFATE 4 MG: 4 INJECTION, SOLUTION INTRAMUSCULAR; INTRAVENOUS at 12:07

## 2020-07-17 RX ADMIN — PANTOPRAZOLE SODIUM 40 MG: 40 TABLET, DELAYED RELEASE ORAL at 09:07

## 2020-07-17 RX ADMIN — ENOXAPARIN SODIUM 100 MG: 100 INJECTION SUBCUTANEOUS at 12:07

## 2020-07-17 RX ADMIN — PANTOPRAZOLE SODIUM 40 MG: 40 TABLET, DELAYED RELEASE ORAL at 08:07

## 2020-07-17 RX ADMIN — MORPHINE SULFATE 6 MG: 2 INJECTION, SOLUTION INTRAMUSCULAR; INTRAVENOUS at 12:07

## 2020-07-17 RX ADMIN — HYDROMORPHONE HYDROCHLORIDE 2 MG: 2 TABLET ORAL at 08:07

## 2020-07-17 RX ADMIN — HYDROMORPHONE HYDROCHLORIDE 2 MG: 2 TABLET ORAL at 04:07

## 2020-07-17 RX ADMIN — CLOPIDOGREL 75 MG: 75 TABLET, FILM COATED ORAL at 09:07

## 2020-07-17 RX ADMIN — MORPHINE SULFATE 4 MG: 4 INJECTION, SOLUTION INTRAMUSCULAR; INTRAVENOUS at 02:07

## 2020-07-17 RX ADMIN — MORPHINE SULFATE 4 MG: 4 INJECTION, SOLUTION INTRAMUSCULAR; INTRAVENOUS at 09:07

## 2020-07-17 RX ADMIN — MORPHINE SULFATE 4 MG: 4 INJECTION, SOLUTION INTRAMUSCULAR; INTRAVENOUS at 10:07

## 2020-07-17 NOTE — H&P
[facility] Hospital Medicine  History & Physical    Patient Name: Aleyda Costa  MRN: [id number]  Admission Date: 7/16/2020  Attending Physician: Darryl Wise MD   Primary Care Provider: Darlene Hayden MD         Patient information was obtained from patient, past medical records and ER records.     Subjective:     Principal Problem:DVT (deep venous thrombosis)    Chief Complaint:   Chief Complaint   Patient presents with    Fever     leg pain and swelling        HPI: Aleyda Costa is a 41 y/o female with a past medical history significant for depression, GERD, pulmonary emboli in 2018, sleep apnea, prior gastric sleeve, and nicotine dependence presenting today for increased pain to right LE. Pt underwent a right knee arthroplasty per Dr. Crisostomo on 6/16/20. Patient previously failed treatment for pulmonary emboli with Xarelto and was placed on Pradaxa 150 mg p.o. b.i.d. at discharge 6/17 per hematology.  Pt presented back to ED on 6/21 for increasing pain and swelling to right leg. Pt reported compliance with her pradaxa and medications; however, ultrasound of the RLE on that date demonstrated a right nonocclusive popliteal DVT. Pt was readmitted and placed on full dose lovenox. Pt was discharged home on 6/23 on lovenox and pain was controlled with oral meds at that time. Dr. Crisostomo provided pt with oral dilaudid pain script on d/c.  Patient returned to the ED at on 6/24/20 with complaints of intractable pain to right leg that is not controlled with home meds but improved after receiving IV dilaudid in ED.  Pt was admitted and placed on full dose lovenox.  On 7/14/20, pt was seen by hematology; lovenox was discontinued and pt began taking pradaxa.  She presents to the ED tonight with similar presentation as before with pain to the RLE uncontrolled by her home medication.  Ultrasound of the RLE was inconclusive in that the inferior right femoral vein could not be fully compressed.   It was unclear if this was related technical difficulty and pain or associated with nonocclusive DVT.  Pt was given IV morphine while in the ED with improvement in pain.  She was also administered full dose lovenox.  She will be placed in observation under the service of hospital medicine for continued treatment.    Past Medical History:   Diagnosis Date    Abnormal Pap smear of cervix     Arthritis     OA    Back pain     Cancer     skin cancer to back    Depression     Endometriosis     Full dentures     GERD (gastroesophageal reflux disease)     Migraine     Pulmonary embolism     Seizures     Sleep apnea     Does not use c-pap since weight loss - 170 lbs    Uterine fibroid     Wears glasses        Past Surgical History:   Procedure Laterality Date    ANGIOGRAM, CORONARY, WITH LEFT HEART CATHETERIZATION Left 2020    Procedure: Left heart cath;  Surgeon: Jm Guthrie MD;  Location: [facility] CATH/EP LAB;  Service: Cardiology;  Laterality: Left;    APPENDECTOMY      CARPAL TUNNEL RELEASE Bilateral      SECTION      CHOLECYSTECTOMY      COLONOSCOPY N/A 2019    Procedure: COLONOSCOPY;  Surgeon: Anselmo Blackwell MD;  Location: [facility] ENDO;  Service: Endoscopy;  Laterality: N/A;    EPIDURAL STEROID INJECTION INTO LUMBAR SPINE N/A 2019    Procedure: Injection-steroid-epidural-lumbar;  Surgeon: Yariel Ramirez MD;  Location: [facility] OR;  Service: Pain Management;  Laterality: N/A;  L3-4    ESOPHAGOGASTRODUODENOSCOPY N/A 2019    Procedure: EGD (ESOPHAGOGASTRODUODENOSCOPY);  Surgeon: Anselmo Blackwell MD;  Location: [facility];  Service: Endoscopy;  Laterality: N/A;    FRACTURE SURGERY      ankle    gastric sleve      HYSTERECTOMY      endo and fibroids    HYSTERECTOMY      JOINT REPLACEMENT Left 2018    KNEE ARTHROPLASTY Left 2018    Procedure: ARTHROPLASTY, KNEE;  Surgeon: Feliberto Cardenas MD;  Location: [facility] OR;  Service: Orthopedics;   Laterality: Left;    KNEE ARTHROPLASTY Right 6/16/2020    Procedure: ARTHROPLASTY, KNEE;  Surgeon: Feliberto Cardenas MD;  Location: [facility] OR;  Service: Orthopedics;  Laterality: Right;    KNEE ARTHROSCOPY W/ MENISCECTOMY Right 10/25/2019    Procedure: ARTHROSCOPY, KNEE, WITH MENISCECTOMY;  Surgeon: Feliberto Cardenas MD;  Location: [facility] OR;  Service: Orthopedics;  Laterality: Right;    KNEE SURGERY      LUMBAR EPIDURAL INJECTION      OOPHORECTOMY Left     LSO    RADIAL NERVE Right     RECONSTRUCTION OF MEDIAL PATELLOFEMORAL LIGAMENT OF RIGHT KNEE Right 10/25/2019    Procedure: RECONSTRUCTION, LIGAMENT, MEDIAL PATELLOFEMORAL, RIGHT;  Surgeon: Feliberto Cardenas MD;  Location: [facility] OR;  Service: Orthopedics;  Laterality: Right;    SINUS SURGERY      UPPER GASTROINTESTINAL ENDOSCOPY  11/27/2019    Dr. Blackwell; mild schatzki ring-dilated; gastritis; bx in process       Review of patient's allergies indicates:   Allergen Reactions    Clarithromycin Swelling and Other (See Comments)     DIFFICULTY SWALLOWING  DIFFICULTY SWALLOWING    Pcn [penicillins] Anaphylaxis    Sulfa (sulfonamide antibiotics) Hives    Wellbutrin [bupropion hcl] Shortness Of Breath and Anaphylaxis    Betadine [povidone-iodine] Hives     Swelling      Cefprozil Hives    Iodinated contrast media Hives    Latex, natural rubber Rash    Sulfamethoxazole-trimethoprim Nausea And Vomiting    Iodine Hives and Swelling    Latex Hives and Swelling       Current Facility-Administered Medications on File Prior to Encounter   Medication    lactated ringers infusion    lidocaine (PF) 10 mg/ml (1%) injection 10 mg     Current Outpatient Medications on File Prior to Encounter   Medication Sig    amitriptyline (ELAVIL) 25 MG tablet TAKE ONE TABLET BY MOUTH EVERY NIGHT FOR 2 WEEKS THEN INCREASE TO 2 TABLETS AT BEDTIME    atorvastatin (LIPITOR) 40 MG tablet     cetirizine (ZYRTEC) 5 MG tablet Take 5 mg by mouth as needed.      "clopidogreL (PLAVIX) 75 mg tablet     cyanocobalamin, vitamin B-12, 1,000 mcg/mL Kit Inject 1 mL into the muscle every 21 days.    dabigatran etexilate (PRADAXA) 75 mg Cap Take 1 capsule (75 mg total) by mouth 2 (two) times daily. "Do NOT break, chew, or open capsules."    dicyclomine (BENTYL) 10 MG capsule Take 10 mg by mouth.    HYDROmorphone (DILAUDID) 2 MG tablet Take 1 tablet (2 mg total) by mouth every 4 (four) hours as needed for Pain.    hydrOXYzine pamoate (VISTARIL) 25 MG Cap TAKE ONE CAPSULE BY MOUTH 3 TIMES A DAY AS NEEDED FOR ANXIETY    lorazepam (ATIVAN) 0.5 MG tablet Take 0.5 mg by mouth every 4 (four) hours as needed.     oxyCODONE (OXYCONTIN) 10 mg 12 hr tablet Take 10 mg by mouth every 12 (twelve) hours.    oxyCODONE-acetaminophen (PERCOCET) 5-325 mg per tablet Take 1 tablet by mouth every 6 (six) hours as needed.    promethazine (PHENERGAN) 25 MG tablet Take 1 tablet (25 mg total) by mouth every 6 (six) hours as needed for Nausea. Take 1 tablet by mouth every 6 (six) hours as needed (migraine). -MAY CAUSE DROWSINESS-    tiZANidine (ZANAFLEX) 4 MG tablet Take 4 mg by mouth every 6 (six) hours as needed.    AIMOVIG AUTOINJECTOR 140 mg/mL AtIn     erenumab-aooe 140 mg/mL AtIn Inject 140 mg into the skin every 28 days.    fluoxetine (PROZAC) 20 MG capsule every evening.     pantoprazole (PROTONIX) 40 MG tablet Take 1 tablet (40 mg total) by mouth 2 (two) times daily.     Family History     Problem Relation (Age of Onset)    Breast cancer Maternal Aunt (46)    Esophageal cancer Maternal Grandmother    Heart disease Mother, Father        Tobacco Use    Smoking status: Current Every Day Smoker     Packs/day: 0.25     Years: 20.00     Pack years: 5.00     Types: Cigarettes     Last attempt to quit: 2018     Years since quittin.6    Smokeless tobacco: Never Used   Substance and Sexual Activity    Alcohol use: Yes     Alcohol/week: 0.0 standard drinks     Comment: occasionally    " Drug use: Not Currently     Types: Other-see comments     Comment: no    Sexual activity: Yes     Partners: Male     Review of Systems   Constitutional: Negative for chills and fever.   HENT: Negative for congestion and postnasal drip.    Eyes: Negative for photophobia and visual disturbance.   Respiratory: Negative for cough and shortness of breath.    Cardiovascular: Negative for chest pain and palpitations.   Gastrointestinal: Negative for abdominal pain, diarrhea, nausea and vomiting.   Genitourinary: Negative for dysuria and flank pain.   Musculoskeletal: Positive for arthralgias and gait problem.   Skin: Negative for rash and wound.   Neurological: Negative for dizziness and weakness.   Hematological: Negative for adenopathy. Bruises/bleeds easily.   Psychiatric/Behavioral: Negative for agitation and confusion. The patient is nervous/anxious.    All other systems reviewed and are negative.    Objective:     Vital Signs (Most Recent):  Temp: 97 °F (36.1 °C) (07/17/20 0154)  Pulse: 61 (07/17/20 0154)  Resp: 19 (07/17/20 0254)  BP: 114/62 (07/17/20 0154)  SpO2: 97 % (07/17/20 0154) Vital Signs (24h Range):  Temp:  [97 °F (36.1 °C)-98.5 °F (36.9 °C)] 97 °F (36.1 °C)  Pulse:  [61-73] 61  Resp:  [12-20] 19  SpO2:  [96 %-100 %] 97 %  BP: (113-129)/(60-82) 114/62     Weight: 104.3 kg (230 lb)  Body mass index is 31.19 kg/m².    Physical Exam  Vitals signs and nursing note reviewed.   Constitutional:       General: She is not in acute distress.     Appearance: Normal appearance. She is well-developed.   HENT:      Head: Normocephalic and atraumatic.   Eyes:      Extraocular Movements: Extraocular movements intact.      Conjunctiva/sclera: Conjunctivae normal.      Pupils: Pupils are equal, round, and reactive to light.   Neck:      Musculoskeletal: Normal range of motion and neck supple.   Cardiovascular:      Rate and Rhythm: Normal rate and regular rhythm.      Heart sounds: Normal heart sounds.   Pulmonary:       Effort: Pulmonary effort is normal.      Breath sounds: Normal breath sounds.   Abdominal:      General: Bowel sounds are normal. There is no distension.      Palpations: Abdomen is soft.      Tenderness: There is no abdominal tenderness.   Musculoskeletal: Normal range of motion.         General: Tenderness (right anterior thigh/right popliteal area) present.   Skin:     General: Skin is warm and dry.      Capillary Refill: Capillary refill takes 2 to 3 seconds.   Neurological:      General: No focal deficit present.      Mental Status: She is alert and oriented to person, place, and time.   Psychiatric:         Mood and Affect: Affect is tearful.         Thought Content: Thought content normal.         Judgment: Judgment normal.           CRANIAL NERVES     CN III, IV, VI   Pupils are equal, round, and reactive to light.       Significant Labs:   BMP:   Recent Labs   Lab 07/16/20  2354         K 4.3      CO2 25   BUN 9   CREATININE 0.8   CALCIUM 9.8   MG 2.2     CBC:   Recent Labs   Lab 07/16/20  2354   WBC 9.26   HGB 13.1   HCT 39.4          Significant Imaging: US RLE: History of DVT on the right.  The inferior right femoral vein could not be fully compressed.  It is unclear if this is related technical difficulty and pain or associated with nonocclusive DVT.  DVT was noted in this region approximately 3 weeks prior.  Recommend clinical correlation and follow-up.    Assessment/Plan:     * DVT (deep venous thrombosis)  Pt with previously diagnosed nonocclusive deep vein thrombosis is noted in the right popliteal vein who was transitioned from lovenox to pradaxa 2 days ago returns with increased RLE pain.  Pt was given lovenox 1mg/kg while in the ED.  Consult hematology for recommendations for continued treatment.      Pain of right lower extremity  Pain uncontrolled by her current home regimen, requiring IV narcotics for pain control.  Plan to transition back to po pain medication as  soon as possible.      Generalized anxiety disorder  Chronic; continue prn vistaril and chronic prozac as per home regimen.      Nicotine dependence  Health hazards associated with cigarette smoking were reviewed with patient and cessation was encouraged. Nicotine replacement and counseling options were discussed.  Spent 3 minutes counseling on cessation, patient not ready to quit.  Will order nicotine patch.          VTE Risk Mitigation (From admission, onward)         Ordered     enoxaparin injection 100 mg  Every 12 hours (non-standard times)      07/17/20 0126     IP VTE HIGH RISK PATIENT  Once      07/17/20 0145     Place sequential compression device  Until discontinued      07/17/20 0145                   SCHUYLER Lino  Department of Hospital Medicine   [facility]

## 2020-07-17 NOTE — PLAN OF CARE
POC reviewed with pt. Pt AAO X4. Pt verbalized understanding. Moderate pain controlled with prn meds per order. Affected extremity elevated on 3 pillows throughout shift. Pt educated and encouraged to keep limb elevated throughout shift. Bed in low position, call light in reach. Will continue to monitor.

## 2020-07-17 NOTE — ED NOTES
Pt placed on remote Telemetry.  [address] applied to pt Monitor room notified and confirmed placement

## 2020-07-17 NOTE — ED NOTES
Patient identifiers for Aleyda Costa checked and correct.  LOC:  Aleyda Costa is awake, alert, and aware of environment with an appropriate affect. She is oriented x 3 and speaking appropriately.  APPEARANCE:  She is resting comfortably and in no acute distress. She is clean and well groomed, patient's clothing is properly fastened.  SKIN:  The skin is warm and dry. She has normal skin turgor and moist mucus membranes. Skin is intact; no bruising or breakdown noted.  MUSCULOSKELETAL:  She is moving all extremities well, no obvious deformities noted. Pulses intact. Complains of pain and swelling to RLE  RESPIRATORY:  Airway is open and patent. Respirations are spontaneous and non-labored with normal effort and rate.  CARDIAC:  She has a normal rate and rhythm. No peripheral edema noted. Capillary refill < 3 seconds.  ABDOMEN:  No distention noted.  Soft and non-tender upon palpation.  NEUROLOGICAL:  PERRL. Facial expression is symmetrical. Hand grasps are equal bilaterally. Normal sensation in all extremities when touched with finger.  Allergies reported:    Review of patient's allergies indicates:   Allergen Reactions    Clarithromycin Swelling and Other (See Comments)     DIFFICULTY SWALLOWING  DIFFICULTY SWALLOWING    Pcn [penicillins] Anaphylaxis    Sulfa (sulfonamide antibiotics) Hives    Wellbutrin [bupropion hcl] Shortness Of Breath and Anaphylaxis    Betadine [povidone-iodine] Hives     Swelling      Cefprozil Hives    Iodinated contrast media Hives    Latex, natural rubber Rash    Sulfamethoxazole-trimethoprim Nausea And Vomiting    Iodine Hives and Swelling    Latex Hives and Swelling     OTHER NOTES:

## 2020-07-17 NOTE — PLAN OF CARE
CM completed discharge assessment with pt bedside. Pt verified information on facesheet as correct. Denies POA/LW. Reports living at listed address with spouse. PCP is Dr. Hayden. Pharm is [facility]. Reports being active with [facility] (pt disclosure completed). DME- rw, wc, raised toliet, glucometer. Reports being modified independent with ADLs. Reports that transportation to [facility] is provided by her spouse or father. Stated one of them will be providing transportation home upon DC. Verified insurance as [employer]. Pt was recently discharged from this facility. DC plan is home with continued home health with [facility]. CM following.      07/17/20 1000   Discharge Assessment   Assessment Type Discharge Planning Assessment   Confirmed/corrected address and phone number on facesheet? Yes   Assessment information obtained from? Patient   Communicated expected length of stay with patient/caregiver yes   Prior to hospitilization cognitive status: Alert/Oriented   Prior to hospitalization functional status: Independent   Current cognitive status: Alert/Oriented   Current Functional Status: Independent   Lives With spouse   Able to Return to Prior Arrangements yes   Is patient able to care for self after discharge? Yes   Patient's perception of discharge disposition home health   Readmission Within the Last 30 Days previous discharge plan unsuccessful   If yes, most recent facility name: [facility]   Patient currently being followed by outpatient case management? No   Patient currently receives any other outside agency services? No   Equipment Currently Used at Home walker, rolling   Do you have any problems affording any of your prescribed medications? No   Is the patient taking medications as prescribed? yes   Does the patient have transportation home? Yes   Transportation Anticipated family or friend will provide   Does the patient receive services at the [facility]? No   Discharge Plan A Home Health  "  Discharge Plan B Home with family   DME Needed Upon Discharge  none   Patient/Family in Agreement with Plan yes   Readmission Questionnaire   At the time of your discharge, did someone talk to you about what your health problems were? Yes   At the time of discharge, did someone talk to you about what to watch out for regarding worsening of your health problem? Yes   At the time of discharge, did someone talk to you about what to do if you experienced worsening of your health problem? Yes   At the time of discharge, did someone talk to you about which medication to take when you left the hospital and which ones to stop taking? Yes   At the time of discharge, did someone talk to you about when and where to follow up with a doctor after you left the hospital? Yes   What do you believe caused you to be sick enough to be re-admitted? "pain in my leg"   Do you have problems taking your medications as prescribed? No   Do you have any problems affording any of  your prescribed medications? No   Do you have problems obtaining/receiving your medications? No   Does the patient have transportation to healthcare appointments? Yes     "

## 2020-07-17 NOTE — CONSULTS
[facility]  Hematology/Oncology  Consult Note    Patient Name: Aleyda Costa  MRN: [id number]  Admission Date: 7/16/2020  Hospital Length of Stay: 0 days  Code Status: Full Code   Attending Provider: Darryl Wise MD  Consulting Provider: Rabia Pina MD  Primary Care Physician: Darlene Hayden MD  Principal Problem:DVT (deep venous thrombosis)    Inpatient consult to Hematology  Consult performed by: Rabia Pina MD  Consult ordered by: SCHUYLER Caceres        Subjective:     HPI:      40 y.o. female with a PMHx of DVT, PE, and arthritis s/p right knee replacement on 6/16/20 who presents to the ED with an onset of right knee pain and swelling, as well as pain behind the knee since last night. Patient endorses taking a 5 mg Percocet approximately 2 hours ago for pain with little relief. Patient states leg feels similar to when she had a previous DVT. Patient reports she was switched from Lovenox to Pradaxa yesterday. Pt admitted with chronic pain with acute exacerbation. US non specific as to any change in her DVT : pt was unable to tolerate the test due to pain     Medications:  Continuous Infusions:  Scheduled Meds:   amitriptyline  25 mg Oral QHS    atorvastatin  40 mg Oral QHS    clopidogreL  75 mg Oral Daily    enoxaparin  1 mg/kg Subcutaneous Q12H    FLUoxetine  20 mg Oral QHS    nicotine  1 patch Transdermal Daily    pantoprazole  40 mg Oral BID    senna-docusate 8.6-50 mg  1 tablet Oral BID     PRN Meds:acetaminophen, dextrose 50%, dextrose 50%, glucagon (human recombinant), glucose, glucose, hydrOXYzine pamoate, magnesium oxide, magnesium oxide, melatonin, morphine, ondansetron, potassium chloride 10%, potassium chloride 10%, potassium chloride 10%, sodium chloride 0.9%, tiZANidine     Review of patient's allergies indicates:   Allergen Reactions    Clarithromycin Swelling and Other (See Comments)     DIFFICULTY SWALLOWING  DIFFICULTY SWALLOWING    Pcn  [penicillins] Anaphylaxis    Sulfa (sulfonamide antibiotics) Hives    Wellbutrin [bupropion hcl] Shortness Of Breath and Anaphylaxis    Betadine [povidone-iodine] Hives     Swelling      Cefprozil Hives    Iodinated contrast media Hives    Latex, natural rubber Rash    Sulfamethoxazole-trimethoprim Nausea And Vomiting    Iodine Hives and Swelling    Latex Hives and Swelling        Past Medical History:   Diagnosis Date    Abnormal Pap smear of cervix     Arthritis     OA    Back pain     Cancer     skin cancer to back    Depression     Endometriosis     Full dentures     GERD (gastroesophageal reflux disease)     Migraine     Pulmonary embolism     Seizures     Sleep apnea     Does not use c-pap since weight loss - 170 lbs    Uterine fibroid     Wears glasses      Past Surgical History:   Procedure Laterality Date    ANGIOGRAM, CORONARY, WITH LEFT HEART CATHETERIZATION Left 2020    Procedure: Left heart cath;  Surgeon: Jm Guthrie MD;  Location: [facility] CATH/EP LAB;  Service: Cardiology;  Laterality: Left;    APPENDECTOMY      CARPAL TUNNEL RELEASE Bilateral      SECTION      CHOLECYSTECTOMY      COLONOSCOPY N/A 2019    Procedure: COLONOSCOPY;  Surgeon: Anselmo Blackwell MD;  Location: [facility];  Service: Endoscopy;  Laterality: N/A;    EPIDURAL STEROID INJECTION INTO LUMBAR SPINE N/A 2019    Procedure: Injection-steroid-epidural-lumbar;  Surgeon: Yariel Ramirez MD;  Location: [facility] OR;  Service: Pain Management;  Laterality: N/A;  L3-4    ESOPHAGOGASTRODUODENOSCOPY N/A 2019    Procedure: EGD (ESOPHAGOGASTRODUODENOSCOPY);  Surgeon: Anselmo Blackwell MD;  Location: [facility];  Service: Endoscopy;  Laterality: N/A;    FRACTURE SURGERY      ankle    gastric sleve      HYSTERECTOMY      endo and fibroids    HYSTERECTOMY      JOINT REPLACEMENT Left 2018    KNEE ARTHROPLASTY Left 2018    Procedure: ARTHROPLASTY, KNEE;  Surgeon:  Feliberto Cardenas MD;  Location: [facility] OR;  Service: Orthopedics;  Laterality: Left;    KNEE ARTHROPLASTY Right 2020    Procedure: ARTHROPLASTY, KNEE;  Surgeon: Feliberto Cardenas MD;  Location: [facility] OR;  Service: Orthopedics;  Laterality: Right;    KNEE ARTHROSCOPY W/ MENISCECTOMY Right 10/25/2019    Procedure: ARTHROSCOPY, KNEE, WITH MENISCECTOMY;  Surgeon: Feliberto Cardenas MD;  Location: [facility] OR;  Service: Orthopedics;  Laterality: Right;    KNEE SURGERY      LUMBAR EPIDURAL INJECTION      OOPHORECTOMY Left     LSO    RADIAL NERVE Right     RECONSTRUCTION OF MEDIAL PATELLOFEMORAL LIGAMENT OF RIGHT KNEE Right 10/25/2019    Procedure: RECONSTRUCTION, LIGAMENT, MEDIAL PATELLOFEMORAL, RIGHT;  Surgeon: Feliberto Cardenas MD;  Location: [facility] OR;  Service: Orthopedics;  Laterality: Right;    SINUS SURGERY      UPPER GASTROINTESTINAL ENDOSCOPY  2019    Dr. Blackwell; mild schatzki ring-dilated; gastritis; bx in process     Family History     Problem Relation (Age of Onset)    Breast cancer Maternal Aunt (46)    Esophageal cancer Maternal Grandmother    Heart disease Mother, Father        Tobacco Use    Smoking status: Current Every Day Smoker     Packs/day: 0.25     Years: 20.00     Pack years: 5.00     Types: Cigarettes     Last attempt to quit: 2018     Years since quittin.6    Smokeless tobacco: Never Used   Substance and Sexual Activity    Alcohol use: Yes     Alcohol/week: 0.0 standard drinks     Comment: occasionally    Drug use: Not Currently     Types: Other-see comments     Comment: no    Sexual activity: Yes     Partners: Male       Review of Systems   Constitutional: Negative for fatigue, fever and unexpected weight change.   HENT: Negative for rhinorrhea and sore throat.    Eyes: Negative for photophobia.   Respiratory: Negative for cough and shortness of breath.    Cardiovascular: Negative for chest pain and leg swelling.   Gastrointestinal: Negative for  abdominal pain, constipation, diarrhea, nausea and vomiting.   Endocrine: Negative for cold intolerance and heat intolerance.   Genitourinary: Negative for dysuria, frequency, hematuria and urgency.   Musculoskeletal: Positive for arthralgias. Negative for back pain and neck pain.   Skin: Negative for pallor and rash.   Neurological: Negative for weakness, numbness and headaches.   Hematological: Negative for adenopathy. Does not bruise/bleed easily.   Psychiatric/Behavioral: Negative for agitation.   All other systems reviewed and are negative.    Objective:     Vital Signs (Most Recent):  Temp: 97 °F (36.1 °C) (07/17/20 0154)  Pulse: 61 (07/17/20 0154)  Resp: 19 (07/17/20 0254)  BP: 114/62 (07/17/20 0154)  SpO2: 97 % (07/17/20 0154) Vital Signs (24h Range):  Temp:  [97 °F (36.1 °C)-98.5 °F (36.9 °C)] 97 °F (36.1 °C)  Pulse:  [61-73] 61  Resp:  [12-20] 19  SpO2:  [96 %-100 %] 97 %  BP: (113-129)/(60-82) 114/62     Weight: 104.3 kg (230 lb 0.8 oz)  Body mass index is 31.2 kg/m².  Body surface area is 2.3 meters squared.    No intake or output data in the 24 hours ending 07/17/20 0604    Physical Exam  Height / weight / VS reviewed  GENERAL.: Well-developed, well-nourished, in no acute distress  PSYCH: pleasant affect, no anxiety, no depression  HEENT: Normocephalic, lids intact, conjunctiva pink, sinuses nontender to palpation TMs intact, non-boggy turbinates,Normal auricula, nasal septum, dentition, gums and mucosa ,OP clear,no palatal pallor  NECK: Supple,trachea midline, no palpable abnormalities  LYMPHATICS: No cervical or axillary adenopathy  RESPIRATORY: CTAB, no wheezes, rubs or rhonchi  CARDIOVASCULAR: no JVD, S1 / S2 with RRR, no murmur  ABDOMEN: NTND, normal bowel sounds, no palpable HSM or mass  EXTREMITIES: No cyanosis, no clubbing, no joint effusion  NEUROLOGICAL: alert and oriented x 3, cranial nerves grossly intact  SKIN: Warm, dry, no rash or lesions noted, no tenting   + edema right leg    Significant Labs:   Lab Results   Component Value Date    WBC 7.82 07/17/2020    HGB 11.9 (L) 07/17/2020    HCT 37.4 07/17/2020    MCV 92 07/17/2020     07/17/2020     CMP  Sodium   Date Value Ref Range Status   07/17/2020 139 136 - 145 mmol/L Final     Potassium   Date Value Ref Range Status   07/17/2020 3.5 3.5 - 5.1 mmol/L Final     Chloride   Date Value Ref Range Status   07/17/2020 103 95 - 110 mmol/L Final     CO2   Date Value Ref Range Status   07/17/2020 25 23 - 29 mmol/L Final     Glucose   Date Value Ref Range Status   07/17/2020 91 70 - 110 mg/dL Final     BUN, Bld   Date Value Ref Range Status   07/17/2020 8 6 - 20 mg/dL Final     Creatinine   Date Value Ref Range Status   07/17/2020 0.7 0.5 - 1.4 mg/dL Final     Calcium   Date Value Ref Range Status   07/17/2020 9.4 8.7 - 10.5 mg/dL Final     Total Protein   Date Value Ref Range Status   06/27/2020 7.6 6.0 - 8.4 g/dL Final     Albumin   Date Value Ref Range Status   06/27/2020 3.4 (L) 3.5 - 5.2 g/dL Final     Total Bilirubin   Date Value Ref Range Status   06/27/2020 0.6 0.1 - 1.0 mg/dL Final     Comment:     For infants and newborns, interpretation of results should be based  on gestational age, weight and in agreement with clinical  observations.  Premature Infant recommended reference ranges:  Up to 24 hours.............<8.0 mg/dL  Up to 48 hours............<12.0 mg/dL  3-5 days..................<15.0 mg/dL  6-29 days.................<15.0 mg/dL       Alkaline Phosphatase   Date Value Ref Range Status   06/27/2020 87 55 - 135 U/L Final     AST   Date Value Ref Range Status   06/27/2020 20 10 - 40 U/L Final     ALT   Date Value Ref Range Status   06/27/2020 14 10 - 44 U/L Final     Anion Gap   Date Value Ref Range Status   07/17/2020 11 8 - 16 mmol/L Final     eGFR if    Date Value Ref Range Status   07/17/2020 >60 >60 mL/min/1.73 m^2 Final     eGFR if non    Date Value Ref Range Status   07/17/2020 >60 >60  mL/min/1.73 m^2 Final     Comment:     Calculation used to obtain the estimated glomerular filtration  rate (eGFR) is the CKD-EPI equation.                Assessment/Plan:     Active Diagnoses:    Diagnosis Date Noted POA    PRINCIPAL PROBLEM:  DVT (deep venous thrombosis) [I82.409] 06/21/2020 Yes    Pain of right lower extremity [M79.604] 06/25/2020 Yes    Generalized anxiety disorder [F41.1] 06/16/2020 Yes    Nicotine dependence [F17.200] 11/19/2018 Yes      Problems Resolved During this Admission:       Administer pain meds at 9 am and by ten am repeat US.   Must assess DVT for any changes  Pt may be DC d on pradaxa if no change in DVT. I unfortunately expect her to have pain and may have ultram upon DC at home to help with such.       ADDENDUM   EXAMINATION:  US LOWER EXTREMITY VEINS RIGHT     CLINICAL HISTORY:  only need US right leg, cannot find unilateral order;     TECHNIQUE:  Duplex and color flow Doppler evaluation and graded compression of the right lower extremity veins was performed.     COMPARISON:  None     FINDINGS:  Right thigh veins: The common femoral, femoral, popliteal, upper greater saphenous, and deep femoral veins are patent and free of thrombus. The veins are normally compressible and have normal phasic flow and augmentation response.     Right calf veins: The visualized calf veins are patent.     Contralateral CFV: The contralateral (left) common femoral vein is patent and free of thrombus.     Miscellaneous: None     Impression:     No evidence of deep venous thrombosis in the right lower extremity.        Electronically signed by: Jose Salas MD  Date:                                            07/17/2020  Time:                                           11:22      NO evidence of DVT  DC home from heme standpoint on pradaxa . F/U with heme in 2-3 weeks please  Rabia Pina MD  Hematology/Oncology  [facility]

## 2020-07-17 NOTE — SUBJECTIVE & OBJECTIVE
Past Medical History:   Diagnosis Date    Abnormal Pap smear of cervix     Arthritis     OA    Back pain     Cancer     skin cancer to back    Depression     Endometriosis     Full dentures     GERD (gastroesophageal reflux disease)     Migraine     Pulmonary embolism     Seizures     Sleep apnea     Does not use c-pap since weight loss - 170 lbs    Uterine fibroid     Wears glasses        Past Surgical History:   Procedure Laterality Date    ANGIOGRAM, CORONARY, WITH LEFT HEART CATHETERIZATION Left 2020    Procedure: Left heart cath;  Surgeon: Jm Guthrie MD;  Location: [facility] CATH/EP LAB;  Service: Cardiology;  Laterality: Left;    APPENDECTOMY      CARPAL TUNNEL RELEASE Bilateral      SECTION      CHOLECYSTECTOMY      COLONOSCOPY N/A 2019    Procedure: COLONOSCOPY;  Surgeon: Anselmo Blackwell MD;  Location: [facility] ENDO;  Service: Endoscopy;  Laterality: N/A;    EPIDURAL STEROID INJECTION INTO LUMBAR SPINE N/A 2019    Procedure: Injection-steroid-epidural-lumbar;  Surgeon: Yariel Ramirez MD;  Location: [facility] OR;  Service: Pain Management;  Laterality: N/A;  L3-4    ESOPHAGOGASTRODUODENOSCOPY N/A 2019    Procedure: EGD (ESOPHAGOGASTRODUODENOSCOPY);  Surgeon: Anselmo Blackwell MD;  Location: [facility] ENDO;  Service: Endoscopy;  Laterality: N/A;    FRACTURE SURGERY      ankle    gastric sleve      HYSTERECTOMY      endo and fibroids    HYSTERECTOMY      JOINT REPLACEMENT Left 2018    KNEE ARTHROPLASTY Left 2018    Procedure: ARTHROPLASTY, KNEE;  Surgeon: Feliberto Cardenas MD;  Location: [facility] OR;  Service: Orthopedics;  Laterality: Left;    KNEE ARTHROPLASTY Right 2020    Procedure: ARTHROPLASTY, KNEE;  Surgeon: Feliberto Cardenas MD;  Location: [facility] OR;  Service: Orthopedics;  Laterality: Right;    KNEE ARTHROSCOPY W/ MENISCECTOMY Right 10/25/2019    Procedure: ARTHROSCOPY, KNEE, WITH MENISCECTOMY;  Surgeon: Feliberto Perez  "MD Ronald;  Location: [facility] OR;  Service: Orthopedics;  Laterality: Right;    KNEE SURGERY      LUMBAR EPIDURAL INJECTION      OOPHORECTOMY Left     LSO    RADIAL NERVE Right     RECONSTRUCTION OF MEDIAL PATELLOFEMORAL LIGAMENT OF RIGHT KNEE Right 10/25/2019    Procedure: RECONSTRUCTION, LIGAMENT, MEDIAL PATELLOFEMORAL, RIGHT;  Surgeon: Feliberto Cardenas MD;  Location: [facility] OR;  Service: Orthopedics;  Laterality: Right;    SINUS SURGERY      UPPER GASTROINTESTINAL ENDOSCOPY  11/27/2019    Dr. Blackwell; mild schatzki ring-dilated; gastritis; bx in process       Review of patient's allergies indicates:   Allergen Reactions    Clarithromycin Swelling and Other (See Comments)     DIFFICULTY SWALLOWING  DIFFICULTY SWALLOWING    Pcn [penicillins] Anaphylaxis    Sulfa (sulfonamide antibiotics) Hives    Wellbutrin [bupropion hcl] Shortness Of Breath and Anaphylaxis    Betadine [povidone-iodine] Hives     Swelling      Cefprozil Hives    Iodinated contrast media Hives    Latex, natural rubber Rash    Sulfamethoxazole-trimethoprim Nausea And Vomiting    Iodine Hives and Swelling    Latex Hives and Swelling       Current Facility-Administered Medications on File Prior to Encounter   Medication    lactated ringers infusion    lidocaine (PF) 10 mg/ml (1%) injection 10 mg     Current Outpatient Medications on File Prior to Encounter   Medication Sig    amitriptyline (ELAVIL) 25 MG tablet TAKE ONE TABLET BY MOUTH EVERY NIGHT FOR 2 WEEKS THEN INCREASE TO 2 TABLETS AT BEDTIME    atorvastatin (LIPITOR) 40 MG tablet     cetirizine (ZYRTEC) 5 MG tablet Take 5 mg by mouth as needed.     clopidogreL (PLAVIX) 75 mg tablet     cyanocobalamin, vitamin B-12, 1,000 mcg/mL Kit Inject 1 mL into the muscle every 21 days.    dabigatran etexilate (PRADAXA) 75 mg Cap Take 1 capsule (75 mg total) by mouth 2 (two) times daily. "Do NOT break, chew, or open capsules."    dicyclomine (BENTYL) 10 MG capsule Take 10 mg by " mouth.    HYDROmorphone (DILAUDID) 2 MG tablet Take 1 tablet (2 mg total) by mouth every 4 (four) hours as needed for Pain.    hydrOXYzine pamoate (VISTARIL) 25 MG Cap TAKE ONE CAPSULE BY MOUTH 3 TIMES A DAY AS NEEDED FOR ANXIETY    lorazepam (ATIVAN) 0.5 MG tablet Take 0.5 mg by mouth every 4 (four) hours as needed.     oxyCODONE (OXYCONTIN) 10 mg 12 hr tablet Take 10 mg by mouth every 12 (twelve) hours.    oxyCODONE-acetaminophen (PERCOCET) 5-325 mg per tablet Take 1 tablet by mouth every 6 (six) hours as needed.    promethazine (PHENERGAN) 25 MG tablet Take 1 tablet (25 mg total) by mouth every 6 (six) hours as needed for Nausea. Take 1 tablet by mouth every 6 (six) hours as needed (migraine). -MAY CAUSE DROWSINESS-    tiZANidine (ZANAFLEX) 4 MG tablet Take 4 mg by mouth every 6 (six) hours as needed.    AIMOVIG AUTOINJECTOR 140 mg/mL AtIn     erenumab-aooe 140 mg/mL AtIn Inject 140 mg into the skin every 28 days.    fluoxetine (PROZAC) 20 MG capsule every evening.     pantoprazole (PROTONIX) 40 MG tablet Take 1 tablet (40 mg total) by mouth 2 (two) times daily.     Family History     Problem Relation (Age of Onset)    Breast cancer Maternal Aunt (46)    Esophageal cancer Maternal Grandmother    Heart disease Mother, Father        Tobacco Use    Smoking status: Current Every Day Smoker     Packs/day: 0.25     Years: 20.00     Pack years: 5.00     Types: Cigarettes     Last attempt to quit: 2018     Years since quittin.6    Smokeless tobacco: Never Used   Substance and Sexual Activity    Alcohol use: Yes     Alcohol/week: 0.0 standard drinks     Comment: occasionally    Drug use: Not Currently     Types: Other-see comments     Comment: no    Sexual activity: Yes     Partners: Male     Review of Systems   Constitutional: Negative for chills and fever.   HENT: Negative for congestion and postnasal drip.    Eyes: Negative for photophobia and visual disturbance.   Respiratory: Negative for  cough and shortness of breath.    Cardiovascular: Negative for chest pain and palpitations.   Gastrointestinal: Negative for abdominal pain, diarrhea, nausea and vomiting.   Genitourinary: Negative for dysuria and flank pain.   Musculoskeletal: Positive for arthralgias and gait problem.   Skin: Negative for rash and wound.   Neurological: Negative for dizziness and weakness.   Hematological: Negative for adenopathy. Bruises/bleeds easily.   Psychiatric/Behavioral: Negative for agitation and confusion. The patient is nervous/anxious.    All other systems reviewed and are negative.    Objective:     Vital Signs (Most Recent):  Temp: 97 °F (36.1 °C) (07/17/20 0154)  Pulse: 61 (07/17/20 0154)  Resp: 19 (07/17/20 0254)  BP: 114/62 (07/17/20 0154)  SpO2: 97 % (07/17/20 0154) Vital Signs (24h Range):  Temp:  [97 °F (36.1 °C)-98.5 °F (36.9 °C)] 97 °F (36.1 °C)  Pulse:  [61-73] 61  Resp:  [12-20] 19  SpO2:  [96 %-100 %] 97 %  BP: (113-129)/(60-82) 114/62     Weight: 104.3 kg (230 lb)  Body mass index is 31.19 kg/m².    Physical Exam  Vitals signs and nursing note reviewed.   Constitutional:       General: She is not in acute distress.     Appearance: Normal appearance. She is well-developed.   HENT:      Head: Normocephalic and atraumatic.   Eyes:      Extraocular Movements: Extraocular movements intact.      Conjunctiva/sclera: Conjunctivae normal.      Pupils: Pupils are equal, round, and reactive to light.   Neck:      Musculoskeletal: Normal range of motion and neck supple.   Cardiovascular:      Rate and Rhythm: Normal rate and regular rhythm.      Heart sounds: Normal heart sounds.   Pulmonary:      Effort: Pulmonary effort is normal.      Breath sounds: Normal breath sounds.   Abdominal:      General: Bowel sounds are normal. There is no distension.      Palpations: Abdomen is soft.      Tenderness: There is no abdominal tenderness.   Musculoskeletal: Normal range of motion.         General: Tenderness (right anterior  thigh/right popliteal area) present.   Skin:     General: Skin is warm and dry.      Capillary Refill: Capillary refill takes 2 to 3 seconds.   Neurological:      General: No focal deficit present.      Mental Status: She is alert and oriented to person, place, and time.   Psychiatric:         Mood and Affect: Affect is tearful.         Thought Content: Thought content normal.         Judgment: Judgment normal.           CRANIAL NERVES     CN III, IV, VI   Pupils are equal, round, and reactive to light.       Significant Labs:   BMP:   Recent Labs   Lab 07/16/20  2354         K 4.3      CO2 25   BUN 9   CREATININE 0.8   CALCIUM 9.8   MG 2.2     CBC:   Recent Labs   Lab 07/16/20  2354   WBC 9.26   HGB 13.1   HCT 39.4          Significant Imaging: US RLE: History of DVT on the right.  The inferior right femoral vein could not be fully compressed.  It is unclear if this is related technical difficulty and pain or associated with nonocclusive DVT.  DVT was noted in this region approximately 3 weeks prior.  Recommend clinical correlation and follow-up.

## 2020-07-17 NOTE — PLAN OF CARE
Reviewed POC with pt, pt verbalized understanding, A&O x4, room air maintained, no c/o sob, pain maintained during shift, Diet maintained, Tele [id number] maintained, VTE maintained, wound care followed per MD order,self turn maintained, IV maintained,  Visualized pt, safety intact, bed in low position, bed alarm on, locked, call light and personal items within reach. Will continue to monitor.                       Problem: Adult Inpatient Plan of Care  Goal: Plan of Care Review  Outcome: Ongoing, Progressing  Goal: Patient-Specific Goal (Individualization)  Outcome: Ongoing, Progressing  Goal: Absence of Hospital-Acquired Illness or Injury  Outcome: Ongoing, Progressing  Goal: Optimal Comfort and Wellbeing  Outcome: Ongoing, Progressing  Goal: Readiness for Transition of Care  Outcome: Ongoing, Progressing  Goal: Rounds/Family Conference  Outcome: Ongoing, Progressing     Problem: Fall Injury Risk  Goal: Absence of Fall and Fall-Related Injury  Outcome: Ongoing, Progressing

## 2020-07-17 NOTE — ASSESSMENT & PLAN NOTE
Pain uncontrolled by her current home regimen, requiring IV narcotics for pain control.  Plan to transition back to po pain medication as soon as possible.

## 2020-07-17 NOTE — ED PROVIDER NOTES
Encounter Date: 7/16/2020    SCRIBE #1 NOTE: IYahaira, steve scribing for, and in the presence of, Casey Campos MD.       History     Chief Complaint   Patient presents with    Fever     leg pain and swelling       Time seen by provider: 8:52 PM on 07/16/2020    Aleyda Costa is a 40 y.o. female with a PMHx of DVT, PE, and arthritis s/p right knee replacement on 6/16/20 who presents to the ED with an onset of right knee pain and swelling, as well as pain behind the knee since last night. Patient endorses taking a 5 mg Percocet approximately 2 hours ago for pain with little relief. Patient states leg feels similar to when she had a previous DVT. Patient reports she was switched from Lovenox to Pradaxa yesterday. The patient denies any other symptoms at this time.      The history is provided by the patient.     Review of patient's allergies indicates:   Allergen Reactions    Clarithromycin Swelling and Other (See Comments)     DIFFICULTY SWALLOWING  DIFFICULTY SWALLOWING    Pcn [penicillins] Anaphylaxis    Sulfa (sulfonamide antibiotics) Hives    Wellbutrin [bupropion hcl] Shortness Of Breath and Anaphylaxis    Betadine [povidone-iodine] Hives     Swelling      Cefprozil Hives    Iodinated contrast media Hives    Latex, natural rubber Rash    Sulfamethoxazole-trimethoprim Nausea And Vomiting    Iodine Hives and Swelling    Latex Hives and Swelling     Past Medical History:   Diagnosis Date    Abnormal Pap smear of cervix     Arthritis     OA    Back pain     Cancer     skin cancer to back    Depression     Endometriosis     Full dentures     GERD (gastroesophageal reflux disease)     Migraine     Pulmonary embolism 2018    Seizures     Sleep apnea     Does not use c-pap since weight loss - 170 lbs    Uterine fibroid     Wears glasses      Past Surgical History:   Procedure Laterality Date    ANGIOGRAM, CORONARY, WITH LEFT HEART CATHETERIZATION Left 6/29/2020    Procedure:  Left heart cath;  Surgeon: Jm Guthrie MD;  Location: [facility] CATH/EP LAB;  Service: Cardiology;  Laterality: Left;    APPENDECTOMY      CARPAL TUNNEL RELEASE Bilateral      SECTION      CHOLECYSTECTOMY      COLONOSCOPY N/A 2019    Procedure: COLONOSCOPY;  Surgeon: Anselmo Blackwell MD;  Location: [facility] ENDO;  Service: Endoscopy;  Laterality: N/A;    EPIDURAL STEROID INJECTION INTO LUMBAR SPINE N/A 2019    Procedure: Injection-steroid-epidural-lumbar;  Surgeon: Yariel Ramirez MD;  Location: [facility];  Service: Pain Management;  Laterality: N/A;  L3-4    ESOPHAGOGASTRODUODENOSCOPY N/A 2019    Procedure: EGD (ESOPHAGOGASTRODUODENOSCOPY);  Surgeon: Anselmo Blackwell MD;  Location: [facility] ENDO;  Service: Endoscopy;  Laterality: N/A;    FRACTURE SURGERY      ankle    gastric sleve      HYSTERECTOMY  2014    endo and fibroids    HYSTERECTOMY      JOINT REPLACEMENT Left 2018    KNEE ARTHROPLASTY Left 2018    Procedure: ARTHROPLASTY, KNEE;  Surgeon: Feliberto Cardenas MD;  Location: [facility] OR;  Service: Orthopedics;  Laterality: Left;    KNEE ARTHROPLASTY Right 2020    Procedure: ARTHROPLASTY, KNEE;  Surgeon: Feliberto Cardenas MD;  Location: [facility] OR;  Service: Orthopedics;  Laterality: Right;    KNEE ARTHROSCOPY W/ MENISCECTOMY Right 10/25/2019    Procedure: ARTHROSCOPY, KNEE, WITH MENISCECTOMY;  Surgeon: Feliberto Cardenas MD;  Location: [facility] OR;  Service: Orthopedics;  Laterality: Right;    KNEE SURGERY      LUMBAR EPIDURAL INJECTION      OOPHORECTOMY Left     LSO    RADIAL NERVE Right     RECONSTRUCTION OF MEDIAL PATELLOFEMORAL LIGAMENT OF RIGHT KNEE Right 10/25/2019    Procedure: RECONSTRUCTION, LIGAMENT, MEDIAL PATELLOFEMORAL, RIGHT;  Surgeon: Feliberto Cardenas MD;  Location: [facility] OR;  Service: Orthopedics;  Laterality: Right;    SINUS SURGERY      UPPER GASTROINTESTINAL ENDOSCOPY  2019    Dr. Blackwell; mild schatzki ring-dilated;  gastritis; bx in process     Family History   Problem Relation Age of Onset    Heart disease Mother     Heart disease Father     Esophageal cancer Maternal Grandmother     Breast cancer Maternal Aunt 46    Colon cancer Neg Hx     Stomach cancer Neg Hx     Ovarian cancer Neg Hx      Social History     Tobacco Use    Smoking status: Current Every Day Smoker     Packs/day: 0.25     Years: 20.00     Pack years: 5.00     Types: Cigarettes     Last attempt to quit: 2018     Years since quittin.6    Smokeless tobacco: Never Used   Substance Use Topics    Alcohol use: Yes     Alcohol/week: 0.0 standard drinks     Comment: occasionally    Drug use: Not Currently     Types: Other-see comments     Comment: no     Review of Systems   Constitutional: Negative for fever.   HENT: Negative for sore throat.    Respiratory: Negative for shortness of breath.    Cardiovascular: Negative for chest pain.   Gastrointestinal: Negative for nausea.   Genitourinary: Negative for dysuria.   Musculoskeletal: Positive for arthralgias, gait problem and joint swelling. Negative for back pain.   Skin: Negative for rash.   Neurological: Negative for weakness.   Hematological: Bruises/bleeds easily.       Physical Exam     Initial Vitals [20]   BP Pulse Resp Temp SpO2   116/75 70 20 98.5 °F (36.9 °C) 100 %      MAP       --         Physical Exam    Nursing note and vitals reviewed.  Constitutional: She appears well-developed and well-nourished.  Non-toxic appearance. No distress.   HENT:   Head: Normocephalic and atraumatic.   Eyes: EOM are normal. Pupils are equal, round, and reactive to light.   Neck: Normal range of motion. Neck supple. No neck rigidity. No JVD present.   Cardiovascular: Normal rate, regular rhythm, normal heart sounds and intact distal pulses. Exam reveals no gallop and no friction rub.    No murmur heard.  Pulmonary/Chest: Breath sounds normal. She has no wheezes. She has no rhonchi. She has no  rales.   Abdominal: Soft. Bowel sounds are normal. She exhibits no distension. There is no abdominal tenderness. There is no rebound and no guarding.   Musculoskeletal:      Right knee: Tenderness found.      Comments: Tenderness in right lateral knee and popliteal. Warmth of right knee without erythema. Near full ROM with an inability to fully straighten knee which she states is chronic since surgery.   Neurological: She is alert and oriented to person, place, and time. She has normal strength and normal reflexes. No cranial nerve deficit or sensory deficit. She exhibits normal muscle tone. Coordination normal.   Skin: Skin is warm and dry.   Psychiatric: She has a normal mood and affect. Her speech is normal and behavior is normal. She is not actively hallucinating.         ED Course   Procedures  Labs Reviewed   SARS-COV-2 RNA AMPLIFICATION, QUAL   CBC W/ AUTO DIFFERENTIAL   BASIC METABOLIC PANEL   MAGNESIUM          Imaging Results          US Lower Extremity Veins Right (Final result)  Result time 07/16/20 22:07:38    Final result by Dariusz Martin MD (07/16/20 22:07:38)                 Impression:      History of DVT on the right.  The inferior right femoral vein could not be fully compressed.  It is unclear if this is related technical difficulty and pain or associated with nonocclusive DVT.  DVT was noted in this region approximately 3 weeks prior.  Recommend clinical correlation and follow-up.      Electronically signed by: Dariusz Martin  Date:    07/16/2020  Time:    22:07             Narrative:    EXAMINATION:  US LOWER EXTREMITY VEINS RIGHT    CLINICAL HISTORY:  Pain in unspecified knee    TECHNIQUE:  Duplex and color flow Doppler evaluation and graded compression of the right lower extremity veins was performed.    COMPARISON:  06/24/2020    FINDINGS:  Right thigh veins: The common femoral, femoral, popliteal, upper greater saphenous, and deep femoral veins were evaluated.  Inferior femoral vein could  not be compressed.  Unclear if this is related to a technical difficulty and pain or associated with nonocclusive DVT.  Please note DVT was noted in this region approximately 3 weeks prior.  No detrimental changes.  No definite DVT can be identified on today's study on limited evaluation.    There is flow noted within the popliteal vein on the right.  Intraluminal echoes are noted suggesting sluggish flow.  No DVT is detected.    Right calf veins: The visualized calf veins are patent.    Contralateral CFV: The contralateral (left) common femoral vein is patent and free of thrombus.    Miscellaneous: None                                 Medical Decision Making:   History:   Old Medical Records: I decided to obtain old medical records.  Initial Assessment:   40-year-old woman with a history of right knee arthroplasty and postoperative DVT on Lovenox who was switched to Pradaxa 1 day ago presents emergency department for pain behind her right knee and warmth of the right knee similar to her previous DVT presentation.  Ultrasound of her lower extremity was performed that shows inability to fully compress the inferior right femoral vein that is suspicious for a nonocclusive DVT.  Discussed with Hospital Medicine who agrees to observe the patient and reinstitute Lovenox therapy  Clinical Tests:   Lab Tests: Ordered and Reviewed  Radiological Study: Ordered and Reviewed            Scribe Attestation:   Scribe #1: I performed the above scribed service and the documentation accurately describes the services I performed. I attest to the accuracy of the note.     I, Andres Velez, personally performed the services described in this documentation. All medical record entries made by the scribe were at my direction and in my presence.  I have reviewed the chart and agree that the record reflects my personal performance and is accurate and complete. Casey Campos MD.                      Clinical Impression:        ICD-10-CM ICD-9-CM   1. Knee pain  M25.569 719.46         Disposition:   Disposition: Admitted     ED Disposition Condition    Observation                           Casey Campos MD  07/17/20 0325

## 2020-07-17 NOTE — ASSESSMENT & PLAN NOTE
Pt with previously diagnosed nonocclusive deep vein thrombosis is noted in the right popliteal vein who was transitioned from lovenox to pradaxa 2 days ago returns with increased RLE pain.  Pt was given lovenox 1mg/kg while in the ED.  Consult hematology for recommendations for continued treatment.

## 2020-07-17 NOTE — PROGRESS NOTES
The enoxaparin dose has been adjusted for Aleyda Plasencia Igor [id number] according to the pharmacy practice protocol.      Based on the patient's Estimated Creatinine Clearance: 125.1 mL/min (based on SCr of 0.8 mg/dL)., and weight= 104.3 kg (230 lb), the enoxaparin dose has been adjusted 100 mg every 12 hours for CrCl =/>30.    Thank you,  Jonny Pollock

## 2020-07-17 NOTE — HPI
Aleyda Costa is a 41 y/o female with a past medical history significant for depression, GERD, pulmonary emboli in 2018, sleep apnea, prior gastric sleeve, and nicotine dependence presenting today for increased pain to right LE. Pt underwent a right knee arthroplasty per Dr. Crisostomo on 6/16/20. Patient previously failed treatment for pulmonary emboli with Xarelto and was placed on Pradaxa 150 mg p.o. b.i.d. at discharge 6/17 per hematology.  Pt presented back to ED on 6/21 for increasing pain and swelling to right leg. Pt reported compliance with her pradaxa and medications; however, ultrasound of the RLE on that date demonstrated a right nonocclusive popliteal DVT. Pt was readmitted and placed on full dose lovenox. Pt was discharged home on 6/23 on lovenox and pain was controlled with oral meds at that time. Dr. Crisostomo provided pt with oral dilaudid pain script on d/c.  Patient returned to the ED at on 6/24/20 with complaints of intractable pain to right leg that is not controlled with home meds but improved after receiving IV dilaudid in ED.  Pt was admitted and placed on full dose lovenox.  On 7/14/20, pt was seen by hematology; lovenox was discontinued and pt began taking pradaxa.  She presents to the ED tonight with similar presentation as before with pain to the RLE uncontrolled by her home medication.  Ultrasound of the RLE was inconclusive in that the inferior right femoral vein could not be fully compressed.  It was unclear if this was related technical difficulty and pain or associated with nonocclusive DVT.  Pt was given IV morphine while in the ED with improvement in pain.  She was also administered full dose lovenox.  She will be placed in observation under the service of hospital medicine for continued treatment.

## 2020-07-18 LAB
ANION GAP SERPL CALC-SCNC: 11 MMOL/L (ref 8–16)
BASOPHILS # BLD AUTO: 0.04 K/UL (ref 0–0.2)
BASOPHILS NFR BLD: 0.6 % (ref 0–1.9)
BUN SERPL-MCNC: 8 MG/DL (ref 6–20)
CALCIUM SERPL-MCNC: 9.7 MG/DL (ref 8.7–10.5)
CHLORIDE SERPL-SCNC: 103 MMOL/L (ref 95–110)
CO2 SERPL-SCNC: 24 MMOL/L (ref 23–29)
CREAT SERPL-MCNC: 0.7 MG/DL (ref 0.5–1.4)
DIFFERENTIAL METHOD: NORMAL
EOSINOPHIL # BLD AUTO: 0.1 K/UL (ref 0–0.5)
EOSINOPHIL NFR BLD: 1.8 % (ref 0–8)
ERYTHROCYTE [DISTWIDTH] IN BLOOD BY AUTOMATED COUNT: 14.3 % (ref 11.5–14.5)
EST. GFR  (AFRICAN AMERICAN): >60 ML/MIN/1.73 M^2
EST. GFR  (NON AFRICAN AMERICAN): >60 ML/MIN/1.73 M^2
GLUCOSE SERPL-MCNC: 95 MG/DL (ref 70–110)
HCT VFR BLD AUTO: 38.5 % (ref 37–48.5)
HGB BLD-MCNC: 12.5 G/DL (ref 12–16)
IMM GRANULOCYTES # BLD AUTO: 0.03 K/UL (ref 0–0.04)
IMM GRANULOCYTES NFR BLD AUTO: 0.5 % (ref 0–0.5)
LYMPHOCYTES # BLD AUTO: 2.2 K/UL (ref 1–4.8)
LYMPHOCYTES NFR BLD: 32.9 % (ref 18–48)
MAGNESIUM SERPL-MCNC: 2.2 MG/DL (ref 1.6–2.6)
MCH RBC QN AUTO: 29.8 PG (ref 27–31)
MCHC RBC AUTO-ENTMCNC: 32.5 G/DL (ref 32–36)
MCV RBC AUTO: 92 FL (ref 82–98)
MONOCYTES # BLD AUTO: 0.7 K/UL (ref 0.3–1)
MONOCYTES NFR BLD: 10 % (ref 4–15)
NEUTROPHILS # BLD AUTO: 3.5 K/UL (ref 1.8–7.7)
NEUTROPHILS NFR BLD: 54.2 % (ref 38–73)
NRBC BLD-RTO: 0 /100 WBC
PLATELET # BLD AUTO: 244 K/UL (ref 150–350)
PMV BLD AUTO: 10 FL (ref 9.2–12.9)
POTASSIUM SERPL-SCNC: 3.8 MMOL/L (ref 3.5–5.1)
RBC # BLD AUTO: 4.2 M/UL (ref 4–5.4)
SODIUM SERPL-SCNC: 138 MMOL/L (ref 136–145)
WBC # BLD AUTO: 6.53 K/UL (ref 3.9–12.7)

## 2020-07-18 PROCEDURE — 96376 TX/PRO/DX INJ SAME DRUG ADON: CPT

## 2020-07-18 PROCEDURE — 63600175 PHARM REV CODE 636 W HCPCS: Performed by: HOSPITALIST

## 2020-07-18 PROCEDURE — 96372 THER/PROPH/DIAG INJ SC/IM: CPT

## 2020-07-18 PROCEDURE — 25000003 PHARM REV CODE 250: Performed by: NURSE PRACTITIONER

## 2020-07-18 PROCEDURE — 25000003 PHARM REV CODE 250

## 2020-07-18 PROCEDURE — 85025 COMPLETE CBC W/AUTO DIFF WBC: CPT

## 2020-07-18 PROCEDURE — 25000003 PHARM REV CODE 250: Performed by: HOSPITALIST

## 2020-07-18 PROCEDURE — 83735 ASSAY OF MAGNESIUM: CPT

## 2020-07-18 PROCEDURE — 80048 BASIC METABOLIC PNL TOTAL CA: CPT

## 2020-07-18 PROCEDURE — G0378 HOSPITAL OBSERVATION PER HR: HCPCS

## 2020-07-18 PROCEDURE — 63600175 PHARM REV CODE 636 W HCPCS: Performed by: NURSE PRACTITIONER

## 2020-07-18 PROCEDURE — 36415 COLL VENOUS BLD VENIPUNCTURE: CPT

## 2020-07-18 PROCEDURE — S4991 NICOTINE PATCH NONLEGEND: HCPCS | Performed by: NURSE PRACTITIONER

## 2020-07-18 RX ORDER — OXYCODONE HCL 10 MG/1
10 TABLET, FILM COATED, EXTENDED RELEASE ORAL EVERY 12 HOURS
Status: DISCONTINUED | OUTPATIENT
Start: 2020-07-18 | End: 2020-07-20 | Stop reason: HOSPADM

## 2020-07-18 RX ADMIN — HYDROMORPHONE HYDROCHLORIDE 2 MG: 2 TABLET ORAL at 04:07

## 2020-07-18 RX ADMIN — PANTOPRAZOLE SODIUM 40 MG: 40 TABLET, DELAYED RELEASE ORAL at 08:07

## 2020-07-18 RX ADMIN — DOCUSATE SODIUM - SENNOSIDES 1 TABLET: 50; 8.6 TABLET, FILM COATED ORAL at 09:07

## 2020-07-18 RX ADMIN — MORPHINE SULFATE 4 MG: 4 INJECTION, SOLUTION INTRAMUSCULAR; INTRAVENOUS at 05:07

## 2020-07-18 RX ADMIN — OXYCODONE HYDROCHLORIDE 10 MG: 10 TABLET, FILM COATED, EXTENDED RELEASE ORAL at 10:07

## 2020-07-18 RX ADMIN — MORPHINE SULFATE 4 MG: 4 INJECTION, SOLUTION INTRAMUSCULAR; INTRAVENOUS at 12:07

## 2020-07-18 RX ADMIN — MORPHINE SULFATE 4 MG: 4 INJECTION, SOLUTION INTRAMUSCULAR; INTRAVENOUS at 06:07

## 2020-07-18 RX ADMIN — HYDROMORPHONE HYDROCHLORIDE 2 MG: 2 TABLET ORAL at 08:07

## 2020-07-18 RX ADMIN — TIZANIDINE 4 MG: 4 TABLET ORAL at 08:07

## 2020-07-18 RX ADMIN — HYDROMORPHONE HYDROCHLORIDE 2 MG: 2 TABLET ORAL at 10:07

## 2020-07-18 RX ADMIN — ATORVASTATIN CALCIUM 40 MG: 40 TABLET, FILM COATED ORAL at 08:07

## 2020-07-18 RX ADMIN — CLOPIDOGREL 75 MG: 75 TABLET, FILM COATED ORAL at 09:07

## 2020-07-18 RX ADMIN — ENOXAPARIN SODIUM 100 MG: 100 INJECTION SUBCUTANEOUS at 12:07

## 2020-07-18 RX ADMIN — FLUOXETINE 20 MG: 20 CAPSULE ORAL at 08:07

## 2020-07-18 RX ADMIN — NICOTINE 1 PATCH: 14 PATCH, EXTENDED RELEASE TRANSDERMAL at 09:07

## 2020-07-18 RX ADMIN — PANTOPRAZOLE SODIUM 40 MG: 40 TABLET, DELAYED RELEASE ORAL at 09:07

## 2020-07-18 RX ADMIN — AMITRIPTYLINE HYDROCHLORIDE 25 MG: 25 TABLET, FILM COATED ORAL at 08:07

## 2020-07-18 RX ADMIN — HYDROMORPHONE HYDROCHLORIDE 2 MG: 2 TABLET ORAL at 05:07

## 2020-07-18 NOTE — PLAN OF CARE
Reviewed POC with pt, pt verbalized understanding, A&O x4, room air maintained, no c/o sob, pain managed during shift, Diet maintained, Tele maintained, VTE maintained, wound care followed per MD order, self turn maintained, IV maintained,  Visualized pt, safety intact, bed in low position, locked, bed alarm on, call light and personal items within reach. Will continue to monitor.                                                   Problem: Adult Inpatient Plan of Care  Goal: Plan of Care Review  Outcome: Ongoing, Progressing  Goal: Patient-Specific Goal (Individualization)  Outcome: Ongoing, Progressing  Goal: Absence of Hospital-Acquired Illness or Injury  Outcome: Ongoing, Progressing  Goal: Optimal Comfort and Wellbeing  Outcome: Ongoing, Progressing  Goal: Readiness for Transition of Care  Outcome: Ongoing, Progressing  Goal: Rounds/Family Conference  Outcome: Ongoing, Progressing     Problem: Fall Injury Risk  Goal: Absence of Fall and Fall-Related Injury  Outcome: Ongoing, Progressing

## 2020-07-18 NOTE — ASSESSMENT & PLAN NOTE
Pain uncontrolled.  Still requiring IV opiates.   Will keep in hospital for IV opiates and close monitoring for oversedation.  Patient agrees to try oral hydromorphone for pain and then IV morphine if relief not achieved.

## 2020-07-18 NOTE — ASSESSMENT & PLAN NOTE
Chronic; controlled.    Psychotherapeutics (From admission, onward)    Start     Stop Route Frequency Ordered    07/17/20 0230  amitriptyline tablet 25 mg      -- Oral Nightly 07/17/20 0145 07/17/20 0230  FLUoxetine capsule 20 mg      -- Oral Nightly 07/17/20 0145

## 2020-07-18 NOTE — SUBJECTIVE & OBJECTIVE
Interval History:  Still with severe pain, requiring IV opiates.  Seen by hematologist.  No other changes in status.    Review of Systems   Constitutional: Negative for chills and fever.   Respiratory: Negative for cough and shortness of breath.    Cardiovascular: Negative for chest pain and leg swelling.   Gastrointestinal: Negative for abdominal pain, nausea and vomiting.     Objective:     Vital Signs (Most Recent):  Temp: 97.9 °F (36.6 °C) (07/17/20 1955)  Pulse: 67 (07/17/20 1955)  Resp: 17 (07/17/20 2052)  BP: (!) 99/52 (07/17/20 1955)  SpO2: (!) 94 % (07/17/20 1955) Vital Signs (24h Range):  Temp:  [97 °F (36.1 °C)-98.1 °F (36.7 °C)] 97.9 °F (36.6 °C)  Pulse:  [61-73] 67  Resp:  [12-19] 17  SpO2:  [93 %-99 %] 94 %  BP: ()/(52-82) 99/52     Weight: 104.3 kg (230 lb 0.8 oz)  Body mass index is 31.2 kg/m².    Intake/Output Summary (Last 24 hours) at 7/17/2020 2206  Last data filed at 7/17/2020 1804  Gross per 24 hour   Intake 700 ml   Output --   Net 700 ml      Physical Exam  Vitals signs reviewed.   Constitutional:       General: She is not in acute distress.  Eyes:      General:         Right eye: No discharge.         Left eye: No discharge.   Neck:      Vascular: No JVD.   Cardiovascular:      Rate and Rhythm: Normal rate and regular rhythm.   Pulmonary:      Effort: Pulmonary effort is normal.      Breath sounds: Normal breath sounds.   Abdominal:      General: Bowel sounds are normal. There is no distension.      Palpations: Abdomen is soft.      Tenderness: There is no abdominal tenderness.   Skin:     General: Skin is warm.      Findings: No rash.   Neurological:      Mental Status: She is alert.         Significant Labs: All pertinent labs within the past 24 hours have been reviewed.    Significant Imaging: I have reviewed all pertinent imaging results/findings within the past 24 hours.

## 2020-07-18 NOTE — PROGRESS NOTES
[facility] Hospital Medicine  Progress Note    Patient Name: Aleyda Costa  MRN: [id number]  Patient Class: OP- Observation   Admission Date: 7/16/2020  Length of Stay: 0 days  Attending Physician: Pantera Rivas MD  Primary Care Provider: Darlene Hayden MD        Subjective:     Principal Problem:DVT (deep venous thrombosis)        HPI:  Aleyda Costa is a 39 y/o female with a past medical history significant for depression, GERD, pulmonary emboli in 2018, sleep apnea, prior gastric sleeve, and nicotine dependence presenting today for increased pain to right LE. Pt underwent a right knee arthroplasty per Dr. Crisostomo on 6/16/20. Patient previously failed treatment for pulmonary emboli with Xarelto and was placed on Pradaxa 150 mg p.o. b.i.d. at discharge 6/17 per hematology.  Pt presented back to ED on 6/21 for increasing pain and swelling to right leg. Pt reported compliance with her pradaxa and medications; however, ultrasound of the RLE on that date demonstrated a right nonocclusive popliteal DVT. Pt was readmitted and placed on full dose lovenox. Pt was discharged home on 6/23 on lovenox and pain was controlled with oral meds at that time. Dr. Crisostomo provided pt with oral dilaudid pain script on d/c.  Patient returned to the ED at on 6/24/20 with complaints of intractable pain to right leg that is not controlled with home meds but improved after receiving IV dilaudid in ED.  Pt was admitted and placed on full dose lovenox.  On 7/14/20, pt was seen by hematology; lovenox was discontinued and pt began taking pradaxa.  She presents to the ED tonight with similar presentation as before with pain to the RLE uncontrolled by her home medication.  Ultrasound of the RLE was inconclusive in that the inferior right femoral vein could not be fully compressed.  It was unclear if this was related technical difficulty and pain or associated with nonocclusive DVT.  Pt was given IV morphine  while in the ED with improvement in pain.  She was also administered full dose lovenox.  She will be placed in observation under the service of hospital medicine for continued treatment.    Overview/Hospital Course:  No notes on file    Interval History:  Still with severe pain, requiring IV opiates.  Seen by hematologist.  No other changes in status.    Review of Systems   Constitutional: Negative for chills and fever.   Respiratory: Negative for cough and shortness of breath.    Cardiovascular: Negative for chest pain and leg swelling.   Gastrointestinal: Negative for abdominal pain, nausea and vomiting.     Objective:     Vital Signs (Most Recent):  Temp: 97.9 °F (36.6 °C) (07/17/20 1955)  Pulse: 67 (07/17/20 1955)  Resp: 17 (07/17/20 2052)  BP: (!) 99/52 (07/17/20 1955)  SpO2: (!) 94 % (07/17/20 1955) Vital Signs (24h Range):  Temp:  [97 °F (36.1 °C)-98.1 °F (36.7 °C)] 97.9 °F (36.6 °C)  Pulse:  [61-73] 67  Resp:  [12-19] 17  SpO2:  [93 %-99 %] 94 %  BP: ()/(52-82) 99/52     Weight: 104.3 kg (230 lb 0.8 oz)  Body mass index is 31.2 kg/m².    Intake/Output Summary (Last 24 hours) at 7/17/2020 2206  Last data filed at 7/17/2020 1804  Gross per 24 hour   Intake 700 ml   Output --   Net 700 ml      Physical Exam  Vitals signs reviewed.   Constitutional:       General: She is not in acute distress.  Eyes:      General:         Right eye: No discharge.         Left eye: No discharge.   Neck:      Vascular: No JVD.   Cardiovascular:      Rate and Rhythm: Normal rate and regular rhythm.   Pulmonary:      Effort: Pulmonary effort is normal.      Breath sounds: Normal breath sounds.   Abdominal:      General: Bowel sounds are normal. There is no distension.      Palpations: Abdomen is soft.      Tenderness: There is no abdominal tenderness.   Skin:     General: Skin is warm.      Findings: No rash.   Neurological:      Mental Status: She is alert.         Significant Labs: All pertinent labs within the past 24 hours  have been reviewed.    Significant Imaging: I have reviewed all pertinent imaging results/findings within the past 24 hours.      Assessment/Plan:      * DVT (deep venous thrombosis)  A second ultrasound was done today, which was negative for thrombus.  DVT has resolved.  Dr. Pina recommends keeping patient on Pradaxa.      Pain of right lower extremity  Pain uncontrolled.  Still requiring IV opiates.   Will keep in hospital for IV opiates and close monitoring for oversedation.  Patient agrees to try oral hydromorphone for pain and then IV morphine if relief not achieved.    Generalized anxiety disorder  Chronic; controlled.    Psychotherapeutics (From admission, onward)    Start     Stop Route Frequency Ordered    07/17/20 0230  amitriptyline tablet 25 mg      -- Oral Nightly 07/17/20 0145    07/17/20 0230  FLUoxetine capsule 20 mg      -- Oral Nightly 07/17/20 0145            Nicotine dependence  Health hazards associated with cigarette smoking were reviewed with patient and cessation was encouraged. Nicotine replacement and counseling options were discussed.  all performed by prior provider.      VTE Risk Mitigation (From admission, onward)         Ordered     enoxaparin injection 100 mg  Every 12 hours (non-standard times)      07/17/20 0126     IP VTE HIGH RISK PATIENT  Once      07/17/20 0145     Place sequential compression device  Until discontinued      07/17/20 0145                      Pantera Rivas MD  Department of Hospital Medicine   [facility]

## 2020-07-18 NOTE — ASSESSMENT & PLAN NOTE
Health hazards associated with cigarette smoking were reviewed with patient and cessation was encouraged. Nicotine replacement and counseling options were discussed.  all performed by prior provider.

## 2020-07-18 NOTE — ASSESSMENT & PLAN NOTE
A second ultrasound was done today, which was negative for thrombus.  DVT has resolved.  Dr. Pina recommends keeping patient on Pradaxa.

## 2020-07-19 LAB
ANION GAP SERPL CALC-SCNC: 10 MMOL/L (ref 8–16)
BASOPHILS # BLD AUTO: 0.03 K/UL (ref 0–0.2)
BASOPHILS NFR BLD: 0.4 % (ref 0–1.9)
BUN SERPL-MCNC: 10 MG/DL (ref 6–20)
CALCIUM SERPL-MCNC: 9.4 MG/DL (ref 8.7–10.5)
CHLORIDE SERPL-SCNC: 101 MMOL/L (ref 95–110)
CO2 SERPL-SCNC: 26 MMOL/L (ref 23–29)
CREAT SERPL-MCNC: 0.7 MG/DL (ref 0.5–1.4)
DIFFERENTIAL METHOD: NORMAL
EOSINOPHIL # BLD AUTO: 0.1 K/UL (ref 0–0.5)
EOSINOPHIL NFR BLD: 1.7 % (ref 0–8)
ERYTHROCYTE [DISTWIDTH] IN BLOOD BY AUTOMATED COUNT: 14 % (ref 11.5–14.5)
EST. GFR  (AFRICAN AMERICAN): >60 ML/MIN/1.73 M^2
EST. GFR  (NON AFRICAN AMERICAN): >60 ML/MIN/1.73 M^2
GLUCOSE SERPL-MCNC: 95 MG/DL (ref 70–110)
HCT VFR BLD AUTO: 38 % (ref 37–48.5)
HGB BLD-MCNC: 12.5 G/DL (ref 12–16)
IMM GRANULOCYTES # BLD AUTO: 0.03 K/UL (ref 0–0.04)
IMM GRANULOCYTES NFR BLD AUTO: 0.4 % (ref 0–0.5)
LYMPHOCYTES # BLD AUTO: 2.3 K/UL (ref 1–4.8)
LYMPHOCYTES NFR BLD: 33.5 % (ref 18–48)
MAGNESIUM SERPL-MCNC: 2.2 MG/DL (ref 1.6–2.6)
MCH RBC QN AUTO: 30.3 PG (ref 27–31)
MCHC RBC AUTO-ENTMCNC: 32.9 G/DL (ref 32–36)
MCV RBC AUTO: 92 FL (ref 82–98)
MONOCYTES # BLD AUTO: 0.7 K/UL (ref 0.3–1)
MONOCYTES NFR BLD: 10.3 % (ref 4–15)
NEUTROPHILS # BLD AUTO: 3.8 K/UL (ref 1.8–7.7)
NEUTROPHILS NFR BLD: 53.7 % (ref 38–73)
NRBC BLD-RTO: 0 /100 WBC
PLATELET # BLD AUTO: 248 K/UL (ref 150–350)
PMV BLD AUTO: 9.5 FL (ref 9.2–12.9)
POTASSIUM SERPL-SCNC: 4.1 MMOL/L (ref 3.5–5.1)
RBC # BLD AUTO: 4.12 M/UL (ref 4–5.4)
SODIUM SERPL-SCNC: 137 MMOL/L (ref 136–145)
WBC # BLD AUTO: 6.99 K/UL (ref 3.9–12.7)

## 2020-07-19 PROCEDURE — 25000003 PHARM REV CODE 250: Performed by: HOSPITALIST

## 2020-07-19 PROCEDURE — S4991 NICOTINE PATCH NONLEGEND: HCPCS | Performed by: NURSE PRACTITIONER

## 2020-07-19 PROCEDURE — 83735 ASSAY OF MAGNESIUM: CPT

## 2020-07-19 PROCEDURE — 63600175 PHARM REV CODE 636 W HCPCS: Performed by: NURSE PRACTITIONER

## 2020-07-19 PROCEDURE — 25000003 PHARM REV CODE 250: Performed by: NURSE PRACTITIONER

## 2020-07-19 PROCEDURE — 36415 COLL VENOUS BLD VENIPUNCTURE: CPT

## 2020-07-19 PROCEDURE — 25000003 PHARM REV CODE 250

## 2020-07-19 PROCEDURE — 85025 COMPLETE CBC W/AUTO DIFF WBC: CPT

## 2020-07-19 PROCEDURE — G0378 HOSPITAL OBSERVATION PER HR: HCPCS

## 2020-07-19 PROCEDURE — 96372 THER/PROPH/DIAG INJ SC/IM: CPT

## 2020-07-19 PROCEDURE — 80048 BASIC METABOLIC PNL TOTAL CA: CPT

## 2020-07-19 RX ADMIN — PANTOPRAZOLE SODIUM 40 MG: 40 TABLET, DELAYED RELEASE ORAL at 09:07

## 2020-07-19 RX ADMIN — ENOXAPARIN SODIUM 100 MG: 100 INJECTION SUBCUTANEOUS at 12:07

## 2020-07-19 RX ADMIN — HYDROMORPHONE HYDROCHLORIDE 2 MG: 2 TABLET ORAL at 04:07

## 2020-07-19 RX ADMIN — CLOPIDOGREL 75 MG: 75 TABLET, FILM COATED ORAL at 09:07

## 2020-07-19 RX ADMIN — HYDROMORPHONE HYDROCHLORIDE 2 MG: 2 TABLET ORAL at 02:07

## 2020-07-19 RX ADMIN — ATORVASTATIN CALCIUM 40 MG: 40 TABLET, FILM COATED ORAL at 08:07

## 2020-07-19 RX ADMIN — FLUOXETINE 20 MG: 20 CAPSULE ORAL at 08:07

## 2020-07-19 RX ADMIN — NICOTINE 1 PATCH: 14 PATCH, EXTENDED RELEASE TRANSDERMAL at 09:07

## 2020-07-19 RX ADMIN — HYDROMORPHONE HYDROCHLORIDE 2 MG: 2 TABLET ORAL at 10:07

## 2020-07-19 RX ADMIN — OXYCODONE HYDROCHLORIDE 10 MG: 10 TABLET, FILM COATED, EXTENDED RELEASE ORAL at 09:07

## 2020-07-19 RX ADMIN — TIZANIDINE 4 MG: 4 TABLET ORAL at 10:07

## 2020-07-19 RX ADMIN — PANTOPRAZOLE SODIUM 40 MG: 40 TABLET, DELAYED RELEASE ORAL at 08:07

## 2020-07-19 RX ADMIN — AMITRIPTYLINE HYDROCHLORIDE 25 MG: 25 TABLET, FILM COATED ORAL at 08:07

## 2020-07-19 RX ADMIN — OXYCODONE HYDROCHLORIDE 10 MG: 10 TABLET, FILM COATED, EXTENDED RELEASE ORAL at 08:07

## 2020-07-19 NOTE — ASSESSMENT & PLAN NOTE
Pain uncontrolled.  Still requiring IV opiates.   Will keep in hospital for IV opiates and close monitoring for oversedation.  Patient agrees to try oral hydromorphone for pain and then IV morphine if relief not achieved.  Will restart her home oxycodone 10 BID

## 2020-07-19 NOTE — PLAN OF CARE
Patient AOX4 resting in bed, appears asleep, eyes closed, respirations even and unlabored. NAD. Denies SOB. VSS. Afebrile. Cardiac monitoring maintained. Normal SR. Up independently. Room air. Medications administered per MD/NP order. Lovenox for VTE prevention. Bed alarm active. Side Rails up X2. Plan of care reviewed with patient. Verbalizes understanding. Frequent checks performed Q2H. Call light in reach. Pt free from fall or injury. Will monitor.

## 2020-07-19 NOTE — SUBJECTIVE & OBJECTIVE
Interval History: Reporting that her pain is not controlled and that her home medication is oxy 10 BID. She is tolerating food without vomiting or constipation.    Review of Systems   Constitutional: Negative for chills and fever.   Respiratory: Negative for cough and shortness of breath.    Cardiovascular: Negative for chest pain and leg swelling.   Gastrointestinal: Negative for abdominal pain, nausea and vomiting.   Musculoskeletal:        Joint pain is severe     Objective:     Vital Signs (Most Recent):  Temp: 96.8 °F (36 °C) (07/18/20 1910)  Pulse: 62 (07/18/20 1910)  Resp: 18 (07/18/20 2027)  BP: (!) 89/50 (07/18/20 1910)  SpO2: 95 % (07/18/20 1910) Vital Signs (24h Range):  Temp:  [96.6 °F (35.9 °C)-97.8 °F (36.6 °C)] 96.8 °F (36 °C)  Pulse:  [60-80] 62  Resp:  [16-19] 18  SpO2:  [92 %-97 %] 95 %  BP: ()/(50-68) 89/50     Weight: 104.3 kg (230 lb 0.8 oz)  Body mass index is 31.2 kg/m².  No intake or output data in the 24 hours ending 07/18/20 2225   Physical Exam  Vitals signs reviewed.   Constitutional:       General: She is not in acute distress.  Eyes:      General:         Right eye: No discharge.         Left eye: No discharge.   Neck:      Vascular: No JVD.   Cardiovascular:      Rate and Rhythm: Normal rate and regular rhythm.   Pulmonary:      Effort: Pulmonary effort is normal.      Breath sounds: Normal breath sounds.   Abdominal:      General: Bowel sounds are normal. There is no distension.      Palpations: Abdomen is soft.      Tenderness: There is no abdominal tenderness.   Skin:     General: Skin is warm.      Findings: No rash.   Neurological:      Mental Status: She is alert.         Significant Labs:   BMP:   Recent Labs   Lab 07/18/20  0516   GLU 95      K 3.8      CO2 24   BUN 8   CREATININE 0.7   CALCIUM 9.7   MG 2.2     CBC:   Recent Labs   Lab 07/16/20  2354 07/17/20  0508 07/18/20  0516   WBC 9.26 7.82 6.53   HGB 13.1 11.9* 12.5   HCT 39.4 37.4 38.5    286 576      CMP:   Recent Labs   Lab 07/16/20  2354 07/17/20  0508 07/18/20  0516    139 138   K 4.3 3.5 3.8    103 103   CO2 25 25 24    91 95   BUN 9 8 8   CREATININE 0.8 0.7 0.7   CALCIUM 9.8 9.4 9.7   ANIONGAP 11 11 11   EGFRNONAA >60 >60 >60       Significant Imaging: Nothing new for review

## 2020-07-19 NOTE — PLAN OF CARE
Alert, oriented, fall prevention reinforced. Teaching regarding pain management review and safety taking meds bases on bp numbers patient verbalized understanding. Appetite is good. Dressing silicone border gauze dressing intact to small pimple size area and bottom of incision present.

## 2020-07-19 NOTE — ASSESSMENT & PLAN NOTE
A second ultrasound was done today, which was negative for thrombus.  DVT has resolved.  Dr. Pina recommends keeping patient on Pradaxa.  She is asking that a filter be placed as this was discussed and she would like to have it done in anticipation of more interventions on leg.

## 2020-07-19 NOTE — SUBJECTIVE & OBJECTIVE
Interval History: She reports that her leg pain is not improved and if she sleeps through her next dose of PRN oral pain medication she is hurting a lot when she wakes up. She is asking for more IV pain medications. She is eating OK and reports she is having regular bowel movements. She is very focused on getting IVC filter.    Review of Systems   Constitutional: Negative for chills and fever.   Respiratory: Negative for cough and shortness of breath.    Cardiovascular: Negative for chest pain and leg swelling.   Gastrointestinal: Negative for abdominal pain, nausea and vomiting.   Musculoskeletal:        Joint pain is severe     Objective:     Vital Signs (Most Recent):  Temp: 97.7 °F (36.5 °C) (07/19/20 1524)  Pulse: 73 (07/19/20 1524)  Resp: 18 (07/19/20 1524)  BP: (!) 107/57 (07/19/20 1524)  SpO2: 96 % (07/19/20 1524) Vital Signs (24h Range):  Temp:  [96.3 °F (35.7 °C)-97.9 °F (36.6 °C)] 97.7 °F (36.5 °C)  Pulse:  [61-81] 73  Resp:  [16-18] 18  SpO2:  [93 %-97 %] 96 %  BP: ()/(46-67) 107/57     Weight: 104.3 kg (229 lb 15 oz)  Body mass index is 31.19 kg/m².    Intake/Output Summary (Last 24 hours) at 7/19/2020 1830  Last data filed at 7/19/2020 1200  Gross per 24 hour   Intake 480 ml   Output --   Net 480 ml      Physical Exam  Vitals signs reviewed.   Constitutional:       General: She is not in acute distress.  Eyes:      General:         Right eye: No discharge.         Left eye: No discharge.   Neck:      Vascular: No JVD.   Cardiovascular:      Rate and Rhythm: Normal rate and regular rhythm.   Pulmonary:      Effort: Pulmonary effort is normal.      Breath sounds: Normal breath sounds.   Abdominal:      General: Bowel sounds are normal. There is no distension.      Palpations: Abdomen is soft.      Tenderness: There is no abdominal tenderness.   Skin:     General: Skin is warm.      Findings: No rash.   Neurological:      Mental Status: She is alert.         Significant Labs:   BMP:   Recent Labs    Lab 07/19/20  0647   GLU 95      K 4.1      CO2 26   BUN 10   CREATININE 0.7   CALCIUM 9.4   MG 2.2     CBC:   Recent Labs   Lab 07/18/20  0516 07/19/20  0647   WBC 6.53 6.99   HGB 12.5 12.5   HCT 38.5 38.0    248     CMP:   Recent Labs   Lab 07/18/20  0516 07/19/20  0647    137   K 3.8 4.1    101   CO2 24 26   GLU 95 95   BUN 8 10   CREATININE 0.7 0.7   CALCIUM 9.7 9.4   ANIONGAP 11 10   EGFRNONAA >60 >60       Significant Imaging: Nothing new for review

## 2020-07-19 NOTE — PROGRESS NOTES
[facility] Hospital Medicine  Progress Note    Patient Name: Aleyda Costa  MRN: [id number]  Patient Class: OP- Observation   Admission Date: 7/16/2020  Length of Stay: 0 days  Attending Physician: Kaleigh Roland MD  Primary Care Provider: Darlene Hayden MD        Subjective:     Principal Problem:DVT (deep venous thrombosis)        HPI:  Aleyda Costa is a 39 y/o female with a past medical history significant for depression, GERD, pulmonary emboli in 2018, sleep apnea, prior gastric sleeve, and nicotine dependence presenting today for increased pain to right LE. Pt underwent a right knee arthroplasty per Dr. Crisostomo on 6/16/20. Patient previously failed treatment for pulmonary emboli with Xarelto and was placed on Pradaxa 150 mg p.o. b.i.d. at discharge 6/17 per hematology.  Pt presented back to ED on 6/21 for increasing pain and swelling to right leg. Pt reported compliance with her pradaxa and medications; however, ultrasound of the RLE on that date demonstrated a right nonocclusive popliteal DVT. Pt was readmitted and placed on full dose lovenox. Pt was discharged home on 6/23 on lovenox and pain was controlled with oral meds at that time. Dr. Crisostomo provided pt with oral dilaudid pain script on d/c.  Patient returned to the ED at on 6/24/20 with complaints of intractable pain to right leg that is not controlled with home meds but improved after receiving IV dilaudid in ED.  Pt was admitted and placed on full dose lovenox.  On 7/14/20, pt was seen by hematology; lovenox was discontinued and pt began taking pradaxa.  She presents to the ED tonight with similar presentation as before with pain to the RLE uncontrolled by her home medication.  Ultrasound of the RLE was inconclusive in that the inferior right femoral vein could not be fully compressed.  It was unclear if this was related technical difficulty and pain or associated with nonocclusive DVT.  Pt was given IV  morphine while in the ED with improvement in pain.  She was also administered full dose lovenox.  She will be placed in observation under the service of hospital medicine for continued treatment.    Overview/Hospital Course:  No notes on file    Interval History: Reporting that her pain is not controlled and that her home medication is oxy 10 BID. She is tolerating food without vomiting or constipation.    Review of Systems   Constitutional: Negative for chills and fever.   Respiratory: Negative for cough and shortness of breath.    Cardiovascular: Negative for chest pain and leg swelling.   Gastrointestinal: Negative for abdominal pain, nausea and vomiting.   Musculoskeletal:        Joint pain is severe     Objective:     Vital Signs (Most Recent):  Temp: 96.8 °F (36 °C) (07/18/20 1910)  Pulse: 62 (07/18/20 1910)  Resp: 18 (07/18/20 2027)  BP: (!) 89/50 (07/18/20 1910)  SpO2: 95 % (07/18/20 1910) Vital Signs (24h Range):  Temp:  [96.6 °F (35.9 °C)-97.8 °F (36.6 °C)] 96.8 °F (36 °C)  Pulse:  [60-80] 62  Resp:  [16-19] 18  SpO2:  [92 %-97 %] 95 %  BP: ()/(50-68) 89/50     Weight: 104.3 kg (230 lb 0.8 oz)  Body mass index is 31.2 kg/m².  No intake or output data in the 24 hours ending 07/18/20 2225   Physical Exam  Vitals signs reviewed.   Constitutional:       General: She is not in acute distress.  Eyes:      General:         Right eye: No discharge.         Left eye: No discharge.   Neck:      Vascular: No JVD.   Cardiovascular:      Rate and Rhythm: Normal rate and regular rhythm.   Pulmonary:      Effort: Pulmonary effort is normal.      Breath sounds: Normal breath sounds.   Abdominal:      General: Bowel sounds are normal. There is no distension.      Palpations: Abdomen is soft.      Tenderness: There is no abdominal tenderness.   Skin:     General: Skin is warm.      Findings: No rash.   Neurological:      Mental Status: She is alert.         Significant Labs:   BMP:   Recent Labs   Lab 07/18/20  7323    GLU 95      K 3.8      CO2 24   BUN 8   CREATININE 0.7   CALCIUM 9.7   MG 2.2     CBC:   Recent Labs   Lab 07/16/20  2354 07/17/20  0508 07/18/20  0516   WBC 9.26 7.82 6.53   HGB 13.1 11.9* 12.5   HCT 39.4 37.4 38.5    261 244     CMP:   Recent Labs   Lab 07/16/20  2354 07/17/20  0508 07/18/20  0516    139 138   K 4.3 3.5 3.8    103 103   CO2 25 25 24    91 95   BUN 9 8 8   CREATININE 0.8 0.7 0.7   CALCIUM 9.8 9.4 9.7   ANIONGAP 11 11 11   EGFRNONAA >60 >60 >60       Significant Imaging: Nothing new for review      Assessment/Plan:      * DVT (deep venous thrombosis)  A second ultrasound was done today, which was negative for thrombus.  DVT has resolved.  Dr. Pina recommends keeping patient on Pradaxa.  She is asking that a filter be placed as this was discussed and she would like to have it done in anticipation of more interventions on leg.      Pain of right lower extremity  Pain uncontrolled.  Still requiring IV opiates.   Will keep in hospital for IV opiates and close monitoring for oversedation.  Patient agrees to try oral hydromorphone for pain and then IV morphine if relief not achieved.  Will restart her home oxycodone 10 BID    Generalized anxiety disorder  Chronic; controlled.    Psychotherapeutics (From admission, onward)    Start     Stop Route Frequency Ordered    07/17/20 0230  amitriptyline tablet 25 mg      -- Oral Nightly 07/17/20 0145    07/17/20 0230  FLUoxetine capsule 20 mg      -- Oral Nightly 07/17/20 0145            Nicotine dependence  Health hazards associated with cigarette smoking were reviewed with patient and cessation was encouraged. Nicotine replacement and counseling options were discussed.  all performed by prior provider.      VTE Risk Mitigation (From admission, onward)         Ordered     enoxaparin injection 100 mg  Every 12 hours (non-standard times)      07/17/20 0126     IP VTE HIGH RISK PATIENT  Once      07/17/20 0145     Place  sequential compression device  Until discontinued      07/17/20 0145                      Kaleigh Roland MD  Department of Hospital Medicine   [facility]

## 2020-07-19 NOTE — PROGRESS NOTES
[facility] Medicine  Progress Note    Patient Name: Aleyda Costa  MRN: [id number]  Patient Class: OP- Observation   Admission Date: 7/16/2020  Length of Stay: 0 days  Attending Physician: Kaleigh Roland MD  Primary Care Provider: Darlene Hayden MD        Subjective:     Principal Problem:DVT (deep venous thrombosis)        HPI:  Aleyda Costa is a 41 y/o female with a past medical history significant for depression, GERD, pulmonary emboli in 2018, sleep apnea, prior gastric sleeve, and nicotine dependence presenting today for increased pain to right LE. Pt underwent a right knee arthroplasty per Dr. Crisostomo on 6/16/20. Patient previously failed treatment for pulmonary emboli with Xarelto and was placed on Pradaxa 150 mg p.o. b.i.d. at discharge 6/17 per hematology.  Pt presented back to ED on 6/21 for increasing pain and swelling to right leg. Pt reported compliance with her pradaxa and medications; however, ultrasound of the RLE on that date demonstrated a right nonocclusive popliteal DVT. Pt was readmitted and placed on full dose lovenox. Pt was discharged home on 6/23 on lovenox and pain was controlled with oral meds at that time. Dr. Crisostomo provided pt with oral dilaudid pain script on d/c.  Patient returned to the ED at on 6/24/20 with complaints of intractable pain to right leg that is not controlled with home meds but improved after receiving IV dilaudid in ED.  Pt was admitted and placed on full dose lovenox.  On 7/14/20, pt was seen by hematology; lovenox was discontinued and pt began taking pradaxa.  She presents to the ED tonight with similar presentation as before with pain to the RLE uncontrolled by her home medication.  Ultrasound of the RLE was inconclusive in that the inferior right femoral vein could not be fully compressed.  It was unclear if this was related technical difficulty and pain or associated with nonocclusive DVT.  Pt was given IV  morphine while in the ED with improvement in pain.  She was also administered full dose lovenox.  She will be placed in observation under the service of hospital medicine for continued treatment.    Overview/Hospital Course:  No notes on file    Interval History: She reports that her leg pain is not improved and if she sleeps through her next dose of PRN oral pain medication she is hurting a lot when she wakes up. She is asking for more IV pain medications. She is eating OK and reports she is having regular bowel movements. She is very focused on getting IVC filter.    Review of Systems   Constitutional: Negative for chills and fever.   Respiratory: Negative for cough and shortness of breath.    Cardiovascular: Negative for chest pain and leg swelling.   Gastrointestinal: Negative for abdominal pain, nausea and vomiting.   Musculoskeletal:        Joint pain is severe     Objective:     Vital Signs (Most Recent):  Temp: 97.7 °F (36.5 °C) (07/19/20 1524)  Pulse: 73 (07/19/20 1524)  Resp: 18 (07/19/20 1524)  BP: (!) 107/57 (07/19/20 1524)  SpO2: 96 % (07/19/20 1524) Vital Signs (24h Range):  Temp:  [96.3 °F (35.7 °C)-97.9 °F (36.6 °C)] 97.7 °F (36.5 °C)  Pulse:  [61-81] 73  Resp:  [16-18] 18  SpO2:  [93 %-97 %] 96 %  BP: ()/(46-67) 107/57     Weight: 104.3 kg (229 lb 15 oz)  Body mass index is 31.19 kg/m².    Intake/Output Summary (Last 24 hours) at 7/19/2020 1830  Last data filed at 7/19/2020 1200  Gross per 24 hour   Intake 480 ml   Output --   Net 480 ml      Physical Exam  Vitals signs reviewed.   Constitutional:       General: She is not in acute distress.  Eyes:      General:         Right eye: No discharge.         Left eye: No discharge.   Neck:      Vascular: No JVD.   Cardiovascular:      Rate and Rhythm: Normal rate and regular rhythm.   Pulmonary:      Effort: Pulmonary effort is normal.      Breath sounds: Normal breath sounds.   Abdominal:      General: Bowel sounds are normal. There is no  distension.      Palpations: Abdomen is soft.      Tenderness: There is no abdominal tenderness.   Skin:     General: Skin is warm.      Findings: No rash.   Neurological:      Mental Status: She is alert.         Significant Labs:   BMP:   Recent Labs   Lab 07/19/20  0647   GLU 95      K 4.1      CO2 26   BUN 10   CREATININE 0.7   CALCIUM 9.4   MG 2.2     CBC:   Recent Labs   Lab 07/18/20  0516 07/19/20  0647   WBC 6.53 6.99   HGB 12.5 12.5   HCT 38.5 38.0    248     CMP:   Recent Labs   Lab 07/18/20  0516 07/19/20  0647    137   K 3.8 4.1    101   CO2 24 26   GLU 95 95   BUN 8 10   CREATININE 0.7 0.7   CALCIUM 9.7 9.4   ANIONGAP 11 10   EGFRNONAA >60 >60       Significant Imaging: Nothing new for review      Assessment/Plan:      * DVT (deep venous thrombosis)  A second ultrasound was done today, which was negative for thrombus.  DVT has resolved.  Dr. Pina recommends keeping patient on Pradaxa.  The patient is asking that a filter be placed as this was discussed and she would like to have it done in anticipation of more interventions on leg.      Pain of right lower extremity  Pain uncontrolled.  Still requiring IV opiates.   Will keep in hospital for IV opiates and close monitoring for oversedation.  Patient agrees to try oral hydromorphone for pain and then IV morphine if relief not achieved.  Will restart her home oxycodone 10 BID    Generalized anxiety disorder  Chronic; controlled.    Psychotherapeutics (From admission, onward)    Start     Stop Route Frequency Ordered    07/17/20 0230  amitriptyline tablet 25 mg      -- Oral Nightly 07/17/20 0145    07/17/20 0230  FLUoxetine capsule 20 mg      -- Oral Nightly 07/17/20 0145            Nicotine dependence  Health hazards associated with cigarette smoking were reviewed with patient and cessation was encouraged. Nicotine replacement and counseling options were discussed.  all performed by prior provider.      VTE Risk Mitigation  (From admission, onward)         Ordered     enoxaparin injection 100 mg  Every 12 hours (non-standard times)      07/17/20 0126     IP VTE HIGH RISK PATIENT  Once      07/17/20 0145     Place sequential compression device  Until discontinued      07/17/20 0145                      Kaleigh Roland MD  Department of Hospital Medicine   [facility]

## 2020-07-20 ENCOUNTER — TELEPHONE (OUTPATIENT)
Dept: HEMATOLOGY/ONCOLOGY | Facility: CLINIC | Age: 40
End: 2020-07-20

## 2020-07-20 VITALS
SYSTOLIC BLOOD PRESSURE: 132 MMHG | TEMPERATURE: 98 F | RESPIRATION RATE: 18 BRPM | DIASTOLIC BLOOD PRESSURE: 72 MMHG | OXYGEN SATURATION: 94 % | BODY MASS INDEX: 31.14 KG/M2 | HEART RATE: 67 BPM | HEIGHT: 72 IN | WEIGHT: 229.94 LBS

## 2020-07-20 LAB
ANION GAP SERPL CALC-SCNC: 10 MMOL/L (ref 8–16)
BASOPHILS # BLD AUTO: 0.03 K/UL (ref 0–0.2)
BASOPHILS NFR BLD: 0.5 % (ref 0–1.9)
BUN SERPL-MCNC: 8 MG/DL (ref 6–20)
CALCIUM SERPL-MCNC: 9.5 MG/DL (ref 8.7–10.5)
CHLORIDE SERPL-SCNC: 103 MMOL/L (ref 95–110)
CO2 SERPL-SCNC: 25 MMOL/L (ref 23–29)
CREAT SERPL-MCNC: 0.7 MG/DL (ref 0.5–1.4)
DIFFERENTIAL METHOD: NORMAL
EOSINOPHIL # BLD AUTO: 0.1 K/UL (ref 0–0.5)
EOSINOPHIL NFR BLD: 1.7 % (ref 0–8)
ERYTHROCYTE [DISTWIDTH] IN BLOOD BY AUTOMATED COUNT: 13.9 % (ref 11.5–14.5)
EST. GFR  (AFRICAN AMERICAN): >60 ML/MIN/1.73 M^2
EST. GFR  (NON AFRICAN AMERICAN): >60 ML/MIN/1.73 M^2
GLUCOSE SERPL-MCNC: 94 MG/DL (ref 70–110)
HCT VFR BLD AUTO: 40.3 % (ref 37–48.5)
HGB BLD-MCNC: 13.1 G/DL (ref 12–16)
IMM GRANULOCYTES # BLD AUTO: 0.02 K/UL (ref 0–0.04)
IMM GRANULOCYTES NFR BLD AUTO: 0.3 % (ref 0–0.5)
LYMPHOCYTES # BLD AUTO: 2.4 K/UL (ref 1–4.8)
LYMPHOCYTES NFR BLD: 36.1 % (ref 18–48)
MAGNESIUM SERPL-MCNC: 2.2 MG/DL (ref 1.6–2.6)
MCH RBC QN AUTO: 30 PG (ref 27–31)
MCHC RBC AUTO-ENTMCNC: 32.5 G/DL (ref 32–36)
MCV RBC AUTO: 92 FL (ref 82–98)
MONOCYTES # BLD AUTO: 0.6 K/UL (ref 0.3–1)
MONOCYTES NFR BLD: 9 % (ref 4–15)
NEUTROPHILS # BLD AUTO: 3.5 K/UL (ref 1.8–7.7)
NEUTROPHILS NFR BLD: 52.4 % (ref 38–73)
NRBC BLD-RTO: 0 /100 WBC
PLATELET # BLD AUTO: 254 K/UL (ref 150–350)
PMV BLD AUTO: 9.8 FL (ref 9.2–12.9)
POTASSIUM SERPL-SCNC: 3.8 MMOL/L (ref 3.5–5.1)
RBC # BLD AUTO: 4.36 M/UL (ref 4–5.4)
SODIUM SERPL-SCNC: 138 MMOL/L (ref 136–145)
WBC # BLD AUTO: 6.59 K/UL (ref 3.9–12.7)

## 2020-07-20 PROCEDURE — 25000003 PHARM REV CODE 250: Performed by: NURSE PRACTITIONER

## 2020-07-20 PROCEDURE — 25000003 PHARM REV CODE 250: Performed by: HOSPITALIST

## 2020-07-20 PROCEDURE — 85025 COMPLETE CBC W/AUTO DIFF WBC: CPT

## 2020-07-20 PROCEDURE — 36415 COLL VENOUS BLD VENIPUNCTURE: CPT

## 2020-07-20 PROCEDURE — 83735 ASSAY OF MAGNESIUM: CPT

## 2020-07-20 PROCEDURE — 96372 THER/PROPH/DIAG INJ SC/IM: CPT

## 2020-07-20 PROCEDURE — G0378 HOSPITAL OBSERVATION PER HR: HCPCS

## 2020-07-20 PROCEDURE — 25000003 PHARM REV CODE 250

## 2020-07-20 PROCEDURE — 80048 BASIC METABOLIC PNL TOTAL CA: CPT

## 2020-07-20 PROCEDURE — 63600175 PHARM REV CODE 636 W HCPCS: Performed by: NURSE PRACTITIONER

## 2020-07-20 PROCEDURE — S4991 NICOTINE PATCH NONLEGEND: HCPCS | Performed by: NURSE PRACTITIONER

## 2020-07-20 RX ORDER — DABIGATRAN ETEXILATE 150 MG/1
150 CAPSULE ORAL 2 TIMES DAILY
Qty: 60 CAPSULE | Refills: 0 | Status: SHIPPED | OUTPATIENT
Start: 2020-07-20 | End: 2020-09-17 | Stop reason: SDUPTHER

## 2020-07-20 RX ORDER — MUPIROCIN 20 MG/G
OINTMENT TOPICAL 2 TIMES DAILY
Status: ON HOLD
Start: 2020-07-20 | End: 2020-10-06

## 2020-07-20 RX ORDER — DABIGATRAN ETEXILATE 150 MG/1
150 CAPSULE ORAL 2 TIMES DAILY
Qty: 60 CAPSULE | Refills: 0 | Status: SHIPPED | OUTPATIENT
Start: 2020-07-20 | End: 2020-09-28

## 2020-07-20 RX ORDER — MUPIROCIN 20 MG/G
OINTMENT TOPICAL 2 TIMES DAILY
Status: DISCONTINUED | OUTPATIENT
Start: 2020-07-20 | End: 2020-07-20 | Stop reason: HOSPADM

## 2020-07-20 RX ADMIN — HYDROMORPHONE HYDROCHLORIDE 2 MG: 2 TABLET ORAL at 11:07

## 2020-07-20 RX ADMIN — HYDROMORPHONE HYDROCHLORIDE 2 MG: 2 TABLET ORAL at 05:07

## 2020-07-20 RX ADMIN — OXYCODONE HYDROCHLORIDE 10 MG: 10 TABLET, FILM COATED, EXTENDED RELEASE ORAL at 08:07

## 2020-07-20 RX ADMIN — ENOXAPARIN SODIUM 100 MG: 100 INJECTION SUBCUTANEOUS at 11:07

## 2020-07-20 RX ADMIN — ENOXAPARIN SODIUM 100 MG: 100 INJECTION SUBCUTANEOUS at 12:07

## 2020-07-20 RX ADMIN — PANTOPRAZOLE SODIUM 40 MG: 40 TABLET, DELAYED RELEASE ORAL at 08:07

## 2020-07-20 RX ADMIN — CLOPIDOGREL 75 MG: 75 TABLET, FILM COATED ORAL at 08:07

## 2020-07-20 RX ADMIN — NICOTINE 1 PATCH: 14 PATCH, EXTENDED RELEASE TRANSDERMAL at 08:07

## 2020-07-20 NOTE — DISCHARGE SUMMARY
[facility] Medicine  Discharge Summary      Patient Name: Aleyda Costa  MRN: [id number]  Admission Date: 7/16/2020  Hospital Length of Stay: 0 days  Discharge Date and Time:  07/20/2020 3:14 PM  Attending Physician: Darryl Wise MD   Discharging Provider: Darryl Wise MD  Primary Care Provider: Darlene Hayden MD      HPI:   Aleyda Costa is a 39 y/o female with a past medical history significant for depression, GERD, pulmonary emboli in 2018, sleep apnea, prior gastric sleeve, and nicotine dependence presenting today for increased pain to right LE. Pt underwent a right knee arthroplasty per Dr. Crisostomo on 6/16/20. Patient previously failed treatment for pulmonary emboli with Xarelto and was placed on Pradaxa 150 mg p.o. b.i.d. at discharge 6/17 per hematology.  Pt presented back to ED on 6/21 for increasing pain and swelling to right leg. Pt reported compliance with her pradaxa and medications; however, ultrasound of the RLE on that date demonstrated a right nonocclusive popliteal DVT. Pt was readmitted and placed on full dose lovenox. Pt was discharged home on 6/23 on lovenox and pain was controlled with oral meds at that time. Dr. Crisostomo provided pt with oral dilaudid pain script on d/c.  Patient returned to the ED at on 6/24/20 with complaints of intractable pain to right leg that is not controlled with home meds but improved after receiving IV dilaudid in ED.  Pt was admitted and placed on full dose lovenox.  On 7/14/20, pt was seen by hematology; lovenox was discontinued and pt began taking pradaxa.  She presents to the ED tonight with similar presentation as before with pain to the RLE uncontrolled by her home medication.  Ultrasound of the RLE was inconclusive in that the inferior right femoral vein could not be fully compressed.  It was unclear if this was related technical difficulty and pain or associated with nonocclusive DVT.  Pt was given IV morphine while in  the ED with improvement in pain.  She was also administered full dose lovenox.  She will be placed in observation under the service of hospital medicine for continued treatment.    Hospital Course:   Patient was admitted to medicine telemetry service.  Repeat lower extremity ultrasound of right leg veins was negative for deep venous thrombosis.  During hospital stay patient was evaluated by orthopedics and hematology specialist.  Dr. Pina recommended resumption of Pradaxa 150 mg twice daily and no consideration for inferior vena cava filter placement at this time.  Home health services to be resumed for closer monitoring of incision healing.  Dr. Cardenas recommended topical Bactroban ointment at couple of areas of redness seen on the incision.  During hospital stay patient required excessively high quantities of narcotics for pain control.  Patient follows up with pain management specialist as outpatient.  Dangers of long-term high potency narcotics discussed with the patient.  Patient voiced understanding.  Patient will continue close follow-up with her doctors upon discharge.  During hospital stay smoking cessation counseling was also performed with the patient.  Patient has been cleared for discharge by Orthopedics and hematology services.    Consults:   Consults (From admission, onward)        Status Ordering Provider     Inpatient consult to Hematology  Once     Provider:  Rabia Pina MD    Completed CHAYITO RAMIREZ        Imaging Results          US Lower Extremity Veins Right (Final result)  Result time 07/16/20 22:07:38    Final result by Dariusz Martin MD (07/16/20 22:07:38)                 Impression:      History of DVT on the right.  The inferior right femoral vein could not be fully compressed.  It is unclear if this is related technical difficulty and pain or associated with nonocclusive DVT.  DVT was noted in this region approximately 3 weeks prior.  Recommend clinical correlation and  follow-up.      Electronically signed by: Dariusz Montalvo  Date:    07/16/2020  Time:    22:07             Narrative:    EXAMINATION:  US LOWER EXTREMITY VEINS RIGHT    CLINICAL HISTORY:  Pain in unspecified knee    TECHNIQUE:  Duplex and color flow Doppler evaluation and graded compression of the right lower extremity veins was performed.    COMPARISON:  06/24/2020    FINDINGS:  Right thigh veins: The common femoral, femoral, popliteal, upper greater saphenous, and deep femoral veins were evaluated.  Inferior femoral vein could not be compressed.  Unclear if this is related to a technical difficulty and pain or associated with nonocclusive DVT.  Please note DVT was noted in this region approximately 3 weeks prior.  No detrimental changes.  No definite DVT can be identified on today's study on limited evaluation.    There is flow noted within the popliteal vein on the right.  Intraluminal echoes are noted suggesting sluggish flow.  No DVT is detected.    Right calf veins: The visualized calf veins are patent.    Contralateral CFV: The contralateral (left) common femoral vein is patent and free of thrombus.    Miscellaneous: None                              Final Active Diagnoses:    Diagnosis Date Noted POA    PRINCIPAL PROBLEM:  DVT (deep venous thrombosis) [I82.409] 06/21/2020 Yes    Pain of right lower extremity [M79.604] 06/25/2020 Yes    Generalized anxiety disorder [F41.1] 06/16/2020 Yes    Nicotine dependence [F17.200] 11/19/2018 Yes      Problems Resolved During this Admission:       Discharged Condition: good    Disposition: Home or Self Care    Follow Up:  Follow-up Information     Darlene Hayden MD In 1 week.    Specialty: Family Medicine  Contact information:  [address] [facility] [address]  [phone]             Feliberto Cardenas MD In 2 weeks.    Specialties: Sports Medicine, Orthopedic Surgery  Contact information:  [address] [place] [address] DR Gloria RAMIREZ  "[id number]  [phone]             Please follow up.    Contact information:  Follow up with pain specialist as per plan.                Patient Instructions:      Diet Cardiac     Other restrictions (specify):   Order Comments: PLEASE OBSERVE FALL PRECAUTIONS     Call MD for:   Order Comments: For worsening symptoms, chest pain, shortness of breath, increased abdominal pain, high grade fever, stroke or stroke like symptoms, immediately go to the nearest Emergency Room or call 911 as soon as possible.       Significant Diagnostic Studies: Labs:   CMP   Recent Labs   Lab 07/19/20  0647 07/20/20  0559    138   K 4.1 3.8    103   CO2 26 25   GLU 95 94   BUN 10 8   CREATININE 0.7 0.7   CALCIUM 9.4 9.5   ANIONGAP 10 10   ESTGFRAFRICA >60 >60   EGFRNONAA >60 >60    and CBC   Recent Labs   Lab 07/19/20  0647 07/20/20  0559   WBC 6.99 6.59   HGB 12.5 13.1   HCT 38.0 40.3    254       Pending Diagnostic Studies:     None         Medications:  Reconciled Home Medications:      Medication List      START taking these medications    mupirocin 2 % ointment  Commonly known as: BACTROBAN  Apply topically 2 (two) times daily. While incision redness persists        CHANGE how you take these medications    * dabigatran etexilate 150 mg Cap  Commonly known as: PRADAXA  Take 1 capsule (150 mg total) by mouth 2 (two) times daily. "Do NOT break, chew, or open capsules."  What changed:   · medication strength  · how much to take     * dabigatran etexilate 150 mg Cap  Commonly known as: PRADAXA  Take 1 capsule (150 mg total) by mouth 2 (two) times daily. "Do NOT break, chew, or open capsules."  What changed: You were already taking a medication with the same name, and this prescription was added. Make sure you understand how and when to take each.         * This list has 2 medication(s) that are the same as other medications prescribed for you. Read the directions carefully, and ask your doctor or other care provider to " review them with you.            CONTINUE taking these medications    * AIMOVIG AUTOINJECTOR 140 mg/mL Atin  Generic drug: erenumab-aooe  Inject 140 mg into the skin every 28 days.     * AIMOVIG AUTOINJECTOR 140 mg/mL Atin  Generic drug: erenumab-aooe     amitriptyline 25 MG tablet  Commonly known as: ELAVIL  TAKE ONE TABLET BY MOUTH EVERY NIGHT FOR 2 WEEKS THEN INCREASE TO 2 TABLETS AT BEDTIME     atorvastatin 40 MG tablet  Commonly known as: LIPITOR     cetirizine 5 MG tablet  Commonly known as: ZYRTEC  Take 5 mg by mouth as needed.     cyanocobalamin (vitamin B-12) 1,000 mcg/mL Kit  Inject 1 mL into the muscle every 21 days.     dicyclomine 10 MG capsule  Commonly known as: BENTYL  Take 10 mg by mouth.     FLUoxetine 20 MG capsule  every evening.     HYDROmorphone 2 MG tablet  Commonly known as: DILAUDID  Take 1 tablet (2 mg total) by mouth every 4 (four) hours as needed for Pain.     hydrOXYzine pamoate 25 MG Cap  Commonly known as: VISTARIL  TAKE ONE CAPSULE BY MOUTH 3 TIMES A DAY AS NEEDED FOR ANXIETY     LORazepam 0.5 MG tablet  Commonly known as: ATIVAN  Take 0.5 mg by mouth every 4 (four) hours as needed.     oxyCODONE 10 mg 12 hr tablet  Commonly known as: OXYCONTIN  Take 10 mg by mouth every 12 (twelve) hours.     oxyCODONE-acetaminophen 5-325 mg per tablet  Commonly known as: PERCOCET  Take 1 tablet by mouth every 6 (six) hours as needed.     pantoprazole 40 MG tablet  Commonly known as: PROTONIX  Take 1 tablet (40 mg total) by mouth 2 (two) times daily.     promethazine 25 MG tablet  Commonly known as: PHENERGAN  Take 1 tablet (25 mg total) by mouth every 6 (six) hours as needed for Nausea. Take 1 tablet by mouth every 6 (six) hours as needed (migraine). -MAY CAUSE DROWSINESS-     tiZANidine 4 MG tablet  Commonly known as: ZANAFLEX  Take 4 mg by mouth every 6 (six) hours as needed.         * This list has 2 medication(s) that are the same as other medications prescribed for you. Read the directions  carefully, and ask your doctor or other care provider to review them with you.            ASK your doctor about these medications    clopidogreL 75 mg tablet  Commonly known as: PLAVIX            Indwelling Lines/Drains at time of discharge:   Lines/Drains/Airways     None                 Time spent on the discharge of patient: 28 minutes  Patient was seen and examined on the date of discharge and determined to be suitable for discharge.         Darryl Wise MD  Department of Hospital Medicine  [facility]

## 2020-07-20 NOTE — PLAN OF CARE
Hospital follow up scheduled with PCP. AVS updated.     Pt already has follow up scheduled with ortho.

## 2020-07-20 NOTE — TELEPHONE ENCOUNTER
No evidence DVT  No indication for IVC filter   Pt rec is DC home from heme onc standpoint with pradaxa     Pt messaged via portal asking about IVC filter placement     See below   Resolved thrombosis    Rabia Pina     US Lower Extremity Veins Right  Order: [id number]  Status:  Final result   Visible to patient:  Yes (Patient Portal)   Next appt:  08/06/2020 at 11:15 AM in Orthopedics (Feliberto Cardenas MD)  Details    Reading Physician Reading Date Result Priority   Jose Salas Jr., MD  [phone] 7/17/2020 Routine      Narrative & Impression     EXAMINATION:  US LOWER EXTREMITY VEINS RIGHT     CLINICAL HISTORY:  only need US right leg, cannot find unilateral order;     TECHNIQUE:  Duplex and color flow Doppler evaluation and graded compression of the right lower extremity veins was performed.     COMPARISON:  None     FINDINGS:  Right thigh veins: The common femoral, femoral, popliteal, upper greater saphenous, and deep femoral veins are patent and free of thrombus. The veins are normally compressible and have normal phasic flow and augmentation response.     Right calf veins: The visualized calf veins are patent.     Contralateral CFV: The contralateral (left) common femoral vein is patent and free of thrombus.     Miscellaneous: None     Impression:     No evidence of deep venous thrombosis in the right lower extremity.        Electronically signed by: Jose Salas MD  Date:                                            07/17/2020  Time:                                           11:22

## 2020-07-20 NOTE — SUBJECTIVE & OBJECTIVE
Interval History: She reports that her leg pain is not improved and if she sleeps through her next dose of PRN oral pain medication she is hurting a lot when she wakes up. She is asking for more IV pain medications. She is eating OK and reports she is having regular bowel movements. She is very focused on getting IVC filter.    Review of Systems   Constitutional: Negative for chills and fever.   Respiratory: Negative for cough and shortness of breath.    Cardiovascular: Negative for chest pain and leg swelling.   Gastrointestinal: Negative for abdominal pain, nausea and vomiting.   Musculoskeletal:        Joint pain is severe     Objective:     Vital Signs (Most Recent):  Temp: 97.2 °F (36.2 °C) (07/20/20 0732)  Pulse: (!) 59 (07/20/20 0732)  Resp: 16 (07/20/20 0835)  BP: (!) 110/57 (07/20/20 0732)  SpO2: (!) 93 % (07/20/20 0732) Vital Signs (24h Range):  Temp:  [96.3 °F (35.7 °C)-97.9 °F (36.6 °C)] 97.2 °F (36.2 °C)  Pulse:  [59-81] 59  Resp:  [16-18] 16  SpO2:  [93 %-97 %] 93 %  BP: ()/(50-59) 110/57     Weight: 104.3 kg (229 lb 15 oz)  Body mass index is 31.19 kg/m².    Intake/Output Summary (Last 24 hours) at 7/20/2020 1000  Last data filed at 7/19/2020 1200  Gross per 24 hour   Intake 240 ml   Output --   Net 240 ml      Physical Exam  Vitals signs reviewed.   Constitutional:       General: She is not in acute distress.  Eyes:      General:         Right eye: No discharge.         Left eye: No discharge.   Neck:      Vascular: No JVD.   Cardiovascular:      Rate and Rhythm: Normal rate and regular rhythm.   Pulmonary:      Effort: Pulmonary effort is normal.      Breath sounds: Normal breath sounds.   Abdominal:      General: Bowel sounds are normal. There is no distension.      Palpations: Abdomen is soft.      Tenderness: There is no abdominal tenderness.   Skin:     General: Skin is warm.      Findings: No rash.   Neurological:      Mental Status: She is alert.         Significant Labs:   BMP:   Recent  Labs   Lab 07/20/20  0559   GLU 94      K 3.8      CO2 25   BUN 8   CREATININE 0.7   CALCIUM 9.5   MG 2.2     CBC:   Recent Labs   Lab 07/19/20  0647 07/20/20  0559   WBC 6.99 6.59   HGB 12.5 13.1   HCT 38.0 40.3    254     CMP:   Recent Labs   Lab 07/19/20  0647 07/20/20  0559    138   K 4.1 3.8    103   CO2 26 25   GLU 95 94   BUN 10 8   CREATININE 0.7 0.7   CALCIUM 9.4 9.5   ANIONGAP 10 10   EGFRNONAA >60 >60       Significant Imaging: Nothing new for review

## 2020-07-20 NOTE — H&P
Examination of the right knee shows mostly fully healed total knee arthroplasty incision.  Has 2 spots were she has a little suture abscess.  The most inferior 1 has a little redness and irritation around it.  I will order some Bactroban ointment to be applied a couple times a day.  Okay to leave open to air.

## 2020-07-20 NOTE — PLAN OF CARE
Pt clear to discharge from case management standpoint. Pt discharging to home with [place] [facility] home health services.            07/20/20 1606   Final Note   Assessment Type Final Discharge Note   Anticipated Discharge Disposition Home-Health   Hospital Follow Up  Appt(s) scheduled? Yes

## 2020-07-20 NOTE — PLAN OF CARE
I sent the pts HH orders and HH packet to [facility] via [facility]. CM following. Adina Pierce, hospitals[facility]     07/20/20 1535   Post-Acute Status   Post-Acute Authorization Home Health   Home Health Status Referrals Sent   Patient choice form signed by patient/caregiver List with quality metrics by geographic area provided

## 2020-07-20 NOTE — PLAN OF CARE
CM called pt's pharmacy to check coverage of pradaxa. Per pharmacist, prescription covered with $40 copay.       [facility] - [place], [place] - [address]  [address] [address]  Phone: [phone] Fax: [phone]

## 2020-07-20 NOTE — PLAN OF CARE
The pts HH is resumed with [facility] office. Destination booked. Adina Pierce, [facility] [place]     07/20/20 3783   Post-Acute Status   Post-Acute Authorization Home Health   Home Health Status Set-up Complete

## 2020-07-21 ENCOUNTER — TELEPHONE (OUTPATIENT)
Dept: PHARMACY | Facility: CLINIC | Age: 40
End: 2020-07-21

## 2020-07-23 RX ORDER — CEPHALEXIN 500 MG/1
500 CAPSULE ORAL 3 TIMES DAILY
Qty: 21 CAPSULE | Refills: 0 | OUTPATIENT
Start: 2020-07-23 | End: 2020-07-30

## 2020-07-23 RX ORDER — CLINDAMYCIN HYDROCHLORIDE 300 MG/1
300 CAPSULE ORAL 3 TIMES DAILY
Qty: 21 CAPSULE | Refills: 0 | Status: SHIPPED | OUTPATIENT
Start: 2020-07-23 | End: 2020-07-30

## 2020-08-04 DIAGNOSIS — Z96.651 S/P TOTAL KNEE ARTHROPLASTY, RIGHT: Primary | ICD-10-CM

## 2020-08-06 ENCOUNTER — OFFICE VISIT (OUTPATIENT)
Dept: ORTHOPEDICS | Facility: CLINIC | Age: 40
End: 2020-08-06
Payer: COMMERCIAL

## 2020-08-06 VITALS — WEIGHT: 229 LBS | RESPIRATION RATE: 16 BRPM | HEIGHT: 72 IN | BODY MASS INDEX: 31.02 KG/M2

## 2020-08-06 DIAGNOSIS — Z96.651 S/P TOTAL KNEE ARTHROPLASTY, RIGHT: Primary | ICD-10-CM

## 2020-08-06 DIAGNOSIS — M25.571 ACUTE RIGHT ANKLE PAIN: ICD-10-CM

## 2020-08-06 PROCEDURE — 99024 POSTOP FOLLOW-UP VISIT: CPT | Mod: S$GLB,,, | Performed by: ORTHOPAEDIC SURGERY

## 2020-08-06 PROCEDURE — 99999 PR PBB SHADOW E&M-EST. PATIENT-LVL III: ICD-10-PCS | Mod: PBBFAC,,, | Performed by: ORTHOPAEDIC SURGERY

## 2020-08-06 PROCEDURE — 99024 PR POST-OP FOLLOW-UP VISIT: ICD-10-PCS | Mod: S$GLB,,, | Performed by: ORTHOPAEDIC SURGERY

## 2020-08-06 PROCEDURE — 99999 PR PBB SHADOW E&M-EST. PATIENT-LVL III: CPT | Mod: PBBFAC,,, | Performed by: ORTHOPAEDIC SURGERY

## 2020-08-06 NOTE — PROGRESS NOTES
Past Medical History:   Diagnosis Date    Abnormal Pap smear of cervix     Arthritis     OA    Back pain     Cancer     skin cancer to back    Depression     Endometriosis     Full dentures     GERD (gastroesophageal reflux disease)     Migraine     Pulmonary embolism     Seizures     Sleep apnea     Does not use c-pap since weight loss - 170 lbs    Uterine fibroid     Wears glasses        Past Surgical History:   Procedure Laterality Date    ANGIOGRAM, CORONARY, WITH LEFT HEART CATHETERIZATION Left 2020    Procedure: Left heart cath;  Surgeon: Jm Guthrie MD;  Location: [facility] CATH/EP LAB;  Service: Cardiology;  Laterality: Left;    APPENDECTOMY      CARPAL TUNNEL RELEASE Bilateral      SECTION      CHOLECYSTECTOMY      COLONOSCOPY N/A 2019    Procedure: COLONOSCOPY;  Surgeon: Anselmo Blackwell MD;  Location: [facility] ENDO;  Service: Endoscopy;  Laterality: N/A;    EPIDURAL STEROID INJECTION INTO LUMBAR SPINE N/A 2019    Procedure: Injection-steroid-epidural-lumbar;  Surgeon: Yariel Ramirez MD;  Location: [facility] OR;  Service: Pain Management;  Laterality: N/A;  L3-4    ESOPHAGOGASTRODUODENOSCOPY N/A 2019    Procedure: EGD (ESOPHAGOGASTRODUODENOSCOPY);  Surgeon: Anselmo Blackwell MD;  Location: [facility] ENDO;  Service: Endoscopy;  Laterality: N/A;    FRACTURE SURGERY      ankle    gastric sleve      HYSTERECTOMY      endo and fibroids    HYSTERECTOMY      JOINT REPLACEMENT Left 2018    KNEE ARTHROPLASTY Left 2018    Procedure: ARTHROPLASTY, KNEE;  Surgeon: Feliberto Cardenas MD;  Location: [facility] OR;  Service: Orthopedics;  Laterality: Left;    KNEE ARTHROPLASTY Right 2020    Procedure: ARTHROPLASTY, KNEE;  Surgeon: Feliberto Cardneas MD;  Location: [facility] OR;  Service: Orthopedics;  Laterality: Right;    KNEE ARTHROSCOPY W/ MENISCECTOMY Right 10/25/2019    Procedure: ARTHROSCOPY, KNEE, WITH MENISCECTOMY;  Surgeon: Feliberto Perez  "MD Ronald;  Location: [facility] OR;  Service: Orthopedics;  Laterality: Right;    KNEE SURGERY      LUMBAR EPIDURAL INJECTION      OOPHORECTOMY Left     LSO    RADIAL NERVE Right     RECONSTRUCTION OF MEDIAL PATELLOFEMORAL LIGAMENT OF RIGHT KNEE Right 10/25/2019    Procedure: RECONSTRUCTION, LIGAMENT, MEDIAL PATELLOFEMORAL, RIGHT;  Surgeon: Feliberto Cardenas MD;  Location: [facility] OR;  Service: Orthopedics;  Laterality: Right;    SINUS SURGERY      UPPER GASTROINTESTINAL ENDOSCOPY  11/27/2019    Dr. Blackwell; mild schatzki ring-dilated; gastritis; bx in process       Current Outpatient Medications   Medication Sig    AIMOVIG AUTOINJECTOR 140 mg/mL AtIn     amitriptyline (ELAVIL) 25 MG tablet TAKE ONE TABLET BY MOUTH EVERY NIGHT FOR 2 WEEKS THEN INCREASE TO 2 TABLETS AT BEDTIME    atorvastatin (LIPITOR) 40 MG tablet     cetirizine (ZYRTEC) 5 MG tablet Take 5 mg by mouth as needed.     clopidogreL (PLAVIX) 75 mg tablet     cyanocobalamin, vitamin B-12, 1,000 mcg/mL Kit Inject 1 mL into the muscle every 21 days.    dabigatran etexilate (PRADAXA) 150 mg Cap Take 1 capsule (150 mg total) by mouth 2 (two) times daily. "Do NOT break, chew, or open capsules."    dabigatran etexilate (PRADAXA) 150 mg Cap Take 1 capsule (150 mg total) by mouth 2 (two) times daily. "Do NOT break, chew, or open capsules."    erenumab-aooe (AIMOVIG) 140 mg/mL autoinjector Inject 140 mg into the skin every 28 days.    fluoxetine (PROZAC) 20 MG capsule every evening.     HYDROmorphone (DILAUDID) 2 MG tablet Take 1 tablet (2 mg total) by mouth every 4 (four) hours as needed for Pain.    hydrOXYzine pamoate (VISTARIL) 25 MG Cap TAKE ONE CAPSULE BY MOUTH 3 TIMES A DAY AS NEEDED FOR ANXIETY    lorazepam (ATIVAN) 0.5 MG tablet Take 0.5 mg by mouth every 4 (four) hours as needed.     mupirocin (BACTROBAN) 2 % ointment Apply topically 2 (two) times daily. While incision redness persists    oxyCODONE (OXYCONTIN) 10 mg 12 hr tablet Take " 10 mg by mouth every 12 (twelve) hours.    oxyCODONE-acetaminophen (PERCOCET) 5-325 mg per tablet Take 1 tablet by mouth every 6 (six) hours as needed.    promethazine (PHENERGAN) 25 MG tablet Take 1 tablet (25 mg total) by mouth every 6 (six) hours as needed for Nausea. Take 1 tablet by mouth every 6 (six) hours as needed (migraine). -MAY CAUSE DROWSINESS-    tiZANidine (ZANAFLEX) 4 MG tablet Take 4 mg by mouth every 6 (six) hours as needed.    pantoprazole (PROTONIX) 40 MG tablet Take 1 tablet (40 mg total) by mouth 2 (two) times daily.     No current facility-administered medications for this visit.      Facility-Administered Medications Ordered in Other Visits   Medication    lactated ringers infusion    lidocaine (PF) 10 mg/ml (1%) injection 10 mg       Review of patient's allergies indicates:   Allergen Reactions    Clarithromycin Swelling and Other (See Comments)     DIFFICULTY SWALLOWING  DIFFICULTY SWALLOWING    Pcn [penicillins] Anaphylaxis    Sulfa (sulfonamide antibiotics) Hives    Wellbutrin [bupropion hcl] Shortness Of Breath and Anaphylaxis    Betadine [povidone-iodine] Hives     Swelling      Cefprozil Hives    Iodinated contrast media Hives    Latex, natural rubber Rash    Sulfamethoxazole-trimethoprim Nausea And Vomiting    Iodine Hives and Swelling    Latex Hives and Swelling       Family History   Problem Relation Age of Onset    Heart disease Mother     Heart disease Father     Esophageal cancer Maternal Grandmother     Breast cancer Maternal Aunt 46    Colon cancer Neg Hx     Stomach cancer Neg Hx     Ovarian cancer Neg Hx        Social History     Socioeconomic History    Marital status:      Spouse name: Not on file    Number of children: Not on file    Years of education: Not on file    Highest education level: Not on file   Occupational History    Not on file   Social Needs    Financial resource strain: Not on file    Food insecurity     Worry: Not on  file     Inability: Not on file    Transportation needs     Medical: Not on file     Non-medical: Not on file   Tobacco Use    Smoking status: Current Every Day Smoker     Packs/day: 0.25     Years: 20.00     Pack years: 5.00     Types: Cigarettes     Last attempt to quit: 2018     Years since quittin.7    Smokeless tobacco: Never Used   Substance and Sexual Activity    Alcohol use: Yes     Alcohol/week: 0.0 standard drinks     Comment: occasionally    Drug use: Not Currently     Types: Other-see comments     Comment: no    Sexual activity: Yes     Partners: Male   Lifestyle    Physical activity     Days per week: Not on file     Minutes per session: Not on file    Stress: Not on file   Relationships    Social connections     Talks on phone: Not on file     Gets together: Not on file     Attends Oriental orthodox service: Not on file     Active member of club or organization: Not on file     Attends meetings of clubs or organizations: Not on file     Relationship status: Not on file   Other Topics Concern    Not on file   Social History Narrative    Not on file       Chief Complaint:   Chief Complaint   Patient presents with    Post-op Evaluation     s/p right knee TKA        Date of surgery:  2020    History of present illness:  40-year-old female underwent right total knee arthroplasty.  Patient has had some complications with clotting issues in the past despite being on anticoagulation.  Ankle pain is much better.  Boot seems to help.  She has been working on range of motion and has good flexion but still lacking a little extension.  Pain is 6/10.  Having some pain as well as some knots along the IT band and along her pes tendons.    Review of Systems:    Musculoskeletal:  See HPI        Physical Examination:    Vital Signs:    Vitals:    20 1108   Resp: 16       Body mass index is 31.06 kg/m².    This a well-developed, well nourished patient in no acute distress.  They are alert and  oriented and cooperative to examination.  Pt. walks with a moderate antalgic gait    Examination the right knee shows well-healed surgical incision.  No erythema or drainage.  Patient has range of motion is about 5° to 100°.  Bruising and swelling in the ankle is much better.  Has some tightness and pain along the IT band and along the pes tendons.    X-rays:  X-rays of the right knee are reviewed which show well-aligned total knee arthroplasty components without complication     Assessment::  Status post right Clio CR total knee arthroplasty      Plan:  I reviewed the findings with her today.  She is doing better.  We will get her into outpatient physical therapy.  Follow-up in 6 weeks with four views of the right knee.  We will also get her a cane today to prevent any falls.    This note was created using M Modal voice recognition software that occasionally misinterpreted phrases or words.

## 2020-08-10 ENCOUNTER — LAB VISIT (OUTPATIENT)
Dept: LAB | Facility: HOSPITAL | Age: 40
End: 2020-08-10
Attending: INTERNAL MEDICINE
Payer: COMMERCIAL

## 2020-08-10 DIAGNOSIS — Z51.81 MONITORING FOR ANTICOAGULANT USE: ICD-10-CM

## 2020-08-10 DIAGNOSIS — Z79.01 MONITORING FOR ANTICOAGULANT USE: ICD-10-CM

## 2020-08-10 DIAGNOSIS — R60.9 EDEMA, UNSPECIFIED TYPE: ICD-10-CM

## 2020-08-10 DIAGNOSIS — I82.551 CHRONIC DEEP VEIN THROMBOSIS (DVT) OF RIGHT PERONEAL VEIN: ICD-10-CM

## 2020-08-10 DIAGNOSIS — Z79.02 PLATELET INHIBITION DUE TO PLAVIX: ICD-10-CM

## 2020-08-10 LAB
ALBUMIN SERPL BCP-MCNC: 3.6 G/DL (ref 3.5–5.2)
ALP SERPL-CCNC: 118 U/L (ref 55–135)
ALT SERPL W/O P-5'-P-CCNC: 18 U/L (ref 10–44)
ANION GAP SERPL CALC-SCNC: 11 MMOL/L (ref 8–16)
AST SERPL-CCNC: 18 U/L (ref 10–40)
BASOPHILS NFR BLD: 0 % (ref 0–1.9)
BILIRUB SERPL-MCNC: 0.3 MG/DL (ref 0.1–1)
BUN SERPL-MCNC: 4 MG/DL (ref 6–20)
CALCIUM SERPL-MCNC: 9.5 MG/DL (ref 8.7–10.5)
CHLORIDE SERPL-SCNC: 104 MMOL/L (ref 95–110)
CO2 SERPL-SCNC: 26 MMOL/L (ref 23–29)
CREAT SERPL-MCNC: 0.7 MG/DL (ref 0.5–1.4)
DIFFERENTIAL METHOD: NORMAL
EOSINOPHIL NFR BLD: 2 % (ref 0–8)
ERYTHROCYTE [DISTWIDTH] IN BLOOD BY AUTOMATED COUNT: 14.3 % (ref 11.5–14.5)
EST. GFR  (AFRICAN AMERICAN): >60 ML/MIN/1.73 M^2
EST. GFR  (NON AFRICAN AMERICAN): >60 ML/MIN/1.73 M^2
GLUCOSE SERPL-MCNC: 99 MG/DL (ref 70–110)
HCT VFR BLD AUTO: 42.2 % (ref 37–48.5)
HGB BLD-MCNC: 13.5 G/DL (ref 12–16)
IMM GRANULOCYTES # BLD AUTO: NORMAL K/UL
IMM GRANULOCYTES NFR BLD AUTO: NORMAL %
LYMPHOCYTES NFR BLD: 30 % (ref 18–48)
MCH RBC QN AUTO: 30.6 PG (ref 27–31)
MCHC RBC AUTO-ENTMCNC: 32 G/DL (ref 32–36)
MCV RBC AUTO: 96 FL (ref 82–98)
MONOCYTES NFR BLD: 5 % (ref 4–15)
NEUTROPHILS NFR BLD: 63 % (ref 38–73)
NRBC BLD-RTO: 0 /100 WBC
PLATELET # BLD AUTO: 300 K/UL (ref 150–350)
PMV BLD AUTO: 9.9 FL (ref 9.2–12.9)
POTASSIUM SERPL-SCNC: 3.6 MMOL/L (ref 3.5–5.1)
PROT SERPL-MCNC: 7.8 G/DL (ref 6–8.4)
RBC # BLD AUTO: 4.41 M/UL (ref 4–5.4)
SODIUM SERPL-SCNC: 141 MMOL/L (ref 136–145)
WBC # BLD AUTO: 9.47 K/UL (ref 3.9–12.7)

## 2020-08-10 PROCEDURE — 36415 COLL VENOUS BLD VENIPUNCTURE: CPT

## 2020-08-10 PROCEDURE — 85007 BL SMEAR W/DIFF WBC COUNT: CPT

## 2020-08-10 PROCEDURE — 80053 COMPREHEN METABOLIC PANEL: CPT

## 2020-08-10 PROCEDURE — 85027 COMPLETE CBC AUTOMATED: CPT

## 2020-08-18 ENCOUNTER — OFFICE VISIT (OUTPATIENT)
Dept: HEMATOLOGY/ONCOLOGY | Facility: CLINIC | Age: 40
End: 2020-08-18
Payer: COMMERCIAL

## 2020-08-18 ENCOUNTER — TELEPHONE (OUTPATIENT)
Dept: PHARMACY | Facility: CLINIC | Age: 40
End: 2020-08-18

## 2020-08-18 VITALS
HEART RATE: 66 BPM | WEIGHT: 234.81 LBS | SYSTOLIC BLOOD PRESSURE: 115 MMHG | BODY MASS INDEX: 31.84 KG/M2 | TEMPERATURE: 98 F | DIASTOLIC BLOOD PRESSURE: 71 MMHG | OXYGEN SATURATION: 96 % | RESPIRATION RATE: 18 BRPM

## 2020-08-18 DIAGNOSIS — T14.8XXA: ICD-10-CM

## 2020-08-18 DIAGNOSIS — Z79.01 ANTICOAGULATED: ICD-10-CM

## 2020-08-18 DIAGNOSIS — M19.90 OSTEOARTHRITIS, UNSPECIFIED OSTEOARTHRITIS TYPE, UNSPECIFIED SITE: ICD-10-CM

## 2020-08-18 DIAGNOSIS — Z86.711 HISTORY OF PULMONARY EMBOLUS (PE): Primary | ICD-10-CM

## 2020-08-18 PROCEDURE — 3008F BODY MASS INDEX DOCD: CPT | Mod: CPTII,S$GLB,, | Performed by: INTERNAL MEDICINE

## 2020-08-18 PROCEDURE — 99214 OFFICE O/P EST MOD 30 MIN: CPT | Mod: S$GLB,,, | Performed by: INTERNAL MEDICINE

## 2020-08-18 PROCEDURE — 99999 PR PBB SHADOW E&M-EST. PATIENT-LVL V: ICD-10-PCS | Mod: PBBFAC,,, | Performed by: INTERNAL MEDICINE

## 2020-08-18 PROCEDURE — 3008F PR BODY MASS INDEX (BMI) DOCUMENTED: ICD-10-PCS | Mod: CPTII,S$GLB,, | Performed by: INTERNAL MEDICINE

## 2020-08-18 PROCEDURE — 99999 PR PBB SHADOW E&M-EST. PATIENT-LVL V: CPT | Mod: PBBFAC,,, | Performed by: INTERNAL MEDICINE

## 2020-08-18 PROCEDURE — 99214 PR OFFICE/OUTPT VISIT, EST, LEVL IV, 30-39 MIN: ICD-10-PCS | Mod: S$GLB,,, | Performed by: INTERNAL MEDICINE

## 2020-08-18 RX ORDER — FLUOXETINE HYDROCHLORIDE 40 MG/1
CAPSULE ORAL
Status: ON HOLD | COMMUNITY
Start: 2020-07-16 | End: 2020-10-06

## 2020-08-18 NOTE — PROGRESS NOTES
CC pt tolerating pradaxa    Aleyda Costa is a 40 y.o.    Pt had undergone a total left knee replacement in November 2018 when a few days later she presented to ER with increasing SOB. Workup revealed bilateral PE. SHe was placed on xarelto. SHe had an ultrasound of legs which was negative.Repeat CTA revealed no resolution of PE when she saw me. I changed her to pradaxa. She is here to review repeat CTA . She mistakenly quit taking pradaxa a few weeks ago. She has no SOB or pain . No swelling of her legs   Pt had a gastric sleeve in 2014.   She is here for results of hyper coag workup in order for her to proceed with Right knee replacement     Hexagonal test negative   CT chest hiatal hernia was reported as a paraesophageal mass   Spoke with radiologist     July 13 2020   Post fracture right   Lovenox     August 2020  Here to review imaging studies patient is having pain in her lower extremity unsure if she has any fracture broken bone.  She is seeing an orthopedist as well as and ankle specialist.  She is unsure if she is going to require surgical intervention in the near future.  She reports that she is having pins and needle sensations in her foot    Past Medical History:   Diagnosis Date    Abnormal Pap smear of cervix     Arthritis     OA    Back pain     Cancer     skin cancer to back    Depression     Endometriosis     Full dentures     GERD (gastroesophageal reflux disease)     Migraine     Pulmonary embolism 2018    Seizures     Sleep apnea     Does not use c-pap since weight loss - 170 lbs    Uterine fibroid     Wears glasses    She remains on Prozac for mood and B12 to help with energy      Current Outpatient Medications:     amitriptyline (ELAVIL) 25 MG tablet, TAKE ONE TABLET BY MOUTH EVERY NIGHT FOR 2 WEEKS THEN INCREASE TO 2 TABLETS AT BEDTIME, Disp: , Rfl:     atorvastatin (LIPITOR) 40 MG tablet, , Disp: , Rfl:     cetirizine (ZYRTEC) 5 MG tablet, Take 5 mg by mouth as needed.  ", Disp: , Rfl:     clopidogreL (PLAVIX) 75 mg tablet, , Disp: , Rfl:     cyanocobalamin, vitamin B-12, 1,000 mcg/mL Kit, Inject 1 mL into the muscle every 21 days., Disp: , Rfl:     dabigatran etexilate (PRADAXA) 150 mg Cap, Take 1 capsule (150 mg total) by mouth 2 (two) times daily. "Do NOT break, chew, or open capsules.", Disp: 60 capsule, Rfl: 0    erenumab-aooe (AIMOVIG) 140 mg/mL autoinjector, Inject 140 mg into the skin every 28 days., Disp: 1 mL, Rfl: 11    FLUoxetine 40 MG capsule, Take 1 capsule (40 mg total) by mouth daily., Disp: , Rfl:     hydrOXYzine pamoate (VISTARIL) 25 MG Cap, TAKE ONE CAPSULE BY MOUTH 3 TIMES A DAY AS NEEDED FOR ANXIETY, Disp: , Rfl:     lorazepam (ATIVAN) 0.5 MG tablet, Take 0.5 mg by mouth every 4 (four) hours as needed. , Disp: , Rfl:     oxyCODONE (OXYCONTIN) 10 mg 12 hr tablet, Take 10 mg by mouth every 12 (twelve) hours., Disp: , Rfl:     oxyCODONE-acetaminophen (PERCOCET) 5-325 mg per tablet, Take 1 tablet by mouth every 6 (six) hours as needed., Disp: 40 tablet, Rfl: 0    pantoprazole (PROTONIX) 40 MG tablet, Take 1 tablet (40 mg total) by mouth 2 (two) times daily., Disp: 60 tablet, Rfl: 1    promethazine (PHENERGAN) 25 MG tablet, Take 1 tablet (25 mg total) by mouth every 6 (six) hours as needed for Nausea. Take 1 tablet by mouth every 6 (six) hours as needed (migraine). -MAY CAUSE DROWSINESS-, Disp: 20 tablet, Rfl: 3    tiZANidine (ZANAFLEX) 4 MG tablet, Take 4 mg by mouth every 6 (six) hours as needed., Disp: , Rfl:     AIMOVIG AUTOINJECTOR 140 mg/mL AtIn, , Disp: , Rfl:     dabigatran etexilate (PRADAXA) 150 mg Cap, Take 1 capsule (150 mg total) by mouth 2 (two) times daily. "Do NOT break, chew, or open capsules." (Patient not taking: Reported on 8/18/2020), Disp: 60 capsule, Rfl: 0    fluoxetine (PROZAC) 20 MG capsule, every evening. , Disp: , Rfl: 3    HYDROmorphone (DILAUDID) 2 MG tablet, Take 1 tablet (2 mg total) by mouth every 4 (four) hours as " needed for Pain. (Patient not taking: Reported on 2020), Disp: 40 tablet, Rfl: 0    mupirocin (BACTROBAN) 2 % ointment, Apply topically 2 (two) times daily. While incision redness persists (Patient not taking: Reported on 2020), Disp:  , Rfl:   No current facility-administered medications for this visit.     Facility-Administered Medications Ordered in Other Visits:     lactated ringers infusion, , Intravenous, Continuous, Anjel Hernandez MD, Stopped at 10/25/19 1115    lidocaine (PF) 10 mg/ml (1%) injection 10 mg, 1 mL, Intradermal, Once, Anjel Hernandez MD    Social History     Tobacco Use    Smoking status: Current Every Day Smoker     Packs/day: 0.25     Years: 20.00     Pack years: 5.00     Types: Cigarettes     Last attempt to quit: 2018     Years since quittin.7    Smokeless tobacco: Never Used   Substance Use Topics    Alcohol use: Yes     Alcohol/week: 0.0 standard drinks     Comment: occasionally    Drug use: Not Currently     Types: Other-see comments     Comment: no     Family History   Problem Relation Age of Onset    Heart disease Mother     Heart disease Father     Esophageal cancer Maternal Grandmother     Breast cancer Maternal Aunt 46    Colon cancer Neg Hx     Stomach cancer Neg Hx     Ovarian cancer Neg Hx        CONSTITUTIONAL: No fevers, chills, night sweats, wt. loss, appetite changes  SKIN: no rashes or itching  ENT: No headaches, head trauma,or eye pain , no discharge  LYMPH NODES: None noticed   CV: No chest pain, palpitations.   RESP: No dyspnea on exertion, cough, wheezing, or hemoptysis  GI:  Intermittent nausea, no emesis, diarrhea, constipation, melena   : No dysuria, hematuria, urgency, or frequency   HEME: No easy bruising, bleeding problems  PSYCHIATRIC: No depression, anxiety, hallucinations.  NEURO: No seizures, memory loss, dizziness    Musculoskeletal positive arthralgias positive paresthesias     Vitals:    20 1313   BP: 115/71    Pulse: 66   Resp: 18   Temp: 97.8 °F (36.6 °C)       Height / weight / VS reviewed  GENERAL.: Well-developed, well-nourished, in no acute distress  PSYCH: pleasant affect, no anxiety, no depression  HEENT: Normocephalic, lids intact, conjunctiva pink, Normal auricula, nasal septum, dentition, OP clear,no palatal pallor  NECK: Supple,trachea midline, no palpable abnormalities  LYMPHATICS: No cervical or axillary adenopathy  RESPIRATORY: CTAB, no wheezes, rubs or rhonchi  Good respiratory effort without any retractions or diaphragmatic movement  CARDIOVASCULAR: no JVD, S1 / S2 with RRR, no murmur, no rub,2+ capillary refill  ABDOMEN: NTND, normal bowel sounds, no palpable HSM or mass  EXTREMITIES: No cyanosis, no clubbing, no edema, no joint effusion  NEUROLOGICAL: alert and oriented x 3, cranial nerves grossly intact  SKIN: Warm, dry, no rash or lesions noted   Boot right foot   Edema evident       Lab Results   Component Value Date    WBC 9.47 08/10/2020    HGB 13.5 08/10/2020    HCT 42.2 08/10/2020    MCV 96 08/10/2020     08/10/2020     CMP  Sodium   Date Value Ref Range Status   08/10/2020 141 136 - 145 mmol/L Final     Potassium   Date Value Ref Range Status   08/10/2020 3.6 3.5 - 5.1 mmol/L Final     Chloride   Date Value Ref Range Status   08/10/2020 104 95 - 110 mmol/L Final     CO2   Date Value Ref Range Status   08/10/2020 26 23 - 29 mmol/L Final     Glucose   Date Value Ref Range Status   08/10/2020 99 70 - 110 mg/dL Final     BUN, Bld   Date Value Ref Range Status   08/10/2020 4 (L) 6 - 20 mg/dL Final     Creatinine   Date Value Ref Range Status   08/10/2020 0.7 0.5 - 1.4 mg/dL Final     Calcium   Date Value Ref Range Status   08/10/2020 9.5 8.7 - 10.5 mg/dL Final     Total Protein   Date Value Ref Range Status   08/10/2020 7.8 6.0 - 8.4 g/dL Final     Albumin   Date Value Ref Range Status   08/10/2020 3.6 3.5 - 5.2 g/dL Final     Total Bilirubin   Date Value Ref Range Status   08/10/2020 0.3 0.1  - 1.0 mg/dL Final     Comment:     For infants and newborns, interpretation of results should be based  on gestational age, weight and in agreement with clinical  observations.  Premature Infant recommended reference ranges:  Up to 24 hours.............<8.0 mg/dL  Up to 48 hours............<12.0 mg/dL  3-5 days..................<15.0 mg/dL  6-29 days.................<15.0 mg/dL       Alkaline Phosphatase   Date Value Ref Range Status   08/10/2020 118 55 - 135 U/L Final     AST   Date Value Ref Range Status   08/10/2020 18 10 - 40 U/L Final     ALT   Date Value Ref Range Status   08/10/2020 18 10 - 44 U/L Final     Anion Gap   Date Value Ref Range Status   08/10/2020 11 8 - 16 mmol/L Final     eGFR if    Date Value Ref Range Status   08/10/2020 >60 >60 mL/min/1.73 m^2 Final     eGFR if non    Date Value Ref Range Status   08/10/2020 >60 >60 mL/min/1.73 m^2 Final     Comment:     Calculation used to obtain the estimated glomerular filtration  rate (eGFR) is the CKD-EPI equation.      reviewed scans   No pain  No fall risk   Repeat labs negative    History of pulmonary embolus (PE)    Anticoagulated    Osteoarthritis, unspecified osteoarthritis type, unspecified site  -     CBC auto differential; Standing  -     CMP; Future; Expected date: 08/18/2020  -     MRI Foot Toes W WO Contrast Right; Future; Expected date: 08/18/2020  -     MRI Ankle W WO Contrast Right; Future; Expected date: 08/18/2020    Fractured bone  -     Cancel: DXA Bone Density Spine And Hip; Future; Expected date: 08/18/2020  -     MRI Foot Toes W WO Contrast Right; Future; Expected date: 08/18/2020  -     MRI Ankle W WO Contrast Right; Future; Expected date: 08/18/2020    see ortho and discuss ankle replacement   Send for MRI foot toes and ankle will call with results and recs    Start strontium D3 and K2   cont pradaxa    RTC 1 month   No further ankle injections   No further steroid injections  Once recovered will  need bone density scan   She has no antiphospholipid syndrome or lupus anticoagulant. She must stay hydrated : she is at risk of clotting if she remains dehydrated post gastric bypass.   Given the growth of the subcarinal lymph node from 1 cm to 1.6 cm , she needs a repeat CT chest with contrast in 3 months; history tobacco use   Cont pradaxa 75 mg po bid as she is on plavix and aspirin because of cardiac issues  Unable to have cardiac stent due to location of blockage       Thank you for allowing me to evaluate and participate in the care of this pleasant patient. Please fell free to call me with any questions or concerns.    Rabia Mcgowan MD    This note was dictated with Dragon and briefly proofread. Please excuse any sentences that may be unclear or words misspelled

## 2020-08-20 ENCOUNTER — HOSPITAL ENCOUNTER (OUTPATIENT)
Dept: RADIOLOGY | Facility: HOSPITAL | Age: 40
Discharge: HOME | End: 2020-08-20
Attending: INTERNAL MEDICINE
Payer: COMMERCIAL

## 2020-08-20 DIAGNOSIS — T14.8XXA: ICD-10-CM

## 2020-08-20 DIAGNOSIS — M19.90 OSTEOARTHRITIS, UNSPECIFIED OSTEOARTHRITIS TYPE, UNSPECIFIED SITE: ICD-10-CM

## 2020-08-20 PROCEDURE — 73718 MRI LOWER EXTREMITY W/O DYE: CPT | Mod: 26,RT,, | Performed by: RADIOLOGY

## 2020-08-20 PROCEDURE — 73721 MRI ANKLE WITHOUT CONTRAST RIGHT: ICD-10-PCS | Mod: 26,RT,, | Performed by: RADIOLOGY

## 2020-08-20 PROCEDURE — 73718 MRI FOOT TOES WITHOUT CONTRAST RIGHT: ICD-10-PCS | Mod: 26,RT,, | Performed by: RADIOLOGY

## 2020-08-20 PROCEDURE — 73718 MRI LOWER EXTREMITY W/O DYE: CPT | Mod: TC,RT

## 2020-08-20 PROCEDURE — 73721 MRI JNT OF LWR EXTRE W/O DYE: CPT | Mod: TC,RT

## 2020-08-20 PROCEDURE — 73721 MRI JNT OF LWR EXTRE W/O DYE: CPT | Mod: 26,RT,, | Performed by: RADIOLOGY

## 2020-08-27 ENCOUNTER — TELEPHONE (OUTPATIENT)
Dept: HEMATOLOGY/ONCOLOGY | Facility: CLINIC | Age: 40
End: 2020-08-27

## 2020-08-27 NOTE — TELEPHONE ENCOUNTER
Spoke to pt, gave results. Pt verbalized understanding.     ----- Message from Rabia Pina MD sent at 8/25/2020  9:28 PM CDT -----  Please call the patient regarding her result. No bone fracture just inflammation :)

## 2020-09-13 ENCOUNTER — TELEPHONE (OUTPATIENT)
Dept: HEMATOLOGY/ONCOLOGY | Facility: CLINIC | Age: 40
End: 2020-09-13

## 2020-09-14 NOTE — TELEPHONE ENCOUNTER
Call pt to see ortho  and bring her MRI reports   I am unsure what to make of the bone infarcts and osteochondral lesions and this may be causing her pain

## 2020-09-16 ENCOUNTER — PATIENT MESSAGE (OUTPATIENT)
Dept: HEMATOLOGY/ONCOLOGY | Facility: CLINIC | Age: 40
End: 2020-09-16

## 2020-09-16 DIAGNOSIS — Z96.651 S/P TOTAL KNEE ARTHROPLASTY, RIGHT: Primary | ICD-10-CM

## 2020-09-17 ENCOUNTER — OFFICE VISIT (OUTPATIENT)
Dept: ORTHOPEDICS | Facility: CLINIC | Age: 40
End: 2020-09-17
Payer: COMMERCIAL

## 2020-09-17 ENCOUNTER — HOSPITAL ENCOUNTER (OUTPATIENT)
Dept: RADIOLOGY | Facility: HOSPITAL | Age: 40
Discharge: HOME | End: 2020-09-17
Attending: ORTHOPAEDIC SURGERY
Payer: COMMERCIAL

## 2020-09-17 ENCOUNTER — PATIENT MESSAGE (OUTPATIENT)
Dept: HEMATOLOGY/ONCOLOGY | Facility: CLINIC | Age: 40
End: 2020-09-17

## 2020-09-17 VITALS — RESPIRATION RATE: 16 BRPM | HEIGHT: 72 IN | BODY MASS INDEX: 31.69 KG/M2 | WEIGHT: 234 LBS

## 2020-09-17 DIAGNOSIS — Z96.651 S/P TOTAL KNEE ARTHROPLASTY, RIGHT: ICD-10-CM

## 2020-09-17 DIAGNOSIS — Z96.651 S/P TOTAL KNEE ARTHROPLASTY, RIGHT: Primary | ICD-10-CM

## 2020-09-17 DIAGNOSIS — Z86.711 HISTORY OF PULMONARY EMBOLUS (PE): Primary | ICD-10-CM

## 2020-09-17 PROCEDURE — 73564 XR KNEE ORTHO RIGHT WITH FLEXION: ICD-10-PCS | Mod: 26,RT,, | Performed by: RADIOLOGY

## 2020-09-17 PROCEDURE — 73562 XR KNEE ORTHO RIGHT WITH FLEXION: ICD-10-PCS | Mod: 26,59,LT, | Performed by: RADIOLOGY

## 2020-09-17 PROCEDURE — 3008F PR BODY MASS INDEX (BMI) DOCUMENTED: ICD-10-PCS | Mod: CPTII,S$GLB,, | Performed by: ORTHOPAEDIC SURGERY

## 2020-09-17 PROCEDURE — 99999 PR PBB SHADOW E&M-EST. PATIENT-LVL III: CPT | Mod: PBBFAC,,, | Performed by: ORTHOPAEDIC SURGERY

## 2020-09-17 PROCEDURE — 99213 OFFICE O/P EST LOW 20 MIN: CPT | Mod: S$GLB,,, | Performed by: ORTHOPAEDIC SURGERY

## 2020-09-17 PROCEDURE — 73564 X-RAY EXAM KNEE 4 OR MORE: CPT | Mod: 26,RT,, | Performed by: RADIOLOGY

## 2020-09-17 PROCEDURE — 3008F BODY MASS INDEX DOCD: CPT | Mod: CPTII,S$GLB,, | Performed by: ORTHOPAEDIC SURGERY

## 2020-09-17 PROCEDURE — 73562 X-RAY EXAM OF KNEE 3: CPT | Mod: 26,59,LT, | Performed by: RADIOLOGY

## 2020-09-17 PROCEDURE — 99213 PR OFFICE/OUTPT VISIT, EST, LEVL III, 20-29 MIN: ICD-10-PCS | Mod: S$GLB,,, | Performed by: ORTHOPAEDIC SURGERY

## 2020-09-17 PROCEDURE — 73562 X-RAY EXAM OF KNEE 3: CPT | Mod: TC,PN,LT,59

## 2020-09-17 PROCEDURE — 99999 PR PBB SHADOW E&M-EST. PATIENT-LVL III: ICD-10-PCS | Mod: PBBFAC,,, | Performed by: ORTHOPAEDIC SURGERY

## 2020-09-17 NOTE — PROGRESS NOTES
Past Medical History:   Diagnosis Date    Abnormal Pap smear of cervix     Arthritis     OA    Back pain     Cancer     skin cancer to back    Depression     Endometriosis     Full dentures     GERD (gastroesophageal reflux disease)     Migraine     Pulmonary embolism     Seizures     Sleep apnea     Does not use c-pap since weight loss - 170 lbs    Uterine fibroid     Wears glasses        Past Surgical History:   Procedure Laterality Date    ANGIOGRAM, CORONARY, WITH LEFT HEART CATHETERIZATION Left 2020    Procedure: Left heart cath;  Surgeon: Jm Guthrie MD;  Location: [facility] CATH/EP LAB;  Service: Cardiology;  Laterality: Left;    APPENDECTOMY      CARPAL TUNNEL RELEASE Bilateral      SECTION      CHOLECYSTECTOMY      COLONOSCOPY N/A 2019    Procedure: COLONOSCOPY;  Surgeon: Anselmo Blackwell MD;  Location: [facility] ENDO;  Service: Endoscopy;  Laterality: N/A;    EPIDURAL STEROID INJECTION INTO LUMBAR SPINE N/A 2019    Procedure: Injection-steroid-epidural-lumbar;  Surgeon: Yariel Ramirez MD;  Location: [facility] OR;  Service: Pain Management;  Laterality: N/A;  L3-4    ESOPHAGOGASTRODUODENOSCOPY N/A 2019    Procedure: EGD (ESOPHAGOGASTRODUODENOSCOPY);  Surgeon: Anselmo Blackwell MD;  Location: [facility] ENDO;  Service: Endoscopy;  Laterality: N/A;    FRACTURE SURGERY      ankle    gastric sleve      HYSTERECTOMY      endo and fibroids    HYSTERECTOMY      JOINT REPLACEMENT Left 2018    KNEE ARTHROPLASTY Left 2018    Procedure: ARTHROPLASTY, KNEE;  Surgeon: Feliberto Cardenas MD;  Location: [facility] OR;  Service: Orthopedics;  Laterality: Left;    KNEE ARTHROPLASTY Right 2020    Procedure: ARTHROPLASTY, KNEE;  Surgeon: Feliberto Cardenas MD;  Location: [facility] OR;  Service: Orthopedics;  Laterality: Right;    KNEE ARTHROSCOPY W/ MENISCECTOMY Right 10/25/2019    Procedure: ARTHROSCOPY, KNEE, WITH MENISCECTOMY;  Surgeon: Feliberto Perez  "MD Ronald;  Location: [facility] OR;  Service: Orthopedics;  Laterality: Right;    KNEE SURGERY      LUMBAR EPIDURAL INJECTION      OOPHORECTOMY Left     LSO    RADIAL NERVE Right     RECONSTRUCTION OF MEDIAL PATELLOFEMORAL LIGAMENT OF RIGHT KNEE Right 10/25/2019    Procedure: RECONSTRUCTION, LIGAMENT, MEDIAL PATELLOFEMORAL, RIGHT;  Surgeon: Feliberto Cardenas MD;  Location: [facility] OR;  Service: Orthopedics;  Laterality: Right;    SINUS SURGERY      UPPER GASTROINTESTINAL ENDOSCOPY  11/27/2019    Dr. Blackwell; mild schatzki ring-dilated; gastritis; bx in process       Current Outpatient Medications   Medication Sig    AIMOVIG AUTOINJECTOR 140 mg/mL AtIn     amitriptyline (ELAVIL) 25 MG tablet TAKE ONE TABLET BY MOUTH EVERY NIGHT FOR 2 WEEKS THEN INCREASE TO 2 TABLETS AT BEDTIME    atorvastatin (LIPITOR) 40 MG tablet     cetirizine (ZYRTEC) 5 MG tablet Take 5 mg by mouth as needed.     clopidogreL (PLAVIX) 75 mg tablet     cyanocobalamin, vitamin B-12, 1,000 mcg/mL Kit Inject 1 mL into the muscle every 21 days.    dabigatran etexilate (PRADAXA) 150 mg Cap Take 1 capsule (150 mg total) by mouth 2 (two) times daily. "Do NOT break, chew, or open capsules."    dabigatran etexilate (PRADAXA) 150 mg Cap Take 1 capsule (150 mg total) by mouth 2 (two) times daily. "Do NOT break, chew, or open capsules."    erenumab-aooe (AIMOVIG) 140 mg/mL autoinjector Inject 140 mg into the skin every 28 days.    fluoxetine (PROZAC) 20 MG capsule every evening.     FLUoxetine 40 MG capsule Take 1 capsule (40 mg total) by mouth daily.    HYDROmorphone (DILAUDID) 2 MG tablet Take 1 tablet (2 mg total) by mouth every 4 (four) hours as needed for Pain.    hydrOXYzine pamoate (VISTARIL) 25 MG Cap TAKE ONE CAPSULE BY MOUTH 3 TIMES A DAY AS NEEDED FOR ANXIETY    lorazepam (ATIVAN) 0.5 MG tablet Take 0.5 mg by mouth every 4 (four) hours as needed.     mupirocin (BACTROBAN) 2 % ointment Apply topically 2 (two) times daily. While " incision redness persists    oxyCODONE (OXYCONTIN) 10 mg 12 hr tablet Take 10 mg by mouth every 12 (twelve) hours.    oxyCODONE-acetaminophen (PERCOCET) 5-325 mg per tablet Take 1 tablet by mouth every 6 (six) hours as needed.    promethazine (PHENERGAN) 25 MG tablet Take 1 tablet (25 mg total) by mouth every 6 (six) hours as needed for Nausea. Take 1 tablet by mouth every 6 (six) hours as needed (migraine). -MAY CAUSE DROWSINESS-    tiZANidine (ZANAFLEX) 4 MG tablet Take 4 mg by mouth every 6 (six) hours as needed.    pantoprazole (PROTONIX) 40 MG tablet Take 1 tablet (40 mg total) by mouth 2 (two) times daily.     No current facility-administered medications for this visit.      Facility-Administered Medications Ordered in Other Visits   Medication    lactated ringers infusion    lidocaine (PF) 10 mg/ml (1%) injection 10 mg       Review of patient's allergies indicates:   Allergen Reactions    Clarithromycin Swelling and Other (See Comments)     DIFFICULTY SWALLOWING  DIFFICULTY SWALLOWING    Pcn [penicillins] Anaphylaxis    Sulfa (sulfonamide antibiotics) Hives    Wellbutrin [bupropion hcl] Shortness Of Breath and Anaphylaxis    Betadine [povidone-iodine] Hives     Swelling      Cefprozil Hives    Iodinated contrast media Hives    Latex, natural rubber Rash    Sulfamethoxazole-trimethoprim Nausea And Vomiting    Iodine Hives and Swelling    Latex Hives and Swelling       Family History   Problem Relation Age of Onset    Heart disease Mother     Heart disease Father     Esophageal cancer Maternal Grandmother     Breast cancer Maternal Aunt 46    Colon cancer Neg Hx     Stomach cancer Neg Hx     Ovarian cancer Neg Hx        Social History     Socioeconomic History    Marital status:      Spouse name: Not on file    Number of children: Not on file    Years of education: Not on file    Highest education level: Not on file   Occupational History    Not on file   Social Needs     Financial resource strain: Not on file    Food insecurity     Worry: Not on file     Inability: Not on file    Transportation needs     Medical: Not on file     Non-medical: Not on file   Tobacco Use    Smoking status: Current Every Day Smoker     Packs/day: 0.25     Years: 20.00     Pack years: 5.00     Types: Cigarettes     Last attempt to quit: 2018     Years since quittin.8    Smokeless tobacco: Never Used   Substance and Sexual Activity    Alcohol use: Yes     Alcohol/week: 0.0 standard drinks     Comment: occasionally    Drug use: Not Currently     Types: Other-see comments     Comment: no    Sexual activity: Yes     Partners: Male   Lifestyle    Physical activity     Days per week: Not on file     Minutes per session: Not on file    Stress: Not on file   Relationships    Social connections     Talks on phone: Not on file     Gets together: Not on file     Attends [employer] service: Not on file     Active member of club or organization: Not on file     Attends meetings of clubs or organizations: Not on file     Relationship status: Not on file   Other Topics Concern    Not on file   Social History Narrative    Not on file       Chief Complaint:   Chief Complaint   Patient presents with    Post-op Evaluation     s/p right knee TKA 20        Date of surgery:  2020    History of present illness:  40-year-old female underwent right total knee arthroplasty.  Patient has had some complications with clotting issues in the past despite being on anticoagulation.  Ankle pain is much better.  Boot seems to help.  She has been working on range of motion and has good flexion but still lacking a little extension.  Pain is 6/10.  It is a little swelling at times.  Soreness.    Review of Systems:    Musculoskeletal:  See HPI        Physical Examination:    Vital Signs:    Vitals:    20 1114   Resp: 16       Body mass index is 31.74 kg/m².    This a well-developed, well nourished  patient in no acute distress.  They are alert and oriented and cooperative to examination.  Pt. walks with a moderate antalgic gait    Examination the right knee shows well-healed surgical incision.  No erythema or drainage.  Patient has range of motion is about 5° to 120°.   Has some tightness and pain along the IT band and along the pes tendons.    X-rays:  X-rays of the right knee are ordered and reviewed which show well-aligned total knee arthroplasty components without complication     Assessment::  Status post right Lilly CR total knee arthroplasty      Plan:  I reviewed the findings with her today.  She is doing pretty well.  I think if she did not have to use the boot than her knee would not be as sore.  Follow-up in a couple months.  No x-rays needed.    This note was created using [id number] voice recognition software that occasionally misinterpreted phrases or words.

## 2020-09-18 ENCOUNTER — PATIENT MESSAGE (OUTPATIENT)
Dept: HEMATOLOGY/ONCOLOGY | Facility: CLINIC | Age: 40
End: 2020-09-18

## 2020-09-18 RX ORDER — DABIGATRAN ETEXILATE 150 MG/1
150 CAPSULE ORAL 2 TIMES DAILY
Qty: 60 CAPSULE | Refills: 0 | Status: SHIPPED | OUTPATIENT
Start: 2020-09-18 | End: 2020-09-28 | Stop reason: SDUPTHER

## 2020-09-21 ENCOUNTER — TELEPHONE (OUTPATIENT)
Dept: PHARMACY | Facility: CLINIC | Age: 40
End: 2020-09-21

## 2020-09-28 ENCOUNTER — OFFICE VISIT (OUTPATIENT)
Dept: HEMATOLOGY/ONCOLOGY | Facility: CLINIC | Age: 40
End: 2020-09-28
Payer: COMMERCIAL

## 2020-09-28 VITALS
TEMPERATURE: 98 F | BODY MASS INDEX: 31.24 KG/M2 | RESPIRATION RATE: 16 BRPM | DIASTOLIC BLOOD PRESSURE: 64 MMHG | OXYGEN SATURATION: 99 % | HEIGHT: 72 IN | HEART RATE: 70 BPM | SYSTOLIC BLOOD PRESSURE: 118 MMHG | WEIGHT: 230.63 LBS

## 2020-09-28 DIAGNOSIS — Z79.899 CURRENT USE OF PROTON PUMP INHIBITOR: ICD-10-CM

## 2020-09-28 DIAGNOSIS — Z86.711 HISTORY OF PULMONARY EMBOLUS (PE): Primary | ICD-10-CM

## 2020-09-28 DIAGNOSIS — R07.9 CHEST PAIN, UNSPECIFIED TYPE: ICD-10-CM

## 2020-09-28 DIAGNOSIS — Z79.01 ANTICOAGULATED: ICD-10-CM

## 2020-09-28 DIAGNOSIS — Z79.899 ON STATIN THERAPY: ICD-10-CM

## 2020-09-28 DIAGNOSIS — I82.551 CHRONIC DEEP VEIN THROMBOSIS (DVT) OF RIGHT PERONEAL VEIN: ICD-10-CM

## 2020-09-28 PROCEDURE — 3008F BODY MASS INDEX DOCD: CPT | Mod: CPTII,S$GLB,, | Performed by: INTERNAL MEDICINE

## 2020-09-28 PROCEDURE — 99214 PR OFFICE/OUTPT VISIT, EST, LEVL IV, 30-39 MIN: ICD-10-PCS | Mod: S$GLB,,, | Performed by: INTERNAL MEDICINE

## 2020-09-28 PROCEDURE — 99999 PR PBB SHADOW E&M-EST. PATIENT-LVL V: ICD-10-PCS | Mod: PBBFAC,,, | Performed by: INTERNAL MEDICINE

## 2020-09-28 PROCEDURE — 3008F PR BODY MASS INDEX (BMI) DOCUMENTED: ICD-10-PCS | Mod: CPTII,S$GLB,, | Performed by: INTERNAL MEDICINE

## 2020-09-28 PROCEDURE — 99999 PR PBB SHADOW E&M-EST. PATIENT-LVL V: CPT | Mod: PBBFAC,,, | Performed by: INTERNAL MEDICINE

## 2020-09-28 PROCEDURE — 99214 OFFICE O/P EST MOD 30 MIN: CPT | Mod: S$GLB,,, | Performed by: INTERNAL MEDICINE

## 2020-09-28 RX ORDER — DABIGATRAN ETEXILATE 150 MG/1
150 CAPSULE ORAL 2 TIMES DAILY
Qty: 60 CAPSULE | Refills: 2 | Status: SHIPPED | OUTPATIENT
Start: 2020-09-28 | End: 2020-11-24

## 2020-09-28 RX ORDER — DOXYCYCLINE 100 MG/1
100 TABLET ORAL 2 TIMES DAILY
COMMUNITY
Start: 2020-09-24 | End: 2020-10-06

## 2020-09-28 NOTE — PROGRESS NOTES
CC pt tolerating pradaxa and plavix     Aleyda Costa is a 40 y.o.    Pt had undergone a total left knee replacement in November 2018 when a few days later she presented to ER with increasing SOB. Workup revealed bilateral PE. SHe was placed on xarelto. SHe had an ultrasound of legs which was negative in July 2020 .Repeat CTA revealed no resolution of PE when she saw me. I changed her to pradaxa. SHe is shade 150 mg po BID . She is here to review repeat CTA . She is tolerating pradaxa and plavix  Pt had a gastric sleeve in 2014. SHe is having chest pain. SHe reports when she had the angiogram her cardiologist explained her vessels were too small for stents and she qualified for medical management alone . She is tolerating  lipitor   Unable to have cardiac stent due to location of blockage   She is post Right knee replacement in June then developed blood clot . Now she is due for fusion of right ankle     Reviewed MRI   Hexagonal test negative   CT chest hiatal hernia was reported as a paraesophageal mass   Spoke with radiologist     July 13 2020   Post fracture right   Lovenox     Past Medical History:   Diagnosis Date    Abnormal Pap smear of cervix     Arthritis     OA    Back pain     Cancer     skin cancer to back    Depression     Endometriosis     Full dentures     GERD (gastroesophageal reflux disease)     Migraine     Pulmonary embolism 2018    Seizures     Sleep apnea     Does not use c-pap since weight loss - 170 lbs    Uterine fibroid     Wears glasses    She remains on Prozac for mood and B12 to help with energy      Current Outpatient Medications:     AIMOVIG AUTOINJECTOR 140 mg/mL AtIn, , Disp: , Rfl:     amitriptyline (ELAVIL) 25 MG tablet, TAKE ONE TABLET BY MOUTH EVERY NIGHT FOR 2 WEEKS THEN INCREASE TO 2 TABLETS AT BEDTIME, Disp: , Rfl:     atorvastatin (LIPITOR) 40 MG tablet, , Disp: , Rfl:     cetirizine (ZYRTEC) 5 MG tablet, Take 5 mg by mouth as needed. , Disp: , Rfl:     " clopidogreL (PLAVIX) 75 mg tablet, , Disp: , Rfl:     cyanocobalamin, vitamin B-12, 1,000 mcg/mL Kit, Inject 1 mL into the muscle every 21 days., Disp: , Rfl:     dabigatran etexilate (PRADAXA) 150 mg Cap, Take 1 capsule (150 mg total) by mouth 2 (two) times daily. "Do NOT break, chew, or open capsules.", Disp: 60 capsule, Rfl: 0    dabigatran etexilate (PRADAXA) 150 mg Cap, Take 1 capsule (150 mg total) by mouth 2 (two) times daily. "Do NOT break, chew, or open capsules.", Disp: 60 capsule, Rfl: 0    doxycycline monohydrate 100 mg Tab, Take 100 mg by mouth 2 (two) times a day. Pt taking 1 tab bid [id number], Disp: , Rfl:     erenumab-aooe (AIMOVIG) 140 mg/mL autoinjector, Inject 140 mg into the skin every 28 days., Disp: 1 mL, Rfl: 11    fluoxetine (PROZAC) 20 MG capsule, every evening. , Disp: , Rfl: 3    FLUoxetine 40 MG capsule, Take 1 capsule (40 mg total) by mouth daily., Disp: , Rfl:     HYDROmorphone (DILAUDID) 2 MG tablet, Take 1 tablet (2 mg total) by mouth every 4 (four) hours as needed for Pain., Disp: 40 tablet, Rfl: 0    hydrOXYzine pamoate (VISTARIL) 25 MG Cap, TAKE ONE CAPSULE BY MOUTH 3 TIMES A DAY AS NEEDED FOR ANXIETY, Disp: , Rfl:     lorazepam (ATIVAN) 0.5 MG tablet, Take 0.5 mg by mouth every 4 (four) hours as needed. , Disp: , Rfl:     mupirocin (BACTROBAN) 2 % ointment, Apply topically 2 (two) times daily. While incision redness persists, Disp:  , Rfl:     oxyCODONE (OXYCONTIN) 10 mg 12 hr tablet, Take 10 mg by mouth every 12 (twelve) hours., Disp: , Rfl:     oxyCODONE-acetaminophen (PERCOCET) 5-325 mg per tablet, Take 1 tablet by mouth every 6 (six) hours as needed., Disp: 40 tablet, Rfl: 0    promethazine (PHENERGAN) 25 MG tablet, Take 1 tablet (25 mg total) by mouth every 6 (six) hours as needed for Nausea. Take 1 tablet by mouth every 6 (six) hours as needed (migraine). -MAY CAUSE DROWSINESS-, Disp: 20 tablet, Rfl: 3    tiZANidine (ZANAFLEX) 4 MG tablet, Take 4 mg by mouth " every 6 (six) hours as needed., Disp: , Rfl:     pantoprazole (PROTONIX) 40 MG tablet, Take 1 tablet (40 mg total) by mouth 2 (two) times daily., Disp: 60 tablet, Rfl: 1  No current facility-administered medications for this visit.     Facility-Administered Medications Ordered in Other Visits:     lactated ringers infusion, , Intravenous, Continuous, Anjel Hernandez MD, Stopped at 10/25/19 1115    lidocaine (PF) 10 mg/ml (1%) injection 10 mg, 1 mL, Intradermal, Once, Anjel Hernandez MD    Social History     Tobacco Use    Smoking status: Current Every Day Smoker     Packs/day: 0.25     Years: 20.00     Pack years: 5.00     Types: Cigarettes     Last attempt to quit: 2018     Years since quittin.8    Smokeless tobacco: Never Used   Substance Use Topics    Alcohol use: Yes     Alcohol/week: 0.0 standard drinks     Comment: occasionally    Drug use: Not Currently     Types: Other-see comments     Comment: no     Family History   Problem Relation Age of Onset    Heart disease Mother     Heart disease Father     Esophageal cancer Maternal Grandmother     Breast cancer Maternal Aunt 46    Colon cancer Neg Hx     Stomach cancer Neg Hx     Ovarian cancer Neg Hx        CONSTITUTIONAL: No fevers, chills, night sweats, wt. loss, appetite changes  SKIN: no rashes or itching  ENT: No headaches, head trauma,or eye pain , no discharge  LYMPH NODES: None noticed   CV: No chest pain, palpitations.   RESP: No dyspnea on exertion, cough, wheezing, or hemoptysis  GI:  Intermittent nausea, no emesis, diarrhea, constipation, melena   : No dysuria, hematuria, urgency, or frequency   HEME: No easy bruising, bleeding problems  PSYCHIATRIC: No depression, anxiety, hallucinations.  NEURO: No seizures, memory loss, dizziness    Musculoskeletal positive arthralgias positive paresthesias     Vitals:    20 1012   BP: 118/64   Pulse: 70   Resp: 16   Temp: 98.2 °F (36.8 °C)       Height / weight / VS  reviewed  GENERAL.: Well-developed, well-nourished, in no acute distress  PSYCH: pleasant affect, no anxiety, no depression  HEENT: Normocephalic, lids intact, conjunctiva pink, Normal auricula, nasal septum, dentition, OP clear,no palatal pallor  NECK: Supple,trachea midline, no palpable abnormalities  LYMPHATICS: No cervical or axillary adenopathy  RESPIRATORY: CTAB, no wheezes, rubs or rhonchi  Good respiratory effort without any retractions or diaphragmatic movement  CARDIOVASCULAR: no JVD, S1 / S2 with RRR, no murmur, no rub,2+ capillary refill  ABDOMEN: NTND, normal bowel sounds, no palpable HSM or mass  EXTREMITIES: No cyanosis, no clubbing, no edema, no joint effusion  NEUROLOGICAL: alert and oriented x 3, cranial nerves grossly intact  SKIN: Warm, dry, no rash or lesions noted   Boot right foot : that can be removed        Lab Results   Component Value Date    WBC 9.47 08/10/2020    HGB 13.5 08/10/2020    HCT 42.2 08/10/2020    MCV 96 08/10/2020     08/10/2020     CMP  Sodium   Date Value Ref Range Status   08/10/2020 141 136 - 145 mmol/L Final     Potassium   Date Value Ref Range Status   08/10/2020 3.6 3.5 - 5.1 mmol/L Final     Chloride   Date Value Ref Range Status   08/10/2020 104 95 - 110 mmol/L Final     CO2   Date Value Ref Range Status   08/10/2020 26 23 - 29 mmol/L Final     Glucose   Date Value Ref Range Status   08/10/2020 99 70 - 110 mg/dL Final     BUN, Bld   Date Value Ref Range Status   08/10/2020 4 (L) 6 - 20 mg/dL Final     Creatinine   Date Value Ref Range Status   08/10/2020 0.7 0.5 - 1.4 mg/dL Final     Calcium   Date Value Ref Range Status   08/10/2020 9.5 8.7 - 10.5 mg/dL Final     Total Protein   Date Value Ref Range Status   08/10/2020 7.8 6.0 - 8.4 g/dL Final     Albumin   Date Value Ref Range Status   08/10/2020 3.6 3.5 - 5.2 g/dL Final     Total Bilirubin   Date Value Ref Range Status   08/10/2020 0.3 0.1 - 1.0 mg/dL Final     Comment:     For infants and newborns,  "interpretation of results should be based  on gestational age, weight and in agreement with clinical  observations.  Premature Infant recommended reference ranges:  Up to 24 hours.............<8.0 mg/dL  Up to 48 hours............<12.0 mg/dL  3-5 days..................<15.0 mg/dL  6-29 days.................<15.0 mg/dL       Alkaline Phosphatase   Date Value Ref Range Status   08/10/2020 118 55 - 135 U/L Final     AST   Date Value Ref Range Status   08/10/2020 18 10 - 40 U/L Final     ALT   Date Value Ref Range Status   08/10/2020 18 10 - 44 U/L Final     Anion Gap   Date Value Ref Range Status   08/10/2020 11 8 - 16 mmol/L Final     eGFR if    Date Value Ref Range Status   08/10/2020 >60 >60 mL/min/1.73 m^2 Final     eGFR if non    Date Value Ref Range Status   08/10/2020 >60 >60 mL/min/1.73 m^2 Final     Comment:     Calculation used to obtain the estimated glomerular filtration  rate (eGFR) is the CKD-EPI equation.      reviewed scans   No pain  No fall risk   Repeat labs negative    History of pulmonary embolus (PE)  -     CBC auto differential; Standing  -     CMP; Future; Expected date: 09/28/2020  -     dabigatran etexilate (PRADAXA) 150 mg Cap; Take 1 capsule (150 mg total) by mouth 2 (two) times daily. "Do NOT break, chew, or open capsules."  Dispense: 60 capsule; Refill: 2    Chronic deep vein thrombosis (DVT) of right peroneal vein  -     CBC auto differential; Standing  -     CMP; Future; Expected date: 09/28/2020    Anticoagulated    Chest pain, unspecified type    On statin therapy    Current use of proton pump inhibitor            cont pradaxa  150 mg po BID  For history of DVT and PE   Cont plavix per cardiology  For chest  pain she must see cardiology for discussion of next step   No further ankle injections   No further steroid injections  Once recovered will need bone density scan   Cont physical therapy   Cont statin for cardiac protection   She promises to call " [place]   She has no antiphospholipid syndrome or lupus anticoagulant. She must stay hydrated : she is at risk of clotting if she remains dehydrated post gastric bypass.   Given the growth of the subcarinal lymph node from 1 cm to 1.6 cm , she needs a repeat CT chest with contrast in  months; history tobacco use   Cont pradaxa 75 mg po bid as she is on plavix and aspirin because of cardiac issues  Unable to have cardiac stent due to location of blockage   ocnt current regim e    Thank you for allowing me to evaluate and participate in the care of this pleasant patient. Please fell free to call me with any questions or concerns.    Rabia Mcgowan MD    This note was dictated with Dragon and briefly proofread. Please excuse any sentences that may be unclear or words misspelled

## 2020-10-05 PROCEDURE — 93010 EKG 12-LEAD: ICD-10-PCS | Mod: ,,, | Performed by: INTERNAL MEDICINE

## 2020-10-05 PROCEDURE — 93010 ELECTROCARDIOGRAM REPORT: CPT | Mod: ,,, | Performed by: INTERNAL MEDICINE

## 2020-10-05 PROCEDURE — 93005 ELECTROCARDIOGRAM TRACING: CPT

## 2020-10-05 PROCEDURE — 99285 EMERGENCY DEPT VISIT HI MDM: CPT | Mod: 25

## 2020-10-05 RX ORDER — ASPIRIN 81 MG/1
81 TABLET ORAL
Status: ON HOLD | COMMUNITY
End: 2020-10-06 | Stop reason: HOSPADM

## 2020-10-06 ENCOUNTER — HOSPITAL ENCOUNTER (OUTPATIENT)
Facility: HOSPITAL | Age: 40
Discharge: HOME | End: 2020-10-06
Attending: EMERGENCY MEDICINE | Admitting: HOSPITALIST
Payer: COMMERCIAL

## 2020-10-06 ENCOUNTER — PATIENT MESSAGE (OUTPATIENT)
Dept: HEMATOLOGY/ONCOLOGY | Facility: CLINIC | Age: 40
End: 2020-10-06

## 2020-10-06 VITALS
DIASTOLIC BLOOD PRESSURE: 55 MMHG | HEIGHT: 72 IN | OXYGEN SATURATION: 94 % | SYSTOLIC BLOOD PRESSURE: 115 MMHG | TEMPERATURE: 96 F | RESPIRATION RATE: 19 BRPM | BODY MASS INDEX: 30.48 KG/M2 | WEIGHT: 225 LBS | HEART RATE: 61 BPM

## 2020-10-06 DIAGNOSIS — R07.9 CHEST PAIN: ICD-10-CM

## 2020-10-06 DIAGNOSIS — R07.9 CHEST PAIN, UNSPECIFIED TYPE: Primary | ICD-10-CM

## 2020-10-06 DIAGNOSIS — R07.89 ATYPICAL CHEST PAIN: ICD-10-CM

## 2020-10-06 PROBLEM — E78.5 HYPERLIPIDEMIA: Status: ACTIVE | Noted: 2020-10-06

## 2020-10-06 PROBLEM — Z79.01 LONG TERM CURRENT USE OF ANTICOAGULANT: Status: ACTIVE | Noted: 2020-10-06

## 2020-10-06 PROBLEM — F17.210 DEPENDENCE ON NICOTINE FROM CIGARETTES: Status: ACTIVE | Noted: 2018-11-19

## 2020-10-06 LAB
ALBUMIN SERPL BCP-MCNC: 3.3 G/DL (ref 3.5–5.2)
ALP SERPL-CCNC: 106 U/L (ref 55–135)
ALT SERPL W/O P-5'-P-CCNC: 15 U/L (ref 10–44)
ANION GAP SERPL CALC-SCNC: 12 MMOL/L (ref 8–16)
AST SERPL-CCNC: 19 U/L (ref 10–40)
BASOPHILS # BLD AUTO: 0.04 K/UL (ref 0–0.2)
BASOPHILS NFR BLD: 0.5 % (ref 0–1.9)
BILIRUB SERPL-MCNC: 0.4 MG/DL (ref 0.1–1)
BNP SERPL-MCNC: 27 PG/ML (ref 0–99)
BUN SERPL-MCNC: 4 MG/DL (ref 6–20)
CALCIUM SERPL-MCNC: 8.9 MG/DL (ref 8.7–10.5)
CHLORIDE SERPL-SCNC: 104 MMOL/L (ref 95–110)
CO2 SERPL-SCNC: 21 MMOL/L (ref 23–29)
CREAT SERPL-MCNC: 0.7 MG/DL (ref 0.5–1.4)
DIFFERENTIAL METHOD: ABNORMAL
EOSINOPHIL # BLD AUTO: 0.1 K/UL (ref 0–0.5)
EOSINOPHIL NFR BLD: 1.5 % (ref 0–8)
ERYTHROCYTE [DISTWIDTH] IN BLOOD BY AUTOMATED COUNT: 15.2 % (ref 11.5–14.5)
EST. GFR  (AFRICAN AMERICAN): >60 ML/MIN/1.73 M^2
EST. GFR  (NON AFRICAN AMERICAN): >60 ML/MIN/1.73 M^2
GLUCOSE SERPL-MCNC: 97 MG/DL (ref 70–110)
HCT VFR BLD AUTO: 39.5 % (ref 37–48.5)
HGB BLD-MCNC: 12.4 G/DL (ref 12–16)
IMM GRANULOCYTES # BLD AUTO: 0.02 K/UL (ref 0–0.04)
IMM GRANULOCYTES NFR BLD AUTO: 0.2 % (ref 0–0.5)
LYMPHOCYTES # BLD AUTO: 2.6 K/UL (ref 1–4.8)
LYMPHOCYTES NFR BLD: 31.2 % (ref 18–48)
MCH RBC QN AUTO: 28.4 PG (ref 27–31)
MCHC RBC AUTO-ENTMCNC: 31.4 G/DL (ref 32–36)
MCV RBC AUTO: 91 FL (ref 82–98)
MONOCYTES # BLD AUTO: 0.6 K/UL (ref 0.3–1)
MONOCYTES NFR BLD: 7.4 % (ref 4–15)
NEUTROPHILS # BLD AUTO: 5 K/UL (ref 1.8–7.7)
NEUTROPHILS NFR BLD: 59.2 % (ref 38–73)
NRBC BLD-RTO: 0 /100 WBC
PLATELET # BLD AUTO: 260 K/UL (ref 150–350)
PMV BLD AUTO: 9.2 FL (ref 9.2–12.9)
POTASSIUM SERPL-SCNC: 3.9 MMOL/L (ref 3.5–5.1)
PROT SERPL-MCNC: 7 G/DL (ref 6–8.4)
RBC # BLD AUTO: 4.36 M/UL (ref 4–5.4)
SARS-COV-2 RDRP RESP QL NAA+PROBE: NEGATIVE
SODIUM SERPL-SCNC: 137 MMOL/L (ref 136–145)
TROPONIN I SERPL DL<=0.01 NG/ML-MCNC: 0.01 NG/ML (ref 0–0.03)
TROPONIN I SERPL DL<=0.01 NG/ML-MCNC: <0.006 NG/ML (ref 0–0.03)
TROPONIN I SERPL DL<=0.01 NG/ML-MCNC: <0.006 NG/ML (ref 0–0.03)
WBC # BLD AUTO: 8.47 K/UL (ref 3.9–12.7)

## 2020-10-06 PROCEDURE — 83880 ASSAY OF NATRIURETIC PEPTIDE: CPT

## 2020-10-06 PROCEDURE — G0378 HOSPITAL OBSERVATION PER HR: HCPCS

## 2020-10-06 PROCEDURE — 85025 COMPLETE CBC W/AUTO DIFF WBC: CPT

## 2020-10-06 PROCEDURE — 84484 ASSAY OF TROPONIN QUANT: CPT

## 2020-10-06 PROCEDURE — 36415 COLL VENOUS BLD VENIPUNCTURE: CPT

## 2020-10-06 PROCEDURE — 84484 ASSAY OF TROPONIN QUANT: CPT | Mod: 91

## 2020-10-06 PROCEDURE — 80053 COMPREHEN METABOLIC PANEL: CPT

## 2020-10-06 PROCEDURE — 25000003 PHARM REV CODE 250: Performed by: INTERNAL MEDICINE

## 2020-10-06 PROCEDURE — 25000003 PHARM REV CODE 250: Performed by: NURSE PRACTITIONER

## 2020-10-06 PROCEDURE — U0002 COVID-19 LAB TEST NON-CDC: HCPCS

## 2020-10-06 RX ORDER — FLUOXETINE HYDROCHLORIDE 20 MG/1
40 CAPSULE ORAL DAILY
Status: DISCONTINUED | OUTPATIENT
Start: 2020-10-06 | End: 2020-10-06

## 2020-10-06 RX ORDER — POTASSIUM CHLORIDE 1.5 G/1.58G
40 POWDER, FOR SOLUTION ORAL
Status: DISCONTINUED | OUTPATIENT
Start: 2020-10-06 | End: 2020-10-06 | Stop reason: HOSPADM

## 2020-10-06 RX ORDER — FLUOXETINE HYDROCHLORIDE 20 MG/1
40 CAPSULE ORAL NIGHTLY
Status: DISCONTINUED | OUTPATIENT
Start: 2020-10-06 | End: 2020-10-06 | Stop reason: HOSPADM

## 2020-10-06 RX ORDER — PANTOPRAZOLE SODIUM 40 MG/1
40 TABLET, DELAYED RELEASE ORAL 2 TIMES DAILY
Status: DISCONTINUED | OUTPATIENT
Start: 2020-10-06 | End: 2020-10-06 | Stop reason: HOSPADM

## 2020-10-06 RX ORDER — ATORVASTATIN CALCIUM 40 MG/1
40 TABLET, FILM COATED ORAL NIGHTLY
Status: DISCONTINUED | OUTPATIENT
Start: 2020-10-06 | End: 2020-10-06 | Stop reason: HOSPADM

## 2020-10-06 RX ORDER — TALC
9 POWDER (GRAM) TOPICAL NIGHTLY PRN
Status: DISCONTINUED | OUTPATIENT
Start: 2020-10-06 | End: 2020-10-06 | Stop reason: HOSPADM

## 2020-10-06 RX ORDER — ACETAMINOPHEN 500 MG
1000 TABLET ORAL EVERY 6 HOURS PRN
Status: DISCONTINUED | OUTPATIENT
Start: 2020-10-06 | End: 2020-10-06 | Stop reason: HOSPADM

## 2020-10-06 RX ORDER — SODIUM CHLORIDE 0.9 % (FLUSH) 0.9 %
10 SYRINGE (ML) INJECTION
Status: DISCONTINUED | OUTPATIENT
Start: 2020-10-06 | End: 2020-10-06 | Stop reason: HOSPADM

## 2020-10-06 RX ORDER — ASPIRIN 81 MG/1
81 TABLET ORAL DAILY
Status: DISCONTINUED | OUTPATIENT
Start: 2020-10-06 | End: 2020-10-06 | Stop reason: HOSPADM

## 2020-10-06 RX ORDER — OXYCODONE HYDROCHLORIDE 10 MG/1
10 TABLET ORAL EVERY 6 HOURS PRN
COMMUNITY

## 2020-10-06 RX ORDER — IBUPROFEN 200 MG
16 TABLET ORAL
Status: DISCONTINUED | OUTPATIENT
Start: 2020-10-06 | End: 2020-10-06 | Stop reason: HOSPADM

## 2020-10-06 RX ORDER — ATORVASTATIN CALCIUM 40 MG/1
40 TABLET, FILM COATED ORAL DAILY
Status: DISCONTINUED | OUTPATIENT
Start: 2020-10-06 | End: 2020-10-06

## 2020-10-06 RX ORDER — CLOPIDOGREL BISULFATE 75 MG/1
75 TABLET ORAL DAILY
Status: DISCONTINUED | OUTPATIENT
Start: 2020-10-06 | End: 2020-10-06 | Stop reason: HOSPADM

## 2020-10-06 RX ORDER — ACETAMINOPHEN 325 MG/1
650 TABLET ORAL EVERY 4 HOURS PRN
Status: DISCONTINUED | OUTPATIENT
Start: 2020-10-06 | End: 2020-10-06 | Stop reason: HOSPADM

## 2020-10-06 RX ORDER — DABIGATRAN ETEXILATE 75 MG/1
150 CAPSULE ORAL 2 TIMES DAILY
Status: DISCONTINUED | OUTPATIENT
Start: 2020-10-06 | End: 2020-10-06 | Stop reason: HOSPADM

## 2020-10-06 RX ORDER — GLUCAGON 1 MG
1 KIT INJECTION
Status: DISCONTINUED | OUTPATIENT
Start: 2020-10-06 | End: 2020-10-06 | Stop reason: HOSPADM

## 2020-10-06 RX ORDER — ACETAMINOPHEN 325 MG/1
650 TABLET ORAL EVERY 6 HOURS PRN
Status: DISCONTINUED | OUTPATIENT
Start: 2020-10-06 | End: 2020-10-06 | Stop reason: HOSPADM

## 2020-10-06 RX ORDER — LANOLIN ALCOHOL/MO/W.PET/CERES
800 CREAM (GRAM) TOPICAL
Status: DISCONTINUED | OUTPATIENT
Start: 2020-10-06 | End: 2020-10-06 | Stop reason: HOSPADM

## 2020-10-06 RX ORDER — IBUPROFEN 200 MG
24 TABLET ORAL
Status: DISCONTINUED | OUTPATIENT
Start: 2020-10-06 | End: 2020-10-06 | Stop reason: HOSPADM

## 2020-10-06 RX ORDER — OXYCODONE HYDROCHLORIDE 10 MG/1
10 TABLET ORAL EVERY 6 HOURS PRN
Status: DISCONTINUED | OUTPATIENT
Start: 2020-10-06 | End: 2020-10-06 | Stop reason: HOSPADM

## 2020-10-06 RX ORDER — ONDANSETRON 2 MG/ML
8 INJECTION INTRAMUSCULAR; INTRAVENOUS EVERY 8 HOURS PRN
Status: DISCONTINUED | OUTPATIENT
Start: 2020-10-06 | End: 2020-10-06 | Stop reason: HOSPADM

## 2020-10-06 RX ORDER — AMOXICILLIN 250 MG
1 CAPSULE ORAL 2 TIMES DAILY
Status: DISCONTINUED | OUTPATIENT
Start: 2020-10-06 | End: 2020-10-06 | Stop reason: HOSPADM

## 2020-10-06 RX ADMIN — ASPIRIN 81 MG: 81 TABLET, COATED ORAL at 09:10

## 2020-10-06 RX ADMIN — OXYCODONE HYDROCHLORIDE 10 MG: 10 TABLET ORAL at 09:10

## 2020-10-06 RX ADMIN — DABIGATRAN ETEXILATE MESYLATE 150 MG: 75 CAPSULE ORAL at 09:10

## 2020-10-06 RX ADMIN — CLOPIDOGREL 75 MG: 75 TABLET, FILM COATED ORAL at 09:10

## 2020-10-06 RX ADMIN — DOCUSATE SODIUM - SENNOSIDES 1 TABLET: 50; 8.6 TABLET, FILM COATED ORAL at 09:10

## 2020-10-06 RX ADMIN — PANTOPRAZOLE SODIUM 40 MG: 40 TABLET, DELAYED RELEASE ORAL at 09:10

## 2020-10-06 NOTE — ASSESSMENT & PLAN NOTE
Acute on chronic problem  History of CAD  Troponin negative  Trend troponins  EKG sinus rhythm without ST or T-wave abnormalities  Continuous cardiac monitor  Admit to telemetry  Consult Dr. Kaushik Guthrie (her cardiologist)

## 2020-10-06 NOTE — ED PROVIDER NOTES
Encounter Date: 10/5/2020       History     Chief Complaint   Patient presents with    Chest Pain     for some time; seen at other hospitals for this now having numbness     HPI   40-year-old woman with a history of CAD, DVT and PE 2018 after orthopedic surgery on Pradaxa, gastric sleeve presents emergency department for evaluation of intermittent sharp stabbing substernal chest pains that have been occurring for the past couple of days.  She has had some associated tingling in her left arm recently with this pain.  Currently she is pain free.  Her cardiologist is Dr. Guthrie which she has an appointment with this Thursday. Patient has known history of coronary artery disease and underwent cardiac catheterization back in June of this year in [place]. This showed coronary stenosis involving the ostium of obtuse marginal branch, ramus intermedius and diagonal after bifurcation: Small vessel disease not ideally amenable to PCI.  Patient currently on Plavix and aspirin.    Review of patient's allergies indicates:   Allergen Reactions    Clarithromycin Swelling and Other (See Comments)     DIFFICULTY SWALLOWING  DIFFICULTY SWALLOWING    Pcn [penicillins] Anaphylaxis    Sulfa (sulfonamide antibiotics) Hives    Wellbutrin [bupropion hcl] Shortness Of Breath and Anaphylaxis    Betadine [povidone-iodine] Hives     Swelling      Cefprozil Hives    Iodinated contrast media Hives    Latex, natural rubber Rash    Sulfamethoxazole-trimethoprim Nausea And Vomiting    Iodine Hives and Swelling    Latex Hives and Swelling     Past Medical History:   Diagnosis Date    Abnormal Pap smear of cervix     Arthritis     OA    Back pain     Cancer     skin cancer to back    Depression     Endometriosis     Full dentures     GERD (gastroesophageal reflux disease)     Migraine     Pulmonary embolism 2018    Seizures     Sleep apnea     Does not use c-pap since weight loss - 170 lbs    Uterine fibroid     Wears glasses       Past Surgical History:   Procedure Laterality Date    ANGIOGRAM, CORONARY, WITH LEFT HEART CATHETERIZATION Left 2020    Procedure: Left heart cath;  Surgeon: Jm Guthrie MD;  Location: [facility] CATH/EP LAB;  Service: Cardiology;  Laterality: Left;    APPENDECTOMY      CARPAL TUNNEL RELEASE Bilateral      SECTION      CHOLECYSTECTOMY      COLONOSCOPY N/A 2019    Procedure: COLONOSCOPY;  Surgeon: Anselmo Blackwell MD;  Location: [facility] ENDO;  Service: Endoscopy;  Laterality: N/A;    EPIDURAL STEROID INJECTION INTO LUMBAR SPINE N/A 2019    Procedure: Injection-steroid-epidural-lumbar;  Surgeon: Yariel Ramirez MD;  Location: [facility] OR;  Service: Pain Management;  Laterality: N/A;  L3-4    ESOPHAGOGASTRODUODENOSCOPY N/A 2019    Procedure: EGD (ESOPHAGOGASTRODUODENOSCOPY);  Surgeon: Anselmo Blackwell MD;  Location: [facility] ENDO;  Service: Endoscopy;  Laterality: N/A;    FRACTURE SURGERY      ankle    gastric sleve      HYSTERECTOMY  2014    endo and fibroids    HYSTERECTOMY      JOINT REPLACEMENT Left 2018    KNEE ARTHROPLASTY Left 2018    Procedure: ARTHROPLASTY, KNEE;  Surgeon: Feliberto Cardenas MD;  Location: [facility] OR;  Service: Orthopedics;  Laterality: Left;    KNEE ARTHROPLASTY Right 2020    Procedure: ARTHROPLASTY, KNEE;  Surgeon: Feliberto aCrdenas MD;  Location: [facility] OR;  Service: Orthopedics;  Laterality: Right;    KNEE ARTHROSCOPY W/ MENISCECTOMY Right 10/25/2019    Procedure: ARTHROSCOPY, KNEE, WITH MENISCECTOMY;  Surgeon: Feliberto Cardenas MD;  Location: [facility] OR;  Service: Orthopedics;  Laterality: Right;    KNEE SURGERY      LUMBAR EPIDURAL INJECTION      OOPHORECTOMY Left     LSO    RADIAL NERVE Right     RECONSTRUCTION OF MEDIAL PATELLOFEMORAL LIGAMENT OF RIGHT KNEE Right 10/25/2019    Procedure: RECONSTRUCTION, LIGAMENT, MEDIAL PATELLOFEMORAL, RIGHT;  Surgeon: Feliberto Cardenas MD;  Location: [facility] OR;  Service:  Orthopedics;  Laterality: Right;    SINUS SURGERY      UPPER GASTROINTESTINAL ENDOSCOPY  2019    Dr. Blackwell; mild schatzki ring-dilated; gastritis; bx in process     Family History   Problem Relation Age of Onset    Heart disease Mother     Heart disease Father     Esophageal cancer Maternal Grandmother     Breast cancer Maternal Aunt 46    Colon cancer Neg Hx     Stomach cancer Neg Hx     Ovarian cancer Neg Hx      Social History     Tobacco Use    Smoking status: Current Every Day Smoker     Packs/day: 0.25     Years: 20.00     Pack years: 5.00     Types: Cigarettes     Last attempt to quit: 2018     Years since quittin.8    Smokeless tobacco: Never Used   Substance Use Topics    Alcohol use: Yes     Alcohol/week: 0.0 standard drinks     Comment: occasionally    Drug use: Not Currently     Types: Other-see comments     Comment: no     Review of Systems   Constitutional: Negative for fever.   HENT: Negative for sore throat.    Respiratory: Negative for shortness of breath.    Cardiovascular: Positive for chest pain.   Gastrointestinal: Negative for nausea.   Genitourinary: Negative for dysuria.   Musculoskeletal: Negative for back pain.   Skin: Negative for rash.   Neurological: Positive for numbness (Left arm paresthesias.). Negative for weakness.   Hematological: Does not bruise/bleed easily.       Physical Exam     Initial Vitals [10/05/20 2335]   BP Pulse Resp Temp SpO2   130/72 71 18 97.9 °F (36.6 °C) 97 %      MAP       --         Physical Exam    Constitutional: She appears well-developed and well-nourished.  Non-toxic appearance. No distress.   HENT:   Head: Normocephalic and atraumatic.   Eyes: EOM are normal. Pupils are equal, round, and reactive to light.   Neck: Normal range of motion. Neck supple. No neck rigidity. No JVD present.   Cardiovascular: Normal rate, regular rhythm, normal heart sounds and intact distal pulses. Exam reveals no gallop and no friction rub.    No  murmur heard.  Pulmonary/Chest: Breath sounds normal. She has no wheezes. She has no rhonchi. She has no rales.   Abdominal: Soft. Bowel sounds are normal. She exhibits no distension. There is no abdominal tenderness. There is no rebound and no guarding.   Musculoskeletal:      Comments: Right foot in a walking boot.   Neurological: She is alert and oriented to person, place, and time. She has normal strength and normal reflexes. No cranial nerve deficit or sensory deficit. She exhibits normal muscle tone. Coordination normal. GCS eye subscore is 4. GCS verbal subscore is 5. GCS motor subscore is 6.   Skin: Skin is warm and dry.   Psychiatric: She has a normal mood and affect. Her speech is normal and behavior is normal. She is not actively hallucinating.         ED Course   Procedures  Labs Reviewed - No data to display  EKG Readings: (Independently Interpreted)   Rhythm: Normal Sinus Rhythm. Heart Rate: 71. Ectopy: No Ectopy. Conduction: Normal. ST Segments: Normal ST Segments. T Waves: Normal. Clinical Impression: Normal Sinus Rhythm       Imaging Results    None          Medical Decision Making:   History:   Old Medical Records: I decided to obtain old medical records.  Initial Assessment:   Assessment/Plan:  Aleyda Costa is a 40 y.o. female with chest pain.    This is an emergent evaluation.  Patient is complaining of chest pain.  My differential diagnosis includes: Acute MI, unstable angina, stable angina, congestive heart failure, pneumonia, pneumothorax, COPD/asthma, bronchitis, and mass.    Clinically, the patient does not have any symptoms of bronchitis, asthma, or COPD exacerbation.    An EKG was performed and was negative for ST segment elevation.  A chest x-ray was performed and was negative for signs of heart failure or pulmonary edema.  There was no evidence of pneumonia or pneumothorax or mass lesion.    Cardiac markers-troponin and BNP-are both negative.  No evidence for NSTEMI or  CHF.    This patient has multiple cardiac risk factors. Risk stratification with serial cardiac markers and stress testing is warranted in this patient.  I believe the patient should be admitted for observation to the definitively rule out acute coronary syndrome.    I discussed the patient's presentation and workup with the hospitalist who agrees with placing the patient in the hospital.  I have placed orders for the hospitalist.     Casey Campos M.D. 4:31 AM 10/6/2020                               Clinical Impression:       ICD-10-CM ICD-9-CM   1. Chest pain  R07.9 786.50                                               Casey Campos MD  10/06/20 0432

## 2020-10-06 NOTE — ASSESSMENT & PLAN NOTE
Chronic problem  Dangers of cigarette smoking were reviewed with patient in detail for 10 minutes and patient was encouraged to quit.   Nicotine replacement options were discussed.  Patient is down to 6 cigarettes a day, will try to quit soon.

## 2020-10-06 NOTE — ASSESSMENT & PLAN NOTE
Chronic problem, history of PE and DVT in the past.   On Pradaxa BID for that, continue current medications.

## 2020-10-06 NOTE — H&P
[facility] Hospital Medicine  History & Physical    Patient Name: Aleyda Costa  MRN: [id number]  Admission Date: 10/6/2020  Attending Physician: Pantera Rivas MD   Primary Care Provider: Darlene Hayden MD         Patient information was obtained from patient, past medical records and ER records.     Subjective:     Principal Problem:Chest pain    Chief Complaint:   Chief Complaint   Patient presents with    Chest Pain     for some time; seen at other hospitals for this now having numbness        HPI: Aleyda Costa is a 40-year-old  female who presents to the emergency room this morning complaining of chest pain, left arm pain, and jaw pain.  She also describes a tingling sensation to her left arm.  The symptoms onset several days ago had been waxing and waning.  No aggravating or alleviating factors.  Denies fever or chills.  No known sick contacts or travel.  Previous medical history includes coronary artery disease, depression, active smoker, anemia, PE, and DVT.  Surgical history includes gastric sleeve, an several orthopedic surgeries.  ER workup:  CBC and CMP unremarkable.  Troponin negative.  Chest x-ray negative for acute process.  EKG was sinus rhythm without ST or T-wave abnormalities.  Patient follows with Dr. Kaushik Guthrie for Cardiology.  Patient will be admitted to Hospital Medicine for observation and management.  Will trend troponins and consult Dr. Guthrie.    Past Medical History:   Diagnosis Date    Abnormal Pap smear of cervix     Arthritis     OA    Back pain     Cancer     skin cancer to back    Depression     Endometriosis     Full dentures     GERD (gastroesophageal reflux disease)     Migraine     Pulmonary embolism 2018    Seizures     Sleep apnea     Does not use c-pap since weight loss - 170 lbs    Uterine fibroid     Wears glasses        Past Surgical History:   Procedure Laterality Date    ANGIOGRAM, CORONARY, WITH LEFT HEART  CATHETERIZATION Left 2020    Procedure: Left heart cath;  Surgeon: Jm Guthrie MD;  Location: [facility] CATH/EP LAB;  Service: Cardiology;  Laterality: Left;    APPENDECTOMY      CARPAL TUNNEL RELEASE Bilateral      SECTION      CHOLECYSTECTOMY      COLONOSCOPY N/A 2019    Procedure: COLONOSCOPY;  Surgeon: Anselmo Blackwell MD;  Location: [facility] ENDO;  Service: Endoscopy;  Laterality: N/A;    EPIDURAL STEROID INJECTION INTO LUMBAR SPINE N/A 2019    Procedure: Injection-steroid-epidural-lumbar;  Surgeon: Yariel Ramirez MD;  Location: [facility] OR;  Service: Pain Management;  Laterality: N/A;  L3-4    ESOPHAGOGASTRODUODENOSCOPY N/A 2019    Procedure: EGD (ESOPHAGOGASTRODUODENOSCOPY);  Surgeon: Anselmo Blackwell MD;  Location: [facility] ENDO;  Service: Endoscopy;  Laterality: N/A;    FRACTURE SURGERY      ankle    gastric sleve      HYSTERECTOMY  2014    endo and fibroids    HYSTERECTOMY      JOINT REPLACEMENT Left 2018    KNEE ARTHROPLASTY Left 2018    Procedure: ARTHROPLASTY, KNEE;  Surgeon: Feliberto Cardenas MD;  Location: [facility] OR;  Service: Orthopedics;  Laterality: Left;    KNEE ARTHROPLASTY Right 2020    Procedure: ARTHROPLASTY, KNEE;  Surgeon: Feliberto Cardenas MD;  Location: [facility] OR;  Service: Orthopedics;  Laterality: Right;    KNEE ARTHROSCOPY W/ MENISCECTOMY Right 10/25/2019    Procedure: ARTHROSCOPY, KNEE, WITH MENISCECTOMY;  Surgeon: Feliberto Cardenas MD;  Location: [facility] OR;  Service: Orthopedics;  Laterality: Right;    KNEE SURGERY      LUMBAR EPIDURAL INJECTION      OOPHORECTOMY Left     LSO    RADIAL NERVE Right     RECONSTRUCTION OF MEDIAL PATELLOFEMORAL LIGAMENT OF RIGHT KNEE Right 10/25/2019    Procedure: RECONSTRUCTION, LIGAMENT, MEDIAL PATELLOFEMORAL, RIGHT;  Surgeon: Feliberto Cardenas MD;  Location: [facility] OR;  Service: Orthopedics;  Laterality: Right;    SINUS SURGERY      UPPER GASTROINTESTINAL ENDOSCOPY  2019      "Dibuono; mild schatzki ring-dilated; gastritis; bx in process       Review of patient's allergies indicates:   Allergen Reactions    Clarithromycin Swelling and Other (See Comments)     DIFFICULTY SWALLOWING  DIFFICULTY SWALLOWING    Pcn [penicillins] Anaphylaxis    Sulfa (sulfonamide antibiotics) Hives    Wellbutrin [bupropion hcl] Shortness Of Breath and Anaphylaxis    Betadine [povidone-iodine] Hives     Swelling      Cefprozil Hives    Iodinated contrast media Hives    Latex, natural rubber Rash    Sulfamethoxazole-trimethoprim Nausea And Vomiting    Iodine Hives and Swelling    Latex Hives and Swelling       Current Facility-Administered Medications on File Prior to Encounter   Medication    lactated ringers infusion    lidocaine (PF) 10 mg/ml (1%) injection 10 mg     Current Outpatient Medications on File Prior to Encounter   Medication Sig    aspirin (ECOTRIN) 81 MG EC tablet Take 81 mg by mouth.    AIMOVIG AUTOINJECTOR 140 mg/mL AtIn     amitriptyline (ELAVIL) 25 MG tablet TAKE ONE TABLET BY MOUTH EVERY NIGHT FOR 2 WEEKS THEN INCREASE TO 2 TABLETS AT BEDTIME    atorvastatin (LIPITOR) 40 MG tablet     cetirizine (ZYRTEC) 5 MG tablet Take 5 mg by mouth as needed.     clopidogreL (PLAVIX) 75 mg tablet     cyanocobalamin, vitamin B-12, 1,000 mcg/mL Kit Inject 1 mL into the muscle every 21 days.    dabigatran etexilate (PRADAXA) 150 mg Cap Take 1 capsule (150 mg total) by mouth 2 (two) times daily. "Do NOT break, chew, or open capsules."    erenumab-aooe (AIMOVIG) 140 mg/mL autoinjector Inject 140 mg into the skin every 28 days.    fluoxetine (PROZAC) 20 MG capsule every evening.     FLUoxetine 40 MG capsule Take 1 capsule (40 mg total) by mouth daily.    HYDROmorphone (DILAUDID) 2 MG tablet Take 1 tablet (2 mg total) by mouth every 4 (four) hours as needed for Pain.    hydrOXYzine pamoate (VISTARIL) 25 MG Cap TAKE ONE CAPSULE BY MOUTH 3 TIMES A DAY AS NEEDED FOR ANXIETY    lorazepam " (ATIVAN) 0.5 MG tablet Take 0.5 mg by mouth every 4 (four) hours as needed.     mupirocin (BACTROBAN) 2 % ointment Apply topically 2 (two) times daily. While incision redness persists    oxyCODONE (OXYCONTIN) 10 mg 12 hr tablet Take 10 mg by mouth every 12 (twelve) hours.    oxyCODONE-acetaminophen (PERCOCET) 5-325 mg per tablet Take 1 tablet by mouth every 6 (six) hours as needed.    pantoprazole (PROTONIX) 40 MG tablet Take 1 tablet (40 mg total) by mouth 2 (two) times daily.    promethazine (PHENERGAN) 25 MG tablet Take 1 tablet (25 mg total) by mouth every 6 (six) hours as needed for Nausea. Take 1 tablet by mouth every 6 (six) hours as needed (migraine). -MAY CAUSE DROWSINESS-    tiZANidine (ZANAFLEX) 4 MG tablet Take 4 mg by mouth every 6 (six) hours as needed.     Family History     Problem Relation (Age of Onset)    Breast cancer Maternal Aunt (46)    Esophageal cancer Maternal Grandmother    Heart disease Mother, Father        Tobacco Use    Smoking status: Current Every Day Smoker     Packs/day: 0.25     Years: 20.00     Pack years: 5.00     Types: Cigarettes     Last attempt to quit: 2018     Years since quittin.8    Smokeless tobacco: Never Used   Substance and Sexual Activity    Alcohol use: Yes     Alcohol/week: 0.0 standard drinks     Comment: occasionally    Drug use: Not Currently     Types: Other-see comments     Comment: no    Sexual activity: Yes     Partners: Male     Review of Systems   Constitutional: Positive for activity change. Negative for chills, diaphoresis and fever.   HENT: Negative for congestion, nosebleeds and tinnitus.    Eyes: Negative for photophobia and visual disturbance.   Respiratory: Negative for cough, chest tightness, shortness of breath and wheezing.    Cardiovascular: Positive for chest pain and palpitations. Negative for leg swelling.   Gastrointestinal: Negative for abdominal distention, abdominal pain, constipation, diarrhea, nausea and vomiting.    Endocrine: Negative for cold intolerance and heat intolerance.   Genitourinary: Negative for difficulty urinating, dysuria, frequency, hematuria and urgency.   Musculoskeletal: Negative for arthralgias, back pain and myalgias.   Skin: Negative for pallor, rash and wound.   Allergic/Immunologic: Negative for immunocompromised state.   Neurological: Negative for dizziness, tremors, facial asymmetry, speech difficulty and weakness.   Hematological: Negative for adenopathy. Does not bruise/bleed easily.   Psychiatric/Behavioral: Negative for confusion and sleep disturbance. The patient is not nervous/anxious.      Objective:     Vital Signs (Most Recent):  Temp: 97.9 °F (36.6 °C) (10/05/20 2335)  Pulse: 71 (10/05/20 2335)  Resp: 18 (10/05/20 2335)  BP: 130/72 (10/05/20 2335)  SpO2: 97 % (10/05/20 2335) Vital Signs (24h Range):  Temp:  [97.9 °F (36.6 °C)] 97.9 °F (36.6 °C)  Pulse:  [71] 71  Resp:  [18] 18  SpO2:  [97 %] 97 %  BP: (130)/(72) 130/72     Weight: 102.1 kg (225 lb)  Body mass index is 30.52 kg/m².    Physical Exam  Vitals signs and nursing note reviewed.   Constitutional:       General: She is not in acute distress.     Appearance: She is well-developed. She is not diaphoretic.   HENT:      Head: Normocephalic.      Mouth/Throat:      Mouth: Mucous membranes are moist.      Pharynx: Oropharynx is clear.   Eyes:      General: No scleral icterus.     Conjunctiva/sclera: Conjunctivae normal.      Pupils: Pupils are equal, round, and reactive to light.   Neck:      Musculoskeletal: Normal range of motion and neck supple.      Vascular: No JVD.   Cardiovascular:      Rate and Rhythm: Normal rate and regular rhythm.      Heart sounds: Normal heart sounds. No murmur. No friction rub. No gallop.    Pulmonary:      Effort: Pulmonary effort is normal. No respiratory distress.      Breath sounds: Normal breath sounds. No wheezing or rales.   Abdominal:      General: Bowel sounds are normal. There is no distension.       Palpations: Abdomen is soft.      Tenderness: There is no abdominal tenderness. There is no guarding or rebound.   Musculoskeletal: Normal range of motion.         General: No tenderness.   Lymphadenopathy:      Cervical: No cervical adenopathy.   Skin:     General: Skin is warm and dry.      Capillary Refill: Capillary refill takes less than 2 seconds.      Coloration: Skin is not pale.      Findings: No erythema or rash.   Neurological:      Mental Status: She is alert and oriented to person, place, and time.      Cranial Nerves: No cranial nerve deficit.      Sensory: No sensory deficit.      Coordination: Coordination normal.      Deep Tendon Reflexes: Reflexes normal.   Psychiatric:         Behavior: Behavior normal.         Thought Content: Thought content normal.         Judgment: Judgment normal.           CRANIAL NERVES     CN III, IV, VI   Pupils are equal, round, and reactive to light.       Significant Labs:   CBC:   Recent Labs   Lab 10/06/20  0238   WBC 8.47   HGB 12.4   HCT 39.5        CMP:   Recent Labs   Lab 10/06/20  0238      K 3.9      CO2 21*   GLU 97   BUN 4*   CREATININE 0.7   CALCIUM 8.9   PROT 7.0   ALBUMIN 3.3*   BILITOT 0.4   ALKPHOS 106   AST 19   ALT 15   ANIONGAP 12   EGFRNONAA >60     Cardiac Markers:   Recent Labs   Lab 10/06/20  0238   BNP 27       Significant Imaging: I have reviewed all pertinent imaging results/findings within the past 24 hours.    Assessment/Plan:     * Chest pain  Acute on chronic problem  History of CAD  Troponin negative  Trend troponins  EKG sinus rhythm without ST or T-wave abnormalities  Continuous cardiac monitor  Admit to telemetry  Consult Dr. Kaushik Guthrie (her cardiologist)      Hyperlipidemia  Chronic problem   Patient is chronically on statin.will continue for now. Monitor clinically. Last LDL was   Lab Results   Component Value Date    LDLCALC 89.6 06/28/2020          Long term current use of anticoagulant  Chronic  problem        History of bariatric surgery  Chronic problem  Noted      Dependence on nicotine from cigarettes  Chronic problem  Dangers of cigarette smoking were reviewed with patient in detail for 10 minutes and patient was encouraged to quit. Nicotine replacement options were discussed.      Normocytic anemia  Chronic problem  Stable          VTE Risk Mitigation (From admission, onward)    None             Al Guerrero NP  Department of Hospital Medicine   [facility]

## 2020-10-06 NOTE — PROGRESS NOTES
Pt arrived to the floor from the ED. No acute distress noted. Denies pain at this time. Normal sinus rhythm on tele monitor and VS stable. Will continue to monitor.

## 2020-10-06 NOTE — PROGRESS NOTES
Discharge instructions given. Pt verbalized understanding. Peripheral IV removed. VS stable. Pt transferred to vehicle via wheelchair by staff. All belongings sent.

## 2020-10-06 NOTE — ED TRIAGE NOTES
Aleyda Costa is here with chest pain; has had this for some time and seen at other places for it, now having some numbness to left arm and jaw.

## 2020-10-06 NOTE — ASSESSMENT & PLAN NOTE
Chronic problem   Patient is chronically on statin.will continue for now. Monitor clinically. Last LDL was    Lab Results   Component Value Date    LDLCALC 89.6 06/28/2020

## 2020-10-06 NOTE — HPI
Aleyda Costa is a 40-year-old  female who presents to the emergency room this morning complaining of chest pain, left arm pain, and jaw pain.  She also describes a tingling sensation to her left arm.  The symptoms onset several days ago had been waxing and waning.  No aggravating or alleviating factors.  Denies fever or chills.  No known sick contacts or travel.  Previous medical history includes coronary artery disease, depression, active smoker, anemia, PE, and DVT.  Surgical history includes gastric sleeve, an several orthopedic surgeries.  ER workup:  CBC and CMP unremarkable.  Troponin negative.  Chest x-ray negative for acute process.  EKG was sinus rhythm without ST or T-wave abnormalities.  Patient follows with Dr. Kaushik Guthrie for Cardiology.  Patient will be admitted to Hospital Medicine for observation and management.  Will trend troponins and consult Dr. Guthrie.

## 2020-10-06 NOTE — SUBJECTIVE & OBJECTIVE
Past Medical History:   Diagnosis Date    Abnormal Pap smear of cervix     Arthritis     OA    Back pain     Cancer     skin cancer to back    Depression     Endometriosis     Full dentures     GERD (gastroesophageal reflux disease)     Migraine     Pulmonary embolism     Seizures     Sleep apnea     Does not use c-pap since weight loss - 170 lbs    Uterine fibroid     Wears glasses        Past Surgical History:   Procedure Laterality Date    ANGIOGRAM, CORONARY, WITH LEFT HEART CATHETERIZATION Left 2020    Procedure: Left heart cath;  Surgeon: Jm Guthrie MD;  Location: [facility] CATH/EP LAB;  Service: Cardiology;  Laterality: Left;    APPENDECTOMY      CARPAL TUNNEL RELEASE Bilateral      SECTION      CHOLECYSTECTOMY      COLONOSCOPY N/A 2019    Procedure: COLONOSCOPY;  Surgeon: Anselmo Blackwell MD;  Location: [facility] ENDO;  Service: Endoscopy;  Laterality: N/A;    EPIDURAL STEROID INJECTION INTO LUMBAR SPINE N/A 2019    Procedure: Injection-steroid-epidural-lumbar;  Surgeon: Yariel Ramirez MD;  Location: [facility] OR;  Service: Pain Management;  Laterality: N/A;  L3-4    ESOPHAGOGASTRODUODENOSCOPY N/A 2019    Procedure: EGD (ESOPHAGOGASTRODUODENOSCOPY);  Surgeon: Anselmo Blackwell MD;  Location: [facility] ENDO;  Service: Endoscopy;  Laterality: N/A;    FRACTURE SURGERY      ankle    gastric sleve      HYSTERECTOMY      endo and fibroids    HYSTERECTOMY      JOINT REPLACEMENT Left 2018    KNEE ARTHROPLASTY Left 2018    Procedure: ARTHROPLASTY, KNEE;  Surgeon: Feliberto Cardenas MD;  Location: [facility] OR;  Service: Orthopedics;  Laterality: Left;    KNEE ARTHROPLASTY Right 2020    Procedure: ARTHROPLASTY, KNEE;  Surgeon: Feliberto Cardenas MD;  Location: [facility] OR;  Service: Orthopedics;  Laterality: Right;    KNEE ARTHROSCOPY W/ MENISCECTOMY Right 10/25/2019    Procedure: ARTHROSCOPY, KNEE, WITH MENISCECTOMY;  Surgeon: Feliberto Perez  MD Ronald;  Location: [facility] OR;  Service: Orthopedics;  Laterality: Right;    KNEE SURGERY      LUMBAR EPIDURAL INJECTION      OOPHORECTOMY Left     LSO    RADIAL NERVE Right     RECONSTRUCTION OF MEDIAL PATELLOFEMORAL LIGAMENT OF RIGHT KNEE Right 10/25/2019    Procedure: RECONSTRUCTION, LIGAMENT, MEDIAL PATELLOFEMORAL, RIGHT;  Surgeon: Feliberto Cardenas MD;  Location: [facility] OR;  Service: Orthopedics;  Laterality: Right;    SINUS SURGERY      UPPER GASTROINTESTINAL ENDOSCOPY  11/27/2019    Dr. lBackwell; mild schatzki ring-dilated; gastritis; bx in process       Review of patient's allergies indicates:   Allergen Reactions    Clarithromycin Swelling and Other (See Comments)     DIFFICULTY SWALLOWING  DIFFICULTY SWALLOWING    Pcn [penicillins] Anaphylaxis    Sulfa (sulfonamide antibiotics) Hives    Wellbutrin [bupropion hcl] Shortness Of Breath and Anaphylaxis    Betadine [povidone-iodine] Hives     Swelling      Cefprozil Hives    Iodinated contrast media Hives    Latex, natural rubber Rash    Sulfamethoxazole-trimethoprim Nausea And Vomiting    Iodine Hives and Swelling    Latex Hives and Swelling       Current Facility-Administered Medications on File Prior to Encounter   Medication    lactated ringers infusion    lidocaine (PF) 10 mg/ml (1%) injection 10 mg     Current Outpatient Medications on File Prior to Encounter   Medication Sig    aspirin (ECOTRIN) 81 MG EC tablet Take 81 mg by mouth.    AIMOVIG AUTOINJECTOR 140 mg/mL AtIn     amitriptyline (ELAVIL) 25 MG tablet TAKE ONE TABLET BY MOUTH EVERY NIGHT FOR 2 WEEKS THEN INCREASE TO 2 TABLETS AT BEDTIME    atorvastatin (LIPITOR) 40 MG tablet     cetirizine (ZYRTEC) 5 MG tablet Take 5 mg by mouth as needed.     clopidogreL (PLAVIX) 75 mg tablet     cyanocobalamin, vitamin B-12, 1,000 mcg/mL Kit Inject 1 mL into the muscle every 21 days.    dabigatran etexilate (PRADAXA) 150 mg Cap Take 1 capsule (150 mg total) by mouth 2 (two) times  "daily. "Do NOT break, chew, or open capsules."    erenumab-aooe (AIMOVIG) 140 mg/mL autoinjector Inject 140 mg into the skin every 28 days.    fluoxetine (PROZAC) 20 MG capsule every evening.     FLUoxetine 40 MG capsule Take 1 capsule (40 mg total) by mouth daily.    HYDROmorphone (DILAUDID) 2 MG tablet Take 1 tablet (2 mg total) by mouth every 4 (four) hours as needed for Pain.    hydrOXYzine pamoate (VISTARIL) 25 MG Cap TAKE ONE CAPSULE BY MOUTH 3 TIMES A DAY AS NEEDED FOR ANXIETY    lorazepam (ATIVAN) 0.5 MG tablet Take 0.5 mg by mouth every 4 (four) hours as needed.     mupirocin (BACTROBAN) 2 % ointment Apply topically 2 (two) times daily. While incision redness persists    oxyCODONE (OXYCONTIN) 10 mg 12 hr tablet Take 10 mg by mouth every 12 (twelve) hours.    oxyCODONE-acetaminophen (PERCOCET) 5-325 mg per tablet Take 1 tablet by mouth every 6 (six) hours as needed.    pantoprazole (PROTONIX) 40 MG tablet Take 1 tablet (40 mg total) by mouth 2 (two) times daily.    promethazine (PHENERGAN) 25 MG tablet Take 1 tablet (25 mg total) by mouth every 6 (six) hours as needed for Nausea. Take 1 tablet by mouth every 6 (six) hours as needed (migraine). -MAY CAUSE DROWSINESS-    tiZANidine (ZANAFLEX) 4 MG tablet Take 4 mg by mouth every 6 (six) hours as needed.     Family History     Problem Relation (Age of Onset)    Breast cancer Maternal Aunt (46)    Esophageal cancer Maternal Grandmother    Heart disease Mother, Father        Tobacco Use    Smoking status: Current Every Day Smoker     Packs/day: 0.25     Years: 20.00     Pack years: 5.00     Types: Cigarettes     Last attempt to quit: 2018     Years since quittin.8    Smokeless tobacco: Never Used   Substance and Sexual Activity    Alcohol use: Yes     Alcohol/week: 0.0 standard drinks     Comment: occasionally    Drug use: Not Currently     Types: Other-see comments     Comment: no    Sexual activity: Yes     Partners: Male     Review " of Systems   Constitutional: Positive for activity change. Negative for chills, diaphoresis and fever.   HENT: Negative for congestion, nosebleeds and tinnitus.    Eyes: Negative for photophobia and visual disturbance.   Respiratory: Negative for cough, chest tightness, shortness of breath and wheezing.    Cardiovascular: Positive for chest pain and palpitations. Negative for leg swelling.   Gastrointestinal: Negative for abdominal distention, abdominal pain, constipation, diarrhea, nausea and vomiting.   Endocrine: Negative for cold intolerance and heat intolerance.   Genitourinary: Negative for difficulty urinating, dysuria, frequency, hematuria and urgency.   Musculoskeletal: Negative for arthralgias, back pain and myalgias.   Skin: Negative for pallor, rash and wound.   Allergic/Immunologic: Negative for immunocompromised state.   Neurological: Negative for dizziness, tremors, facial asymmetry, speech difficulty and weakness.   Hematological: Negative for adenopathy. Does not bruise/bleed easily.   Psychiatric/Behavioral: Negative for confusion and sleep disturbance. The patient is not nervous/anxious.      Objective:     Vital Signs (Most Recent):  Temp: 97.9 °F (36.6 °C) (10/05/20 2335)  Pulse: 71 (10/05/20 2335)  Resp: 18 (10/05/20 2335)  BP: 130/72 (10/05/20 2335)  SpO2: 97 % (10/05/20 2335) Vital Signs (24h Range):  Temp:  [97.9 °F (36.6 °C)] 97.9 °F (36.6 °C)  Pulse:  [71] 71  Resp:  [18] 18  SpO2:  [97 %] 97 %  BP: (130)/(72) 130/72     Weight: 102.1 kg (225 lb)  Body mass index is 30.52 kg/m².    Physical Exam  Vitals signs and nursing note reviewed.   Constitutional:       General: She is not in acute distress.     Appearance: She is well-developed. She is not diaphoretic.   HENT:      Head: Normocephalic.      Mouth/Throat:      Mouth: Mucous membranes are moist.      Pharynx: Oropharynx is clear.   Eyes:      General: No scleral icterus.     Conjunctiva/sclera: Conjunctivae normal.      Pupils: Pupils  are equal, round, and reactive to light.   Neck:      Musculoskeletal: Normal range of motion and neck supple.      Vascular: No JVD.   Cardiovascular:      Rate and Rhythm: Normal rate and regular rhythm.      Heart sounds: Normal heart sounds. No murmur. No friction rub. No gallop.    Pulmonary:      Effort: Pulmonary effort is normal. No respiratory distress.      Breath sounds: Normal breath sounds. No wheezing or rales.   Abdominal:      General: Bowel sounds are normal. There is no distension.      Palpations: Abdomen is soft.      Tenderness: There is no abdominal tenderness. There is no guarding or rebound.   Musculoskeletal: Normal range of motion.         General: No tenderness.   Lymphadenopathy:      Cervical: No cervical adenopathy.   Skin:     General: Skin is warm and dry.      Capillary Refill: Capillary refill takes less than 2 seconds.      Coloration: Skin is not pale.      Findings: No erythema or rash.   Neurological:      Mental Status: She is alert and oriented to person, place, and time.      Cranial Nerves: No cranial nerve deficit.      Sensory: No sensory deficit.      Coordination: Coordination normal.      Deep Tendon Reflexes: Reflexes normal.   Psychiatric:         Behavior: Behavior normal.         Thought Content: Thought content normal.         Judgment: Judgment normal.           CRANIAL NERVES     CN III, IV, VI   Pupils are equal, round, and reactive to light.       Significant Labs:   CBC:   Recent Labs   Lab 10/06/20  0238   WBC 8.47   HGB 12.4   HCT 39.5        CMP:   Recent Labs   Lab 10/06/20  0238      K 3.9      CO2 21*   GLU 97   BUN 4*   CREATININE 0.7   CALCIUM 8.9   PROT 7.0   ALBUMIN 3.3*   BILITOT 0.4   ALKPHOS 106   AST 19   ALT 15   ANIONGAP 12   EGFRNONAA >60     Cardiac Markers:   Recent Labs   Lab 10/06/20  0238   BNP 27       Significant Imaging: I have reviewed all pertinent imaging results/findings within the past 24 hours.

## 2020-10-06 NOTE — HOSPITAL COURSE
Patient was admitted to telemetry for ACS rule out. She has a known history of small vessel CAD from coronary angiogram in June 2020, no intervention was done, and she was treated medically. She currently presented with atypical chest pain, EKG showed no acute changes, troponin was negative x2.   I discussed the case with Dr. Guthrie who performed her angiogram last admission, and he agreed to no ischemic workup given atypical symptoms and negative troponins. Patient will be discharged home to follow up with her PCP and cardiology as an outpatient. Continue current medications with the exception of aspirin which will be discontinued as she's on triple therapy.   Discharge plan discussed with the patient and she's in agreement. ER precautions were provided.

## 2020-10-06 NOTE — ASSESSMENT & PLAN NOTE
Blood cultures drawn by Zain DAVIS RN   Patient with known history of CAD, had a coronary angiogram in June after presenting with chest pain and positive troponin.   She has small vessel disease, not amenable to PCI.   She is on medical therapy with aspirin, plavix, and lipitor. Heart rate in the 60s, no room for beta blockers.  Currently presented with chest pain, EKG showed no acute changes, troponin was negative x2.  Patient was admitted to telemetry for monitoring.   Cardiology was contacted, Dr. Guthrie agreed to no ischemic workup at this time given recent angiogram and atypical chest pain.     Plan:   Patient will be discharged home to follow up with PCP and cardiology as an outpatient.   Will discontinue aspirin on discharge given triple therapy and the risk of bleeding.   Continue Plavix and Pradaxa, along with lipitor.

## 2020-10-06 NOTE — ED NOTES
Pt reports chest pain for the week. Pain has been intermittent. When has the pain she gets short of breath and clammy feeling. Tonight started having jaw pain and left arm tingling with the pain. Pt on cardiac and vitals monitor. Pt denies any chest pain at this   time.

## 2020-10-06 NOTE — DISCHARGE SUMMARY
[facility] Hospital Medicine  Discharge Summary      Patient Name: Aleyda Costa  MRN: [id number]  Admission Date: 10/6/2020  Hospital Length of Stay: 0 days  Discharge Date and Time:  10/06/2020 1:09 PM  Attending Physician: Pantera Rivas MD   Discharging Provider: Deacon White MD  Primary Care Provider: Darlene Hayden MD      HPI:   Aleyda Costa is a 40-year-old  female who presents to the emergency room this morning complaining of chest pain, left arm pain, and jaw pain.  She also describes a tingling sensation to her left arm.  The symptoms onset several days ago had been waxing and waning.  No aggravating or alleviating factors.  Denies fever or chills.  No known sick contacts or travel.  Previous medical history includes coronary artery disease, depression, active smoker, anemia, PE, and DVT.  Surgical history includes gastric sleeve, an several orthopedic surgeries.  ER workup:  CBC and CMP unremarkable.  Troponin negative.  Chest x-ray negative for acute process.  EKG was sinus rhythm without ST or T-wave abnormalities.  Patient follows with Dr. Kaushik Guthrie for Cardiology.  Patient will be admitted to Hospital Medicine for observation and management.  Will trend troponins and consult Dr. Guthrie.    * No surgery found *      Hospital Course:   Patient was admitted to telemetry for ACS rule out. She has a known history of small vessel CAD from coronary angiogram in June 2020, no intervention was done, and she was treated medically. She currently presented with atypical chest pain, EKG showed no acute changes, troponin was negative x2.   I discussed the case with Dr. Guthrie who performed her angiogram last admission, and he agreed to no ischemic workup given atypical symptoms and negative troponins. Patient will be discharged home to follow up with her PCP and cardiology as an outpatient. Continue current medications with the exception of aspirin which will be  discontinued as she's on triple therapy.   Discharge plan discussed with the patient and she's in agreement. ER precautions were provided.      Consults:   Consults (From admission, onward)        Status Ordering Provider     Inpatient consult to Cardiology  Once     Provider:  Jm Guthrie MD    Acknowledged ERMELINDA LOPEZ.          * Chest pain  Patient with known history of CAD, had a coronary angiogram in June after presenting with chest pain and positive troponin.   She has small vessel disease, not amenable to PCI.   She is on medical therapy with aspirin, plavix, and lipitor. Heart rate in the 60s, no room for beta blockers.  Currently presented with chest pain, EKG showed no acute changes, troponin was negative x2.  Patient was admitted to telemetry for monitoring.   Cardiology was contacted, Dr. Guthrie agreed to no ischemic workup at this time given recent angiogram and atypical chest pain.     Plan:   Patient will be discharged home to follow up with PCP and cardiology as an outpatient.   Will discontinue aspirin on discharge given triple therapy and the risk of bleeding.   Continue Plavix and Pradaxa, along with lipitor.       Hyperlipidemia  Chronic problem   Patient is chronically on statin.will continue for now. Monitor clinically. Last LDL was    Lab Results   Component Value Date    LDLCALC 89.6 06/28/2020       Long term current use of anticoagulant  Chronic problem, history of PE and DVT in the past.   On Pradaxa BID for that, continue current medications.     History of bariatric surgery  Chronic problem    Dependence on nicotine from cigarettes  Chronic problem  Dangers of cigarette smoking were reviewed with patient in detail for 10 minutes and patient was encouraged to quit.   Nicotine replacement options were discussed.  Patient is down to 6 cigarettes a day, will try to quit soon.     Normocytic anemia  Chronic problem  Stable      Final Active Diagnoses:    Diagnosis Date Noted POA     PRINCIPAL PROBLEM:  Chest pain [R07.9] 06/25/2020 Yes    Long term current use of anticoagulant [Z79.01] 10/06/2020 Not Applicable    Hyperlipidemia [E78.5] 10/06/2020 Yes    History of bariatric surgery [Z98.84] 06/26/2020 Not Applicable    Normocytic anemia [D64.9] 11/19/2018 Yes    Dependence on nicotine from cigarettes [F17.210] 11/19/2018 Yes      Problems Resolved During this Admission:       Discharged Condition: good    Disposition: Home or Self Care    Follow Up:  Follow-up Information     Darlene Hayden MD In 1 week.    Specialty: Family Medicine  Contact information:  [address] [facility] [address]  [phone]             Jm Guthrie MD In 2 weeks.    Specialty: Cardiology  Contact information:  [address]  [facility] [address]  [phone]                 Patient Instructions:      Diet Cardiac     Activity as tolerated       Significant Diagnostic Studies: Troponin negative x2, EKG without ischemic changes.     Pending Diagnostic Studies:     Procedure Component Value Units Date/Time    Troponin I [[id number]]     Order Status: Sent Lab Status: No result     Specimen: Blood          Medications:  Reconciled Home Medications:      Medication List      CHANGE how you take these medications    AIMOVIG AUTOINJECTOR 140 mg/mL autoinjector  Generic drug: erenumab-aooe  Inject 140 mg into the skin every 28 days.  What changed: Another medication with the same name was removed. Continue taking this medication, and follow the directions you see here.     FLUoxetine 20 MG capsule  40 mg every evening.  What changed: Another medication with the same name was removed. Continue taking this medication, and follow the directions you see here.     oxyCODONE 10 mg Tab immediate release tablet  Commonly known as: ROXICODONE  Take 10 mg by mouth every 6 (six) hours as needed for Pain.  What changed: Another medication with the same name was removed.  "Continue taking this medication, and follow the directions you see here.        CONTINUE taking these medications    atorvastatin 40 MG tablet  Commonly known as: LIPITOR  40 mg every evening.     cetirizine 5 MG tablet  Commonly known as: ZYRTEC  Take 5 mg by mouth as needed.     clopidogreL 75 mg tablet  Commonly known as: PLAVIX  Take by mouth every evening.     cyanocobalamin (vitamin B-12) 1,000 mcg/mL Kit  Inject 1 mL into the muscle every 21 days.     dabigatran etexilate 150 mg Cap  Commonly known as: PRADAXA  Take 1 capsule (150 mg total) by mouth 2 (two) times daily. "Do NOT break, chew, or open capsules."     hydrOXYzine pamoate 25 MG Cap  Commonly known as: VISTARIL  TAKE ONE CAPSULE BY MOUTH 3 TIMES A DAY AS NEEDED FOR ANXIETY     LORazepam 0.5 MG tablet  Commonly known as: ATIVAN  Take 0.5 mg by mouth every 4 (four) hours as needed.     pantoprazole 40 MG tablet  Commonly known as: PROTONIX  Take 1 tablet (40 mg total) by mouth 2 (two) times daily.     promethazine 25 MG tablet  Commonly known as: PHENERGAN  Take 1 tablet (25 mg total) by mouth every 6 (six) hours as needed for Nausea. Take 1 tablet by mouth every 6 (six) hours as needed (migraine). -MAY CAUSE DROWSINESS-     tiZANidine 4 MG tablet  Commonly known as: ZANAFLEX  Take 4 mg by mouth every 6 (six) hours as needed.        STOP taking these medications    amitriptyline 25 MG tablet  Commonly known as: ELAVIL     aspirin 81 MG EC tablet  Commonly known as: ECOTRIN     doxycycline monohydrate 100 mg Tab     HYDROmorphone 2 MG tablet  Commonly known as: DILAUDID     mupirocin 2 % ointment  Commonly known as: BACTROBAN     oxyCODONE-acetaminophen 5-325 mg per tablet  Commonly known as: PERCOCET          Indwelling Lines/Drains at time of discharge:   Lines/Drains/Airways     None               Time spent on the discharge of patient: 30 minutes  Patient was seen and examined on the date of discharge and determined to be suitable for " discharge.    Deacon White MD  Department of Hospital Medicine  [facility]

## 2020-10-07 ENCOUNTER — TELEPHONE (OUTPATIENT)
Dept: MEDSURG UNIT | Facility: HOSPITAL | Age: 40
End: 2020-10-07

## 2020-10-07 NOTE — PLAN OF CARE
10/07/20 0819   Final Note   Assessment Type Final Discharge Note   Anticipated Discharge Disposition Home

## 2020-10-19 ENCOUNTER — HOSPITAL ENCOUNTER (OUTPATIENT)
Facility: HOSPITAL | Age: 40
Discharge: HOME | End: 2020-10-21
Attending: EMERGENCY MEDICINE | Admitting: HOSPITALIST
Payer: COMMERCIAL

## 2020-10-19 DIAGNOSIS — R07.9 CHEST PAIN: Primary | ICD-10-CM

## 2020-10-19 PROBLEM — Z79.899 POLYPHARMACY: Status: ACTIVE | Noted: 2020-10-19

## 2020-10-19 PROBLEM — R51.9 HEADACHE: Status: ACTIVE | Noted: 2020-10-19

## 2020-10-19 PROBLEM — I27.82 CHRONIC PULMONARY EMBOLISM: Status: ACTIVE | Noted: 2020-10-19

## 2020-10-19 PROBLEM — I25.118 CORONARY ARTERY DISEASE OF NATIVE ARTERY WITH STABLE ANGINA PECTORIS: Status: ACTIVE | Noted: 2020-10-19

## 2020-10-19 LAB
ALBUMIN SERPL BCP-MCNC: 3.9 G/DL (ref 3.5–5.2)
ALP SERPL-CCNC: 92 U/L (ref 55–135)
ALT SERPL W/O P-5'-P-CCNC: 17 U/L (ref 10–44)
ANION GAP SERPL CALC-SCNC: 10 MMOL/L (ref 8–16)
AST SERPL-CCNC: 20 U/L (ref 10–40)
BASOPHILS # BLD AUTO: 0.04 K/UL (ref 0–0.2)
BASOPHILS NFR BLD: 0.4 % (ref 0–1.9)
BILIRUB SERPL-MCNC: 0.7 MG/DL (ref 0.1–1)
BNP SERPL-MCNC: 64 PG/ML (ref 0–99)
BUN SERPL-MCNC: 11 MG/DL (ref 6–20)
CALCIUM SERPL-MCNC: 9 MG/DL (ref 8.7–10.5)
CHLORIDE SERPL-SCNC: 102 MMOL/L (ref 95–110)
CO2 SERPL-SCNC: 26 MMOL/L (ref 23–29)
CREAT SERPL-MCNC: 0.8 MG/DL (ref 0.5–1.4)
DIFFERENTIAL METHOD: ABNORMAL
EOSINOPHIL # BLD AUTO: 0.2 K/UL (ref 0–0.5)
EOSINOPHIL NFR BLD: 1.9 % (ref 0–8)
ERYTHROCYTE [DISTWIDTH] IN BLOOD BY AUTOMATED COUNT: 15.9 % (ref 11.5–14.5)
EST. GFR  (AFRICAN AMERICAN): >60 ML/MIN/1.73 M^2
EST. GFR  (NON AFRICAN AMERICAN): >60 ML/MIN/1.73 M^2
GLUCOSE SERPL-MCNC: 95 MG/DL (ref 70–110)
HCT VFR BLD AUTO: 38.2 % (ref 37–48.5)
HGB BLD-MCNC: 12.5 G/DL (ref 12–16)
IMM GRANULOCYTES # BLD AUTO: 0.02 K/UL (ref 0–0.04)
IMM GRANULOCYTES NFR BLD AUTO: 0.2 % (ref 0–0.5)
LYMPHOCYTES # BLD AUTO: 2.4 K/UL (ref 1–4.8)
LYMPHOCYTES NFR BLD: 26.2 % (ref 18–48)
MCH RBC QN AUTO: 29.1 PG (ref 27–31)
MCHC RBC AUTO-ENTMCNC: 32.7 G/DL (ref 32–36)
MCV RBC AUTO: 89 FL (ref 82–98)
MONOCYTES # BLD AUTO: 0.6 K/UL (ref 0.3–1)
MONOCYTES NFR BLD: 6.3 % (ref 4–15)
NEUTROPHILS # BLD AUTO: 5.9 K/UL (ref 1.8–7.7)
NEUTROPHILS NFR BLD: 65 % (ref 38–73)
NRBC BLD-RTO: 0 /100 WBC
PLATELET # BLD AUTO: 263 K/UL (ref 150–350)
PMV BLD AUTO: 10.1 FL (ref 9.2–12.9)
POTASSIUM SERPL-SCNC: 3.3 MMOL/L (ref 3.5–5.1)
PROT SERPL-MCNC: 7.8 G/DL (ref 6–8.4)
RBC # BLD AUTO: 4.3 M/UL (ref 4–5.4)
SARS-COV-2 RDRP RESP QL NAA+PROBE: NEGATIVE
SODIUM SERPL-SCNC: 138 MMOL/L (ref 136–145)
TROPONIN I SERPL DL<=0.01 NG/ML-MCNC: <0.03 NG/ML
WBC # BLD AUTO: 9.06 K/UL (ref 3.9–12.7)

## 2020-10-19 PROCEDURE — 84484 ASSAY OF TROPONIN QUANT: CPT

## 2020-10-19 PROCEDURE — 25000003 PHARM REV CODE 250: Performed by: EMERGENCY MEDICINE

## 2020-10-19 PROCEDURE — 25000003 PHARM REV CODE 250: Performed by: HOSPITALIST

## 2020-10-19 PROCEDURE — 93010 EKG 12-LEAD: ICD-10-PCS | Mod: ,,, | Performed by: INTERNAL MEDICINE

## 2020-10-19 PROCEDURE — G0378 HOSPITAL OBSERVATION PER HR: HCPCS

## 2020-10-19 PROCEDURE — 80053 COMPREHEN METABOLIC PANEL: CPT

## 2020-10-19 PROCEDURE — 85025 COMPLETE CBC W/AUTO DIFF WBC: CPT

## 2020-10-19 PROCEDURE — 99285 EMERGENCY DEPT VISIT HI MDM: CPT | Mod: 25

## 2020-10-19 PROCEDURE — U0002 COVID-19 LAB TEST NON-CDC: HCPCS

## 2020-10-19 PROCEDURE — 83880 ASSAY OF NATRIURETIC PEPTIDE: CPT

## 2020-10-19 PROCEDURE — 93005 ELECTROCARDIOGRAM TRACING: CPT | Performed by: INTERNAL MEDICINE

## 2020-10-19 PROCEDURE — 63700000 PHARM REV CODE 250 ALT 637 W/O HCPCS: Performed by: HOSPITALIST

## 2020-10-19 PROCEDURE — 36415 COLL VENOUS BLD VENIPUNCTURE: CPT

## 2020-10-19 PROCEDURE — 93010 ELECTROCARDIOGRAM REPORT: CPT | Mod: ,,, | Performed by: INTERNAL MEDICINE

## 2020-10-19 RX ORDER — FLUOXETINE HYDROCHLORIDE 20 MG/1
40 CAPSULE ORAL NIGHTLY
Status: DISCONTINUED | OUTPATIENT
Start: 2020-10-20 | End: 2020-10-21 | Stop reason: HOSPADM

## 2020-10-19 RX ORDER — CLOPIDOGREL BISULFATE 75 MG/1
75 TABLET ORAL NIGHTLY
Status: DISCONTINUED | OUTPATIENT
Start: 2020-10-20 | End: 2020-10-21 | Stop reason: HOSPADM

## 2020-10-19 RX ORDER — MORPHINE SULFATE 2 MG/ML
2 INJECTION, SOLUTION INTRAMUSCULAR; INTRAVENOUS EVERY 6 HOURS PRN
Status: DISCONTINUED | OUTPATIENT
Start: 2020-10-20 | End: 2020-10-21 | Stop reason: HOSPADM

## 2020-10-19 RX ORDER — OXYCODONE HYDROCHLORIDE 5 MG/1
10 TABLET ORAL EVERY 6 HOURS PRN
Status: DISCONTINUED | OUTPATIENT
Start: 2020-10-20 | End: 2020-10-21 | Stop reason: HOSPADM

## 2020-10-19 RX ORDER — NITROGLYCERIN 0.4 MG/1
0.4 TABLET SUBLINGUAL EVERY 5 MIN PRN
Status: DISCONTINUED | OUTPATIENT
Start: 2020-10-20 | End: 2020-10-21 | Stop reason: HOSPADM

## 2020-10-19 RX ORDER — ACETAMINOPHEN 325 MG/1
650 TABLET ORAL EVERY 4 HOURS PRN
Status: DISCONTINUED | OUTPATIENT
Start: 2020-10-20 | End: 2020-10-21 | Stop reason: HOSPADM

## 2020-10-19 RX ORDER — NITROGLYCERIN 0.4 MG/1
0.4 TABLET SUBLINGUAL EVERY 5 MIN PRN
COMMUNITY

## 2020-10-19 RX ORDER — PANTOPRAZOLE SODIUM 40 MG/1
40 TABLET, DELAYED RELEASE ORAL 2 TIMES DAILY
Status: DISCONTINUED | OUTPATIENT
Start: 2020-10-20 | End: 2020-10-21 | Stop reason: HOSPADM

## 2020-10-19 RX ORDER — TIZANIDINE 4 MG/1
4 TABLET ORAL EVERY 6 HOURS PRN
Status: DISCONTINUED | OUTPATIENT
Start: 2020-10-20 | End: 2020-10-21 | Stop reason: HOSPADM

## 2020-10-19 RX ORDER — KETOROLAC TROMETHAMINE 30 MG/ML
30 INJECTION, SOLUTION INTRAMUSCULAR; INTRAVENOUS EVERY 6 HOURS
Status: ON HOLD | COMMUNITY
Start: 2020-09-30 | End: 2020-12-28 | Stop reason: HOSPADM

## 2020-10-19 RX ORDER — SODIUM CHLORIDE 0.9 % (FLUSH) 0.9 %
10 SYRINGE (ML) INJECTION
Status: DISCONTINUED | OUTPATIENT
Start: 2020-10-20 | End: 2020-10-21 | Stop reason: HOSPADM

## 2020-10-19 RX ORDER — AMOXICILLIN 250 MG
1 CAPSULE ORAL 2 TIMES DAILY
Status: DISCONTINUED | OUTPATIENT
Start: 2020-10-20 | End: 2020-10-21 | Stop reason: HOSPADM

## 2020-10-19 RX ORDER — DABIGATRAN ETEXILATE 150 MG/1
150 CAPSULE ORAL 2 TIMES DAILY
Status: DISCONTINUED | OUTPATIENT
Start: 2020-10-20 | End: 2020-10-21 | Stop reason: HOSPADM

## 2020-10-19 RX ORDER — POTASSIUM CHLORIDE 20 MEQ/15ML
40 SOLUTION ORAL ONCE
Status: COMPLETED | OUTPATIENT
Start: 2020-10-19 | End: 2020-10-19

## 2020-10-19 RX ORDER — ONDANSETRON 2 MG/ML
4 INJECTION INTRAMUSCULAR; INTRAVENOUS EVERY 8 HOURS PRN
Status: DISCONTINUED | OUTPATIENT
Start: 2020-10-20 | End: 2020-10-21 | Stop reason: HOSPADM

## 2020-10-19 RX ORDER — RANOLAZINE 500 MG/1
500 TABLET, EXTENDED RELEASE ORAL 2 TIMES DAILY
Status: ON HOLD | COMMUNITY
Start: 2020-10-17 | End: 2020-12-07 | Stop reason: HOSPADM

## 2020-10-19 RX ORDER — NAPROXEN SODIUM 220 MG/1
243 TABLET, FILM COATED ORAL
Status: COMPLETED | OUTPATIENT
Start: 2020-10-19 | End: 2020-10-19

## 2020-10-19 RX ORDER — POLYETHYLENE GLYCOL 3350 17 G/17G
17 POWDER, FOR SOLUTION ORAL DAILY
Status: DISCONTINUED | OUTPATIENT
Start: 2020-10-20 | End: 2020-10-21 | Stop reason: HOSPADM

## 2020-10-19 RX ORDER — ATORVASTATIN CALCIUM 40 MG/1
40 TABLET, FILM COATED ORAL NIGHTLY
Status: DISCONTINUED | OUTPATIENT
Start: 2020-10-20 | End: 2020-10-21 | Stop reason: HOSPADM

## 2020-10-19 RX ADMIN — POTASSIUM CHLORIDE 40 MEQ: 20 SOLUTION ORAL at 11:10

## 2020-10-19 RX ADMIN — ASPIRIN 81 MG 243 MG: 81 TABLET ORAL at 09:10

## 2020-10-19 RX ADMIN — NITROGLYCERIN 1 INCH: 20 OINTMENT TOPICAL at 09:10

## 2020-10-19 RX ADMIN — PANTOPRAZOLE SODIUM 40 MG: 40 TABLET, DELAYED RELEASE ORAL at 10:10

## 2020-10-20 LAB
ALBUMIN SERPL BCP-MCNC: 3.4 G/DL (ref 3.5–5.2)
ALP SERPL-CCNC: 79 U/L (ref 55–135)
ALT SERPL W/O P-5'-P-CCNC: 14 U/L (ref 10–44)
ANION GAP SERPL CALC-SCNC: 8 MMOL/L (ref 8–16)
AST SERPL-CCNC: 16 U/L (ref 10–40)
BASOPHILS # BLD AUTO: 0.04 K/UL (ref 0–0.2)
BASOPHILS NFR BLD: 0.6 % (ref 0–1.9)
BILIRUB SERPL-MCNC: 0.7 MG/DL (ref 0.1–1)
BUN SERPL-MCNC: 8 MG/DL (ref 6–20)
CALCIUM SERPL-MCNC: 8.7 MG/DL (ref 8.7–10.5)
CHLORIDE SERPL-SCNC: 105 MMOL/L (ref 95–110)
CO2 SERPL-SCNC: 26 MMOL/L (ref 23–29)
CREAT SERPL-MCNC: 0.7 MG/DL (ref 0.5–1.4)
DIFFERENTIAL METHOD: ABNORMAL
EOSINOPHIL # BLD AUTO: 0.1 K/UL (ref 0–0.5)
EOSINOPHIL NFR BLD: 1.9 % (ref 0–8)
ERYTHROCYTE [DISTWIDTH] IN BLOOD BY AUTOMATED COUNT: 15.9 % (ref 11.5–14.5)
EST. GFR  (AFRICAN AMERICAN): >60 ML/MIN/1.73 M^2
EST. GFR  (NON AFRICAN AMERICAN): >60 ML/MIN/1.73 M^2
GLUCOSE SERPL-MCNC: 100 MG/DL (ref 70–110)
HCT VFR BLD AUTO: 36.3 % (ref 37–48.5)
HGB BLD-MCNC: 11.8 G/DL (ref 12–16)
IMM GRANULOCYTES # BLD AUTO: 0.02 K/UL (ref 0–0.04)
IMM GRANULOCYTES NFR BLD AUTO: 0.3 % (ref 0–0.5)
LYMPHOCYTES # BLD AUTO: 2.3 K/UL (ref 1–4.8)
LYMPHOCYTES NFR BLD: 31.1 % (ref 18–48)
MAGNESIUM SERPL-MCNC: 2.2 MG/DL (ref 1.6–2.6)
MCH RBC QN AUTO: 28.8 PG (ref 27–31)
MCHC RBC AUTO-ENTMCNC: 32.5 G/DL (ref 32–36)
MCV RBC AUTO: 89 FL (ref 82–98)
MONOCYTES # BLD AUTO: 0.5 K/UL (ref 0.3–1)
MONOCYTES NFR BLD: 6.3 % (ref 4–15)
NEUTROPHILS # BLD AUTO: 4.4 K/UL (ref 1.8–7.7)
NEUTROPHILS NFR BLD: 59.8 % (ref 38–73)
NRBC BLD-RTO: 0 /100 WBC
PHOSPHATE SERPL-MCNC: 4.4 MG/DL (ref 2.7–4.5)
PLATELET # BLD AUTO: 228 K/UL (ref 150–350)
PMV BLD AUTO: 10.1 FL (ref 9.2–12.9)
POTASSIUM SERPL-SCNC: 4.1 MMOL/L (ref 3.5–5.1)
PROT SERPL-MCNC: 6.9 G/DL (ref 6–8.4)
RBC # BLD AUTO: 4.1 M/UL (ref 4–5.4)
SODIUM SERPL-SCNC: 139 MMOL/L (ref 136–145)
TROPONIN I SERPL DL<=0.01 NG/ML-MCNC: <0.03 NG/ML
WBC # BLD AUTO: 7.27 K/UL (ref 3.9–12.7)

## 2020-10-20 PROCEDURE — 85025 COMPLETE CBC W/AUTO DIFF WBC: CPT

## 2020-10-20 PROCEDURE — G0378 HOSPITAL OBSERVATION PER HR: HCPCS

## 2020-10-20 PROCEDURE — 83735 ASSAY OF MAGNESIUM: CPT

## 2020-10-20 PROCEDURE — 80053 COMPREHEN METABOLIC PANEL: CPT

## 2020-10-20 PROCEDURE — 84100 ASSAY OF PHOSPHORUS: CPT

## 2020-10-20 PROCEDURE — 25000003 PHARM REV CODE 250: Performed by: HOSPITALIST

## 2020-10-20 PROCEDURE — 84484 ASSAY OF TROPONIN QUANT: CPT

## 2020-10-20 RX ORDER — POTASSIUM CHLORIDE 20 MEQ/1
40 TABLET, EXTENDED RELEASE ORAL
Status: DISCONTINUED | OUTPATIENT
Start: 2020-10-20 | End: 2020-10-21 | Stop reason: HOSPADM

## 2020-10-20 RX ORDER — MAGNESIUM SULFATE HEPTAHYDRATE 40 MG/ML
4 INJECTION, SOLUTION INTRAVENOUS
Status: DISCONTINUED | OUTPATIENT
Start: 2020-10-20 | End: 2020-10-21 | Stop reason: HOSPADM

## 2020-10-20 RX ORDER — POTASSIUM CHLORIDE 7.45 MG/ML
40 INJECTION INTRAVENOUS
Status: DISCONTINUED | OUTPATIENT
Start: 2020-10-20 | End: 2020-10-21 | Stop reason: HOSPADM

## 2020-10-20 RX ORDER — POTASSIUM CHLORIDE 7.45 MG/ML
20 INJECTION INTRAVENOUS
Status: DISCONTINUED | OUTPATIENT
Start: 2020-10-20 | End: 2020-10-21 | Stop reason: HOSPADM

## 2020-10-20 RX ORDER — POTASSIUM CHLORIDE 20 MEQ/1
20 TABLET, EXTENDED RELEASE ORAL
Status: DISCONTINUED | OUTPATIENT
Start: 2020-10-20 | End: 2020-10-21 | Stop reason: HOSPADM

## 2020-10-20 RX ORDER — LANOLIN ALCOHOL/MO/W.PET/CERES
800 CREAM (GRAM) TOPICAL
Status: DISCONTINUED | OUTPATIENT
Start: 2020-10-20 | End: 2020-10-21 | Stop reason: HOSPADM

## 2020-10-20 RX ORDER — MAGNESIUM SULFATE HEPTAHYDRATE 40 MG/ML
2 INJECTION, SOLUTION INTRAVENOUS
Status: DISCONTINUED | OUTPATIENT
Start: 2020-10-20 | End: 2020-10-21 | Stop reason: HOSPADM

## 2020-10-20 RX ORDER — MAGNESIUM SULFATE 1 G/100ML
1 INJECTION INTRAVENOUS
Status: DISCONTINUED | OUTPATIENT
Start: 2020-10-20 | End: 2020-10-21 | Stop reason: HOSPADM

## 2020-10-20 RX ADMIN — FLUOXETINE 40 MG: 20 CAPSULE ORAL at 08:10

## 2020-10-20 RX ADMIN — CLOPIDOGREL BISULFATE 75 MG: 75 TABLET, FILM COATED ORAL at 08:10

## 2020-10-20 RX ADMIN — OXYCODONE HYDROCHLORIDE 10 MG: 5 TABLET ORAL at 12:10

## 2020-10-20 RX ADMIN — ATORVASTATIN CALCIUM 40 MG: 40 TABLET, FILM COATED ORAL at 08:10

## 2020-10-20 RX ADMIN — DABIGATRAN ETEXILATE MESYLATE 150 MG: 150 CAPSULE ORAL at 09:10

## 2020-10-20 RX ADMIN — SENNOSIDES AND DOCUSATE SODIUM 1 TABLET: 8.6; 5 TABLET ORAL at 08:10

## 2020-10-20 RX ADMIN — PANTOPRAZOLE SODIUM 40 MG: 40 TABLET, DELAYED RELEASE ORAL at 08:10

## 2020-10-20 RX ADMIN — PANTOPRAZOLE SODIUM 40 MG: 40 TABLET, DELAYED RELEASE ORAL at 09:10

## 2020-10-20 NOTE — ED PROVIDER NOTES
Encounter Date: 10/19/2020       History     Chief Complaint   Patient presents with    Chest Pain     since this am    Headache     Patient with extensive past medical history including coronary artery disease, pulmonary embolus, deep vein thrombosis.  Patient currently anticoagulated with Pradaxa.  Patient reports she has multiple episodes of decreased blood pressure over last several weeks.  One episode of syncope 3 weeks ago.  She was admitted at another facility 3 nights ago with chest pain.  She was discharge after negative cardiac enzymes and medication change.  Patient continues with sternal chest pain.  Pain described as sternal heaviness/some eye sitting on her chest since this morning associated with dyspnea on exertion.  There is no pleurisy hemoptysis.  No cough.  No fever or chills.  No legs swelling.  No gastrointestinal bleeding.        Review of patient's allergies indicates:   Allergen Reactions    Clarithromycin Swelling and Other (See Comments)     DIFFICULTY SWALLOWING  DIFFICULTY SWALLOWING    Pcn [penicillins] Anaphylaxis    Sulfa (sulfonamide antibiotics) Hives    Wellbutrin [bupropion hcl] Shortness Of Breath and Anaphylaxis    Betadine [povidone-iodine] Hives     Swelling      Cefprozil Hives    Iodinated contrast media Hives    Latex, natural rubber Rash    Sulfamethoxazole-trimethoprim Nausea And Vomiting    Iodine Hives and Swelling    Latex Hives and Swelling     Past Medical History:   Diagnosis Date    Abnormal Pap smear of cervix     Arthritis     OA    Back pain     Cancer     skin cancer to back    Depression     Endometriosis     Full dentures     GERD (gastroesophageal reflux disease)     Migraine     Pulmonary embolism 2018    Seizures     Sleep apnea     Does not use c-pap since weight loss - 170 lbs    Uterine fibroid     Wears glasses      Past Surgical History:   Procedure Laterality Date    ANGIOGRAM, CORONARY, WITH LEFT HEART CATHETERIZATION  Left 2020    Procedure: Left heart cath;  Surgeon: Jm Guthrie MD;  Location: [facility] CATH/EP LAB;  Service: Cardiology;  Laterality: Left;    APPENDECTOMY      CARPAL TUNNEL RELEASE Bilateral      SECTION      CHOLECYSTECTOMY      COLONOSCOPY N/A 2019    Procedure: COLONOSCOPY;  Surgeon: Anselmo Blackwell MD;  Location: [facility] ENDO;  Service: Endoscopy;  Laterality: N/A;    EPIDURAL STEROID INJECTION INTO LUMBAR SPINE N/A 2019    Procedure: Injection-steroid-epidural-lumbar;  Surgeon: Yariel Ramirez MD;  Location: [facility] OR;  Service: Pain Management;  Laterality: N/A;  L3-4    ESOPHAGOGASTRODUODENOSCOPY N/A 2019    Procedure: EGD (ESOPHAGOGASTRODUODENOSCOPY);  Surgeon: Anselmo Blackwell MD;  Location: [facility] ENDO;  Service: Endoscopy;  Laterality: N/A;    FRACTURE SURGERY      ankle    gastric sleve      HYSTERECTOMY  2014    endo and fibroids    HYSTERECTOMY      JOINT REPLACEMENT Left 2018    KNEE ARTHROPLASTY Left 2018    Procedure: ARTHROPLASTY, KNEE;  Surgeon: Feliberto Cardenas MD;  Location: [facility] OR;  Service: Orthopedics;  Laterality: Left;    KNEE ARTHROPLASTY Right 2020    Procedure: ARTHROPLASTY, KNEE;  Surgeon: Feliberto Cardenas MD;  Location: [facility] OR;  Service: Orthopedics;  Laterality: Right;    KNEE ARTHROSCOPY W/ MENISCECTOMY Right 10/25/2019    Procedure: ARTHROSCOPY, KNEE, WITH MENISCECTOMY;  Surgeon: Feliberto Cardenas MD;  Location: [facility] OR;  Service: Orthopedics;  Laterality: Right;    KNEE SURGERY      LUMBAR EPIDURAL INJECTION      OOPHORECTOMY Left     LSO    RADIAL NERVE Right     RECONSTRUCTION OF MEDIAL PATELLOFEMORAL LIGAMENT OF RIGHT KNEE Right 10/25/2019    Procedure: RECONSTRUCTION, LIGAMENT, MEDIAL PATELLOFEMORAL, RIGHT;  Surgeon: Feliberto Cardenas MD;  Location: [facility] OR;  Service: Orthopedics;  Laterality: Right;    SINUS SURGERY      UPPER GASTROINTESTINAL ENDOSCOPY  2019    Dr. Blackwell; mild  schatzki ring-dilated; gastritis; bx in process     Family History   Problem Relation Age of Onset    Heart disease Mother     Heart disease Father     Esophageal cancer Maternal Grandmother     Breast cancer Maternal Aunt 46    Colon cancer Neg Hx     Stomach cancer Neg Hx     Ovarian cancer Neg Hx      Social History     Tobacco Use    Smoking status: Current Every Day Smoker     Packs/day: 0.25     Years: 20.00     Pack years: 5.00     Types: Cigarettes     Last attempt to quit: 2018     Years since quittin.9    Smokeless tobacco: Never Used   Substance Use Topics    Alcohol use: Yes     Alcohol/week: 0.0 standard drinks     Comment: occasionally    Drug use: Not Currently     Types: Other-see comments     Comment: no     Review of Systems   Constitutional: Negative for chills and fever.   HENT: Negative for congestion.    Eyes: Negative for visual disturbance.   Respiratory: Positive for shortness of breath.    Cardiovascular: Positive for chest pain. Negative for palpitations.   Gastrointestinal: Negative for abdominal pain and vomiting.   Genitourinary: Negative for dysuria.   Musculoskeletal: Negative for joint swelling.   Neurological: Negative for headaches.   Psychiatric/Behavioral: Negative for confusion.       Physical Exam     Initial Vitals [10/19/20 2043]   BP Pulse Resp Temp SpO2   (!) 130/58 77 18 98.5 °F (36.9 °C) 96 %      MAP       --         Physical Exam    Nursing note and vitals reviewed.  Constitutional: She is not diaphoretic. No distress.   HENT:   Head: Normocephalic and atraumatic.   Eyes: Conjunctivae are normal.   Neck: Normal range of motion.   Cardiovascular: Normal rate.   Pulmonary/Chest: Breath sounds normal.   No reproducible chest wall pain   Abdominal: Soft. There is no abdominal tenderness.   Musculoskeletal: Normal range of motion.      Comments: Bilateral knees with well-healed surgical scars.  Good range of motion.  Patient's right ankle is in a walking  boot.  I did examine and remove walking boot.  All extremities are neurovascular intact.   Neurological: She is alert.   No gross deficits   Skin: No rash noted.   Psychiatric: She has a normal mood and affect.         ED Course   Procedures  Labs Reviewed   CBC W/ AUTO DIFFERENTIAL - Abnormal; Notable for the following components:       Result Value    RDW 15.9 (*)     All other components within normal limits   COMPREHENSIVE METABOLIC PANEL - Abnormal; Notable for the following components:    Potassium 3.3 (*)     All other components within normal limits   TROPONIN I   B-TYPE NATRIURETIC PEPTIDE   SARS-COV-2 RNA AMPLIFICATION, QUAL          Imaging Results          X-Ray Chest AP Portable (In process)                  Medical Decision Making:   History:   Old Medical Records: I decided to obtain old medical records.  Clinical Tests:   Lab Tests: Reviewed  Radiological Study: Reviewed  Medical Tests: Reviewed  ED Management:  Patient presents with chest pain.  History of worrisome for possible acute coronary syndrome.  EKG with essentially no acute ST segment changes.  Troponin is unremarkable.  Giving significant history feel patient will need serial cardiac enzymes and further evaluation of chest pain.  Hospitalist consulted for admission.                             Clinical Impression:       ICD-10-CM ICD-9-CM   1. Chest pain  R07.9 786.50                          ED Disposition Condition    Admit                             Murali Morris MD  10/19/20 3658

## 2020-10-20 NOTE — HPI
40-year-old lady with past medical history of pulmonary embolism and DVT on Pradaxa, small-vessel coronary artery disease as per last angiogram in June 2020, grade 1 diastolic dysfunction came with chief complaint of chest pain.  She reports substernal pressure-like chest pain associated with diaphoresis and shortness of breath.  It appears that patient was just discharged from [facility] not sure couple week ago with similar complaints and she was also discharged from another facility couple days ago where she was advised to stop Imdur and metoprolol due to low blood pressure.  Patient tells me that she today she had couple episodes where her systolic blood pressure was in 80s and she felt lightheaded and also had pressure-like chest pain.  Otherwise denies any fever, chills, headache, dizziness, nausea, vomiting, bladder or bowel symptoms.

## 2020-10-20 NOTE — H&P
[facility]  History & Physical    Patient Name: Aleyda Costa  MRN: [id number]  Admission Date: 10/19/2020  Attending Physician: Sarah Dahl MD   Primary Care Provider: Dralene Hayden MD         Patient information was obtained from patient, past medical records and ER records.     Subjective:     Principal Problem:Chest pain    Chief Complaint:   Chief Complaint   Patient presents with    Chest Pain     since this am    Headache        HPI: 40-year-old lady with past medical history of pulmonary embolism and DVT on Pradaxa, small-vessel coronary artery disease as per last angiogram in June 2020, grade 1 diastolic dysfunction came with chief complaint of chest pain.  She reports substernal pressure-like chest pain associated with diaphoresis and shortness of breath.  It appears that patient was just discharged from [facility] not sure couple week ago with similar complaints and she was also discharged from another facility couple days ago where she was advised to stop Imdur and metoprolol due to low blood pressure.  Patient tells me that she today she had couple episodes where her systolic blood pressure was in 80s and she felt lightheaded and also had pressure-like chest pain.  Otherwise denies any fever, chills, headache, dizziness, nausea, vomiting, bladder or bowel symptoms.     Past Medical History:   Diagnosis Date    Abnormal Pap smear of cervix     Arthritis     OA    Back pain     Cancer     skin cancer to back    Depression     Endometriosis     Full dentures     GERD (gastroesophageal reflux disease)     Migraine     Pulmonary embolism 2018    Seizures     Sleep apnea     Does not use c-pap since weight loss - 170 lbs    Uterine fibroid     Wears glasses        Past Surgical History:   Procedure Laterality Date    ANGIOGRAM, CORONARY, WITH LEFT HEART CATHETERIZATION Left 6/29/2020    Procedure: Left heart cath;  Surgeon: Jm Guthrie MD;   Location: [facility] CATH/EP LAB;  Service: Cardiology;  Laterality: Left;    APPENDECTOMY      CARPAL TUNNEL RELEASE Bilateral      SECTION      CHOLECYSTECTOMY      COLONOSCOPY N/A 2019    Procedure: COLONOSCOPY;  Surgeon: Anselmo Blackwell MD;  Location: [facility] ENDO;  Service: Endoscopy;  Laterality: N/A;    EPIDURAL STEROID INJECTION INTO LUMBAR SPINE N/A 2019    Procedure: Injection-steroid-epidural-lumbar;  Surgeon: Yariel Ramirez MD;  Location: [facility] OR;  Service: Pain Management;  Laterality: N/A;  L3-4    ESOPHAGOGASTRODUODENOSCOPY N/A 2019    Procedure: EGD (ESOPHAGOGASTRODUODENOSCOPY);  Surgeon: Anselmo Blackwell MD;  Location: [facility] ENDO;  Service: Endoscopy;  Laterality: N/A;    FRACTURE SURGERY      ankle    gastric sleve      HYSTERECTOMY      endo and fibroids    HYSTERECTOMY      JOINT REPLACEMENT Left 2018    KNEE ARTHROPLASTY Left 2018    Procedure: ARTHROPLASTY, KNEE;  Surgeon: Feliberto Cardenas MD;  Location: [facility] OR;  Service: Orthopedics;  Laterality: Left;    KNEE ARTHROPLASTY Right 2020    Procedure: ARTHROPLASTY, KNEE;  Surgeon: Feliberto Cardenas MD;  Location: [facility] OR;  Service: Orthopedics;  Laterality: Right;    KNEE ARTHROSCOPY W/ MENISCECTOMY Right 10/25/2019    Procedure: ARTHROSCOPY, KNEE, WITH MENISCECTOMY;  Surgeon: Feliberto Cardenas MD;  Location: [facility] OR;  Service: Orthopedics;  Laterality: Right;    KNEE SURGERY      LUMBAR EPIDURAL INJECTION      OOPHORECTOMY Left     LSO    RADIAL NERVE Right     RECONSTRUCTION OF MEDIAL PATELLOFEMORAL LIGAMENT OF RIGHT KNEE Right 10/25/2019    Procedure: RECONSTRUCTION, LIGAMENT, MEDIAL PATELLOFEMORAL, RIGHT;  Surgeon: Feliberto Cardenas MD;  Location: [facility] OR;  Service: Orthopedics;  Laterality: Right;    SINUS SURGERY      UPPER GASTROINTESTINAL ENDOSCOPY  2019    Dr. Blackwell; mild schatzki ring-dilated; gastritis; bx in process       Review of patient's  "allergies indicates:   Allergen Reactions    Clarithromycin Swelling and Other (See Comments)     DIFFICULTY SWALLOWING  DIFFICULTY SWALLOWING    Pcn [penicillins] Anaphylaxis    Sulfa (sulfonamide antibiotics) Hives    Wellbutrin [bupropion hcl] Shortness Of Breath and Anaphylaxis    Betadine [povidone-iodine] Hives     Swelling      Cefprozil Hives    Iodinated contrast media Hives    Latex, natural rubber Rash    Sulfamethoxazole-trimethoprim Nausea And Vomiting    Iodine Hives and Swelling    Latex Hives and Swelling       Current Facility-Administered Medications on File Prior to Encounter   Medication    lactated ringers infusion    lidocaine (PF) 10 mg/ml (1%) injection 10 mg     Current Outpatient Medications on File Prior to Encounter   Medication Sig    atorvastatin (LIPITOR) 40 MG tablet Take 40 mg by mouth every evening.     cetirizine (ZYRTEC) 5 MG tablet Take 5 mg by mouth as needed.     clopidogreL (PLAVIX) 75 mg tablet Take 75 mg by mouth every evening.     cyanocobalamin, vitamin B-12, 1,000 mcg/mL Kit Inject 1 mL into the muscle every 21 days.     dabigatran etexilate (PRADAXA) 150 mg Cap Take 1 capsule (150 mg total) by mouth 2 (two) times daily. "Do NOT break, chew, or open capsules."    erenumab-aooe (AIMOVIG) 140 mg/mL autoinjector Inject 140 mg into the skin every 28 days.    FLUoxetine 40 MG capsule Take 40 mg by mouth every evening.     hydrOXYzine pamoate (VISTARIL) 25 MG Cap Take 25 mg by mouth every 8 (eight) hours as needed.     ketorolac (TORADOL) 60 mg/2 mL Soln Inject 30 mg into the muscle every 6 (six) hours.     nitroGLYCERIN (NITROSTAT) 0.4 MG SL tablet Place 0.4 mg under the tongue every 5 (five) minutes as needed for Chest pain.    oxyCODONE (ROXICODONE) 10 mg Tab immediate release tablet Take 10 mg by mouth every 6 (six) hours as needed for Pain.    pantoprazole (PROTONIX) 40 MG tablet Take 1 tablet (40 mg total) by mouth 2 (two) times daily.    " lorazepam (ATIVAN) 0.5 MG tablet Take 0.5 mg by mouth every 4 (four) hours as needed.     ranolazine (RANEXA) 500 MG Tb12 Take 500 mg by mouth 2 (two) times daily.     tiZANidine (ZANAFLEX) 4 MG tablet Take 4 mg by mouth every 6 (six) hours as needed.    [DISCONTINUED] promethazine (PHENERGAN) 25 MG tablet Take 1 tablet (25 mg total) by mouth every 6 (six) hours as needed for Nausea. Take 1 tablet by mouth every 6 (six) hours as needed (migraine). -MAY CAUSE DROWSINESS-     Family History     Problem Relation (Age of Onset)    Breast cancer Maternal Aunt (46)    Esophageal cancer Maternal Grandmother    Heart disease Mother, Father        Tobacco Use    Smoking status: Current Every Day Smoker     Packs/day: 0.25     Years: 20.00     Pack years: 5.00     Types: Cigarettes     Last attempt to quit: 2018     Years since quittin.9    Smokeless tobacco: Never Used   Substance and Sexual Activity    Alcohol use: Yes     Alcohol/week: 0.0 standard drinks     Comment: occasionally    Drug use: Not Currently     Types: Other-see comments     Comment: no    Sexual activity: Yes     Partners: Male     Review of Systems   Constitutional: Negative for chills and fever.   HENT: Negative for sore throat.    Eyes: Negative for redness and itching.   Respiratory: Positive for shortness of breath. Negative for chest tightness.    Cardiovascular: Positive for chest pain. Negative for palpitations.   Gastrointestinal: Negative for blood in stool and nausea.   Genitourinary: Negative for dysuria.   Musculoskeletal: Positive for gait problem. Negative for joint swelling.   Neurological: Negative for tremors and weakness.   Psychiatric/Behavioral: Negative for behavioral problems and sleep disturbance.   All other systems reviewed and are negative.    Objective:     Vital Signs (Most Recent):  Temp: 98.5 °F (36.9 °C) (10/19/20 2043)  Pulse: 77 (10/19/20 2043)  Resp: 18 (10/19/20 2043)  BP: (!) 130/58 (10/19/20  2043)  SpO2: 96 % (10/19/20 2043) Vital Signs (24h Range):  Temp:  [98.5 °F (36.9 °C)] 98.5 °F (36.9 °C)  Pulse:  [77] 77  Resp:  [18] 18  SpO2:  [96 %] 96 %  BP: (130)/(58) 130/58     Weight: 102.1 kg (225 lb)  Body mass index is 30.52 kg/m².    Physical Exam  Vitals signs and nursing note reviewed.   Constitutional:       Appearance: She is well-developed.      Comments: Chronically ill-appearing lady in no distress   HENT:      Head: Atraumatic.   Neck:      Musculoskeletal: Neck supple.      Vascular: No JVD.   Cardiovascular:      Rate and Rhythm: Normal rate and regular rhythm.      Heart sounds: Normal heart sounds. No murmur. No gallop.    Pulmonary:      Effort: No respiratory distress.      Breath sounds: Normal breath sounds. No wheezing.   Abdominal:      General: Bowel sounds are normal. There is no distension.      Palpations: Abdomen is soft.      Tenderness: There is no guarding or rebound.   Musculoskeletal:      Comments: Surgical boot in right lower extremity   Lymphadenopathy:      Cervical: No cervical adenopathy.   Skin:     General: Skin is warm.      Capillary Refill: Capillary refill takes less than 2 seconds.      Findings: No rash.   Neurological:      Mental Status: She is alert and oriented to person, place, and time.      Cranial Nerves: No cranial nerve deficit.   Psychiatric:         Behavior: Behavior normal.             Significant Labs: All pertinent labs within the past 24 hours have been reviewed.    Significant Imaging: I have reviewed all pertinent imaging results/findings within the past 24 hours.    Last coronary angiogram in June 2020:  Widely patent left main, left anterior descending, left circumflex and right coronary arteries.  Ostial disease of the 2nd septal , ramus intermedius and obtuse marginal branch-all small 1.5 mm vessels.  Medical management; Plavix aspirin metoprolol, statin.    Assessment/Plan:     40-year-old lady with small-vessel coronary artery  disease, grade 1 diastolic dysfunction going from 1 hospital to other hospital with chief complaint of chest pain came to our ED with similar complaints and episode of hypotension.    Assessment:  Active Hospital Problems    Diagnosis  POA    *Chest pain [R07.9]  Yes    Coronary artery disease of native artery with stable angina pectoris [I25.118]  Yes    H/o PE and DVT [I27.82]  Yes    Polypharmacy [Z79.899]  Not Applicable    Headache [R51.9]  Yes    Chronic anticoagulation [Z79.01]  Not Applicable    Hypokalemia [E87.6]  Yes    Dependence on nicotine from cigarettes [F17.210]  Yes    S/P Lilly cemented CR total knee arthroplasty, right 6/16/20 [Z96.651]  Not Applicable    DDD (degenerative disc disease), lumbar [M51.36]  Yes      Resolved Hospital Problems   No resolved problems to display.       Plan:  Admit to [facility] for observation    Patient had angiogram in June 2020 which showed small vessel coronary artery disease and Cardiology recommended medical management however patient is going from 1 hospital to other hospital with similar complaints and self-reported hypotension and headaches    Consult cardiology-Dr. Guthrie    Serial EKGs, cardiac enzymes, telemetry monitoring, p.r.n. nitroglycerin    She stop taking metoprolol, Imdur and was recently advised to take Ranexa however currently due to headaches and hypotension she is not taking any of these medications    Patient is taking multiple high-risk medications including opioids, benzodiazepines, muscle relaxant and Prozac    Frequent orthostatic vital signs    Will defer further cardiac workup to Cardiology    Nicotine patch if desired    Continue home medications including Pradaxa        VTE Risk Mitigation (From admission, onward)         Ordered     dabigatran etexilate capsule 150 mg  2 times daily      10/19/20 2318     IP VTE HIGH RISK PATIENT  Once      10/19/20 2318     Place sequential compression device  Until discontinued       10/19/20 2314     Reason for No Pharmacological VTE Prophylaxis  Once     Question:  Reasons:  Answer:  Already adequately anticoagulated on oral Anticoagulants    10/19/20 2318                   Sarah Dahl MD  Department of Hospital Medicine   [facility]

## 2020-10-20 NOTE — SUBJECTIVE & OBJECTIVE
Past Medical History:   Diagnosis Date    Abnormal Pap smear of cervix     Arthritis     OA    Back pain     Cancer     skin cancer to back    Depression     Endometriosis     Full dentures     GERD (gastroesophageal reflux disease)     Migraine     Pulmonary embolism     Seizures     Sleep apnea     Does not use c-pap since weight loss - 170 lbs    Uterine fibroid     Wears glasses        Past Surgical History:   Procedure Laterality Date    ANGIOGRAM, CORONARY, WITH LEFT HEART CATHETERIZATION Left 2020    Procedure: Left heart cath;  Surgeon: Jm Guthrie MD;  Location: [facility] CATH/EP LAB;  Service: Cardiology;  Laterality: Left;    APPENDECTOMY      CARPAL TUNNEL RELEASE Bilateral      SECTION      CHOLECYSTECTOMY      COLONOSCOPY N/A 2019    Procedure: COLONOSCOPY;  Surgeon: Anselmo Blackwell MD;  Location: [facility] ENDO;  Service: Endoscopy;  Laterality: N/A;    EPIDURAL STEROID INJECTION INTO LUMBAR SPINE N/A 2019    Procedure: Injection-steroid-epidural-lumbar;  Surgeon: Yariel Ramirez MD;  Location: [facility] OR;  Service: Pain Management;  Laterality: N/A;  L3-4    ESOPHAGOGASTRODUODENOSCOPY N/A 2019    Procedure: EGD (ESOPHAGOGASTRODUODENOSCOPY);  Surgeon: Anselmo Blackwell MD;  Location: [facility] ENDO;  Service: Endoscopy;  Laterality: N/A;    FRACTURE SURGERY      ankle    gastric sleve      HYSTERECTOMY      endo and fibroids    HYSTERECTOMY      JOINT REPLACEMENT Left 2018    KNEE ARTHROPLASTY Left 2018    Procedure: ARTHROPLASTY, KNEE;  Surgeon: Feliberto Cardenas MD;  Location: [facility] OR;  Service: Orthopedics;  Laterality: Left;    KNEE ARTHROPLASTY Right 2020    Procedure: ARTHROPLASTY, KNEE;  Surgeon: Feliberto Cardenas MD;  Location: [facility] OR;  Service: Orthopedics;  Laterality: Right;    KNEE ARTHROSCOPY W/ MENISCECTOMY Right 10/25/2019    Procedure: ARTHROSCOPY, KNEE, WITH MENISCECTOMY;  Surgeon: Feliberto Perez  "MD Ronald;  Location: [facility] OR;  Service: Orthopedics;  Laterality: Right;    KNEE SURGERY      LUMBAR EPIDURAL INJECTION      OOPHORECTOMY Left     LSO    RADIAL NERVE Right     RECONSTRUCTION OF MEDIAL PATELLOFEMORAL LIGAMENT OF RIGHT KNEE Right 10/25/2019    Procedure: RECONSTRUCTION, LIGAMENT, MEDIAL PATELLOFEMORAL, RIGHT;  Surgeon: Feliberto Cardenas MD;  Location: [facility] OR;  Service: Orthopedics;  Laterality: Right;    SINUS SURGERY      UPPER GASTROINTESTINAL ENDOSCOPY  11/27/2019    Dr. Blackwell; mild schatzki ring-dilated; gastritis; bx in process       Review of patient's allergies indicates:   Allergen Reactions    Clarithromycin Swelling and Other (See Comments)     DIFFICULTY SWALLOWING  DIFFICULTY SWALLOWING    Pcn [penicillins] Anaphylaxis    Sulfa (sulfonamide antibiotics) Hives    Wellbutrin [bupropion hcl] Shortness Of Breath and Anaphylaxis    Betadine [povidone-iodine] Hives     Swelling      Cefprozil Hives    Iodinated contrast media Hives    Latex, natural rubber Rash    Sulfamethoxazole-trimethoprim Nausea And Vomiting    Iodine Hives and Swelling    Latex Hives and Swelling       Current Facility-Administered Medications on File Prior to Encounter   Medication    lactated ringers infusion    lidocaine (PF) 10 mg/ml (1%) injection 10 mg     Current Outpatient Medications on File Prior to Encounter   Medication Sig    atorvastatin (LIPITOR) 40 MG tablet Take 40 mg by mouth every evening.     cetirizine (ZYRTEC) 5 MG tablet Take 5 mg by mouth as needed.     clopidogreL (PLAVIX) 75 mg tablet Take 75 mg by mouth every evening.     cyanocobalamin, vitamin B-12, 1,000 mcg/mL Kit Inject 1 mL into the muscle every 21 days.     dabigatran etexilate (PRADAXA) 150 mg Cap Take 1 capsule (150 mg total) by mouth 2 (two) times daily. "Do NOT break, chew, or open capsules."    erenumab-aooe (AIMOVIG) 140 mg/mL autoinjector Inject 140 mg into the skin every 28 days.    FLUoxetine " 40 MG capsule Take 40 mg by mouth every evening.     hydrOXYzine pamoate (VISTARIL) 25 MG Cap Take 25 mg by mouth every 8 (eight) hours as needed.     ketorolac (TORADOL) 60 mg/2 mL Soln Inject 30 mg into the muscle every 6 (six) hours.     nitroGLYCERIN (NITROSTAT) 0.4 MG SL tablet Place 0.4 mg under the tongue every 5 (five) minutes as needed for Chest pain.    oxyCODONE (ROXICODONE) 10 mg Tab immediate release tablet Take 10 mg by mouth every 6 (six) hours as needed for Pain.    pantoprazole (PROTONIX) 40 MG tablet Take 1 tablet (40 mg total) by mouth 2 (two) times daily.    lorazepam (ATIVAN) 0.5 MG tablet Take 0.5 mg by mouth every 4 (four) hours as needed.     ranolazine (RANEXA) 500 MG Tb12 Take 500 mg by mouth 2 (two) times daily.     tiZANidine (ZANAFLEX) 4 MG tablet Take 4 mg by mouth every 6 (six) hours as needed.    [DISCONTINUED] promethazine (PHENERGAN) 25 MG tablet Take 1 tablet (25 mg total) by mouth every 6 (six) hours as needed for Nausea. Take 1 tablet by mouth every 6 (six) hours as needed (migraine). -MAY CAUSE DROWSINESS-     Family History     Problem Relation (Age of Onset)    Breast cancer Maternal Aunt (46)    Esophageal cancer Maternal Grandmother    Heart disease Mother, Father        Tobacco Use    Smoking status: Current Every Day Smoker     Packs/day: 0.25     Years: 20.00     Pack years: 5.00     Types: Cigarettes     Last attempt to quit: 2018     Years since quittin.9    Smokeless tobacco: Never Used   Substance and Sexual Activity    Alcohol use: Yes     Alcohol/week: 0.0 standard drinks     Comment: occasionally    Drug use: Not Currently     Types: Other-see comments     Comment: no    Sexual activity: Yes     Partners: Male     Review of Systems   Constitutional: Negative for chills and fever.   HENT: Negative for sore throat.    Eyes: Negative for redness and itching.   Respiratory: Positive for shortness of breath. Negative for chest tightness.     Cardiovascular: Positive for chest pain. Negative for palpitations.   Gastrointestinal: Negative for blood in stool and nausea.   Genitourinary: Negative for dysuria.   Musculoskeletal: Positive for gait problem. Negative for joint swelling.   Neurological: Negative for tremors and weakness.   Psychiatric/Behavioral: Negative for behavioral problems and sleep disturbance.   All other systems reviewed and are negative.    Objective:     Vital Signs (Most Recent):  Temp: 98.5 °F (36.9 °C) (10/19/20 2043)  Pulse: 77 (10/19/20 2043)  Resp: 18 (10/19/20 2043)  BP: (!) 130/58 (10/19/20 2043)  SpO2: 96 % (10/19/20 2043) Vital Signs (24h Range):  Temp:  [98.5 °F (36.9 °C)] 98.5 °F (36.9 °C)  Pulse:  [77] 77  Resp:  [18] 18  SpO2:  [96 %] 96 %  BP: (130)/(58) 130/58     Weight: 102.1 kg (225 lb)  Body mass index is 30.52 kg/m².    Physical Exam  Vitals signs and nursing note reviewed.   Constitutional:       Appearance: She is well-developed.      Comments: Chronically ill-appearing lady in no distress   HENT:      Head: Atraumatic.   Neck:      Musculoskeletal: Neck supple.      Vascular: No JVD.   Cardiovascular:      Rate and Rhythm: Normal rate and regular rhythm.      Heart sounds: Normal heart sounds. No murmur. No gallop.    Pulmonary:      Effort: No respiratory distress.      Breath sounds: Normal breath sounds. No wheezing.   Abdominal:      General: Bowel sounds are normal. There is no distension.      Palpations: Abdomen is soft.      Tenderness: There is no guarding or rebound.   Musculoskeletal:      Comments: Surgical boot in right lower extremity   Lymphadenopathy:      Cervical: No cervical adenopathy.   Skin:     General: Skin is warm.      Capillary Refill: Capillary refill takes less than 2 seconds.      Findings: No rash.   Neurological:      Mental Status: She is alert and oriented to person, place, and time.      Cranial Nerves: No cranial nerve deficit.   Psychiatric:         Behavior: Behavior  normal.             Significant Labs: All pertinent labs within the past 24 hours have been reviewed.    Significant Imaging: I have reviewed all pertinent imaging results/findings within the past 24 hours.

## 2020-10-20 NOTE — FIRST PROVIDER EVALUATION
Medical screening exam completed.  I have conducted a focused provider triage encounter, findings are as follows:    Brief history of present illness:  Chest pain  reports SOB with chest pain Denies chest pain at present  Took Notro times one PTA  Pt also reports HA history of Migraines Onset this AM     There were no vitals filed for this visit.    Pertinent physical exam:  HRR Lungs CTA  Pt appears in NAD  Rates pain at 6/10    Brief workup plan:  Cardiac work up     Preliminary workup initiated; this workup will be continued and followed by the physician or advanced practice provider that is assigned to the patient when roomed.

## 2020-10-20 NOTE — CONSULTS
Sarah Dahl MD   Physician   [facility] Medicine   H&P   Signed                       [facility]  Cardiology consultation  10/20/2020      Patient Name: Aleyda Costa  MRN: [id number]  Admission Date: 10/19/2020  Attending Physician: Sarah Dahl MD   Primary Care Provider: Darlene Hayden MD           Patient information was obtained from patient, past medical records and ER records.      Subjective:      Principal Problem:Chest pain     Chief Complaint:        Chief Complaint   Patient presents with    Chest Pain       since this am    Headache         HPI: 40-year-old lady with past medical history of pulmonary embolism and DVT on Pradaxa, small-vessel coronary artery disease as per last angiogram in June 2020, grade 1 diastolic dysfunction came with chief complaint of chest pain.    She reports substernal pressure-like chest pain associated with diaphoresis and shortness of breath.  The patient states that she awoke with the pain on 10/19/2020 at approximately 7:00 a.m..  The pain lasted for 1 minutes and was sharp in nature in the lower mid retrosternal area.   It appears that patient was just discharged from [facility] not sure couple week ago with similar complaints and she was also discharged from [facility] couple days ago where she was advised to stop Imdur and metoprolol due to low blood pressure.  Troponins were negative; she was started on Ranexa b.i.d.  Patient tells me that she today she had couple episodes where her systolic blood pressure was in 80s and she felt lightheaded and also had pressure-like chest pain.  At [facility] approximately 9-10 days ago, the patient had a syncopal spell after entering her bladder-probably micturition syncope.   Otherwise denies any fever, chills, headache, dizziness, nausea, vomiting, bladder or bowel symptoms.  The patient is very sedentary; she can walk approximately 50 yd at best due to pain in the foot-the patient continues to use  a boot.    CT of the chest was negative for pulmonary emboli 2020.  She had nonocclusive thrombus in the right popliteal vein.  The patient was on Eliquis.  She was switched to Pradaxa.  Repeat ultrasound in 2020 did not reveal any thrombus.     Cardiac catheterization angiogram note 2020:  ·  The left ventricular systolic function is normal.  · The ejection fraction is greater than 55% by visual estimate. Hyperkinesia  · LVEDP (Pre): 9  · LVEDP (Post): 8  · No mitral valve regurgitation.  · Ramus intermedius 80-85% ostial stenosis 1.5 mm vessel  · 90% ostial small 2nd obtuse marginal branch stenosis; 1.5 mm vessel  · 90% ostial 2nd septal  stenosis-small vessel  · 2nd Diag lesion distal to the bifurcation of the diagonal -1.0 mm vessel, 85% stenosed.  · Estimated blood loss: <50 mL  · Two vessel coronary artery disease.  Mild intimal disease in the left anterior descending artery. Widely patent left circumflex and right coronary arteries    Echocardiogram 2020 revealed LVEF of 56%.  Mild mitral regurgitation, mild left atrial enlargement, mild-to-moderate tricuspid regurgitation with estimated PA systolic pressure of 35 mmHg       Past Medical History:   Diagnosis Date    Abnormal Pap smear of cervix      Arthritis       OA    Back pain      Cancer       skin cancer to back    Depression      Endometriosis      Full dentures      GERD (gastroesophageal reflux disease)      Migraine      Pulmonary embolism     Seizures      Sleep apnea       Does not use c-pap since weight loss - 170 lbs    Uterine fibroid      Wears glasses                 Past Surgical History:   Procedure Laterality Date    ANGIOGRAM, CORONARY, WITH LEFT HEART CATHETERIZATION Left 2020     Procedure: Left heart cath;  Surgeon: Jm Guthrie MD;  Location: [facility] CATH/EP LAB;  Service: Cardiology;  Laterality: Left;    APPENDECTOMY        CARPAL TUNNEL RELEASE Bilateral        SECTION        CHOLECYSTECTOMY        COLONOSCOPY N/A 12/30/2019     Procedure: COLONOSCOPY;  Surgeon: Anselmo Blackwell MD;  Location: [facility] ENDO;  Service: Endoscopy;  Laterality: N/A;    EPIDURAL STEROID INJECTION INTO LUMBAR SPINE N/A 6/7/2019     Procedure: Injection-steroid-epidural-lumbar;  Surgeon: Yariel Ramirez MD;  Location: [facility] OR;  Service: Pain Management;  Laterality: N/A;  L3-4    ESOPHAGOGASTRODUODENOSCOPY N/A 11/27/2019     Procedure: EGD (ESOPHAGOGASTRODUODENOSCOPY);  Surgeon: Anselmo Blackwell MD;  Location: [facility] ENDO;  Service: Endoscopy;  Laterality: N/A;    FRACTURE SURGERY         ankle    gastric sleve        HYSTERECTOMY   2014     endo and fibroids    HYSTERECTOMY        JOINT REPLACEMENT Left 11/13/2018    KNEE ARTHROPLASTY Left 11/13/2018     Procedure: ARTHROPLASTY, KNEE;  Surgeon: Feliberto Cardenas MD;  Location: [facility] OR;  Service: Orthopedics;  Laterality: Left;    KNEE ARTHROPLASTY Right 6/16/2020     Procedure: ARTHROPLASTY, KNEE;  Surgeon: Feliberto Cardenas MD;  Location: [facility] OR;  Service: Orthopedics;  Laterality: Right;    KNEE ARTHROSCOPY W/ MENISCECTOMY Right 10/25/2019     Procedure: ARTHROSCOPY, KNEE, WITH MENISCECTOMY;  Surgeon: Feliberto Cardenas MD;  Location: [facility] OR;  Service: Orthopedics;  Laterality: Right;    KNEE SURGERY        LUMBAR EPIDURAL INJECTION        OOPHORECTOMY Left       LSO    RADIAL NERVE Right      RECONSTRUCTION OF MEDIAL PATELLOFEMORAL LIGAMENT OF RIGHT KNEE Right 10/25/2019     Procedure: RECONSTRUCTION, LIGAMENT, MEDIAL PATELLOFEMORAL, RIGHT;  Surgeon: Feliberto Cardenas MD;  Location: [facility] OR;  Service: Orthopedics;  Laterality: Right;    SINUS SURGERY        UPPER GASTROINTESTINAL ENDOSCOPY   11/27/2019     Dr. Blackwell; mild schatzki ring-dilated; gastritis; bx in process               Review of patient's allergies indicates:   Allergen Reactions    Clarithromycin Swelling and Other (See Comments)        "DIFFICULTY SWALLOWING  DIFFICULTY SWALLOWING    Pcn [penicillins] Anaphylaxis    Sulfa (sulfonamide antibiotics) Hives    Wellbutrin [bupropion hcl] Shortness Of Breath and Anaphylaxis    Betadine [povidone-iodine] Hives       Swelling       Cefprozil Hives    Iodinated contrast media Hives    Latex, natural rubber Rash    Sulfamethoxazole-trimethoprim Nausea And Vomiting    Iodine Hives and Swelling    Latex Hives and Swelling             Current Facility-Administered Medications on File Prior to Encounter   Medication    lactated ringers infusion    lidocaine (PF) 10 mg/ml (1%) injection 10 mg           Current Outpatient Medications on File Prior to Encounter   Medication Sig    atorvastatin (LIPITOR) 40 MG tablet Take 40 mg by mouth every evening.     cetirizine (ZYRTEC) 5 MG tablet Take 5 mg by mouth as needed.     clopidogreL (PLAVIX) 75 mg tablet Take 75 mg by mouth every evening.     cyanocobalamin, vitamin B-12, 1,000 mcg/mL Kit Inject 1 mL into the muscle every 21 days.     dabigatran etexilate (PRADAXA) 150 mg Cap Take 1 capsule (150 mg total) by mouth 2 (two) times daily. "Do NOT break, chew, or open capsules."    erenumab-aooe (AIMOVIG) 140 mg/mL autoinjector Inject 140 mg into the skin every 28 days.    FLUoxetine 40 MG capsule Take 40 mg by mouth every evening.     hydrOXYzine pamoate (VISTARIL) 25 MG Cap Take 25 mg by mouth every 8 (eight) hours as needed.     ketorolac (TORADOL) 60 mg/2 mL Soln Inject 30 mg into the muscle every 6 (six) hours.     nitroGLYCERIN (NITROSTAT) 0.4 MG SL tablet Place 0.4 mg under the tongue every 5 (five) minutes as needed for Chest pain.    oxyCODONE (ROXICODONE) 10 mg Tab immediate release tablet Take 10 mg by mouth every 6 (six) hours as needed for Pain.    pantoprazole (PROTONIX) 40 MG tablet Take 1 tablet (40 mg total) by mouth 2 (two) times daily.    lorazepam (ATIVAN) 0.5 MG tablet Take 0.5 mg by mouth every 4 (four) hours as needed.     " ranolazine (RANEXA) 500 MG Tb12 Take 500 mg by mouth 2 (two) times daily.     tiZANidine (ZANAFLEX) 4 MG tablet Take 4 mg by mouth every 6 (six) hours as needed.    [DISCONTINUED] promethazine (PHENERGAN) 25 MG tablet Take 1 tablet (25 mg total) by mouth every 6 (six) hours as needed for Nausea. Take 1 tablet by mouth every 6 (six) hours as needed (migraine). -MAY CAUSE DROWSINESS-           Family History      Problem Relation (Age of Onset)     Breast cancer Maternal Aunt (46)     Esophageal cancer Maternal Grandmother     Heart disease Mother, Father                Tobacco Use    Smoking status: Current Every Day Smoker       Packs/day: 0.25       Years: 20.00       Pack years: 5.00       Types: Cigarettes       Last attempt to quit: 2018       Years since quittin.9    Smokeless tobacco: Never Used   Substance and Sexual Activity    Alcohol use: Yes       Alcohol/week: 0.0 standard drinks       Comment: occasionally    Drug use: Not Currently       Types: Other-see comments       Comment: no    Sexual activity: Yes       Partners: Male      Review of Systems   Constitutional: Negative for chills and fever.   HENT: Negative for sore throat.    Eyes: Negative for redness and itching.   Respiratory: Positive for shortness of breath. Negative for chest tightness.    Cardiovascular: Positive for chest pain. Negative for palpitations.   Gastrointestinal: Negative for blood in stool and nausea.   Genitourinary: Negative for dysuria.   Musculoskeletal: Positive for gait problem. Negative for joint swelling.   Neurological: Negative for tremors and weakness.   Psychiatric/Behavioral: Negative for behavioral problems and sleep disturbance.   All other systems reviewed and are negative.     Objective:      Vital Signs (Most Recent):  Temp: 98.5 °F (36.9 °C) (10/19/20 2043)  Pulse: 77 (10/19/20 2043)  Resp: 18 (10/19/20 2043)  BP: (!) 130/58 (10/19/20 2043)  SpO2: 96 % (10/19/20 2043) Vital Signs (24h  Range):  Temp:  [98.5 °F (36.9 °C)] 98.5 °F (36.9 °C)  Pulse:  [77] 77  Resp:  [18] 18  SpO2:  [96 %] 96 %  BP: (130)/(58) 130/58      Weight: 102.1 kg (225 lb)  Body mass index is 30.52 kg/m².     Physical Exam  Vitals signs and nursing note reviewed.   Constitutional:       Appearance: She is well-developed.      Comments: Chronically ill-appearing lady in no distress   HENT:      Head: Atraumatic.   Neck:      Musculoskeletal: Neck supple.      Vascular: No JVD.   Cardiovascular:      Rate and Rhythm: Normal rate and regular rhythm.      Heart sounds: Normal heart sounds. No murmur. No gallop.    Pulmonary:      Effort: No respiratory distress.      Breath sounds: Normal breath sounds. No wheezing.   Abdominal:      General: Bowel sounds are normal. There is no distension.      Palpations: Abdomen is soft.      Tenderness: There is no guarding or rebound.   Musculoskeletal:      Comments: Surgical boot in right lower extremity   Lymphadenopathy:      Cervical: No cervical adenopathy.   Skin:     General: Skin is warm.      Capillary Refill: Capillary refill takes less than 2 seconds.      Findings: No rash.   Neurological:      Mental Status: She is alert and oriented to person, place, and time.      Cranial Nerves: No cranial nerve deficit.   Psychiatric:         Behavior: Behavior normal.               Significant Labs: All pertinent labs within the past 24 hours have been reviewed.     Significant Imaging: I have reviewed all pertinent imaging results/findings within the past 24 hours.     Last coronary angiogram in June 2020:  Widely patent left main, left anterior descending, left circumflex and right coronary arteries.  Ostial disease of the 2nd septal , ramus intermedius and obtuse marginal branch-all small 1.5 mm vessels.  Medical management; Plavix aspirin metoprolol, statin.     Assessment/Plan:      40-year-old lady with small-vessel coronary artery disease, grade 1 diastolic dysfunction  going from 1 hospital to other hospital with chief complaint of chest pain came to our ED with similar complaints and episode of hypotension.     Assessment:        Active Hospital Problems     Diagnosis   POA    *Chest pain [R07.9]   Yes    Coronary artery disease of native artery with stable angina pectoris [I25.118]   Yes    H/o PE and DVT [I27.82]   Yes    Polypharmacy [Z79.899]   Not Applicable    Headache [R51.9]   Yes    Chronic anticoagulation [Z79.01]   Not Applicable    Hypokalemia [E87.6]   Yes    Dependence on nicotine from cigarettes [F17.210]   Yes    S/P Lilly cemented CR total knee arthroplasty, right 6/16/20 [Z96.651]   Not Applicable    DDD (degenerative disc disease), lumbar [M51.36]   Yes       Resolved Hospital Problems   No resolved problems to display.         Plan:  Admit to [facility] for observation     Patient had angiogram in June 2020 which showed small vessel coronary artery disease and Cardiology recommended medical management however patient is going from 1 hospital to other hospital with similar complaints and self-reported hypotension and headaches     Consult cardiology-Dr. Guthrie     Serial EKGs, cardiac enzymes, telemetry monitoring, p.r.n. nitroglycerin     She stop taking metoprolol, Imdur and was recently advised to take Ranexa however currently due to headaches and hypotension she is not taking any of these medications     Patient is taking multiple high-risk medications including opioids, benzodiazepines, muscle relaxant and Prozac     Frequent orthostatic vital signs     Will defer further cardiac workup to Cardiology     Nicotine patch if desired     Continue home medications including Pradaxa               VTE Risk Mitigation (From admission, onward)                  Ordered        dabigatran etexilate capsule 150 mg  2 times daily      10/19/20 2317        IP VTE HIGH RISK PATIENT  Once      10/19/20 2318        Place sequential compression device  Until  discontinued      10/19/20 2318        Reason for No Pharmacological VTE Prophylaxis  Once     Question:  Reasons:  Answer:  Already adequately anticoagulated on oral Anticoagulants    10/19/20 2318                    The patient's coronary angiogram was reviewed again.  She is not a candidate for PCI since the diseased vessels are 1.5 mm in diameter at best.  I am unclear if the patient's chest pain is anginal at all.  Pain usually awakens her from sleep or occurs at rest; does not appear to be precipitated by physical exertion.  She was on metoprolol 12.5 mg daily and Imdur 30 mg daily.  She states her blood pressure was 88/40-hands metoprolol and Imdur were discontinued.  She reports chronic fatigue and  drowsiness.  Continue Ranexa for now.  Ambulate in a.m. , troponins are all negative; BNP 64; hopefully the patient can be discharged tomorrow.     KELIN Guthrie MD [facility]  Cardiology  [facility]

## 2020-10-21 VITALS
OXYGEN SATURATION: 95 % | HEIGHT: 72 IN | WEIGHT: 225.63 LBS | SYSTOLIC BLOOD PRESSURE: 94 MMHG | BODY MASS INDEX: 30.56 KG/M2 | TEMPERATURE: 98 F | RESPIRATION RATE: 18 BRPM | DIASTOLIC BLOOD PRESSURE: 58 MMHG | HEART RATE: 56 BPM

## 2020-10-21 LAB
ALBUMIN SERPL BCP-MCNC: 3.7 G/DL (ref 3.5–5.2)
ALP SERPL-CCNC: 84 U/L (ref 55–135)
ALT SERPL W/O P-5'-P-CCNC: 17 U/L (ref 10–44)
ANION GAP SERPL CALC-SCNC: 11 MMOL/L (ref 8–16)
AST SERPL-CCNC: 18 U/L (ref 10–40)
BASOPHILS # BLD AUTO: 0.04 K/UL (ref 0–0.2)
BASOPHILS NFR BLD: 0.6 % (ref 0–1.9)
BILIRUB SERPL-MCNC: 0.6 MG/DL (ref 0.1–1)
BUN SERPL-MCNC: 7 MG/DL (ref 6–20)
CALCIUM SERPL-MCNC: 9 MG/DL (ref 8.7–10.5)
CHLORIDE SERPL-SCNC: 101 MMOL/L (ref 95–110)
CO2 SERPL-SCNC: 26 MMOL/L (ref 23–29)
CREAT SERPL-MCNC: 0.7 MG/DL (ref 0.5–1.4)
DIFFERENTIAL METHOD: ABNORMAL
EOSINOPHIL # BLD AUTO: 0.2 K/UL (ref 0–0.5)
EOSINOPHIL NFR BLD: 2.1 % (ref 0–8)
ERYTHROCYTE [DISTWIDTH] IN BLOOD BY AUTOMATED COUNT: 15.8 % (ref 11.5–14.5)
EST. GFR  (AFRICAN AMERICAN): >60 ML/MIN/1.73 M^2
EST. GFR  (NON AFRICAN AMERICAN): >60 ML/MIN/1.73 M^2
GLUCOSE SERPL-MCNC: 90 MG/DL (ref 70–110)
HCT VFR BLD AUTO: 39.5 % (ref 37–48.5)
HGB BLD-MCNC: 12.7 G/DL (ref 12–16)
IMM GRANULOCYTES # BLD AUTO: 0.02 K/UL (ref 0–0.04)
IMM GRANULOCYTES NFR BLD AUTO: 0.3 % (ref 0–0.5)
LYMPHOCYTES # BLD AUTO: 2.5 K/UL (ref 1–4.8)
LYMPHOCYTES NFR BLD: 34.2 % (ref 18–48)
MAGNESIUM SERPL-MCNC: 2.1 MG/DL (ref 1.6–2.6)
MCH RBC QN AUTO: 28.7 PG (ref 27–31)
MCHC RBC AUTO-ENTMCNC: 32.2 G/DL (ref 32–36)
MCV RBC AUTO: 89 FL (ref 82–98)
MONOCYTES # BLD AUTO: 0.6 K/UL (ref 0.3–1)
MONOCYTES NFR BLD: 8 % (ref 4–15)
NEUTROPHILS # BLD AUTO: 4 K/UL (ref 1.8–7.7)
NEUTROPHILS NFR BLD: 54.8 % (ref 38–73)
NRBC BLD-RTO: 0 /100 WBC
PHOSPHATE SERPL-MCNC: 4 MG/DL (ref 2.7–4.5)
PLATELET # BLD AUTO: 247 K/UL (ref 150–350)
PMV BLD AUTO: 10.7 FL (ref 9.2–12.9)
POTASSIUM SERPL-SCNC: 3.8 MMOL/L (ref 3.5–5.1)
PROT SERPL-MCNC: 7.3 G/DL (ref 6–8.4)
RBC # BLD AUTO: 4.43 M/UL (ref 4–5.4)
SODIUM SERPL-SCNC: 138 MMOL/L (ref 136–145)
WBC # BLD AUTO: 7.22 K/UL (ref 3.9–12.7)

## 2020-10-21 PROCEDURE — 80053 COMPREHEN METABOLIC PANEL: CPT

## 2020-10-21 PROCEDURE — 25000003 PHARM REV CODE 250: Performed by: HOSPITALIST

## 2020-10-21 PROCEDURE — G0378 HOSPITAL OBSERVATION PER HR: HCPCS

## 2020-10-21 PROCEDURE — 84100 ASSAY OF PHOSPHORUS: CPT

## 2020-10-21 PROCEDURE — 36415 COLL VENOUS BLD VENIPUNCTURE: CPT

## 2020-10-21 PROCEDURE — 85025 COMPLETE CBC W/AUTO DIFF WBC: CPT

## 2020-10-21 PROCEDURE — 83735 ASSAY OF MAGNESIUM: CPT

## 2020-10-21 RX ADMIN — DABIGATRAN ETEXILATE MESYLATE 150 MG: 150 CAPSULE ORAL at 09:10

## 2020-10-21 RX ADMIN — PANTOPRAZOLE SODIUM 40 MG: 40 TABLET, DELAYED RELEASE ORAL at 09:10

## 2020-10-21 RX ADMIN — OXYCODONE HYDROCHLORIDE 10 MG: 5 TABLET ORAL at 04:10

## 2020-10-21 NOTE — PLAN OF CARE
Patient lives with spouse, two children, and a niece. She has no home health but uses a cane. She uses [facility] in [place], [place].  She  has no needs at this time. CM will continue to follow patient until discharged.        10/21/20 1042   Discharge Assessment   Assessment Type Discharge Planning Assessment   Confirmed/corrected address and phone number on facesheet? Yes   Assessment information obtained from? Patient   Communicated expected length of stay with patient/caregiver yes   Prior to hospitilization cognitive status: Alert/Oriented   Prior to hospitalization functional status: Independent   Current cognitive status: Alert/Oriented   Current Functional Status: Independent   Lives With spouse;child(darrell), dependent   Able to Return to Prior Arrangements yes   Is patient able to care for self after discharge? Yes   Readmission Within the Last 30 Days no previous admission in last 30 days   Patient currently being followed by outpatient case management? No   Patient currently receives any other outside agency services? No   Equipment Currently Used at Home cane, quad   Do you have any problems affording any of your prescribed medications? No   Is the patient taking medications as prescribed? yes   Does the patient have transportation home? Yes   Transportation Anticipated family or friend will provide   Dialysis Name and Scheduled days N/a   Does the patient receive services at the [facility]? No   Discharge Plan A Home   Discharge Plan B Home   DME Needed Upon Discharge  none   Patient/Family in Agreement with Plan yes

## 2020-10-21 NOTE — PROGRESS NOTES
[facility] Medicine Progress Note  Patient Name: Aleyda Costa MRN: [id number]   Patient Class: OP- Observation  Length of Stay: 0   Admission Date: 10/19/2020  9:11 PM Attending Physician: Jeanette Lewis MD   Primary Care Provider: Darlene Hayden MD Face-to-Face encounter date: 10/20/2020   Chief Complaint: Chest Pain (since this am) and Headache    Assessment & Plan:   Aleyda Costa is a 40 y.o. female admitted for    Chest pain  Hypotension    Plan  No further intervention from cardiology  Will reevaluate tomorrow  If continues to do well, d/c in the am        Discharge Planning:   No mobility needs.      Subjective:    Interval History   Patient is doing well.    Denies chest pain, shortness of breath, palpitations, abdominal pain, nausea/vomiting.   No concerns/issues overnight reported by the patient or the nursing staff.  Reviewed the labs and discussed the plan of care.   No family present at bedside.     Review of Systems   All other Review of Systems were found to be negative expect for that mentioned already in HPI.   Objective:   Physical Exam  BP (!) 93/57   Pulse (!) 54   Temp 98.4 °F (36.9 °C)   Resp 16   Ht 6' (1.829 m)   Wt 102.3 kg (225 lb 9.6 oz)   SpO2 95%   Breastfeeding No   BMI 30.60 kg/m²   Vitals reviewed.    Constitutional: No distress.   HENT: Atraumatic.   Cardiovascular: Normal rate, regular rhythm and normal heart sounds.   Pulmonary/Chest: Effort normal. Clear to auscultation bilaterally. No wheezes.   Abdominal: Soft. Bowel sounds are normal. Exhibits no distension and no mass. No tenderness  Neurological: Alert.   Skin: Skin is warm and dry.     Following labs were Reviewed   Recent Labs   Lab 10/20/20  0427   WBC 7.27   HGB 11.8*   HCT 36.3*      CALCIUM 8.7   ALBUMIN 3.4*   PROT 6.9      K 4.1   CO2 26      BUN 8   CREATININE 0.7   ALKPHOS 79   ALT 14   AST 16   BILITOT 0.7     No results found for: POCTGLUCOSE      All labs within the past 24 hours have been reviewed  Microbiology Results (last 7 days)     ** No results found for the last 168 hours. **        X-Ray Chest AP Portable   ED Interpretation   No acute cardiopulmonary problems      Final Result      No acute pulmonary process.         Electronically signed by: Steve Edmond MD   Date:    10/20/2020   Time:    06:05          Inpatient medications  Scheduled Meds:   atorvastatin  40 mg Oral QHS    clopidogreL  75 mg Oral QHS    dabigatran etexilate  150 mg Oral BID    FLUoxetine  40 mg Oral QHS    pantoprazole  40 mg Oral BID    polyethylene glycol  17 g Oral Daily    senna-docusate 8.6-50 mg  1 tablet Oral BID     Continuous Infusions:  PRN Meds:.acetaminophen, calcium chloride IVPB, calcium chloride IVPB, calcium chloride IVPB, magnesium oxide, magnesium sulfate IVPB, magnesium sulfate IVPB, magnesium sulfate IVPB, magnesium sulfate IVPB, morphine, nitroGLYCERIN, ondansetron, oxyCODONE, potassium chloride in water, potassium chloride in water, potassium chloride in water, potassium chloride in water, potassium chloride, potassium chloride, potassium chloride, potassium chloride, promethazine (PHENERGAN) IVPB, sodium chloride 0.9%, sodium phosphate IVPB, sodium phosphate IVPB, sodium phosphate IVPB, sodium phosphate IVPB, sodium phosphate IVPB, tiZANidine    Above encounter included review of the medical records, interviewing and examining the patient face-to-face, discussion with family and other health care providers, ordering and interpreting lab/test results and formulating a plan of care.     Medical Decision Making:    [] Low Complexity  [x] Moderate Complexity  [] High Complexity    Jeanette Lewis  [facility] Hospitalist  10/20/2020

## 2020-10-21 NOTE — PLAN OF CARE
No needs at this time.       10/21/20 1114   Final Note   Assessment Type Final Discharge Note   Anticipated Discharge Disposition Home

## 2020-10-21 NOTE — NURSING
With orientee Denise Garg RN who will be taking on primary nursing and charting roles under my watch.

## 2020-10-21 NOTE — DISCHARGE SUMMARY
[facility]  Discharge Summary  Patient Name: Aleyda Costa MRN: [id number]   Patient Class: OP- Observation  Length of Stay: 0   Admission Date: 10/19/2020  9:11 PM Attending Physician: Jeanette Lewis MD   Primary Care Provider: Darlene Hayden MD Face-to-Face encounter date: 10/21/2020   Chief Complaint: Chest Pain (since this am) and Headache    Date of Discharge: 10/21/2020  Discharge Disposition: Home  Condition: Stable    Reason for Hospitalization   Primary Diagnosis: Non cardiac chest pain    Secondary Diagnosis: h/o VTE, coronary artery disease, migraine      Patient Active Problem List   Diagnosis    Arthritis of ankle, right    Osteoarthritis of right subtalar joint    DDD (degenerative disc disease), lumbar    Other spondylosis, lumbar region    Acute lateral meniscal tear    Quadriceps weakness    Patellar instability of left knee    Primary osteoarthritis of right knee    [place] cemented CR total knee arthroplasty, right 6/16/20    Mild episode of recurrent major depressive disorder    Sleep apnea    Chest pain on breathing    Normocytic anemia    Hyperphosphatemia    Dependence on nicotine from cigarettes    Abnormal CXR    Constipation    Lumbar radiculitis    Arthritis of knee, right    Acute medial meniscal tear, right, subsequent encounter    Dysphagia    Hematochezia    Patellofemoral arthritis of right knee    Arthritis of right knee    History of pulmonary embolism    Generalized anxiety disorder    DVT (deep venous thrombosis)    Chest pain    Pain of right lower extremity    History of bariatric surgery    Atelectasis    Hiatal hernia    Elevated troponin    Hypokalemia    Angina of effort    ACS (acute coronary syndrome)    Knee pain    Chronic anticoagulation    Hyperlipidemia    Coronary artery disease of native artery with stable angina pectoris    H/o PE and DVT    Polypharmacy    Headache       Brief History of  Present Illness    Aleyda Costa is a 40 y.o.  female who  has a past medical history of Abnormal Pap smear of cervix, Arthritis, Back pain, Cancer, Depression, Endometriosis, Full dentures, GERD (gastroesophageal reflux disease), Migraine, Pulmonary embolism (2018), Seizures, Sleep apnea, Uterine fibroid, and Wears glasses.. The patient presented to [facility] on 10/19/2020 with a primary complaint of Chest Pain (since this am) and Headache      For the full HPI please refer to the History & Physical from this admission.    Hospital Course By Problem with Pertinent Findings     Patient was observed in the hospital and monitored with EKG and serial troponin. They remained negative and patient did not complain of chest pain during his stay. Patient evaluated by cardiologist and do not think she is a candidate for PCI since the diseased vessels are 1.5 mm in diameter. Recommended stopping beta blocker and IMDUR and continue Ranexa. The patient was discharged , with discharge instructions were reviewed in written and verbal form. All pertinent questions were discussed and prescriptions were provided. The patient will follow up in 1 week or sooner as needed with the PCP and Cardiologist as discussed, and the patient is on board with the plan.        Physical Exam  BP (!) 94/58   Pulse (!) 56   Temp 98.2 °F (36.8 °C) (Oral)   Resp 18   Ht 6' (1.829 m)   Wt 102.3 kg (225 lb 9.6 oz)   SpO2 95%   Breastfeeding No   BMI 30.60 kg/m²   Vitals reviewed.    Constitutional: No distress.   HENT: Atraumatic.   Cardiovascular: Normal rate, regular rhythm and normal heart sounds.   Pulmonary/Chest: Effort normal. Clear to auscultation bilaterally. No wheezes.   Abdominal: Soft. Bowel sounds are normal. Exhibits no distension and no mass. No tenderness  Neurological: Alert.   Skin: Skin is warm and dry.     Following labs were Reviewed   Recent Labs   Lab 10/21/20  0441   WBC 7.22   HGB 12.7   HCT 39.5       CALCIUM 9.0   ALBUMIN 3.7   PROT 7.3      K 3.8   CO2 26      BUN 7   CREATININE 0.7   ALKPHOS 84   ALT 17   AST 18   BILITOT 0.6     No results found for: POCTGLUCOSE     All labs within the past 24 hours have been reviewed    Microbiology Results (last 7 days)     ** No results found for the last 168 hours. **        X-Ray Chest AP Portable   ED Interpretation   No acute cardiopulmonary problems      Final Result      No acute pulmonary process.         Electronically signed by: Steve Edmond MD   Date:    10/20/2020   Time:    06:05          No results found for this or any previous visit.      Consultants and Procedures   Consultants:  Cardiologist    Procedures:   None    Discharge Information:   Diet:  Resume regular diet/Cardiac diet    Physical Activity:  Activity as tolerated    Instructions:  1. Take all medications as prescribed  2. Keep all follow-up appointments  3. Return to the hospital or call your primary care physicians if any worsening symptoms such as worsening chest pain occur.      Follow-Up Appointments:  1. Please call your primary care physician to schedule an appointment in 1 week time.      Pending laboratory work/Tests to be performed/followed by the Primary care Physician: None    The patient was discharged in the care of her parents//wife/family/caregiver, with discharge instructions were reviewed in written and verbal form. All pertinent questions were discussed and prescriptions were provided. The importance of making follow up appointments and compliance of medications has been stressed repeatedly. The patient will follow up in 1 week or sooner as needed with the PCP, and the patient is on board with the plan. Upon discharge, patient needs to be on following medications.    Discharge Medications:     Medication List      CONTINUE taking these medications    AIMOVIG AUTOINJECTOR 140 mg/mL autoinjector  Generic drug: erenumab-aooe  Inject 140 mg into  "the skin every 28 days.     atorvastatin 40 MG tablet  Commonly known as: LIPITOR     cetirizine 5 MG tablet  Commonly known as: ZYRTEC     clopidogreL 75 mg tablet  Commonly known as: PLAVIX     cyanocobalamin (vitamin B-12) 1,000 mcg/mL Kit     dabigatran etexilate 150 mg Cap  Commonly known as: PRADAXA  Take 1 capsule (150 mg total) by mouth 2 (two) times daily. "Do NOT break, chew, or open capsules."     FLUoxetine 40 MG capsule     hydrOXYzine pamoate 25 MG Cap  Commonly known as: VISTARIL     ketorolac 60 mg/2 mL Soln  Commonly known as: TORADOL     LORazepam 0.5 MG tablet  Commonly known as: ATIVAN     nitroGLYCERIN 0.4 MG SL tablet  Commonly known as: NITROSTAT     oxyCODONE 10 mg Tab immediate release tablet  Commonly known as: ROXICODONE     pantoprazole 40 MG tablet  Commonly known as: PROTONIX  Take 1 tablet (40 mg total) by mouth 2 (two) times daily.     ranolazine 500 MG Tb12  Commonly known as: RANEXA     tiZANidine 4 MG tablet  Commonly known as: ZANAFLEX              I spent 30 minutes preparing the discharge including reviewing records from previous encounters, preparation of discharge summary, assessing and final examination of the patient, discharge medicine reconciliation, discussing plan of care, follow up and education and prescriptions.       Jeanette Lewis  [facility] Hospitalist  10/21/2020          "

## 2020-10-28 ENCOUNTER — SPECIALTY PHARMACY (OUTPATIENT)
Dept: PHARMACY | Facility: CLINIC | Age: 40
End: 2020-10-28

## 2020-10-28 DIAGNOSIS — G43.719 INTRACTABLE CHRONIC MIGRAINE WITHOUT AURA AND WITHOUT STATUS MIGRAINOSUS: Primary | ICD-10-CM

## 2020-10-28 NOTE — TELEPHONE ENCOUNTER
Specialty Pharmacy - Refill Coordination    Specialty Medication Orders Linked to Encounter      Most Recent Value   Medication #1  erenumab-aooe (AIMOVIG) 140 mg/mL autoinjector (Order#[id number], Rx#[id number])          Refill Questions - Documented Responses      Most Recent Value   Relationship to patient of person spoken to?  Self   HIPAA/medical authority confirmed?  Yes   Any changes in contact preferences or allowed representatives?  No   Has the patient had any insurance changes?  No   Has the patient had any changes to specialty medication, dose, or instructions?  No   Has the patient started taking any new medications, herbals, or supplements?  (!) Yes   Has the patient been diagnosed with any new medical conditions?  No   Does the patient have any new allergies to medications or foods?  No   Does the patient have any concerns about side effects?  No   Can the patient store medication/sharps container properly (at the correct temperature, away from children/pets, etc.)?  Yes   Can the patient call emergency services (911) in the event of an emergency?  Yes   Does the patient have any concerns or questions about taking or administering this medication as prescribed?  No   How many doses did the patient miss in the past 4 weeks or since the last fill?  1   Reported reason for missed doses:  Simply forgot   How many doses does the patient have on hand?  0   How many days does the patient report on hand quantity will last?  0   Does the number of doses/days supply remaining match pharmacy expected amounts?  Yes   How will the patient receive the medication?  Mail   When does the patient need to receive the medication?  10/28/20   Shipping Address  Home   Address in [facility] confirmed and updated if neccessary?  Yes   Expected Copay ($)  0   Is the patient able to afford the medication copay?  Yes   Payment Method  zero copay   Days supply of Refill  28   Would patient like to speak to a pharmacist?   "No   Do you want to trigger an intervention?  No   Do you want to trigger an additional referral task?  No   Refill activity completed?  Yes   Refill activity plan  Refill scheduled   Shipment/Pickup Date:  10/29/20          Current Outpatient Medications   Medication Sig    atorvastatin (LIPITOR) 40 MG tablet Take 40 mg by mouth every evening.     cetirizine (ZYRTEC) 5 MG tablet Take 5 mg by mouth as needed.     clopidogreL (PLAVIX) 75 mg tablet Take 75 mg by mouth every evening.     cyanocobalamin, vitamin B-12, 1,000 mcg/mL Kit Inject 1 mL into the muscle every 21 days.     dabigatran etexilate (PRADAXA) 150 mg Cap Take 1 capsule (150 mg total) by mouth 2 (two) times daily. "Do NOT break, chew, or open capsules."    erenumab-aooe (AIMOVIG) 140 mg/mL autoinjector Inject 140 mg into the skin every 28 days.    FLUoxetine 40 MG capsule Take 40 mg by mouth every evening.     hydrOXYzine pamoate (VISTARIL) 25 MG Cap Take 25 mg by mouth every 8 (eight) hours as needed.     ketorolac (TORADOL) 60 mg/2 mL Soln Inject 30 mg into the muscle every 6 (six) hours.     lorazepam (ATIVAN) 0.5 MG tablet Take 0.5 mg by mouth every 4 (four) hours as needed.     nitroGLYCERIN (NITROSTAT) 0.4 MG SL tablet Place 0.4 mg under the tongue every 5 (five) minutes as needed for Chest pain.    oxyCODONE (ROXICODONE) 10 mg Tab immediate release tablet Take 10 mg by mouth every 6 (six) hours as needed for Pain.    pantoprazole (PROTONIX) 40 MG tablet Take 1 tablet (40 mg total) by mouth 2 (two) times daily.    ranolazine (RANEXA) 500 MG Tb12 Take 500 mg by mouth 2 (two) times daily.     tiZANidine (ZANAFLEX) 4 MG tablet Take 4 mg by mouth every 6 (six) hours as needed.   Last reviewed on 10/28/2020 12:15 PM by Jose Harry, PharmD    Review of patient's allergies indicates:   Allergen Reactions    Clarithromycin Swelling and Other (See Comments)     DIFFICULTY SWALLOWING  DIFFICULTY SWALLOWING    Pcn [penicillins] Anaphylaxis "    Sulfa (sulfonamide antibiotics) Hives    Wellbutrin [bupropion hcl] Shortness Of Breath and Anaphylaxis    Betadine [povidone-iodine] Hives     Swelling      Cefprozil Hives    Iodinated contrast media Hives    Latex, natural rubber Rash    Sulfamethoxazole-trimethoprim Nausea And Vomiting    Iodine Hives and Swelling    Latex Hives and Swelling    Last reviewed on  10/28/2020 12:15 PM by Jose Harry    Interventions added this encounter   Closed: [facility] Patient Intervention - Drug safety: erenumab-aooe (AIMOVIG) 140 mg/mL autoinjector   Refill call for Aimovig. Patient confirmed need of the refill. Will deliver via [employer] on 10/29 to arrive on 10/30 with patient consent. Copay $0 at 004.  Address confirmed. Patient has 0 doses remaining / next injection due 10/28. Patient is late on injection. Informed patient to inject upon receiving medication and adjust next dose by 28 days. Patient aware and willing to adhere.Patient denies missed doses and no side effects. No new allergiees/medical conditions. During refill call patient mentioned they had started taking Ranexa. Reviewed interactions with Aimovig- no interactions of clinical significance found; patient advised. Patient taking the medication as directed. Patient denies any further questions. Confirmed 2 patient identifiers - Name and .       Tasks added this encounter   2020 - Refill Call (Auto Added)   Tasks due within next 3 months   No tasks due.     Jose Harry, PharmD  [facility]  [address] [place] [id number]  Phone: [phone]  Fax: [phone]

## 2020-10-28 NOTE — TELEPHONE ENCOUNTER
Specialty Pharmacy - Patient Intervention    Patient education provided:  Drug safety: Drug interaction    Impact on patient care:  Elimination of drug interaction and Prevented therapy complications    Additional Notes  Closing patient intervention. During refill call patient mentioned they had started taking Ranexa. Reviewed interactions with Aimovig- no interactions of clinical significance found; patient advised.

## 2020-11-07 ENCOUNTER — HOSPITAL ENCOUNTER (EMERGENCY)
Facility: HOSPITAL | Age: 40
Discharge: HOME | End: 2020-11-07
Attending: EMERGENCY MEDICINE
Payer: COMMERCIAL

## 2020-11-07 ENCOUNTER — PATIENT MESSAGE (OUTPATIENT)
Dept: HEMATOLOGY/ONCOLOGY | Facility: CLINIC | Age: 40
End: 2020-11-07

## 2020-11-07 VITALS
HEIGHT: 72 IN | BODY MASS INDEX: 30.34 KG/M2 | SYSTOLIC BLOOD PRESSURE: 112 MMHG | HEART RATE: 70 BPM | RESPIRATION RATE: 18 BRPM | TEMPERATURE: 98 F | DIASTOLIC BLOOD PRESSURE: 57 MMHG | OXYGEN SATURATION: 97 % | WEIGHT: 224 LBS

## 2020-11-07 DIAGNOSIS — M25.569 POSTERIOR KNEE PAIN: ICD-10-CM

## 2020-11-07 DIAGNOSIS — I82.401 ACUTE DEEP VEIN THROMBOSIS (DVT) OF RIGHT LOWER EXTREMITY, UNSPECIFIED VEIN: Primary | ICD-10-CM

## 2020-11-07 PROCEDURE — 25000003 PHARM REV CODE 250: Performed by: PHYSICIAN ASSISTANT

## 2020-11-07 PROCEDURE — 96360 HYDRATION IV INFUSION INIT: CPT

## 2020-11-07 PROCEDURE — 99284 EMERGENCY DEPT VISIT MOD MDM: CPT | Mod: 25

## 2020-11-07 PROCEDURE — 63600175 PHARM REV CODE 636 W HCPCS: Performed by: PHYSICIAN ASSISTANT

## 2020-11-07 PROCEDURE — 96372 THER/PROPH/DIAG INJ SC/IM: CPT | Mod: 59

## 2020-11-07 RX ORDER — OXYCODONE HYDROCHLORIDE 5 MG/1
5 TABLET ORAL
Status: COMPLETED | OUTPATIENT
Start: 2020-11-07 | End: 2020-11-07

## 2020-11-07 RX ORDER — ACETAMINOPHEN 500 MG
1000 TABLET ORAL
Status: COMPLETED | OUTPATIENT
Start: 2020-11-07 | End: 2020-11-07

## 2020-11-07 RX ORDER — ENOXAPARIN SODIUM 100 MG/ML
100 INJECTION SUBCUTANEOUS 2 TIMES DAILY
Qty: 14 ML | Refills: 0 | Status: SHIPPED | OUTPATIENT
Start: 2020-11-07 | End: 2020-11-14

## 2020-11-07 RX ORDER — WARFARIN SODIUM 5 MG/1
5 TABLET ORAL DAILY
Qty: 15 TABLET | Refills: 0 | Status: SHIPPED | OUTPATIENT
Start: 2020-11-07 | End: 2020-11-24

## 2020-11-07 RX ORDER — ONDANSETRON 4 MG/1
8 TABLET, ORALLY DISINTEGRATING ORAL
Status: COMPLETED | OUTPATIENT
Start: 2020-11-07 | End: 2020-11-07

## 2020-11-07 RX ORDER — ENOXAPARIN SODIUM 100 MG/ML
1 INJECTION SUBCUTANEOUS
Status: COMPLETED | OUTPATIENT
Start: 2020-11-07 | End: 2020-11-07

## 2020-11-07 RX ADMIN — ONDANSETRON 8 MG: 4 TABLET, ORALLY DISINTEGRATING ORAL at 12:11

## 2020-11-07 RX ADMIN — SODIUM CHLORIDE 500 ML: 0.9 INJECTION, SOLUTION INTRAVENOUS at 12:11

## 2020-11-07 RX ADMIN — ACETAMINOPHEN 1000 MG: 500 TABLET ORAL at 10:11

## 2020-11-07 RX ADMIN — ENOXAPARIN SODIUM 100 MG: 100 INJECTION SUBCUTANEOUS at 12:11

## 2020-11-07 RX ADMIN — OXYCODONE HYDROCHLORIDE 5 MG: 5 TABLET ORAL at 10:11

## 2020-11-07 NOTE — ED NOTES
Presents with complaints of right leg pain for 2 weeks states worsening for 2 days states unable to bear weight today small swollen area to front of leg shin area states bumped it at unknown time also complaints of pain on back of leg behind knee No obvious deformities noted states hx of blood clots and wants to get it checked out NAD noted Neli LACEY in to examine pt

## 2020-11-07 NOTE — ED PROVIDER NOTES
"Encounter Date: 11/7/2020       History     Chief Complaint   Patient presents with    Leg Pain     right, tenderness to anterior lower leg and behind knee     Aleyda Costa is a 40 y.o. female presenting for evaluation of right lower leg pain, mostly behind the right knee, persisting over the last few days.  No injury, trauma or fall.  She had a right total knee replacement, performed by Dr. Cardenas in June and subsequently developed a DVT.  Since then, she has been taking Pradaxa daily.  No fever, no chills.  She has also noticed a small bump on the front of her right lower leg, but denies any injury.  She has been wearing a walking boot on the right foot because of pain in the foot/ankle. She states that she needs to have the ankle "fused."  She has taken her regularly prescribed pain medication at home with little relief.  No numbness, tingling or weakness. No difficulty breathing or shortness of breath.     The history is provided by the patient.     Review of patient's allergies indicates:   Allergen Reactions    Clarithromycin Swelling and Other (See Comments)     DIFFICULTY SWALLOWING  DIFFICULTY SWALLOWING    Pcn [penicillins] Anaphylaxis    Sulfa (sulfonamide antibiotics) Hives    Wellbutrin [bupropion hcl] Shortness Of Breath and Anaphylaxis    Betadine [povidone-iodine] Hives     Swelling      Cefprozil Hives    Iodinated contrast media Hives    Latex, natural rubber Rash    Sulfamethoxazole-trimethoprim Nausea And Vomiting    Iodine Hives and Swelling    Latex Hives and Swelling     Past Medical History:   Diagnosis Date    Abnormal Pap smear of cervix     Arthritis     OA    Back pain     Cancer     skin cancer to back    Depression     Endometriosis     Full dentures     GERD (gastroesophageal reflux disease)     Migraine     Pulmonary embolism 2018    Seizures     Sleep apnea     Does not use c-pap since weight loss - 170 lbs    Uterine fibroid     Wears glasses  "     Past Surgical History:   Procedure Laterality Date    ANGIOGRAM, CORONARY, WITH LEFT HEART CATHETERIZATION Left 2020    Procedure: Left heart cath;  Surgeon: Jm Guthrie MD;  Location: [facility] CATH/EP LAB;  Service: Cardiology;  Laterality: Left;    APPENDECTOMY      CARPAL TUNNEL RELEASE Bilateral      SECTION      CHOLECYSTECTOMY      COLONOSCOPY N/A 2019    Procedure: COLONOSCOPY;  Surgeon: Anselmo Blackwell MD;  Location: [facility] ENDO;  Service: Endoscopy;  Laterality: N/A;    EPIDURAL STEROID INJECTION INTO LUMBAR SPINE N/A 2019    Procedure: Injection-steroid-epidural-lumbar;  Surgeon: Yariel Ramirez MD;  Location: [facility] OR;  Service: Pain Management;  Laterality: N/A;  L3-4    ESOPHAGOGASTRODUODENOSCOPY N/A 2019    Procedure: EGD (ESOPHAGOGASTRODUODENOSCOPY);  Surgeon: Anselmo Blackwell MD;  Location: [facility] ENDO;  Service: Endoscopy;  Laterality: N/A;    FRACTURE SURGERY      ankle    gastric sleve      HYSTERECTOMY  2014    endo and fibroids    HYSTERECTOMY      JOINT REPLACEMENT Left 2018    KNEE ARTHROPLASTY Left 2018    Procedure: ARTHROPLASTY, KNEE;  Surgeon: Feliberto Cardenas MD;  Location: [facility] OR;  Service: Orthopedics;  Laterality: Left;    KNEE ARTHROPLASTY Right 2020    Procedure: ARTHROPLASTY, KNEE;  Surgeon: Feliberto Cardenas MD;  Location: [facility] OR;  Service: Orthopedics;  Laterality: Right;    KNEE ARTHROSCOPY W/ MENISCECTOMY Right 10/25/2019    Procedure: ARTHROSCOPY, KNEE, WITH MENISCECTOMY;  Surgeon: Feliberto Cardenas MD;  Location: [facility] OR;  Service: Orthopedics;  Laterality: Right;    KNEE SURGERY      LUMBAR EPIDURAL INJECTION      OOPHORECTOMY Left     LSO    RADIAL NERVE Right     RECONSTRUCTION OF MEDIAL PATELLOFEMORAL LIGAMENT OF RIGHT KNEE Right 10/25/2019    Procedure: RECONSTRUCTION, LIGAMENT, MEDIAL PATELLOFEMORAL, RIGHT;  Surgeon: Feliberto Cardenas MD;  Location: [facility] OR;  Service: Orthopedics;   Laterality: Right;    SINUS SURGERY      UPPER GASTROINTESTINAL ENDOSCOPY  2019    Dr. Blackwell; mild schatzki ring-dilated; gastritis; bx in process     Family History   Problem Relation Age of Onset    Heart disease Mother     Heart disease Father     Esophageal cancer Maternal Grandmother     Breast cancer Maternal Aunt 46    Colon cancer Neg Hx     Stomach cancer Neg Hx     Ovarian cancer Neg Hx      Social History     Tobacco Use    Smoking status: Current Every Day Smoker     Packs/day: 0.25     Years: 20.00     Pack years: 5.00     Types: Cigarettes     Last attempt to quit: 2018     Years since quittin.9    Smokeless tobacco: Never Used   Substance Use Topics    Alcohol use: Yes     Alcohol/week: 0.0 standard drinks     Comment: occasionally    Drug use: Not Currently     Types: Other-see comments     Comment: no     Review of Systems   Constitutional: Negative for chills and fever.   Respiratory: Negative for cough, chest tightness, shortness of breath and wheezing.    Cardiovascular: Negative for chest pain and palpitations.   Musculoskeletal: Positive for arthralgias and myalgias. Negative for back pain, joint swelling, neck pain and neck stiffness.   Skin: Negative for color change, pallor, rash and wound.   Neurological: Negative for weakness and numbness.   Hematological: Does not bruise/bleed easily.       Physical Exam     Initial Vitals [20 1022]   BP Pulse Resp Temp SpO2   (!) 100/51 70 16 97.8 °F (36.6 °C) 97 %      MAP       --         Physical Exam    Nursing note and vitals reviewed.  Constitutional: She appears well-developed and well-nourished. She is not diaphoretic. No distress.   HENT:   Head: Normocephalic and atraumatic.   Neck: Normal range of motion. Neck supple.   Cardiovascular: Normal rate, regular rhythm, normal heart sounds and intact distal pulses. Exam reveals no gallop and no friction rub.    No murmur heard.  Pulmonary/Chest: Breath sounds  normal. No respiratory distress. She has no wheezes. She has no rhonchi. She has no rales.   Musculoskeletal: Normal range of motion. Tenderness present. No edema.      Comments: TTP noted to right posterior knee without erythema or swelling noted.  Well-healed surgical incision to right anterior knee without erythema, effusion or TTP.  Mildly tender, small nodule noted to right anterior tibia without erythema.  Palpable 2+ pedal pulses.    Neurological: She is alert and oriented to person, place, and time. She has normal strength. No sensory deficit.   Skin: Skin is warm and dry. No rash and no abscess noted. No erythema.   Psychiatric: She has a normal mood and affect.         ED Course   Procedures  Labs Reviewed - No data to display       Imaging Results          US Lower Extremity Veins Right (Final result)  Result time 11/07/20 11:18:22    Final result by Dominik Sexton MD (11/07/20 11:18:22)                 Impression:      Partially occlusive thrombus in the right popliteal vein.  Age is acute.      Electronically signed by: Dominik Sexton  Date:    11/07/2020  Time:    11:18             Narrative:    EXAMINATION:  US LOWER EXTREMITY VEINS RIGHT    CLINICAL HISTORY:  Pain in unspecified knee    TECHNIQUE:  Duplex and color flow Doppler evaluation and graded compression of the right lower extremity veins was performed.    COMPARISON:  07/17/2020    FINDINGS:  Right thigh veins: There is an intraluminal filling defect in the popliteal vein resulting in partial decreased flow and partial decrease in compressibility.  The remaining common femoral, femoral, upper greater saphenous, and deep femoral veins are patent and free of thrombus. The veins are normally compressible and have normal phasic flow and augmentation response.    Right calf veins: The visualized calf veins are patent.    Contralateral CFV: The contralateral (left) common femoral vein is patent and free of thrombus.    Miscellaneous: None                                  Medical Decision Making:   History:   Old Medical Records: I decided to obtain old medical records.  Old Records Summarized: records from clinic visits and records from previous admission(s).  Differential Diagnosis:   DVT  Baker's Cyst  Muscle strain  Septic joint   Clinical Tests:   Radiological Study: Ordered and Reviewed       APC / Resident Notes:   US of right lower extremity shows an acute, partially occlusive thrombus in the right popliteal vein.  She states that she has been compliant with her Pradaxa, Plavix and Baby Aspirin.  Given this, Dr. Aguilar and I feel that she would benefit from weight based Lovenox for the next 7 days and stopping the other anti-coagulants for now.  She is given her first dose of lovenox in the ED today.  She is also given a prescription for Coumadin to start taking after the Lovenox is completed, if she isn't able to follow-up with her hematologist next week.  Hopefully, she'll be able to follow-up with Dr. Pina and she can make further treatment recommendations.  She voices understanding and is agreeable to the plan.  She is given specific return precautions.                           Clinical Impression:       ICD-10-CM ICD-9-CM   1. Acute deep vein thrombosis (DVT) of right lower extremity, unspecified vein  I82.401 453.40   2. Posterior knee pain  M25.569 719.46                          ED Disposition Condition    Discharge Stable        ED Prescriptions     Medication Sig Dispense Start Date End Date Auth. Provider    enoxaparin (LOVENOX) 100 mg/mL Syrg Inject 1 mL (100 mg total) into the skin 2 (two) times a day. for 7 days 14 mL 11/7/2020 11/14/2020 Neli Robles PA-C    warfarin (COUMADIN) 5 MG tablet Take 1 tablet (5 mg total) by mouth Daily. for 15 days 15 tablet 11/7/2020 11/22/2020 Neli Robles PA-C        Follow-up Information     Follow up With Specialties Details Why Contact Info    Rabia Pina MD Hematology and  Oncology  for re-evaluation next week [address]  [address]  [facility] [address]  [phone]      [facility] Emergency Medicine  As needed, If symptoms worsen [address] [address] [phone]                                       Neli Robles PA-C  11/07/20 [id number]

## 2020-11-09 ENCOUNTER — PATIENT MESSAGE (OUTPATIENT)
Dept: HEMATOLOGY/ONCOLOGY | Facility: CLINIC | Age: 40
End: 2020-11-09

## 2020-11-10 ENCOUNTER — TELEPHONE (OUTPATIENT)
Dept: HEMATOLOGY/ONCOLOGY | Facility: CLINIC | Age: 40
End: 2020-11-10

## 2020-11-10 NOTE — TELEPHONE ENCOUNTER
Spoke with patient after speaking to Dr. Pina. Patient ack Dr. Pina would like patient to take prescribed Coumadin and will have ov with Yovani in 2w. Pt ack.

## 2020-11-11 ENCOUNTER — PATIENT MESSAGE (OUTPATIENT)
Dept: ORTHOPEDICS | Facility: CLINIC | Age: 40
End: 2020-11-11

## 2020-11-11 DIAGNOSIS — M25.569 KNEE PAIN, UNSPECIFIED CHRONICITY, UNSPECIFIED LATERALITY: Primary | ICD-10-CM

## 2020-11-12 ENCOUNTER — HOSPITAL ENCOUNTER (OUTPATIENT)
Dept: RADIOLOGY | Facility: HOSPITAL | Age: 40
Discharge: HOME | End: 2020-11-12
Attending: ORTHOPAEDIC SURGERY
Payer: COMMERCIAL

## 2020-11-12 ENCOUNTER — OFFICE VISIT (OUTPATIENT)
Dept: ORTHOPEDICS | Facility: CLINIC | Age: 40
End: 2020-11-12
Payer: COMMERCIAL

## 2020-11-12 VITALS — RESPIRATION RATE: 16 BRPM | BODY MASS INDEX: 30.34 KG/M2 | HEIGHT: 72 IN | WEIGHT: 224 LBS

## 2020-11-12 DIAGNOSIS — M62.89 MASS OF MUSCLE OF RIGHT LOWER EXTREMITY: Primary | ICD-10-CM

## 2020-11-12 DIAGNOSIS — M79.661 PAIN IN RIGHT SHIN: ICD-10-CM

## 2020-11-12 DIAGNOSIS — M79.661 PAIN IN RIGHT SHIN: Primary | ICD-10-CM

## 2020-11-12 DIAGNOSIS — M62.89 MASS OF MUSCLE OF RIGHT LOWER EXTREMITY: ICD-10-CM

## 2020-11-12 DIAGNOSIS — M25.569 KNEE PAIN, UNSPECIFIED CHRONICITY, UNSPECIFIED LATERALITY: ICD-10-CM

## 2020-11-12 PROCEDURE — 99999 PR PBB SHADOW E&M-EST. PATIENT-LVL IV: CPT | Mod: PBBFAC,,, | Performed by: ORTHOPAEDIC SURGERY

## 2020-11-12 PROCEDURE — 1125F AMNT PAIN NOTED PAIN PRSNT: CPT | Mod: S$GLB,,, | Performed by: ORTHOPAEDIC SURGERY

## 2020-11-12 PROCEDURE — 3008F BODY MASS INDEX DOCD: CPT | Mod: CPTII,S$GLB,, | Performed by: ORTHOPAEDIC SURGERY

## 2020-11-12 PROCEDURE — 76882 US LMTD JT/FCL EVL NVASC XTR: CPT | Mod: 26,RT,, | Performed by: RADIOLOGY

## 2020-11-12 PROCEDURE — 73562 XR KNEE ORTHO RIGHT WITH FLEXION: ICD-10-PCS | Mod: 26,LT,, | Performed by: RADIOLOGY

## 2020-11-12 PROCEDURE — 73590 X-RAY EXAM OF LOWER LEG: CPT | Mod: TC,PN,RT

## 2020-11-12 PROCEDURE — 73562 X-RAY EXAM OF KNEE 3: CPT | Mod: 26,LT,, | Performed by: RADIOLOGY

## 2020-11-12 PROCEDURE — 73564 X-RAY EXAM KNEE 4 OR MORE: CPT | Mod: 26,RT,, | Performed by: RADIOLOGY

## 2020-11-12 PROCEDURE — 99213 OFFICE O/P EST LOW 20 MIN: CPT | Mod: S$GLB,,, | Performed by: ORTHOPAEDIC SURGERY

## 2020-11-12 PROCEDURE — 73590 XR TIBIA FIBULA 2 VIEW RIGHT: ICD-10-PCS | Mod: 26,RT,, | Performed by: RADIOLOGY

## 2020-11-12 PROCEDURE — 99213 PR OFFICE/OUTPT VISIT, EST, LEVL III, 20-29 MIN: ICD-10-PCS | Mod: S$GLB,,, | Performed by: ORTHOPAEDIC SURGERY

## 2020-11-12 PROCEDURE — 73590 X-RAY EXAM OF LOWER LEG: CPT | Mod: 26,RT,, | Performed by: RADIOLOGY

## 2020-11-12 PROCEDURE — 73562 X-RAY EXAM OF KNEE 3: CPT | Mod: TC,PN,LT

## 2020-11-12 PROCEDURE — 1125F PR PAIN SEVERITY QUANTIFIED, PAIN PRESENT: ICD-10-PCS | Mod: S$GLB,,, | Performed by: ORTHOPAEDIC SURGERY

## 2020-11-12 PROCEDURE — 73564 XR KNEE ORTHO RIGHT WITH FLEXION: ICD-10-PCS | Mod: 26,RT,, | Performed by: RADIOLOGY

## 2020-11-12 PROCEDURE — 76882 US LMTD JT/FCL EVL NVASC XTR: CPT | Mod: TC,RT

## 2020-11-12 PROCEDURE — 3008F PR BODY MASS INDEX (BMI) DOCUMENTED: ICD-10-PCS | Mod: CPTII,S$GLB,, | Performed by: ORTHOPAEDIC SURGERY

## 2020-11-12 PROCEDURE — 99999 PR PBB SHADOW E&M-EST. PATIENT-LVL IV: ICD-10-PCS | Mod: PBBFAC,,, | Performed by: ORTHOPAEDIC SURGERY

## 2020-11-12 PROCEDURE — 76882 US EXTREMITY NON VASCULAR LIMITED RIGHT: ICD-10-PCS | Mod: 26,RT,, | Performed by: RADIOLOGY

## 2020-11-12 NOTE — PROGRESS NOTES
Past Medical History:   Diagnosis Date    Abnormal Pap smear of cervix     Arthritis     OA    Back pain     Cancer     skin cancer to back    Depression     Endometriosis     Full dentures     GERD (gastroesophageal reflux disease)     Migraine     Pulmonary embolism     Seizures     Sleep apnea     Does not use c-pap since weight loss - 170 lbs    Uterine fibroid     Wears glasses        Past Surgical History:   Procedure Laterality Date    ANGIOGRAM, CORONARY, WITH LEFT HEART CATHETERIZATION Left 2020    Procedure: Left heart cath;  Surgeon: Jm Guthrie MD;  Location: [facility] CATH/EP LAB;  Service: Cardiology;  Laterality: Left;    APPENDECTOMY      CARPAL TUNNEL RELEASE Bilateral      SECTION      CHOLECYSTECTOMY      COLONOSCOPY N/A 2019    Procedure: COLONOSCOPY;  Surgeon: Anselmo Blackwell MD;  Location: [facility] ENDO;  Service: Endoscopy;  Laterality: N/A;    EPIDURAL STEROID INJECTION INTO LUMBAR SPINE N/A 2019    Procedure: Injection-steroid-epidural-lumbar;  Surgeon: Yariel Ramirez MD;  Location: [facility] OR;  Service: Pain Management;  Laterality: N/A;  L3-4    ESOPHAGOGASTRODUODENOSCOPY N/A 2019    Procedure: EGD (ESOPHAGOGASTRODUODENOSCOPY);  Surgeon: Anselmo Blackwell MD;  Location: [facility] ENDO;  Service: Endoscopy;  Laterality: N/A;    FRACTURE SURGERY      ankle    gastric sleve      HYSTERECTOMY      endo and fibroids    HYSTERECTOMY      JOINT REPLACEMENT Left 2018    KNEE ARTHROPLASTY Left 2018    Procedure: ARTHROPLASTY, KNEE;  Surgeon: Feliberto Cardenas MD;  Location: [facility] OR;  Service: Orthopedics;  Laterality: Left;    KNEE ARTHROPLASTY Right 2020    Procedure: ARTHROPLASTY, KNEE;  Surgeon: Feliberto Cardenas MD;  Location: [facility] OR;  Service: Orthopedics;  Laterality: Right;    KNEE ARTHROSCOPY W/ MENISCECTOMY Right 10/25/2019    Procedure: ARTHROSCOPY, KNEE, WITH MENISCECTOMY;  Surgeon: Feliberto Perez  MD Ronald;  Location: [facility] OR;  Service: Orthopedics;  Laterality: Right;    KNEE SURGERY      LUMBAR EPIDURAL INJECTION      OOPHORECTOMY Left     LSO    RADIAL NERVE Right     RECONSTRUCTION OF MEDIAL PATELLOFEMORAL LIGAMENT OF RIGHT KNEE Right 10/25/2019    Procedure: RECONSTRUCTION, LIGAMENT, MEDIAL PATELLOFEMORAL, RIGHT;  Surgeon: Feliberto Cardenas MD;  Location: [facility] OR;  Service: Orthopedics;  Laterality: Right;    SINUS SURGERY      UPPER GASTROINTESTINAL ENDOSCOPY  11/27/2019    Dr. Blackwell; mild schatzki ring-dilated; gastritis; bx in process       Current Outpatient Medications   Medication Sig    atorvastatin (LIPITOR) 40 MG tablet Take 40 mg by mouth every evening.     cetirizine (ZYRTEC) 5 MG tablet Take 5 mg by mouth as needed.     clopidogreL (PLAVIX) 75 mg tablet Take 75 mg by mouth every evening.     cyanocobalamin, vitamin B-12, 1,000 mcg/mL Kit Inject 1 mL into the muscle every 21 days.     enoxaparin (LOVENOX) 100 mg/mL Syrg Inject 1 mL (100 mg total) into the skin 2 (two) times a day. for 7 days    erenumab-aooe (AIMOVIG) 140 mg/mL autoinjector Inject 140 mg into the skin every 28 days.    FLUoxetine 40 MG capsule Take 40 mg by mouth every evening.     hydrOXYzine pamoate (VISTARIL) 25 MG Cap Take 25 mg by mouth every 8 (eight) hours as needed.     ketorolac (TORADOL) 60 mg/2 mL Soln Inject 30 mg into the muscle every 6 (six) hours.     lorazepam (ATIVAN) 0.5 MG tablet Take 0.5 mg by mouth every 4 (four) hours as needed.     nitroGLYCERIN (NITROSTAT) 0.4 MG SL tablet Place 0.4 mg under the tongue every 5 (five) minutes as needed for Chest pain.    oxyCODONE (ROXICODONE) 10 mg Tab immediate release tablet Take 10 mg by mouth every 6 (six) hours as needed for Pain.    ranolazine (RANEXA) 500 MG Tb12 Take 500 mg by mouth 2 (two) times daily.     tiZANidine (ZANAFLEX) 4 MG tablet Take 4 mg by mouth every 6 (six) hours as needed.    warfarin (COUMADIN) 5 MG tablet Take 1  "tablet (5 mg total) by mouth Daily. for 15 days    dabigatran etexilate (PRADAXA) 150 mg Cap Take 1 capsule (150 mg total) by mouth 2 (two) times daily. "Do NOT break, chew, or open capsules." (Patient not taking: Reported on 11/12/2020)    pantoprazole (PROTONIX) 40 MG tablet Take 1 tablet (40 mg total) by mouth 2 (two) times daily.     No current facility-administered medications for this visit.      Facility-Administered Medications Ordered in Other Visits   Medication    lactated ringers infusion    lidocaine (PF) 10 mg/ml (1%) injection 10 mg       Review of patient's allergies indicates:   Allergen Reactions    Clarithromycin Swelling and Other (See Comments)     DIFFICULTY SWALLOWING  DIFFICULTY SWALLOWING    Pcn [penicillins] Anaphylaxis    Sulfa (sulfonamide antibiotics) Hives    Wellbutrin [bupropion hcl] Shortness Of Breath and Anaphylaxis    Betadine [povidone-iodine] Hives     Swelling      Cefprozil Hives    Iodinated contrast media Hives    Latex, natural rubber Rash    Sulfamethoxazole-trimethoprim Nausea And Vomiting    Iodine Hives and Swelling    Latex Hives and Swelling       Family History   Problem Relation Age of Onset    Heart disease Mother     Heart disease Father     Esophageal cancer Maternal Grandmother     Breast cancer Maternal Aunt 46    Colon cancer Neg Hx     Stomach cancer Neg Hx     Ovarian cancer Neg Hx        Social History     Socioeconomic History    Marital status:      Spouse name: Not on file    Number of children: Not on file    Years of education: Not on file    Highest education level: Not on file   Occupational History    Not on file   Social Needs    Financial resource strain: Not on file    Food insecurity     Worry: Not on file     Inability: Not on file    Transportation needs     Medical: Not on file     Non-medical: Not on file   Tobacco Use    Smoking status: Current Every Day Smoker     Packs/day: 0.25     Years: 20.00     " Pack years: 5.00     Types: Cigarettes     Last attempt to quit: 2018     Years since quittin.0    Smokeless tobacco: Never Used   Substance and Sexual Activity    Alcohol use: Yes     Alcohol/week: 0.0 standard drinks     Comment: occasionally    Drug use: Not Currently     Types: Other-see comments     Comment: no    Sexual activity: Yes     Partners: Male   Lifestyle    Physical activity     Days per week: Not on file     Minutes per session: Not on file    Stress: Not on file   Relationships    Social connections     Talks on phone: Not on file     Gets together: Not on file     Attends [employer] service: Not on file     Active member of club or organization: Not on file     Attends meetings of clubs or organizations: Not on file     Relationship status: Not on file   Other Topics Concern    Not on file   Social History Narrative    Not on file       Chief Complaint:   Chief Complaint   Patient presents with    Right Knee - Pain       Date of surgery:  2020    History of present illness:  40-year-old female underwent right total knee arthroplasty.  Patient has had some complications with clotting issues in the past despite being on anticoagulation.  She was doing great until this past weekend.  Started to have a little knee pain and not on her shin about 2 weeks ago.  She thought it was from the boot rubbing her shin so she stopped wearing it but the area continued to get bigger and more tender.  Pain is a 9/10.  She had an ultrasound done which showed a possible new DVT as well.      Review of Systems:    Musculoskeletal:  See HPI        Physical Examination:    Vital Signs:    Vitals:    20 1018   Resp: 16       Body mass index is 30.38 kg/m².    This a well-developed, well nourished patient in no acute distress.  They are alert and oriented and cooperative to examination.  Pt. walks with a moderate antalgic gait    Examination the right knee shows well-healed surgical  incision.  No erythema or drainage.  Patient has range of motion is about 5° to 120°.   Has some tightness and pain along the IT band and along the pes tendons.  The patient has a about a nickel sized raise lesion over the anterior medial tibia.  It is exquisitely tender to palpation.  Difficult to tell whether there is fluctuance.  There is no real erythema around it.    X-rays:  X-rays of the right knee are ordered and reviewed which show well-aligned total knee arthroplasty components without complication  X-ray of the right tib-fib is ordered and reviewed which show an old ankle fracture with prior hardware removal.  Some posttraumatic arthritic changes of the ankle as well.  Patient also has some soft tissue swelling and a raised area over the mid tibia.     Assessment::  Status post right Bethany CR total knee arthroplasty  Possible right leg abscess versus seroma or hematoma      Plan:  I reviewed the findings with her today.  I recommended an ultrasound evaluation of this lesion.  She might end up needing an MRI as well.  I would like to know whether it is something that could be aspirated her drained.  I did recommend a little heat for the area to hopefully help it subsided.  Follow-up after the ultrasound is completed.    This note was created using [employer] voice recognition software that occasionally misinterpreted phrases or words.

## 2020-11-13 DIAGNOSIS — M62.89 MASS OF MUSCLE OF RIGHT LOWER EXTREMITY: Primary | ICD-10-CM

## 2020-11-17 ENCOUNTER — HOSPITAL ENCOUNTER (OUTPATIENT)
Dept: RADIOLOGY | Facility: HOSPITAL | Age: 40
Discharge: HOME | End: 2020-11-17
Attending: ORTHOPAEDIC SURGERY
Payer: COMMERCIAL

## 2020-11-17 DIAGNOSIS — M62.89 MASS OF MUSCLE OF RIGHT LOWER EXTREMITY: ICD-10-CM

## 2020-11-17 PROCEDURE — 73718 MRI LOWER EXTREMITY W/O DYE: CPT | Mod: TC,RT

## 2020-11-17 PROCEDURE — 73718 MRI TIBIA FIBULA WITHOUT CONTRAST RIGHT: ICD-10-PCS | Mod: 26,RT,, | Performed by: RADIOLOGY

## 2020-11-17 PROCEDURE — 73718 MRI LOWER EXTREMITY W/O DYE: CPT | Mod: 26,RT,, | Performed by: RADIOLOGY

## 2020-11-19 ENCOUNTER — OFFICE VISIT (OUTPATIENT)
Dept: ORTHOPEDICS | Facility: CLINIC | Age: 40
End: 2020-11-19
Payer: COMMERCIAL

## 2020-11-19 ENCOUNTER — SPECIALTY PHARMACY (OUTPATIENT)
Dept: PHARMACY | Facility: CLINIC | Age: 40
End: 2020-11-19

## 2020-11-19 VITALS — BODY MASS INDEX: 30.34 KG/M2 | RESPIRATION RATE: 16 BRPM | HEIGHT: 72 IN | WEIGHT: 224 LBS

## 2020-11-19 DIAGNOSIS — M79.661 PAIN IN RIGHT SHIN: Primary | ICD-10-CM

## 2020-11-19 PROCEDURE — 3008F PR BODY MASS INDEX (BMI) DOCUMENTED: ICD-10-PCS | Mod: CPTII,S$GLB,, | Performed by: ORTHOPAEDIC SURGERY

## 2020-11-19 PROCEDURE — 99213 PR OFFICE/OUTPT VISIT, EST, LEVL III, 20-29 MIN: ICD-10-PCS | Mod: S$GLB,,, | Performed by: ORTHOPAEDIC SURGERY

## 2020-11-19 PROCEDURE — 99999 PR PBB SHADOW E&M-EST. PATIENT-LVL III: ICD-10-PCS | Mod: PBBFAC,,, | Performed by: ORTHOPAEDIC SURGERY

## 2020-11-19 PROCEDURE — 99213 OFFICE O/P EST LOW 20 MIN: CPT | Mod: S$GLB,,, | Performed by: ORTHOPAEDIC SURGERY

## 2020-11-19 PROCEDURE — 99999 PR PBB SHADOW E&M-EST. PATIENT-LVL III: CPT | Mod: PBBFAC,,, | Performed by: ORTHOPAEDIC SURGERY

## 2020-11-19 PROCEDURE — 3008F BODY MASS INDEX DOCD: CPT | Mod: CPTII,S$GLB,, | Performed by: ORTHOPAEDIC SURGERY

## 2020-11-19 PROCEDURE — 1125F AMNT PAIN NOTED PAIN PRSNT: CPT | Mod: S$GLB,,, | Performed by: ORTHOPAEDIC SURGERY

## 2020-11-19 PROCEDURE — 1125F PR PAIN SEVERITY QUANTIFIED, PAIN PRESENT: ICD-10-PCS | Mod: S$GLB,,, | Performed by: ORTHOPAEDIC SURGERY

## 2020-11-19 NOTE — PROGRESS NOTES
Past Medical History:   Diagnosis Date    Abnormal Pap smear of cervix     Arthritis     OA    Back pain     Cancer     skin cancer to back    Depression     Endometriosis     Full dentures     GERD (gastroesophageal reflux disease)     Migraine     Pulmonary embolism     Seizures     Sleep apnea     Does not use c-pap since weight loss - 170 lbs    Uterine fibroid     Wears glasses        Past Surgical History:   Procedure Laterality Date    ANGIOGRAM, CORONARY, WITH LEFT HEART CATHETERIZATION Left 2020    Procedure: Left heart cath;  Surgeon: Jm Guthrie MD;  Location: [facility] CATH/EP LAB;  Service: Cardiology;  Laterality: Left;    APPENDECTOMY      CARPAL TUNNEL RELEASE Bilateral      SECTION      CHOLECYSTECTOMY      COLONOSCOPY N/A 2019    Procedure: COLONOSCOPY;  Surgeon: Anselmo Blackwell MD;  Location: [facility] ENDO;  Service: Endoscopy;  Laterality: N/A;    EPIDURAL STEROID INJECTION INTO LUMBAR SPINE N/A 2019    Procedure: Injection-steroid-epidural-lumbar;  Surgeon: Yariel Ramirez MD;  Location: [facility] OR;  Service: Pain Management;  Laterality: N/A;  L3-4    ESOPHAGOGASTRODUODENOSCOPY N/A 2019    Procedure: EGD (ESOPHAGOGASTRODUODENOSCOPY);  Surgeon: Anselmo Blackwell MD;  Location: [facility] ENDO;  Service: Endoscopy;  Laterality: N/A;    FRACTURE SURGERY      ankle    gastric sleve      HYSTERECTOMY      endo and fibroids    HYSTERECTOMY      JOINT REPLACEMENT Left 2018    KNEE ARTHROPLASTY Left 2018    Procedure: ARTHROPLASTY, KNEE;  Surgeon: Feliberto Cardenas MD;  Location: [facility] OR;  Service: Orthopedics;  Laterality: Left;    KNEE ARTHROPLASTY Right 2020    Procedure: ARTHROPLASTY, KNEE;  Surgeon: Feliberto Cardenas MD;  Location: [facility] OR;  Service: Orthopedics;  Laterality: Right;    KNEE ARTHROSCOPY W/ MENISCECTOMY Right 10/25/2019    Procedure: ARTHROSCOPY, KNEE, WITH MENISCECTOMY;  Surgeon: Feliberto Perez  "MD Ronald;  Location: [facility] OR;  Service: Orthopedics;  Laterality: Right;    KNEE SURGERY      LUMBAR EPIDURAL INJECTION      OOPHORECTOMY Left     LSO    RADIAL NERVE Right     RECONSTRUCTION OF MEDIAL PATELLOFEMORAL LIGAMENT OF RIGHT KNEE Right 10/25/2019    Procedure: RECONSTRUCTION, LIGAMENT, MEDIAL PATELLOFEMORAL, RIGHT;  Surgeon: Feliberto Cardenas MD;  Location: [facility] OR;  Service: Orthopedics;  Laterality: Right;    SINUS SURGERY      UPPER GASTROINTESTINAL ENDOSCOPY  11/27/2019    Dr. Blackwell; mild schatzki ring-dilated; gastritis; bx in process       Current Outpatient Medications   Medication Sig    atorvastatin (LIPITOR) 40 MG tablet Take 40 mg by mouth every evening.     cetirizine (ZYRTEC) 5 MG tablet Take 5 mg by mouth as needed.     clopidogreL (PLAVIX) 75 mg tablet Take 75 mg by mouth every evening.     cyanocobalamin, vitamin B-12, 1,000 mcg/mL Kit Inject 1 mL into the muscle every 21 days.     dabigatran etexilate (PRADAXA) 150 mg Cap Take 1 capsule (150 mg total) by mouth 2 (two) times daily. "Do NOT break, chew, or open capsules."    erenumab-aooe (AIMOVIG) 140 mg/mL autoinjector Inject 140 mg into the skin every 28 days.    FLUoxetine 40 MG capsule Take 40 mg by mouth every evening.     hydrOXYzine pamoate (VISTARIL) 25 MG Cap Take 25 mg by mouth every 8 (eight) hours as needed.     ketorolac (TORADOL) 60 mg/2 mL Soln Inject 30 mg into the muscle every 6 (six) hours.     lorazepam (ATIVAN) 0.5 MG tablet Take 0.5 mg by mouth every 4 (four) hours as needed.     nitroGLYCERIN (NITROSTAT) 0.4 MG SL tablet Place 0.4 mg under the tongue every 5 (five) minutes as needed for Chest pain.    oxyCODONE (ROXICODONE) 10 mg Tab immediate release tablet Take 10 mg by mouth every 6 (six) hours as needed for Pain.    ranolazine (RANEXA) 500 MG Tb12 Take 500 mg by mouth 2 (two) times daily.     tiZANidine (ZANAFLEX) 4 MG tablet Take 4 mg by mouth every 6 (six) hours as needed.    " warfarin (COUMADIN) 5 MG tablet Take 1 tablet (5 mg total) by mouth Daily. for 15 days    pantoprazole (PROTONIX) 40 MG tablet Take 1 tablet (40 mg total) by mouth 2 (two) times daily.     No current facility-administered medications for this visit.      Facility-Administered Medications Ordered in Other Visits   Medication    lactated ringers infusion    lidocaine (PF) 10 mg/ml (1%) injection 10 mg       Review of patient's allergies indicates:   Allergen Reactions    Clarithromycin Swelling and Other (See Comments)     DIFFICULTY SWALLOWING  DIFFICULTY SWALLOWING    Pcn [penicillins] Anaphylaxis    Sulfa (sulfonamide antibiotics) Hives    Wellbutrin [bupropion hcl] Shortness Of Breath and Anaphylaxis    Betadine [povidone-iodine] Hives     Swelling      Cefprozil Hives    Iodinated contrast media Hives    Latex, natural rubber Rash    Sulfamethoxazole-trimethoprim Nausea And Vomiting    Iodine Hives and Swelling    Latex Hives and Swelling       Family History   Problem Relation Age of Onset    Heart disease Mother     Heart disease Father     Esophageal cancer Maternal Grandmother     Breast cancer Maternal Aunt 46    Colon cancer Neg Hx     Stomach cancer Neg Hx     Ovarian cancer Neg Hx        Social History     Socioeconomic History    Marital status:      Spouse name: Not on file    Number of children: Not on file    Years of education: Not on file    Highest education level: Not on file   Occupational History    Not on file   Social Needs    Financial resource strain: Not on file    Food insecurity     Worry: Not on file     Inability: Not on file    Transportation needs     Medical: Not on file     Non-medical: Not on file   Tobacco Use    Smoking status: Current Every Day Smoker     Packs/day: 0.25     Years: 20.00     Pack years: 5.00     Types: Cigarettes     Last attempt to quit: 2018     Years since quittin.0    Smokeless tobacco: Never Used   Substance  and Sexual Activity    Alcohol use: Yes     Alcohol/week: 0.0 standard drinks     Comment: occasionally    Drug use: Not Currently     Types: Other-see comments     Comment: no    Sexual activity: Yes     Partners: Male   Lifestyle    Physical activity     Days per week: Not on file     Minutes per session: Not on file    Stress: Not on file   Relationships    Social connections     Talks on phone: Not on file     Gets together: Not on file     Attends Yazidi service: Not on file     Active member of club or organization: Not on file     Attends meetings of clubs or organizations: Not on file     Relationship status: Not on file   Other Topics Concern    Not on file   Social History Narrative    Not on file       Chief Complaint:   Chief Complaint   Patient presents with    Leg Pain     right leg mri results        Date of surgery:  June 16, 2020    History of present illness:  40-year-old female underwent right total knee arthroplasty.  Patient has had some complications with clotting issues in the past despite being on anticoagulation.  She was doing great until this past weekend.  Started to have a little knee pain and not on her shin about 2 weeks ago.  She thought it was from the boot rubbing her shin so she stopped wearing it but the area continued to get bigger and more tender.  It is a little better.  Pain is down to 6/10.  MRI and ultrasound showed no obvious fluid collection or abscess but possible hematoma or granuloma.    Review of Systems:    Musculoskeletal:  See HPI        Physical Examination:    Vital Signs:    Vitals:    11/19/20 1042   Resp: 16       Body mass index is 30.38 kg/m².    This a well-developed, well nourished patient in no acute distress.  They are alert and oriented and cooperative to examination.  Pt. walks with a moderate antalgic gait    Examination the right knee shows well-healed surgical incision.  No erythema or drainage.  Patient has range of motion is about 5° to  120°.   Has some tightness and pain along the IT band and along the pes tendons.  The patient has a about a nickel sized raise lesion over the anterior medial tibia.  It is exquisitely tender to palpation.  Difficult to tell whether there is fluctuance.  There is no real erythema around it.    X-rays:  X-rays of the right knee are  reviewed which show well-aligned total knee arthroplasty components without complication  X-ray of the right tib-fib is reviewed which show an old ankle fracture with prior hardware removal.  Some posttraumatic arthritic changes of the ankle as well.  Patient also has some soft tissue swelling and a raised area over the mid tibia.    MRI of the right tibia:1. Well-defined subcutaneous nodule along the superficial soft tissues at mid shin.  Contrast was not administered which somewhat limits characterization.  While lesion morphology is indeterminate, favored benign entity such as pressure lesion, scar or fat trauma.  2. Subtle periosteal edema in the adjacent tibial cortex is presumably reactive, as lesion does not directly involve the bone.    Ultrasound of the right tibia: Nonspecific mass in the area of interest, which may represent a solid mass or possibly complex collection such as in the setting of hematoma.  Continued clinical follow-up is recommended if the finding persists, further evaluation with MRI with and without contrast would be recommended.     Assessment::  Status post right Lilly CR total knee arthroplasty  Right leg granuloma      Plan:  I reviewed the findings with her today.  I recommended heat and compress.  We put her in a short boot to help with her ankle.  I will see her back in a month.      This note was created using [employer] voice recognition software that occasionally misinterpreted phrases or words.

## 2020-11-19 NOTE — TELEPHONE ENCOUNTER
Specialty Pharmacy - Refill Coordination    Specialty Medication Orders Linked to Encounter      Most Recent Value   Medication #1  erenumab-aooe (AIMOVIG) 140 mg/mL autoinjector (Order#[id number], Rx#[id number])          Refill Questions - Documented Responses      Most Recent Value   Relationship to patient of person spoken to?  Self   HIPAA/medical authority confirmed?  Yes   Any changes in contact preferences or allowed representatives?  No   Has the patient had any insurance changes?  No   Has the patient had any changes to specialty medication, dose, or instructions?  No   Has the patient started taking any new medications, herbals, or supplements?  No   Has the patient been diagnosed with any new medical conditions?  No   Does the patient have any new allergies to medications or foods?  No   Does the patient have any concerns about side effects?  No   Can the patient store medication/sharps container properly (at the correct temperature, away from children/pets, etc.)?  Yes   Can the patient call emergency services (911) in the event of an emergency?  Yes   Does the patient have any concerns or questions about taking or administering this medication as prescribed?  No   How many doses did the patient miss in the past 4 weeks or since the last fill?  0   How many doses does the patient have on hand?  0   How many days does the patient report on hand quantity will last?  0   Does the number of doses/days supply remaining match pharmacy expected amounts?  Yes   Does the patient feel that this medication is effective?  Yes   During the past 4 weeks, has patient missed any activities due to condition or medication?  No   During the past 4 weeks, did patient have any of the following urgent care visits?  None   How will the patient receive the medication?  Mail   When does the patient need to receive the medication?  11/27/20   Shipping Address  Home   Address in [facility] confirmed and updated if  "neccessary?  Yes   Expected Copay ($)  0   Is the patient able to afford the medication copay?  Yes   Payment Method  zero copay   Days supply of Refill  28   Would patient like to speak to a pharmacist?  No   Do you want to trigger an intervention?  No   Do you want to trigger an additional referral task?  No   Refill activity completed?  Yes   Refill activity plan  Refill scheduled   Shipment/Pickup Date:  11/23/20          Current Outpatient Medications   Medication Sig    atorvastatin (LIPITOR) 40 MG tablet Take 40 mg by mouth every evening.     cetirizine (ZYRTEC) 5 MG tablet Take 5 mg by mouth as needed.     clopidogreL (PLAVIX) 75 mg tablet Take 75 mg by mouth every evening.     cyanocobalamin, vitamin B-12, 1,000 mcg/mL Kit Inject 1 mL into the muscle every 21 days.     dabigatran etexilate (PRADAXA) 150 mg Cap Take 1 capsule (150 mg total) by mouth 2 (two) times daily. "Do NOT break, chew, or open capsules."    erenumab-aooe (AIMOVIG) 140 mg/mL autoinjector Inject 140 mg into the skin every 28 days.    FLUoxetine 40 MG capsule Take 40 mg by mouth every evening.     hydrOXYzine pamoate (VISTARIL) 25 MG Cap Take 25 mg by mouth every 8 (eight) hours as needed.     ketorolac (TORADOL) 60 mg/2 mL Soln Inject 30 mg into the muscle every 6 (six) hours.     lorazepam (ATIVAN) 0.5 MG tablet Take 0.5 mg by mouth every 4 (four) hours as needed.     nitroGLYCERIN (NITROSTAT) 0.4 MG SL tablet Place 0.4 mg under the tongue every 5 (five) minutes as needed for Chest pain.    oxyCODONE (ROXICODONE) 10 mg Tab immediate release tablet Take 10 mg by mouth every 6 (six) hours as needed for Pain.    pantoprazole (PROTONIX) 40 MG tablet Take 1 tablet (40 mg total) by mouth 2 (two) times daily.    ranolazine (RANEXA) 500 MG Tb12 Take 500 mg by mouth 2 (two) times daily.     tiZANidine (ZANAFLEX) 4 MG tablet Take 4 mg by mouth every 6 (six) hours as needed.    warfarin (COUMADIN) 5 MG tablet Take 1 tablet (5 mg " total) by mouth Daily. for 15 days   Last reviewed on 11/19/2020  4:50 PM by Zeny Hooks    Review of patient's allergies indicates:   Allergen Reactions    Clarithromycin Swelling and Other (See Comments)     DIFFICULTY SWALLOWING  DIFFICULTY SWALLOWING    Pcn [penicillins] Anaphylaxis    Sulfa (sulfonamide antibiotics) Hives    Wellbutrin [bupropion hcl] Shortness Of Breath and Anaphylaxis    Betadine [povidone-iodine] Hives     Swelling      Cefprozil Hives    Iodinated contrast media Hives    Latex, natural rubber Rash    Sulfamethoxazole-trimethoprim Nausea And Vomiting    Iodine Hives and Swelling    Latex Hives and Swelling    Last reviewed on  11/19/2020 4:50 PM by Zeny Hooks      Tasks added this encounter   12/17/2020 - Refill Call (Auto Added)   Tasks due within next 3 months   No tasks due.     Zeny Hooks  [facility]  [address] [place] [id number]  Phone: [phone]  Fax: [phone]

## 2020-11-23 ENCOUNTER — TELEPHONE (OUTPATIENT)
Dept: HEMATOLOGY/ONCOLOGY | Facility: CLINIC | Age: 40
End: 2020-11-23

## 2020-11-24 ENCOUNTER — TELEPHONE (OUTPATIENT)
Dept: HEMATOLOGY/ONCOLOGY | Facility: CLINIC | Age: 40
End: 2020-11-24

## 2020-11-24 ENCOUNTER — OFFICE VISIT (OUTPATIENT)
Dept: HEMATOLOGY/ONCOLOGY | Facility: CLINIC | Age: 40
End: 2020-11-24
Payer: COMMERCIAL

## 2020-11-24 VITALS
RESPIRATION RATE: 18 BRPM | HEIGHT: 72 IN | DIASTOLIC BLOOD PRESSURE: 63 MMHG | TEMPERATURE: 98 F | WEIGHT: 226.63 LBS | HEART RATE: 75 BPM | SYSTOLIC BLOOD PRESSURE: 110 MMHG | BODY MASS INDEX: 30.7 KG/M2 | OXYGEN SATURATION: 96 %

## 2020-11-24 DIAGNOSIS — Z79.01 ANTICOAGULATED: ICD-10-CM

## 2020-11-24 DIAGNOSIS — I82.551 CHRONIC DEEP VEIN THROMBOSIS (DVT) OF RIGHT PERONEAL VEIN: Primary | ICD-10-CM

## 2020-11-24 DIAGNOSIS — Z72.0 TOBACCO USE: ICD-10-CM

## 2020-11-24 DIAGNOSIS — Z86.711 HISTORY OF PULMONARY EMBOLUS (PE): ICD-10-CM

## 2020-11-24 DIAGNOSIS — F32.A DEPRESSION, UNSPECIFIED DEPRESSION TYPE: ICD-10-CM

## 2020-11-24 PROCEDURE — 1125F AMNT PAIN NOTED PAIN PRSNT: CPT | Mod: S$GLB,,, | Performed by: INTERNAL MEDICINE

## 2020-11-24 PROCEDURE — 99215 PR OFFICE/OUTPT VISIT, EST, LEVL V, 40-54 MIN: ICD-10-PCS | Mod: S$GLB,,, | Performed by: INTERNAL MEDICINE

## 2020-11-24 PROCEDURE — 99999 PR PBB SHADOW E&M-EST. PATIENT-LVL IV: ICD-10-PCS | Mod: PBBFAC,,, | Performed by: INTERNAL MEDICINE

## 2020-11-24 PROCEDURE — 1125F PR PAIN SEVERITY QUANTIFIED, PAIN PRESENT: ICD-10-PCS | Mod: S$GLB,,, | Performed by: INTERNAL MEDICINE

## 2020-11-24 PROCEDURE — 99215 OFFICE O/P EST HI 40 MIN: CPT | Mod: S$GLB,,, | Performed by: INTERNAL MEDICINE

## 2020-11-24 PROCEDURE — 3008F PR BODY MASS INDEX (BMI) DOCUMENTED: ICD-10-PCS | Mod: CPTII,S$GLB,, | Performed by: INTERNAL MEDICINE

## 2020-11-24 PROCEDURE — 3008F BODY MASS INDEX DOCD: CPT | Mod: CPTII,S$GLB,, | Performed by: INTERNAL MEDICINE

## 2020-11-24 PROCEDURE — 99999 PR PBB SHADOW E&M-EST. PATIENT-LVL IV: CPT | Mod: PBBFAC,,, | Performed by: INTERNAL MEDICINE

## 2020-11-24 RX ORDER — ENOXAPARIN SODIUM 150 MG/ML
150 INJECTION SUBCUTANEOUS DAILY
Qty: 30 SYRINGE | Refills: 1 | Status: SHIPPED | OUTPATIENT
Start: 2020-11-24 | End: 2021-01-05 | Stop reason: SDUPTHER

## 2020-11-24 RX ORDER — FLUOXETINE HYDROCHLORIDE 40 MG/1
40 CAPSULE ORAL 2 TIMES DAILY
Qty: 60 CAPSULE | Refills: 1 | Status: SHIPPED | OUTPATIENT
Start: 2020-11-24

## 2020-11-24 NOTE — Clinical Note
Please print out her labs to be drawn in [place]. RTC 1 week virtual visit to review her labs . Draw labs 3-4 days before OV please

## 2020-11-24 NOTE — PROGRESS NOTES
CC I had a blood clot on the pradaxa and now I am on coumadin and lovenox    Aleyda Csota is a 40 y.o.    Pt had undergone a total left knee replacement in November 2018 when a few days later she presented to ER with increasing SOB. Workup revealed bilateral PE. SHe was placed on xarelto. SHe had an ultrasound of legs which was negative in July 2020 .Repeat CTA revealed no resolution of PE when she saw me. I changed her to pradaxa. She was  shade 150 mg po BID .  . She was on  pradaxa and plavix  Pt had a gastric sleeve in 2014. SHe is having chest pain. She reports when she had the angiogram her cardiologist explained her vessels were too small for stents and she qualified for medical management alone . She is tolerating  lipitor New recurrent thrombosis in peroneal vein  Unable to have cardiac stent due to location of blockage : on a baby aspirin   She is post Right knee replacement in June then developed blood clot . Now she is due for fusion of right ankle   On 40 mg of prozac currently       Reviewed each of the following   November 17 2020  MRI tibia : is a circumscribed 2 cm ovoid T1/T2 hypointense mass. Well-defined subcutaneous nodule along the superficial soft tissues at mid shin.  Contrast was not administered which somewhat limits characterization.  While lesion morphology is indeterminate, favored benign entity such as pressure lesion, scar or fat trauma    November 12 2020  US right leg : Within the subcutaneous soft tissues in the area of interest along the anterior right leg, there is an ill-defined hypoechoic mass measuring 1.8 x 0.7 x 1.8 cm    November 7 2020  US right leg: Partially occlusive thrombus  in the popliteal vein resulting in partial decreased flow and partial decrease in compressibility.  The remaining common femoral, femoral, upper greater saphenous, and deep femoral veins are patent and free of thrombus    August 20 2020   MRI ankle : Bones: There is an infarct measuring 2 cm  "within the distal tibial shaft.  Dorsal and plantar calcaneal spurs.  Heterotopic ossification along the distal tibiofibular articulation. Moderate degenerative change of the tibiotalar joint with several adjacent osteochondral lesions along the medial and posterior tibial plafond, Soft Tissues: Mild edema within the flexor hallucis longus musculature.  Mild edema more diffusely in the foot musculature Small bone infarct in the distal tibia    July 17 2020  US right leg: No evidence of deep venous thrombosis in the right lower extremity     June 24 2020  US right leg: Nonocclusive deep vein thrombosis is noted in the right popliteal vein        Past Medical History:   Diagnosis Date    Abnormal Pap smear of cervix     Arthritis     OA    Back pain     Cancer     skin cancer to back    Depression     Endometriosis     Full dentures     GERD (gastroesophageal reflux disease)     Migraine     Pulmonary embolism 2018    Seizures     Sleep apnea     Does not use c-pap since weight loss - 170 lbs    Uterine fibroid     Wears glasses    She remains on Prozac for mood and B12 to help with energy      Current Outpatient Medications:     atorvastatin (LIPITOR) 40 MG tablet, Take 40 mg by mouth every evening. , Disp: , Rfl:     cetirizine (ZYRTEC) 5 MG tablet, Take 5 mg by mouth as needed. , Disp: , Rfl:     clopidogreL (PLAVIX) 75 mg tablet, Take 75 mg by mouth every evening. , Disp: , Rfl:     cyanocobalamin, vitamin B-12, 1,000 mcg/mL Kit, Inject 1 mL into the muscle every 21 days. , Disp: , Rfl:     dabigatran etexilate (PRADAXA) 150 mg Cap, Take 1 capsule (150 mg total) by mouth 2 (two) times daily. "Do NOT break, chew, or open capsules.", Disp: 60 capsule, Rfl: 2    erenumab-aooe (AIMOVIG) 140 mg/mL autoinjector, Inject 140 mg into the skin every 28 days., Disp: 1 mL, Rfl: 11    FLUoxetine 40 MG capsule, Take 40 mg by mouth every evening. , Disp: , Rfl: 3    hydrOXYzine pamoate (VISTARIL) 25 MG Cap, " Take 25 mg by mouth every 8 (eight) hours as needed. , Disp: , Rfl:     ketorolac (TORADOL) 60 mg/2 mL Soln, Inject 30 mg into the muscle every 6 (six) hours. , Disp: , Rfl:     lorazepam (ATIVAN) 0.5 MG tablet, Take 0.5 mg by mouth every 4 (four) hours as needed. , Disp: , Rfl:     nitroGLYCERIN (NITROSTAT) 0.4 MG SL tablet, Place 0.4 mg under the tongue every 5 (five) minutes as needed for Chest pain., Disp: , Rfl:     oxyCODONE (ROXICODONE) 10 mg Tab immediate release tablet, Take 10 mg by mouth every 6 (six) hours as needed for Pain., Disp: , Rfl:     ranolazine (RANEXA) 500 MG Tb12, Take 500 mg by mouth 2 (two) times daily. , Disp: , Rfl:     tiZANidine (ZANAFLEX) 4 MG tablet, Take 4 mg by mouth every 6 (six) hours as needed., Disp: , Rfl:     pantoprazole (PROTONIX) 40 MG tablet, Take 1 tablet (40 mg total) by mouth 2 (two) times daily., Disp: 60 tablet, Rfl: 1    warfarin (COUMADIN) 5 MG tablet, Take 1 tablet (5 mg total) by mouth Daily. for 15 days, Disp: 15 tablet, Rfl: 0  No current facility-administered medications for this visit.     Facility-Administered Medications Ordered in Other Visits:     lactated ringers infusion, , Intravenous, Continuous, Anjel Hernandez MD, Stopped at 10/25/19 1115    lidocaine (PF) 10 mg/ml (1%) injection 10 mg, 1 mL, Intradermal, Once, Anjel Hernandez MD    Social History     Tobacco Use    Smoking status: Current Every Day Smoker     Packs/day: 0.25     Years: 20.00     Pack years: 5.00     Types: Cigarettes    Smokeless tobacco: Never Used    Tobacco comment: trying to quit again   Substance Use Topics    Alcohol use: Yes     Alcohol/week: 0.0 standard drinks     Comment: occasionally    Drug use: Not Currently     Types: Other-see comments     Comment: no     Family History   Problem Relation Age of Onset    Heart disease Mother     Heart disease Father     Esophageal cancer Maternal Grandmother     Breast cancer Maternal Aunt 46    Colon cancer  Neg Hx     Stomach cancer Neg Hx     Ovarian cancer Neg Hx        CONSTITUTIONAL: No fevers, chills, night sweats, wt. loss, appetite changes  SKIN: no rashes or itching  ENT: No headaches, head trauma,or eye pain , no discharge  LYMPH NODES: None noticed   CV: No chest pain, palpitations.   RESP: No dyspnea on exertion, cough, wheezing, or hemoptysis  GI:  Intermittent nausea, no emesis, diarrhea, constipation, melena   : No dysuria, hematuria, urgency, or frequency   HEME: No easy bruising, bleeding problems  PSYCHIATRIC: No depression, anxiety, hallucinations.  NEURO: No seizures, memory loss, dizziness          Vitals:    11/24/20 0927   BP: 110/63   Pulse: 75   Resp: 18   Temp: 98.2 °F (36.8 °C)       Height / weight / VS reviewed  GENERAL.: Well-developed, well-nourished, in no acute distress  PSYCH: pleasant affect, no anxiety, no depression  HEENT: Normocephalic, lids intact, conjunctiva pink, Normal auricula, nasal septum, dentition, OP clear,no palatal pallor  NECK: Supple,trachea midline, no palpable abnormalities  LYMPHATICS: No cervical or axillary adenopathy  RESPIRATORY: CTAB, no wheezes, rubs or rhonchi  Good respiratory effort without any retractions or diaphragmatic movement  CARDIOVASCULAR: no JVD, S1 / S2 with RRR, no murmur, no rub,2+ capillary refill  ABDOMEN: NTND, normal bowel sounds, no palpable HSM or mass  EXTREMITIES: No cyanosis, no clubbing, no edema, no joint effusion  NEUROLOGICAL: alert and oriented x 3, cranial nerves grossly intact  SKIN: Warm, dry, no rash or lesions noted   Boot right foot : that can be removed        Lab Results   Component Value Date    WBC 7.22 10/21/2020    HGB 12.7 10/21/2020    HCT 39.5 10/21/2020    MCV 89 10/21/2020     10/21/2020     CMP  Sodium   Date Value Ref Range Status   10/21/2020 138 136 - 145 mmol/L Final     Potassium   Date Value Ref Range Status   10/21/2020 3.8 3.5 - 5.1 mmol/L Final     Chloride   Date Value Ref Range Status    10/21/2020 101 95 - 110 mmol/L Final     CO2   Date Value Ref Range Status   10/21/2020 26 23 - 29 mmol/L Final     Glucose   Date Value Ref Range Status   10/21/2020 90 70 - 110 mg/dL Final     BUN   Date Value Ref Range Status   10/21/2020 7 6 - 20 mg/dL Final     Creatinine   Date Value Ref Range Status   10/21/2020 0.7 0.5 - 1.4 mg/dL Final     Calcium   Date Value Ref Range Status   10/21/2020 9.0 8.7 - 10.5 mg/dL Final     Total Protein   Date Value Ref Range Status   10/21/2020 7.3 6.0 - 8.4 g/dL Final     Albumin   Date Value Ref Range Status   10/21/2020 3.7 3.5 - 5.2 g/dL Final     Total Bilirubin   Date Value Ref Range Status   10/21/2020 0.6 0.1 - 1.0 mg/dL Final     Comment:     For infants and newborns, interpretation of results should be based  on gestational age, weight and in agreement with clinical  observations.  Premature Infant recommended reference ranges:  Up to 24 hours.............<8.0 mg/dL  Up to 48 hours............<12.0 mg/dL  3-5 days..................<15.0 mg/dL  6-29 days.................<15.0 mg/dL       Alkaline Phosphatase   Date Value Ref Range Status   10/21/2020 84 55 - 135 U/L Final     AST   Date Value Ref Range Status   10/21/2020 18 10 - 40 U/L Final     ALT   Date Value Ref Range Status   10/21/2020 17 10 - 44 U/L Final     Anion Gap   Date Value Ref Range Status   10/21/2020 11 8 - 16 mmol/L Final     eGFR if    Date Value Ref Range Status   10/21/2020 >60.0 >60 mL/min/1.73 m^2 Final     eGFR if non    Date Value Ref Range Status   10/21/2020 >60.0 >60 mL/min/1.73 m^2 Final     Comment:     Calculation used to obtain the estimated glomerular filtration  rate (eGFR) is the CKD-EPI equation.              Chronic deep vein thrombosis (DVT) of right peroneal vein  -     enoxaparin (LOVENOX) 150 mg/mL Syrg; Inject 1 mL (150 mg total) into the skin once daily.  Dispense: 30 Syringe; Refill: 1  -     Anti-Xa Heparin Monitoring; Standing  -      CBC Auto Differential; Future; Expected date: 11/24/2020  -     CMP; Future; Expected date: 11/24/2020    History of pulmonary embolus (PE)    Tobacco use    Anticoagulated  -     Anti-Xa Heparin Monitoring; Standing  -     CBC Auto Differential; Future; Expected date: 11/24/2020  -     CMP; Future; Expected date: 11/24/2020    Depression, unspecified depression type  -     FLUoxetine 40 MG capsule; Take 1 capsule (40 mg total) by mouth 2 (two) times daily.  Dispense: 60 capsule; Refill: 1          Pt needs to have her surgery: the edema from her trauma is causing compromised blood flow and she needs surgery  sooner than later . This is putting her at risk of a PE   Cont plavix per cardiology  Stop coumadin as this medication is difficult to control   Start lovenox  Take regular medications for pain   From a heme standpoint SHe is cleared for surgery on Lovenox   Please print out her labs to be drawn in [place]. RTC 1 week virtual visit to review her labs . Draw labs 3-4 days before OV please    Check Factor anti Xa level weekly for 3 weeks   Increase prozac to 80 mg in hopes to help her with tobacco cessation  Once recovered will need bone density scan   Cont statin for cardiac protection   She has no antiphospholipid syndrome or lupus anticoagulant. She must stay hydrated : she is at risk of clotting if she remains dehydrated post gastric bypass.   Given the growth of the subcarinal lymph node from 1 cm to 1.6 cm , she needs a repeat CT chest with contrast in  months; history tobacco use   Unable to have cardiac stent due to location of blockage   Rtc one week     Thank you for allowing me to evaluate and participate in the care of this pleasant patient. Please fell free to call me with any questions or concerns.    Rabia Mcgowan MD    This note was dictated with Dragon and briefly proofread. Please excuse any sentences that may be unclear or words misspelled

## 2020-11-24 NOTE — TELEPHONE ENCOUNTER
pt confirmed upcoming virtual visit with labwork prior. no further questions or concerns. ABHIJIT Valencia ok to see pt as Yovani schedule full.

## 2020-11-24 NOTE — TELEPHONE ENCOUNTER
Spoke with patient in-person after her office visit with Dr. Pina today. Pt received lab orders weekly for next 4 weeks per Dr. Pina and hard copy of AVS. Reviewed AVS with patient. No further questions or concerns at this time.

## 2020-11-27 PROCEDURE — 99285 EMERGENCY DEPT VISIT HI MDM: CPT

## 2020-11-28 ENCOUNTER — HOSPITAL ENCOUNTER (INPATIENT)
Facility: HOSPITAL | Age: 40
LOS: 7 days | Discharge: HOME | DRG: 233 | End: 2020-12-07
Attending: EMERGENCY MEDICINE | Admitting: INTERNAL MEDICINE
Payer: COMMERCIAL

## 2020-11-28 DIAGNOSIS — I25.118 CORONARY ARTERY DISEASE OF NATIVE ARTERY OF NATIVE HEART WITH STABLE ANGINA PECTORIS: ICD-10-CM

## 2020-11-28 DIAGNOSIS — Z95.1 HX OF CABG: ICD-10-CM

## 2020-11-28 DIAGNOSIS — D64.9 NORMOCYTIC ANEMIA: ICD-10-CM

## 2020-11-28 DIAGNOSIS — R07.9 CHEST PAIN: Primary | ICD-10-CM

## 2020-11-28 DIAGNOSIS — I25.10 CORONARY ARTERY DISEASE, ANGINA PRESENCE UNSPECIFIED, UNSPECIFIED VESSEL OR LESION TYPE, UNSPECIFIED WHETHER NATIVE OR TRANSPLANTED HEART: ICD-10-CM

## 2020-11-28 DIAGNOSIS — R07.9 CHEST PAIN, UNSPECIFIED TYPE: ICD-10-CM

## 2020-11-28 DIAGNOSIS — Z95.1 S/P CABG X 3: ICD-10-CM

## 2020-11-28 DIAGNOSIS — R07.9 CHEST PAIN AT REST: ICD-10-CM

## 2020-11-28 DIAGNOSIS — R07.89 OTHER CHEST PAIN: ICD-10-CM

## 2020-11-28 DIAGNOSIS — Z09 POSTOP CHECK: ICD-10-CM

## 2020-11-28 DIAGNOSIS — Z86.711 HISTORY OF PULMONARY EMBOLISM: ICD-10-CM

## 2020-11-28 DIAGNOSIS — I82.401 ACUTE DEEP VEIN THROMBOSIS (DVT) OF RIGHT LOWER EXTREMITY, UNSPECIFIED VEIN: ICD-10-CM

## 2020-11-28 LAB
ALBUMIN SERPL BCP-MCNC: 3.6 G/DL (ref 3.5–5.2)
ALBUMIN SERPL BCP-MCNC: 4.4 G/DL (ref 3.5–5.2)
ALP SERPL-CCNC: 67 U/L (ref 55–135)
ALP SERPL-CCNC: 87 U/L (ref 55–135)
ALT SERPL W/O P-5'-P-CCNC: 16 U/L (ref 10–44)
ALT SERPL W/O P-5'-P-CCNC: 18 U/L (ref 10–44)
ANION GAP SERPL CALC-SCNC: 11 MMOL/L (ref 8–16)
ANION GAP SERPL CALC-SCNC: 8 MMOL/L (ref 8–16)
AST SERPL-CCNC: 15 U/L (ref 10–40)
AST SERPL-CCNC: 20 U/L (ref 10–40)
B-HCG UR QL: NEGATIVE
BASOPHILS # BLD AUTO: 0.06 K/UL (ref 0–0.2)
BASOPHILS NFR BLD: 0.6 % (ref 0–1.9)
BILIRUB SERPL-MCNC: 0.6 MG/DL (ref 0.1–1)
BILIRUB SERPL-MCNC: 0.7 MG/DL (ref 0.1–1)
BUN SERPL-MCNC: 7 MG/DL (ref 6–20)
BUN SERPL-MCNC: 8 MG/DL (ref 6–20)
CALCIUM SERPL-MCNC: 8.8 MG/DL (ref 8.7–10.5)
CALCIUM SERPL-MCNC: 9.5 MG/DL (ref 8.7–10.5)
CHLORIDE SERPL-SCNC: 104 MMOL/L (ref 95–110)
CHLORIDE SERPL-SCNC: 104 MMOL/L (ref 95–110)
CO2 SERPL-SCNC: 24 MMOL/L (ref 23–29)
CO2 SERPL-SCNC: 26 MMOL/L (ref 23–29)
CREAT SERPL-MCNC: 0.7 MG/DL (ref 0.5–1.4)
CREAT SERPL-MCNC: 0.8 MG/DL (ref 0.5–1.4)
CTP QC/QA: YES
DIFFERENTIAL METHOD: ABNORMAL
EOSINOPHIL # BLD AUTO: 0.1 K/UL (ref 0–0.5)
EOSINOPHIL NFR BLD: 1 % (ref 0–8)
ERYTHROCYTE [DISTWIDTH] IN BLOOD BY AUTOMATED COUNT: 16.3 % (ref 11.5–14.5)
EST. GFR  (AFRICAN AMERICAN): >60 ML/MIN/1.73 M^2
EST. GFR  (AFRICAN AMERICAN): >60 ML/MIN/1.73 M^2
EST. GFR  (NON AFRICAN AMERICAN): >60 ML/MIN/1.73 M^2
EST. GFR  (NON AFRICAN AMERICAN): >60 ML/MIN/1.73 M^2
GLUCOSE SERPL-MCNC: 104 MG/DL (ref 70–110)
GLUCOSE SERPL-MCNC: 108 MG/DL (ref 70–110)
HCT VFR BLD AUTO: 41.8 % (ref 37–48.5)
HGB BLD-MCNC: 13.7 G/DL (ref 12–16)
IMM GRANULOCYTES # BLD AUTO: 0.05 K/UL (ref 0–0.04)
IMM GRANULOCYTES NFR BLD AUTO: 0.5 % (ref 0–0.5)
LIPASE SERPL-CCNC: 24 U/L (ref 4–60)
LYMPHOCYTES # BLD AUTO: 3.6 K/UL (ref 1–4.8)
LYMPHOCYTES NFR BLD: 34 % (ref 18–48)
MAGNESIUM SERPL-MCNC: 2 MG/DL (ref 1.6–2.6)
MCH RBC QN AUTO: 28.9 PG (ref 27–31)
MCHC RBC AUTO-ENTMCNC: 32.8 G/DL (ref 32–36)
MCV RBC AUTO: 88 FL (ref 82–98)
MONOCYTES # BLD AUTO: 0.8 K/UL (ref 0.3–1)
MONOCYTES NFR BLD: 7.6 % (ref 4–15)
NEUTROPHILS # BLD AUTO: 5.9 K/UL (ref 1.8–7.7)
NEUTROPHILS NFR BLD: 56.3 % (ref 38–73)
NRBC BLD-RTO: 0 /100 WBC
PLATELET # BLD AUTO: 308 K/UL (ref 150–350)
PMV BLD AUTO: 10.1 FL (ref 9.2–12.9)
POTASSIUM SERPL-SCNC: 3.5 MMOL/L (ref 3.5–5.1)
POTASSIUM SERPL-SCNC: 3.7 MMOL/L (ref 3.5–5.1)
PROT SERPL-MCNC: 7 G/DL (ref 6–8.4)
PROT SERPL-MCNC: 8.3 G/DL (ref 6–8.4)
RBC # BLD AUTO: 4.74 M/UL (ref 4–5.4)
SARS-COV-2 RDRP RESP QL NAA+PROBE: NEGATIVE
SODIUM SERPL-SCNC: 138 MMOL/L (ref 136–145)
SODIUM SERPL-SCNC: 139 MMOL/L (ref 136–145)
TROPONIN I SERPL DL<=0.01 NG/ML-MCNC: 0.05 NG/ML
TROPONIN I SERPL DL<=0.01 NG/ML-MCNC: <0.03 NG/ML
TROPONIN I SERPL DL<=0.01 NG/ML-MCNC: <0.03 NG/ML
WBC # BLD AUTO: 10.51 K/UL (ref 3.9–12.7)

## 2020-11-28 PROCEDURE — 93005 ELECTROCARDIOGRAM TRACING: CPT | Performed by: INTERNAL MEDICINE

## 2020-11-28 PROCEDURE — 25000003 PHARM REV CODE 250: Performed by: INTERNAL MEDICINE

## 2020-11-28 PROCEDURE — 36415 COLL VENOUS BLD VENIPUNCTURE: CPT

## 2020-11-28 PROCEDURE — U0002 COVID-19 LAB TEST NON-CDC: HCPCS

## 2020-11-28 PROCEDURE — 93010 EKG 12-LEAD: ICD-10-PCS | Mod: ,,, | Performed by: INTERNAL MEDICINE

## 2020-11-28 PROCEDURE — 84484 ASSAY OF TROPONIN QUANT: CPT | Mod: 91

## 2020-11-28 PROCEDURE — G0378 HOSPITAL OBSERVATION PER HR: HCPCS

## 2020-11-28 PROCEDURE — 83735 ASSAY OF MAGNESIUM: CPT

## 2020-11-28 PROCEDURE — 93010 EKG 12-LEAD: ICD-10-PCS | Mod: ,,, | Performed by: SPECIALIST

## 2020-11-28 PROCEDURE — 81025 URINE PREGNANCY TEST: CPT | Performed by: EMERGENCY MEDICINE

## 2020-11-28 PROCEDURE — 25000003 PHARM REV CODE 250: Performed by: EMERGENCY MEDICINE

## 2020-11-28 PROCEDURE — 25000003 PHARM REV CODE 250: Performed by: PHYSICIAN ASSISTANT

## 2020-11-28 PROCEDURE — 80053 COMPREHEN METABOLIC PANEL: CPT

## 2020-11-28 PROCEDURE — 25000242 PHARM REV CODE 250 ALT 637 W/ HCPCS: Performed by: NURSE PRACTITIONER

## 2020-11-28 PROCEDURE — 85025 COMPLETE CBC W/AUTO DIFF WBC: CPT

## 2020-11-28 PROCEDURE — 25000003 PHARM REV CODE 250: Performed by: NURSE PRACTITIONER

## 2020-11-28 PROCEDURE — 93010 ELECTROCARDIOGRAM REPORT: CPT | Mod: ,,, | Performed by: SPECIALIST

## 2020-11-28 PROCEDURE — 93010 ELECTROCARDIOGRAM REPORT: CPT | Mod: ,,, | Performed by: INTERNAL MEDICINE

## 2020-11-28 PROCEDURE — 63600175 PHARM REV CODE 636 W HCPCS: Performed by: INTERNAL MEDICINE

## 2020-11-28 PROCEDURE — 84484 ASSAY OF TROPONIN QUANT: CPT

## 2020-11-28 PROCEDURE — 80053 COMPREHEN METABOLIC PANEL: CPT | Mod: 91

## 2020-11-28 PROCEDURE — 93005 ELECTROCARDIOGRAM TRACING: CPT | Performed by: SPECIALIST

## 2020-11-28 PROCEDURE — 83690 ASSAY OF LIPASE: CPT

## 2020-11-28 RX ORDER — MAGNESIUM SULFATE HEPTAHYDRATE 40 MG/ML
2 INJECTION, SOLUTION INTRAVENOUS
Status: DISCONTINUED | OUTPATIENT
Start: 2020-11-28 | End: 2020-12-07 | Stop reason: HOSPADM

## 2020-11-28 RX ORDER — FLUOXETINE HYDROCHLORIDE 20 MG/1
40 CAPSULE ORAL 2 TIMES DAILY
Status: DISCONTINUED | OUTPATIENT
Start: 2020-11-28 | End: 2020-11-28

## 2020-11-28 RX ORDER — POTASSIUM CHLORIDE 7.45 MG/ML
20 INJECTION INTRAVENOUS
Status: DISCONTINUED | OUTPATIENT
Start: 2020-11-28 | End: 2020-12-07 | Stop reason: HOSPADM

## 2020-11-28 RX ORDER — METOPROLOL TARTRATE 25 MG/1
25 TABLET, FILM COATED ORAL DAILY
Status: DISCONTINUED | OUTPATIENT
Start: 2020-11-28 | End: 2020-11-28

## 2020-11-28 RX ORDER — MIDODRINE HYDROCHLORIDE 2.5 MG/1
5 TABLET ORAL EVERY 12 HOURS
Status: DISCONTINUED | OUTPATIENT
Start: 2020-11-28 | End: 2020-11-28

## 2020-11-28 RX ORDER — POTASSIUM CHLORIDE 20 MEQ/1
40 TABLET, EXTENDED RELEASE ORAL
Status: DISCONTINUED | OUTPATIENT
Start: 2020-11-28 | End: 2020-12-07 | Stop reason: HOSPADM

## 2020-11-28 RX ORDER — RANOLAZINE 500 MG/1
500 TABLET, EXTENDED RELEASE ORAL 2 TIMES DAILY
Status: DISCONTINUED | OUTPATIENT
Start: 2020-11-28 | End: 2020-11-28

## 2020-11-28 RX ORDER — MAGNESIUM SULFATE HEPTAHYDRATE 40 MG/ML
4 INJECTION, SOLUTION INTRAVENOUS
Status: DISCONTINUED | OUTPATIENT
Start: 2020-11-28 | End: 2020-12-07 | Stop reason: HOSPADM

## 2020-11-28 RX ORDER — HYDROXYZINE PAMOATE 25 MG/1
25 CAPSULE ORAL EVERY 8 HOURS PRN
Status: DISCONTINUED | OUTPATIENT
Start: 2020-11-28 | End: 2020-12-07 | Stop reason: HOSPADM

## 2020-11-28 RX ORDER — FAMOTIDINE 20 MG/1
20 TABLET, FILM COATED ORAL 2 TIMES DAILY
Status: DISCONTINUED | OUTPATIENT
Start: 2020-11-28 | End: 2020-11-28

## 2020-11-28 RX ORDER — PANTOPRAZOLE SODIUM 40 MG/1
40 TABLET, DELAYED RELEASE ORAL 2 TIMES DAILY
Status: DISCONTINUED | OUTPATIENT
Start: 2020-11-28 | End: 2020-12-07 | Stop reason: HOSPADM

## 2020-11-28 RX ORDER — OXYCODONE HYDROCHLORIDE 5 MG/1
10 TABLET ORAL EVERY 6 HOURS PRN
Status: DISCONTINUED | OUTPATIENT
Start: 2020-11-28 | End: 2020-12-07 | Stop reason: HOSPADM

## 2020-11-28 RX ORDER — RANOLAZINE 500 MG/1
1000 TABLET, EXTENDED RELEASE ORAL 2 TIMES DAILY
Status: DISCONTINUED | OUTPATIENT
Start: 2020-11-28 | End: 2020-12-02

## 2020-11-28 RX ORDER — MORPHINE SULFATE 4 MG/ML
4 INJECTION, SOLUTION INTRAMUSCULAR; INTRAVENOUS EVERY 4 HOURS PRN
Status: DISCONTINUED | OUTPATIENT
Start: 2020-11-28 | End: 2020-12-04

## 2020-11-28 RX ORDER — CLOPIDOGREL BISULFATE 75 MG/1
75 TABLET ORAL NIGHTLY
Status: DISCONTINUED | OUTPATIENT
Start: 2020-11-28 | End: 2020-12-03

## 2020-11-28 RX ORDER — ASPIRIN 325 MG
325 TABLET ORAL
Status: COMPLETED | OUTPATIENT
Start: 2020-11-28 | End: 2020-11-28

## 2020-11-28 RX ORDER — METOPROLOL TARTRATE 25 MG/1
25 TABLET, FILM COATED ORAL 2 TIMES DAILY
Status: DISCONTINUED | OUTPATIENT
Start: 2020-11-28 | End: 2020-11-28

## 2020-11-28 RX ORDER — NITROGLYCERIN 0.4 MG/1
0.4 TABLET SUBLINGUAL EVERY 5 MIN PRN
Status: DISCONTINUED | OUTPATIENT
Start: 2020-11-28 | End: 2020-12-02

## 2020-11-28 RX ORDER — ENOXAPARIN SODIUM 100 MG/ML
150 INJECTION SUBCUTANEOUS DAILY
Status: DISCONTINUED | OUTPATIENT
Start: 2020-11-28 | End: 2020-11-28

## 2020-11-28 RX ORDER — POTASSIUM CHLORIDE 7.45 MG/ML
40 INJECTION INTRAVENOUS
Status: DISCONTINUED | OUTPATIENT
Start: 2020-11-28 | End: 2020-12-07 | Stop reason: HOSPADM

## 2020-11-28 RX ORDER — MIDODRINE HYDROCHLORIDE 2.5 MG/1
5 TABLET ORAL EVERY 12 HOURS
Status: DISCONTINUED | OUTPATIENT
Start: 2020-11-28 | End: 2020-11-29

## 2020-11-28 RX ORDER — NAPROXEN SODIUM 220 MG/1
81 TABLET, FILM COATED ORAL DAILY
Status: DISCONTINUED | OUTPATIENT
Start: 2020-11-28 | End: 2020-12-07 | Stop reason: HOSPADM

## 2020-11-28 RX ORDER — LORAZEPAM 0.5 MG/1
0.5 TABLET ORAL EVERY 4 HOURS PRN
Status: DISCONTINUED | OUTPATIENT
Start: 2020-11-28 | End: 2020-12-02

## 2020-11-28 RX ORDER — OXYCODONE HYDROCHLORIDE 5 MG/1
5 TABLET ORAL EVERY 6 HOURS PRN
Status: DISCONTINUED | OUTPATIENT
Start: 2020-11-28 | End: 2020-11-28

## 2020-11-28 RX ORDER — ENOXAPARIN SODIUM 100 MG/ML
150 INJECTION SUBCUTANEOUS
Status: DISCONTINUED | OUTPATIENT
Start: 2020-11-28 | End: 2020-11-30

## 2020-11-28 RX ORDER — NITROGLYCERIN 0.4 MG/1
0.4 TABLET SUBLINGUAL EVERY 5 MIN PRN
Status: DISCONTINUED | OUTPATIENT
Start: 2020-11-28 | End: 2020-11-28

## 2020-11-28 RX ORDER — ASPIRIN 325 MG
325 TABLET ORAL DAILY
Status: DISCONTINUED | OUTPATIENT
Start: 2020-11-29 | End: 2020-11-28

## 2020-11-28 RX ORDER — ATORVASTATIN CALCIUM 40 MG/1
40 TABLET, FILM COATED ORAL NIGHTLY
Status: DISCONTINUED | OUTPATIENT
Start: 2020-11-28 | End: 2020-12-07 | Stop reason: HOSPADM

## 2020-11-28 RX ORDER — POTASSIUM CHLORIDE 20 MEQ/1
20 TABLET, EXTENDED RELEASE ORAL
Status: DISCONTINUED | OUTPATIENT
Start: 2020-11-28 | End: 2020-12-07 | Stop reason: HOSPADM

## 2020-11-28 RX ORDER — MAGNESIUM SULFATE 1 G/100ML
1 INJECTION INTRAVENOUS
Status: DISCONTINUED | OUTPATIENT
Start: 2020-11-28 | End: 2020-12-07 | Stop reason: HOSPADM

## 2020-11-28 RX ORDER — RANOLAZINE 500 MG/1
500 TABLET, EXTENDED RELEASE ORAL ONCE
Status: COMPLETED | OUTPATIENT
Start: 2020-11-28 | End: 2020-11-28

## 2020-11-28 RX ORDER — TIZANIDINE 4 MG/1
4 TABLET ORAL EVERY 6 HOURS PRN
Status: DISCONTINUED | OUTPATIENT
Start: 2020-11-28 | End: 2020-12-07 | Stop reason: HOSPADM

## 2020-11-28 RX ORDER — FLUOXETINE HYDROCHLORIDE 20 MG/1
40 CAPSULE ORAL DAILY
Status: DISCONTINUED | OUTPATIENT
Start: 2020-11-28 | End: 2020-12-07 | Stop reason: HOSPADM

## 2020-11-28 RX ORDER — LANOLIN ALCOHOL/MO/W.PET/CERES
800 CREAM (GRAM) TOPICAL
Status: DISCONTINUED | OUTPATIENT
Start: 2020-11-28 | End: 2020-12-07 | Stop reason: HOSPADM

## 2020-11-28 RX ORDER — ISOSORBIDE MONONITRATE 30 MG/1
30 TABLET, EXTENDED RELEASE ORAL DAILY PRN
Status: DISCONTINUED | OUTPATIENT
Start: 2020-11-28 | End: 2020-11-29

## 2020-11-28 RX ADMIN — NITROGLYCERIN 0.4 MG: 0.4 TABLET SUBLINGUAL at 02:11

## 2020-11-28 RX ADMIN — NITROGLYCERIN 0.4 MG: 0.4 TABLET SUBLINGUAL at 11:11

## 2020-11-28 RX ADMIN — RANOLAZINE 1000 MG: 500 TABLET, EXTENDED RELEASE ORAL at 09:11

## 2020-11-28 RX ADMIN — CLOPIDOGREL BISULFATE 75 MG: 75 TABLET, FILM COATED ORAL at 09:11

## 2020-11-28 RX ADMIN — OXYCODONE HYDROCHLORIDE 10 MG: 5 TABLET ORAL at 09:11

## 2020-11-28 RX ADMIN — MIDODRINE HYDROCHLORIDE 5 MG: 2.5 TABLET ORAL at 03:11

## 2020-11-28 RX ADMIN — ATORVASTATIN CALCIUM 40 MG: 40 TABLET, FILM COATED ORAL at 09:11

## 2020-11-28 RX ADMIN — PANTOPRAZOLE SODIUM 40 MG: 40 TABLET, DELAYED RELEASE ORAL at 09:11

## 2020-11-28 RX ADMIN — RANOLAZINE 500 MG: 500 TABLET, EXTENDED RELEASE ORAL at 03:11

## 2020-11-28 RX ADMIN — RANOLAZINE 500 MG: 500 TABLET, EXTENDED RELEASE ORAL at 10:11

## 2020-11-28 RX ADMIN — PANTOPRAZOLE SODIUM 40 MG: 40 TABLET, DELAYED RELEASE ORAL at 10:11

## 2020-11-28 RX ADMIN — OXYCODONE HYDROCHLORIDE 10 MG: 5 TABLET ORAL at 10:11

## 2020-11-28 RX ADMIN — ASPIRIN 81 MG 81 MG: 81 TABLET ORAL at 11:11

## 2020-11-28 RX ADMIN — NITROGLYCERIN 2 INCH: 20 OINTMENT TOPICAL at 01:11

## 2020-11-28 RX ADMIN — FLUOXETINE 40 MG: 20 CAPSULE ORAL at 05:11

## 2020-11-28 RX ADMIN — ENOXAPARIN SODIUM 150 MG: 80 INJECTION, SOLUTION INTRAVENOUS; SUBCUTANEOUS at 05:11

## 2020-11-28 RX ADMIN — ASPIRIN 325 MG ORAL TABLET 325 MG: 325 PILL ORAL at 01:11

## 2020-11-28 RX ADMIN — MORPHINE SULFATE 4 MG: 4 INJECTION INTRAVENOUS at 11:11

## 2020-11-28 RX ADMIN — POTASSIUM CHLORIDE 40 MEQ: 20 TABLET, EXTENDED RELEASE ORAL at 06:11

## 2020-11-28 NOTE — Clinical Note
The aortic root was injected, visualized and visualized. Rate = 15 mL/sec. Total volume = 30 mL. PIGTAIL

## 2020-11-28 NOTE — Clinical Note
The left ventricle was injected, visualized and visualized. Rate = 12 mL/sec. Total volume = 24 mL. PIGTAIL

## 2020-11-28 NOTE — ED PROVIDER NOTES
Encounter Date: 2020       History     Chief Complaint   Patient presents with    Chest Pain     This is a 40-year-old female with a past medical history of coronary artery disease, on aspirin and Plavix, DVT and PE on Lovenox, who presents emergency department with chest pain. She says this started earlier today describes the pain as a tightness in his center of her chest, nonradiating, it is relieved with nitro.  Nothing seems to make it worse.  No associated cough or any shortness of breath. No fever chills reported.  Dr. Guthrie is her cardiologist        Review of patient's allergies indicates:   Allergen Reactions    Clarithromycin Swelling and Other (See Comments)     DIFFICULTY SWALLOWING  DIFFICULTY SWALLOWING    Pcn [penicillins] Anaphylaxis    Sulfa (sulfonamide antibiotics) Hives    Wellbutrin [bupropion hcl] Shortness Of Breath and Anaphylaxis    Betadine [povidone-iodine] Hives     Swelling      Cefprozil Hives    Iodinated contrast media Hives    Latex, natural rubber Rash    Sulfamethoxazole-trimethoprim Nausea And Vomiting    Iodine Hives and Swelling    Latex Hives and Swelling     Past Medical History:   Diagnosis Date    Abnormal Pap smear of cervix     Arthritis     OA    Back pain     Cancer     skin cancer to back    Depression     Endometriosis     Full dentures     GERD (gastroesophageal reflux disease)     Migraine     Pulmonary embolism 2018    Seizures     Sleep apnea     Does not use c-pap since weight loss - 170 lbs    Uterine fibroid     Wears glasses      Past Surgical History:   Procedure Laterality Date    ANGIOGRAM, CORONARY, WITH LEFT HEART CATHETERIZATION Left 2020    Procedure: Left heart cath;  Surgeon: Jm Guthrie MD;  Location: [facility] CATH/EP LAB;  Service: Cardiology;  Laterality: Left;    APPENDECTOMY      CARPAL TUNNEL RELEASE Bilateral      SECTION      CHOLECYSTECTOMY      COLONOSCOPY N/A 2019    Procedure:  COLONOSCOPY;  Surgeon: Anselmo Blackwell MD;  Location: [facility] ENDO;  Service: Endoscopy;  Laterality: N/A;    EPIDURAL STEROID INJECTION INTO LUMBAR SPINE N/A 6/7/2019    Procedure: Injection-steroid-epidural-lumbar;  Surgeon: Yariel Ramirez MD;  [facility];  Service: Pain Management;  Laterality: N/A;  L3-4    ESOPHAGOGASTRODUODENOSCOPY N/A 11/27/2019    Procedure: EGD (ESOPHAGOGASTRODUODENOSCOPY);  Surgeon: Anselmo Blackwell MD;  Location: [facility] ENDO;  Service: Endoscopy;  Laterality: N/A;    FRACTURE SURGERY      ankle    gastric sleve      HYSTERECTOMY  2014    endo and fibroids    HYSTERECTOMY      JOINT REPLACEMENT Left 11/13/2018    KNEE ARTHROPLASTY Left 11/13/2018    Procedure: ARTHROPLASTY, KNEE;  Surgeon: Feliberto Cardenas MD;  Location: [facility] OR;  Service: Orthopedics;  Laterality: Left;    KNEE ARTHROPLASTY Right 6/16/2020    Procedure: ARTHROPLASTY, KNEE;  Surgeon: Feliberto Cardenas MD;  Location: [facility] OR;  Service: Orthopedics;  Laterality: Right;    KNEE ARTHROSCOPY W/ MENISCECTOMY Right 10/25/2019    Procedure: ARTHROSCOPY, KNEE, WITH MENISCECTOMY;  Surgeon: Feliberto Cardenas MD;  Location: [facility] OR;  Service: Orthopedics;  Laterality: Right;    KNEE SURGERY      LUMBAR EPIDURAL INJECTION      OOPHORECTOMY Left     LSO    RADIAL NERVE Right     RECONSTRUCTION OF MEDIAL PATELLOFEMORAL LIGAMENT OF RIGHT KNEE Right 10/25/2019    Procedure: RECONSTRUCTION, LIGAMENT, MEDIAL PATELLOFEMORAL, RIGHT;  Surgeon: Feliberto Cardenas MD;  Location: [facility] OR;  Service: Orthopedics;  Laterality: Right;    SINUS SURGERY      UPPER GASTROINTESTINAL ENDOSCOPY  11/27/2019    Dr. Blackwell; mild schatzki ring-dilated; gastritis; bx in process     Family History   Problem Relation Age of Onset    Heart disease Mother     Heart disease Father     Esophageal cancer Maternal Grandmother     Breast cancer Maternal Aunt 46    Colon cancer Neg Hx     Stomach cancer Neg Hx     Ovarian  cancer Neg Hx      Social History     Tobacco Use    Smoking status: Current Every Day Smoker     Packs/day: 0.25     Years: 20.00     Pack years: 5.00     Types: Cigarettes    Smokeless tobacco: Never Used    Tobacco comment: trying to quit again   Substance Use Topics    Alcohol use: Yes     Alcohol/week: 0.0 standard drinks     Comment: occasionally    Drug use: Not Currently     Types: Other-see comments     Comment: no     Review of Systems   Constitutional: Negative for fever.   HENT: Negative for sore throat.    Respiratory: Negative for shortness of breath.    Cardiovascular: Positive for chest pain.   Gastrointestinal: Negative for abdominal pain and nausea.   Genitourinary: Negative for dysuria.   Musculoskeletal: Negative for back pain.   Skin: Negative for rash.   Neurological: Negative for weakness and headaches.   Hematological: Does not bruise/bleed easily.   All other systems reviewed and are negative.      Physical Exam     Initial Vitals [11/27/20 2246]   BP Pulse Resp Temp SpO2   (!) 145/70 65 17 97.9 °F (36.6 °C) 98 %      MAP       --         Physical Exam    Nursing note and vitals reviewed.  Constitutional: She appears well-developed and well-nourished. She is Obese .   HENT:   Head: Normocephalic and atraumatic.   Mouth/Throat: No oropharyngeal exudate.   Eyes: Conjunctivae and EOM are normal. Pupils are equal, round, and reactive to light.   Neck: Neck supple. No tracheal deviation present.   Cardiovascular: Normal rate, regular rhythm, normal heart sounds and intact distal pulses.   No murmur heard.  Pulmonary/Chest: Breath sounds normal. No stridor. No respiratory distress. She has no wheezes. She has no rhonchi. She has no rales.   Abdominal: Soft. She exhibits no distension. There is no abdominal tenderness. There is no rebound and no guarding.   Musculoskeletal: Normal range of motion. No tenderness or edema.   Neurological: She is alert and oriented to person, place, and time. She  has normal strength. No cranial nerve deficit or sensory deficit. GCS score is 15. GCS eye subscore is 4. GCS verbal subscore is 5. GCS motor subscore is 6.   Skin: Skin is warm and dry. Capillary refill takes less than 2 seconds. No rash noted. No erythema. No pallor.   Psychiatric: She has a normal mood and affect. Thought content normal.         ED Course   Procedures  Labs Reviewed   CBC W/ AUTO DIFFERENTIAL - Abnormal; Notable for the following components:       Result Value    RDW 16.3 (*)     Immature Grans (Abs) 0.05 (*)     All other components within normal limits   COMPREHENSIVE METABOLIC PANEL   LIPASE   TROPONIN I   SARS-COV-2 RNA AMPLIFICATION, QUAL   COMPREHENSIVE METABOLIC PANEL   MAGNESIUM   POCT URINE PREGNANCY           Results for orders placed or performed during the hospital encounter of 11/28/20   CBC auto differential   Result Value Ref Range    WBC 10.51 3.90 - 12.70 K/uL    RBC 4.74 4.00 - 5.40 M/uL    Hemoglobin 13.7 12.0 - 16.0 g/dL    Hematocrit 41.8 37.0 - 48.5 %    MCV 88 82 - 98 fL    MCH 28.9 27.0 - 31.0 pg    MCHC 32.8 32.0 - 36.0 g/dL    RDW 16.3 (H) 11.5 - 14.5 %    Platelets 308 150 - 350 K/uL    MPV 10.1 9.2 - 12.9 fL    Immature Granulocytes 0.5 0.0 - 0.5 %    Gran # (ANC) 5.9 1.8 - 7.7 K/uL    Immature Grans (Abs) 0.05 (H) 0.00 - 0.04 K/uL    Lymph # 3.6 1.0 - 4.8 K/uL    Mono # 0.8 0.3 - 1.0 K/uL    Eos # 0.1 0.0 - 0.5 K/uL    Baso # 0.06 0.00 - 0.20 K/uL    nRBC 0 0 /100 WBC    Gran % 56.3 38.0 - 73.0 %    Lymph % 34.0 18.0 - 48.0 %    Mono % 7.6 4.0 - 15.0 %    Eosinophil % 1.0 0.0 - 8.0 %    Basophil % 0.6 0.0 - 1.9 %    Differential Method Automated    Comprehensive metabolic panel   Result Value Ref Range    Sodium 139 136 - 145 mmol/L    Potassium 3.5 3.5 - 5.1 mmol/L    Chloride 104 95 - 110 mmol/L    CO2 24 23 - 29 mmol/L    Glucose 108 70 - 110 mg/dL    BUN 8 6 - 20 mg/dL    Creatinine 0.8 0.5 - 1.4 mg/dL    Calcium 9.5 8.7 - 10.5 mg/dL    Total Protein 8.3 6.0 - 8.4  g/dL    Albumin 4.4 3.5 - 5.2 g/dL    Total Bilirubin 0.6 0.1 - 1.0 mg/dL    Alkaline Phosphatase 87 55 - 135 U/L    AST 20 10 - 40 U/L    ALT 18 10 - 44 U/L    Anion Gap 11 8 - 16 mmol/L    eGFR if African American >60.0 >60 mL/min/1.73 m^2    eGFR if non African American >60.0 >60 mL/min/1.73 m^2   Lipase   Result Value Ref Range    Lipase 24 4 - 60 U/L   Troponin I   Result Value Ref Range    Troponin I <0.030 <=0.040 ng/mL   COVID-19 Rapid Screening   Result Value Ref Range    SARS-CoV-2 RNA, Amplification, Qual Negative Negative   POCT urine pregnancy   Result Value Ref Range    POC Preg Test, Ur Negative Negative     Acceptable Yes      X-Ray Chest AP Portable   ED Interpretation   No acute pathology          Imaging Results          X-Ray Chest AP Portable (Preliminary result)  Result time 11/28/20 01:55:48   Procedure changed from X-Ray Chest 1 View     ED Interpretation by Woodrow Zuluaga DO (11/28/20 01:55:48, [facility], Emergency Medicine)    No acute pathology                               Medical Decision Making:   ED Management:  Patient presents emergency department chest pain as described above.  She has known CAD with stenosis in the branches off of the LAD.  Doubt that she has PE could she is on Lovenox and has been compliant.  Will refer to the hospitalist for admission for further evaluation or chest pain.  She has been given aspirin and nitro paste was feeling better.  She is not have any active chest pain after I re-evaluated her.  Stable for admission to the floor.  No evidence of STEMI on EKG.    Additional MDM:   Heart Score:    History:          Moderately suspicious.  ECG:             Normal  Age:               Less than 45 years  Risk factors: >= 3 risk factors or history of atherosclerotic disease  Troponin:       Less than or equal to normal limit  Final Score: 3                    ED Course as of Nov 28 0528   Sat Nov 28, 2020   0155 EKG 1:05 a.m. normal  sinus rhythm rate of 70.  No ST elevation /depression.  No STEMI.    [JR]   0220 Shaheen leyva from hospitalist will admit the patient to the hospital.    [JR]      ED Course User Index  [JR] Woodrow Zuluaga DO            Clinical Impression:       ICD-10-CM ICD-9-CM   1. Chest pain  R07.9 786.50   2. Coronary artery disease, angina presence unspecified, unspecified vessel or lesion type, unspecified whether native or transplanted heart  I25.10 414.00                          ED Disposition Condition    Observation                             Woodrow Zuluaga DO  11/28/20 0530

## 2020-11-28 NOTE — Clinical Note
The catheter is inserted ostium   left main. Angiography performed of the left coronary arteries. Multiple views were taken. Angiography performed via power injection. Injection was 8 mL contrast at 4 mL/s.

## 2020-11-28 NOTE — H&P
[facility] Medicine History & Physical Examination   Patient Name: Aleyda Costa  MRN: [id number]  Patient Class: Emergency   Admission Date: 11/28/2020 12:51 AM  Length of Stay: 0  Attending Physician:   Primary Care Provider: Darlene Hayden MD  Face-to-Face encounter date: 11/28/2020  Code Status:Full Code  MPOA:  Chief Complaint: Chest Pain        Patient information was obtained from patient, past medical records and ER records.   HISTORY OF PRESENT ILLNESS:   Aleyda Costa is a 40 y.o. old  female who  has a past medical history of Abnormal Pap smear of cervix, Arthritis, Back pain, Cancer, Depression, Endometriosis, Full dentures, GERD (gastroesophageal reflux disease), Migraine, Pulmonary embolism (2018), Seizures, Sleep apnea, Uterine fibroid, and Wears glasses.. The patient presented to [facility] on 11/28/2020 with a primary complaint of Chest Pain  .     40-year-old  female presents emergency room complaints of chest pain.  The patient states she has been having chest pain on and for the past 24-48 hours.  She described the pain as a tight sensation she took sublingual nitro with improvement in the chest discomfort.      The chest pain is located to her mid sternum is nonradiating there was associated nausea vomiting.      The patient did complain of cough that she described as dry.      This patient has a known history of DVT/PEs anticoagulated with Lovenox, coronary artery disease status post left heart catheterization that revealed small-vessel disease and was manage medically in June or July of 2020,   Essential hypertension, depression    REVIEW OF SYSTEMS:   10 Point Review of System was performed and was found to be negative except for that mentioned already in the HPI and   Review of Systems (Negative unless checked off)  Review of Systems   Constitutional: Negative.    HENT: Negative.    Eyes: Negative.    Respiratory: Negative.     Cardiovascular: Positive for chest pain.   Gastrointestinal: Positive for nausea.   Genitourinary: Negative.    Musculoskeletal: Negative.    Skin: Negative.    Neurological: Positive for headaches.   Endo/Heme/Allergies: Negative.    Psychiatric/Behavioral: The patient is nervous/anxious.            PAST MEDICAL HISTORY:     Past Medical History:   Diagnosis Date    Abnormal Pap smear of cervix     Arthritis     OA    Back pain     Cancer     skin cancer to back    Depression     Endometriosis     Full dentures     GERD (gastroesophageal reflux disease)     Migraine     Pulmonary embolism 2018    Seizures     Sleep apnea     Does not use c-pap since weight loss - 170 lbs    Uterine fibroid     Wears glasses        PAST SURGICAL HISTORY:     Past Surgical History:   Procedure Laterality Date    ANGIOGRAM, CORONARY, WITH LEFT HEART CATHETERIZATION Left 2020    Procedure: Left heart cath;  Surgeon: Jm Guthrie MD;  Location: [facility] CATH/EP LAB;  Service: Cardiology;  Laterality: Left;    APPENDECTOMY      CARPAL TUNNEL RELEASE Bilateral      SECTION      CHOLECYSTECTOMY      COLONOSCOPY N/A 2019    Procedure: COLONOSCOPY;  Surgeon: Anselmo Blackwell MD;  Location: [facility];  Service: Endoscopy;  Laterality: N/A;    EPIDURAL STEROID INJECTION INTO LUMBAR SPINE N/A 2019    Procedure: Injection-steroid-epidural-lumbar;  Surgeon: Yariel Ramirez MD;  Location: [facility] OR;  Service: Pain Management;  Laterality: N/A;  L3-4    ESOPHAGOGASTRODUODENOSCOPY N/A 2019    Procedure: EGD (ESOPHAGOGASTRODUODENOSCOPY);  Surgeon: Anselmo Blackwell MD;  Location: [facility];  Service: Endoscopy;  Laterality: N/A;    FRACTURE SURGERY      ankle    gastric sleve      HYSTERECTOMY      endo and fibroids    HYSTERECTOMY      JOINT REPLACEMENT Left 2018    KNEE ARTHROPLASTY Left 2018    Procedure: ARTHROPLASTY, KNEE;  Surgeon: Feliberto Cardenas MD;  Location:  [facility] OR;  Service: Orthopedics;  Laterality: Left;    KNEE ARTHROPLASTY Right 6/16/2020    Procedure: ARTHROPLASTY, KNEE;  Surgeon: Feliberto Cardenas MD;  Location: [facility] OR;  Service: Orthopedics;  Laterality: Right;    KNEE ARTHROSCOPY W/ MENISCECTOMY Right 10/25/2019    Procedure: ARTHROSCOPY, KNEE, WITH MENISCECTOMY;  Surgeon: Feliberto Cardenas MD;  Location: [facility] OR;  Service: Orthopedics;  Laterality: Right;    KNEE SURGERY      LUMBAR EPIDURAL INJECTION      OOPHORECTOMY Left     LSO    RADIAL NERVE Right     RECONSTRUCTION OF MEDIAL PATELLOFEMORAL LIGAMENT OF RIGHT KNEE Right 10/25/2019    Procedure: RECONSTRUCTION, LIGAMENT, MEDIAL PATELLOFEMORAL, RIGHT;  Surgeon: Feliberto Cardenas MD;  Location: [facility] OR;  Service: Orthopedics;  Laterality: Right;    SINUS SURGERY      UPPER GASTROINTESTINAL ENDOSCOPY  11/27/2019    Dr. Blackwell; mild schatzki ring-dilated; gastritis; bx in process       ALLERGIES:   Clarithromycin; Pcn [penicillins]; Sulfa (sulfonamide antibiotics); Wellbutrin [bupropion hcl]; Betadine [povidone-iodine]; Cefprozil; Iodinated contrast media; Latex, natural rubber; Sulfamethoxazole-trimethoprim; Iodine; and Latex    FAMILY HISTORY:     Family History   Problem Relation Age of Onset    Heart disease Mother     Heart disease Father     Esophageal cancer Maternal Grandmother     Breast cancer Maternal Aunt 46    Colon cancer Neg Hx     Stomach cancer Neg Hx     Ovarian cancer Neg Hx        SOCIAL HISTORY:     Social History     Tobacco Use    Smoking status: Current Every Day Smoker     Packs/day: 0.25     Years: 20.00     Pack years: 5.00     Types: Cigarettes    Smokeless tobacco: Never Used    Tobacco comment: trying to quit again   Substance Use Topics    Alcohol use: Yes     Alcohol/week: 0.0 standard drinks     Comment: occasionally        Social History     Substance and Sexual Activity   Sexual Activity Yes    Partners: Male        HOME MEDICATIONS:      Prior to Admission medications    Medication Sig Start Date End Date Taking? Authorizing Provider   atorvastatin (LIPITOR) 40 MG tablet Take 40 mg by mouth every evening.  7/1/20   Historical Provider   cetirizine (ZYRTEC) 5 MG tablet Take 5 mg by mouth as needed.     Historical Provider   clopidogreL (PLAVIX) 75 mg tablet Take 75 mg by mouth every evening.  7/8/20   Historical Provider   cyanocobalamin, vitamin B-12, 1,000 mcg/mL Kit Inject 1 mL into the muscle every 21 days.  1/5/17   Historical Provider   enoxaparin (LOVENOX) 150 mg/mL Syrg Inject 1 mL (150 mg total) into the skin once daily. 11/24/20   Rabia Pina MD   erenumab-aooe (AIMOVIG) 140 mg/mL autoinjector Inject 140 mg into the skin every 28 days. 3/20/20   Lola Rojas MD   FLUoxetine 40 MG capsule Take 1 capsule (40 mg total) by mouth 2 (two) times daily. 11/24/20   Rabia Pina MD   hydrOXYzine pamoate (VISTARIL) 25 MG Cap Take 25 mg by mouth every 8 (eight) hours as needed.  4/3/17   Historical Provider   ketorolac (TORADOL) 60 mg/2 mL Soln Inject 30 mg into the muscle every 6 (six) hours.  9/30/20   Historical Provider   lorazepam (ATIVAN) 0.5 MG tablet Take 0.5 mg by mouth every 4 (four) hours as needed.  8/4/15   Historical Provider   nitroGLYCERIN (NITROSTAT) 0.4 MG SL tablet Place 0.4 mg under the tongue every 5 (five) minutes as needed for Chest pain.    Historical Provider   oxyCODONE (ROXICODONE) 10 mg Tab immediate release tablet Take 10 mg by mouth every 6 (six) hours as needed for Pain.    Historical Provider   pantoprazole (PROTONIX) 40 MG tablet Take 1 tablet (40 mg total) by mouth 2 (two) times daily. 12/9/19 10/19/20  Lisette Meade NP   ranolazine (RANEXA) 500 MG Tb12 Take 500 mg by mouth 2 (two) times daily.  10/17/20 10/17/21  Historical Provider   tiZANidine (ZANAFLEX) 4 MG tablet Take 4 mg by mouth every 6 (six) hours as needed.    Historical Provider         PHYSICAL EXAM:   BP (!) 145/70   Pulse 65    Temp 97.9 °F (36.6 °C) (Oral)   Resp 17   Ht 6' (1.829 m)   Wt 97.5 kg (215 lb)   SpO2 98%   BMI 29.16 kg/m²   Vitals Reviewed  General appearance: Well-developed, well-nourished female in no apparent distress.  Skin: No Rash.   Neuro: Motor and sensory exams grossly intact. Good tone. Power in all 4 extremities 5/5.   HENT: Atraumatic head. Moist mucous membranes of oral cavity.  Eyes: Normal extraocular movements.   Neck: Supple. No evidence of lymphadenopathy. No thyroidomegaly.  Lungs: Clear to auscultation bilaterally. No wheezing present.   Heart: Regular rate and rhythm. S1 and S2 present with no murmurs/gallop/rub. No pedal edema. No JVD present.   Abdomen: Soft, non-distended, non-tender. No rebound tenderness/guarding. No masses or organomegaly. Bowel sounds are normal. Bladder is not palpable.   Extremities: No cyanosis, clubbing, or edema.  Psych/mental status: Alert and oriented. Cooperative. Responds appropriately to questions.   EMERGENCY DEPARTMENT LABS AND IMAGING:   Following labs were Reviewed   Recent Labs   Lab 11/28/20 0115   WBC 10.51   HGB 13.7   HCT 41.8      CALCIUM 9.5   ALBUMIN 4.4   PROT 8.3      K 3.5   CO2 24      BUN 8   CREATININE 0.8   ALKPHOS 87   ALT 18   AST 20   BILITOT 0.6         BMP:   Recent Labs   Lab 11/28/20  0115         K 3.5      CO2 24   BUN 8   CREATININE 0.8   CALCIUM 9.5   , CMP   Recent Labs   Lab 11/28/20  0115      K 3.5      CO2 24      BUN 8   CREATININE 0.8   CALCIUM 9.5   PROT 8.3   ALBUMIN 4.4   BILITOT 0.6   ALKPHOS 87   AST 20   ALT 18   ANIONGAP 11   ESTGFRAFRICA >60.0   EGFRNONAA >60.0   , CBC   Recent Labs   Lab 11/28/20 0115   WBC 10.51   HGB 13.7   HCT 41.8      , INR   Lab Results   Component Value Date    INR 1.0 06/24/2020    INR 1.2 12/03/2018    INR 1.1 11/19/2018   , Lipid Panel   Lab Results   Component Value Date    CHOL 156 06/28/2020    HDL 38 (L) 06/28/2020    LDLCALC  89.6 06/28/2020    TRIG 142 06/28/2020    CHOLHDL 24.4 06/28/2020   , Troponin   Recent Labs   Lab 11/28/20  0115   TROPONINI <0.030   , A1C: No results for input(s): HGBA1C in the last 4320 hours. and All labs within the past 24 hours have been reviewed  Microbiology Results (last 7 days)     ** No results found for the last 168 hours. **        X-Ray Chest AP Portable   ED Interpretation   No acute pathology      X-ray Tibia Fibula 2 View Right    Result Date: 11/12/2020  EXAMINATION: XR TIBIA FIBULA 2 VIEW RIGHT CLINICAL HISTORY: Pain in right lower leg TECHNIQUE: AP and lateral views of the right tibia and fibula were performed. COMPARISON: None. FINDINGS: The patient has had a prior right knee joint replacement with metallic femoral and tibial components in good position.  Lucency around the prosthesis consistent with osteomyelitis or loosening is not seen.There is irregularity of the distal shaft of the fibula and apparent screw holes noted consistent with an old fracture now healed.  There are however significant degenerative changes at the ankle mortise joint with irregularity spur formation and narrowing of the joint space. A small heel spur is noted     Severe DJD at the right ankle joint possibly posttraumatic.  Old fracture now healed of the distal shaft of the right fibula.  Prior right knee joint replacement. Electronically signed by: Jose Salas MD Date:    11/12/2020 Time:    10:30    Us Extremity Non Vascular Limited Right    Result Date: 11/12/2020  EXAMINATION: US EXTREMITY NON VASCULAR LIMITED RIGHT CLINICAL HISTORY: Other specified disorders of muscle TECHNIQUE: Targeted sonographic evaluation in the area of interest was performed along the right anterior leg COMPARISON: None FINDINGS: Within the subcutaneous soft tissues in the area of interest along the anterior right leg, there is an ill-defined hypoechoic mass measuring 1.8 x 0.7 x 1.8 cm.  No internal or surrounding color Doppler flow  is demonstrated.  The margins of the mass are poorly defined.     Nonspecific mass in the area of interest, which may represent a solid mass or possibly complex collection such as in the setting of hematoma.  Continued clinical follow-up is recommended if the finding persists, further evaluation with MRI with and without contrast would be recommended. Electronically signed by: Al Webster MD Date:    11/12/2020 Time:    16:17    Us Lower Extremity Veins Right    Result Date: 11/7/2020  EXAMINATION: US LOWER EXTREMITY VEINS RIGHT CLINICAL HISTORY: Pain in unspecified knee TECHNIQUE: Duplex and color flow Doppler evaluation and graded compression of the right lower extremity veins was performed. COMPARISON: 07/17/2020 FINDINGS: Right thigh veins: There is an intraluminal filling defect in the popliteal vein resulting in partial decreased flow and partial decrease in compressibility.  The remaining common femoral, femoral, upper greater saphenous, and deep femoral veins are patent and free of thrombus. The veins are normally compressible and have normal phasic flow and augmentation response. Right calf veins: The visualized calf veins are patent. Contralateral CFV: The contralateral (left) common femoral vein is patent and free of thrombus. Miscellaneous: None     Partially occlusive thrombus in the right popliteal vein.  Age is acute. Electronically signed by: Dominik Sexton Date:    11/07/2020 Time:    11:18    Mri Tibia Fibula Without Contrast Right    Result Date: 11/18/2020  EXAMINATION: MRI TIBIA FIBULA WITHOUT CONTRAST RIGHT CLINICAL HISTORY: right tibia lump;  Other specified disorders of muscle TECHNIQUE: Multiplanar multisequence MR imaging of the tibia and fibula without contrast. COMPARISON: 11/12/2020 and 08/20/2020 FINDINGS: There is metallic susceptibility artifact at the knee from arthroplasty hardware.  There is a remote fracture of the distal fibula with some adjacent susceptibility artifact  likely postsurgical.  There is a small bone infarct along the distal tibial metaphysis as better characterized on dedicated ankle MRI.  Remaining marrow in the tibia and fibular shafts is normal with no replacement or edema or fracture. There is subtle periosteal edema along the anterior tibial cortex at mid diaphysis.  In the overlying subcutaneous soft tissues at the interface of the subcutaneous fat with the superficial muscular fascia, there is a circumscribed 2 cm ovoid T1/T2 hypointense mass.  This is best visualized on sagittal series 701, image 10.  There is mild adjacent soft tissue edema.  Elsewhere, muscle volume and signal are normal.     1. Well-defined subcutaneous nodule along the superficial soft tissues at mid shin.  Contrast was not administered which somewhat limits characterization.  While lesion morphology is indeterminate, favored benign entity such as pressure lesion, scar or fat trauma. 2. Subtle periosteal edema in the adjacent tibial cortex is presumably reactive, as lesion does not directly involve the bone. Electronically signed by: Dominik Sexton Date:    11/18/2020 Time:    11:54    X-ray Knee Ortho Right With Flexion    Result Date: 11/12/2020  EXAMINATION: XR KNEE ORTHO RIGHT WITH FLEXION CLINICAL HISTORY: Pain in unspecified knee TECHNIQUE: AP standing as well as PA flexion standing and Merchant views of both knees were performed.  A lateral view of the right knee is also performed. COMPARISON: Knee x-rays of September 17, 2020. FINDINGS: The patient has undergone a right knee joint replacement with metallic femoral and tibial components in good position.  Lucency around the prosthesis consistent with osteomyelitis or loosening is not seen.  A small joint effusion is noted.  The patient has also undergone left knee joint replacement.     Status post right knee joint replacement.  Small joint effusion otherwise no acute findings.  Prior left knee joint replacement as well  Electronically signed by: Jose Salas MD Date:    11/12/2020 Time:    10:22  12  lead EKG reveals a normal sinus rhythm with a normal axis this good R-wave progression is LVH by voltage this some mild inferior ST depression rate 70  milliseconds    ASSESSMENT & PLAN:   Aleyda Costa is a 40 y.o. female admitted for    1. Chest Pain  -trend cardiac enzymes and troponin  -consult cardiologist Dr. Kaushik Guthrie  -continue status, aspirin, Plavix and p.r.n. nitrates    2. H/O CAD with small vessel disease  -patient had a left heart catheterization in June of 2020 revealing small-vessel disease there was manage medically    3. H/O DVT/PE  -on daily injections of Lovenox    4. Hyperlipidemia  -LFT stable continue statin    5. Essential HTN  -continue home pharmacologic regime to management pressure        DVT Prophylaxis: will be placed on Lovenox for DVT prophylaxis and will be advised to be as mobile as possible and sit in a chair as tolerated.   ________________________________________________________________________________      Face-to-Face encounter date: 11/28/2020  Encounter included review of the medical records, interviewing and examining the patient face-to-face, discussion with family and other health care providers including emergency medicine physician, admission orders, interpreting lab/test results and formulating a plan of care.   Medical Decision Making during this encounter was  [_] Low Complexity  [_] Moderate Complexity  [x] High Complexity  _________________________________________________________________________________    INPATIENT LIST OF MEDICATIONS   No current facility-administered medications for this encounter.     Current Outpatient Medications:     atorvastatin (LIPITOR) 40 MG tablet, Take 40 mg by mouth every evening. , Disp: , Rfl:     cetirizine (ZYRTEC) 5 MG tablet, Take 5 mg by mouth as needed. , Disp: , Rfl:     clopidogreL (PLAVIX) 75 mg tablet, Take 75 mg by  mouth every evening. , Disp: , Rfl:     cyanocobalamin, vitamin B-12, 1,000 mcg/mL Kit, Inject 1 mL into the muscle every 21 days. , Disp: , Rfl:     enoxaparin (LOVENOX) 150 mg/mL Syrg, Inject 1 mL (150 mg total) into the skin once daily., Disp: 30 Syringe, Rfl: 1    erenumab-aooe (AIMOVIG) 140 mg/mL autoinjector, Inject 140 mg into the skin every 28 days., Disp: 1 mL, Rfl: 11    FLUoxetine 40 MG capsule, Take 1 capsule (40 mg total) by mouth 2 (two) times daily., Disp: 60 capsule, Rfl: 1    hydrOXYzine pamoate (VISTARIL) 25 MG Cap, Take 25 mg by mouth every 8 (eight) hours as needed. , Disp: , Rfl:     ketorolac (TORADOL) 60 mg/2 mL Soln, Inject 30 mg into the muscle every 6 (six) hours. , Disp: , Rfl:     lorazepam (ATIVAN) 0.5 MG tablet, Take 0.5 mg by mouth every 4 (four) hours as needed. , Disp: , Rfl:     nitroGLYCERIN (NITROSTAT) 0.4 MG SL tablet, Place 0.4 mg under the tongue every 5 (five) minutes as needed for Chest pain., Disp: , Rfl:     oxyCODONE (ROXICODONE) 10 mg Tab immediate release tablet, Take 10 mg by mouth every 6 (six) hours as needed for Pain., Disp: , Rfl:     pantoprazole (PROTONIX) 40 MG tablet, Take 1 tablet (40 mg total) by mouth 2 (two) times daily., Disp: 60 tablet, Rfl: 1    ranolazine (RANEXA) 500 MG Tb12, Take 500 mg by mouth 2 (two) times daily. , Disp: , Rfl:     tiZANidine (ZANAFLEX) 4 MG tablet, Take 4 mg by mouth every 6 (six) hours as needed., Disp: , Rfl:     Facility-Administered Medications Ordered in Other Encounters:     lactated ringers infusion, , Intravenous, Continuous, Anjel Hernandez MD, Stopped at 10/25/19 1115    lidocaine (PF) 10 mg/ml (1%) injection 10 mg, 1 mL, Intradermal, Once, Anjel Hernandez MD      Scheduled Meds:  Continuous Infusions:  PRN Meds:.      Bianka Luevano  [facility] Hospitalist NP  11/28/2020

## 2020-11-28 NOTE — NURSING
Notified Dr. Hatfield and Sinan LACEY that at 1420 pt called me in stating she is having chest pain. Entered pts room to find her doubled over crying in pain. Attempted to get EKG which had a sig amount of artifact due to pt unable to stay still. Vitals are stable 117/60, HR 80s. Pt did have frequent PVCs on monitor during episode. Pt was hyperventilating at time. Ntiro give SL x1 at 1426 with total relief of pain at 1430. Sent EKG to PA for review. See new orders.

## 2020-11-28 NOTE — Clinical Note
The catheter is inserted ostium   right coronary artery. Angiography performed of the right coronary arteries. Multiple views were taken. Angiography performed via power injection. Injection was 6 mL contrast at 3 mL/s.

## 2020-11-28 NOTE — CONSULTS
[facility]   Cardiology Note    Consult Requested By: Hospital Medicine  Reason for Consult: CP (Covering on behalf of Dr. Guthrie)    SUBJECTIVE:     History of Present Illness:   PE/DVT on Pradaxa  Small vessel CAD - not amenable to PCI per Dr. Guthrie [facility] 06/2020  Grade I DD  HTN/HLD  Anxiety    This is a 40-year-old female with a past medical history as stated above who presented to the emergency department for evaluation of chest pain onset approximately 2 days ago. We are covering this patient's care on behalf of Dr. Guthrie for the weekend. The patient describes this as a midsternal tight sensation with associated nausea. She reports taking sublingual nitroglycerin at home with resolve of her chest discomfort which prompted her evaluation. She was recently admitted approximately 1 month ago for similar symptomatology. Dr. Guthrie was consulted at this time and reviewed her [facility] which demonstrated 90% ostial stenosis in the 2nd obtuse marginal branch and the 2nd septal  and 80-85% ostial stenosis in the ramus branch and just distal to the bifurcation in the 2nd diagonal, all of which are 1.5 mm vessels or smaller. No significant disease in the LAD, Cx, and RCA. Normal LV function and EDP at this time. Based off these results, it was determined the vessels are too small for PCI intervention. She was encouraged to continue antianginal therapy at this time including Ranexa. She did not tolerate Imdur in the past secondary to hypotensive symptoms. On current admission, initial troponin negative, 2nd troponin level this morning 0.049. EKG NSR without ischemic changes. Chest x-ray unremarkable for acute process. All other lab findings unremarkable. Vital signs stable with BP low side of normotensive.    Review of patient's allergies indicates:   Allergen Reactions    Clarithromycin Swelling and Other (See Comments)     DIFFICULTY SWALLOWING  DIFFICULTY SWALLOWING    Pcn [penicillins] Anaphylaxis     Sulfa (sulfonamide antibiotics) Hives    Wellbutrin [bupropion hcl] Shortness Of Breath and Anaphylaxis    Betadine [povidone-iodine] Hives     Swelling      Cefprozil Hives    Iodinated contrast media Hives    Latex, natural rubber Rash    Sulfamethoxazole-trimethoprim Nausea And Vomiting    Iodine Hives and Swelling    Latex Hives and Swelling       Past Medical History:   Diagnosis Date    Abnormal Pap smear of cervix     Arthritis     OA    Back pain     Cancer     skin cancer to back    Depression     Endometriosis     Full dentures     GERD (gastroesophageal reflux disease)     Migraine     Pulmonary embolism     Seizures     Sleep apnea     Does not use c-pap since weight loss - 170 lbs    Uterine fibroid     Wears glasses      Past Surgical History:   Procedure Laterality Date    ANGIOGRAM, CORONARY, WITH LEFT HEART CATHETERIZATION Left 2020    Procedure: Left heart cath;  Surgeon: Jm Guthrie MD;  Location: [facility] CATH/EP LAB;  Service: Cardiology;  Laterality: Left;    APPENDECTOMY      CARPAL TUNNEL RELEASE Bilateral      SECTION      CHOLECYSTECTOMY      COLONOSCOPY N/A 2019    Procedure: COLONOSCOPY;  Surgeon: Anselmo Blackwell MD;  Location: [facility];  Service: Endoscopy;  Laterality: N/A;    EPIDURAL STEROID INJECTION INTO LUMBAR SPINE N/A 2019    Procedure: Injection-steroid-epidural-lumbar;  Surgeon: Yariel Ramirez MD;  Location: [facility] OR;  Service: Pain Management;  Laterality: N/A;  L3-4    ESOPHAGOGASTRODUODENOSCOPY N/A 2019    Procedure: EGD (ESOPHAGOGASTRODUODENOSCOPY);  Surgeon: Anselmo Blackwell MD;  Location: [facility];  Service: Endoscopy;  Laterality: N/A;    FRACTURE SURGERY      ankle    gastric sleve      HYSTERECTOMY      endo and fibroids    HYSTERECTOMY      JOINT REPLACEMENT Left 2018    KNEE ARTHROPLASTY Left 2018    Procedure: ARTHROPLASTY, KNEE;  Surgeon: Feliberto Cardenas MD;   Location: [facility] OR;  Service: Orthopedics;  Laterality: Left;    KNEE ARTHROPLASTY Right 6/16/2020    Procedure: ARTHROPLASTY, KNEE;  Surgeon: Feliberto Cardenas MD;  Location: [facility] OR;  Service: Orthopedics;  Laterality: Right;    KNEE ARTHROSCOPY W/ MENISCECTOMY Right 10/25/2019    Procedure: ARTHROSCOPY, KNEE, WITH MENISCECTOMY;  Surgeon: Feliberto Cardenas MD;  Location: [facility] OR;  Service: Orthopedics;  Laterality: Right;    KNEE SURGERY      LUMBAR EPIDURAL INJECTION      OOPHORECTOMY Left     LSO    RADIAL NERVE Right     RECONSTRUCTION OF MEDIAL PATELLOFEMORAL LIGAMENT OF RIGHT KNEE Right 10/25/2019    Procedure: RECONSTRUCTION, LIGAMENT, MEDIAL PATELLOFEMORAL, RIGHT;  Surgeon: Feliberto Cardenas MD;  Location: [facility] OR;  Service: Orthopedics;  Laterality: Right;    SINUS SURGERY      UPPER GASTROINTESTINAL ENDOSCOPY  11/27/2019    Dr. Blackwell; mild schatzki ring-dilated; gastritis; bx in process     Family History   Problem Relation Age of Onset    Heart disease Mother     Heart disease Father     Esophageal cancer Maternal Grandmother     Breast cancer Maternal Aunt 46    Colon cancer Neg Hx     Stomach cancer Neg Hx     Ovarian cancer Neg Hx      Social History     Tobacco Use    Smoking status: Current Every Day Smoker     Packs/day: 0.25     Years: 20.00     Pack years: 5.00     Types: Cigarettes    Smokeless tobacco: Never Used    Tobacco comment: trying to quit again   Substance Use Topics    Alcohol use: Yes     Alcohol/week: 0.0 standard drinks     Comment: occasionally    Drug use: Not Currently     Types: Other-see comments     Comment: no       Review of Systems:  Review of Systems   Cardiovascular: Positive for chest pain.   Gastrointestinal: Positive for nausea.   All other systems reviewed and are negative.      OBJECTIVE:     Vital Signs (Most Recent)  Temp: 97.8 °F (36.6 °C) (11/28/20 0720)  Pulse: 63 (11/28/20 0720)  Resp: 16 (11/28/20 1013)  BP: (!) 108/53  (11/28/20 0720)  SpO2: (!) 91 % (11/28/20 0720)    Vital Signs Range (Last 24H):  Temp:  [97.8 °F (36.6 °C)-97.9 °F (36.6 °C)]   Pulse:  [62-65]   Resp:  [16-18]   BP: (108-145)/(53-70)   SpO2:  [91 %-98 %]     I & O (Last 24H):  No intake or output data in the 24 hours ending 11/28/20 1157    Current Diet:     Current Diet Order   Procedures    Diet Cardiac        Allergies:  Review of patient's allergies indicates:   Allergen Reactions    Clarithromycin Swelling and Other (See Comments)     DIFFICULTY SWALLOWING  DIFFICULTY SWALLOWING    Pcn [penicillins] Anaphylaxis    Sulfa (sulfonamide antibiotics) Hives    Wellbutrin [bupropion hcl] Shortness Of Breath and Anaphylaxis    Betadine [povidone-iodine] Hives     Swelling      Cefprozil Hives    Iodinated contrast media Hives    Latex, natural rubber Rash    Sulfamethoxazole-trimethoprim Nausea And Vomiting    Iodine Hives and Swelling    Latex Hives and Swelling       Meds:  Scheduled Meds:   aspirin  81 mg Oral Daily    atorvastatin  40 mg Oral QHS    clopidogreL  75 mg Oral QHS    enoxaparin  150 mg Subcutaneous Q24H    FLUoxetine  40 mg Oral Daily    metoprolol tartrate  25 mg Oral Daily    pantoprazole  40 mg Oral BID    ranolazine  500 mg Oral BID     Continuous Infusions:  PRN Meds:calcium chloride IVPB, calcium chloride IVPB, calcium chloride IVPB, hydrOXYzine pamoate, LORazepam, magnesium oxide, magnesium sulfate IVPB, magnesium sulfate IVPB, magnesium sulfate IVPB, magnesium sulfate IVPB, nitroGLYCERIN, oxyCODONE, potassium chloride in water, potassium chloride in water, potassium chloride in water, potassium chloride in water, potassium chloride, potassium chloride, potassium chloride, potassium chloride, sodium phosphate IVPB, sodium phosphate IVPB, sodium phosphate IVPB, sodium phosphate IVPB, sodium phosphate IVPB, tiZANidine    Laboratory and Radiology Data:  Recent Results (from the past 24 hour(s))   CBC auto differential     Collection Time: 11/28/20  1:15 AM   Result Value Ref Range    WBC 10.51 3.90 - 12.70 K/uL    RBC 4.74 4.00 - 5.40 M/uL    Hemoglobin 13.7 12.0 - 16.0 g/dL    Hematocrit 41.8 37.0 - 48.5 %    MCV 88 82 - 98 fL    MCH 28.9 27.0 - 31.0 pg    MCHC 32.8 32.0 - 36.0 g/dL    RDW 16.3 (H) 11.5 - 14.5 %    Platelets 308 150 - 350 K/uL    MPV 10.1 9.2 - 12.9 fL    Immature Granulocytes 0.5 0.0 - 0.5 %    Gran # (ANC) 5.9 1.8 - 7.7 K/uL    Immature Grans (Abs) 0.05 (H) 0.00 - 0.04 K/uL    Lymph # 3.6 1.0 - 4.8 K/uL    Mono # 0.8 0.3 - 1.0 K/uL    Eos # 0.1 0.0 - 0.5 K/uL    Baso # 0.06 0.00 - 0.20 K/uL    nRBC 0 0 /100 WBC    Gran % 56.3 38.0 - 73.0 %    Lymph % 34.0 18.0 - 48.0 %    Mono % 7.6 4.0 - 15.0 %    Eosinophil % 1.0 0.0 - 8.0 %    Basophil % 0.6 0.0 - 1.9 %    Differential Method Automated    Comprehensive metabolic panel    Collection Time: 11/28/20  1:15 AM   Result Value Ref Range    Sodium 139 136 - 145 mmol/L    Potassium 3.5 3.5 - 5.1 mmol/L    Chloride 104 95 - 110 mmol/L    CO2 24 23 - 29 mmol/L    Glucose 108 70 - 110 mg/dL    BUN 8 6 - 20 mg/dL    Creatinine 0.8 0.5 - 1.4 mg/dL    Calcium 9.5 8.7 - 10.5 mg/dL    Total Protein 8.3 6.0 - 8.4 g/dL    Albumin 4.4 3.5 - 5.2 g/dL    Total Bilirubin 0.6 0.1 - 1.0 mg/dL    Alkaline Phosphatase 87 55 - 135 U/L    AST 20 10 - 40 U/L    ALT 18 10 - 44 U/L    Anion Gap 11 8 - 16 mmol/L    eGFR if African American >60.0 >60 mL/min/1.73 m^2    eGFR if non African American >60.0 >60 mL/min/1.73 m^2   Lipase    Collection Time: 11/28/20  1:15 AM   Result Value Ref Range    Lipase 24 4 - 60 U/L   Troponin I    Collection Time: 11/28/20  1:15 AM   Result Value Ref Range    Troponin I <0.030 <=0.040 ng/mL   POCT urine pregnancy    Collection Time: 11/28/20  1:43 AM   Result Value Ref Range    POC Preg Test, Ur Negative Negative     Acceptable Yes    COVID-19 Rapid Screening    Collection Time: 11/28/20  2:11 AM   Result Value Ref Range    SARS-CoV-2 RNA,  Amplification, Qual Negative Negative   Comprehensive metabolic panel, DAILY IN AM    Collection Time: 11/28/20  6:22 AM   Result Value Ref Range    Sodium 138 136 - 145 mmol/L    Potassium 3.7 3.5 - 5.1 mmol/L    Chloride 104 95 - 110 mmol/L    CO2 26 23 - 29 mmol/L    Glucose 104 70 - 110 mg/dL    BUN 7 6 - 20 mg/dL    Creatinine 0.7 0.5 - 1.4 mg/dL    Calcium 8.8 8.7 - 10.5 mg/dL    Total Protein 7.0 6.0 - 8.4 g/dL    Albumin 3.6 3.5 - 5.2 g/dL    Total Bilirubin 0.7 0.1 - 1.0 mg/dL    Alkaline Phosphatase 67 55 - 135 U/L    AST 15 10 - 40 U/L    ALT 16 10 - 44 U/L    Anion Gap 8 8 - 16 mmol/L    eGFR if African American >60.0 >60 mL/min/1.73 m^2    eGFR if non African American >60.0 >60 mL/min/1.73 m^2   Magnesium, DAILY    Collection Time: 11/28/20  6:22 AM   Result Value Ref Range    Magnesium 2.0 1.6 - 2.6 mg/dL   Troponin I    Collection Time: 11/28/20  6:22 AM   Result Value Ref Range    Troponin I 0.049 (H) <=0.040 ng/mL     Imaging Results          X-Ray Chest AP Portable (Final result)  Result time 11/28/20 07:15:51   Procedure changed from X-Ray Chest 1 View     Final result by Adrienne Haji MD (11/28/20 07:15:51)                 Impression:      No acute cardiopulmonary abnormality.      Electronically signed by: Adrienne Haji MD  Date:    11/28/2020  Time:    07:15             Narrative:    EXAMINATION:  XR CHEST AP PORTABLE    CLINICAL HISTORY:  cp; Chest pain, unspecified    FINDINGS:  Portable chest at 01:56 is compared to 10/19/2020 shows normal cardiomediastinal silhouette.    Lungs are clear. Pulmonary vasculature is normal. No acute osseous abnormality.                    ED Interpretation by Woodrow Zuluaga DO (11/28/20 01:55:48, [facility])    No acute pathology                            Physical Exam:   Physical Exam   Constitutional: She is oriented to person, place, and time and well-developed, well-nourished, and in no distress. No distress.    Neck: No JVD present.   Cardiovascular: Normal rate, regular rhythm and normal heart sounds.   No murmur heard.  Pulmonary/Chest: Effort normal. No respiratory distress. She has no wheezes. She has no rales.   Abdominal: Soft. There is no abdominal tenderness.   Musculoskeletal: Normal range of motion.         General: No edema.   Neurological: She is alert and oriented to person, place, and time.   Skin: Skin is warm and dry. She is not diaphoretic.       ASSESSMENT/PLAN:   Assessment:   Chest pain - minimal troponin leak on 2nd level with 1st level normal. Will repeat. EKG unremarkable. Likely small vessel angina.  PE/DVT on Pradaxa  Small vessel CAD - not amenable to PCI per Dr. Guthrie [facility] 06/2020  Grade I DD  HTN/HLD  Anxiety    Plan:   Repeat Trop today. If trend is downward, no ischemic w/u planned.   Increase Ranexa to 1000 mg BID.  Start Midodrine 5 mg BID. If BP inflates to a level comfortable to restart Imdur, will restart Imdur for anginal control in conjunction with Ranexa.     Thank you for your consult on behalf of our coverage for Dr. Guthrie.    Madhav Estrella PA-C  11/28/2020

## 2020-11-29 LAB
ALBUMIN SERPL BCP-MCNC: 3.5 G/DL (ref 3.5–5.2)
ALP SERPL-CCNC: 81 U/L (ref 55–135)
ALT SERPL W/O P-5'-P-CCNC: 22 U/L (ref 10–44)
ANION GAP SERPL CALC-SCNC: 5 MMOL/L (ref 8–16)
AST SERPL-CCNC: 36 U/L (ref 10–40)
BASOPHILS # BLD AUTO: 0.04 K/UL (ref 0–0.2)
BASOPHILS NFR BLD: 0.5 % (ref 0–1.9)
BILIRUB SERPL-MCNC: 0.7 MG/DL (ref 0.1–1)
BUN SERPL-MCNC: 10 MG/DL (ref 6–20)
CALCIUM SERPL-MCNC: 9 MG/DL (ref 8.7–10.5)
CHLORIDE SERPL-SCNC: 103 MMOL/L (ref 95–110)
CHOLEST SERPL-MCNC: 142 MG/DL (ref 120–199)
CHOLEST/HDLC SERPL: 2.9 {RATIO} (ref 2–5)
CO2 SERPL-SCNC: 27 MMOL/L (ref 23–29)
CREAT SERPL-MCNC: 0.7 MG/DL (ref 0.5–1.4)
DIFFERENTIAL METHOD: ABNORMAL
EOSINOPHIL # BLD AUTO: 0.1 K/UL (ref 0–0.5)
EOSINOPHIL NFR BLD: 1.7 % (ref 0–8)
ERYTHROCYTE [DISTWIDTH] IN BLOOD BY AUTOMATED COUNT: 16.8 % (ref 11.5–14.5)
EST. GFR  (AFRICAN AMERICAN): >60 ML/MIN/1.73 M^2
EST. GFR  (NON AFRICAN AMERICAN): >60 ML/MIN/1.73 M^2
GLUCOSE SERPL-MCNC: 100 MG/DL (ref 70–110)
HCT VFR BLD AUTO: 38.4 % (ref 37–48.5)
HDLC SERPL-MCNC: 49 MG/DL (ref 40–75)
HDLC SERPL: 34.5 % (ref 20–50)
HGB BLD-MCNC: 12.4 G/DL (ref 12–16)
IMM GRANULOCYTES # BLD AUTO: 0.05 K/UL (ref 0–0.04)
IMM GRANULOCYTES NFR BLD AUTO: 0.7 % (ref 0–0.5)
LDLC SERPL CALC-MCNC: 62.6 MG/DL (ref 63–159)
LYMPHOCYTES # BLD AUTO: 2.7 K/UL (ref 1–4.8)
LYMPHOCYTES NFR BLD: 36.3 % (ref 18–48)
MAGNESIUM SERPL-MCNC: 2 MG/DL (ref 1.6–2.6)
MCH RBC QN AUTO: 28.8 PG (ref 27–31)
MCHC RBC AUTO-ENTMCNC: 32.3 G/DL (ref 32–36)
MCV RBC AUTO: 89 FL (ref 82–98)
MONOCYTES # BLD AUTO: 0.6 K/UL (ref 0.3–1)
MONOCYTES NFR BLD: 8.1 % (ref 4–15)
NEUTROPHILS # BLD AUTO: 4 K/UL (ref 1.8–7.7)
NEUTROPHILS NFR BLD: 52.7 % (ref 38–73)
NONHDLC SERPL-MCNC: 93 MG/DL
NRBC BLD-RTO: 0 /100 WBC
PLATELET # BLD AUTO: 244 K/UL (ref 150–350)
PMV BLD AUTO: 9.7 FL (ref 9.2–12.9)
POTASSIUM SERPL-SCNC: 3.7 MMOL/L (ref 3.5–5.1)
PROT SERPL-MCNC: 7.2 G/DL (ref 6–8.4)
RBC # BLD AUTO: 4.3 M/UL (ref 4–5.4)
SODIUM SERPL-SCNC: 135 MMOL/L (ref 136–145)
TRIGL SERPL-MCNC: 152 MG/DL (ref 30–150)
TROPONIN I SERPL DL<=0.01 NG/ML-MCNC: <0.03 NG/ML
WBC # BLD AUTO: 7.5 K/UL (ref 3.9–12.7)

## 2020-11-29 PROCEDURE — 25000003 PHARM REV CODE 250: Performed by: NURSE PRACTITIONER

## 2020-11-29 PROCEDURE — 93005 ELECTROCARDIOGRAM TRACING: CPT | Performed by: INTERNAL MEDICINE

## 2020-11-29 PROCEDURE — 93010 EKG 12-LEAD: ICD-10-PCS | Mod: ,,, | Performed by: INTERNAL MEDICINE

## 2020-11-29 PROCEDURE — 80053 COMPREHEN METABOLIC PANEL: CPT

## 2020-11-29 PROCEDURE — 80061 LIPID PANEL: CPT

## 2020-11-29 PROCEDURE — 63600175 PHARM REV CODE 636 W HCPCS: Performed by: INTERNAL MEDICINE

## 2020-11-29 PROCEDURE — 36415 COLL VENOUS BLD VENIPUNCTURE: CPT

## 2020-11-29 PROCEDURE — 25000003 PHARM REV CODE 250: Performed by: PHYSICIAN ASSISTANT

## 2020-11-29 PROCEDURE — 25000003 PHARM REV CODE 250: Performed by: INTERNAL MEDICINE

## 2020-11-29 PROCEDURE — 84484 ASSAY OF TROPONIN QUANT: CPT

## 2020-11-29 PROCEDURE — 85025 COMPLETE CBC W/AUTO DIFF WBC: CPT

## 2020-11-29 PROCEDURE — 93010 ELECTROCARDIOGRAM REPORT: CPT | Mod: ,,, | Performed by: INTERNAL MEDICINE

## 2020-11-29 PROCEDURE — G0378 HOSPITAL OBSERVATION PER HR: HCPCS

## 2020-11-29 PROCEDURE — 83735 ASSAY OF MAGNESIUM: CPT

## 2020-11-29 RX ORDER — ISOSORBIDE MONONITRATE 30 MG/1
30 TABLET, EXTENDED RELEASE ORAL DAILY
Status: DISCONTINUED | OUTPATIENT
Start: 2020-11-29 | End: 2020-12-04

## 2020-11-29 RX ORDER — MIDODRINE HYDROCHLORIDE 2.5 MG/1
10 TABLET ORAL
Status: DISCONTINUED | OUTPATIENT
Start: 2020-11-29 | End: 2020-11-30

## 2020-11-29 RX ORDER — ACETAMINOPHEN 325 MG/1
650 TABLET ORAL EVERY 6 HOURS PRN
Status: DISCONTINUED | OUTPATIENT
Start: 2020-11-29 | End: 2020-12-02

## 2020-11-29 RX ADMIN — PANTOPRAZOLE SODIUM 40 MG: 40 TABLET, DELAYED RELEASE ORAL at 08:11

## 2020-11-29 RX ADMIN — RANOLAZINE 1000 MG: 500 TABLET, EXTENDED RELEASE ORAL at 08:11

## 2020-11-29 RX ADMIN — MIDODRINE HYDROCHLORIDE 10 MG: 2.5 TABLET ORAL at 05:11

## 2020-11-29 RX ADMIN — ACETAMINOPHEN 650 MG: 325 TABLET, FILM COATED ORAL at 10:11

## 2020-11-29 RX ADMIN — ASPIRIN 81 MG 81 MG: 81 TABLET ORAL at 08:11

## 2020-11-29 RX ADMIN — MIDODRINE HYDROCHLORIDE 5 MG: 2.5 TABLET ORAL at 07:11

## 2020-11-29 RX ADMIN — ATORVASTATIN CALCIUM 40 MG: 40 TABLET, FILM COATED ORAL at 08:11

## 2020-11-29 RX ADMIN — ENOXAPARIN SODIUM 150 MG: 80 INJECTION, SOLUTION INTRAVENOUS; SUBCUTANEOUS at 05:11

## 2020-11-29 RX ADMIN — MIDODRINE HYDROCHLORIDE 5 MG: 2.5 TABLET ORAL at 08:11

## 2020-11-29 RX ADMIN — FLUOXETINE 40 MG: 20 CAPSULE ORAL at 05:11

## 2020-11-29 RX ADMIN — OXYCODONE HYDROCHLORIDE 10 MG: 5 TABLET ORAL at 07:11

## 2020-11-29 RX ADMIN — MIDODRINE HYDROCHLORIDE 10 MG: 2.5 TABLET ORAL at 11:11

## 2020-11-29 RX ADMIN — CLOPIDOGREL BISULFATE 75 MG: 75 TABLET, FILM COATED ORAL at 08:11

## 2020-11-29 RX ADMIN — OXYCODONE HYDROCHLORIDE 10 MG: 5 TABLET ORAL at 05:11

## 2020-11-29 RX ADMIN — POTASSIUM CHLORIDE 20 MEQ: 20 TABLET, EXTENDED RELEASE ORAL at 06:11

## 2020-11-29 NOTE — PROGRESS NOTES
[facility]   Cardiology Note    SUBJECTIVE:     History of Present Illness:   Patient seen and examined this AM. Approximately 2300 last night the patient experienced another episode of severe midsternal chest discomfort. Vitals were stable at this time. Patient was given 3 tabs SLNTG without much resolve, 4 mg IV morphine given with resolution. EKG that was obtained demonstrated progression of T-wave inversions in the anterior leads which were not present on 1st EKG on admission. An EKG was obtained during her chest pain episode yesterday afternoon which demonstrated new T-wave inversions in V1 and V2. EKG last night demonstrated T-wave inversion in V3 as well and repeat EKG this morning demonstrated biphasic T-wave appearance and high lateral leads as well. No ischemic changes noted otherwise. Currently, patient remains and minimal sinus bradycardia with mild hypotension but otherwise vitals stable. Repeat troponin yesterday evening and this morning unremarkable.      Review of patient's allergies indicates:   Allergen Reactions    Clarithromycin Swelling and Other (See Comments)     DIFFICULTY SWALLOWING  DIFFICULTY SWALLOWING    Pcn [penicillins] Anaphylaxis    Sulfa (sulfonamide antibiotics) Hives    Wellbutrin [bupropion hcl] Shortness Of Breath and Anaphylaxis    Betadine [povidone-iodine] Hives     Swelling      Cefprozil Hives    Iodinated contrast media Hives    Latex, natural rubber Rash    Sulfamethoxazole-trimethoprim Nausea And Vomiting    Iodine Hives and Swelling    Latex Hives and Swelling       Past Medical History:   Diagnosis Date    Abnormal Pap smear of cervix     Arthritis     OA    Back pain     Cancer     skin cancer to back    Depression     Endometriosis     Full dentures     GERD (gastroesophageal reflux disease)     Migraine     Pulmonary embolism 2018    Seizures     Sleep apnea     Does not use c-pap since weight loss - 170 lbs    Uterine fibroid      Wears glasses      Past Surgical History:   Procedure Laterality Date    ANGIOGRAM, CORONARY, WITH LEFT HEART CATHETERIZATION Left 2020    Procedure: Left heart cath;  Surgeon: Jm Guthrie MD;  Location: [facility] CATH/EP LAB;  Service: Cardiology;  Laterality: Left;    APPENDECTOMY      CARPAL TUNNEL RELEASE Bilateral      SECTION      CHOLECYSTECTOMY      COLONOSCOPY N/A 2019    Procedure: COLONOSCOPY;  Surgeon: Anselmo Blackwell MD;  Location: [facility] ENDO;  Service: Endoscopy;  Laterality: N/A;    EPIDURAL STEROID INJECTION INTO LUMBAR SPINE N/A 2019    Procedure: Injection-steroid-epidural-lumbar;  Surgeon: Yariel Ramirez MD;  Location: [facility] OR;  Service: Pain Management;  Laterality: N/A;  L3-4    ESOPHAGOGASTRODUODENOSCOPY N/A 2019    Procedure: EGD (ESOPHAGOGASTRODUODENOSCOPY);  Surgeon: Anselmo Blackwell MD;  Location: [facility] ENDO;  Service: Endoscopy;  Laterality: N/A;    FRACTURE SURGERY      ankle    gastric sleve      HYSTERECTOMY      endo and fibroids    HYSTERECTOMY      JOINT REPLACEMENT Left 2018    KNEE ARTHROPLASTY Left 2018    Procedure: ARTHROPLASTY, KNEE;  Surgeon: Feliberto Cardenas MD;  Location: [facility] OR;  Service: Orthopedics;  Laterality: Left;    KNEE ARTHROPLASTY Right 2020    Procedure: ARTHROPLASTY, KNEE;  Surgeon: Feliberto Cardenas MD;  Location: [facility] OR;  Service: Orthopedics;  Laterality: Right;    KNEE ARTHROSCOPY W/ MENISCECTOMY Right 10/25/2019    Procedure: ARTHROSCOPY, KNEE, WITH MENISCECTOMY;  Surgeon: Feliberto Cardenas MD;  Location: [facility] OR;  Service: Orthopedics;  Laterality: Right;    KNEE SURGERY      LUMBAR EPIDURAL INJECTION      OOPHORECTOMY Left     LSO    RADIAL NERVE Right     RECONSTRUCTION OF MEDIAL PATELLOFEMORAL LIGAMENT OF RIGHT KNEE Right 10/25/2019    Procedure: RECONSTRUCTION, LIGAMENT, MEDIAL PATELLOFEMORAL, RIGHT;  Surgeon: Feliberto Cardenas MD;  Location: [facility] OR;   Service: Orthopedics;  Laterality: Right;    SINUS SURGERY      UPPER GASTROINTESTINAL ENDOSCOPY  11/27/2019    Dr. Blackwell; mild schatzki ring-dilated; gastritis; bx in process     Family History   Problem Relation Age of Onset    Heart disease Mother     Heart disease Father     Esophageal cancer Maternal Grandmother     Breast cancer Maternal Aunt 46    Colon cancer Neg Hx     Stomach cancer Neg Hx     Ovarian cancer Neg Hx      Social History     Tobacco Use    Smoking status: Current Every Day Smoker     Packs/day: 0.25     Years: 20.00     Pack years: 5.00     Types: Cigarettes    Smokeless tobacco: Never Used    Tobacco comment: trying to quit again   Substance Use Topics    Alcohol use: Yes     Alcohol/week: 0.0 standard drinks     Comment: occasionally    Drug use: Not Currently     Types: Other-see comments     Comment: no       OBJECTIVE:     Vital Signs (Most Recent)  Temp: 97.8 °F (36.6 °C) (11/29/20 1105)  Pulse: (!) 54 (11/29/20 1105)  Resp: 16 (11/29/20 1105)  BP: (!) 93/48 (11/29/20 1105)  SpO2: 96 % (11/29/20 1105)    Vital Signs Range (Last 24H):  Temp:  [97.6 °F (36.4 °C)-98 °F (36.7 °C)]   Pulse:  [53-90]   Resp:  [16-20]   BP: ()/(45-66)   SpO2:  [93 %-100 %]     I & O (Last 24H):    Intake/Output Summary (Last 24 hours) at 11/29/2020 1117  Last data filed at 11/29/2020 0335  Gross per 24 hour   Intake 780 ml   Output --   Net 780 ml       Current Diet:     Current Diet Order   Procedures    Diet Cardiac        Allergies:  Review of patient's allergies indicates:   Allergen Reactions    Clarithromycin Swelling and Other (See Comments)     DIFFICULTY SWALLOWING  DIFFICULTY SWALLOWING    Pcn [penicillins] Anaphylaxis    Sulfa (sulfonamide antibiotics) Hives    Wellbutrin [bupropion hcl] Shortness Of Breath and Anaphylaxis    Betadine [povidone-iodine] Hives     Swelling      Cefprozil Hives    Iodinated contrast media Hives    Latex, natural rubber Rash     Sulfamethoxazole-trimethoprim Nausea And Vomiting    Iodine Hives and Swelling    Latex Hives and Swelling       Meds:  Scheduled Meds:   aspirin  81 mg Oral Daily    atorvastatin  40 mg Oral QHS    clopidogreL  75 mg Oral QHS    enoxaparin  150 mg Subcutaneous Q24H    FLUoxetine  40 mg Oral Daily    isosorbide mononitrate  30 mg Oral Daily    midodrine  10 mg Oral TID WM    pantoprazole  40 mg Oral BID    ranolazine  1,000 mg Oral BID     Continuous Infusions:  PRN Meds:acetaminophen, calcium chloride IVPB, calcium chloride IVPB, calcium chloride IVPB, hydrOXYzine pamoate, LORazepam, magnesium oxide, magnesium sulfate IVPB, magnesium sulfate IVPB, magnesium sulfate IVPB, magnesium sulfate IVPB, morphine, nitroGLYCERIN, oxyCODONE, potassium chloride in water, potassium chloride in water, potassium chloride in water, potassium chloride in water, potassium chloride, potassium chloride, potassium chloride, potassium chloride, sodium phosphate IVPB, sodium phosphate IVPB, sodium phosphate IVPB, sodium phosphate IVPB, sodium phosphate IVPB, tiZANidine    Laboratory and Radiology Data:  Recent Results (from the past 24 hour(s))   Troponin I    Collection Time: 11/28/20  3:10 PM   Result Value Ref Range    Troponin I <0.030 <=0.040 ng/mL   Comprehensive metabolic panel, DAILY IN AM    Collection Time: 11/29/20  5:09 AM   Result Value Ref Range    Sodium 135 (L) 136 - 145 mmol/L    Potassium 3.7 3.5 - 5.1 mmol/L    Chloride 103 95 - 110 mmol/L    CO2 27 23 - 29 mmol/L    Glucose 100 70 - 110 mg/dL    BUN 10 6 - 20 mg/dL    Creatinine 0.7 0.5 - 1.4 mg/dL    Calcium 9.0 8.7 - 10.5 mg/dL    Total Protein 7.2 6.0 - 8.4 g/dL    Albumin 3.5 3.5 - 5.2 g/dL    Total Bilirubin 0.7 0.1 - 1.0 mg/dL    Alkaline Phosphatase 81 55 - 135 U/L    AST 36 10 - 40 U/L    ALT 22 10 - 44 U/L    Anion Gap 5 (L) 8 - 16 mmol/L    eGFR if African American >60.0 >60 mL/min/1.73 m^2    eGFR if non African American >60.0 >60 mL/min/1.73 m^2    Magnesium, DAILY    Collection Time: 11/29/20  5:09 AM   Result Value Ref Range    Magnesium 2.0 1.6 - 2.6 mg/dL   Lipid panel - fasting, IN AM    Collection Time: 11/29/20  5:09 AM   Result Value Ref Range    Cholesterol 142 120 - 199 mg/dL    Triglycerides 152 (H) 30 - 150 mg/dL    HDL 49 40 - 75 mg/dL    LDL Cholesterol 62.6 (L) 63.0 - 159.0 mg/dL    HDL/Cholesterol Ratio 34.5 20.0 - 50.0 %    Total Cholesterol/HDL Ratio 2.9 2.0 - 5.0    Non-HDL Cholesterol 93 mg/dL   CBC Auto Differential    Collection Time: 11/29/20  5:09 AM   Result Value Ref Range    WBC 7.50 3.90 - 12.70 K/uL    RBC 4.30 4.00 - 5.40 M/uL    Hemoglobin 12.4 12.0 - 16.0 g/dL    Hematocrit 38.4 37.0 - 48.5 %    MCV 89 82 - 98 fL    MCH 28.8 27.0 - 31.0 pg    MCHC 32.3 32.0 - 36.0 g/dL    RDW 16.8 (H) 11.5 - 14.5 %    Platelets 244 150 - 350 K/uL    MPV 9.7 9.2 - 12.9 fL    Immature Granulocytes 0.7 (H) 0.0 - 0.5 %    Gran # (ANC) 4.0 1.8 - 7.7 K/uL    Immature Grans (Abs) 0.05 (H) 0.00 - 0.04 K/uL    Lymph # 2.7 1.0 - 4.8 K/uL    Mono # 0.6 0.3 - 1.0 K/uL    Eos # 0.1 0.0 - 0.5 K/uL    Baso # 0.04 0.00 - 0.20 K/uL    nRBC 0 0 /100 WBC    Gran % 52.7 38.0 - 73.0 %    Lymph % 36.3 18.0 - 48.0 %    Mono % 8.1 4.0 - 15.0 %    Eosinophil % 1.7 0.0 - 8.0 %    Basophil % 0.5 0.0 - 1.9 %    Differential Method Automated    Troponin I    Collection Time: 11/29/20  5:09 AM   Result Value Ref Range    Troponin I <0.030 <=0.040 ng/mL     Imaging Results          X-Ray Chest AP Portable (Final result)  Result time 11/28/20 07:15:51   Procedure changed from X-Ray Chest 1 View     Final result by Adrienne Haji MD (11/28/20 07:15:51)                 Impression:      No acute cardiopulmonary abnormality.      Electronically signed by: Adrienne Haji MD  Date:    11/28/2020  Time:    07:15             Narrative:    EXAMINATION:  XR CHEST AP PORTABLE    CLINICAL HISTORY:  cp; Chest pain, unspecified    FINDINGS:  Portable chest at 01:56 is compared to  10/19/2020 shows normal cardiomediastinal silhouette.    Lungs are clear. Pulmonary vasculature is normal. No acute osseous abnormality.                    ED Interpretation by Woodrow Zuluaga DO (11/28/20 01:55:48, [facility], Emergency Medicine)    No acute pathology                            Physical Exam:   Physical Exam   Constitutional: She is oriented to person, place, and time and well-developed, well-nourished, and in no distress. No distress.   Neck: No JVD present.   Cardiovascular: Normal rate, regular rhythm and normal heart sounds.   No murmur heard.  Pulmonary/Chest: Effort normal. No respiratory distress. She has no wheezes. She has no rales.   Abdominal: Soft. There is no abdominal tenderness.   Musculoskeletal: Normal range of motion.         General: No edema.   Neurological: She is alert and oriented to person, place, and time.   Skin: Skin is warm and dry. She is not diaphoretic.       ASSESSMENT/PLAN:   Assessment:   Chest pain, possibly small vessel angina.  PE/DVT on Pradaxa  Small vessel CAD - not amenable to PCI per Dr. Guthrie [facility] 06/2020  Grade I DD  HTN/HLD  Anxiety    Plan:   With new EKG changes and continued anginal symptoms, the patient may require alternative solution to her known Dx/OM/Ramus disease. At previous admission, her angiography was reviewed and it was determined that her small-caliber vessel size occluded traditional intervention.  However she remains on max dose Ranexa with attempts to administer nitroglycerin without consequential hypotension without much resolve. Defer to Dr. Guthrie who will return tomorrow. Will keep NPO overnight in event she may require repeat catheterization.  Continue to monitor vitals as we attempt to administer nitroglycerin for anginal control.    Madhav Estrella PA-C  11/29/2020

## 2020-11-29 NOTE — PROGRESS NOTES
[facility] Medicine Progress Note    Subjective:     :   Complains of severe episode of chest pain this morning.   Also had a similar episode yesterday morning with severe chest pain and felt like she could not catch her breath. She had associated dynamic EKG changes with that episode as well.   Currently she is comfortable and asymptomatic.      Objective:   Last 24 Hour Vital Signs:  BP  Min: 84/45  Max: 101/55  Temp  Av.9 °F (36.6 °C)  Min: 97.6 °F (36.4 °C)  Max: 98 °F (36.7 °C)  Pulse  Av.9  Min: 51  Max: 65  Resp  Av.5  Min: 16  Max: 18  SpO2  Av.4 %  Min: 94 %  Max: 97 %  Weight  Av.1 kg (218 lb 7.6 oz)  Min: 99.1 kg (218 lb 7.6 oz)  Max: 99.1 kg (218 lb 7.6 oz)  I/O last 3 completed shifts:  In: 780 [P.O.:780]  Out: -     Physical Examination:  Physical Exam  Vitals signs reviewed.   Constitutional:       Appearance: Normal appearance.   HENT:      Head: Normocephalic and atraumatic.      Mouth/Throat:      Mouth: Mucous membranes are moist.      Pharynx: Oropharynx is clear.   Eyes:      Extraocular Movements: Extraocular movements intact.      Pupils: Pupils are equal, round, and reactive to light.   Cardiovascular:      Rate and Rhythm: Normal rate and regular rhythm.   Pulmonary:      Effort: Pulmonary effort is normal. No respiratory distress.   Abdominal:      General: Abdomen is flat. There is no distension.      Tenderness: There is no abdominal tenderness.   Musculoskeletal:         General: Tenderness (TTP chest wall) present.   Skin:     General: Skin is warm and dry.      Capillary Refill: Capillary refill takes less than 2 seconds.   Neurological:      General: No focal deficit present.      Mental Status: She is alert and oriented to person, place, and time.           Laboratory:  Laboratory Data Reviewed: yes    Most Recent Data:  CBC:   Lab Results   Component Value Date    WBC 7.50 2020    HGB 12.4 2020    HCT 38.4 2020     2020     MCV 89 11/29/2020    RDW 16.8 (H) 11/29/2020     BMP:   Lab Results   Component Value Date     (L) 11/29/2020    K 3.7 11/29/2020     11/29/2020    CO2 27 11/29/2020    BUN 10 11/29/2020     11/29/2020    CALCIUM 9.0 11/29/2020    MG 2.0 11/29/2020    PHOS 4.0 10/21/2020     LFTs:   Lab Results   Component Value Date    PROT 7.2 11/29/2020    ALBUMIN 3.5 11/29/2020    BILITOT 0.7 11/29/2020    AST 36 11/29/2020    ALKPHOS 81 11/29/2020    ALT 22 11/29/2020     Coags:   Lab Results   Component Value Date    INR 1.0 06/24/2020     FLP:   Lab Results   Component Value Date    CHOL 142 11/29/2020    HDL 49 11/29/2020    LDLCALC 62.6 (L) 11/29/2020    TRIG 152 (H) 11/29/2020    CHOLHDL 34.5 11/29/2020     DM:   Lab Results   Component Value Date    LDLCALC 62.6 (L) 11/29/2020    CREATININE 0.7 11/29/2020     Thyroid: No results found for: TSH, FREET4, N1GPGDH, R9ODEQR, THYROIDAB  Anemia:   Lab Results   Component Value Date    IRON 37 06/21/2020    TIBC 293 06/21/2020    FERRITIN 180 06/21/2020     Cardiac:   Lab Results   Component Value Date    TROPONINI <0.030 11/29/2020    BNP 64 10/19/2020     Urinalysis:   Lab Results   Component Value Date    COLORU Yellow 06/27/2020    SPECGRAV 1.010 06/27/2020    NITRITE Negative 06/27/2020    KETONESU Negative 06/27/2020    UROBILINOGEN 1.0 06/27/2020    WBCUA 2 06/27/2020        Radiology:  Data Reviewed: yes  XR CHEST AP PORTABLE      Current Medications:     Infusions:       Scheduled:   aspirin  81 mg Oral Daily    atorvastatin  40 mg Oral QHS    clopidogreL  75 mg Oral QHS    enoxaparin  150 mg Subcutaneous Q24H    FLUoxetine  40 mg Oral Daily    isosorbide mononitrate  30 mg Oral Daily    midodrine  10 mg Oral TID WM    pantoprazole  40 mg Oral BID    ranolazine  1,000 mg Oral BID        PRN:  acetaminophen, calcium chloride IVPB, calcium chloride IVPB, calcium chloride IVPB, hydrOXYzine pamoate, LORazepam, magnesium oxide, magnesium sulfate  IVPB, magnesium sulfate IVPB, magnesium sulfate IVPB, magnesium sulfate IVPB, morphine, nitroGLYCERIN, oxyCODONE, potassium chloride in water, potassium chloride in water, potassium chloride in water, potassium chloride in water, potassium chloride, potassium chloride, potassium chloride, potassium chloride, sodium phosphate IVPB, sodium phosphate IVPB, sodium phosphate IVPB, sodium phosphate IVPB, sodium phosphate IVPB, tiZANidine    Lines and Day Number of Therapy:      Microbiology Data:  Reviewed: yes  Microbiology Results (last 7 days)     ** No results found for the last 168 hours. **           Antibiotics and Day Number of Therapy:  Antibiotics (From admission, onward)    None         Antivirals (From admission, onward)    None             Assessment/Plan:     Aleyda Costa is a 40 y.o.female with     1. Chronic angina   -continue statin, aspirin, Plavix and p.r.n. nitrates, prn morphine  - has had episodes of severe pain while here with associated EKG changes, case discussed with cardiology and plan to defer to Dr. Guthrie when he returns tomorrow re: possibility of further intervention.      2. H/O CAD with small vessel disease  -patient had a left heart catheterization in June of 2020 revealing small-vessel disease and decision at that time was manage medically     3. H/O DVT/PE  -on daily injections of Lovenox     4. Hyperlipidemia  -LFT stable continue statin     5. Essential HTN  -continue home pharmacologic regimen to manage pressure          Andrae Hatfield  [facility]

## 2020-11-29 NOTE — NURSING
Notified Dr. Lewis that at 2315 patient called stating she was having sever chest pain. Patient was sitting up in bed when I entered the room clenching her chest. EKG done reading normal sinus rhythm. BP  112/59 heart rate 69. First dose of Nitro given at 2325 with no relief to the patient. Patient continues to state pain level is 8, /55 HR 66. Second dose of nitro given at 2332 /56 HR 69. 3L of O2 placed on patient, patient states pain is unrelieved third dose of nitro given at 2339. Patient states pain level is 6. 4mg of IV morphine given. Pain resolved

## 2020-11-30 LAB
ALBUMIN SERPL BCP-MCNC: 3.5 G/DL (ref 3.5–5.2)
ALP SERPL-CCNC: 76 U/L (ref 55–135)
ALT SERPL W/O P-5'-P-CCNC: 24 U/L (ref 10–44)
ANION GAP SERPL CALC-SCNC: 11 MMOL/L (ref 8–16)
AST SERPL-CCNC: 29 U/L (ref 10–40)
BASOPHILS # BLD AUTO: 0.05 K/UL (ref 0–0.2)
BASOPHILS NFR BLD: 0.7 % (ref 0–1.9)
BILIRUB SERPL-MCNC: 0.4 MG/DL (ref 0.1–1)
BUN SERPL-MCNC: 11 MG/DL (ref 6–20)
CALCIUM SERPL-MCNC: 9 MG/DL (ref 8.7–10.5)
CHLORIDE SERPL-SCNC: 99 MMOL/L (ref 95–110)
CK MB SERPL-MCNC: 1.8 NG/ML (ref 0.1–6.5)
CK SERPL-CCNC: 66 U/L (ref 20–180)
CO2 SERPL-SCNC: 25 MMOL/L (ref 23–29)
CREAT SERPL-MCNC: 0.7 MG/DL (ref 0.5–1.4)
CRP SERPL-MCNC: 1.07 MG/DL
DIFFERENTIAL METHOD: ABNORMAL
EOSINOPHIL # BLD AUTO: 0.1 K/UL (ref 0–0.5)
EOSINOPHIL NFR BLD: 2 % (ref 0–8)
ERYTHROCYTE [DISTWIDTH] IN BLOOD BY AUTOMATED COUNT: 16.6 % (ref 11.5–14.5)
ERYTHROCYTE [SEDIMENTATION RATE] IN BLOOD BY WESTERGREN METHOD: 28 MM/HR (ref 0–20)
EST. GFR  (AFRICAN AMERICAN): >60 ML/MIN/1.73 M^2
EST. GFR  (NON AFRICAN AMERICAN): >60 ML/MIN/1.73 M^2
GLUCOSE SERPL-MCNC: 103 MG/DL (ref 70–110)
HCT VFR BLD AUTO: 38.6 % (ref 37–48.5)
HGB BLD-MCNC: 12.4 G/DL (ref 12–16)
IMM GRANULOCYTES # BLD AUTO: 0.02 K/UL (ref 0–0.04)
IMM GRANULOCYTES NFR BLD AUTO: 0.3 % (ref 0–0.5)
LYMPHOCYTES # BLD AUTO: 2.6 K/UL (ref 1–4.8)
LYMPHOCYTES NFR BLD: 37.3 % (ref 18–48)
MAGNESIUM SERPL-MCNC: 1.9 MG/DL (ref 1.6–2.6)
MCH RBC QN AUTO: 28.9 PG (ref 27–31)
MCHC RBC AUTO-ENTMCNC: 32.1 G/DL (ref 32–36)
MCV RBC AUTO: 90 FL (ref 82–98)
MONOCYTES # BLD AUTO: 0.6 K/UL (ref 0.3–1)
MONOCYTES NFR BLD: 8.4 % (ref 4–15)
NEUTROPHILS # BLD AUTO: 3.6 K/UL (ref 1.8–7.7)
NEUTROPHILS NFR BLD: 51.3 % (ref 38–73)
NRBC BLD-RTO: 0 /100 WBC
PLATELET # BLD AUTO: 243 K/UL (ref 150–350)
PMV BLD AUTO: 10.2 FL (ref 9.2–12.9)
POTASSIUM SERPL-SCNC: 3.8 MMOL/L (ref 3.5–5.1)
PROT SERPL-MCNC: 6.9 G/DL (ref 6–8.4)
RBC # BLD AUTO: 4.29 M/UL (ref 4–5.4)
SODIUM SERPL-SCNC: 135 MMOL/L (ref 136–145)
TROPONIN I SERPL DL<=0.01 NG/ML-MCNC: <0.03 NG/ML
TROPONIN I SERPL DL<=0.01 NG/ML-MCNC: <0.03 NG/ML
WBC # BLD AUTO: 7.05 K/UL (ref 3.9–12.7)

## 2020-11-30 PROCEDURE — 25000003 PHARM REV CODE 250: Performed by: PHYSICIAN ASSISTANT

## 2020-11-30 PROCEDURE — 25000003 PHARM REV CODE 250: Performed by: NURSE PRACTITIONER

## 2020-11-30 PROCEDURE — 83735 ASSAY OF MAGNESIUM: CPT

## 2020-11-30 PROCEDURE — 36415 COLL VENOUS BLD VENIPUNCTURE: CPT

## 2020-11-30 PROCEDURE — 86235 NUCLEAR ANTIGEN ANTIBODY: CPT | Mod: 59

## 2020-11-30 PROCEDURE — 63600175 PHARM REV CODE 636 W HCPCS: Performed by: INTERNAL MEDICINE

## 2020-11-30 PROCEDURE — 80053 COMPREHEN METABOLIC PANEL: CPT

## 2020-11-30 PROCEDURE — 21400001 HC TELEMETRY ROOM

## 2020-11-30 PROCEDURE — 93010 EKG 12-LEAD: ICD-10-PCS | Mod: ,,, | Performed by: INTERNAL MEDICINE

## 2020-11-30 PROCEDURE — 25000003 PHARM REV CODE 250: Performed by: INTERNAL MEDICINE

## 2020-11-30 PROCEDURE — 84484 ASSAY OF TROPONIN QUANT: CPT | Mod: 91

## 2020-11-30 PROCEDURE — 86140 C-REACTIVE PROTEIN: CPT

## 2020-11-30 PROCEDURE — 82550 ASSAY OF CK (CPK): CPT

## 2020-11-30 PROCEDURE — 93005 ELECTROCARDIOGRAM TRACING: CPT | Performed by: INTERNAL MEDICINE

## 2020-11-30 PROCEDURE — 85651 RBC SED RATE NONAUTOMATED: CPT

## 2020-11-30 PROCEDURE — 82553 CREATINE MB FRACTION: CPT

## 2020-11-30 PROCEDURE — 85025 COMPLETE CBC W/AUTO DIFF WBC: CPT

## 2020-11-30 PROCEDURE — 93010 ELECTROCARDIOGRAM REPORT: CPT | Mod: ,,, | Performed by: INTERNAL MEDICINE

## 2020-11-30 RX ADMIN — OXYCODONE HYDROCHLORIDE 10 MG: 5 TABLET ORAL at 01:11

## 2020-11-30 RX ADMIN — FLUOXETINE 40 MG: 20 CAPSULE ORAL at 06:11

## 2020-11-30 RX ADMIN — ATORVASTATIN CALCIUM 40 MG: 40 TABLET, FILM COATED ORAL at 09:11

## 2020-11-30 RX ADMIN — PANTOPRAZOLE SODIUM 40 MG: 40 TABLET, DELAYED RELEASE ORAL at 09:11

## 2020-11-30 RX ADMIN — CLOPIDOGREL BISULFATE 75 MG: 75 TABLET, FILM COATED ORAL at 09:11

## 2020-11-30 RX ADMIN — ASPIRIN 81 MG 81 MG: 81 TABLET ORAL at 09:11

## 2020-11-30 RX ADMIN — NITROGLYCERIN 0.4 MG: 0.4 TABLET SUBLINGUAL at 02:11

## 2020-11-30 RX ADMIN — OXYCODONE HYDROCHLORIDE 10 MG: 5 TABLET ORAL at 12:11

## 2020-11-30 RX ADMIN — NITROGLYCERIN 0.4 MG: 0.4 TABLET SUBLINGUAL at 03:11

## 2020-11-30 RX ADMIN — RANOLAZINE 1000 MG: 500 TABLET, EXTENDED RELEASE ORAL at 09:11

## 2020-11-30 RX ADMIN — ENOXAPARIN SODIUM 150 MG: 80 INJECTION, SOLUTION INTRAVENOUS; SUBCUTANEOUS at 06:11

## 2020-11-30 RX ADMIN — MORPHINE SULFATE 4 MG: 4 INJECTION INTRAVENOUS at 02:11

## 2020-11-30 RX ADMIN — POTASSIUM CHLORIDE 20 MEQ: 20 TABLET, EXTENDED RELEASE ORAL at 09:11

## 2020-11-30 RX ADMIN — MORPHINE SULFATE 4 MG: 4 INJECTION INTRAVENOUS at 03:11

## 2020-11-30 NOTE — PROGRESS NOTES
[facility]   Cardiology Note    SUBJECTIVE:     History of Present Illness: Patient seen and examined this AM. Approximately 0200 last night the patient experienced another episode of severe midsternal chest discomfort. Vitals were stable at this time. Patient was given 1 tab SLNTG without much resolve, 4 mg IV morphine given with resolution. EKG that was obtained demonstrated now unchanged T-wave inversions in the anterior leads but biphasic T wave in lead I has resolved. NSR rate controlled. No ischemic changes noted otherwise. Currently, patient remains comfortable. BP stable. CRP this AM 1.07. Plan as of yesterday was to discuss further planning with Dr. Guthrie will be in this afternoon.      Review of patient's allergies indicates:   Allergen Reactions    Clarithromycin Swelling and Other (See Comments)     DIFFICULTY SWALLOWING  DIFFICULTY SWALLOWING    Pcn [penicillins] Anaphylaxis    Sulfa (sulfonamide antibiotics) Hives    Wellbutrin [bupropion hcl] Shortness Of Breath and Anaphylaxis    Betadine [povidone-iodine] Hives     Swelling      Cefprozil Hives    Iodinated contrast media Hives    Latex, natural rubber Rash    Sulfamethoxazole-trimethoprim Nausea And Vomiting    Iodine Hives and Swelling    Latex Hives and Swelling       Past Medical History:   Diagnosis Date    Abnormal Pap smear of cervix     Arthritis     OA    Back pain     Cancer     skin cancer to back    Depression     Endometriosis     Full dentures     GERD (gastroesophageal reflux disease)     Migraine     Pulmonary embolism 2018    Seizures     Sleep apnea     Does not use c-pap since weight loss - 170 lbs    Uterine fibroid     Wears glasses      Past Surgical History:   Procedure Laterality Date    ANGIOGRAM, CORONARY, WITH LEFT HEART CATHETERIZATION Left 6/29/2020    Procedure: Left heart cath;  Surgeon: Jm Guthrie MD;  Location: [facility] CATH/EP LAB;  Service: Cardiology;  Laterality: Left;     APPENDECTOMY      CARPAL TUNNEL RELEASE Bilateral      SECTION      CHOLECYSTECTOMY      COLONOSCOPY N/A 2019    Procedure: COLONOSCOPY;  Surgeon: Anselmo Blackwell MD;  Location: [facility] ENDO;  Service: Endoscopy;  Laterality: N/A;    EPIDURAL STEROID INJECTION INTO LUMBAR SPINE N/A 2019    Procedure: Injection-steroid-epidural-lumbar;  Surgeon: Yariel Ramirez MD;  Location: [facility] OR;  Service: Pain Management;  Laterality: N/A;  L3-4    ESOPHAGOGASTRODUODENOSCOPY N/A 2019    Procedure: EGD (ESOPHAGOGASTRODUODENOSCOPY);  Surgeon: Anselmo Blackwell MD;  Location: [facility] ENDO;  Service: Endoscopy;  Laterality: N/A;    FRACTURE SURGERY      ankle    gastric sleve      HYSTERECTOMY      endo and fibroids    HYSTERECTOMY      JOINT REPLACEMENT Left 2018    KNEE ARTHROPLASTY Left 2018    Procedure: ARTHROPLASTY, KNEE;  Surgeon: Feliberto Cardenas MD;  Location: [facility] OR;  Service: Orthopedics;  Laterality: Left;    KNEE ARTHROPLASTY Right 2020    Procedure: ARTHROPLASTY, KNEE;  Surgeon: Feliberto Cardenas MD;  Location: [facility] OR;  Service: Orthopedics;  Laterality: Right;    KNEE ARTHROSCOPY W/ MENISCECTOMY Right 10/25/2019    Procedure: ARTHROSCOPY, KNEE, WITH MENISCECTOMY;  Surgeon: Feliberto Cardenas MD;  Location: [facility] OR;  Service: Orthopedics;  Laterality: Right;    KNEE SURGERY      LUMBAR EPIDURAL INJECTION      OOPHORECTOMY Left     LSO    RADIAL NERVE Right     RECONSTRUCTION OF MEDIAL PATELLOFEMORAL LIGAMENT OF RIGHT KNEE Right 10/25/2019    Procedure: RECONSTRUCTION, LIGAMENT, MEDIAL PATELLOFEMORAL, RIGHT;  Surgeon: Feliberto Cardenas MD;  Location: [facility] OR;  Service: Orthopedics;  Laterality: Right;    SINUS SURGERY      UPPER GASTROINTESTINAL ENDOSCOPY  2019    Dr. Blackwell; mild schatzki ring-dilated; gastritis; bx in process     Family History   Problem Relation Age of Onset    Heart disease Mother     Heart disease Father      Esophageal cancer Maternal Grandmother     Breast cancer Maternal Aunt 46    Colon cancer Neg Hx     Stomach cancer Neg Hx     Ovarian cancer Neg Hx      Social History     Tobacco Use    Smoking status: Current Every Day Smoker     Packs/day: 0.25     Years: 20.00     Pack years: 5.00     Types: Cigarettes    Smokeless tobacco: Never Used    Tobacco comment: trying to quit again   Substance Use Topics    Alcohol use: Yes     Alcohol/week: 0.0 standard drinks     Comment: occasionally    Drug use: Not Currently     Types: Other-see comments     Comment: no       OBJECTIVE:     Vital Signs (Most Recent)  Temp: 97.6 °F (36.4 °C) (11/30/20 0739)  Pulse: (!) 57 (11/30/20 0739)  Resp: 17 (11/30/20 0739)  BP: (!) 135/51 (11/30/20 0739)  SpO2: 97 % (11/30/20 0739)    Vital Signs Range (Last 24H):  Temp:  [97.4 °F (36.3 °C)-98 °F (36.7 °C)]   Pulse:  [51-57]   Resp:  [16-18]   BP: ()/(46-55)   SpO2:  [94 %-97 %]     I & O (Last 24H):  No intake or output data in the 24 hours ending 11/30/20 0936    Current Diet:     Current Diet Order   Procedures    Diet Cardiac        Allergies:  Review of patient's allergies indicates:   Allergen Reactions    Clarithromycin Swelling and Other (See Comments)     DIFFICULTY SWALLOWING  DIFFICULTY SWALLOWING    Pcn [penicillins] Anaphylaxis    Sulfa (sulfonamide antibiotics) Hives    Wellbutrin [bupropion hcl] Shortness Of Breath and Anaphylaxis    Betadine [povidone-iodine] Hives     Swelling      Cefprozil Hives    Iodinated contrast media Hives    Latex, natural rubber Rash    Sulfamethoxazole-trimethoprim Nausea And Vomiting    Iodine Hives and Swelling    Latex Hives and Swelling       Meds:  Scheduled Meds:   aspirin  81 mg Oral Daily    atorvastatin  40 mg Oral QHS    clopidogreL  75 mg Oral QHS    enoxaparin  150 mg Subcutaneous Q24H    FLUoxetine  40 mg Oral Daily    isosorbide mononitrate  30 mg Oral Daily    pantoprazole  40 mg Oral BID     ranolazine  1,000 mg Oral BID     Continuous Infusions:  PRN Meds:acetaminophen, calcium chloride IVPB, calcium chloride IVPB, calcium chloride IVPB, hydrOXYzine pamoate, LORazepam, magnesium oxide, magnesium sulfate IVPB, magnesium sulfate IVPB, magnesium sulfate IVPB, magnesium sulfate IVPB, morphine, nitroGLYCERIN, oxyCODONE, potassium chloride in water, potassium chloride in water, potassium chloride in water, potassium chloride in water, potassium chloride, potassium chloride, potassium chloride, potassium chloride, sodium phosphate IVPB, sodium phosphate IVPB, sodium phosphate IVPB, sodium phosphate IVPB, sodium phosphate IVPB, tiZANidine    Laboratory and Radiology Data:  Recent Results (from the past 24 hour(s))   Comprehensive metabolic panel, DAILY IN AM    Collection Time: 11/30/20  4:25 AM   Result Value Ref Range    Sodium 135 (L) 136 - 145 mmol/L    Potassium 3.8 3.5 - 5.1 mmol/L    Chloride 99 95 - 110 mmol/L    CO2 25 23 - 29 mmol/L    Glucose 103 70 - 110 mg/dL    BUN 11 6 - 20 mg/dL    Creatinine 0.7 0.5 - 1.4 mg/dL    Calcium 9.0 8.7 - 10.5 mg/dL    Total Protein 6.9 6.0 - 8.4 g/dL    Albumin 3.5 3.5 - 5.2 g/dL    Total Bilirubin 0.4 0.1 - 1.0 mg/dL    Alkaline Phosphatase 76 55 - 135 U/L    AST 29 10 - 40 U/L    ALT 24 10 - 44 U/L    Anion Gap 11 8 - 16 mmol/L    eGFR if African American >60.0 >60 mL/min/1.73 m^2    eGFR if non African American >60.0 >60 mL/min/1.73 m^2   Magnesium, DAILY    Collection Time: 11/30/20  4:25 AM   Result Value Ref Range    Magnesium 1.9 1.6 - 2.6 mg/dL   CBC Auto Differential    Collection Time: 11/30/20  4:25 AM   Result Value Ref Range    WBC 7.05 3.90 - 12.70 K/uL    RBC 4.29 4.00 - 5.40 M/uL    Hemoglobin 12.4 12.0 - 16.0 g/dL    Hematocrit 38.6 37.0 - 48.5 %    MCV 90 82 - 98 fL    MCH 28.9 27.0 - 31.0 pg    MCHC 32.1 32.0 - 36.0 g/dL    RDW 16.6 (H) 11.5 - 14.5 %    Platelets 243 150 - 350 K/uL    MPV 10.2 9.2 - 12.9 fL    Immature Granulocytes 0.3 0.0 - 0.5 %     Gran # (ANC) 3.6 1.8 - 7.7 K/uL    Immature Grans (Abs) 0.02 0.00 - 0.04 K/uL    Lymph # 2.6 1.0 - 4.8 K/uL    Mono # 0.6 0.3 - 1.0 K/uL    Eos # 0.1 0.0 - 0.5 K/uL    Baso # 0.05 0.00 - 0.20 K/uL    nRBC 0 0 /100 WBC    Gran % 51.3 38.0 - 73.0 %    Lymph % 37.3 18.0 - 48.0 %    Mono % 8.4 4.0 - 15.0 %    Eosinophil % 2.0 0.0 - 8.0 %    Basophil % 0.7 0.0 - 1.9 %    Differential Method Automated    Sedimentation rate    Collection Time: 11/30/20  4:25 AM   Result Value Ref Range    Sed Rate 28 (H) 0 - 20 mm/Hr   C-reactive protein    Collection Time: 11/30/20  4:25 AM   Result Value Ref Range    CRP 1.07 (H) <0.76 mg/dL     Imaging Results          X-Ray Chest AP Portable (Final result)  Result time 11/28/20 07:15:51   Procedure changed from X-Ray Chest 1 View     Final result by Adrienne Haji MD (11/28/20 07:15:51)                 Impression:      No acute cardiopulmonary abnormality.      Electronically signed by: Adrienne Haji MD  Date:    11/28/2020  Time:    07:15             Narrative:    EXAMINATION:  XR CHEST AP PORTABLE    CLINICAL HISTORY:  cp; Chest pain, unspecified    FINDINGS:  Portable chest at 01:56 is compared to 10/19/2020 shows normal cardiomediastinal silhouette.    Lungs are clear. Pulmonary vasculature is normal. No acute osseous abnormality.                    ED Interpretation by Woodrow Zuluaga DO (11/28/20 01:55:48, [facility], Emergency Medicine)    No acute pathology                            Physical Exam:   Physical Exam   Constitutional: She is oriented to person, place, and time and well-developed, well-nourished, and in no distress. No distress.   Neck: No JVD present.   Cardiovascular: Normal rate, regular rhythm and normal heart sounds.   No murmur heard.  Pulmonary/Chest: Effort normal. No respiratory distress. She has no wheezes. She has no rales.   Abdominal: Soft. There is no abdominal tenderness.   Musculoskeletal: Normal range of motion.          General: No edema.   Neurological: She is alert and oriented to person, place, and time.   Skin: Skin is warm and dry. She is not diaphoretic.       ASSESSMENT/PLAN:   Assessment:   Chest pain, possibly small vessel angina.  PE/DVT on Pradaxa  Small vessel CAD - not amenable to PCI per Dr. Guthrie [facility] 06/2020  Grade I DD  HTN/HLD  Anxiety    Plan:   With new EKG changes and continued anginal symptoms, the patient may require alternative solution to her known Dx/OM/Ramus disease. At previous admission, her angiography was reviewed and it was determined that her small-caliber vessel size occluded traditional intervention.  However she remains on max dose Ranexa with attempts to administer nitroglycerin without consequential hypotension without much resolve. Defer to Dr. Guthrie who will resume care.   Consider auto-immune etiology as well? CRP is elevated and patient fits typical demographic.   Continue to monitor vitals as we attempt to administer nitroglycerin for anginal control.    Madhav Estrella PA-C  11/30/2020

## 2020-11-30 NOTE — NURSING
Notified Dr. Lewis of pt's c/o chest pain, patient sitting up on the side of the bed clenching her chest. Rates chest pain as an 8, Sublingual nitro given at 0200 with no relief EKG reads normal sinus rhythm with abnormal T waves. Pt continues to c/o chest pain still at an 8 second nitro given at 0213. Patient states pain level continues at a 6 and that nitro is burning her tongue. 4mg IV morphine given. Pt has gained full relief from pain. VSS, Will continue to monitor

## 2020-11-30 NOTE — NURSING
Notified Dr. Guthrie of episode of 10/10 chest pain that is mid-sternal described as heavy pressure. Pt was found sitting on edge of bed clinching chest. EKG, 2l O2 placed on pt, nitro x2 given, 4 mg of morphine given with relief of pain. MD asked to send EKG. No new orders. MD to come see pt.

## 2020-12-01 LAB
ALBUMIN SERPL BCP-MCNC: 3.6 G/DL (ref 3.5–5.2)
ALP SERPL-CCNC: 79 U/L (ref 55–135)
ALT SERPL W/O P-5'-P-CCNC: 25 U/L (ref 10–44)
ANION GAP SERPL CALC-SCNC: 11 MMOL/L (ref 8–16)
AST SERPL-CCNC: 25 U/L (ref 10–40)
BASOPHILS # BLD AUTO: 0.05 K/UL (ref 0–0.2)
BASOPHILS NFR BLD: 0.7 % (ref 0–1.9)
BILIRUB SERPL-MCNC: 0.6 MG/DL (ref 0.1–1)
BUN SERPL-MCNC: 10 MG/DL (ref 6–20)
CALCIUM SERPL-MCNC: 8.9 MG/DL (ref 8.7–10.5)
CHLORIDE SERPL-SCNC: 99 MMOL/L (ref 95–110)
CO2 SERPL-SCNC: 26 MMOL/L (ref 23–29)
CREAT SERPL-MCNC: 0.7 MG/DL (ref 0.5–1.4)
DIFFERENTIAL METHOD: ABNORMAL
EOSINOPHIL # BLD AUTO: 0.1 K/UL (ref 0–0.5)
EOSINOPHIL NFR BLD: 1.7 % (ref 0–8)
ERYTHROCYTE [DISTWIDTH] IN BLOOD BY AUTOMATED COUNT: 16.6 % (ref 11.5–14.5)
EST. GFR  (AFRICAN AMERICAN): >60 ML/MIN/1.73 M^2
EST. GFR  (NON AFRICAN AMERICAN): >60 ML/MIN/1.73 M^2
GLUCOSE SERPL-MCNC: 106 MG/DL (ref 70–110)
HCT VFR BLD AUTO: 38.5 % (ref 37–48.5)
HGB BLD-MCNC: 12.4 G/DL (ref 12–16)
IMM GRANULOCYTES # BLD AUTO: 0.03 K/UL (ref 0–0.04)
IMM GRANULOCYTES NFR BLD AUTO: 0.4 % (ref 0–0.5)
LYMPHOCYTES # BLD AUTO: 2.1 K/UL (ref 1–4.8)
LYMPHOCYTES NFR BLD: 30.5 % (ref 18–48)
MAGNESIUM SERPL-MCNC: 1.8 MG/DL (ref 1.6–2.6)
MCH RBC QN AUTO: 28.4 PG (ref 27–31)
MCHC RBC AUTO-ENTMCNC: 32.2 G/DL (ref 32–36)
MCV RBC AUTO: 88 FL (ref 82–98)
MONOCYTES # BLD AUTO: 0.7 K/UL (ref 0.3–1)
MONOCYTES NFR BLD: 9.5 % (ref 4–15)
NEUTROPHILS # BLD AUTO: 4 K/UL (ref 1.8–7.7)
NEUTROPHILS NFR BLD: 57.2 % (ref 38–73)
NRBC BLD-RTO: 0 /100 WBC
PLATELET # BLD AUTO: 241 K/UL (ref 150–350)
PMV BLD AUTO: 9.8 FL (ref 9.2–12.9)
POTASSIUM SERPL-SCNC: 3.8 MMOL/L (ref 3.5–5.1)
PROT SERPL-MCNC: 7.3 G/DL (ref 6–8.4)
RBC # BLD AUTO: 4.36 M/UL (ref 4–5.4)
SODIUM SERPL-SCNC: 136 MMOL/L (ref 136–145)
TROPONIN I SERPL DL<=0.01 NG/ML-MCNC: <0.03 NG/ML
WBC # BLD AUTO: 6.98 K/UL (ref 3.9–12.7)

## 2020-12-01 PROCEDURE — 93005 ELECTROCARDIOGRAM TRACING: CPT | Performed by: INTERNAL MEDICINE

## 2020-12-01 PROCEDURE — 25000003 PHARM REV CODE 250: Performed by: INTERNAL MEDICINE

## 2020-12-01 PROCEDURE — 36415 COLL VENOUS BLD VENIPUNCTURE: CPT

## 2020-12-01 PROCEDURE — 80053 COMPREHEN METABOLIC PANEL: CPT

## 2020-12-01 PROCEDURE — 63600175 PHARM REV CODE 636 W HCPCS: Performed by: SPECIALIST

## 2020-12-01 PROCEDURE — 21000000 HC CCU ICU ROOM CHARGE

## 2020-12-01 PROCEDURE — 83735 ASSAY OF MAGNESIUM: CPT

## 2020-12-01 PROCEDURE — 84484 ASSAY OF TROPONIN QUANT: CPT

## 2020-12-01 PROCEDURE — 25000003 PHARM REV CODE 250: Performed by: PHYSICIAN ASSISTANT

## 2020-12-01 PROCEDURE — 85025 COMPLETE CBC W/AUTO DIFF WBC: CPT

## 2020-12-01 PROCEDURE — 93010 ELECTROCARDIOGRAM REPORT: CPT | Mod: ,,, | Performed by: INTERNAL MEDICINE

## 2020-12-01 PROCEDURE — 25000003 PHARM REV CODE 250: Performed by: NURSE PRACTITIONER

## 2020-12-01 PROCEDURE — 93010 EKG 12-LEAD: ICD-10-PCS | Mod: ,,, | Performed by: INTERNAL MEDICINE

## 2020-12-01 PROCEDURE — 63600175 PHARM REV CODE 636 W HCPCS: Performed by: INTERNAL MEDICINE

## 2020-12-01 PROCEDURE — 25000242 PHARM REV CODE 250 ALT 637 W/ HCPCS: Performed by: NURSE PRACTITIONER

## 2020-12-01 PROCEDURE — 25000003 PHARM REV CODE 250: Performed by: SPECIALIST

## 2020-12-01 RX ORDER — DIPHENHYDRAMINE HCL 25 MG
50 CAPSULE ORAL
Status: DISCONTINUED | OUTPATIENT
Start: 2020-12-02 | End: 2020-12-02 | Stop reason: HOSPADM

## 2020-12-01 RX ORDER — NITROGLYCERIN 20 MG/100ML
0-400 INJECTION INTRAVENOUS CONTINUOUS
Status: DISCONTINUED | OUTPATIENT
Start: 2020-12-01 | End: 2020-12-02

## 2020-12-01 RX ORDER — ALPRAZOLAM 0.25 MG/1
0.25 TABLET ORAL 3 TIMES DAILY
Status: DISCONTINUED | OUTPATIENT
Start: 2020-12-01 | End: 2020-12-02

## 2020-12-01 RX ORDER — ENOXAPARIN SODIUM 100 MG/ML
90 INJECTION SUBCUTANEOUS ONCE
Status: COMPLETED | OUTPATIENT
Start: 2020-12-01 | End: 2020-12-01

## 2020-12-01 RX ORDER — LORAZEPAM 1 MG/1
1 TABLET ORAL ONCE
Status: DISCONTINUED | OUTPATIENT
Start: 2020-12-02 | End: 2020-12-01

## 2020-12-01 RX ORDER — LORAZEPAM 1 MG/1
1 TABLET ORAL ONCE
Status: COMPLETED | OUTPATIENT
Start: 2020-12-02 | End: 2020-12-02

## 2020-12-01 RX ORDER — SODIUM CHLORIDE 9 MG/ML
INJECTION, SOLUTION INTRAVENOUS ONCE
Status: COMPLETED | OUTPATIENT
Start: 2020-12-02 | End: 2020-12-02

## 2020-12-01 RX ORDER — MORPHINE SULFATE 2 MG/ML
2 INJECTION, SOLUTION INTRAMUSCULAR; INTRAVENOUS EVERY 10 MIN PRN
Status: DISCONTINUED | OUTPATIENT
Start: 2020-12-01 | End: 2020-12-02

## 2020-12-01 RX ORDER — SODIUM CHLORIDE 9 MG/ML
INJECTION, SOLUTION INTRAVENOUS CONTINUOUS
Status: DISCONTINUED | OUTPATIENT
Start: 2020-12-01 | End: 2020-12-01

## 2020-12-01 RX ORDER — METOPROLOL TARTRATE 1 MG/ML
5 INJECTION, SOLUTION INTRAVENOUS ONCE
Status: COMPLETED | OUTPATIENT
Start: 2020-12-01 | End: 2020-12-01

## 2020-12-01 RX ORDER — METOPROLOL TARTRATE 1 MG/ML
INJECTION, SOLUTION INTRAVENOUS
Status: DISPENSED
Start: 2020-12-01 | End: 2020-12-02

## 2020-12-01 RX ADMIN — NITROGLYCERIN 0.4 MG: 0.4 TABLET SUBLINGUAL at 08:12

## 2020-12-01 RX ADMIN — ENOXAPARIN SODIUM 90 MG: 30 INJECTION SUBCUTANEOUS at 06:12

## 2020-12-01 RX ADMIN — MORPHINE SULFATE 4 MG: 4 INJECTION INTRAVENOUS at 10:12

## 2020-12-01 RX ADMIN — ASPIRIN 81 MG 81 MG: 81 TABLET ORAL at 04:12

## 2020-12-01 RX ADMIN — MAGNESIUM OXIDE 800 MG: 400 TABLET ORAL at 06:12

## 2020-12-01 RX ADMIN — ATORVASTATIN CALCIUM 40 MG: 40 TABLET, FILM COATED ORAL at 09:12

## 2020-12-01 RX ADMIN — POTASSIUM CHLORIDE 20 MEQ: 20 TABLET, EXTENDED RELEASE ORAL at 06:12

## 2020-12-01 RX ADMIN — PANTOPRAZOLE SODIUM 40 MG: 40 TABLET, DELAYED RELEASE ORAL at 09:12

## 2020-12-01 RX ADMIN — METOPROLOL TARTRATE 5 MG: 5 INJECTION, SOLUTION INTRAVENOUS at 09:12

## 2020-12-01 RX ADMIN — SODIUM CHLORIDE 75 ML/HR: 0.9 INJECTION, SOLUTION INTRAVENOUS at 06:12

## 2020-12-01 RX ADMIN — RANOLAZINE 1000 MG: 500 TABLET, EXTENDED RELEASE ORAL at 04:12

## 2020-12-01 RX ADMIN — NITROGLYCERIN 5 MCG/MIN: 20 INJECTION INTRAVENOUS at 10:12

## 2020-12-01 RX ADMIN — ALPRAZOLAM 0.25 MG: 0.25 TABLET ORAL at 09:12

## 2020-12-01 RX ADMIN — FLUOXETINE 40 MG: 20 CAPSULE ORAL at 06:12

## 2020-12-01 RX ADMIN — MORPHINE SULFATE 4 MG: 4 INJECTION INTRAVENOUS at 08:12

## 2020-12-01 RX ADMIN — CLOPIDOGREL BISULFATE 75 MG: 75 TABLET, FILM COATED ORAL at 09:12

## 2020-12-01 RX ADMIN — RANOLAZINE 1000 MG: 500 TABLET, EXTENDED RELEASE ORAL at 09:12

## 2020-12-01 NOTE — PROGRESS NOTES
[facility] Medicine  Progress Note    Patient Name: Aleyda Costa  MRN: [id number]  Patient Class: IP- Inpatient   Admission Date: 11/28/2020  Length of Stay: 1 days  Attending Physician: Donald Hilton MD  Primary Care Provider: Darlene Hayden MD        Subjective:     Principal Problem:Chest pain    Interval History:     Patient was seen and examined  No active chest pain reported  She is having point tenderness over the ribs and sternum areas  Troponins are negative  Possible angiogram tomorrow    Review of Systems  Objective:     Vital Signs (Most Recent):  Temp: 98.2 °F (36.8 °C) (12/01/20 1445)  Pulse: 60 (12/01/20 1445)  Resp: 12 (12/01/20 1100)  BP: (!) 87/50 (12/01/20 1445)  SpO2: 97 % (12/01/20 1445) Vital Signs (24h Range):  Temp:  [97.9 °F (36.6 °C)-98.7 °F (37.1 °C)] 98.2 °F (36.8 °C)  Pulse:  [56-97] 60  Resp:  [12-20] 12  SpO2:  [65 %-99 %] 97 %  BP: ()/(45-56) 87/50     Weight: 98.8 kg (217 lb 13 oz)  Body mass index is 29.54 kg/m².    Intake/Output Summary (Last 24 hours) at 12/1/2020 1609  Last data filed at 12/1/2020 0400  Gross per 24 hour   Intake 140 ml   Output --   Net 140 ml      Physical Exam    Physical Exam:  General- Patient alert and oriented x3 in NAD  HEENT- PERRLA, EOMI, OP clear, MMM  Neck- No JVD, Lymphadenopathy, Thyromegaly  CV- Regular rate and rhythm, No Murmur/martina/rubs  Resp- Lungs CTA Bilaterally, No increased WOB  GI- Non tender/non-distended, BS normoactive  Extrem- No cyanosis, clubbing, edema.  Neuro- Strength 5/5 flexors/extensors,  Skin-  No masses, rashes or lesions noted on cursory skin exam.  Overview/Hospital Course:     Significant Labs:   BMP:   Recent Labs   Lab 12/01/20  0520         K 3.8   CL 99   CO2 26   BUN 10   CREATININE 0.7   CALCIUM 8.9   MG 1.8     CBC:   Recent Labs   Lab 11/30/20  0425 12/01/20  0520   WBC 7.05 6.98   HGB 12.4 12.4   HCT 38.6 38.5    241     CMP:   Recent Labs   Lab  11/30/20  0425 12/01/20  0520   * 136   K 3.8 3.8   CL 99 99   CO2 25 26    106   BUN 11 10   CREATININE 0.7 0.7   CALCIUM 9.0 8.9   PROT 6.9 7.3   ALBUMIN 3.5 3.6   BILITOT 0.4 0.6   ALKPHOS 76 79   AST 29 25   ALT 24 25   ANIONGAP 11 11   EGFRNONAA >60.0 >60.0       Significant Imaging: I have reviewed and interpreted all pertinent imaging results/findings within the past 24 hours.    Assessment/Plan:      Active Diagnoses:    Diagnosis Date Noted POA    PRINCIPAL PROBLEM:  Chest pain [R07.9] 06/25/2020 Yes    Coronary artery disease of native artery with stable angina pectoris [I25.118] 10/19/2020 Yes    H/o PE and DVT [I27.82] 10/19/2020 Yes    Hyperlipidemia [E78.5] 10/06/2020 Yes    DVT (deep venous thrombosis) [I82.409] 06/21/2020 Yes    Dependence on nicotine from cigarettes [F17.210] 11/19/2018 Yes    Sleep apnea [G47.30] 11/13/2018 Yes      Problems Resolved During this Admission:       ASSESSMENT AND PLAN         1. Acute on Chronic angina      2. H/O CAD with small vessel disease  3. H/O DVT/PE  4. Hyperlipidemia  5. Essential HTN    PLAN   For possible angiogram tomorrow  Continue present management  Patient was already on Ranexa   Follow cardiology     patient already had chronic D-dimer elevation possible secondary to smoking and previous DVT PE      VTE Risk Mitigation (From admission, onward)         Ordered     enoxaparin injection 90 mg  Once      12/01/20 1212                   Donald Hilton MD  Department of Hospital Medicine   [facility]

## 2020-12-01 NOTE — PROGRESS NOTES
Progress Note  Cardiology    Admit Date: 11/28/2020   LOS: 1 day     Follow-up For:  Chest pain.  Small vessel disease on angiography 06/29/2020    Scheduled Meds:   aspirin  81 mg Oral Daily    atorvastatin  40 mg Oral QHS    clopidogreL  75 mg Oral QHS    FLUoxetine  40 mg Oral Daily    isosorbide mononitrate  30 mg Oral Daily    pantoprazole  40 mg Oral BID    ranolazine  1,000 mg Oral BID     Continuous Infusions:  PRN Meds:acetaminophen, calcium chloride IVPB, calcium chloride IVPB, calcium chloride IVPB, hydrOXYzine pamoate, LORazepam, magnesium oxide, magnesium sulfate IVPB, magnesium sulfate IVPB, magnesium sulfate IVPB, magnesium sulfate IVPB, morphine, nitroGLYCERIN, oxyCODONE, potassium chloride in water, potassium chloride in water, potassium chloride in water, potassium chloride in water, potassium chloride, potassium chloride, potassium chloride, potassium chloride, sodium phosphate IVPB, sodium phosphate IVPB, sodium phosphate IVPB, sodium phosphate IVPB, sodium phosphate IVPB, tiZANidine    Review of patient's allergies indicates:   Allergen Reactions    Clarithromycin Swelling and Other (See Comments)     DIFFICULTY SWALLOWING  DIFFICULTY SWALLOWING    Pcn [penicillins] Anaphylaxis    Sulfa (sulfonamide antibiotics) Hives    Wellbutrin [bupropion hcl] Shortness Of Breath and Anaphylaxis    Betadine [povidone-iodine] Hives     Swelling      Cefprozil Hives    Iodinated contrast media Hives    Latex, natural rubber Rash    Sulfamethoxazole-trimethoprim Nausea And Vomiting    Iodine Hives and Swelling    Latex Hives and Swelling       SUBJECTIVE:     Interval History: Patient had mild discomfort earlier today; she had an uneventful night.    Review of Systems  Respiratory: negative for cough, hemoptysis, sputum and wheezing  Cardiovascular: negative for chest pressure/discomfort, orthopnea, palpitations and paroxysmal nocturnal dyspnea    OBJECTIVE:     Vital Signs (Most  Recent)  Temp: 98.7 °F (37.1 °C) (12/01/20 1100)  Pulse: 97 (12/01/20 1100)  Resp: 12 (12/01/20 1100)  BP: (!) 98/51 (12/01/20 1100)  SpO2: (!) 65 % (12/01/20 1100)    Vital Signs Range (Last 24H):  Temp:  [97.9 °F (36.6 °C)-98.7 °F (37.1 °C)]   Pulse:  [56-97]   Resp:  [12-22]   BP: ()/(45-62)   SpO2:  [65 %-99 %]       Physical Exam:  Neck: no carotid bruit, no JVD and supple, symmetrical, trachea midline  Lungs: clear to auscultation bilaterally, normal respiratory effort  Heart: regular rate and rhythm, S1, S2 normal, no murmur, click, rub or gallop  Abdomen: soft, non-tender; bowel sounds normal; no masses,  no organomegaly  Extremities: Extremities normal, atraumatic, no cyanosis, clubbing, or edema    Recent Results (from the past 24 hour(s))   Troponin I    Collection Time: 11/30/20  8:33 PM   Result Value Ref Range    Troponin I <0.030 <=0.040 ng/mL   Comprehensive metabolic panel, DAILY IN AM    Collection Time: 12/01/20  5:20 AM   Result Value Ref Range    Sodium 136 136 - 145 mmol/L    Potassium 3.8 3.5 - 5.1 mmol/L    Chloride 99 95 - 110 mmol/L    CO2 26 23 - 29 mmol/L    Glucose 106 70 - 110 mg/dL    BUN 10 6 - 20 mg/dL    Creatinine 0.7 0.5 - 1.4 mg/dL    Calcium 8.9 8.7 - 10.5 mg/dL    Total Protein 7.3 6.0 - 8.4 g/dL    Albumin 3.6 3.5 - 5.2 g/dL    Total Bilirubin 0.6 0.1 - 1.0 mg/dL    Alkaline Phosphatase 79 55 - 135 U/L    AST 25 10 - 40 U/L    ALT 25 10 - 44 U/L    Anion Gap 11 8 - 16 mmol/L    eGFR if African American >60.0 >60 mL/min/1.73 m^2    eGFR if non African American >60.0 >60 mL/min/1.73 m^2   Magnesium, DAILY    Collection Time: 12/01/20  5:20 AM   Result Value Ref Range    Magnesium 1.8 1.6 - 2.6 mg/dL   CBC Auto Differential    Collection Time: 12/01/20  5:20 AM   Result Value Ref Range    WBC 6.98 3.90 - 12.70 K/uL    RBC 4.36 4.00 - 5.40 M/uL    Hemoglobin 12.4 12.0 - 16.0 g/dL    Hematocrit 38.5 37.0 - 48.5 %    MCV 88 82 - 98 fL    MCH 28.4 27.0 - 31.0 pg    MCHC 32.2  32.0 - 36.0 g/dL    RDW 16.6 (H) 11.5 - 14.5 %    Platelets 241 150 - 350 K/uL    MPV 9.8 9.2 - 12.9 fL    Immature Granulocytes 0.4 0.0 - 0.5 %    Gran # (ANC) 4.0 1.8 - 7.7 K/uL    Immature Grans (Abs) 0.03 0.00 - 0.04 K/uL    Lymph # 2.1 1.0 - 4.8 K/uL    Mono # 0.7 0.3 - 1.0 K/uL    Eos # 0.1 0.0 - 0.5 K/uL    Baso # 0.05 0.00 - 0.20 K/uL    nRBC 0 0 /100 WBC    Gran % 57.2 38.0 - 73.0 %    Lymph % 30.5 18.0 - 48.0 %    Mono % 9.5 4.0 - 15.0 %    Eosinophil % 1.7 0.0 - 8.0 %    Basophil % 0.7 0.0 - 1.9 %    Differential Method Automated    Troponin I    Collection Time: 12/01/20  5:20 AM   Result Value Ref Range    Troponin I <0.030 <=0.040 ng/mL       Diagnostic Results:  Labs: Reviewed  ECG: Reviewed    ASSESSMENT/PLAN:     Troponin x2 are negative.  EKG shows no change.  Options of continued medical therapy, repeat coronary arteriography or 2nd opinion at the [facility] were discussed with the patient.  Will give patient Lovenox 1 time dose this evening, in event of patient deciding to have angiogram in a.m..  Smoking cessation again emphasized.  She is down to 6 cigarettes a day..

## 2020-12-01 NOTE — PROGRESS NOTES
Progress Note  Cardiology    Admit Date: 11/28/2020   LOS: 0 days     Follow-up For:  Chest pain.  History was obtained and the patient was examined.  Chest pain at rest predominantly since 11/26/2020 after a small Thanksgiving meal.  The patient has a palpitation intermittently.  She denies exertional chest pain.  ECG reveals new T-wave inversions V1 and V2 and 1 ECG with biphasic T-wave in V3.  T waves have been usually upright on previous ECGs.  Troponins are all negative except for 0.049 x 1.  The patient has been on Lovenox as per Dr. Pina; she was intolerant of Coumadin -took it for 2 weeks, secondary to GI upset.    Scheduled Meds:   aspirin  81 mg Oral Daily    atorvastatin  40 mg Oral QHS    clopidogreL  75 mg Oral QHS    enoxaparin  150 mg Subcutaneous Q24H    FLUoxetine  40 mg Oral Daily    isosorbide mononitrate  30 mg Oral Daily    pantoprazole  40 mg Oral BID    ranolazine  1,000 mg Oral BID     Continuous Infusions:  PRN Meds:acetaminophen, calcium chloride IVPB, calcium chloride IVPB, calcium chloride IVPB, hydrOXYzine pamoate, LORazepam, magnesium oxide, magnesium sulfate IVPB, magnesium sulfate IVPB, magnesium sulfate IVPB, magnesium sulfate IVPB, morphine, nitroGLYCERIN, oxyCODONE, potassium chloride in water, potassium chloride in water, potassium chloride in water, potassium chloride in water, potassium chloride, potassium chloride, potassium chloride, potassium chloride, sodium phosphate IVPB, sodium phosphate IVPB, sodium phosphate IVPB, sodium phosphate IVPB, sodium phosphate IVPB, tiZANidine    Review of patient's allergies indicates:   Allergen Reactions    Clarithromycin Swelling and Other (See Comments)     DIFFICULTY SWALLOWING  DIFFICULTY SWALLOWING    Pcn [penicillins] Anaphylaxis    Sulfa (sulfonamide antibiotics) Hives    Wellbutrin [bupropion hcl] Shortness Of Breath and Anaphylaxis    Betadine [povidone-iodine] Hives     Swelling      Cefprozil Hives    Iodinated  contrast media Hives    Latex, natural rubber Rash    Sulfamethoxazole-trimethoprim Nausea And Vomiting    Iodine Hives and Swelling    Latex Hives and Swelling       SUBJECTIVE:     Interval History: Patient has complaints of chest pain earlier this afternoon.  She will receive sublingual nitro x2, morphine 4 mg IV and was placed on oxygen at 2 liters/minute Asymptomatic now.    Review of Systems  Respiratory: negative for cough, hemoptysis, sputum and wheezing  Cardiovascular: negative for dyspnea, orthopnea, paroxysmal nocturnal dyspnea and ; the patient reports rare intermittent palpitations, often with exertion associated with chest pain    OBJECTIVE:     Vital Signs (Most Recent)  Temp: 98 °F (36.7 °C) (11/30/20 1602)  Pulse: (!) 58 (11/30/20 1602)  Resp: 17 (11/30/20 1602)  BP: 104/62 (11/30/20 1602)  SpO2: 98 % (11/30/20 1602)    Vital Signs Range (Last 24H):  Temp:  [97.4 °F (36.3 °C)-98 °F (36.7 °C)]   Pulse:  [54-74]   Resp:  [16-22]   BP: ()/(50-62)   SpO2:  [94 %-98 %]       Physical Exam:  Neck: no carotid bruit, no JVD and supple, symmetrical, trachea midline  Lungs: clear to auscultation bilaterally, normal respiratory effort  Heart: regular rate and rhythm, S1, S2 normal, no murmur, click, rub or gallop and Tenderness over the midsternal area-however, this is not similar to the patient's usual chest pains.  Abdomen: soft, non-tender; bowel sounds normal; no masses,  no organomegaly  Extremities: Extremities normal, atraumatic, no cyanosis, clubbing, or edema    Recent Results (from the past 24 hour(s))   Comprehensive metabolic panel, DAILY IN AM    Collection Time: 11/30/20  4:25 AM   Result Value Ref Range    Sodium 135 (L) 136 - 145 mmol/L    Potassium 3.8 3.5 - 5.1 mmol/L    Chloride 99 95 - 110 mmol/L    CO2 25 23 - 29 mmol/L    Glucose 103 70 - 110 mg/dL    BUN 11 6 - 20 mg/dL    Creatinine 0.7 0.5 - 1.4 mg/dL    Calcium 9.0 8.7 - 10.5 mg/dL    Total Protein 6.9 6.0 - 8.4 g/dL     Albumin 3.5 3.5 - 5.2 g/dL    Total Bilirubin 0.4 0.1 - 1.0 mg/dL    Alkaline Phosphatase 76 55 - 135 U/L    AST 29 10 - 40 U/L    ALT 24 10 - 44 U/L    Anion Gap 11 8 - 16 mmol/L    eGFR if African American >60.0 >60 mL/min/1.73 m^2    eGFR if non African American >60.0 >60 mL/min/1.73 m^2   Magnesium, DAILY    Collection Time: 11/30/20  4:25 AM   Result Value Ref Range    Magnesium 1.9 1.6 - 2.6 mg/dL   CBC Auto Differential    Collection Time: 11/30/20  4:25 AM   Result Value Ref Range    WBC 7.05 3.90 - 12.70 K/uL    RBC 4.29 4.00 - 5.40 M/uL    Hemoglobin 12.4 12.0 - 16.0 g/dL    Hematocrit 38.6 37.0 - 48.5 %    MCV 90 82 - 98 fL    MCH 28.9 27.0 - 31.0 pg    MCHC 32.1 32.0 - 36.0 g/dL    RDW 16.6 (H) 11.5 - 14.5 %    Platelets 243 150 - 350 K/uL    MPV 10.2 9.2 - 12.9 fL    Immature Granulocytes 0.3 0.0 - 0.5 %    Gran # (ANC) 3.6 1.8 - 7.7 K/uL    Immature Grans (Abs) 0.02 0.00 - 0.04 K/uL    Lymph # 2.6 1.0 - 4.8 K/uL    Mono # 0.6 0.3 - 1.0 K/uL    Eos # 0.1 0.0 - 0.5 K/uL    Baso # 0.05 0.00 - 0.20 K/uL    nRBC 0 0 /100 WBC    Gran % 51.3 38.0 - 73.0 %    Lymph % 37.3 18.0 - 48.0 %    Mono % 8.4 4.0 - 15.0 %    Eosinophil % 2.0 0.0 - 8.0 %    Basophil % 0.7 0.0 - 1.9 %    Differential Method Automated    Sedimentation rate    Collection Time: 11/30/20  4:25 AM   Result Value Ref Range    Sed Rate 28 (H) 0 - 20 mm/Hr   C-reactive protein    Collection Time: 11/30/20  4:25 AM   Result Value Ref Range    CRP 1.07 (H) <0.76 mg/dL   CK    Collection Time: 11/30/20  4:25 AM   Result Value Ref Range    CPK 66 20 - 180 U/L   CK-MB    Collection Time: 11/30/20  4:25 AM   Result Value Ref Range    CPK MB 1.8 0.1 - 6.5 ng/mL   Troponin I    Collection Time: 11/30/20  4:25 AM   Result Value Ref Range    Troponin I <0.030 <=0.040 ng/mL       Diagnostic Results:  Labs: Reviewed  ECG: Reviewed    ASSESSMENT/PLAN:   Patient's angiogram from 06/29/2020 reviewed.  The patient might require another angiogram for further  evaluation in view of the fact that she has recurrent chest pain on Lovenox.  The patienthas just received Lovenox 150 mg subcu; will DC Lovenox in anticipation of angiography on 12/02/2020.  Will trend troponins and repeat ECG in a.m..

## 2020-12-01 NOTE — PROGRESS NOTES
Assessment completed at bedside with Pt. She is , has 2 children, and has not addressed POA / AD. Her PCP is Dr. Hayden, and she uses [facility] in [facility]. She confirmed insurance, and has [employer] [facility] Choice Plus. Plan for discharge at this time is home with assistance from family as needed. Case Management to continue to follow.      DENNIS Lama Student  Case Management  Ext. [phone]          12/01/20 1222   Discharge Assessment   Assessment Type Discharge Planning Assessment   Confirmed/corrected address and phone number on facesheet? Yes   Assessment information obtained from? Patient;Medical Record   Communicated expected length of stay with patient/caregiver no   Prior to hospitilization cognitive status: Alert/Oriented   Prior to hospitalization functional status: Independent   Current cognitive status: Alert/Oriented   Current Functional Status: Independent   Facility Arrived From: Home   Lives With spouse;child(darrell), dependent   Able to Return to Prior Arrangements yes   Is patient able to care for self after discharge? Yes   Who are your caregiver(s) and their phone number(s)? (Spouse) Niko Costa [phone] (Father) Yunior Plasencia [phone]   Patient's perception of discharge disposition home or selfcare   Readmission Within the Last 30 Days no previous admission in last 30 days   Patient currently being followed by outpatient case management? No   Patient currently receives any other outside agency services? No   Equipment Currently Used at Home none   Part D Coverage n/a   Do you have any problems affording any of your prescribed medications? No   Is the patient taking medications as prescribed? yes   Does the patient have transportation home? Yes   Transportation Anticipated car, drives self;family or friend will provide   Dialysis Name and Scheduled days N/A   Does the patient receive services at the [facility]? No   Discharge Plan A Home;Home with family    Discharge Plan B Home with family;Home   DME Needed Upon Discharge  none   Patient/Family in Agreement with Plan unable to assess

## 2020-12-01 NOTE — PROGRESS NOTES
[facility] Medicine Progress Note    Subjective:     :   Complains of severe episode of chest pain this morning.   Also had a similar episode yesterday morning with severe chest pain and felt like she could not catch her breath. She had associated dynamic EKG changes with that episode as well.   Currently she is comfortable and asymptomatic.    :  Had 2 more episodes of chest pain, one overnight and one during the day today. She feels frustrated that medical treatment isnt helping her angina.       Objective:   Last 24 Hour Vital Signs:  BP  Min: 90/60  Max: 135/51  Temp  Av.7 °F (36.5 °C)  Min: 97.4 °F (36.3 °C)  Max: 98 °F (36.7 °C)  Pulse  Av.4  Min: 54  Max: 74  Resp  Av.5  Min: 16  Max: 22  SpO2  Av.7 %  Min: 94 %  Max: 98 %  Weight  Av.5 kg (219 lb 5.7 oz)  Min: 99.5 kg (219 lb 5.7 oz)  Max: 99.5 kg (219 lb 5.7 oz)  No intake/output data recorded.    Physical Examination:  Physical Exam  Vitals signs reviewed.   Constitutional:       Appearance: Normal appearance.   HENT:      Head: Normocephalic and atraumatic.      Mouth/Throat:      Mouth: Mucous membranes are moist.      Pharynx: Oropharynx is clear.   Eyes:      Extraocular Movements: Extraocular movements intact.      Pupils: Pupils are equal, round, and reactive to light.   Cardiovascular:      Rate and Rhythm: Normal rate and regular rhythm.   Pulmonary:      Effort: Pulmonary effort is normal. No respiratory distress.   Abdominal:      General: Abdomen is flat. There is no distension.      Tenderness: There is no abdominal tenderness.   Musculoskeletal:         General: Tenderness (TTP chest wall) present.   Skin:     General: Skin is warm and dry.      Capillary Refill: Capillary refill takes less than 2 seconds.   Neurological:      General: No focal deficit present.      Mental Status: She is alert and oriented to person, place, and time.           Laboratory:  Laboratory Data Reviewed: yes    Most Recent  Data:  CBC:   Lab Results   Component Value Date    WBC 7.05 11/30/2020    HGB 12.4 11/30/2020    HCT 38.6 11/30/2020     11/30/2020    MCV 90 11/30/2020    RDW 16.6 (H) 11/30/2020     BMP:   Lab Results   Component Value Date     (L) 11/30/2020    K 3.8 11/30/2020    CL 99 11/30/2020    CO2 25 11/30/2020    BUN 11 11/30/2020     11/30/2020    CALCIUM 9.0 11/30/2020    MG 1.9 11/30/2020    PHOS 4.0 10/21/2020     LFTs:   Lab Results   Component Value Date    PROT 6.9 11/30/2020    ALBUMIN 3.5 11/30/2020    BILITOT 0.4 11/30/2020    AST 29 11/30/2020    ALKPHOS 76 11/30/2020    ALT 24 11/30/2020     Coags:   Lab Results   Component Value Date    INR 1.0 06/24/2020     FLP:   Lab Results   Component Value Date    CHOL 142 11/29/2020    HDL 49 11/29/2020    LDLCALC 62.6 (L) 11/29/2020    TRIG 152 (H) 11/29/2020    CHOLHDL 34.5 11/29/2020     DM:   Lab Results   Component Value Date    LDLCALC 62.6 (L) 11/29/2020    CREATININE 0.7 11/30/2020     Thyroid: No results found for: TSH, FREET4, P3EVFXX, I6ZWCCZ, THYROIDAB  Anemia:   Lab Results   Component Value Date    IRON 37 06/21/2020    TIBC 293 06/21/2020    FERRITIN 180 06/21/2020     Cardiac:   Lab Results   Component Value Date    TROPONINI <0.030 11/30/2020    BNP 64 10/19/2020     Urinalysis:   Lab Results   Component Value Date    COLORU Yellow 06/27/2020    SPECGRAV 1.010 06/27/2020    NITRITE Negative 06/27/2020    KETONESU Negative 06/27/2020    UROBILINOGEN 1.0 06/27/2020    WBCUA 2 06/27/2020        Radiology:  Data Reviewed: yes  XR CHEST AP PORTABLE  XR CHEST AP PORTABLE      Current Medications:     Infusions:       Scheduled:   aspirin  81 mg Oral Daily    atorvastatin  40 mg Oral QHS    clopidogreL  75 mg Oral QHS    enoxaparin  150 mg Subcutaneous Q24H    FLUoxetine  40 mg Oral Daily    isosorbide mononitrate  30 mg Oral Daily    pantoprazole  40 mg Oral BID    ranolazine  1,000 mg Oral BID        PRN:  acetaminophen, calcium  chloride IVPB, calcium chloride IVPB, calcium chloride IVPB, hydrOXYzine pamoate, LORazepam, magnesium oxide, magnesium sulfate IVPB, magnesium sulfate IVPB, magnesium sulfate IVPB, magnesium sulfate IVPB, morphine, nitroGLYCERIN, oxyCODONE, potassium chloride in water, potassium chloride in water, potassium chloride in water, potassium chloride in water, potassium chloride, potassium chloride, potassium chloride, potassium chloride, sodium phosphate IVPB, sodium phosphate IVPB, sodium phosphate IVPB, sodium phosphate IVPB, sodium phosphate IVPB, tiZANidine    Lines and Day Number of Therapy:      Microbiology Data:  Reviewed: yes  Microbiology Results (last 7 days)     ** No results found for the last 168 hours. **           Antibiotics and Day Number of Therapy:  Antibiotics (From admission, onward)    None         Antivirals (From admission, onward)    None             Assessment/Plan:     Aleyda Costa is a 40 y.o.female with     1. Chronic angina   -continue statin, aspirin, Plavix and p.r.n. nitrates, prn morphine  - has had episodes of severe pain while here with associated EKG changes, case discussed with cardiology and plan to defer to Dr. Guthrie when he returns today re: possibility of further intervention.      2. H/O CAD with small vessel disease  -patient had a left heart catheterization in June of 2020 revealing small-vessel disease and decision at that time was manage medically     3. H/O DVT/PE  -on daily injections of Lovenox     4. Hyperlipidemia  -LFT stable continue statin     5. Essential HTN  -continue home pharmacologic regimen to manage pressure          Andrae Hatfield  [facility] Medicine

## 2020-12-02 ENCOUNTER — ANESTHESIA (OUTPATIENT)
Dept: SURGERY | Facility: HOSPITAL | Age: 40
DRG: 233 | End: 2020-12-02
Payer: COMMERCIAL

## 2020-12-02 ENCOUNTER — ANESTHESIA EVENT (OUTPATIENT)
Dept: SURGERY | Facility: HOSPITAL | Age: 40
DRG: 233 | End: 2020-12-02
Payer: COMMERCIAL

## 2020-12-02 LAB
ABO + RH BLD: NORMAL
ALBUMIN SERPL BCP-MCNC: 3.7 G/DL (ref 3.5–5.2)
ALLENS TEST: ABNORMAL
ALLENS TEST: ABNORMAL
ALP SERPL-CCNC: 82 U/L (ref 55–135)
ALT SERPL W/O P-5'-P-CCNC: 24 U/L (ref 10–44)
ANA PANEL SEE BELOW:: NORMAL
ANION GAP SERPL CALC-SCNC: 10 MMOL/L (ref 8–16)
ANION GAP SERPL CALC-SCNC: 11 MMOL/L (ref 8–16)
ANION GAP SERPL CALC-SCNC: 11 MMOL/L (ref 8–16)
APTT PPP: 28.6 SEC (ref 23.6–33.3)
AST SERPL-CCNC: 21 U/L (ref 10–40)
BASOPHILS # BLD AUTO: 0.04 K/UL (ref 0–0.2)
BASOPHILS # BLD AUTO: 0.06 K/UL (ref 0–0.2)
BASOPHILS NFR BLD: 0.3 % (ref 0–1.9)
BASOPHILS NFR BLD: 0.5 % (ref 0–1.9)
BILIRUB SERPL-MCNC: 0.7 MG/DL (ref 0.1–1)
BLD GP AB SCN CELLS X3 SERPL QL: NORMAL
BUN SERPL-MCNC: 13 MG/DL (ref 6–20)
BUN SERPL-MCNC: 13 MG/DL (ref 6–20)
BUN SERPL-MCNC: 8 MG/DL (ref 6–20)
CALCIUM SERPL-MCNC: 8 MG/DL (ref 8.7–10.5)
CALCIUM SERPL-MCNC: 8.8 MG/DL (ref 8.7–10.5)
CALCIUM SERPL-MCNC: 8.8 MG/DL (ref 8.7–10.5)
CENTROMERE B AB SER-ACNC: <0.2 AI (ref 0–0.9)
CHLORIDE SERPL-SCNC: 104 MMOL/L (ref 95–110)
CHLORIDE SERPL-SCNC: 104 MMOL/L (ref 95–110)
CHLORIDE SERPL-SCNC: 111 MMOL/L (ref 95–110)
CHROMATIN AB SERPL-ACNC: <0.2 AI (ref 0–0.9)
CO2 SERPL-SCNC: 18 MMOL/L (ref 23–29)
CO2 SERPL-SCNC: 22 MMOL/L (ref 23–29)
CO2 SERPL-SCNC: 22 MMOL/L (ref 23–29)
CREAT SERPL-MCNC: 0.6 MG/DL (ref 0.5–1.4)
CREAT SERPL-MCNC: 0.7 MG/DL (ref 0.5–1.4)
CREAT SERPL-MCNC: 0.7 MG/DL (ref 0.5–1.4)
DELSYS: ABNORMAL
DELSYS: ABNORMAL
DIFFERENTIAL METHOD: ABNORMAL
DIFFERENTIAL METHOD: ABNORMAL
DSDNA AB SER-ACNC: <1 IU/ML (ref 0–9)
ENA JO1 AB SER-ACNC: <0.2 AI (ref 0–0.9)
ENA RNP AB SER-ACNC: 0.3 AI (ref 0–0.9)
ENA SCL70 AB SER-ACNC: <0.2 AI (ref 0–0.9)
ENA SM AB SER-ACNC: <0.2 AI (ref 0–0.9)
ENA SS-A AB SER-ACNC: <0.2 AI (ref 0–0.9)
ENA SS-B AB SER-ACNC: <0.2 AI (ref 0–0.9)
EOSINOPHIL # BLD AUTO: 0.1 K/UL (ref 0–0.5)
EOSINOPHIL # BLD AUTO: 0.1 K/UL (ref 0–0.5)
EOSINOPHIL NFR BLD: 0.4 % (ref 0–8)
EOSINOPHIL NFR BLD: 1.3 % (ref 0–8)
ERYTHROCYTE [DISTWIDTH] IN BLOOD BY AUTOMATED COUNT: 16.5 % (ref 11.5–14.5)
ERYTHROCYTE [DISTWIDTH] IN BLOOD BY AUTOMATED COUNT: 16.7 % (ref 11.5–14.5)
ERYTHROCYTE [SEDIMENTATION RATE] IN BLOOD BY WESTERGREN METHOD: 14 MM/H
ERYTHROCYTE [SEDIMENTATION RATE] IN BLOOD BY WESTERGREN METHOD: 18 MM/H
EST. GFR  (AFRICAN AMERICAN): >60 ML/MIN/1.73 M^2
EST. GFR  (NON AFRICAN AMERICAN): >60 ML/MIN/1.73 M^2
FIO2: 100
FIO2: 60
GLUCOSE SERPL-MCNC: 100 MG/DL (ref 70–110)
GLUCOSE SERPL-MCNC: 102 MG/DL (ref 70–110)
GLUCOSE SERPL-MCNC: 102 MG/DL (ref 70–110)
GLUCOSE SERPL-MCNC: 104 MG/DL (ref 70–110)
GLUCOSE SERPL-MCNC: 148 MG/DL (ref 70–110)
GLUCOSE SERPL-MCNC: 176 MG/DL (ref 70–110)
GLUCOSE SERPL-MCNC: 184 MG/DL (ref 70–110)
GLUCOSE SERPL-MCNC: 198 MG/DL (ref 70–110)
GLUCOSE SERPL-MCNC: 229 MG/DL (ref 70–110)
GLUCOSE SERPL-MCNC: 238 MG/DL (ref 70–110)
GLUCOSE SERPL-MCNC: 243 MG/DL (ref 70–110)
HCO3 UR-SCNC: 18.3 MMOL/L (ref 24–28)
HCO3 UR-SCNC: 20.3 MMOL/L (ref 24–28)
HCO3 UR-SCNC: 20.6 MMOL/L (ref 24–28)
HCO3 UR-SCNC: 21.8 MMOL/L (ref 24–28)
HCO3 UR-SCNC: 24.4 MMOL/L (ref 24–28)
HCT VFR BLD AUTO: 34.8 % (ref 37–48.5)
HCT VFR BLD AUTO: 38.5 % (ref 37–48.5)
HCT VFR BLD CALC: 22 %PCV (ref 36–54)
HCT VFR BLD CALC: 27 %PCV (ref 36–54)
HCT VFR BLD CALC: 28 %PCV (ref 36–54)
HCT VFR BLD CALC: 31 %PCV (ref 36–54)
HCT VFR BLD CALC: 35 %PCV (ref 36–54)
HGB BLD-MCNC: 11.2 G/DL (ref 12–16)
HGB BLD-MCNC: 12.4 G/DL (ref 12–16)
IMM GRANULOCYTES # BLD AUTO: 0.02 K/UL (ref 0–0.04)
IMM GRANULOCYTES # BLD AUTO: 0.26 K/UL (ref 0–0.04)
IMM GRANULOCYTES NFR BLD AUTO: 0.3 % (ref 0–0.5)
IMM GRANULOCYTES NFR BLD AUTO: 1.3 % (ref 0–0.5)
LYMPHOCYTES # BLD AUTO: 2.6 K/UL (ref 1–4.8)
LYMPHOCYTES # BLD AUTO: 4.2 K/UL (ref 1–4.8)
LYMPHOCYTES NFR BLD: 20.4 % (ref 18–48)
LYMPHOCYTES NFR BLD: 34.8 % (ref 18–48)
MAGNESIUM SERPL-MCNC: 1.8 MG/DL (ref 1.6–2.6)
MAGNESIUM SERPL-MCNC: 1.9 MG/DL (ref 1.6–2.6)
MCH RBC QN AUTO: 28.6 PG (ref 27–31)
MCH RBC QN AUTO: 28.6 PG (ref 27–31)
MCHC RBC AUTO-ENTMCNC: 32.2 G/DL (ref 32–36)
MCHC RBC AUTO-ENTMCNC: 32.2 G/DL (ref 32–36)
MCV RBC AUTO: 89 FL (ref 82–98)
MCV RBC AUTO: 89 FL (ref 82–98)
MODE: ABNORMAL
MODE: ABNORMAL
MONOCYTES # BLD AUTO: 0.6 K/UL (ref 0.3–1)
MONOCYTES # BLD AUTO: 0.7 K/UL (ref 0.3–1)
MONOCYTES NFR BLD: 2.7 % (ref 4–15)
MONOCYTES NFR BLD: 8.9 % (ref 4–15)
NEUTROPHILS # BLD AUTO: 15.4 K/UL (ref 1.8–7.7)
NEUTROPHILS # BLD AUTO: 4 K/UL (ref 1.8–7.7)
NEUTROPHILS NFR BLD: 54.2 % (ref 38–73)
NEUTROPHILS NFR BLD: 74.9 % (ref 38–73)
NRBC BLD-RTO: 0 /100 WBC
NRBC BLD-RTO: 0 /100 WBC
PCO2 BLDA: 36 MMHG (ref 35–45)
PCO2 BLDA: 37 MMHG (ref 35–45)
PCO2 BLDA: 37.4 MMHG (ref 35–45)
PCO2 BLDA: 39.6 MMHG (ref 35–45)
PCO2 BLDA: 40.4 MMHG (ref 35–45)
PEEP: 5
PEEP: 5
PH SMN: 7.27 [PH] (ref 7.35–7.45)
PH SMN: 7.34 [PH] (ref 7.35–7.45)
PH SMN: 7.37 [PH] (ref 7.35–7.45)
PH SMN: 7.38 [PH] (ref 7.35–7.45)
PH SMN: 7.39 [PH] (ref 7.35–7.45)
PHOSPHATE SERPL-MCNC: 2.3 MG/DL (ref 2.7–4.5)
PLATELET # BLD AUTO: 210 K/UL (ref 150–350)
PLATELET # BLD AUTO: 261 K/UL (ref 150–350)
PMV BLD AUTO: 10.2 FL (ref 9.2–12.9)
PMV BLD AUTO: 10.3 FL (ref 9.2–12.9)
PO2 BLDA: 185 MMHG (ref 80–100)
PO2 BLDA: 318 MMHG (ref 80–100)
PO2 BLDA: 330 MMHG (ref 80–100)
PO2 BLDA: 379 MMHG (ref 80–100)
PO2 BLDA: 497 MMHG (ref 80–100)
POC ACTIVATED CLOTTING TIME K: 109 SEC (ref 74–137)
POC ACTIVATED CLOTTING TIME K: 136 SEC (ref 74–137)
POC ACTIVATED CLOTTING TIME K: 555 SEC (ref 74–137)
POC ACTIVATED CLOTTING TIME K: 846 SEC (ref 74–137)
POC BE: -1 MMOL/L
POC BE: -3 MMOL/L
POC BE: -5 MMOL/L
POC BE: -5 MMOL/L
POC BE: -9 MMOL/L
POC IONIZED CALCIUM: 0.95 MMOL/L (ref 1.06–1.42)
POC IONIZED CALCIUM: 1.11 MMOL/L (ref 1.06–1.42)
POC IONIZED CALCIUM: 1.14 MMOL/L (ref 1.06–1.42)
POC IONIZED CALCIUM: 1.14 MMOL/L (ref 1.06–1.42)
POC IONIZED CALCIUM: 1.15 MMOL/L (ref 1.06–1.42)
POC SATURATED O2: 100 % (ref 95–100)
POC SATURATED O2: 99 % (ref 95–100)
POC TCO2: 19 MMOL/L (ref 23–27)
POC TCO2: 21 MMOL/L (ref 23–27)
POC TCO2: 22 MMOL/L (ref 23–27)
POC TCO2: 23 MMOL/L (ref 23–27)
POC TCO2: 26 MMOL/L (ref 23–27)
POTASSIUM BLD-SCNC: 3.4 MMOL/L (ref 3.5–5.1)
POTASSIUM BLD-SCNC: 3.6 MMOL/L (ref 3.5–5.1)
POTASSIUM BLD-SCNC: 3.8 MMOL/L (ref 3.5–5.1)
POTASSIUM BLD-SCNC: 4.1 MMOL/L (ref 3.5–5.1)
POTASSIUM BLD-SCNC: 4.2 MMOL/L (ref 3.5–5.1)
POTASSIUM SERPL-SCNC: 3.4 MMOL/L (ref 3.5–5.1)
POTASSIUM SERPL-SCNC: 3.4 MMOL/L (ref 3.5–5.1)
POTASSIUM SERPL-SCNC: 3.8 MMOL/L (ref 3.5–5.1)
POTASSIUM SERPL-SCNC: 3.8 MMOL/L (ref 3.5–5.1)
PROT SERPL-MCNC: 6.9 G/DL (ref 6–8.4)
PS: 10
PS: 10
RBC # BLD AUTO: 3.92 M/UL (ref 4–5.4)
RBC # BLD AUTO: 4.33 M/UL (ref 4–5.4)
SAMPLE: ABNORMAL
SAMPLE: NORMAL
SAMPLE: NORMAL
SITE: ABNORMAL
SITE: ABNORMAL
SODIUM BLD-SCNC: 138 MMOL/L (ref 136–145)
SODIUM BLD-SCNC: 139 MMOL/L (ref 136–145)
SODIUM BLD-SCNC: 139 MMOL/L (ref 136–145)
SODIUM BLD-SCNC: 141 MMOL/L (ref 136–145)
SODIUM BLD-SCNC: 142 MMOL/L (ref 136–145)
SODIUM SERPL-SCNC: 137 MMOL/L (ref 136–145)
SODIUM SERPL-SCNC: 137 MMOL/L (ref 136–145)
SODIUM SERPL-SCNC: 139 MMOL/L (ref 136–145)
TROPONIN I SERPL DL<=0.01 NG/ML-MCNC: 0.1 NG/ML
TROPONIN I SERPL DL<=0.01 NG/ML-MCNC: 0.12 NG/ML
VT: 500
VT: 750
WBC # BLD AUTO: 20.57 K/UL (ref 3.9–12.7)
WBC # BLD AUTO: 7.45 K/UL (ref 3.9–12.7)

## 2020-12-02 PROCEDURE — 27000671 HC TUBING MICROBORE EXT: Performed by: ANESTHESIOLOGY

## 2020-12-02 PROCEDURE — 25000003 PHARM REV CODE 250: Performed by: THORACIC SURGERY (CARDIOTHORACIC VASCULAR SURGERY)

## 2020-12-02 PROCEDURE — 82330 ASSAY OF CALCIUM: CPT

## 2020-12-02 PROCEDURE — 99232 PR SUBSEQUENT HOSPITAL CARE,LEVL II: ICD-10-PCS | Mod: 57,,, | Performed by: THORACIC SURGERY (CARDIOTHORACIC VASCULAR SURGERY)

## 2020-12-02 PROCEDURE — 27000221 HC OXYGEN, UP TO 24 HOURS

## 2020-12-02 PROCEDURE — 27000675 HC TUBING MICRODRIP: Performed by: ANESTHESIOLOGY

## 2020-12-02 PROCEDURE — 25000003 PHARM REV CODE 250: Performed by: NURSE ANESTHETIST, CERTIFIED REGISTERED

## 2020-12-02 PROCEDURE — 94002 VENT MGMT INPAT INIT DAY: CPT

## 2020-12-02 PROCEDURE — 93005 ELECTROCARDIOGRAM TRACING: CPT | Performed by: INTERNAL MEDICINE

## 2020-12-02 PROCEDURE — 25500020 PHARM REV CODE 255: Performed by: SPECIALIST

## 2020-12-02 PROCEDURE — 63600175 PHARM REV CODE 636 W HCPCS: Performed by: INTERNAL MEDICINE

## 2020-12-02 PROCEDURE — S0017 INJECTION, AMINOCAPROIC ACID: HCPCS | Performed by: NURSE ANESTHETIST, CERTIFIED REGISTERED

## 2020-12-02 PROCEDURE — 27201423 OPTIME MED/SURG SUP & DEVICES STERILE SUPPLY: Performed by: THORACIC SURGERY (CARDIOTHORACIC VASCULAR SURGERY)

## 2020-12-02 PROCEDURE — 63600175 PHARM REV CODE 636 W HCPCS: Performed by: SPECIALIST

## 2020-12-02 PROCEDURE — 83735 ASSAY OF MAGNESIUM: CPT

## 2020-12-02 PROCEDURE — 27100025 HC TUBING, SET FLUID WARMER: Performed by: ANESTHESIOLOGY

## 2020-12-02 PROCEDURE — 36000712 HC OR TIME LEV V 1ST 15 MIN: Performed by: THORACIC SURGERY (CARDIOTHORACIC VASCULAR SURGERY)

## 2020-12-02 PROCEDURE — 99900031 HC PATIENT EDUCATION (STAT)

## 2020-12-02 PROCEDURE — 93010 ELECTROCARDIOGRAM REPORT: CPT | Mod: ,,, | Performed by: INTERNAL MEDICINE

## 2020-12-02 PROCEDURE — 85025 COMPLETE CBC W/AUTO DIFF WBC: CPT | Mod: 91

## 2020-12-02 PROCEDURE — 36415 COLL VENOUS BLD VENIPUNCTURE: CPT

## 2020-12-02 PROCEDURE — 25000003 PHARM REV CODE 250

## 2020-12-02 PROCEDURE — 27201107 HC STYLET, STANDARD: Performed by: ANESTHESIOLOGY

## 2020-12-02 PROCEDURE — 94761 N-INVAS EAR/PLS OXIMETRY MLT: CPT

## 2020-12-02 PROCEDURE — 33518 PR CABG, ARTERY-VEIN, TWO: ICD-10-PCS | Mod: ,,, | Performed by: THORACIC SURGERY (CARDIOTHORACIC VASCULAR SURGERY)

## 2020-12-02 PROCEDURE — 27800505 HC CATH, RADIAL ARTERY KIT: Performed by: ANESTHESIOLOGY

## 2020-12-02 PROCEDURE — 82803 BLOOD GASES ANY COMBINATION: CPT

## 2020-12-02 PROCEDURE — 33967 INSERT I-AORT PERCUT DEVICE: CPT | Performed by: SPECIALIST

## 2020-12-02 PROCEDURE — 86901 BLOOD TYPING SEROLOGIC RH(D): CPT

## 2020-12-02 PROCEDURE — 93567 NJX CAR CTH SPRVLV AORTGRPHY: CPT | Performed by: SPECIALIST

## 2020-12-02 PROCEDURE — 33508 PR ENDOSCOPY W/VIDEO-ASST VEIN HARVEST,CABG: ICD-10-PCS | Mod: ,,, | Performed by: THORACIC SURGERY (CARDIOTHORACIC VASCULAR SURGERY)

## 2020-12-02 PROCEDURE — 27202163 HC CATH MULTI LUMEN: Performed by: ANESTHESIOLOGY

## 2020-12-02 PROCEDURE — 25000003 PHARM REV CODE 250: Performed by: INTERNAL MEDICINE

## 2020-12-02 PROCEDURE — 33518 CABG ARTERY-VEIN TWO: CPT | Mod: ,,, | Performed by: THORACIC SURGERY (CARDIOTHORACIC VASCULAR SURGERY)

## 2020-12-02 PROCEDURE — 63600175 PHARM REV CODE 636 W HCPCS: Performed by: THORACIC SURGERY (CARDIOTHORACIC VASCULAR SURGERY)

## 2020-12-02 PROCEDURE — 82962 GLUCOSE BLOOD TEST: CPT

## 2020-12-02 PROCEDURE — 33533 CABG ARTERIAL SINGLE: CPT | Mod: ,,, | Performed by: THORACIC SURGERY (CARDIOTHORACIC VASCULAR SURGERY)

## 2020-12-02 PROCEDURE — 27000676 HC TUBING PRIMARY PLUMSET: Performed by: ANESTHESIOLOGY

## 2020-12-02 PROCEDURE — 37799 UNLISTED PX VASCULAR SURGERY: CPT

## 2020-12-02 PROCEDURE — 80048 BASIC METABOLIC PNL TOTAL CA: CPT

## 2020-12-02 PROCEDURE — 27000673 HC TUBING BLOOD Y: Performed by: ANESTHESIOLOGY

## 2020-12-02 PROCEDURE — 20000000 HC ICU ROOM

## 2020-12-02 PROCEDURE — 25000003 PHARM REV CODE 250: Performed by: SPECIALIST

## 2020-12-02 PROCEDURE — C1729 CATH, DRAINAGE: HCPCS | Performed by: THORACIC SURGERY (CARDIOTHORACIC VASCULAR SURGERY)

## 2020-12-02 PROCEDURE — 63600175 PHARM REV CODE 636 W HCPCS: Performed by: NURSE ANESTHETIST, CERTIFIED REGISTERED

## 2020-12-02 PROCEDURE — 93010 EKG 12-LEAD: ICD-10-PCS | Mod: ,,, | Performed by: INTERNAL MEDICINE

## 2020-12-02 PROCEDURE — 36000713 HC OR TIME LEV V EA ADD 15 MIN: Performed by: THORACIC SURGERY (CARDIOTHORACIC VASCULAR SURGERY)

## 2020-12-02 PROCEDURE — 84132 ASSAY OF SERUM POTASSIUM: CPT

## 2020-12-02 PROCEDURE — 27000656 HC EYE GOGGLES: Performed by: ANESTHESIOLOGY

## 2020-12-02 PROCEDURE — 84295 ASSAY OF SERUM SODIUM: CPT

## 2020-12-02 PROCEDURE — 80053 COMPREHEN METABOLIC PANEL: CPT

## 2020-12-02 PROCEDURE — 93458 L HRT ARTERY/VENTRICLE ANGIO: CPT | Performed by: SPECIALIST

## 2020-12-02 PROCEDURE — 27201423 OPTIME MED/SURG SUP & DEVICES STERILE SUPPLY: Performed by: SPECIALIST

## 2020-12-02 PROCEDURE — 99152 MOD SED SAME PHYS/QHP 5/>YRS: CPT | Performed by: SPECIALIST

## 2020-12-02 PROCEDURE — 63600175 PHARM REV CODE 636 W HCPCS

## 2020-12-02 PROCEDURE — 37000008 HC ANESTHESIA 1ST 15 MINUTES: Performed by: THORACIC SURGERY (CARDIOTHORACIC VASCULAR SURGERY)

## 2020-12-02 PROCEDURE — 33508 ENDOSCOPIC VEIN HARVEST: CPT | Mod: ,,, | Performed by: THORACIC SURGERY (CARDIOTHORACIC VASCULAR SURGERY)

## 2020-12-02 PROCEDURE — C1894 INTRO/SHEATH, NON-LASER: HCPCS | Performed by: SPECIALIST

## 2020-12-02 PROCEDURE — 99900026 HC AIRWAY MAINTENANCE (STAT)

## 2020-12-02 PROCEDURE — 84484 ASSAY OF TROPONIN QUANT: CPT

## 2020-12-02 PROCEDURE — 63600175 PHARM REV CODE 636 W HCPCS: Performed by: ANESTHESIOLOGY

## 2020-12-02 PROCEDURE — 27200678 HC TRANSDUCER MONITOR KIT TRIPLE: Performed by: ANESTHESIOLOGY

## 2020-12-02 PROCEDURE — C1725 CATH, TRANSLUMIN NON-LASER: HCPCS | Performed by: SPECIALIST

## 2020-12-02 PROCEDURE — 86920 COMPATIBILITY TEST SPIN: CPT

## 2020-12-02 PROCEDURE — 27000666 HC INFUSOR PRESSURE BAG: Performed by: ANESTHESIOLOGY

## 2020-12-02 PROCEDURE — 99153 MOD SED SAME PHYS/QHP EA: CPT | Performed by: SPECIALIST

## 2020-12-02 PROCEDURE — 85014 HEMATOCRIT: CPT

## 2020-12-02 PROCEDURE — 27202107 HC XP QUATRO SENSOR: Performed by: ANESTHESIOLOGY

## 2020-12-02 PROCEDURE — C1769 GUIDE WIRE: HCPCS | Performed by: SPECIALIST

## 2020-12-02 PROCEDURE — 27000080 OPTIME MED/SURG SUP & DEVICES GENERAL CLASSIFICATION: Performed by: THORACIC SURGERY (CARDIOTHORACIC VASCULAR SURGERY)

## 2020-12-02 PROCEDURE — 83735 ASSAY OF MAGNESIUM: CPT | Mod: 91

## 2020-12-02 PROCEDURE — 84100 ASSAY OF PHOSPHORUS: CPT

## 2020-12-02 PROCEDURE — 99232 SBSQ HOSP IP/OBS MODERATE 35: CPT | Mod: 57,,, | Performed by: THORACIC SURGERY (CARDIOTHORACIC VASCULAR SURGERY)

## 2020-12-02 PROCEDURE — 37000009 HC ANESTHESIA EA ADD 15 MINS: Performed by: THORACIC SURGERY (CARDIOTHORACIC VASCULAR SURGERY)

## 2020-12-02 PROCEDURE — C1751 CATH, INF, PER/CENT/MIDLINE: HCPCS | Performed by: ANESTHESIOLOGY

## 2020-12-02 PROCEDURE — 99900035 HC TECH TIME PER 15 MIN (STAT)

## 2020-12-02 PROCEDURE — 85730 THROMBOPLASTIN TIME PARTIAL: CPT

## 2020-12-02 PROCEDURE — 27202276 HC INTRODUCER, PERC 8 FR: Performed by: ANESTHESIOLOGY

## 2020-12-02 PROCEDURE — C1887 CATHETER, GUIDING: HCPCS | Performed by: SPECIALIST

## 2020-12-02 PROCEDURE — 33533 PR CABG, ARTERIAL, SINGLE: ICD-10-PCS | Mod: ,,, | Performed by: THORACIC SURGERY (CARDIOTHORACIC VASCULAR SURGERY)

## 2020-12-02 DEVICE — PLEDGET FELT BARD 4.8MM X 9.5MM 007963: Type: IMPLANTABLE DEVICE | Site: HEART | Status: FUNCTIONAL

## 2020-12-02 RX ORDER — SODIUM CHLORIDE 9 MG/ML
INJECTION, SOLUTION INTRAVENOUS CONTINUOUS
Status: CANCELLED | OUTPATIENT
Start: 2020-12-02

## 2020-12-02 RX ORDER — FENTANYL CITRATE 50 UG/ML
INJECTION, SOLUTION INTRAMUSCULAR; INTRAVENOUS
Status: DISCONTINUED | OUTPATIENT
Start: 2020-12-02 | End: 2020-12-02 | Stop reason: HOSPADM

## 2020-12-02 RX ORDER — VECURONIUM BROMIDE FOR INJECTION 1 MG/ML
INJECTION, POWDER, LYOPHILIZED, FOR SOLUTION INTRAVENOUS
Status: DISCONTINUED | OUTPATIENT
Start: 2020-12-02 | End: 2020-12-02

## 2020-12-02 RX ORDER — HEPARIN SODIUM 10000 [USP'U]/ML
INJECTION, SOLUTION INTRAVENOUS; SUBCUTANEOUS
Status: DISCONTINUED | OUTPATIENT
Start: 2020-12-02 | End: 2020-12-02 | Stop reason: HOSPADM

## 2020-12-02 RX ORDER — SODIUM CHLORIDE 9 MG/ML
INJECTION, SOLUTION INTRAVENOUS CONTINUOUS
Status: DISCONTINUED | OUTPATIENT
Start: 2020-12-02 | End: 2020-12-02

## 2020-12-02 RX ORDER — ALBUMIN HUMAN 50 G/1000ML
25 SOLUTION INTRAVENOUS
Status: DISCONTINUED | OUTPATIENT
Start: 2020-12-02 | End: 2020-12-07 | Stop reason: HOSPADM

## 2020-12-02 RX ORDER — DEXAMETHASONE SODIUM PHOSPHATE 4 MG/ML
INJECTION, SOLUTION INTRA-ARTICULAR; INTRALESIONAL; INTRAMUSCULAR; INTRAVENOUS; SOFT TISSUE
Status: DISCONTINUED | OUTPATIENT
Start: 2020-12-02 | End: 2020-12-02

## 2020-12-02 RX ORDER — ONDANSETRON 4 MG/1
8 TABLET, ORALLY DISINTEGRATING ORAL EVERY 8 HOURS PRN
Status: DISCONTINUED | OUTPATIENT
Start: 2020-12-02 | End: 2020-12-02

## 2020-12-02 RX ORDER — MORPHINE SULFATE 4 MG/ML
4 INJECTION, SOLUTION INTRAMUSCULAR; INTRAVENOUS
Status: DISCONTINUED | OUTPATIENT
Start: 2020-12-02 | End: 2020-12-07 | Stop reason: HOSPADM

## 2020-12-02 RX ORDER — IODIXANOL 320 MG/ML
INJECTION, SOLUTION INTRAVASCULAR
Status: DISCONTINUED | OUTPATIENT
Start: 2020-12-02 | End: 2020-12-02 | Stop reason: HOSPADM

## 2020-12-02 RX ORDER — MAGNESIUM SULFATE HEPTAHYDRATE 40 MG/ML
4 INJECTION, SOLUTION INTRAVENOUS
Status: DISCONTINUED | OUTPATIENT
Start: 2020-12-02 | End: 2020-12-07 | Stop reason: HOSPADM

## 2020-12-02 RX ORDER — POTASSIUM CHLORIDE 29.8 MG/ML
20 INJECTION INTRAVENOUS
Status: DISCONTINUED | OUTPATIENT
Start: 2020-12-02 | End: 2020-12-07 | Stop reason: HOSPADM

## 2020-12-02 RX ORDER — DEXTROSE MONOHYDRATE, SODIUM CHLORIDE, AND POTASSIUM CHLORIDE 50; 1.49; 4.5 G/1000ML; G/1000ML; G/1000ML
INJECTION, SOLUTION INTRAVENOUS CONTINUOUS PRN
Status: DISCONTINUED | OUTPATIENT
Start: 2020-12-02 | End: 2020-12-02

## 2020-12-02 RX ORDER — HYDROCODONE BITARTRATE AND ACETAMINOPHEN 5; 325 MG/1; MG/1
1 TABLET ORAL EVERY 4 HOURS PRN
Status: DISCONTINUED | OUTPATIENT
Start: 2020-12-02 | End: 2020-12-06

## 2020-12-02 RX ORDER — ONDANSETRON 2 MG/ML
4 INJECTION INTRAMUSCULAR; INTRAVENOUS EVERY 6 HOURS PRN
Status: DISCONTINUED | OUTPATIENT
Start: 2020-12-02 | End: 2020-12-07 | Stop reason: HOSPADM

## 2020-12-02 RX ORDER — ONDANSETRON 2 MG/ML
4 INJECTION INTRAMUSCULAR; INTRAVENOUS EVERY 6 HOURS PRN
Status: DISCONTINUED | OUTPATIENT
Start: 2020-12-02 | End: 2020-12-02

## 2020-12-02 RX ORDER — ETOMIDATE 2 MG/ML
INJECTION INTRAVENOUS
Status: DISCONTINUED | OUTPATIENT
Start: 2020-12-02 | End: 2020-12-02

## 2020-12-02 RX ORDER — CALCIUM CHLORIDE, MAGNESIUM CHLORIDE, POTASSIUM CHLORIDE AND SODIUM CHLORIDE 17.6; 325.3; 119.3; 643 MG/100ML; MG/100ML; MG/100ML; MG/100ML
SOLUTION INTRA-ARTERIAL ONCE
Status: DISCONTINUED | OUTPATIENT
Start: 2020-12-02 | End: 2020-12-02 | Stop reason: HOSPADM

## 2020-12-02 RX ORDER — NITROGLYCERIN 0.4 MG/1
0.4 TABLET SUBLINGUAL EVERY 5 MIN PRN
Status: DISCONTINUED | OUTPATIENT
Start: 2020-12-02 | End: 2020-12-02

## 2020-12-02 RX ORDER — POTASSIUM CHLORIDE 14.9 MG/ML
20 INJECTION INTRAVENOUS
Status: DISCONTINUED | OUTPATIENT
Start: 2020-12-02 | End: 2020-12-07 | Stop reason: HOSPADM

## 2020-12-02 RX ORDER — ACETAMINOPHEN 325 MG/1
650 TABLET ORAL EVERY 4 HOURS PRN
Status: DISCONTINUED | OUTPATIENT
Start: 2020-12-02 | End: 2020-12-02

## 2020-12-02 RX ORDER — NITROGLYCERIN 20 MG/100ML
INJECTION INTRAVENOUS
Status: DISCONTINUED | OUTPATIENT
Start: 2020-12-02 | End: 2020-12-02

## 2020-12-02 RX ORDER — LIDOCAINE HYDROCHLORIDE 10 MG/ML
INJECTION, SOLUTION EPIDURAL; INFILTRATION; INTRACAUDAL; PERINEURAL
Status: DISCONTINUED | OUTPATIENT
Start: 2020-12-02 | End: 2020-12-02 | Stop reason: HOSPADM

## 2020-12-02 RX ORDER — HYDROCODONE BITARTRATE AND ACETAMINOPHEN 5; 325 MG/1; MG/1
1 TABLET ORAL EVERY 4 HOURS PRN
Status: DISCONTINUED | OUTPATIENT
Start: 2020-12-02 | End: 2020-12-02

## 2020-12-02 RX ORDER — POTASSIUM CHLORIDE 14.9 MG/ML
INJECTION INTRAVENOUS
Status: COMPLETED
Start: 2020-12-02 | End: 2020-12-02

## 2020-12-02 RX ORDER — ROCURONIUM BROMIDE 10 MG/ML
INJECTION, SOLUTION INTRAVENOUS
Status: DISCONTINUED | OUTPATIENT
Start: 2020-12-02 | End: 2020-12-02

## 2020-12-02 RX ORDER — AMINOCAPROIC ACID 250 MG/ML
INJECTION, SOLUTION INTRAVENOUS
Status: DISCONTINUED | OUTPATIENT
Start: 2020-12-02 | End: 2020-12-02

## 2020-12-02 RX ORDER — METOPROLOL TARTRATE 1 MG/ML
INJECTION, SOLUTION INTRAVENOUS
Status: DISCONTINUED | OUTPATIENT
Start: 2020-12-02 | End: 2020-12-02 | Stop reason: HOSPADM

## 2020-12-02 RX ORDER — PROTAMINE SULFATE 10 MG/ML
INJECTION, SOLUTION INTRAVENOUS
Status: DISCONTINUED | OUTPATIENT
Start: 2020-12-02 | End: 2020-12-02

## 2020-12-02 RX ORDER — SODIUM BICARBONATE 1 MEQ/ML
50 SYRINGE (ML) INTRAVENOUS ONCE
Status: COMPLETED | OUTPATIENT
Start: 2020-12-03 | End: 2020-12-02

## 2020-12-02 RX ORDER — SODIUM CHLORIDE 450 MG/100ML
INJECTION, SOLUTION INTRAVENOUS CONTINUOUS
Status: DISCONTINUED | OUTPATIENT
Start: 2020-12-02 | End: 2020-12-06

## 2020-12-02 RX ORDER — HEPARIN SODIUM 5000 [USP'U]/ML
INJECTION, SOLUTION INTRAVENOUS; SUBCUTANEOUS
Status: DISCONTINUED | OUTPATIENT
Start: 2020-12-02 | End: 2020-12-02

## 2020-12-02 RX ORDER — MAGNESIUM SULFATE HEPTAHYDRATE 40 MG/ML
2 INJECTION, SOLUTION INTRAVENOUS
Status: DISCONTINUED | OUTPATIENT
Start: 2020-12-02 | End: 2020-12-07 | Stop reason: HOSPADM

## 2020-12-02 RX ORDER — MIDAZOLAM HYDROCHLORIDE 1 MG/ML
1 INJECTION INTRAMUSCULAR; INTRAVENOUS
Status: ACTIVE | OUTPATIENT
Start: 2020-12-02 | End: 2020-12-03

## 2020-12-02 RX ORDER — LIDOCAINE HYDROCHLORIDE 20 MG/ML
JELLY TOPICAL
Status: DISCONTINUED | OUTPATIENT
Start: 2020-12-02 | End: 2020-12-02

## 2020-12-02 RX ORDER — ALPRAZOLAM 0.25 MG/1
0.25 TABLET ORAL EVERY 8 HOURS PRN
Status: DISCONTINUED | OUTPATIENT
Start: 2020-12-02 | End: 2020-12-02

## 2020-12-02 RX ORDER — MIDAZOLAM HYDROCHLORIDE 1 MG/ML
2 INJECTION INTRAMUSCULAR; INTRAVENOUS
Status: ACTIVE | OUTPATIENT
Start: 2020-12-02 | End: 2020-12-03

## 2020-12-02 RX ORDER — VANCOMYCIN HYDROCHLORIDE 1 G/20ML
INJECTION, POWDER, LYOPHILIZED, FOR SOLUTION INTRAVENOUS
Status: DISCONTINUED | OUTPATIENT
Start: 2020-12-02 | End: 2020-12-02 | Stop reason: HOSPADM

## 2020-12-02 RX ORDER — DOCUSATE SODIUM 100 MG/1
100 CAPSULE, LIQUID FILLED ORAL 2 TIMES DAILY
Status: DISCONTINUED | OUTPATIENT
Start: 2020-12-03 | End: 2020-12-07 | Stop reason: HOSPADM

## 2020-12-02 RX ORDER — DIPHENHYDRAMINE HYDROCHLORIDE 50 MG/ML
INJECTION INTRAMUSCULAR; INTRAVENOUS
Status: DISCONTINUED | OUTPATIENT
Start: 2020-12-02 | End: 2020-12-02

## 2020-12-02 RX ORDER — MIDAZOLAM HYDROCHLORIDE 1 MG/ML
INJECTION INTRAMUSCULAR; INTRAVENOUS
Status: DISCONTINUED | OUTPATIENT
Start: 2020-12-02 | End: 2020-12-02 | Stop reason: HOSPADM

## 2020-12-02 RX ORDER — MORPHINE SULFATE 2 MG/ML
2 INJECTION, SOLUTION INTRAMUSCULAR; INTRAVENOUS
Status: DISCONTINUED | OUTPATIENT
Start: 2020-12-02 | End: 2020-12-07 | Stop reason: HOSPADM

## 2020-12-02 RX ORDER — FAMOTIDINE 10 MG/ML
INJECTION INTRAVENOUS
Status: DISCONTINUED | OUTPATIENT
Start: 2020-12-02 | End: 2020-12-02

## 2020-12-02 RX ADMIN — ROCURONIUM BROMIDE 50 MG: 10 INJECTION, SOLUTION INTRAVENOUS at 04:12

## 2020-12-02 RX ADMIN — FIBRINOGEN HUMAN, HUMAN THROMBIN: 90; 500 SOLUTION TOPICAL at 04:12

## 2020-12-02 RX ADMIN — FENTANYL CITRATE 100 MCG: 50 INJECTION INTRAMUSCULAR; INTRAVENOUS at 02:12

## 2020-12-02 RX ADMIN — FENTANYL CITRATE 250 MCG: 50 INJECTION INTRAMUSCULAR; INTRAVENOUS at 06:12

## 2020-12-02 RX ADMIN — SODIUM CHLORIDE, SODIUM LACTATE, POTASSIUM CHLORIDE, AND CALCIUM CHLORIDE: .6; .31; .03; .02 INJECTION, SOLUTION INTRAVENOUS at 02:12

## 2020-12-02 RX ADMIN — AMINOCAPROIC ACID 5 G: 250 INJECTION, SOLUTION INTRAVENOUS at 03:12

## 2020-12-02 RX ADMIN — SODIUM CHLORIDE 0.8 UNITS/HR: 9 INJECTION, SOLUTION INTRAVENOUS at 04:12

## 2020-12-02 RX ADMIN — DIPHENHYDRAMINE HYDROCHLORIDE 50 MG: 50 INJECTION, SOLUTION INTRAMUSCULAR; INTRAVENOUS at 03:12

## 2020-12-02 RX ADMIN — LIDOCAINE HYDROCHLORIDE 5 ML: 20 JELLY TOPICAL at 03:12

## 2020-12-02 RX ADMIN — SODIUM CHLORIDE, SODIUM ACETATE ANHYDROUS, SODIUM GLUCONATE, POTASSIUM CHLORIDE, AND MAGNESIUM CHLORIDE: 526; 222; 502; 37; 30 INJECTION, SOLUTION INTRAVENOUS at 04:12

## 2020-12-02 RX ADMIN — SODIUM CHLORIDE 500 ML: 0.9 INJECTION, SOLUTION INTRAVENOUS at 08:12

## 2020-12-02 RX ADMIN — GENTAMICIN SULFATE 80 MG: 40 INJECTION, SOLUTION INTRAMUSCULAR; INTRAVENOUS at 03:12

## 2020-12-02 RX ADMIN — INSULIN HUMAN 1.2 UNITS/HR: 1 INJECTION, SOLUTION INTRAVENOUS at 08:12

## 2020-12-02 RX ADMIN — FENTANYL CITRATE 250 MCG: 50 INJECTION INTRAMUSCULAR; INTRAVENOUS at 03:12

## 2020-12-02 RX ADMIN — SODIUM CHLORIDE 75 ML/HR: 0.9 INJECTION, SOLUTION INTRAVENOUS at 05:12

## 2020-12-02 RX ADMIN — HEPARIN SODIUM: 1000 INJECTION, SOLUTION INTRAVENOUS; SUBCUTANEOUS at 04:12

## 2020-12-02 RX ADMIN — FENTANYL CITRATE 150 MCG: 50 INJECTION INTRAMUSCULAR; INTRAVENOUS at 03:12

## 2020-12-02 RX ADMIN — EPINEPHRINE 0.08 MCG/KG/MIN: 1 INJECTION INTRAMUSCULAR; INTRAVENOUS; SUBCUTANEOUS at 05:12

## 2020-12-02 RX ADMIN — SODIUM CHLORIDE 125 ML/HR: 0.9 INJECTION, SOLUTION INTRAVENOUS at 10:12

## 2020-12-02 RX ADMIN — AMINOCAPROIC ACID 5 G: 250 INJECTION, SOLUTION INTRAVENOUS at 06:12

## 2020-12-02 RX ADMIN — MIDAZOLAM HYDROCHLORIDE 3 MG: 1 INJECTION, SOLUTION INTRAMUSCULAR; INTRAVENOUS at 04:12

## 2020-12-02 RX ADMIN — ONDANSETRON 4 MG: 2 INJECTION INTRAMUSCULAR; INTRAVENOUS at 06:12

## 2020-12-02 RX ADMIN — SODIUM CHLORIDE 500 ML: 0.9 INJECTION, SOLUTION INTRAVENOUS at 09:12

## 2020-12-02 RX ADMIN — ROCURONIUM BROMIDE 40 MG: 10 INJECTION, SOLUTION INTRAVENOUS at 03:12

## 2020-12-02 RX ADMIN — DEXAMETHASONE SODIUM PHOSPHATE 8 MG: 4 INJECTION, SOLUTION INTRAMUSCULAR; INTRAVENOUS at 03:12

## 2020-12-02 RX ADMIN — FAMOTIDINE 20 MG: 10 INJECTION, SOLUTION INTRAVENOUS at 03:12

## 2020-12-02 RX ADMIN — SODIUM CHLORIDE: 0.9 INJECTION, SOLUTION INTRAVENOUS at 04:12

## 2020-12-02 RX ADMIN — VECURONIUM BROMIDE 10 MG: 1 INJECTION, POWDER, LYOPHILIZED, FOR SOLUTION INTRAVENOUS at 04:12

## 2020-12-02 RX ADMIN — POTASSIUM CHLORIDE 20 MEQ: 20 TABLET, EXTENDED RELEASE ORAL at 05:12

## 2020-12-02 RX ADMIN — SODIUM CHLORIDE: 0.9 INJECTION, SOLUTION INTRAVENOUS at 03:12

## 2020-12-02 RX ADMIN — PROTAMINE SULFATE 350 MG: 10 INJECTION, SOLUTION INTRAVENOUS at 06:12

## 2020-12-02 RX ADMIN — MAGNESIUM SULFATE 2 G: 2 INJECTION INTRAVENOUS at 10:12

## 2020-12-02 RX ADMIN — SODIUM CHLORIDE 50 ML/HR: 0.45 INJECTION, SOLUTION INTRAVENOUS at 08:12

## 2020-12-02 RX ADMIN — MORPHINE SULFATE 4 MG: 4 INJECTION INTRAVENOUS at 09:12

## 2020-12-02 RX ADMIN — MIDAZOLAM HYDROCHLORIDE 3 MG: 1 INJECTION, SOLUTION INTRAMUSCULAR; INTRAVENOUS at 03:12

## 2020-12-02 RX ADMIN — LORAZEPAM 1 MG: 1 TABLET ORAL at 11:12

## 2020-12-02 RX ADMIN — FENTANYL CITRATE 250 MCG: 50 INJECTION INTRAMUSCULAR; INTRAVENOUS at 04:12

## 2020-12-02 RX ADMIN — DIPHENHYDRAMINE HYDROCHLORIDE 50 MG: 25 CAPSULE ORAL at 11:12

## 2020-12-02 RX ADMIN — HEPARIN SODIUM 35000 UNITS: 5000 INJECTION, SOLUTION INTRAVENOUS; SUBCUTANEOUS at 04:12

## 2020-12-02 RX ADMIN — SODIUM BICARBONATE 50 MEQ: 84 INJECTION INTRAVENOUS at 11:12

## 2020-12-02 RX ADMIN — ASPIRIN 81 MG 81 MG: 81 TABLET ORAL at 11:12

## 2020-12-02 RX ADMIN — VECURONIUM BROMIDE 10 MG: 1 INJECTION, POWDER, LYOPHILIZED, FOR SOLUTION INTRAVENOUS at 03:12

## 2020-12-02 RX ADMIN — POTASSIUM CHLORIDE 20 MEQ: 14.9 INJECTION, SOLUTION INTRAVENOUS at 11:12

## 2020-12-02 RX ADMIN — DEXTROSE, SODIUM CHLORIDE, AND POTASSIUM CHLORIDE: 5; .45; .15 INJECTION INTRAVENOUS at 06:12

## 2020-12-02 RX ADMIN — ROCURONIUM BROMIDE 10 MG: 10 INJECTION, SOLUTION INTRAVENOUS at 03:12

## 2020-12-02 RX ADMIN — VANCOMYCIN HYDROCHLORIDE 1500 MG: 1 INJECTION, POWDER, LYOPHILIZED, FOR SOLUTION INTRAVENOUS at 03:12

## 2020-12-02 RX ADMIN — MORPHINE SULFATE 4 MG: 4 INJECTION INTRAVENOUS at 06:12

## 2020-12-02 RX ADMIN — MIDAZOLAM HYDROCHLORIDE 2 MG: 1 INJECTION, SOLUTION INTRAMUSCULAR; INTRAVENOUS at 05:12

## 2020-12-02 RX ADMIN — MIDAZOLAM HYDROCHLORIDE 2 MG: 1 INJECTION, SOLUTION INTRAMUSCULAR; INTRAVENOUS at 02:12

## 2020-12-02 RX ADMIN — ETOMIDATE 20 MG: 2 INJECTION, SOLUTION INTRAVENOUS at 03:12

## 2020-12-02 NOTE — BRIEF OP NOTE
Post angiogram note:  Ostial LAD stenosis. OMB, ramus and mid D1 stenosis.Mild apical and anterolateral wall hypokinesia. Severe CP post cor angio . IABP inserted. For emergency CABG. Discussed with Dr Neville. Pt is CP free now. Heparin 7000 u IV given. IV NTG, metoprolol 10 mg IV.

## 2020-12-02 NOTE — PROGRESS NOTES
[facility] Medicine  Progress Note    Patient Name: Aleyda Costa  MRN: [id number]  Patient Class: IP- Inpatient   Admission Date: 11/28/2020  Length of Stay: 2 days  Attending Physician: Donald Hilton MD  Primary Care Provider: Darlene Hayden MD        Subjective:     Principal Problem:Chest pain    Interval History:     Patient was seen and examined earlier  Her blood pressure was lower side and fluid bolus given 2 times and slightly better.  Positive troponin 2 times noted  She was for angiogram today.  Later patient found to have multiple vessel  disease and for bypass surgery today    Review of Systems  Objective:     Vital Signs (Most Recent):  Temp: 97.9 °F (36.6 °C) (12/02/20 0745)  Pulse: (!) 52 (12/02/20 1100)  Resp: 17 (12/02/20 1100)  BP: (!) 96/52 (12/02/20 1100)  SpO2: 99 % (12/02/20 1100) Vital Signs (24h Range):  Temp:  [97.8 °F (36.6 °C)-98.3 °F (36.8 °C)] 97.9 °F (36.6 °C)  Pulse:  [] 52  Resp:  [12-27] 17  SpO2:  [95 %-100 %] 99 %  BP: ()/(39-95) 96/52     Weight: 98.8 kg (217 lb 13 oz)  Body mass index is 29.54 kg/m².    Intake/Output Summary (Last 24 hours) at 12/2/2020 1431  Last data filed at 12/2/2020 0533  Gross per 24 hour   Intake 361.05 ml   Output --   Net 361.05 ml      Physical Exam    Physical Exam:  General- Patient alert and oriented x3 in NAD  HEENT- PERRLA, EOMI, OP clear, MMM  Neck- No JVD, Lymphadenopathy, Thyromegaly  CV- Regular rate and rhythm, No Murmur/martina/rubs  Resp- Lungs CTA Bilaterally, No increased WOB  GI- Non tender/non-distended, BS normoactive  Extrem- No cyanosis, clubbing, edema.  Neuro- Strength 5/5 flexors/extensors,  Skin-  No masses, rashes or lesions noted on cursory skin exam.  Overview/Hospital Course:     Significant Labs:   BMP:   Recent Labs   Lab 12/02/20  0321     102     137   K 3.8  3.8     104   CO2 22*  22*   BUN 13  13   CREATININE 0.7  0.7   CALCIUM 8.8  8.8   MG 1.9      CBC:   Recent Labs   Lab 12/01/20  0520 12/02/20  0321   WBC 6.98 7.45   HGB 12.4 12.4   HCT 38.5 38.5    261     CMP:   Recent Labs   Lab 12/01/20  0520 12/02/20  0321    137  137   K 3.8 3.8  3.8   CL 99 104  104   CO2 26 22*  22*    102  102   BUN 10 13  13   CREATININE 0.7 0.7  0.7   CALCIUM 8.9 8.8  8.8   PROT 7.3 6.9   ALBUMIN 3.6 3.7   BILITOT 0.6 0.7   ALKPHOS 79 82   AST 25 21   ALT 25 24   ANIONGAP 11 11  11   EGFRNONAA >60.0 >60.0  >60.0       Significant Imaging: I have reviewed and interpreted all pertinent imaging results/findings within the past 24 hours.    Assessment/Plan:      Active Diagnoses:    Diagnosis Date Noted POA    PRINCIPAL PROBLEM:  Chest pain [R07.9] 06/25/2020 Yes    Coronary artery disease of native artery with stable angina pectoris [I25.118] 10/19/2020 Yes    H/o PE and DVT [I27.82] 10/19/2020 Yes    Hyperlipidemia [E78.5] 10/06/2020 Yes    DVT (deep venous thrombosis) [I82.409] 06/21/2020 Yes    Dependence on nicotine from cigarettes [F17.210] 11/19/2018 Yes    Sleep apnea [G47.30] 11/13/2018 Yes      Problems Resolved During this Admission:       ASSESSMENT AND PLAN         1. Acute on Chronic angina      2. H/O CAD with small vessel disease s/p angiogram with MVD and s/p      Balloon pump post cath for CABG   3. H/O DVT/PE  4. Hyperlipidemia  5. Essential HTN    PLAN   For CABG   Continue present management   Ranexa   Follow cardiothoracic surgery  ICU care     VTE Risk Mitigation (From admission, onward)         Ordered     heparin (porcine) 6,000 Units in lactated ringers 1,000 mL  Once      12/02/20 1333     heparin (porcine) 6,000 Units in lactated ringers 1,000 mL  Once      12/02/20 1333     heparin (porcine) injection  As needed (PRN)      12/02/20 1329     heparin (porcine) injection  As needed (PRN)      12/02/20 1317                   Donald Hilton MD  Department of Hospital Medicine   [facility]

## 2020-12-02 NOTE — NURSING
Patient started having mid-sternal chest pain at 2000. Nitro given x2 followed by 4mg IV morphine. Pain relieved,  VSS. Will continue to monitor.

## 2020-12-02 NOTE — ANESTHESIA PROCEDURE NOTES
Central Line    Diagnosis: Coronary artery disease.  Patient location during procedure: ICU  Procedure start time: 12/2/2020 2:56 PM  Timeout: 12/2/2020 2:55 PM  Procedure end time: 12/2/2020 3:17 PM    Staffing  Authorizing Provider: Demian Brody MD  Performing Provider: Demian Brody MD    Staffing  Anesthesiologist: Demian Brody MD  Performed: anesthesiologist   Anesthesiologist was present at the time of the procedure.  Preanesthetic Checklist  Completed: patient identified, site marked, surgical consent, pre-op evaluation, timeout performed, IV checked, risks and benefits discussed, monitors and equipment checked and anesthesia consent given  Indication   Indication: hemodynamic monitoring, vascular access, med administration     Anesthesia   local infiltration    Central Line   Skin Prep: skin prepped with ChloraPrep, skin prep agent completely dried prior to procedure  maximum sterile barriers used during central venous catheter insertion  hand hygiene performed prior to central venous catheter insertion  Location: right, internal jugular.   Catheter type: triple lumen  Catheter Size: 7.5 Fr  Inserted Catheter Length: 20 cm  Ultrasound: vascular probe with ultrasound  Vessel Caliber: medium, patent  Vascular Doppler:  not done, compressibility normal  Needle advanced into vessel with real time Ultrasound guidance.  Guidewire confirmed in vessel.  Manometry: none  Insertion Attempts: 1   Securement:line sutured, chlorhexidine patch, sterile dressing applied and blood return through all ports    Post-Procedure   X-Ray: no pneumothorax on x-ray, tip termination, successful placement and placement verified by x-ray  Tip termination comments: overlying the superior vena cava   Adverse Events:none    Guidewire Guidewire removed intact. Guidewire removed intact, verified with nurse.

## 2020-12-02 NOTE — NURSING
PER NIGHT NURSE PT. AMBULATED TO BATHROOM AND HAD 10/10 CP. NITRO GTT. TURNED UP TO 2O. AT 0700 HRS TURNED DOWN TO 10. AT 0730 HRS TURNED OFF. PT. BP LOW 90/48. ASYMPTOMATIC.   0748HRS BP 89/39. PLACED IN TRENDEL. POSITION. MD CALLED. NS INFUSING AT 75 ML/HR AS WELL. WILL MONITOR CLOSELY.     DR. VILLAFUERTE NOTIFIED OF BP'S AND NS BOLUS 500ML. HE ORDERED TRIDEL GTT. STOPPED.    0900 HRS PT'S BP STILL LOW. DR. DURAN AND DR. VILLAFUERTE NOTIFIED. BP 78/44 MAP 68. ANOTHER 500ML BOLUS OF NS ORDERED AND IS INFUSING. WILL CONTINUE TO MONITOR.

## 2020-12-02 NOTE — ANESTHESIA PROCEDURE NOTES
Intubation  Performed by: Orlando Lozano CRNA  Authorized by: Demian Brody MD     Intubation:     Induction:  Intravenous    Intubated:  Postinduction    Mask Ventilation:  N/a    Attempts:  1    Attempted By:  CRNA    Method of Intubation:  Direct    Blade:  Wen 2    Laryngeal View Grade: Grade I - full view of chords      Difficult Airway Encountered?: No      Complications:  None    Airway Device:  Oral endotracheal tube    Airway Device Size:  8.0    Style/Cuff Inflation:  Cuffed    Tube secured:  24    Secured at:  The lips    Placement Verified By:  Capnometry    Complicating Factors:  None    Findings Post-Intubation:  BS equal bilateral and atraumatic/condition of teeth unchanged

## 2020-12-02 NOTE — NURSING
PT. TAKEN TO CATH LAB VIA STRETCHER AND CATH LAB TEAM. THEY WERE ADVISED OF HER BP'S AND INTERVENTIONS THAT HAVE BEEN TAKEN THUS FAR.

## 2020-12-02 NOTE — ANESTHESIA PROCEDURE NOTES
Arterial    Diagnosis: Coronary artery disease    Patient location during procedure: done in OR  Procedure start time: 12/2/2020 2:45 PM  Timeout: 12/2/2020 2:44 PM  Procedure end time: 12/2/2020 2:50 PM    Staffing  Authorizing Provider: Demian Brody MD  Performing Provider: Demian Brody MD    Anesthesiologist was present at the time of the procedure.    Preanesthetic Checklist  Completed: patient identified, site marked, surgical consent, pre-op evaluation, timeout performed, IV checked, risks and benefits discussed, monitors and equipment checked and anesthesia consent givenArterial  Skin Prep: chlorhexidine gluconate  Local Infiltration: lidocaine  Orientation: right  Location: radial  Catheter Size: 20 G  Catheter placement by Ultrasound guidance. Heme positive aspiration all ports.  Vessel Caliber: medium, patent, compressibility normal  Vascular Doppler:  not done  Needle advanced into vessel with real time Ultrasound guidance.  Sterile sheath used.Insertion Attempts: 1  Assessment  Dressing: sutured in place and taped, tegaderm and steri-strips  Patient: Tolerated well  Additional Notes  No apparent complications.

## 2020-12-02 NOTE — ANESTHESIA PROCEDURE NOTES
[place] [facility]    Diagnosis: Coronary Artery Disease  Patient location during procedure: done in OR  Procedure start time: 12/2/2020 2:56 PM  Timeout: 12/2/2020 2:55 PM  Procedure end time: 12/2/2020 2:17 PM    Staffing  Authorizing Provider: Demian Brody MD  Performing Provider: Demian Brody MD    Anesthesiologist was present at the time of the procedure.  Preanesthetic Checklist  Completed: patient identified, site marked, surgical consent, pre-op evaluation, timeout performed, IV checked, risks and benefits discussed, monitors and equipment checked and anesthesia consent given  [place] [facility]  Skin Prep: chlorhexidine gluconate  Local Infiltration: lidocaine  Location: right,  internal jugular vein  Vessel Caliber: medium, compressibility normal  Introducer: 9 Fr single lumen, manometry used.  Device: standard thermodilution catheter  Catheter Size: 8 Fr  Catheter placement by yes. Heme positive aspiration all ports. PAC floated with balloon up until wedgedSterile sheath used  Locked at: 50 cm.Insertion Attempts: 1  Indication: hemodynamic monitoring, intravenous therapy  Ultrasound Guidance  Needle advanced into vessel with real time Ultrasound guidance.  Guidewire confirmed in vessel.  Sterile sheath used.  Assessment  Central Line Bundle Protocol followed. Hand hygiene before procedure, surgical cap worn, surgical mask worn, sterile surgical gloves worn, large sterile drape used.  Verification: ultrasound  Dressing: tegaderm (Locked to the introducer)  Patient: Tolerated Well

## 2020-12-02 NOTE — ANESTHESIA PREPROCEDURE EVALUATION
2020  Aleyda Costa is a 40 y.o., female.    Patient Active Problem List   Diagnosis    Arthritis of ankle, right    Osteoarthritis of right subtalar joint    DDD (degenerative disc disease), lumbar    Other spondylosis, lumbar region    Acute lateral meniscal tear    Quadriceps weakness    Patellar instability of left knee    Primary osteoarthritis of right knee    S/P [place] cemented CR total knee arthroplasty, right 20    Mild episode of recurrent major depressive disorder    Sleep apnea    Chest pain on breathing    Normocytic anemia    Hyperphosphatemia    Dependence on nicotine from cigarettes    Abnormal CXR    Constipation    Lumbar radiculitis    Arthritis of knee, right    Acute medial meniscal tear, right, subsequent encounter    Dysphagia    Hematochezia    Patellofemoral arthritis of right knee    Arthritis of right knee    History of pulmonary embolism    Generalized anxiety disorder    DVT (deep venous thrombosis)    Chest pain    Pain of right lower extremity    History of bariatric surgery    Atelectasis    Hiatal hernia    Elevated troponin    Hypokalemia    Angina of effort    ACS (acute coronary syndrome)    Knee pain    Chronic anticoagulation    Hyperlipidemia    Coronary artery disease of native artery with stable angina pectoris    H/o PE and DVT    Polypharmacy    Headache       Past Surgical History:   Procedure Laterality Date    ANGIOGRAM, CORONARY, WITH LEFT HEART CATHETERIZATION Left 2020    Procedure: Left heart cath;  Surgeon: Jm Guthrie MD;  Location: [facility] CATH/EP LAB;  Service: Cardiology;  Laterality: Left;    APPENDECTOMY      CARPAL TUNNEL RELEASE Bilateral      SECTION      CHOLECYSTECTOMY      COLONOSCOPY N/A 2019    Procedure: COLONOSCOPY;  Surgeon: Anselmo Blackwell MD;   Location: [facility] ENDO;  Service: Endoscopy;  Laterality: N/A;    EPIDURAL STEROID INJECTION INTO LUMBAR SPINE N/A 6/7/2019    Procedure: Injection-steroid-epidural-lumbar;  Surgeon: Yariel Ramirez MD;  Location: [facility] OR;  Service: Pain Management;  Laterality: N/A;  L3-4    ESOPHAGOGASTRODUODENOSCOPY N/A 11/27/2019    Procedure: EGD (ESOPHAGOGASTRODUODENOSCOPY);  Surgeon: Anselmo Blackwell MD;  Location: [facility] ENDO;  Service: Endoscopy;  Laterality: N/A;    FRACTURE SURGERY      ankle    gastric sleve      HYSTERECTOMY  2014    endo and fibroids    HYSTERECTOMY      JOINT REPLACEMENT Left 11/13/2018    KNEE ARTHROPLASTY Left 11/13/2018    Procedure: ARTHROPLASTY, KNEE;  Surgeon: Feliberto Cardenas MD;  Location: [facility] OR;  Service: Orthopedics;  Laterality: Left;    KNEE ARTHROPLASTY Right 6/16/2020    Procedure: ARTHROPLASTY, KNEE;  Surgeon: Feliberto Cardenas MD;  Location: [facility] OR;  Service: Orthopedics;  Laterality: Right;    KNEE ARTHROSCOPY W/ MENISCECTOMY Right 10/25/2019    Procedure: ARTHROSCOPY, KNEE, WITH MENISCECTOMY;  Surgeon: Feliberto Cardenas MD;  Location: [facility] OR;  Service: Orthopedics;  Laterality: Right;    KNEE SURGERY      LUMBAR EPIDURAL INJECTION      OOPHORECTOMY Left     LSO    RADIAL NERVE Right     RECONSTRUCTION OF MEDIAL PATELLOFEMORAL LIGAMENT OF RIGHT KNEE Right 10/25/2019    Procedure: RECONSTRUCTION, LIGAMENT, MEDIAL PATELLOFEMORAL, RIGHT;  Surgeon: Feliberto Cardenas MD;  Location: [facility] OR;  Service: Orthopedics;  Laterality: Right;    SINUS SURGERY      UPPER GASTROINTESTINAL ENDOSCOPY  11/27/2019    Dr. Blackwell; mild schatzki ring-dilated; gastritis; bx in process        Tobacco Use:  The patient  reports that she has been smoking cigarettes. She has a 5.00 pack-year smoking history. She has never used smokeless tobacco.     Results for orders placed or performed during the hospital encounter of 11/28/20   EKG 12-lead    Collection Time: 12/02/20  6:23  AM    Narrative    Test Reason : R07.89,    Vent. Rate : 061 BPM     Atrial Rate : 061 BPM     P-R Int : 152 ms          QRS Dur : 074 ms      QT Int : 418 ms       P-R-T Axes : 039 031 047 degrees     QTc Int : 420 ms    Normal sinus rhythm  Nonspecific ST abnormality  Abnormal ECG  When compared with ECG of 01-DEC-2020 21:38,  Vent. rate has decreased BY  33 BPM  ST no longer depressed in Anterior leads  T wave inversion no longer evident in Anterior leads    Referred By: [email or web address]   SELF           Confirmed By:         Imaging Results          X-Ray Chest AP Portable (Final result)  Result time 11/28/20 07:15:51   Procedure changed from X-Ray Chest 1 View     Final result by Adrienne Haji MD (11/28/20 07:15:51)                 Impression:      No acute cardiopulmonary abnormality.      Electronically signed by: Adrienne Haji MD  Date:    11/28/2020  Time:    07:15             Narrative:    EXAMINATION:  XR CHEST AP PORTABLE    CLINICAL HISTORY:  cp; Chest pain, unspecified    FINDINGS:  Portable chest at 01:56 is compared to 10/19/2020 shows normal cardiomediastinal silhouette.    Lungs are clear. Pulmonary vasculature is normal. No acute osseous abnormality.                    ED Interpretation by Woodrow Zuluaga DO (11/28/20 01:55:48, [facility], Emergency Medicine)    No acute pathology                               Lab Results   Component Value Date    WBC 7.45 12/02/2020    HGB 12.4 12/02/2020    HCT 38.5 12/02/2020    MCV 89 12/02/2020     12/02/2020     BMP  Lab Results   Component Value Date     12/02/2020     12/02/2020    K 3.8 12/02/2020    K 3.8 12/02/2020     12/02/2020     12/02/2020    CO2 22 (L) 12/02/2020    CO2 22 (L) 12/02/2020    BUN 13 12/02/2020    BUN 13 12/02/2020    CREATININE 0.7 12/02/2020    CREATININE 0.7 12/02/2020    CALCIUM 8.8 12/02/2020    CALCIUM 8.8 12/02/2020    ANIONGAP 11 12/02/2020    ANIONGAP 11 12/02/2020      12/02/2020     12/02/2020     12/01/2020       Results for orders placed during the hospital encounter of 06/24/20   Echo Color Flow Doppler? Yes    Narrative · Normal left ventricular systolic function. The estimated ejection   fraction is 56%.  · Grade I (mild) left ventricular diastolic dysfunction consistent with   impaired relaxation.  · Mild left atrial enlargement.  · Mild mitral regurgitation.  · Mild to moderate tricuspid regurgitation.  · Normal central venous pressure (3 mmHg).  · The estimated PA systolic pressure is 35 mmHg.              Anesthesia Evaluation          Review of Systems  Cardiovascular:   CAD     Pulmonary:   Sleep Apnea    Hepatic/GI:   Hiatal Hernia, GERD    Neurological:   Neuromuscular Disease, Headaches (migraine) Seizures    Psych:   anxiety depression          Physical Exam  General:  Well nourished    Airway/Jaw/Neck:  Airway Findings: Mouth Opening: Normal Tongue: Normal  General Airway Assessment: Adult  Mallampati: II  TM Distance: Normal, at least 6 cm  Jaw/Neck Findings:  Neck ROM: Normal ROM     Eyes/Ears/Nose:  Eyes/Ears/Nose Findings:    Dental:  Dental Findings: In tact   Chest/Lungs:  Chest/Lungs Findings: Clear to auscultation, Normal Respiratory Rate     Heart/Vascular:  Heart Findings: Rate: Normal  Rhythm: Regular Rhythm  Sounds: Normal     Abdomen:  Abdomen Findings:     Musculoskeletal:  Musculoskeletal Findings:    Skin:  Skin Findings:     Mental Status:  Mental Status Findings:  Cooperative, Alert and Oriented         Anesthesia Plan  Type of Anesthesia, risks & benefits discussed:  Anesthesia Type:  general  Patient's Preference: General  Intra-op Monitoring Plan: standard ASA monitors, arterial line, central line, McKenzie-Pilar and cardiac output  Intra-op Monitoring Plan Comments:   Post Op Pain Control Plan: IV/PO Opioids PRN and per primary service following discharge from PACU  Post Op Pain Control Plan Comments:   Induction:   IV  Beta Blocker:          Informed Consent: Patient understands risks and agrees with Anesthesia plan.  Questions answered. Anesthesia consent signed with patient.  ASA Score: 4     Day of Surgery Review of History & Physical:    H&P update referred to the surgeon.         Ready For Surgery From Anesthesia Perspective.

## 2020-12-02 NOTE — NURSING
Patient began to have chest pain again at 2120, Nitro given at this time. EKG done nitro given X2. VSS. Notified Dr. Guthrie. Orders given to administer 5ml IV lopressor and to transfer the patient to cardio A.

## 2020-12-02 NOTE — PROGRESS NOTES
[facility]  Adult Nutrition   Progress Note (Initial Assessment)     SUMMARY     Recommendations/Interventions:    1. Advance diet as medically able per MD, add cardiac diet restrictions.  2. DI to monitor NPO status, labs, plan of care, and make recommendations accordingly.   Goals: 1. Pt diet to be advanced or nutrition intervention to begin.   Nutrition Goal Status: new    Interval History:  · Pt seen 2' to length of stay. Pt presents with chest pain. Pt currently NPO for angiogram. Pt reports having a good appetite prior to being NPO. Pt reports N/V but no diarrhea/constipation. LHC preformed on June 2020. Possible transfer to [facility] for second opinion on current cardiac issues.     Reason for Assessment    Reason For Assessment: length of stay  Diagnosis: cardiac disease  Relevant Medical History: Chest pain, DVT, hyperlipidemia, coronary artery dieseas of native artery with stable angina pectoris,   Interdisciplinary Rounds: did not attend    Nutrition Risk Screen    Nutrition Risk Screen: no indicators present    Nutrition/Diet History    Food Allergies: other (see comments)(latex)  Factors Affecting Nutritional Intake: NPO    Anthropometrics    Temp: 97.9 °F (36.6 °C)  Height Method: Stated  Height: 6' (182.9 cm)  Height (inches): 72 in  Weight Method: Standard Scale  Weight: 98.8 kg (217 lb 13 oz)  Weight (lb): 217.82 lb  Ideal Body Weight (IBW), Female: 160 lb  % Ideal Body Weight, Female (lb): 139.44 %  BMI (Calculated): 29.5       Weight History:  Wt Readings from Last 10 Encounters:   12/01/20 98.8 kg (217 lb 13 oz)   11/24/20 102.8 kg (226 lb 10.1 oz)   11/19/20 101.6 kg (224 lb)   11/12/20 101.6 kg (224 lb)   11/07/20 101.6 kg (224 lb)   10/20/20 102.3 kg (225 lb 9.6 oz)   10/05/20 102.1 kg (225 lb)   09/28/20 104.6 kg (230 lb 9.6 oz)   09/17/20 106.1 kg (234 lb)   08/18/20 106.5 kg (234 lb 12.6 oz)     Lab/Procedures/Meds: Pertinent Labs Reviewed     12/1/2020 05:20  12/2/2020 03:21 12/2/2020 03:21   Sodium 136 137 137   Potassium 3.8 3.8 3.8   Chloride 99 104 104   CO2 26 22 (L) 22 (L)   Anion Gap 11 11 11   BUN 10 13 13   Creatinine 0.7 0.7 0.7   eGFR if non African American >60.0 >60.0 >60.0   eGFR if African American >60.0 >60.0 >60.0   Glucose 106 102 102   Calcium 8.9 8.8 8.8   Magnesium 1.8 1.9    Alkaline Phosphatase 79 82    PROTEIN TOTAL 7.3 6.9    Albumin 3.6 3.7    BILIRUBIN TOTAL 0.6 0.7    AST 25 21    ALT 25 24        Medications:Pertinent Medications Reviewed  Scheduled Meds:   ALPRAZolam  0.25 mg Oral TID    aspirin  81 mg Oral Daily    atorvastatin  40 mg Oral QHS    clopidogreL  75 mg Oral QHS    normosol-R 300 mL with niCARdipine 5 mg, nitroGLYCERIN 5 mg, sodium bicarbonate 0.3 mEq   Irrigation Once    FLUoxetine  40 mg Oral Daily    custom IVPB builder   Irrigation Once    custom IVPB builder   Irrigation Once    insulin (HUMAN R) infusion (adults)  1 Units/hr Intravenous Once    isosorbide mononitrate  30 mg Oral Daily    pantoprazole  40 mg Oral BID    ranolazine  1,000 mg Oral BID    cardioplegic solution   Intravenous Once    cardioplegic solution   Intravenous Once    thromb,zp-qrfxgq-adpnvgy,s-Ca   Topical (Top) Once     Continuous Infusions:   sodium chloride 0.9% 125 mL/hr (12/02/20 1012)    nitroGLYCERIN Stopped (12/02/20 1328)     PRN Meds:.acetaminophen, calcium chloride IVPB, calcium chloride IVPB, calcium chloride IVPB, diphenhydrAMINE, fentaNYL, heparin (porcine), heparin (porcine), hydrOXYzine pamoate, lidocaine (PF) 10 mg/ml (1%), LORazepam, magnesium oxide, magnesium sulfate IVPB, magnesium sulfate IVPB, magnesium sulfate IVPB, magnesium sulfate IVPB, metoprolol, midazolam, morphine, morphine, nitroGLYCERIN, nitroGLYCERIN, ondansetron, oxyCODONE, potassium chloride in water, potassium chloride in water, potassium chloride in water, potassium chloride in water, potassium chloride, potassium chloride, potassium chloride,  potassium chloride, sodium phosphate IVPB, sodium phosphate IVPB, sodium phosphate IVPB, sodium phosphate IVPB, sodium phosphate IVPB, tiZANidine    Estimated/Assessed Needs    Weight Used For Calorie Calculations: 98.8 kg (217 lb 13 oz)  Energy Calorie Requirements (kcal): 1331-0846 kcal/day (20-25 kcal/kg)  Energy Need Method: Kcal/kg  Protein Requirements: 79-99 g/day (0.8-1 g/kg)  Weight Used For Protein Calculations: 98.8 kg (217 lb 13 oz)  Fluid Requirements (mL): 0159-5711 mL  Estimated Fluid Requirement Method: RDA Method    Nutrition Prescription Ordered    Current Diet Order: NPO    Evaluation of Received Nutrient/Fluid Intake    Energy Calories Required: not meeting needs  Protein Required: not meeting needs  Fluid Required: meeting needs  % Intake of Estimated Energy Needs: Other NPO  % Meal Intake: NPO    Intake/Output Summary (Last 24 hours) at 12/2/2020 1344  Last data filed at 12/2/2020 0533  Gross per 24 hour   Intake 361.05 ml   Output --   Net 361.05 ml      Nutrition Risk    Level of Risk/Frequency of Follow-up: high     Monitor and Evaluation    Food and Nutrient Intake: other (specify)(NPO)  Food and Nutrient Adminstration: other (specify)(NPO)  Knowledge/Beliefs/Attitudes: food and nutrition knowledge/skill, beliefs and attitudes  Physical Activity and Function: nutrition-related ADLs and IADLs, factors affecting access to physical activity  Anthropometric Measurements: weight, weight change, body mass index  Biochemical Data, Medical Tests and Procedures: electrolyte and renal panel, gastrointestinal profile, inflammatory profile, glucose/endocrine profile, lipid profile  Nutrition-Focused Physical Findings: overall appearance     Nutrition Follow-Up    RD Follow-up?: Yes   Adrian Nieto 12/02/20201:45 PM

## 2020-12-03 PROBLEM — Z95.1 S/P CABG X 3: Status: ACTIVE | Noted: 2020-12-03

## 2020-12-03 LAB
ALBUMIN SERPL BCP-MCNC: 3 G/DL (ref 3.5–5.2)
ALLENS TEST: ABNORMAL
ALP SERPL-CCNC: 63 U/L (ref 55–135)
ALT SERPL W/O P-5'-P-CCNC: 42 U/L (ref 10–44)
ANION GAP SERPL CALC-SCNC: 6 MMOL/L (ref 8–16)
ANION GAP SERPL CALC-SCNC: 8 MMOL/L (ref 8–16)
AST SERPL-CCNC: 51 U/L (ref 10–40)
BASOPHILS # BLD AUTO: 0.01 K/UL (ref 0–0.2)
BASOPHILS # BLD AUTO: 0.02 K/UL (ref 0–0.2)
BASOPHILS NFR BLD: 0.1 % (ref 0–1.9)
BASOPHILS NFR BLD: 0.1 % (ref 0–1.9)
BILIRUB SERPL-MCNC: 0.6 MG/DL (ref 0.1–1)
BUN SERPL-MCNC: 8 MG/DL (ref 6–20)
BUN SERPL-MCNC: 8 MG/DL (ref 6–20)
CALCIUM SERPL-MCNC: 8.2 MG/DL (ref 8.7–10.5)
CALCIUM SERPL-MCNC: 8.4 MG/DL (ref 8.7–10.5)
CHLORIDE SERPL-SCNC: 107 MMOL/L (ref 95–110)
CHLORIDE SERPL-SCNC: 110 MMOL/L (ref 95–110)
CO2 SERPL-SCNC: 20 MMOL/L (ref 23–29)
CO2 SERPL-SCNC: 23 MMOL/L (ref 23–29)
CREAT SERPL-MCNC: 0.6 MG/DL (ref 0.5–1.4)
CREAT SERPL-MCNC: 0.7 MG/DL (ref 0.5–1.4)
DELSYS: ABNORMAL
DIFFERENTIAL METHOD: ABNORMAL
DIFFERENTIAL METHOD: ABNORMAL
EOSINOPHIL # BLD AUTO: 0 K/UL (ref 0–0.5)
EOSINOPHIL # BLD AUTO: 0 K/UL (ref 0–0.5)
EOSINOPHIL NFR BLD: 0 % (ref 0–8)
EOSINOPHIL NFR BLD: 0 % (ref 0–8)
ERYTHROCYTE [DISTWIDTH] IN BLOOD BY AUTOMATED COUNT: 16.5 % (ref 11.5–14.5)
ERYTHROCYTE [DISTWIDTH] IN BLOOD BY AUTOMATED COUNT: 16.5 % (ref 11.5–14.5)
ERYTHROCYTE [SEDIMENTATION RATE] IN BLOOD BY WESTERGREN METHOD: 10 MM/H
ERYTHROCYTE [SEDIMENTATION RATE] IN BLOOD BY WESTERGREN METHOD: 12 MM/H
ERYTHROCYTE [SEDIMENTATION RATE] IN BLOOD BY WESTERGREN METHOD: 18 MM/H
EST. GFR  (AFRICAN AMERICAN): >60 ML/MIN/1.73 M^2
EST. GFR  (AFRICAN AMERICAN): >60 ML/MIN/1.73 M^2
EST. GFR  (NON AFRICAN AMERICAN): >60 ML/MIN/1.73 M^2
EST. GFR  (NON AFRICAN AMERICAN): >60 ML/MIN/1.73 M^2
FIO2: 30
FIO2: 40
FLOW: 2
GLUCOSE SERPL-MCNC: 120 MG/DL (ref 70–110)
GLUCOSE SERPL-MCNC: 137 MG/DL (ref 70–110)
GLUCOSE SERPL-MCNC: 140 MG/DL (ref 70–110)
GLUCOSE SERPL-MCNC: 142 MG/DL (ref 70–110)
GLUCOSE SERPL-MCNC: 146 MG/DL (ref 70–110)
GLUCOSE SERPL-MCNC: 146 MG/DL (ref 70–110)
GLUCOSE SERPL-MCNC: 156 MG/DL (ref 70–110)
GLUCOSE SERPL-MCNC: 156 MG/DL (ref 70–110)
GLUCOSE SERPL-MCNC: 184 MG/DL (ref 70–110)
GLUCOSE SERPL-MCNC: 199 MG/DL (ref 70–110)
GLUCOSE SERPL-MCNC: 201 MG/DL (ref 70–110)
HCO3 UR-SCNC: 21.2 MMOL/L (ref 24–28)
HCO3 UR-SCNC: 21.5 MMOL/L (ref 24–28)
HCO3 UR-SCNC: 22.6 MMOL/L (ref 24–28)
HCO3 UR-SCNC: 22.9 MMOL/L (ref 24–28)
HCO3 UR-SCNC: 23 MMOL/L (ref 24–28)
HCO3 UR-SCNC: 23.6 MMOL/L (ref 24–28)
HCT VFR BLD AUTO: 33.4 % (ref 37–48.5)
HCT VFR BLD AUTO: 34 % (ref 37–48.5)
HCT VFR BLD CALC: 33 %PCV (ref 36–54)
HCT VFR BLD CALC: 33 %PCV (ref 36–54)
HCT VFR BLD CALC: 34 %PCV (ref 36–54)
HCT VFR BLD CALC: 35 %PCV (ref 36–54)
HGB BLD-MCNC: 10.8 G/DL (ref 12–16)
HGB BLD-MCNC: 10.8 G/DL (ref 12–16)
IMM GRANULOCYTES # BLD AUTO: 0.06 K/UL (ref 0–0.04)
IMM GRANULOCYTES # BLD AUTO: 0.08 K/UL (ref 0–0.04)
IMM GRANULOCYTES NFR BLD AUTO: 0.4 % (ref 0–0.5)
IMM GRANULOCYTES NFR BLD AUTO: 0.5 % (ref 0–0.5)
LYMPHOCYTES # BLD AUTO: 0.4 K/UL (ref 1–4.8)
LYMPHOCYTES # BLD AUTO: 0.6 K/UL (ref 1–4.8)
LYMPHOCYTES NFR BLD: 3.2 % (ref 18–48)
LYMPHOCYTES NFR BLD: 3.5 % (ref 18–48)
MAGNESIUM SERPL-MCNC: 2.1 MG/DL (ref 1.6–2.6)
MAGNESIUM SERPL-MCNC: 2.8 MG/DL (ref 1.6–2.6)
MCH RBC QN AUTO: 28.3 PG (ref 27–31)
MCH RBC QN AUTO: 29 PG (ref 27–31)
MCHC RBC AUTO-ENTMCNC: 31.8 G/DL (ref 32–36)
MCHC RBC AUTO-ENTMCNC: 32.3 G/DL (ref 32–36)
MCV RBC AUTO: 89 FL (ref 82–98)
MCV RBC AUTO: 90 FL (ref 82–98)
MODE: ABNORMAL
MONOCYTES # BLD AUTO: 0.5 K/UL (ref 0.3–1)
MONOCYTES # BLD AUTO: 0.6 K/UL (ref 0.3–1)
MONOCYTES NFR BLD: 3 % (ref 4–15)
MONOCYTES NFR BLD: 4.4 % (ref 4–15)
NEUTROPHILS # BLD AUTO: 12.5 K/UL (ref 1.8–7.7)
NEUTROPHILS # BLD AUTO: 14.9 K/UL (ref 1.8–7.7)
NEUTROPHILS NFR BLD: 91.9 % (ref 38–73)
NEUTROPHILS NFR BLD: 92.9 % (ref 38–73)
NRBC BLD-RTO: 0 /100 WBC
NRBC BLD-RTO: 0 /100 WBC
PCO2 BLDA: 40.4 MMHG (ref 35–45)
PCO2 BLDA: 41.7 MMHG (ref 35–45)
PCO2 BLDA: 43.6 MMHG (ref 35–45)
PCO2 BLDA: 47.4 MMHG (ref 35–45)
PCO2 BLDA: 47.8 MMHG (ref 35–45)
PCO2 BLDA: 48.7 MMHG (ref 35–45)
PEEP: 5
PH SMN: 7.29 [PH] (ref 7.35–7.45)
PH SMN: 7.31 [PH] (ref 7.35–7.45)
PH SMN: 7.33 [PH] (ref 7.35–7.45)
PH SMN: 7.33 [PH] (ref 7.35–7.45)
PLATELET # BLD AUTO: 154 K/UL (ref 150–350)
PLATELET # BLD AUTO: 161 K/UL (ref 150–350)
PMV BLD AUTO: 10.3 FL (ref 9.2–12.9)
PMV BLD AUTO: 10.7 FL (ref 9.2–12.9)
PO2 BLDA: 101 MMHG (ref 80–100)
PO2 BLDA: 112 MMHG (ref 80–100)
PO2 BLDA: 87 MMHG (ref 80–100)
PO2 BLDA: 88 MMHG (ref 80–100)
PO2 BLDA: 89 MMHG (ref 80–100)
PO2 BLDA: 95 MMHG (ref 80–100)
POC BE: -3 MMOL/L
POC BE: -3 MMOL/L
POC BE: -4 MMOL/L
POC BE: -5 MMOL/L
POC IONIZED CALCIUM: 1.22 MMOL/L (ref 1.06–1.42)
POC IONIZED CALCIUM: 1.23 MMOL/L (ref 1.06–1.42)
POC IONIZED CALCIUM: 1.24 MMOL/L (ref 1.06–1.42)
POC IONIZED CALCIUM: 1.26 MMOL/L (ref 1.06–1.42)
POC IONIZED CALCIUM: 1.26 MMOL/L (ref 1.06–1.42)
POC IONIZED CALCIUM: 1.27 MMOL/L (ref 1.06–1.42)
POC SATURATED O2: 95 % (ref 95–100)
POC SATURATED O2: 96 % (ref 95–100)
POC SATURATED O2: 96 % (ref 95–100)
POC SATURATED O2: 97 % (ref 95–100)
POC SATURATED O2: 97 % (ref 95–100)
POC SATURATED O2: 98 % (ref 95–100)
POC TCO2: 22 MMOL/L (ref 23–27)
POC TCO2: 23 MMOL/L (ref 23–27)
POC TCO2: 24 MMOL/L (ref 23–27)
POC TCO2: 25 MMOL/L (ref 23–27)
POTASSIUM BLD-SCNC: 4.5 MMOL/L (ref 3.5–5.1)
POTASSIUM BLD-SCNC: 4.5 MMOL/L (ref 3.5–5.1)
POTASSIUM BLD-SCNC: 4.6 MMOL/L (ref 3.5–5.1)
POTASSIUM BLD-SCNC: 4.7 MMOL/L (ref 3.5–5.1)
POTASSIUM BLD-SCNC: 4.7 MMOL/L (ref 3.5–5.1)
POTASSIUM BLD-SCNC: 4.8 MMOL/L (ref 3.5–5.1)
POTASSIUM SERPL-SCNC: 4.5 MMOL/L (ref 3.5–5.1)
POTASSIUM SERPL-SCNC: 4.6 MMOL/L (ref 3.5–5.1)
PROT SERPL-MCNC: 5.9 G/DL (ref 6–8.4)
PS: 10
RBC # BLD AUTO: 3.73 M/UL (ref 4–5.4)
RBC # BLD AUTO: 3.81 M/UL (ref 4–5.4)
SAMPLE: ABNORMAL
SITE: ABNORMAL
SODIUM BLD-SCNC: 138 MMOL/L (ref 136–145)
SODIUM BLD-SCNC: 140 MMOL/L (ref 136–145)
SODIUM BLD-SCNC: 141 MMOL/L (ref 136–145)
SODIUM BLD-SCNC: 141 MMOL/L (ref 136–145)
SODIUM BLD-SCNC: 142 MMOL/L (ref 136–145)
SODIUM BLD-SCNC: 142 MMOL/L (ref 136–145)
SODIUM SERPL-SCNC: 136 MMOL/L (ref 136–145)
SODIUM SERPL-SCNC: 138 MMOL/L (ref 136–145)
SP02: 96
VT: 500
WBC # BLD AUTO: 13.59 K/UL (ref 3.9–12.7)
WBC # BLD AUTO: 16 K/UL (ref 3.9–12.7)

## 2020-12-03 PROCEDURE — 63600175 PHARM REV CODE 636 W HCPCS: Performed by: NURSE PRACTITIONER

## 2020-12-03 PROCEDURE — 84132 ASSAY OF SERUM POTASSIUM: CPT

## 2020-12-03 PROCEDURE — 82803 BLOOD GASES ANY COMBINATION: CPT

## 2020-12-03 PROCEDURE — 80053 COMPREHEN METABOLIC PANEL: CPT

## 2020-12-03 PROCEDURE — 99024 PR POST-OP FOLLOW-UP VISIT: ICD-10-PCS | Mod: ,,, | Performed by: NURSE PRACTITIONER

## 2020-12-03 PROCEDURE — 20000000 HC ICU ROOM

## 2020-12-03 PROCEDURE — 27000683 HC PILLOW THERAPEUTIC

## 2020-12-03 PROCEDURE — 63600175 PHARM REV CODE 636 W HCPCS: Performed by: THORACIC SURGERY (CARDIOTHORACIC VASCULAR SURGERY)

## 2020-12-03 PROCEDURE — 94799 UNLISTED PULMONARY SVC/PX: CPT

## 2020-12-03 PROCEDURE — 82330 ASSAY OF CALCIUM: CPT

## 2020-12-03 PROCEDURE — 84295 ASSAY OF SERUM SODIUM: CPT

## 2020-12-03 PROCEDURE — 36600 WITHDRAWAL OF ARTERIAL BLOOD: CPT

## 2020-12-03 PROCEDURE — 25000003 PHARM REV CODE 250: Performed by: NURSE PRACTITIONER

## 2020-12-03 PROCEDURE — 93010 EKG 12-LEAD: ICD-10-PCS | Mod: ,,, | Performed by: INTERNAL MEDICINE

## 2020-12-03 PROCEDURE — 85025 COMPLETE CBC W/AUTO DIFF WBC: CPT

## 2020-12-03 PROCEDURE — 94761 N-INVAS EAR/PLS OXIMETRY MLT: CPT

## 2020-12-03 PROCEDURE — 93010 ELECTROCARDIOGRAM REPORT: CPT | Mod: ,,, | Performed by: INTERNAL MEDICINE

## 2020-12-03 PROCEDURE — 25000003 PHARM REV CODE 250: Performed by: SPECIALIST

## 2020-12-03 PROCEDURE — 25000003 PHARM REV CODE 250

## 2020-12-03 PROCEDURE — 83735 ASSAY OF MAGNESIUM: CPT

## 2020-12-03 PROCEDURE — 99024 POSTOP FOLLOW-UP VISIT: CPT | Mod: ,,, | Performed by: NURSE PRACTITIONER

## 2020-12-03 PROCEDURE — 85014 HEMATOCRIT: CPT

## 2020-12-03 PROCEDURE — 82962 GLUCOSE BLOOD TEST: CPT

## 2020-12-03 PROCEDURE — 94003 VENT MGMT INPAT SUBQ DAY: CPT

## 2020-12-03 PROCEDURE — 27000221 HC OXYGEN, UP TO 24 HOURS

## 2020-12-03 PROCEDURE — 93005 ELECTROCARDIOGRAM TRACING: CPT | Performed by: INTERNAL MEDICINE

## 2020-12-03 PROCEDURE — 25000003 PHARM REV CODE 250: Performed by: THORACIC SURGERY (CARDIOTHORACIC VASCULAR SURGERY)

## 2020-12-03 PROCEDURE — 99900026 HC AIRWAY MAINTENANCE (STAT)

## 2020-12-03 PROCEDURE — 99900035 HC TECH TIME PER 15 MIN (STAT)

## 2020-12-03 PROCEDURE — 37799 UNLISTED PX VASCULAR SURGERY: CPT

## 2020-12-03 RX ORDER — KETOROLAC TROMETHAMINE 30 MG/ML
30 INJECTION, SOLUTION INTRAMUSCULAR; INTRAVENOUS ONCE
Status: COMPLETED | OUTPATIENT
Start: 2020-12-03 | End: 2020-12-03

## 2020-12-03 RX ORDER — FUROSEMIDE 10 MG/ML
20 INJECTION INTRAMUSCULAR; INTRAVENOUS ONCE
Status: COMPLETED | OUTPATIENT
Start: 2020-12-03 | End: 2020-12-03

## 2020-12-03 RX ORDER — METOPROLOL TARTRATE 25 MG/1
25 TABLET, FILM COATED ORAL 2 TIMES DAILY
Status: DISCONTINUED | OUTPATIENT
Start: 2020-12-03 | End: 2020-12-04

## 2020-12-03 RX ORDER — METOPROLOL TARTRATE 25 MG/1
12.5 TABLET ORAL 2 TIMES DAILY
Status: DISCONTINUED | OUTPATIENT
Start: 2020-12-03 | End: 2020-12-03

## 2020-12-03 RX ORDER — CHLORHEXIDINE GLUCONATE ORAL RINSE 1.2 MG/ML
15 SOLUTION DENTAL 2 TIMES DAILY
Status: DISCONTINUED | OUTPATIENT
Start: 2020-12-03 | End: 2020-12-07 | Stop reason: HOSPADM

## 2020-12-03 RX ORDER — ACETAMINOPHEN 10 MG/ML
1000 INJECTION, SOLUTION INTRAVENOUS ONCE
Status: COMPLETED | OUTPATIENT
Start: 2020-12-03 | End: 2020-12-03

## 2020-12-03 RX ORDER — MUPIROCIN 20 MG/G
OINTMENT TOPICAL 2 TIMES DAILY
Status: DISCONTINUED | OUTPATIENT
Start: 2020-12-03 | End: 2020-12-07 | Stop reason: HOSPADM

## 2020-12-03 RX ORDER — KETOROLAC TROMETHAMINE 30 MG/ML
15 INJECTION, SOLUTION INTRAMUSCULAR; INTRAVENOUS EVERY 6 HOURS PRN
Status: DISPENSED | OUTPATIENT
Start: 2020-12-03 | End: 2020-12-06

## 2020-12-03 RX ADMIN — OXYCODONE HYDROCHLORIDE 10 MG: 5 TABLET ORAL at 07:12

## 2020-12-03 RX ADMIN — KETOROLAC TROMETHAMINE 15 MG: 30 INJECTION, SOLUTION INTRAMUSCULAR at 01:12

## 2020-12-03 RX ADMIN — FLUOXETINE 40 MG: 20 CAPSULE ORAL at 08:12

## 2020-12-03 RX ADMIN — DOCUSATE SODIUM 100 MG: 100 CAPSULE, LIQUID FILLED ORAL at 09:12

## 2020-12-03 RX ADMIN — CHLORHEXIDINE GLUCONATE 15 ML: 1.2 RINSE ORAL at 09:12

## 2020-12-03 RX ADMIN — MORPHINE SULFATE 4 MG: 4 INJECTION INTRAVENOUS at 12:12

## 2020-12-03 RX ADMIN — VANCOMYCIN HYDROCHLORIDE 1500 MG: 1.5 INJECTION, POWDER, LYOPHILIZED, FOR SOLUTION INTRAVENOUS at 03:12

## 2020-12-03 RX ADMIN — MORPHINE SULFATE 4 MG: 4 INJECTION INTRAVENOUS at 10:12

## 2020-12-03 RX ADMIN — MUPIROCIN: 20 OINTMENT TOPICAL at 10:12

## 2020-12-03 RX ADMIN — PANTOPRAZOLE SODIUM 40 MG: 40 TABLET, DELAYED RELEASE ORAL at 08:12

## 2020-12-03 RX ADMIN — ATORVASTATIN CALCIUM 40 MG: 40 TABLET, FILM COATED ORAL at 08:12

## 2020-12-03 RX ADMIN — FUROSEMIDE 20 MG: 10 INJECTION, SOLUTION INTRAMUSCULAR; INTRAVENOUS at 01:12

## 2020-12-03 RX ADMIN — ASPIRIN 81 MG 81 MG: 81 TABLET ORAL at 09:12

## 2020-12-03 RX ADMIN — ACETAMINOPHEN 1000 MG: 10 INJECTION, SOLUTION INTRAVENOUS at 10:12

## 2020-12-03 RX ADMIN — METOPROLOL TARTRATE 25 MG: 25 TABLET, FILM COATED ORAL at 01:12

## 2020-12-03 RX ADMIN — MORPHINE SULFATE 2 MG: 2 INJECTION, SOLUTION INTRAMUSCULAR; INTRAVENOUS at 11:12

## 2020-12-03 RX ADMIN — HYDROCODONE BITARTRATE AND ACETAMINOPHEN 1 TABLET: 5; 325 TABLET ORAL at 03:12

## 2020-12-03 RX ADMIN — CHLORHEXIDINE GLUCONATE 15 ML: 1.2 RINSE ORAL at 08:12

## 2020-12-03 RX ADMIN — MUPIROCIN: 20 OINTMENT TOPICAL at 08:12

## 2020-12-03 RX ADMIN — ISOSORBIDE MONONITRATE 30 MG: 30 TABLET, EXTENDED RELEASE ORAL at 09:12

## 2020-12-03 RX ADMIN — DOCUSATE SODIUM 100 MG: 100 CAPSULE, LIQUID FILLED ORAL at 08:12

## 2020-12-03 RX ADMIN — KETOROLAC TROMETHAMINE 30 MG: 30 INJECTION, SOLUTION INTRAMUSCULAR; INTRAVENOUS at 07:12

## 2020-12-03 RX ADMIN — METOPROLOL TARTRATE 25 MG: 25 TABLET, FILM COATED ORAL at 10:12

## 2020-12-03 RX ADMIN — HYDROCODONE BITARTRATE AND ACETAMINOPHEN 1 TABLET: 5; 325 TABLET ORAL at 09:12

## 2020-12-03 RX ADMIN — PANTOPRAZOLE SODIUM 40 MG: 40 TABLET, DELAYED RELEASE ORAL at 09:12

## 2020-12-03 RX ADMIN — MORPHINE SULFATE 2 MG: 2 INJECTION, SOLUTION INTRAMUSCULAR; INTRAVENOUS at 05:12

## 2020-12-03 NOTE — PLAN OF CARE
12/03/20 0741   Patient Assessment/Suction   Level of Consciousness (AVPU) alert   Respiratory Effort Normal;Unlabored   Expansion/Accessory Muscles/Retractions no use of accessory muscles;no retractions   PRE-TX-O2   O2 Device (Oxygen Therapy) nasal cannula   $ Is the patient on Low Flow Oxygen? Yes   Flow (L/min) 2   SpO2 96 %   Pulse Oximetry Type Continuous   $ Pulse Oximetry - Multiple Charge Pulse Oximetry - Multiple   Pulse 98   Resp 14   Labs   $ Was an ABG obtained? Arterial Puncture;ISTAT - Blood gas;ISTAT - Calcium;ISTAT - Hematocrit;ISTAT - Potassium;ISTAT - Sodium   $ Labs Tech Time 15 min   Respiratory Evaluation   $ Care Plan Tech Time 15 min

## 2020-12-03 NOTE — NURSING
Pt bedrest is complete. Pt assisted OOB up to chair. Tolerated well. Left resting comfortably in chair with call light in reach.

## 2020-12-03 NOTE — NURSING
Spoke to Dr. Guthrie. Updated on pt. Status. Informed of pts hemodynamics. Informed IABP currently on 1:3. States he will most likely remove IABP today.

## 2020-12-03 NOTE — NURSING
Spoke to Dr. Kelin Beltrán's nurse. Updated on pt. Status. Informed that pt is awake and alert. Hemodynamics stable on 1:3 IABP. Awaiting Dr. Guthrie to come pull IABP. Verbalized acknowledgement.

## 2020-12-03 NOTE — PROGRESS NOTES
Pharmacokinetic Initial Assessment: IV Vancomycin    Assessment/Plan:    Initiate intravenous vancomycin with loading dose of 1000 mg once (given in surgery) followed by a maintenance dose of vancomycin 1500 mg IV every 12 hours for 2 doses  Desired empiric serum trough concentration is 10 to 15 mcg/mL    Please contact pharmacy at extension [phone] with any questions regarding this assessment.     Andre Fernández, Pharmacist       Patient brief summary:  Aleyda Costa is a 40 y.o. female initiated on antimicrobial therapy with IV Vancomycin for treatment post operative  surgical prophylaxis    Patient Weight is 98.8kg  Renal Function:   Estimated Creatinine Clearance: 139.2 mL/min (based on SCr of 0.7 mg/dL).,

## 2020-12-03 NOTE — RESPIRATORY THERAPY
Pt received for or post cabg and placed on pb840 ventilator. Pt is orally intubatd via 8.0mm ett secured at 23cm lety. BBS equal and loud. Pt tolerated well with no adverse reactions noted.

## 2020-12-03 NOTE — CONSULTS
This is Dr. Neville dictating date of service being the of 2nd December 2020.  This patient was in the cardiac catheterization laboratory where she underwent heart catheterization and coronary angiography and was found to have multivessel coronary artery disease but in particular a high-grade proximal left anterior descending coronary artery lesion she had unstable angina.  She had a myocardial infarction as well.  She has chronic hypertension.  Her other issues are described part of the recent epic record.  Medical problems were reviewed.  Medicines are also part of the epic record and please refer to the history and physical.  There is no pertinent family or other social history at this time.  She has had previous knee surgery.  On exam vital signs are stable.  She was in no overt distress but was had in regular angina.  Pupils were equal and round reactive to light.  Neck was supple.  Chest was clear to auscultation.  Heart was in regular rate and rhythm.  Abdomen was benign.  Perfusion to the legs and feet was satisfactory.  Based on the recent history and catheterization findings surgery was recommended.  An intra-aortic balloon pump was placed in the catheterization laboratory and emergency surgery was recommended and accepted.  Arrangements were made to bring the patient to the operating room acutely.

## 2020-12-03 NOTE — PROGRESS NOTES
[facility] Medicine  Progress Note    Patient Name: Aleyda Costa  MRN: [id number]  Patient Class: IP- Inpatient   Admission Date: 11/28/2020  Length of Stay: 3 days  Attending Physician: Donald Hilton MD  Primary Care Provider: Darlene Hayden MD        Subjective:     Principal Problem:Chest pain    Interval History:     Patient was seen and examined earlier in ICU  She is awake alert oriented and vitals stable  Good urine output and hemoglobin stable      ICU Objective:     Vital Signs (Most Recent):  Temp: 98.1 °F (36.7 °C) (12/03/20 1107)  Pulse: 84 (12/03/20 1300)  Resp: 19 (12/03/20 1512)  BP: 118/72 (12/03/20 1300)  SpO2: 96 % (12/03/20 1300) Vital Signs (24h Range):  Temp:  [97.9 °F (36.6 °C)-98.5 °F (36.9 °C)] 98.1 °F (36.7 °C)  Pulse:  [] 84  Resp:  [0-31] 19  SpO2:  [94 %-100 %] 96 %  BP: ()/(55-78) 118/72  Arterial Line BP: (108-149)/(67-83) 141/71     Weight: 98.8 kg (217 lb 13 oz)  Body mass index is 29.54 kg/m².    Intake/Output Summary (Last 24 hours) at 12/3/2020 1618  Last data filed at 12/3/2020 1300  Gross per 24 hour   Intake 2695.18 ml   Output 3717 ml   Net -1021.82 ml      Physical Exam    Physical Exam:  General- Patient alert and oriented  HEENT- PERRLA  Neck- No JVD, Lymphadenopathy  CV- Regular rate and rhythm,   Resp- Lungs CTA Bilaterally,  GI- Non tender/non-distended,  Extrem- No cyanosis, clubbing, edema.  Neuro- Strength 5/5 flexors/extensors,  Skin-  No masses, rashes or lesions noted on cursory skin exam.  Overview/Hospital Course:     Significant Labs:   BMP:   Recent Labs   Lab 12/03/20  0449   *      K 4.6      CO2 23   BUN 8   CREATININE 0.6   CALCIUM 8.4*   MG 2.1     CBC:   Recent Labs   Lab 12/02/20  1931  12/02/20  2343  12/03/20  0415 12/03/20  0449 12/03/20  0741   WBC 20.57*  --  13.59*  --   --  16.00*  --    HGB 11.2*  --  10.8*  --   --  10.8*  --    HCT 34.8*   < > 34.0*   < > 34* 33.4* 33*   PLT  210  --  154  --   --  161  --     < > = values in this interval not displayed.     CMP:   Recent Labs   Lab 12/02/20  0321 12/02/20  1931 12/02/20  2343 12/03/20  0449     137 139 138 136   K 3.8  3.8 3.4*  3.4* 4.5 4.6     104 111* 110 107   CO2 22*  22* 18* 20* 23     102 243* 184* 199*   BUN 13  13 8 8 8   CREATININE 0.7  0.7 0.6 0.7 0.6   CALCIUM 8.8  8.8 8.0* 8.2* 8.4*   PROT 6.9  --   --  5.9*   ALBUMIN 3.7  --   --  3.0*   BILITOT 0.7  --   --  0.6   ALKPHOS 82  --   --  63   AST 21  --   --  51*   ALT 24  --   --  42   ANIONGAP 11  11 10 8 6*   EGFRNONAA >60.0  >60.0 >60.0 >60.0 >60.0       Significant Imaging: I have reviewed and interpreted all pertinent imaging results/findings within the past 24 hours.  CXR with right upper lobe opacity to follow   Assessment/Plan:      Active Diagnoses:    Diagnosis Date Noted POA    PRINCIPAL PROBLEM:  Chest pain [R07.9] 06/25/2020 Yes    S/P CABG x 3 [Z95.1] 12/03/2020 Not Applicable    Coronary artery disease of native artery with stable angina pectoris [I25.118] 10/19/2020 Yes    H/o PE and DVT [I27.82] 10/19/2020 Yes    Hyperlipidemia [E78.5] 10/06/2020 Yes    DVT (deep venous thrombosis) [I82.409] 06/21/2020 Yes    Dependence on nicotine from cigarettes [F17.210] 11/19/2018 Yes    Sleep apnea [G47.30] 11/13/2018 Yes      Problems Resolved During this Admission:       ASSESSMENT AND PLAN         1. Acute on Chronic angina      2. H/O CAD with small vessel disease s/p angiogram with MVD and s/p      Balloon pump post cath for CABG   3. H/O DVT/PE  4. Hyperlipidemia  5. Essential HTN   6. Right  Upper lobe opacity to follow     PLAN   Post op care as per CT surgery   Continue present management  Lopressor   To resume anticoagulation with Lovenox tomorrow.  She used to follow-up with Dr. Pina  for recurrent DVTs and she was placed on Lovenox most likely intolerance to warfarin.      VTE Risk Mitigation (From admission, onward)          Ordered     heparin (porcine) 6,000 Units in lactated ringers 1,000 mL  Once      12/02/20 1518                   Donald Hilton MD  Department of Hospital Medicine   [facility]

## 2020-12-03 NOTE — TRANSFER OF CARE
Anesthesia Transfer of Care Note    Patient: Aleyda Costa    Procedure(s) Performed: Procedure(s) (LRB):  CORONARY ARTERY BYPASS GRAFT (CABG) (N/A)  SURGICAL PROCUREMENT, VEIN, ENDOSCOPIC (Left)    Patient location: ICU    Anesthesia Type: general    Transport from OR: Upon arrival to PACU/ICU, patient attached to ventilator and auscultated to confirm bilateral breath sounds and adequate TV. Continuous ECG monitoring in transport. Continuous SpO2 monitoring in transport. Continuos invasive BP monitoring in transport. Transported from OR intubated on 100% O2 by AMBU with adequate controlled ventilation    Post pain: adequate analgesia    Post assessment: no apparent anesthetic complications    Post vital signs: stable    Level of consciousness: sedated    Nausea/Vomiting: no nausea/vomiting    Complications: none    Transfer of care protocol was followed      Last vitals:   Visit Vitals  BP (!) 96/52   Pulse (!) 52   Temp 36.6 °C (97.9 °F) (Oral)   Resp 17   Ht 6' (1.829 m)   Wt 98.8 kg (217 lb 13 oz)   LMP  (LMP Unknown)   SpO2 99%   Breastfeeding No   BMI 29.54 kg/m²

## 2020-12-03 NOTE — PROGRESS NOTES
CVT SURGERY PROGRESS NOTE  Aleyda Costa  40 y.o.  1980    Patient's Chief Complaint:  Chest pain    HPI:  40-year-old  female with PMH including HTN, GERD, chronic back pain, and chronic smoker.  She presented with unstable angina and underwent left heart catheterization showing multivessel CAD with high-grade proximal LAD lesion.  She also had myocardial infarction.  She underwent emergent CABG x3 with Dr. Neville.    Last 24 hour Interval:  - Hemodynamics stable  - IABP removed    Subjective:  Symptoms:  Stable.    Diet:  Poor intake.    Activity level: Activity impairment: bedrest.    Pain:  She complains of pain that is mild.  Pain is well controlled.                                                                 Objective:  General Appearance:  Comfortable.    Vital signs: (most recent): Blood pressure 118/65, pulse 84, temperature 98.1 °F (36.7 °C), temperature source Core ([place]), resp. rate 18, height 6' (1.829 m), weight 98.8 kg (217 lb 13 oz), SpO2 95 %, not currently breastfeeding.  Vital signs are normal.  No fever.    Output: Producing urine.    Lungs:  Normal effort and normal respiratory rate.    Heart: Normal rate.  Regular rhythm.    Abdomen: Abdomen is soft.    Pulses: Distal pulses are intact.    Neurological: Patient is alert and oriented to person, place and time.    Pupils:  Pupils are equal, round, and reactive to light.    Skin:  Warm and dry.  (Dressings CDI)    Recent Vitals:  Vitals:    12/03/20 0936 12/03/20 1100 12/03/20 1107 12/03/20 1200   BP:  (!) 109/59  118/65   Pulse:  89  84   Resp: 20 15 15 18   Temp:   98.1 °F (36.7 °C)    TempSrc:   Core ([place])    SpO2:  96%  95%   Weight:       Height:           INPATIENT MEDS   sodium chloride 0.45% 50 mL/hr at 12/03/20 0601    insulin regular 1 units/mL infusion orderable (CTS POST-OP) 2 Units/hr (12/03/20 0601)      aspirin  81 mg Oral Daily    atorvastatin  40 mg Oral QHS    chlorhexidine  15 mL  Mouth/Throat BID    docusate sodium  100 mg Oral BID    FLUoxetine  40 mg Oral Daily    furosemide (LASIX) IV  20 mg Intravenous Once    custom IVPB builder   Irrigation Once    insulin (HUMAN R) infusion (adults)  1 Units/hr Intravenous Once    isosorbide mononitrate  30 mg Oral Daily    metoprolol tartrate  25 mg Oral BID    mupirocin   Nasal BID    pantoprazole  40 mg Oral BID    vancomycin (VANCOCIN) IVPB  1,500 mg Intravenous Q12H     albumin human 5%, calcium chloride IVPB, calcium chloride IVPB, calcium chloride IVPB, calcium gluconate IVPB, calcium gluconate IVPB, calcium gluconate IVPB, dextrose 50%, dextrose 50%, HYDROcodone-acetaminophen, hydrOXYzine pamoate, ketorolac, magnesium oxide, magnesium sulfate IVPB, magnesium sulfate IVPB, magnesium sulfate IVPB, magnesium sulfate IVPB, magnesium sulfate IVPB, magnesium sulfate IVPB, midazolam, midazolam, morphine, morphine, morphine, ondansetron, oxyCODONE, potassium chloride in water, potassium chloride in water, potassium chloride in water, potassium chloride in water, potassium chloride in water, potassium chloride in water, potassium chloride in water, potassium chloride, potassium chloride, potassium chloride, potassium chloride, sodium phosphate IVPB, sodium phosphate IVPB, sodium phosphate IVPB, sodium phosphate IVPB, sodium phosphate IVPB, sodium phosphate IVPB, sodium phosphate IVPB, sodium phosphate IVPB, tiZANidine  HEMODYNAMICS  PAP: (27-42)/(6-16) 29/11  PAP (Mean):  [17 mmHg-24 mmHg] 20 mmHg  CO:  [5.5 L/min-9 L/min] 6.6 L/min  CI:  [2.8 L/min/m2-3.8 L/min/m2] 3 L/min/m2   Recent O2 Therapy/Vent Settings:  NC  I/O last 24 hrs:  Intake/Output - Last 3 Shifts       12/01 0700 - 12/02 0659 12/02 0700 - 12/03 0659 12/03 0700 - 12/04 0659    P.O. 350 120     I.V. (mL/kg) 11.1 (0.1) 3175.2 (32.1)     Blood  300     IV Piggyback  100     Total Intake(mL/kg) 361.1 (3.7) 3695.2 (37.4)     Urine (mL/kg/hr)  1995 (0.8) 605 (1)    Drains  150      Blood  500     Chest Tube  477 0    Total Output  3122 605    Net +361.1 +573.2 -605           Urine Occurrence 2 x          Recent Cardiac Rhythm: RRR    Recent Pain Assessment: Mild and controlled    CBC  Recent Labs   Lab 12/02/20 1931 12/02/20  2343 12/03/20  0449   WBC 20.57* 13.59* 16.00*   RBC 3.92* 3.81* 3.73*   HGB 11.2* 10.8* 10.8*    154 161   MCV 89 89 90   MCH 28.6 28.3 29.0   MCHC 32.2 31.8* 32.3     BMP  Recent Labs   Lab 12/02/20 1931 12/02/20  2343 12/03/20  0449   CO2 18* 20* 23   BUN 8 8 8   CREATININE 0.6 0.7 0.6   CALCIUM 8.0* 8.2* 8.4*     CARDIAC ENZYMES  Recent Labs   Lab 11/30/20  0425  12/01/20  0520 12/01/20  2248 12/02/20  0321   TROPONINI <0.030   < > <0.030 0.104* 0.116*   CPK 66  --   --   --   --     < > = values in this interval not displayed.     BNP  No results for input(s): BNP in the last 168 hours.  PT/INR  INR   Date Value Ref Range Status   06/24/2020 1.0 0.8 - 1.2 Final     Comment:     Coumadin Therapy:  2.0 - 3.0 for INR for all indicators except mechanical heart valves  and antiphospholipid syndromes which should use 2.5 - 3.5.     12/03/2018 1.2 0.8 - 1.2 Final     Comment:     Coumadin Therapy:  2.0 - 3.0 for INR for all indicators except mechanical heart valves  and antiphospholipid syndromes which should use 2.5 - 3.5.     11/19/2018 1.1 0.8 - 1.2 Final     Comment:     Coumadin Therapy:  2.0 - 3.0 for INR for all indicators except mechanical heart valves  and antiphospholipid syndromes which should use 2.5 - 3.5.       DRUG LEVEL  No results found for: DIGOXIN    DIAGNOSTIC RESULTS:     12/3/2020:    There are median sternotomy wires and mediastinal clips. Previous  endotracheal tube and NG tube have been removed. Otherwise, lines and  tubes are stable. Cardiac mediastinal contours are normal. Right upper  lobe atelectasis is decreased. There is no pneumothorax or pleural  effusion.     IMPRESSION:     Interval extubation and decrease in right upper lobe  atelectasis.     Electronically Signed by Leonor Saravia on 12/3/2020 7:13 AM      ECG Results          EKG 12-lead (In process)  Result time 11/28/20 14:52:58    In process by Interface, Lab In [facility] (11/28/20 14:52:58)                 Narrative:    Test Reason : R07.9,    Vent. Rate : 103 BPM     Atrial Rate : 117 BPM     P-R Int : 232 ms          QRS Dur : 072 ms      QT Int : 440 ms       P-R-T Axes : 081 047 -65 degrees     QTc Int : 576 ms    Age and gender specific analysis  Undetermined rhythm  ST elevation consider lateral injury or acute infarct  Prolonged QT    ACUTE MI / STEMI    Abnormal ECG  When compared with ECG of 28-NOV-2020 01:05,  Significant changes have occurred    Referred By: [email or web address]   SELF           Confirmed By:                              EKG 12-lead (Final result)  Result time 11/28/20 09:32:17    Final result by Interface, Lab In [facility] (11/28/20 09:32:17)                 Narrative:    Test Reason : R07.9,    Vent. Rate : 070 BPM     Atrial Rate : 070 BPM     P-R Int : 154 ms          QRS Dur : 072 ms      QT Int : 396 ms       P-R-T Axes : 042 049 036 degrees     QTc Int : 427 ms    Normal sinus rhythm      When compared with ECG of 19-OCT-2020 20:50,  No significant change was found  Confirmed by Emery PANDYA, Austin ELMORE ([id number]) on 11/28/2020 9:32:05 AM    Referred By: [email or web address]   SELF           Confirmed By:Austin Land MD                            @[email or web address]    CURRENT CONSULTS:  IP CONSULT TO HOSPITAL MEDICINE  IP CONSULT TO CARDIOLOGY  IP CONSULT TO CARDIO PULMONARY REHAB  IP CONSULT TO CARDIOTHORACIC SURGERY    ASSESSMENT/PLAN:    Multivessel CAD  NSTEMI  IABP placement  S/p Emergent CABG x 3 (LIMA-LAD, Reverse SVG from aorta to diagonal, and OM)  - POD # 1  - Denies ACS Symptoms  - Remains on NC. Wean per protocol. VSS.  - Tele reviewed - NSR  - IABP removed  - H&H stable  - Leukocytosis noted, likely reactive, remains afebrile  - Chest tubes and pacer wires to remain  -  Surgical Dressings CDI. Surgical incision without evidence of infection  - OOB TID as tolerated  - Continue/Encourage IS  - Cardiac Rehab consulted  - Continue ASA, BB, statin  - Lasix as ordered  - Replace electrolytes accordingly  - Ambulate once bedrest is up.     Acute blood loss anemia, post operative  - Expected post surgery.  - H&H: 10.8 & 33.4  - Stable  - Continue to monitor                 GI Prophylaxis: proton pump inhibitor per orders  DVT Prophylaxis:   Anticoagulants   Medication Route Frequency    heparin (porcine) 6,000 Units in lactated ringers 1,000 mL Irrigation Once           Hola Kraft AGACNP-BC  12/3/2020  1:12 PM

## 2020-12-03 NOTE — NURSING
"0340- Patient extubated to 4L NC. Patient tolerated well and is AAOX4 and has strong/productive cough. Patient updated on POC and post op CABG education.    0430- Patient using IS and is only able to hit 500 three times out of 10. Patient states, "its really painful." Will continue to work with patient on IS.    "

## 2020-12-03 NOTE — RESPIRATORY THERAPY
Pt extubated to 4 lpm 02 hfnc. Pt with strong productive cough and able to speak clearly. BBS loud and equal. Pt tolerated well with no adverse reactions noted.

## 2020-12-03 NOTE — ANESTHESIA POSTPROCEDURE EVALUATION
Anesthesia Post Evaluation    Patient: Aleyda Costa    Procedure(s) Performed: Procedure(s) (LRB):  CORONARY ARTERY BYPASS GRAFT (CABG) (N/A)  SURGICAL PROCUREMENT, VEIN, ENDOSCOPIC (Left)    Final Anesthesia Type: general    Patient location during evaluation: ICU  Patient participation: Yes- Able to Participate  Level of consciousness: awake and alert  Post-procedure vital signs: reviewed and stable  Pain management: adequate  Airway patency: patent    PONV status at discharge: No PONV  Anesthetic complications: no      Cardiovascular status: blood pressure returned to baseline and stable  Respiratory status: unassisted and nasal cannula  Hydration status: euvolemic  Follow-up not needed.  Comments: Patient status post coronary artery bypass surgery. The patient is extubated, and she is awake and alert. She  reports good pain control without nausea vomiting.  Her airway patent, and she has no breathing problems. She denies any surgical recall. Her neurological exam is at baseline. She is hemodynamically stable on a balloon pump, and she has adequate urine output. There were no complications from anesthesia for emergency coronary artery bypass surgery.            Vitals Value Taken Time   /71 12/03/20 0600   Temp 36.8 °C (98.3 °F) 12/03/20 0301   Pulse 90 12/03/20 0617   Resp 15 12/03/20 0617   SpO2 96 % 12/03/20 0617   Vitals shown include unvalidated device data.      No case tracking events are documented in the log.      Pain/Claudine Score: Pain Rating Prior to Med Admin: 7 (12/3/2020 12:42 AM)  Pain Rating Post Med Admin: 0 (12/3/2020  1:12 AM)  Claudine Score: 10 (12/2/2020  2:04 PM)

## 2020-12-03 NOTE — PLAN OF CARE
12/02/20 2055   Patient Assessment/Suction   Level of Consciousness (AVPU) responds to pain   Respiratory Effort Unlabored   Expansion/Accessory Muscles/Retractions expansion symmetric   All Lung Fields Breath Sounds coarse   Rhythm/Pattern, Respiratory assisted mechanically   Cough Frequency with stimulation   Cough Type assisted   Suction Method tracheal   $ Suction Charges Inline Suction Procedure Stat Charge   Secretions Amount small   Secretions Color white   Secretions Characteristics thin   PRE-TX-O2   O2 Device (Oxygen Therapy) ventilator   $ Is the patient on Low Flow Oxygen? Yes   Oxygen Concentration (%) 60   SpO2 98 %   Pulse Oximetry Type Continuous   $ Pulse Oximetry - Multiple Charge Pulse Oximetry - Multiple   Pulse (!) 180   Resp 17   Vent Select   Charged w/in last 24h YES   Preset Conventional Ventilator Settings   Vent Type    Ventilation Type VC   Vent Mode SIMV   Set Rate 18 BPM   Vt Set 500 mL   PEEP/CPAP 5 cmH20   Pressure Support 10 cmH20   Waveform RAMP   Peak Flow 60 L/min   Plateau Set/Insp. Hold (sec) 0   Insp Rise Time  50 %   Trigger Sensitivity Flow/I-Trigger 3 L/min   Patient Ventilator Parameters   Resp Rate Total 18 br/min   Peak Airway Pressure 22 cmH2O   Mean Airway Pressure 10 cmH20   Plateau Pressure 0 cmH20   Exhaled Vt 512 mL   Total Ve 9.27 mL   Spont Ve 0 L   I:E Ratio Measured 1:2.80   Conventional Ventilator Alarms   Resp Rate High Alarm 50 br/min   Press High Alarm 40 cmH2O   Apnea Rate 10   Apnea Volume (mL) 1 mL   Apnea Oxygen Concentration  100   Apnea Flow Rate (L/min) 70   T Apnea 20 sec(s)   Ready to Wean/Extubation Screen   FIO2<=50 (chart decimal) (!) 0.6   MV<16L (chart vol.) 9.27   PEEP <=8 (chart #) 5   Ready to Wean Parameters   F/VT Ratio<105 (RSBI) (!) 33.2   Education   $ Education 15 min;Ventilator Oxygen   Respiratory Evaluation   $ Care Plan Tech Time 15 min   Evaluation For New Orders   Admitting Diagnosis CABG

## 2020-12-03 NOTE — PLAN OF CARE
Plan of care reviewed with patient and patient's spouse. Both verbalized understanding. Will continue to provide post op CABG education.

## 2020-12-03 NOTE — OP NOTE
This is Dr. Neville dictating with date of service being 2nd December 2020.  Preoperative diagnosis:  Severe atherosclerotic coronary artery disease.  Postoperative diagnosis:  Same.  Operation:  Emergency coronary artery bypass grafting x3 using left internal mammary artery to the left anterior descending coronary artery and separate segments of saphenous vein from the aorta to the diagonal coronary artery and from the aorta to the obtuse marginal coronary artery using a combination of antegrade and retrograde cardioplegia, mild systemic hypothermia and endoscopic vein harvest from the left leg.  Operation in detail:  The patient was prepped and draped in usual sterile fashion.  The saphenous vein was harvested from the left leg with the Terumo endoscopic harvest system.  The side branches were initially controlled with electrocautery.  The vein was excised and retrogradely flushed and found to be satisfactory.  The vein branches were reinforced with hemoclips and silk ties.  The leg was closed in a single layer of the subcutaneous tissue with running 2 0 Monocryl stitch in the skin was closed with running 3 0 subcuticular Monocryl stitch.  The chest was opened through median sternotomy and the left internal mammary artery was harvested with the cautery and hemoclips.  After heparinization the mammary artery was divided distally and found to have satisfactory flow.  The pericardium was incised and retraction sutures placed.  The patient was cannulated using the aorta and right atrial appendage in two-stage cannula for venous drainage.  Antegrade and retrograde cardioplegia lines were placed in the aorta and right atrial free wall and into the coronary sinus respectively.  The patient was placed on cardiopulmonary bypass and cooled to systemic moderate hypothermia.  The cross-clamp was applied and the heart arrested initially with antegrade cardioplegia and then intermittently with retrograde cardioplegia.  Once  the heart was satisfactorily arrested the bypasses were done 1st with saphenous vein sewn in an end-to-side fashion to the obtuse marginal coronary artery with running 7 0 Prolene suture.  The graft was measured to length and cut and sewn proximally to the aorta in an end-to-side fashion with running 6 0 Prolene suture.  The vein was then neck sewn in end-to-side fashion to the diagonal coronary artery with running 7 0 Prolene suture.  The graft was measured to length and cut and sewn proximally to the aorta in an end-to-side fashion with running 6 0 Prolene suture.  The left internal mammary artery was sewn to the left anterior descending coronary artery in end-to-side fashion with running 7 0 Prolene suture.  Flow was checked and seemed to be satisfactory.  The pedicle was fixed to the epicardium with interrupted 6 0 Prolene suture.  The patient was rewarming during this time and the cross-clamp released.  The patient was weaned from cardiopulmonary bypass once satisfactory hemodynamics were maintained and rewarming complete.  Chest tubes were inserted into the left hemithorax and 2 into the anterior mediastinum through separate stab wounds and secured to skin with 0 silk suture.  Two pacing wires were placed on the right ventricle and brought out through separate stab wounds and secured to skin with 2-0 silk suture.  Once the patient was doing well decannulation was performed and the sites secured with their respective pursestrings in oversewn with Prolene suture.  The chest was closed with interrupted stainless steel wire on the sternum.  The presternal fascia and subcutaneous tissues were closed with running 0 and 2 0 Monocryl stitch respectively while the skin was closed with running 3 0 subcuticular Monocryl stitch.  Overall patient tolerated the procedure well and there appeared to be no major obvious intraoperative complications.  Estimated blood loss was 500 cc and the patient was brought to the ICU in  satisfactory condition.

## 2020-12-03 NOTE — PROGRESS NOTES
Progress Note  Cardiology    Admit Date: 11/28/2020   LOS: 3 days     Follow-up For:  ACS/non STEMI; high-grade ostial LAD stenosis, OMB, diagonal, ramus intermedius stenosis.  Severe chest pain post angiogram; IABP.  Three-vessel coronary artery bypass graft surgery with LIMA 12/2/20      Scheduled Meds:   acetaminophen  1,000 mg Intravenous Once    aspirin  81 mg Oral Daily    atorvastatin  40 mg Oral QHS    chlorhexidine  15 mL Mouth/Throat BID    clopidogreL  75 mg Oral QHS    docusate sodium  100 mg Oral BID    FLUoxetine  40 mg Oral Daily    custom IVPB builder   Irrigation Once    insulin (HUMAN R) infusion (adults)  1 Units/hr Intravenous Once    isosorbide mononitrate  30 mg Oral Daily    mupirocin   Nasal BID    pantoprazole  40 mg Oral BID    vancomycin (VANCOCIN) IVPB  1,500 mg Intravenous Q12H     Continuous Infusions:   sodium chloride 0.45% 50 mL/hr at 12/03/20 0601    insulin regular 1 units/mL infusion orderable (CTS POST-OP) 2 Units/hr (12/03/20 0601)     PRN Meds:albumin human 5%, calcium chloride IVPB, calcium chloride IVPB, calcium chloride IVPB, calcium gluconate IVPB, calcium gluconate IVPB, calcium gluconate IVPB, dextrose 50%, dextrose 50%, HYDROcodone-acetaminophen, hydrOXYzine pamoate, ketorolac, magnesium oxide, magnesium sulfate IVPB, magnesium sulfate IVPB, magnesium sulfate IVPB, magnesium sulfate IVPB, magnesium sulfate IVPB, magnesium sulfate IVPB, midazolam, midazolam, morphine, morphine, morphine, ondansetron, oxyCODONE, potassium chloride in water, potassium chloride in water, potassium chloride in water, potassium chloride in water, potassium chloride in water, potassium chloride in water, potassium chloride in water, potassium chloride, potassium chloride, potassium chloride, potassium chloride, sodium phosphate IVPB, sodium phosphate IVPB, sodium phosphate IVPB, sodium phosphate IVPB, sodium phosphate IVPB, sodium phosphate IVPB, sodium phosphate IVPB, sodium  phosphate IVPB, tiZANidine    Review of patient's allergies indicates:   Allergen Reactions    Clarithromycin Swelling and Other (See Comments)     DIFFICULTY SWALLOWING  DIFFICULTY SWALLOWING    Pcn [penicillins] Anaphylaxis    Sulfa (sulfonamide antibiotics) Hives    Wellbutrin [bupropion hcl] Shortness Of Breath and Anaphylaxis    Betadine [povidone-iodine] Hives     Swelling      Cefprozil Hives    Iodinated contrast media Hives    Latex, natural rubber Rash    Sulfamethoxazole-trimethoprim Nausea And Vomiting    Iodine Hives and Swelling    Latex Hives and Swelling       SUBJECTIVE:     Interval History: Patient has complaints incisional chest pain.  Mild cough.    Review of Systems  Respiratory: positive for cough and sputum, negative for hemoptysis and wheezing  Cardiovascular: positive for chest pain, negative for dyspnea and palpitations    OBJECTIVE:     Vital Signs (Most Recent)  Temp: 98.5 °F (36.9 °C) (12/03/20 0715)  Pulse: 93 (12/03/20 0900)  Resp: 15 (12/03/20 1100)  BP: 112/68 (12/03/20 0900)  SpO2: 96 % (12/03/20 0900)    Vital Signs Range (Last 24H):  Temp:  [97.9 °F (36.6 °C)-98.5 °F (36.9 °C)]   Pulse:  []   Resp:  [0-31]   BP: ()/(55-78)   SpO2:  [94 %-100 %]   Arterial Line BP: (108-149)/(67-83)       Physical Exam:  Neck: no carotid bruit, no JVD and supple, symmetrical, trachea midline  Lungs: clear to auscultation bilaterally, normal respiratory effort  Heart: regular rate and rhythm, S1, S2 normal, no murmur, click, rub or gallop  Abdomen: soft, non-tender; bowel sounds normal; no masses,  no organomegaly  Extremities: Extremities normal, atraumatic, no cyanosis, clubbing, or edema  Systolic murmur at left sternal border.  Recent Results (from the past 24 hour(s))   Type & Screen    Collection Time: 12/02/20  2:09 PM   Result Value Ref Range    Group & Rh A POS     Indirect Jeannie NEG    Prepare RBC 4 Units; surgery    Collection Time: 12/02/20  2:09 PM   Result  Value Ref Range    UNIT NUMBER [id number]     Product Code V1323L34     DISPENSE STATUS CROSSMATCHED     CODING SYSTEM [id number]     Unit Blood Type Code 6200     Unit Blood Type A POS     Unit Expiration [id number]     UNIT NUMBER [id number]     Product Code P2315Y13     DISPENSE STATUS CROSSMATCHED     CODING SYSTEM [id number]     Unit Blood Type Code 6200     Unit Blood Type A POS     Unit Expiration [id number]     UNIT NUMBER [id number]     Product Code K7731V72     DISPENSE STATUS CROSSMATCHED     CODING SYSTEM [id number]     Unit Blood Type Code 6200     Unit Blood Type A POS     Unit Expiration [id number]     UNIT NUMBER [id number]     Product Code P8026E44     DISPENSE STATUS CROSSMATCHED     CODING SYSTEM [id number]     Unit Blood Type Code 6200     Unit Blood Type A POS     Unit Expiration [id number]    ISTAT ACT-K    Collection Time: 12/02/20  3:46 PM   Result Value Ref Range    POC ACTIVATED CLOTTING TIME K 136 74 - 137 sec    Sample ARTERIAL    ISTAT PROCEDURE    Collection Time: 12/02/20  3:47 PM   Result Value Ref Range    POC PH 7.366 7.35 - 7.45    POC PCO2 36.0 35 - 45 mmHg    POC PO2 379 (H) 80 - 100 mmHg    POC HCO3 20.6 (L) 24 - 28 mmol/L    POC BE -5 -2 to 2 mmol/L    POC SATURATED O2 100 95 - 100 %    POC Glucose 100 70 - 110 mg/dL    POC Sodium 139 136 - 145 mmol/L    POC Potassium 3.8 3.5 - 5.1 mmol/L    POC TCO2 22 (L) 23 - 27 mmol/L    POC Ionized Calcium 1.11 1.06 - 1.42 mmol/L    POC Hematocrit 28 (L) 36 - 54 %PCV    Sample ARTERIAL    ISTAT ACT-K    Collection Time: 12/02/20  4:56 PM   Result Value Ref Range    POC ACTIVATED CLOTTING TIME K 846 (H) 74 - 137 sec    Sample ARTERIAL    ISTAT PROCEDURE    Collection Time: 12/02/20  5:26 PM   Result Value Ref Range    POC PH 7.378 7.35 - 7.45    POC PCO2 37.0 35 - 45 mmHg    POC PO2 330 (H) 80 - 100 mmHg    POC HCO3 21.8 (L) 24 - 28 mmol/L    POC BE -3 -2 to 2 mmol/L    POC SATURATED O2 100 95 - 100 %    POC Glucose 104 70 - 110  mg/dL    POC Sodium 138 136 - 145 mmol/L    POC Potassium 4.1 3.5 - 5.1 mmol/L    POC TCO2 23 23 - 27 mmol/L    POC Ionized Calcium 0.95 (L) 1.06 - 1.42 mmol/L    POC Hematocrit 22 (L) 36 - 54 %PCV    Sample ARTERIAL    ISTAT ACT-K    Collection Time: 12/02/20  5:30 PM   Result Value Ref Range    POC ACTIVATED CLOTTING TIME K 555 (H) 74 - 137 sec    Sample ARTERIAL    ISTAT ACT-K    Collection Time: 12/02/20  6:22 PM   Result Value Ref Range    POC ACTIVATED CLOTTING TIME K 109 74 - 137 sec    Sample ARTERIAL    ISTAT PROCEDURE    Collection Time: 12/02/20  6:26 PM   Result Value Ref Range    POC PH 7.389 7.35 - 7.45    POC PCO2 40.4 35 - 45 mmHg    POC PO2 497 (H) 80 - 100 mmHg    POC HCO3 24.4 24 - 28 mmol/L    POC BE -1 -2 to 2 mmol/L    POC SATURATED O2 100 95 - 100 %    POC Glucose 176 (H) 70 - 110 mg/dL    POC Sodium 139 136 - 145 mmol/L    POC Potassium 3.6 3.5 - 5.1 mmol/L    POC TCO2 26 23 - 27 mmol/L    POC Ionized Calcium 1.15 1.06 - 1.42 mmol/L    POC Hematocrit 27 (L) 36 - 54 %PCV    Sample ARTERIAL    ISTAT PROCEDURE    Collection Time: 12/02/20  7:27 PM   Result Value Ref Range    POC PH 7.342 (L) 7.35 - 7.45    POC PCO2 37.4 35 - 45 mmHg    POC PO2 318 (H) 80 - 100 mmHg    POC HCO3 20.3 (L) 24 - 28 mmol/L    POC BE -5 -2 to 2 mmol/L    POC SATURATED O2 100 95 - 100 %    POC Glucose 229 (H) 70 - 110 mg/dL    POC Sodium 141 136 - 145 mmol/L    POC Potassium 3.4 (L) 3.5 - 5.1 mmol/L    POC TCO2 21 (L) 23 - 27 mmol/L    POC Ionized Calcium 1.14 1.06 - 1.42 mmol/L    POC Hematocrit 35 (L) 36 - 54 %PCV    Rate 14     Sample ARTERIAL     Site [facility]     Allens Test N/A     DelSys Adult Vent     Mode SIMV     Vt 750     PEEP 5     PS 10     FiO2 100    CBC auto differential    Collection Time: 12/02/20  7:31 PM   Result Value Ref Range    WBC 20.57 (H) 3.90 - 12.70 K/uL    RBC 3.92 (L) 4.00 - 5.40 M/uL    Hemoglobin 11.2 (L) 12.0 - 16.0 g/dL    Hematocrit 34.8 (L) 37.0 - 48.5 %    MCV 89 82 - 98 fL    MCH  28.6 27.0 - 31.0 pg    MCHC 32.2 32.0 - 36.0 g/dL    RDW 16.7 (H) 11.5 - 14.5 %    Platelets 210 150 - 350 K/uL    MPV 10.3 9.2 - 12.9 fL    Immature Granulocytes 1.3 (H) 0.0 - 0.5 %    Gran # (ANC) 15.4 (H) 1.8 - 7.7 K/uL    Immature Grans (Abs) 0.26 (H) 0.00 - 0.04 K/uL    Lymph # 4.2 1.0 - 4.8 K/uL    Mono # 0.6 0.3 - 1.0 K/uL    Eos # 0.1 0.0 - 0.5 K/uL    Baso # 0.06 0.00 - 0.20 K/uL    nRBC 0 0 /100 WBC    Gran % 74.9 (H) 38.0 - 73.0 %    Lymph % 20.4 18.0 - 48.0 %    Mono % 2.7 (L) 4.0 - 15.0 %    Eosinophil % 0.4 0.0 - 8.0 %    Basophil % 0.3 0.0 - 1.9 %    Differential Method Automated    Basic metabolic panel    Collection Time: 12/02/20  7:31 PM   Result Value Ref Range    Sodium 139 136 - 145 mmol/L    Potassium 3.4 (L) 3.5 - 5.1 mmol/L    Chloride 111 (H) 95 - 110 mmol/L    CO2 18 (L) 23 - 29 mmol/L    Glucose 243 (H) 70 - 110 mg/dL    BUN 8 6 - 20 mg/dL    Creatinine 0.6 0.5 - 1.4 mg/dL    Calcium 8.0 (L) 8.7 - 10.5 mg/dL    Anion Gap 10 8 - 16 mmol/L    eGFR if African American >60.0 >60 mL/min/1.73 m^2    eGFR if non African American >60.0 >60 mL/min/1.73 m^2   Potassium    Collection Time: 12/02/20  7:31 PM   Result Value Ref Range    Potassium 3.4 (L) 3.5 - 5.1 mmol/L   Magnesium    Collection Time: 12/02/20  7:31 PM   Result Value Ref Range    Magnesium 1.8 1.6 - 2.6 mg/dL   Phosphorus    Collection Time: 12/02/20  7:31 PM   Result Value Ref Range    Phosphorus 2.3 (L) 2.7 - 4.5 mg/dL   POCT glucose    Collection Time: 12/02/20  9:05 PM   Result Value Ref Range    POC Glucose 238 (H) 70 - 110   POCT glucose    Collection Time: 12/02/20 10:12 PM   Result Value Ref Range    POC Glucose 148 (H) 70 - 110   POCT glucose    Collection Time: 12/02/20 11:08 PM   Result Value Ref Range    POC Glucose 198 (H) 70 - 110   ISTAT PROCEDURE    Collection Time: 12/02/20 11:17 PM   Result Value Ref Range    POC PH 7.272 (LL) 7.35 - 7.45    POC PCO2 39.6 35 - 45 mmHg    POC PO2 185 (H) 80 - 100 mmHg    POC HCO3  18.3 (L) 24 - 28 mmol/L    POC BE -9 -2 to 2 mmol/L    POC SATURATED O2 99 95 - 100 %    POC Glucose 184 (H) 70 - 110 mg/dL    POC Sodium 142 136 - 145 mmol/L    POC Potassium 4.2 3.5 - 5.1 mmol/L    POC TCO2 19 (L) 23 - 27 mmol/L    POC Ionized Calcium 1.14 1.06 - 1.42 mmol/L    POC Hematocrit 31 (L) 36 - 54 %PCV    Rate 18     Sample ARTERIAL     Site [place]     Allens Test N/A     DelSys Adult Vent     Mode SIMV     Vt 500     PEEP 5     PS 10     FiO2 60    CBC auto differential    Collection Time: 12/02/20 11:43 PM   Result Value Ref Range    WBC 13.59 (H) 3.90 - 12.70 K/uL    RBC 3.81 (L) 4.00 - 5.40 M/uL    Hemoglobin 10.8 (L) 12.0 - 16.0 g/dL    Hematocrit 34.0 (L) 37.0 - 48.5 %    MCV 89 82 - 98 fL    MCH 28.3 27.0 - 31.0 pg    MCHC 31.8 (L) 32.0 - 36.0 g/dL    RDW 16.5 (H) 11.5 - 14.5 %    Platelets 154 150 - 350 K/uL    MPV 10.3 9.2 - 12.9 fL    Immature Granulocytes 0.4 0.0 - 0.5 %    Gran # (ANC) 12.5 (H) 1.8 - 7.7 K/uL    Immature Grans (Abs) 0.06 (H) 0.00 - 0.04 K/uL    Lymph # 0.4 (L) 1.0 - 4.8 K/uL    Mono # 0.6 0.3 - 1.0 K/uL    Eos # 0.0 0.0 - 0.5 K/uL    Baso # 0.01 0.00 - 0.20 K/uL    nRBC 0 0 /100 WBC    Gran % 91.9 (H) 38.0 - 73.0 %    Lymph % 3.2 (L) 18.0 - 48.0 %    Mono % 4.4 4.0 - 15.0 %    Eosinophil % 0.0 0.0 - 8.0 %    Basophil % 0.1 0.0 - 1.9 %    Differential Method Automated    Basic metabolic panel    Collection Time: 12/02/20 11:43 PM   Result Value Ref Range    Sodium 138 136 - 145 mmol/L    Potassium 4.5 3.5 - 5.1 mmol/L    Chloride 110 95 - 110 mmol/L    CO2 20 (L) 23 - 29 mmol/L    Glucose 184 (H) 70 - 110 mg/dL    BUN 8 6 - 20 mg/dL    Creatinine 0.7 0.5 - 1.4 mg/dL    Calcium 8.2 (L) 8.7 - 10.5 mg/dL    Anion Gap 8 8 - 16 mmol/L    eGFR if African American >60.0 >60 mL/min/1.73 m^2    eGFR if non African American >60.0 >60 mL/min/1.73 m^2   Magnesium    Collection Time: 12/02/20 11:43 PM   Result Value Ref Range    Magnesium 2.8 (H) 1.6 - 2.6 mg/dL   ISTAT PROCEDURE     Collection Time: 12/03/20 12:55 AM   Result Value Ref Range    POC PH 7.334 (L) 7.35 - 7.45    POC PCO2 40.4 35 - 45 mmHg    POC PO2 112 (H) 80 - 100 mmHg    POC HCO3 21.5 (L) 24 - 28 mmol/L    POC BE -4 -2 to 2 mmol/L    POC SATURATED O2 98 95 - 100 %    POC Glucose 156 (H) 70 - 110 mg/dL    POC Sodium 142 136 - 145 mmol/L    POC Potassium 4.5 3.5 - 5.1 mmol/L    POC TCO2 23 23 - 27 mmol/L    POC Ionized Calcium 1.24 1.06 - 1.42 mmol/L    POC Hematocrit 33 (L) 36 - 54 %PCV    Rate 18     Sample ARTERIAL     Site [facility]     Allens Test N/A     DelSys Adult Vent     Mode SIMV     Vt 500     PEEP 5     PS 10     FiO2 40    ISTAT PROCEDURE    Collection Time: 12/03/20  1:46 AM   Result Value Ref Range    POC PH 7.288 (LL) 7.35 - 7.45    POC PCO2 47.8 (H) 35 - 45 mmHg    POC PO2 89 80 - 100 mmHg    POC HCO3 22.9 (L) 24 - 28 mmol/L    POC BE -4 -2 to 2 mmol/L    POC SATURATED O2 96 95 - 100 %    POC Glucose 146 (H) 70 - 110 mg/dL    POC Sodium 142 136 - 145 mmol/L    POC Potassium 4.6 3.5 - 5.1 mmol/L    POC TCO2 24 23 - 27 mmol/L    POC Ionized Calcium 1.26 1.06 - 1.42 mmol/L    POC Hematocrit 35 (L) 36 - 54 %PCV    Rate 12     Sample ARTERIAL     Site [facility]     Allens Test N/A     DelSys Adult Vent     Mode SIMV     Vt 500     PEEP 5     PS 10     FiO2 30    ISTAT PROCEDURE    Collection Time: 12/03/20  2:20 AM   Result Value Ref Range    POC PH 7.287 (LL) 7.35 - 7.45    POC PCO2 47.4 (H) 35 - 45 mmHg    POC PO2 87 80 - 100 mmHg    POC HCO3 22.6 (L) 24 - 28 mmol/L    POC BE -4 -2 to 2 mmol/L    POC SATURATED O2 95 95 - 100 %    POC Glucose 140 (H) 70 - 110 mg/dL    POC Sodium 141 136 - 145 mmol/L    POC Potassium 4.7 3.5 - 5.1 mmol/L    POC TCO2 24 23 - 27 mmol/L    POC Ionized Calcium 1.23 1.06 - 1.42 mmol/L    POC Hematocrit 34 (L) 36 - 54 %PCV    Sample ARTERIAL     Site [facility]     Allens Test N/A     DelSys Adult Vent     Mode CPAP     PEEP 5     PS 10     FiO2 30    ISTAT PROCEDURE    Collection Time:  12/03/20  3:29 AM   Result Value Ref Range    POC PH 7.330 (L) 7.35 - 7.45    POC PCO2 43.6 35 - 45 mmHg    POC PO2 95 80 - 100 mmHg    POC HCO3 23.0 (L) 24 - 28 mmol/L    POC BE -3 -2 to 2 mmol/L    POC SATURATED O2 97 95 - 100 %    POC Glucose 142 (H) 70 - 110 mg/dL    POC Sodium 141 136 - 145 mmol/L    POC Potassium 4.8 3.5 - 5.1 mmol/L    POC TCO2 24 23 - 27 mmol/L    POC Ionized Calcium 1.22 1.06 - 1.42 mmol/L    POC Hematocrit 34 (L) 36 - 54 %PCV    Rate 10     Sample ARTERIAL     [facility]     Allens Test N/A     DelSys Adult Vent     Mode SIMV     Vt 500     PEEP 5     PS 10     FiO2 30    ISTAT PROCEDURE    Collection Time: 12/03/20  4:15 AM   Result Value Ref Range    POC PH 7.293 (L) 7.35 - 7.45    POC PCO2 48.7 (H) 35 - 45 mmHg    POC PO2 101 (H) 80 - 100 mmHg    POC HCO3 23.6 (L) 24 - 28 mmol/L    POC BE -3 -2 to 2 mmol/L    POC SATURATED O2 97 95 - 100 %    POC Glucose 146 (H) 70 - 110 mg/dL    POC Sodium 140 136 - 145 mmol/L    POC Potassium 4.7 3.5 - 5.1 mmol/L    POC TCO2 25 23 - 27 mmol/L    POC Ionized Calcium 1.26 1.06 - 1.42 mmol/L    POC Hematocrit 34 (L) 36 - 54 %PCV    Sample ARTERIAL     [facility]     Allens Test N/A     DelSys Nasal Can    Comprehensive metabolic panel, DAILY IN AM    Collection Time: 12/03/20  4:49 AM   Result Value Ref Range    Sodium 136 136 - 145 mmol/L    Potassium 4.6 3.5 - 5.1 mmol/L    Chloride 107 95 - 110 mmol/L    CO2 23 23 - 29 mmol/L    Glucose 199 (H) 70 - 110 mg/dL    BUN 8 6 - 20 mg/dL    Creatinine 0.6 0.5 - 1.4 mg/dL    Calcium 8.4 (L) 8.7 - 10.5 mg/dL    Total Protein 5.9 (L) 6.0 - 8.4 g/dL    Albumin 3.0 (L) 3.5 - 5.2 g/dL    Total Bilirubin 0.6 0.1 - 1.0 mg/dL    Alkaline Phosphatase 63 55 - 135 U/L    AST 51 (H) 10 - 40 U/L    ALT 42 10 - 44 U/L    Anion Gap 6 (L) 8 - 16 mmol/L    eGFR if African American >60.0 >60 mL/min/1.73 m^2    eGFR if non African American >60.0 >60 mL/min/1.73 m^2   CBC auto differential    Collection Time: 12/03/20   4:49 AM   Result Value Ref Range    WBC 16.00 (H) 3.90 - 12.70 K/uL    RBC 3.73 (L) 4.00 - 5.40 M/uL    Hemoglobin 10.8 (L) 12.0 - 16.0 g/dL    Hematocrit 33.4 (L) 37.0 - 48.5 %    MCV 90 82 - 98 fL    MCH 29.0 27.0 - 31.0 pg    MCHC 32.3 32.0 - 36.0 g/dL    RDW 16.5 (H) 11.5 - 14.5 %    Platelets 161 150 - 350 K/uL    MPV 10.7 9.2 - 12.9 fL    Immature Granulocytes 0.5 0.0 - 0.5 %    Gran # (ANC) 14.9 (H) 1.8 - 7.7 K/uL    Immature Grans (Abs) 0.08 (H) 0.00 - 0.04 K/uL    Lymph # 0.6 (L) 1.0 - 4.8 K/uL    Mono # 0.5 0.3 - 1.0 K/uL    Eos # 0.0 0.0 - 0.5 K/uL    Baso # 0.02 0.00 - 0.20 K/uL    nRBC 0 0 /100 WBC    Gran % 92.9 (H) 38.0 - 73.0 %    Lymph % 3.5 (L) 18.0 - 48.0 %    Mono % 3.0 (L) 4.0 - 15.0 %    Eosinophil % 0.0 0.0 - 8.0 %    Basophil % 0.1 0.0 - 1.9 %    Differential Method Automated    Magnesium    Collection Time: 12/03/20  4:49 AM   Result Value Ref Range    Magnesium 2.1 1.6 - 2.6 mg/dL   POCT glucose    Collection Time: 12/03/20  6:13 AM   Result Value Ref Range    POC Glucose 201 (H) 70 - 110   ISTAT PROCEDURE    Collection Time: 12/03/20  7:41 AM   Result Value Ref Range    POC PH 7.314 (L) 7.35 - 7.45    POC PCO2 41.7 35 - 45 mmHg    POC PO2 88 80 - 100 mmHg    POC HCO3 21.2 (L) 24 - 28 mmol/L    POC BE -5 -2 to 2 mmol/L    POC SATURATED O2 96 95 - 100 %    POC Glucose 156 (H) 70 - 110 mg/dL    POC Sodium 138 136 - 145 mmol/L    POC Potassium 4.5 3.5 - 5.1 mmol/L    POC TCO2 22 (L) 23 - 27 mmol/L    POC Ionized Calcium 1.27 1.06 - 1.42 mmol/L    POC Hematocrit 33 (L) 36 - 54 %PCV    Sample ARTERIAL     Site [facility]     Allens Test N/A     DelSys Nasal Can     Mode SPONT     Flow 2     Sp02 96        Diagnostic Results:  Labs: Reviewed  ECG: Reviewed  X-Ray: Reviewed    ASSESSMENT/PLAN:   Vague haziness right upper lobe region on chest x-ray.  Blood pressure and vital signs are stable.  Intra-aortic balloon pump removed from the right groin.  Apical and LSB systolic murmur.  Inverted T-wave  in V2.  The patient is doing well.  Metoprolol 25 mg b.i.d.; discontinue Plavix.  Dr. Neville emergency CABG appreciated.

## 2020-12-04 ENCOUNTER — PATIENT MESSAGE (OUTPATIENT)
Dept: HEMATOLOGY/ONCOLOGY | Facility: CLINIC | Age: 40
End: 2020-12-04

## 2020-12-04 LAB
ALBUMIN SERPL BCP-MCNC: 2.9 G/DL (ref 3.5–5.2)
ALP SERPL-CCNC: 54 U/L (ref 55–135)
ALT SERPL W/O P-5'-P-CCNC: 29 U/L (ref 10–44)
ANION GAP SERPL CALC-SCNC: 5 MMOL/L (ref 8–16)
AST SERPL-CCNC: 29 U/L (ref 10–40)
BASOPHILS # BLD AUTO: 0.02 K/UL (ref 0–0.2)
BASOPHILS NFR BLD: 0.1 % (ref 0–1.9)
BILIRUB SERPL-MCNC: 0.5 MG/DL (ref 0.1–1)
BUN SERPL-MCNC: 7 MG/DL (ref 6–20)
CALCIUM SERPL-MCNC: 8.2 MG/DL (ref 8.7–10.5)
CHLORIDE SERPL-SCNC: 103 MMOL/L (ref 95–110)
CO2 SERPL-SCNC: 27 MMOL/L (ref 23–29)
CREAT SERPL-MCNC: 0.6 MG/DL (ref 0.5–1.4)
DIFFERENTIAL METHOD: ABNORMAL
EOSINOPHIL # BLD AUTO: 0 K/UL (ref 0–0.5)
EOSINOPHIL NFR BLD: 0.1 % (ref 0–8)
ERYTHROCYTE [DISTWIDTH] IN BLOOD BY AUTOMATED COUNT: 17.2 % (ref 11.5–14.5)
EST. GFR  (AFRICAN AMERICAN): >60 ML/MIN/1.73 M^2
EST. GFR  (NON AFRICAN AMERICAN): >60 ML/MIN/1.73 M^2
FERRITIN SERPL-MCNC: 72 NG/ML (ref 20–300)
GLUCOSE SERPL-MCNC: 123 MG/DL (ref 70–110)
HCT VFR BLD AUTO: 24.9 % (ref 37–48.5)
HCT VFR BLD AUTO: 26 % (ref 37–48.5)
HCT VFR BLD AUTO: 27.5 % (ref 37–48.5)
HGB BLD-MCNC: 7.8 G/DL (ref 12–16)
HGB BLD-MCNC: 8.5 G/DL (ref 12–16)
HGB BLD-MCNC: 8.9 G/DL (ref 12–16)
IMM GRANULOCYTES # BLD AUTO: 0.12 K/UL (ref 0–0.04)
IMM GRANULOCYTES NFR BLD AUTO: 0.8 % (ref 0–0.5)
IRON SERPL-MCNC: 14 UG/DL (ref 30–160)
LYMPHOCYTES # BLD AUTO: 1.8 K/UL (ref 1–4.8)
LYMPHOCYTES NFR BLD: 12.2 % (ref 18–48)
MAGNESIUM SERPL-MCNC: 1.8 MG/DL (ref 1.6–2.6)
MCH RBC QN AUTO: 29.6 PG (ref 27–31)
MCHC RBC AUTO-ENTMCNC: 32.4 G/DL (ref 32–36)
MCV RBC AUTO: 91 FL (ref 82–98)
MONOCYTES # BLD AUTO: 1.2 K/UL (ref 0.3–1)
MONOCYTES NFR BLD: 8.2 % (ref 4–15)
NEUTROPHILS # BLD AUTO: 11.5 K/UL (ref 1.8–7.7)
NEUTROPHILS NFR BLD: 78.6 % (ref 38–73)
NRBC BLD-RTO: 0 /100 WBC
PLATELET # BLD AUTO: 152 K/UL (ref 150–350)
PMV BLD AUTO: 10.7 FL (ref 9.2–12.9)
POTASSIUM SERPL-SCNC: 4.1 MMOL/L (ref 3.5–5.1)
PROT SERPL-MCNC: 5.7 G/DL (ref 6–8.4)
RBC # BLD AUTO: 3.01 M/UL (ref 4–5.4)
SATURATED IRON: 7 % (ref 20–50)
SODIUM SERPL-SCNC: 135 MMOL/L (ref 136–145)
TOTAL IRON BINDING CAPACITY: 197 UG/DL (ref 250–450)
TRANSFERRIN SERPL-MCNC: 141 MG/DL (ref 200–375)
WBC # BLD AUTO: 14.69 K/UL (ref 3.9–12.7)

## 2020-12-04 PROCEDURE — 99222 1ST HOSP IP/OBS MODERATE 55: CPT | Mod: ,,, | Performed by: PHYSICIAN ASSISTANT

## 2020-12-04 PROCEDURE — 99222 PR INITIAL HOSPITAL CARE,LEVL II: ICD-10-PCS | Mod: ,,, | Performed by: PHYSICIAN ASSISTANT

## 2020-12-04 PROCEDURE — 25000003 PHARM REV CODE 250: Performed by: NURSE PRACTITIONER

## 2020-12-04 PROCEDURE — 25000003 PHARM REV CODE 250: Performed by: THORACIC SURGERY (CARDIOTHORACIC VASCULAR SURGERY)

## 2020-12-04 PROCEDURE — P9016 RBC LEUKOCYTES REDUCED: HCPCS

## 2020-12-04 PROCEDURE — 99900031 HC PATIENT EDUCATION (STAT)

## 2020-12-04 PROCEDURE — 85025 COMPLETE CBC W/AUTO DIFF WBC: CPT

## 2020-12-04 PROCEDURE — 85018 HEMOGLOBIN: CPT

## 2020-12-04 PROCEDURE — 83540 ASSAY OF IRON: CPT

## 2020-12-04 PROCEDURE — 85014 HEMATOCRIT: CPT | Mod: 91

## 2020-12-04 PROCEDURE — 83735 ASSAY OF MAGNESIUM: CPT | Mod: 91

## 2020-12-04 PROCEDURE — 63600175 PHARM REV CODE 636 W HCPCS: Performed by: INTERNAL MEDICINE

## 2020-12-04 PROCEDURE — 25000003 PHARM REV CODE 250: Performed by: SPECIALIST

## 2020-12-04 PROCEDURE — 99900035 HC TECH TIME PER 15 MIN (STAT)

## 2020-12-04 PROCEDURE — 63600175 PHARM REV CODE 636 W HCPCS: Mod: JG | Performed by: THORACIC SURGERY (CARDIOTHORACIC VASCULAR SURGERY)

## 2020-12-04 PROCEDURE — P9045 ALBUMIN (HUMAN), 5%, 250 ML: HCPCS | Mod: JG | Performed by: THORACIC SURGERY (CARDIOTHORACIC VASCULAR SURGERY)

## 2020-12-04 PROCEDURE — 25000003 PHARM REV CODE 250

## 2020-12-04 PROCEDURE — 82728 ASSAY OF FERRITIN: CPT

## 2020-12-04 PROCEDURE — 80053 COMPREHEN METABOLIC PANEL: CPT

## 2020-12-04 PROCEDURE — 83735 ASSAY OF MAGNESIUM: CPT

## 2020-12-04 PROCEDURE — 94761 N-INVAS EAR/PLS OXIMETRY MLT: CPT

## 2020-12-04 PROCEDURE — 20000000 HC ICU ROOM

## 2020-12-04 PROCEDURE — 36430 TRANSFUSION BLD/BLD COMPNT: CPT

## 2020-12-04 PROCEDURE — 27000221 HC OXYGEN, UP TO 24 HOURS

## 2020-12-04 RX ORDER — ENOXAPARIN SODIUM 100 MG/ML
1 INJECTION SUBCUTANEOUS
Status: DISCONTINUED | OUTPATIENT
Start: 2020-12-05 | End: 2020-12-06

## 2020-12-04 RX ADMIN — ALBUMIN (HUMAN) 25 G: 12.5 SOLUTION INTRAVENOUS at 03:12

## 2020-12-04 RX ADMIN — MORPHINE SULFATE 2 MG: 2 INJECTION, SOLUTION INTRAMUSCULAR; INTRAVENOUS at 01:12

## 2020-12-04 RX ADMIN — ISOSORBIDE MONONITRATE 30 MG: 30 TABLET, EXTENDED RELEASE ORAL at 09:12

## 2020-12-04 RX ADMIN — HYDROCODONE BITARTRATE AND ACETAMINOPHEN 1 TABLET: 5; 325 TABLET ORAL at 09:12

## 2020-12-04 RX ADMIN — FLUOXETINE 40 MG: 20 CAPSULE ORAL at 06:12

## 2020-12-04 RX ADMIN — MORPHINE SULFATE 2 MG: 2 INJECTION, SOLUTION INTRAMUSCULAR; INTRAVENOUS at 05:12

## 2020-12-04 RX ADMIN — METOPROLOL TARTRATE 25 MG: 25 TABLET, FILM COATED ORAL at 09:12

## 2020-12-04 RX ADMIN — MUPIROCIN: 20 OINTMENT TOPICAL at 08:12

## 2020-12-04 RX ADMIN — CHLORHEXIDINE GLUCONATE 15 ML: 1.2 RINSE ORAL at 09:12

## 2020-12-04 RX ADMIN — PANTOPRAZOLE SODIUM 40 MG: 40 TABLET, DELAYED RELEASE ORAL at 09:12

## 2020-12-04 RX ADMIN — MUPIROCIN: 20 OINTMENT TOPICAL at 09:12

## 2020-12-04 RX ADMIN — ASPIRIN 81 MG 81 MG: 81 TABLET ORAL at 09:12

## 2020-12-04 RX ADMIN — MAGNESIUM OXIDE 800 MG: 400 TABLET ORAL at 12:12

## 2020-12-04 RX ADMIN — CHLORHEXIDINE GLUCONATE 15 ML: 1.2 RINSE ORAL at 08:12

## 2020-12-04 RX ADMIN — DOCUSATE SODIUM 100 MG: 100 CAPSULE, LIQUID FILLED ORAL at 09:12

## 2020-12-04 RX ADMIN — MORPHINE SULFATE 2 MG: 2 INJECTION, SOLUTION INTRAMUSCULAR; INTRAVENOUS at 08:12

## 2020-12-04 RX ADMIN — DOCUSATE SODIUM 100 MG: 100 CAPSULE, LIQUID FILLED ORAL at 08:12

## 2020-12-04 RX ADMIN — MAGNESIUM OXIDE 800 MG: 400 TABLET ORAL at 09:12

## 2020-12-04 RX ADMIN — ENOXAPARIN SODIUM 100 MG: 100 INJECTION SUBCUTANEOUS at 11:12

## 2020-12-04 RX ADMIN — KETOROLAC TROMETHAMINE 15 MG: 30 INJECTION, SOLUTION INTRAMUSCULAR at 06:12

## 2020-12-04 RX ADMIN — ATORVASTATIN CALCIUM 40 MG: 40 TABLET, FILM COATED ORAL at 08:12

## 2020-12-04 RX ADMIN — KETOROLAC TROMETHAMINE 15 MG: 30 INJECTION, SOLUTION INTRAMUSCULAR at 08:12

## 2020-12-04 RX ADMIN — OXYCODONE HYDROCHLORIDE 10 MG: 5 TABLET ORAL at 04:12

## 2020-12-04 RX ADMIN — HYDROCODONE BITARTRATE AND ACETAMINOPHEN 1 TABLET: 5; 325 TABLET ORAL at 03:12

## 2020-12-04 RX ADMIN — MAGNESIUM SULFATE 2 G: 2 INJECTION INTRAVENOUS at 11:12

## 2020-12-04 RX ADMIN — PANTOPRAZOLE SODIUM 40 MG: 40 TABLET, DELAYED RELEASE ORAL at 08:12

## 2020-12-04 RX ADMIN — KETOROLAC TROMETHAMINE 15 MG: 30 INJECTION, SOLUTION INTRAMUSCULAR at 11:12

## 2020-12-04 NOTE — PT/OT/SLP PROGRESS
Physical Therapy      Patient Name:  Aleyda Costa   MRN:  [id number]    Patient not seen today secondary to Other (Comment)(patient on hold per ANTHONY Chua). Will follow-up 12/04/2020.    Chris MeGilligan, PT

## 2020-12-04 NOTE — PLAN OF CARE
12/04/20 0809   Patient Assessment/Suction   Level of Consciousness (AVPU) alert   Respiratory Effort Unlabored   PRE-TX-O2   Flow (L/min) 2  (Decreased o2 fro 3 to 2LPM)   SpO2 98 %  (on 3LPM)   Pulse Oximetry Type Continuous   $ Pulse Oximetry - Multiple Charge Pulse Oximetry - Multiple   Pulse 72   Resp 14   Respiratory Evaluation   $ Care Plan Tech Time 15 min

## 2020-12-04 NOTE — NURSING
Pt requesting to get back in bed. Tolerated well. Left resting comfortably in bed with call light in reach. Denies any needs at this time.

## 2020-12-04 NOTE — PROGRESS NOTES
[facility]  Adult Nutrition   Progress Note (Follow-Up)    SUMMARY     Recommendations  Recommendation/Intervention: 1. Continue Cardiac Diet. 2. RD plans to provide CABG diet education upon follow up.  Goals: 1. Patient to meet at least 75% of estimated energy and protein needs. 2. Patient to receive diet education prior to discharge.  Nutrition Goal Status: new  Communication of RD Recs: reviewed with RN    Dietitian Rounds Brief  Patient seen for follow up. Patient in ICU s/p CABG. Patient tolerating diet with fair intake. RD encouraged PO intake, Offered supplement, pt refused. RD to provide CABG diet education upon follow up.     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,   Interdisciplinary Rounds: attended    Nutrition Risk Screen  Nutrition Risk Screen: no indicators present     MST Score: 0  Have you recently lost weight without trying?: No  Weight loss score: 0  Have you been eating poorly because of a decreased appetite?: No  Appetite score: 0       Nutrition/Diet History  Food Allergies: other (see comments)(latex)  Factors Affecting Nutritional Intake: None identified at this time    Anthropometrics  Temp: 98.6 °F (37 °C)  Height Method: Stated  Height: 6' (182.9 cm)  Height (inches): 72 in  Weight Method: Standard Scale  Weight: 98.8 kg (217 lb 13 oz)  Weight (lb): 217.82 lb  Ideal Body Weight (IBW), Female: 160 lb  % Ideal Body Weight, Female (lb): 139.44 %  BMI (Calculated): 29.5       Weight History:  Wt Readings from Last 10 Encounters:   12/01/20 98.8 kg (217 lb 13 oz)   11/24/20 102.8 kg (226 lb 10.1 oz)   11/19/20 101.6 kg (224 lb)   11/12/20 101.6 kg (224 lb)   11/07/20 101.6 kg (224 lb)   10/20/20 102.3 kg (225 lb 9.6 oz)   10/05/20 102.1 kg (225 lb)   09/28/20 104.6 kg (230 lb 9.6 oz)   09/17/20 106.1 kg  (234 lb)   08/18/20 106.5 kg (234 lb 12.6 oz)       Lab/Procedures/Meds: Pertinent Labs Reviewed    Clinical Chemistry:  Recent Labs   Lab 11/28/20  0115  12/02/20  1931  12/04/20  0405      < > 139   < > 135*   K 3.5   < > 3.4*  3.4*   < > 4.1      < > 111*   < > 103   CO2 24   < > 18*   < > 27      < > 243*   < > 123*   BUN 8   < > 8   < > 7   CREATININE 0.8   < > 0.6   < > 0.6   CALCIUM 9.5   < > 8.0*   < > 8.2*   PROT 8.3   < >  --    < > 5.7*   ALBUMIN 4.4   < >  --    < > 2.9*   BILITOT 0.6   < >  --    < > 0.5   ALKPHOS 87   < >  --    < > 54*   AST 20   < >  --    < > 29   ALT 18   < >  --    < > 29   ANIONGAP 11   < > 10   < > 5*   ESTGFRAFRICA >60.0   < > >60.0   < > >60.0   EGFRNONAA >60.0   < > >60.0   < > >60.0   MG  --    < > 1.8   < > 1.8  1.8   PHOS  --   --  2.3*  --   --    LIPASE 24  --   --   --   --     < > = values in this interval not displayed.       CBC:   Recent Labs   Lab 12/04/20  0405   WBC 14.69*   RBC 3.01*   HGB 8.9*   HCT 27.5*      MCV 91   MCH 29.6   MCHC 32.4       Lipid Panel:  Recent Labs   Lab 11/29/20  0509   CHOL 142   HDL 49   LDLCALC 62.6*   TRIG 152*   CHOLHDL 34.5       Cardiac Profile:  Recent Labs   Lab 11/30/20  0425  12/01/20  0520 12/01/20 2248 12/02/20  0321   CPK 66  --   --   --   --    CPKMB 1.8  --   --   --   --    TROPONINI <0.030   < > <0.030 0.104* 0.116*    < > = values in this interval not displayed.       Inflammatory Labs:  Recent Labs   Lab 11/30/20  0425   CRP 1.07*       Medications: Pertinent Medications reviewed    Scheduled Meds:   aspirin  81 mg Oral Daily    atorvastatin  40 mg Oral QHS    chlorhexidine  15 mL Mouth/Throat BID    docusate sodium  100 mg Oral BID    FLUoxetine  40 mg Oral Daily    custom IVPB builder   Irrigation Once    insulin (HUMAN R) infusion (adults)  1 Units/hr Intravenous Once    isosorbide mononitrate  30 mg Oral Daily    metoprolol tartrate  25 mg Oral BID    mupirocin   Nasal BID     pantoprazole  40 mg Oral BID       Continuous Infusions:   sodium chloride 0.45% 50 mL/hr at 12/03/20 0601    insulin regular 1 units/mL infusion orderable (CTS POST-OP) Stopped (12/03/20 1200)       PRN Meds:.albumin human 5%, calcium chloride IVPB, calcium chloride IVPB, calcium chloride IVPB, calcium gluconate IVPB, calcium gluconate IVPB, calcium gluconate IVPB, dextrose 50%, dextrose 50%, HYDROcodone-acetaminophen, hydrOXYzine pamoate, ketorolac, magnesium oxide, magnesium sulfate IVPB, magnesium sulfate IVPB, magnesium sulfate IVPB, magnesium sulfate IVPB, magnesium sulfate IVPB, magnesium sulfate IVPB, morphine, morphine, ondansetron, oxyCODONE, potassium chloride in water, potassium chloride in water, potassium chloride in water, potassium chloride in water, potassium chloride in water, potassium chloride in water, potassium chloride in water, potassium chloride, potassium chloride, potassium chloride, potassium chloride, sodium phosphate IVPB, sodium phosphate IVPB, sodium phosphate IVPB, sodium phosphate IVPB, sodium phosphate IVPB, sodium phosphate IVPB, sodium phosphate IVPB, sodium phosphate IVPB, tiZANidine    Estimated/Assessed Needs  Weight Used For Calorie Calculations: 98.8 kg (217 lb 13 oz)  Energy Calorie Requirements (kcal): 0061-7783 kcal/day (20-25 kcal/kg)  Energy Need Method: Kcal/kg  Protein Requirements: 79-99 g/day (0.8-1 g/kg)  Weight Used For Protein Calculations: 98.8 kg (217 lb 13 oz)  Fluid Requirements (mL): 1729-4373 mL  Estimated Fluid Requirement Method: RDA Method  RDA Method (mL): 1976       Nutrition Prescription Ordered  Current Diet Order: Cardiac    Evaluation of Received Nutrient/Fluid Intake  Energy Calories Required: not meeting needs  Protein Required: not meeting needs  Fluid Required: meeting needs  Tolerance: tolerating     Intake/Output Summary (Last 24 hours) at 12/4/2020 1335  Last data filed at 12/4/2020 1001  Gross per 24 hour   Intake 2989.17 ml   Output  3370 ml   Net -380.83 ml      % Intake of Estimated Energy Needs: 25 - 50 %  % Meal Intake: 25 - 50 %    Nutrition Risk  Level of Risk/Frequency of Follow-up: high     Monitor and Evaluation  Food and Nutrient Intake: energy intake, food and beverage intake  Food and Nutrient Adminstration: diet order  Knowledge/Beliefs/Attitudes: beliefs and attitudes, food and nutrition knowledge/skill  Physical Activity and Function: factors affecting access to physical activity, nutrition-related ADLs and IADLs  Anthropometric Measurements: weight, weight change, body mass index  Biochemical Data, Medical Tests and Procedures: electrolyte and renal panel, gastrointestinal profile, lipid profile, glucose/endocrine profile, inflammatory profile  Nutrition-Focused Physical Findings: overall appearance     Nutrition Follow-Up  RD Follow-up?: Yes     Fern Fajardo RD 12/04/2020 1:30 PM

## 2020-12-04 NOTE — PROGRESS NOTES
[facility]  Progress Note    Patient Name: Aleyda Costa  MRN: [id number]  Patient Class: IP- Inpatient   Admission Date: 11/28/2020  Length of Stay: 4 days  Attending Physician: Donald Hilton MD  Primary Care Provider: Darlene Hayden MD        Subjective:     Principal Problem:Chest pain    Interval History:     Patient is seen and examined  She sitting in the chair and all chest tubes and not in distress.  Hemoglobin is stable      ICU Objective:     Vital Signs (Most Recent):  Temp: 98.6 °F (37 °C) (12/04/20 0658)  Pulse: 77 (12/04/20 1316)  Resp: (!) 32 (12/04/20 1316)  BP: (!) 81/44 (12/04/20 1316)  SpO2: (!) 94 % (12/04/20 1316) Vital Signs (24h Range):  Temp:  [97.6 °F (36.4 °C)-98.6 °F (37 °C)] 98.6 °F (37 °C)  Pulse:  [57-81] 77  Resp:  [11-32] 32  SpO2:  [91 %-100 %] 94 %  BP: ()/(44-66) 81/44     Weight: 98.8 kg (217 lb 13 oz)  Body mass index is 29.54 kg/m².    Intake/Output Summary (Last 24 hours) at 12/4/2020 1407  Last data filed at 12/4/2020 1001  Gross per 24 hour   Intake 2989.17 ml   Output 3370 ml   Net -380.83 ml      Physical Exam    Physical Exam:  General- Patient alert and oriented  HEENT- PERRLA  Neck- No JVD, Lymphadenopathy  CV- Regular rate and rhythm,   Resp- Lungs CTA Bilaterally,  GI- Non tender/non-distended,  Extrem- No cyanosis, clubbing, edema.  Neuro- Strength 5/5 flexors/extensors,  Skin-  No masses, rashes or lesions noted on cursory skin exam.  Overview/Hospital Course:     Significant Labs:   BMP:   Recent Labs   Lab 12/04/20  0405   *   *   K 4.1      CO2 27   BUN 7   CREATININE 0.6   CALCIUM 8.2*   MG 1.8  1.8     CBC:   Recent Labs   Lab 12/02/20  2343  12/03/20  0449 12/03/20  0741 12/04/20  0405   WBC 13.59*  --  16.00*  --  14.69*   HGB 10.8*  --  10.8*  --  8.9*   HCT 34.0*   < > 33.4* 33* 27.5*     --  161  --  152    < > = values in this interval not displayed.     CMP:   Recent Labs   Lab  12/02/20  2343 12/03/20  0449 12/04/20  0405    136 135*   K 4.5 4.6 4.1    107 103   CO2 20* 23 27   * 199* 123*   BUN 8 8 7   CREATININE 0.7 0.6 0.6   CALCIUM 8.2* 8.4* 8.2*   PROT  --  5.9* 5.7*   ALBUMIN  --  3.0* 2.9*   BILITOT  --  0.6 0.5   ALKPHOS  --  63 54*   AST  --  51* 29   ALT  --  42 29   ANIONGAP 8 6* 5*   EGFRNONAA >60.0 >60.0 >60.0       Significant Imaging: I have reviewed and interpreted all pertinent imaging results/findings within the past 24 hours.  CXR with right upper lobe opacity to follow   Assessment/Plan:      Active Diagnoses:    Diagnosis Date Noted POA    PRINCIPAL PROBLEM:  Chest pain [R07.9] 06/25/2020 Yes    S/P CABG x 3 [Z95.1] 12/03/2020 Not Applicable    Coronary artery disease of native artery with stable angina pectoris [I25.118] 10/19/2020 Yes    H/o PE and DVT [I27.82] 10/19/2020 Yes    Hyperlipidemia [E78.5] 10/06/2020 Yes    DVT (deep venous thrombosis) [I82.409] 06/21/2020 Yes    Dependence on nicotine from cigarettes [F17.210] 11/19/2018 Yes    Sleep apnea [G47.30] 11/13/2018 Yes      Problems Resolved During this Admission:       ASSESSMENT AND PLAN         1. Acute on Chronic angina      2. H/O CAD with small vessel disease s/p angiogram with MVD and s/p      Balloon pump s/pCABG   3. H/O DVT/PE  4. Hyperlipidemia  5. Essential HTN   6. Right  Upper lobe opacity to follow     PLAN   Post op care as per CT surgery   Continue present management  Lopressor added   Resume Lovenox therapeutic dose tomorrow   Consult Dr. Pina  for recurrent DVTs and she was placed on Lovenox most likely intolerance to warfarin.  Possible trial of eliquis?  Downgrade     VTE Risk Mitigation (From admission, onward)         Ordered     enoxaparin injection 100 mg  Every 12 hours (non-standard times)      12/04/20 1359     heparin (porcine) 6,000 Units in lactated ringers 1,000 mL  Once      12/02/20 1333                   Donald Hilton MD  Department of Hospital  Medicine   [facility]

## 2020-12-04 NOTE — PROGRESS NOTES
Progress Note  Cardiology    Admit Date: 11/28/2020   LOS: 4 days     Follow-up For:  ACS, non-STEMI; ostial high-grade LAD stenosis; 3 vessel CABG with LIMA    Scheduled Meds:   aspirin  81 mg Oral Daily    atorvastatin  40 mg Oral QHS    chlorhexidine  15 mL Mouth/Throat BID    docusate sodium  100 mg Oral BID    [START ON 12/5/2020] enoxparin  1 mg/kg Subcutaneous Q12H    FLUoxetine  40 mg Oral Daily    custom IVPB builder   Irrigation Once    insulin (HUMAN R) infusion (adults)  1 Units/hr Intravenous Once    mupirocin   Nasal BID    pantoprazole  40 mg Oral BID    sodium chloride 0.9%  500 mL Intravenous Once     Continuous Infusions:   sodium chloride 0.45% 50 mL/hr at 12/03/20 0601    insulin regular 1 units/mL infusion orderable (CTS POST-OP) Stopped (12/03/20 1200)     PRN Meds:albumin human 5%, calcium chloride IVPB, calcium chloride IVPB, calcium chloride IVPB, calcium gluconate IVPB, calcium gluconate IVPB, calcium gluconate IVPB, dextrose 50%, dextrose 50%, HYDROcodone-acetaminophen, hydrOXYzine pamoate, ketorolac, magnesium oxide, magnesium sulfate IVPB, magnesium sulfate IVPB, magnesium sulfate IVPB, magnesium sulfate IVPB, magnesium sulfate IVPB, magnesium sulfate IVPB, morphine, morphine, ondansetron, oxyCODONE, potassium chloride in water, potassium chloride in water, potassium chloride in water, potassium chloride in water, potassium chloride in water, potassium chloride in water, potassium chloride in water, potassium chloride, potassium chloride, potassium chloride, potassium chloride, sodium phosphate IVPB, sodium phosphate IVPB, sodium phosphate IVPB, sodium phosphate IVPB, sodium phosphate IVPB, sodium phosphate IVPB, sodium phosphate IVPB, sodium phosphate IVPB, tiZANidine    Review of patient's allergies indicates:   Allergen Reactions    Clarithromycin Swelling and Other (See Comments)     DIFFICULTY SWALLOWING  DIFFICULTY SWALLOWING    Pcn [penicillins] Anaphylaxis     Sulfa (sulfonamide antibiotics) Hives    Wellbutrin [bupropion hcl] Shortness Of Breath and Anaphylaxis    Betadine [povidone-iodine] Hives     Swelling      Cefprozil Hives    Iodinated contrast media Hives    Latex, natural rubber Rash    Sulfamethoxazole-trimethoprim Nausea And Vomiting    Iodine Hives and Swelling    Latex Hives and Swelling       SUBJECTIVE:     Interval History: Patient has no complaint of chest pain tightness shortness of breath.    Review of Systems  Respiratory: positive for cough, negative for hemoptysis, sputum and wheezing  Cardiovascular: negative for chest pressure/discomfort, orthopnea and palpitations    OBJECTIVE:     Vital Signs (Most Recent)  Temp: 98.6 °F (37 °C) (12/04/20 0658)  Pulse: 70 (12/04/20 1431)  Resp: 20 (12/04/20 1542)  BP: (!) 94/46 (12/04/20 1431)  SpO2: (!) 91 % (12/04/20 1431)    Vital Signs Range (Last 24H):  Temp:  [97.6 °F (36.4 °C)-98.6 °F (37 °C)]   Pulse:  [57-81]   Resp:  [11-32]   BP: ()/(44-66)   SpO2:  [91 %-100 %]       Physical Exam:  Neck: no carotid bruit, no JVD and supple, symmetrical, trachea midline  Lungs: clear to auscultation bilaterally, normal respiratory effort  Heart: regular rate and rhythm, S1, S2 normal, no murmur, click, rub or gallop  Abdomen: soft, non-tender; bowel sounds normal; no masses,  no organomegaly  Extremities: Extremities normal, atraumatic, no cyanosis, clubbing, or edema  Patient is out of bed in a chair.    Recent Results (from the past 24 hour(s))   POCT glucose    Collection Time: 12/03/20  9:38 PM   Result Value Ref Range    POC Glucose 120 (H) 70 - 110   Comprehensive metabolic panel, DAILY IN AM    Collection Time: 12/04/20  4:05 AM   Result Value Ref Range    Sodium 135 (L) 136 - 145 mmol/L    Potassium 4.1 3.5 - 5.1 mmol/L    Chloride 103 95 - 110 mmol/L    CO2 27 23 - 29 mmol/L    Glucose 123 (H) 70 - 110 mg/dL    BUN 7 6 - 20 mg/dL    Creatinine 0.6 0.5 - 1.4 mg/dL    Calcium 8.2 (L) 8.7 - 10.5  mg/dL    Total Protein 5.7 (L) 6.0 - 8.4 g/dL    Albumin 2.9 (L) 3.5 - 5.2 g/dL    Total Bilirubin 0.5 0.1 - 1.0 mg/dL    Alkaline Phosphatase 54 (L) 55 - 135 U/L    AST 29 10 - 40 U/L    ALT 29 10 - 44 U/L    Anion Gap 5 (L) 8 - 16 mmol/L    eGFR if African American >60.0 >60 mL/min/1.73 m^2    eGFR if non African American >60.0 >60 mL/min/1.73 m^2   Magnesium, DAILY    Collection Time: 12/04/20  4:05 AM   Result Value Ref Range    Magnesium 1.8 1.6 - 2.6 mg/dL   CBC auto differential    Collection Time: 12/04/20  4:05 AM   Result Value Ref Range    WBC 14.69 (H) 3.90 - 12.70 K/uL    RBC 3.01 (L) 4.00 - 5.40 M/uL    Hemoglobin 8.9 (L) 12.0 - 16.0 g/dL    Hematocrit 27.5 (L) 37.0 - 48.5 %    MCV 91 82 - 98 fL    MCH 29.6 27.0 - 31.0 pg    MCHC 32.4 32.0 - 36.0 g/dL    RDW 17.2 (H) 11.5 - 14.5 %    Platelets 152 150 - 350 K/uL    MPV 10.7 9.2 - 12.9 fL    Immature Granulocytes 0.8 (H) 0.0 - 0.5 %    Gran # (ANC) 11.5 (H) 1.8 - 7.7 K/uL    Immature Grans (Abs) 0.12 (H) 0.00 - 0.04 K/uL    Lymph # 1.8 1.0 - 4.8 K/uL    Mono # 1.2 (H) 0.3 - 1.0 K/uL    Eos # 0.0 0.0 - 0.5 K/uL    Baso # 0.02 0.00 - 0.20 K/uL    nRBC 0 0 /100 WBC    Gran % 78.6 (H) 38.0 - 73.0 %    Lymph % 12.2 (L) 18.0 - 48.0 %    Mono % 8.2 4.0 - 15.0 %    Eosinophil % 0.1 0.0 - 8.0 %    Basophil % 0.1 0.0 - 1.9 %    Differential Method Automated    Magnesium    Collection Time: 12/04/20  4:05 AM   Result Value Ref Range    Magnesium 1.8 1.6 - 2.6 mg/dL   Hemoglobin    Collection Time: 12/04/20  3:24 PM   Result Value Ref Range    Hemoglobin 7.8 (L) 12.0 - 16.0 g/dL   Hematocrit    Collection Time: 12/04/20  3:24 PM   Result Value Ref Range    Hematocrit 24.9 (L) 37.0 - 48.5 %       Diagnostic Results:  Labs: Reviewed  X-Ray: Reviewed    ASSESSMENT/PLAN:     Patient is doing well.  Right upper lobe basically is improved on chest x-ray.  No pulmonary edema.  EKG in a.m..  May transfer to cardio A.

## 2020-12-04 NOTE — PHYSICIAN QUERY
"PT Name: Aleyda Costa  MR #: [id number]    Consultant Diagnosis Clarification     CDS/: Gilmar Jara               Contact information: [phone]  This form is a permanent document in the medical record.    Query Date: December 4, 2020      By submitting this query, we are merely seeking further clarification of documentation.  Please utilize your independent clinical judgment when addressing the question(s) below.    The Medical Record reflects the following:    Clinical Information Location in Medical Record   12/1 at 05:20 Troponin = < 0.030  12/1 at 22:48 Troponin = 0.104  12/2 at 03.21 Troponin = 0.116    12/2 Dr. KELIN Guthrie - Mild apical and anterolateral wall hypokinesia. Severe CP post cor angio . IABP inserted. For emergency CABG.     12/2 Dr. Neville - "She had a myocardial infarction as well."    12/3 Dr. Neville - ASSESSMENT/PLAN: NSTEMI     12/4 Dr. Hilton -   ASSESSMENT AND PLAN:    1. Acute on Chronic angina      2. H/O CAD with small vessel disease s/p angiogram with MVD and s/p      Balloon pump s/pCABG  Epic labs, notes       Please clarify/confirm the Consultants diagnosis of NSTEMI:     [ x ] Diagnosis ruled in   [  ] Diagnosis ruled in, but it resolved prior to my assessment of the patient   [  ] Diagnosis ruled out   [  ] Other diagnosis (please specify): _____________________________   [  ] Clinically undetermined             "

## 2020-12-04 NOTE — CARE UPDATE
Cm went to talk to pt to see if she has any questions. Pt doing well but no dc plan at this time. Pt had Emergency CABG on 12/2/20.    Cm tried to call pt's  Niko with no answer to phone.

## 2020-12-04 NOTE — CONSULTS
[facility]  Patient Name: Aleyda Costa  : 1980  Age: 40 y.o.  Sex: female  MRN: [id number]  Admission Date: 2020  Hospital Length of Stay: 4 days  Code Status: Full Code   Admitting Provider: Jeanette Lewis MD  Attending Provider: Donald Hilton MD  Primary Care Physician: Darlene Hayden MD  Full Code  Subjective:     Date of Visit: 2020             Principal Problem: Chest pain    Reason for Consult: H/O DVT and PE    Patient ID: Aleyda Costa is a 40 y.o. female with PMHx of HTN, bilateral PE, GERD, chronic back pain, and chronic smoker seen as [facility] for he presented with unstable angina and underwent left heart catheterization showing multivessel CAD with high-grade proximal LAD lesion. She also had myocardial infarction. She underwent emergent CABG x3 with Dr. Neville. Pt had a total left knee replacement in 2018 and a few days after surgery she presented to ER with increasing SOB. Workup revealed bilateral PE. She was placed on xarelto. She had an ultrasound of legs which was negative in 2020 .Repeat CTA revealed no resolution of PE and she was changed to pradaxa and was later placed on coumadin. Pt had right leg DVT noted on US 2020 and was then changed over to enoxaparin (LOVENOX) 150 mg/mL Syrg Inject 1 mL (150 mg total) into the skin once daily. Pt is wearing NITHYA hose stocking on RLE. Pt states she has mild chest pain, but otherwise has no complaints.    Review of Systems   Constitutional: Negative for activity change, appetite change, chills, fatigue, fever and unexpected weight change.   HENT: Negative for mouth sores and trouble swallowing.    Eyes: Negative for photophobia and visual disturbance.   Respiratory: Negative for cough, chest tightness, shortness of breath, wheezing and stridor.    Cardiovascular: Positive for chest pain (s/p CABG). Negative for leg swelling.   Gastrointestinal: Negative for  abdominal pain, constipation, diarrhea, nausea and vomiting.   Musculoskeletal: Negative for arthralgias, back pain and myalgias.   Skin: Negative for color change, pallor, rash and wound.   Neurological: Negative for syncope, speech difficulty and weakness.   Hematological: Negative for adenopathy. Does not bruise/bleed easily.   Psychiatric/Behavioral: Negative for agitation, behavioral problems, confusion, decreased concentration and dysphoric mood.        Past Medical History:   Diagnosis Date    Abnormal Pap smear of cervix     Arthritis     OA    Back pain     Cancer     skin cancer to back    Depression     Endometriosis     Full dentures     GERD (gastroesophageal reflux disease)     Migraine     Pulmonary embolism 2018    S/P CABG x 3 12/3/2020    Seizures     Sleep apnea     Does not use c-pap since weight loss - 170 lbs    Uterine fibroid     Wears glasses        Family History   Problem Relation Age of Onset    Heart disease Mother     Heart disease Father     Esophageal cancer Maternal Grandmother     Breast cancer Maternal Aunt 46    Colon cancer Neg Hx     Stomach cancer Neg Hx     Ovarian cancer Neg Hx        Past Surgical History:   Procedure Laterality Date    ANGIOGRAM, CORONARY, WITH LEFT HEART CATHETERIZATION Left 2020    Procedure: Left heart cath;  Surgeon: Jm Guthrie MD;  Location: [facility] CATH/EP LAB;  Service: Cardiology;  Laterality: Left;    ANGIOGRAM, CORONARY, WITH LEFT HEART CATHETERIZATION N/A 2020    Procedure: Angiogram, Coronary, with Left Heart Cath 12 noon;  Surgeon: Jm Guthrie MD;  Location: [facility] CATH/EP LAB;  Service: Cardiology;  Laterality: N/A;    APPENDECTOMY      ARTERIOGRAPHY OF AORTIC ROOT N/A 2020    Procedure: ARTERIOGRAM, AORTIC ROOT;  Surgeon: Jm Guthrie MD;  Location: [facility] CATH/EP LAB;  Service: Cardiology;  Laterality: N/A;    CARPAL TUNNEL RELEASE Bilateral      SECTION       CHOLECYSTECTOMY      COLONOSCOPY N/A 12/30/2019    Procedure: COLONOSCOPY;  Surgeon: Anselmo Blackwell MD;  Location: [facility] ENDO;  Service: Endoscopy;  Laterality: N/A;    CORONARY ARTERY BYPASS GRAFT (CABG) N/A 12/2/2020    Procedure: CORONARY ARTERY BYPASS GRAFT (CABG);  Surgeon: Tyler Neville MD;  Location: [facility] OR;  Service: Cardiovascular;  Laterality: N/A;    ENDOSCOPIC HARVEST OF VEIN Left 12/2/2020    Procedure: SURGICAL PROCUREMENT, VEIN, ENDOSCOPIC;  Surgeon: Tyler Neville MD;  Location: [facility] OR;  Service: Cardiovascular;  Laterality: Left;    EPIDURAL STEROID INJECTION INTO LUMBAR SPINE N/A 6/7/2019    Procedure: Injection-steroid-epidural-lumbar;  Surgeon: Yariel Ramirez MD;  Location: [facility] OR;  Service: Pain Management;  Laterality: N/A;  L3-4    ESOPHAGOGASTRODUODENOSCOPY N/A 11/27/2019    Procedure: EGD (ESOPHAGOGASTRODUODENOSCOPY);  Surgeon: Anselmo Blackwell MD;  Location: [facility] ENDO;  Service: Endoscopy;  Laterality: N/A;    FRACTURE SURGERY      ankle    gastric sleve      HYSTERECTOMY  2014    endo and fibroids    HYSTERECTOMY      INSERTION OF INTRA-AORTIC BALLOON ASSIST DEVICE  12/2/2020    Procedure: INSERTION, INTRA-AORTIC BALLOON PUMP;  Surgeon: Jm Guthrie MD;  Location: [facility] CATH/EP LAB;  Service: Cardiology;;    JOINT REPLACEMENT Left 11/13/2018    KNEE ARTHROPLASTY Left 11/13/2018    Procedure: ARTHROPLASTY, KNEE;  Surgeon: Feliberto Cardenas MD;  Location: [facility] OR;  Service: Orthopedics;  Laterality: Left;    KNEE ARTHROPLASTY Right 6/16/2020    Procedure: ARTHROPLASTY, KNEE;  Surgeon: Feliberto Cardenas MD;  Location: [facility] OR;  Service: Orthopedics;  Laterality: Right;    KNEE ARTHROSCOPY W/ MENISCECTOMY Right 10/25/2019    Procedure: ARTHROSCOPY, KNEE, WITH MENISCECTOMY;  Surgeon: Feliberto Cardenas MD;  Location: [facility] OR;  Service: Orthopedics;  Laterality: Right;    KNEE SURGERY      LUMBAR EPIDURAL INJECTION      OOPHORECTOMY Left      LSO    RADIAL NERVE Right     RECONSTRUCTION OF MEDIAL PATELLOFEMORAL LIGAMENT OF RIGHT KNEE Right 10/25/2019    Procedure: RECONSTRUCTION, LIGAMENT, MEDIAL PATELLOFEMORAL, RIGHT;  Surgeon: Feliberto Cardenas MD;  Location: [facility] OR;  Service: Orthopedics;  Laterality: Right;    SINUS SURGERY      UPPER GASTROINTESTINAL ENDOSCOPY  11/27/2019    Dr. Blackwell; mild schatzki ring-dilated; gastritis; bx in process       Social History     Socioeconomic History    Marital status:      Spouse name: Not on file    Number of children: Not on file    Years of education: Not on file    Highest education level: Not on file   Occupational History    Not on file   Social Needs    Financial resource strain: Not on file    Food insecurity     Worry: Not on file     Inability: Not on file    Transportation needs     Medical: Not on file     Non-medical: Not on file   Tobacco Use    Smoking status: Current Every Day Smoker     Packs/day: 0.25     Years: 20.00     Pack years: 5.00     Types: Cigarettes    Smokeless tobacco: Never Used    Tobacco comment: trying to quit again   Substance and Sexual Activity    Alcohol use: Yes     Alcohol/week: 0.0 standard drinks     Comment: occasionally    Drug use: Not Currently     Types: Other-see comments     Comment: no    Sexual activity: Yes     Partners: Male   Lifestyle    Physical activity     Days per week: Not on file     Minutes per session: Not on file    Stress: Not on file   Relationships    Social connections     Talks on phone: Not on file     Gets together: Not on file     Attends [employer] service: Not on file     Active member of club or organization: Not on file     Attends meetings of clubs or organizations: Not on file     Relationship status: Not on file   Other Topics Concern    Not on file   Social History Narrative    Not on file       Current Facility-Administered Medications   Medication Dose Route Frequency Provider Last Rate Last Dose     0.45% NaCl infusion   Intravenous Continuous Tyler Neville MD 50 mL/hr at 12/03/20 0601      albumin human 5% bottle 25 g  25 g Intravenous PRN Tyler Neville MD   25 g at 12/04/20 1541    aspirin chewable tablet 81 mg  81 mg Oral Daily Jm Guthrie MD   81 mg at 12/04/20 0954    atorvastatin tablet 40 mg  40 mg Oral QHS Bianka Luevano NP   40 mg at 12/03/20 2000    calcium chloride 1 g in dextrose 5 % 100 mL IVPB  1 g Intravenous PRN Jm Guthrie MD        calcium chloride 1 g in dextrose 5 % 100 mL IVPB  1 g Intravenous PRN Jm Guthrie MD        calcium chloride 1 g in dextrose 5 % 100 mL IVPB  1 g Intravenous PRN Jm Guthrie MD        calcium gluconate 1 g in dextrose 5 % 100 mL IVPB  1 g Intravenous PRN Tyler Neville MD        calcium gluconate 2 g in dextrose 5 % 100 mL IVPB  2 g Intravenous PRN Tyler Neville MD        calcium gluconate 3 g in dextrose 5 % 100 mL IVPB  3 g Intravenous PRN Tyler Neville MD        chlorhexidine 0.12 % solution 15 mL  15 mL Mouth/Throat BID Dee Moscoso PharmD   15 mL at 12/04/20 0956    dextrose 50% injection 12.5 g  12.5 g Intravenous PRN Tyler Neville MD        dextrose 50% injection 25 g  25 g Intravenous PRN Tyler Neville MD        docusate sodium capsule 100 mg  100 mg Oral BID Tyler Neville MD   100 mg at 12/04/20 0954    [START ON 12/5/2020] enoxaparin injection 100 mg  1 mg/kg Subcutaneous Q12H Donald Hilton MD        FLUoxetine capsule 40 mg  40 mg Oral Daily Jm Guthrie MD   40 mg at 12/03/20 2004    heparin (porcine) 6,000 Units in lactated ringers 1,000 mL   Irrigation Once Jm Guthrie MD        HYDROcodone-acetaminophen 5-325 mg per tablet 1 tablet  1 tablet Oral Q4H PRN Tyler Neville MD   1 tablet at 12/04/20 1542    hydrOXYzine pamoate capsule 25 mg  25 mg Oral Q8H PRN Bianka Luevano NP        insulin regular 100 Units in sodium  chloride 0.9% 100 mL infusion  1 Units/hr Intravenous Once Jm Guthrie MD        insulin regular in 0.9 % NaCl 100 unit/100 mL (1 unit/mL) infusion  0-52 Units/hr Intravenous Continuous Tyler Neville MD   Stopped at 12/03/20 1200    ketorolac injection 15 mg  15 mg Intravenous Q6H PRN Tyler Neville MD   15 mg at 12/04/20 1148    magnesium oxide tablet 800 mg  800 mg Oral PRN Jm Guthrie MD   800 mg at 12/04/20 1244    magnesium sulfate 2g in water 50mL IVPB (premix)  2 g Intravenous PRN Jm Guthrie MD        magnesium sulfate 2g in water 50mL IVPB (premix)  4 g Intravenous PRN Jm Guthrie MD        magnesium sulfate 2g in water 50mL IVPB (premix)  2 g Intravenous PRN Jm Guthrie MD        magnesium sulfate 2g in water 50mL IVPB (premix)  2 g Intravenous PRN Tyler Neville MD 25 mL/hr at 12/02/20 2220 2 g at 12/02/20 2220    magnesium sulfate 2g in water 50mL IVPB (premix)  4 g Intravenous PRN Tyler Neville MD   Stopped at 12/03/20 0001    magnesium sulfate in dextrose IVPB (premix) 1 g  1 g Intravenous PRN Jm Guthrie MD        morphine injection 2 mg  2 mg Intravenous Q1H PRN Tyler Neville MD   2 mg at 12/04/20 0850    morphine injection 4 mg  4 mg Intravenous PRN Tyler Neville MD   4 mg at 12/03/20 2249    mupirocin 2 % ointment   Nasal BID Dee Moscoso PharmD        ondansetron injection 4 mg  4 mg Intravenous Q6H PRN Tyler Neville MD        oxyCODONE immediate release tablet 10 mg  10 mg Oral Q6H PRN Jm Guthrie MD   10 mg at 12/04/20 0428    pantoprazole EC tablet 40 mg  40 mg Oral BID Bianka Luevano NP   40 mg at 12/04/20 0954    potassium chloride 10 mEq in 100 mL IVPB  20 mEq Intravenous PRN Jm Guthrie MD        potassium chloride 10 mEq in 100 mL IVPB  40 mEq Intravenous PRN Jm Guthrie MD        potassium chloride 10 mEq in 100 mL IVPB  20 mEq Intravenous PRN  Jm Guthrie MD        potassium chloride 10 mEq in 100 mL IVPB  40 mEq Intravenous PRN Jm Guthrie MD        potassium chloride 20 mEq in 100 mL IVPB (FOR CENTRAL LINE ADMINISTRATION ONLY)  20 mEq Intravenous PRN Tyler Neville MD        potassium chloride 20 mEq in 100 mL IVPB (FOR CENTRAL LINE ADMINISTRATION ONLY)  20 mEq Intravenous PRN Tyler Neville MD        potassium chloride 40 mEq in 100 mL IVPB (FOR CENTRAL LINE ADMINISTRATION ONLY)  20 mEq Intravenous PRN Tyler Neville MD        potassium chloride SA CR tablet 20 mEq  20 mEq Oral PRN Jm Guthrie MD   20 mEq at 12/02/20 0548    potassium chloride SA CR tablet 20 mEq  20 mEq Oral PRN Jm Guthrie MD   20 mEq at 12/01/20 0642    potassium chloride SA CR tablet 40 mEq  40 mEq Oral PRN Jm Guthrie MD   40 mEq at 11/28/20 0617    potassium chloride SA CR tablet 40 mEq  40 mEq Oral PRN Jm Guthrie MD        sodium chloride 0.9% bolus 500 mL  500 mL Intravenous Once Donald Hilton MD        sodium phosphate 15 mmol in dextrose 5 % 250 mL IVPB  15 mmol Intravenous PRN Jm Guthrie MD        sodium phosphate 15 mmol in dextrose 5 % 250 mL IVPB  15 mmol Intravenous PRN Jm Guthrie MD        sodium phosphate 15 mmol in dextrose 5 % 250 mL IVPB  15 mmol Intravenous PRN Tyler Neville MD        sodium phosphate 20.01 mmol in dextrose 5 % 250 mL IVPB  20.01 mmol Intravenous PRN Jm Guthrie MD        sodium phosphate 20.01 mmol in dextrose 5 % 250 mL IVPB  20.01 mmol Intravenous PRN Jm Guthrie MD        sodium phosphate 20.01 mmol in dextrose 5 % 250 mL IVPB  20.01 mmol Intravenous PRN Tyler Neville MD        sodium phosphate 30 mmol in dextrose 5 % 250 mL IVPB  30 mmol Intravenous PRN Jm Guthrie MD        sodium phosphate 30 mmol in dextrose 5 % 250 mL IVPB  30 mmol Intravenous PRN Tyler Neville MD        tiZANidine tablet 4 mg   4 mg Oral Q6H PRN Bianka Luevano NP         Facility-Administered Medications Ordered in Other Encounters   Medication Dose Route Frequency Provider Last Rate Last Dose    lactated ringers infusion   Intravenous Continuous Anjel Hernandez MD   Stopped at 12/02/20 1810    lidocaine (PF) 10 mg/ml (1%) injection 10 mg  1 mL Intradermal Once Anjel Hernandez MD            aspirin  81 mg Oral Daily    atorvastatin  40 mg Oral QHS    chlorhexidine  15 mL Mouth/Throat BID    docusate sodium  100 mg Oral BID    [START ON 12/5/2020] enoxparin  1 mg/kg Subcutaneous Q12H    FLUoxetine  40 mg Oral Daily    custom IVPB builder   Irrigation Once    insulin (HUMAN R) infusion (adults)  1 Units/hr Intravenous Once    mupirocin   Nasal BID    pantoprazole  40 mg Oral BID    sodium chloride 0.9%  500 mL Intravenous Once        sodium chloride 0.45% 50 mL/hr at 12/03/20 0601    insulin regular 1 units/mL infusion orderable (CTS POST-OP) Stopped (12/03/20 1200)       albumin human 5%, calcium chloride IVPB, calcium chloride IVPB, calcium chloride IVPB, calcium gluconate IVPB, calcium gluconate IVPB, calcium gluconate IVPB, dextrose 50%, dextrose 50%, HYDROcodone-acetaminophen, hydrOXYzine pamoate, ketorolac, magnesium oxide, magnesium sulfate IVPB, magnesium sulfate IVPB, magnesium sulfate IVPB, magnesium sulfate IVPB, magnesium sulfate IVPB, magnesium sulfate IVPB, morphine, morphine, ondansetron, oxyCODONE, potassium chloride in water, potassium chloride in water, potassium chloride in water, potassium chloride in water, potassium chloride in water, potassium chloride in water, potassium chloride in water, potassium chloride, potassium chloride, potassium chloride, potassium chloride, sodium phosphate IVPB, sodium phosphate IVPB, sodium phosphate IVPB, sodium phosphate IVPB, sodium phosphate IVPB, sodium phosphate IVPB, sodium phosphate IVPB, sodium phosphate IVPB, tiZANidine    Antibiotics (From admission, onward)     Start     Stop Route Frequency Ordered    12/03/20 1015  mupirocin 2 % ointment  (MRSA Decolonization Orders [facility])      12/08 0859 Nasl 2 times daily 12/03/20 0902          Review of patient's allergies indicates:   Allergen Reactions    Clarithromycin Swelling and Other (See Comments)     DIFFICULTY SWALLOWING  DIFFICULTY SWALLOWING    Pcn [penicillins] Anaphylaxis    Sulfa (sulfonamide antibiotics) Hives    Wellbutrin [bupropion hcl] Shortness Of Breath and Anaphylaxis    Betadine [povidone-iodine] Hives     Swelling      Cefprozil Hives    Iodinated contrast media Hives    Latex, natural rubber Rash    Sulfamethoxazole-trimethoprim Nausea And Vomiting    Iodine Hives and Swelling    Latex Hives and Swelling     All medications and past history have been reviewed.    Objective:      Vitals:  Patient Vitals for the past 24 hrs:   BP Temp Temp src Pulse Resp SpO2   12/04/20 1542 -- -- -- -- 20 --   12/04/20 1431 (!) 94/46 -- -- 70 13 (!) 91 %   12/04/20 1401 (!) 77/45 -- -- 73 15 (!) 93 %   12/04/20 1316 (!) 81/44 -- -- 77 (!) 32 (!) 94 %   12/04/20 1301 -- -- -- 72 (!) 22 (!) 94 %   12/04/20 1216 (!) 90/51 -- -- (!) 57 18 100 %   12/04/20 1201 (!) 84/53 -- -- 67 18 95 %   12/04/20 1101 (!) 90/53 -- -- 70 19 (!) 94 %   12/04/20 1021 (!) 99/54 -- -- 79 18 (!) 91 %   12/04/20 0954 -- -- -- -- 20 --   12/04/20 0901 (!) 94/54 -- -- 74 (!) 24 95 %   12/04/20 0850 -- -- -- -- 20 --   12/04/20 0809 -- -- -- 72 14 98 %   12/04/20 0801 (!) 89/50 -- -- 71 12 99 %   12/04/20 0701 (!) 96/54 -- -- 75 13 98 %   12/04/20 0700 (!) 96/54 -- -- 71 12 98 %   12/04/20 0658 -- 98.6 °F (37 °C) Oral -- -- --   12/04/20 0600 108/62 -- -- 77 19 (!) 94 %   12/04/20 0500 124/66 -- -- 66 17 100 %   12/04/20 0428 -- -- -- -- 18 --   12/04/20 0400 (!) 119/56 98.2 °F (36.8 °C) -- 63 14 98 %   12/04/20 0300 (!) 96/58 -- -- 67 12 98 %   12/04/20 0200 (!) 103/57 -- -- 67 (!) 22 99 %   12/04/20 0145 -- -- -- -- 20 --   12/04/20 0100 (!)  98/56 -- -- 71 11 98 %   12/04/20 0000 (!) 105/55 -- -- 78 13 97 %   12/03/20 2300 115/61 97.6 °F (36.4 °C) Axillary 81 18 97 %   12/03/20 2249 (!) 122/57 -- -- 79 18 --   12/03/20 2200 (!) 122/57 -- -- 78 18 100 %   12/03/20 2100 (!) 89/54 -- -- 70 17 99 %   12/03/20 2031 -- -- -- 60 (!) 24 96 %   12/03/20 2000 (!) 100/59 -- -- 68 20 98 %   12/03/20 1958 -- -- -- -- 18 --   12/03/20 1901 (!) 108/58 98.2 °F (36.8 °C) Oral 72 20 97 %   12/03/20 1800 (!) 95/53 -- -- 74 14 95 %   12/03/20 1748 -- -- -- -- 16 --      Body mass index is 29.54 kg/m².  Body surface area is 2.24 meters squared.    Last 24 Hours:    Intake/Output Summary (Last 24 hours) at 12/4/2020 1705  Last data filed at 12/4/2020 1001  Gross per 24 hour   Intake 960 ml   Output 2410 ml   Net -1450 ml     Weight Readings:  Wt Readings from Last 5 Encounters:   12/01/20 98.8 kg (217 lb 13 oz)   11/24/20 102.8 kg (226 lb 10.1 oz)   11/19/20 101.6 kg (224 lb)   11/12/20 101.6 kg (224 lb)   11/07/20 101.6 kg (224 lb)      Blood Type:  A POS     Physical Exam  General- Patient alert and oriented  HEENT- PERRLA  Neck- No JVD, Lymphadenopathy  CV- Regular rate and rhythm,   Resp- Lungs CTA Bilaterally,  GI- Non tender/non-distended,  Extrem- No cyanosis, clubbing, edema.  Neuro- Strength 5/5 flexors/extensors,  Skin-  No masses, rashes or lesions noted on cursory skin exam.    Labs:  Recent Labs   Lab 12/02/20  2343  12/03/20  0449 12/03/20  0741 12/04/20  0405 12/04/20  1524   WBC 13.59*  --  16.00*  --  14.69*  --    RBC 3.81*  --  3.73*  --  3.01*  --    HGB 10.8*  --  10.8*  --  8.9* 7.8*   HCT 34.0*   < > 33.4* 33* 27.5* 24.9*     --  161  --  152  --    MCV 89  --  90  --  91  --     < > = values in this interval not displayed.     Recent Labs   Lab 12/02/20  0321 12/02/20  1931 12/02/20  2343 12/03/20 0449 12/04/20  0405     137 139 138 136 135*   K 3.8  3.8 3.4*  3.4* 4.5 4.6 4.1     104 111* 110 107 103   CO2 22*  22* 18* 20* 23  27   BUN 13  13 8 8 8 7   CREATININE 0.7  0.7 0.6 0.7 0.6 0.6     102 243* 184* 199* 123*   CALCIUM 8.8  8.8 8.0* 8.2* 8.4* 8.2*   MG 1.9 1.8 2.8* 2.1 1.8  1.8   PHOS  --  2.3*  --   --   --    ALKPHOS 82  --   --  63 54*   PROT 6.9  --   --  5.9* 5.7*   ALBUMIN 3.7  --   --  3.0* 2.9*   BILITOT 0.7  --   --  0.6 0.5   AST 21  --   --  51* 29   ALT 24  --   --  42 29     Imaging:  No results found for this or any previous visit (from the past 2160 hour(s)).  No results found for this or any previous visit (from the past 2160 hour(s)).  Results for orders placed or performed during the hospital encounter of 11/17/20 (from the past 2160 hour(s))   MRI Tibia Fibula Without Contrast Right    Impression    1. Well-defined subcutaneous nodule along the superficial soft tissues at mid shin.  Contrast was not administered which somewhat limits characterization.  While lesion morphology is indeterminate, favored benign entity such as pressure lesion, scar or fat trauma.  2. Subtle periosteal edema in the adjacent tibial cortex is presumably reactive, as lesion does not directly involve the bone.      Electronically signed by: Dominik Sexton  Date:    11/18/2020  Time:    11:54     All lab results and imaging results have been reviewed.    Assessment and Plan:      Present on Admission:   Chest pain   Coronary artery disease of native artery with stable angina pectoris   Dependence on nicotine from cigarettes   DVT (deep venous thrombosis)   H/o PE and DVT   Hyperlipidemia   Sleep apnea    H/O bilateral PE with partially occlusive thrombus in the right popliteal vein   -Pt is to resume enoxaparin (LOVENOX) 150 mg/mL Syrg Inject 1 mL (150 mg total) into the skin once daily upon discharge.  -Pt is to stay well-hydrated and continue to wear NITHYA hose stocking on RLE.  -Pt is to follow up with Dr. Rabia Pina in clinic1 week from discharge with CBC, CMP and anti-XA heparin labs.    Normocytic Anemia  -Pt H/H  7.8/24.9 s/p CABG. Check iron and tibc and ferritin and place pt on supplemental iron if needed as patient just had surgery and lost 500cc of blood.   -Transfuse 1 unit PRBC PRN if H/H <7/21 or if pt is symptomatically anemic/hemodynamically unstable.  -We will follow up with the patient in clinic. Please feel free to contact our office PRN. Thank you for your consult.    Sincerely,  Surjit Mejia PA-C    Note is available for collaborating MD; Dr. Rabia Pina for review.    Electronically signed by: Surjit Mejia PA-C

## 2020-12-04 NOTE — PROGRESS NOTES
Cardiac Rehab     Aleyda Costa   [id number]   12/4/2020         Cardiac Rehab Phase Taught: Phase 1    Teaching Method: Verbal, Audio/Visual    Handouts: None    Educational Videos: Recovery - Your First Few Days    Understanding:  Knowledge indicated by feedback, Learning indicated by feedback and Verbalize understanding    Comments: pt sitting up in chair, review of post CABG education including sternal precautions, IS, activity, pain control, diet, pt voiced understanding. Spoke with bedside RN, updated on pt plan of care. Will follow    Total Time Spent:30mins            Cookie Smith RN

## 2020-12-04 NOTE — CARE UPDATE
Niko Costa  Pt'  called cm back. Cm asked how he is doing and if he had any questions for cm which he did not.     Instructed him to call if he needs anything from cm. He v/u.    Pt should have no cm needs when discharged.

## 2020-12-04 NOTE — PLAN OF CARE
12/03/20 2031   Patient Assessment/Suction   Level of Consciousness (AVPU) alert   Respiratory Effort Normal;Unlabored   Expansion/Accessory Muscles/Retractions no use of accessory muscles   All Lung Fields Breath Sounds clear   Cough Type good;splinted   Secretions Amount small   Secretions Color white   Secretions Characteristics thick   PRE-TX-O2   O2 Device (Oxygen Therapy) nasal cannula   Flow (L/min) 2   SpO2 96 %   Pulse Oximetry Type Continuous   $ Pulse Oximetry - Multiple Charge Pulse Oximetry - Multiple   Pulse 60   Resp (!) 24   Incentive Spirometer   $ Incentive Spirometer Charges done with encouragement   Administration (IS) mouthpiece   Number of Repetitions (IS) 10   Level Incentive Spirometer (mL) 1500   Patient Tolerance (IS) good   pt. Is compliant with incentive spirometer and cough. Continue to encourage use

## 2020-12-05 LAB
ALBUMIN SERPL BCP-MCNC: 3 G/DL (ref 3.5–5.2)
ALP SERPL-CCNC: 52 U/L (ref 55–135)
ALT SERPL W/O P-5'-P-CCNC: 24 U/L (ref 10–44)
ANION GAP SERPL CALC-SCNC: 5 MMOL/L (ref 8–16)
AST SERPL-CCNC: 21 U/L (ref 10–40)
BASOPHILS # BLD AUTO: 0.03 K/UL (ref 0–0.2)
BASOPHILS NFR BLD: 0.4 % (ref 0–1.9)
BILIRUB SERPL-MCNC: 0.9 MG/DL (ref 0.1–1)
BUN SERPL-MCNC: 12 MG/DL (ref 6–20)
CALCIUM SERPL-MCNC: 8.2 MG/DL (ref 8.7–10.5)
CHLORIDE SERPL-SCNC: 106 MMOL/L (ref 95–110)
CO2 SERPL-SCNC: 28 MMOL/L (ref 23–29)
CREAT SERPL-MCNC: 0.7 MG/DL (ref 0.5–1.4)
DIFFERENTIAL METHOD: ABNORMAL
EOSINOPHIL # BLD AUTO: 0.1 K/UL (ref 0–0.5)
EOSINOPHIL NFR BLD: 1.7 % (ref 0–8)
ERYTHROCYTE [DISTWIDTH] IN BLOOD BY AUTOMATED COUNT: 18.1 % (ref 11.5–14.5)
EST. GFR  (AFRICAN AMERICAN): >60 ML/MIN/1.73 M^2
EST. GFR  (NON AFRICAN AMERICAN): >60 ML/MIN/1.73 M^2
GLUCOSE SERPL-MCNC: 106 MG/DL (ref 70–110)
HCT VFR BLD AUTO: 25.5 % (ref 37–48.5)
HGB BLD-MCNC: 8.4 G/DL (ref 12–16)
IMM GRANULOCYTES # BLD AUTO: 0.05 K/UL (ref 0–0.04)
IMM GRANULOCYTES NFR BLD AUTO: 0.6 % (ref 0–0.5)
LYMPHOCYTES # BLD AUTO: 2.1 K/UL (ref 1–4.8)
LYMPHOCYTES NFR BLD: 24.4 % (ref 18–48)
MAGNESIUM SERPL-MCNC: 1.9 MG/DL (ref 1.6–2.6)
MCH RBC QN AUTO: 29.6 PG (ref 27–31)
MCHC RBC AUTO-ENTMCNC: 32.9 G/DL (ref 32–36)
MCV RBC AUTO: 90 FL (ref 82–98)
MONOCYTES # BLD AUTO: 0.6 K/UL (ref 0.3–1)
MONOCYTES NFR BLD: 7.5 % (ref 4–15)
NEUTROPHILS # BLD AUTO: 5.5 K/UL (ref 1.8–7.7)
NEUTROPHILS NFR BLD: 65.4 % (ref 38–73)
NRBC BLD-RTO: 0 /100 WBC
PLATELET # BLD AUTO: 120 K/UL (ref 150–350)
PMV BLD AUTO: 10.6 FL (ref 9.2–12.9)
POTASSIUM SERPL-SCNC: 3.9 MMOL/L (ref 3.5–5.1)
PROT SERPL-MCNC: 5.5 G/DL (ref 6–8.4)
RBC # BLD AUTO: 2.84 M/UL (ref 4–5.4)
SODIUM SERPL-SCNC: 139 MMOL/L (ref 136–145)
WBC # BLD AUTO: 8.41 K/UL (ref 3.9–12.7)

## 2020-12-05 PROCEDURE — 63600175 PHARM REV CODE 636 W HCPCS: Performed by: THORACIC SURGERY (CARDIOTHORACIC VASCULAR SURGERY)

## 2020-12-05 PROCEDURE — 25000003 PHARM REV CODE 250

## 2020-12-05 PROCEDURE — 63600175 PHARM REV CODE 636 W HCPCS: Performed by: INTERNAL MEDICINE

## 2020-12-05 PROCEDURE — 93010 EKG 12-LEAD: ICD-10-PCS | Mod: ,,, | Performed by: INTERNAL MEDICINE

## 2020-12-05 PROCEDURE — 25000003 PHARM REV CODE 250: Performed by: THORACIC SURGERY (CARDIOTHORACIC VASCULAR SURGERY)

## 2020-12-05 PROCEDURE — 99900031 HC PATIENT EDUCATION (STAT)

## 2020-12-05 PROCEDURE — 25000003 PHARM REV CODE 250: Performed by: SPECIALIST

## 2020-12-05 PROCEDURE — 25000003 PHARM REV CODE 250: Performed by: NURSE PRACTITIONER

## 2020-12-05 PROCEDURE — 99900035 HC TECH TIME PER 15 MIN (STAT)

## 2020-12-05 PROCEDURE — 21000000 HC CCU ICU ROOM CHARGE

## 2020-12-05 PROCEDURE — 93010 ELECTROCARDIOGRAM REPORT: CPT | Mod: ,,, | Performed by: INTERNAL MEDICINE

## 2020-12-05 PROCEDURE — 85025 COMPLETE CBC W/AUTO DIFF WBC: CPT

## 2020-12-05 PROCEDURE — 94761 N-INVAS EAR/PLS OXIMETRY MLT: CPT

## 2020-12-05 PROCEDURE — 80053 COMPREHEN METABOLIC PANEL: CPT

## 2020-12-05 PROCEDURE — 93005 ELECTROCARDIOGRAM TRACING: CPT | Performed by: INTERNAL MEDICINE

## 2020-12-05 PROCEDURE — 83735 ASSAY OF MAGNESIUM: CPT

## 2020-12-05 RX ORDER — METOPROLOL TARTRATE 25 MG/1
12.5 TABLET ORAL 2 TIMES DAILY
Status: DISCONTINUED | OUTPATIENT
Start: 2020-12-05 | End: 2020-12-07 | Stop reason: HOSPADM

## 2020-12-05 RX ADMIN — MORPHINE SULFATE 2 MG: 2 INJECTION, SOLUTION INTRAMUSCULAR; INTRAVENOUS at 01:12

## 2020-12-05 RX ADMIN — ATORVASTATIN CALCIUM 40 MG: 40 TABLET, FILM COATED ORAL at 08:12

## 2020-12-05 RX ADMIN — ASPIRIN 81 MG 81 MG: 81 TABLET ORAL at 09:12

## 2020-12-05 RX ADMIN — CHLORHEXIDINE GLUCONATE 15 ML: 1.2 RINSE ORAL at 09:12

## 2020-12-05 RX ADMIN — MUPIROCIN: 20 OINTMENT TOPICAL at 09:12

## 2020-12-05 RX ADMIN — KETOROLAC TROMETHAMINE 15 MG: 30 INJECTION, SOLUTION INTRAMUSCULAR at 05:12

## 2020-12-05 RX ADMIN — DOCUSATE SODIUM 100 MG: 100 CAPSULE, LIQUID FILLED ORAL at 08:12

## 2020-12-05 RX ADMIN — HYDROCODONE BITARTRATE AND ACETAMINOPHEN 1 TABLET: 5; 325 TABLET ORAL at 09:12

## 2020-12-05 RX ADMIN — FLUOXETINE 40 MG: 20 CAPSULE ORAL at 05:12

## 2020-12-05 RX ADMIN — OXYCODONE HYDROCHLORIDE 10 MG: 5 TABLET ORAL at 08:12

## 2020-12-05 RX ADMIN — ENOXAPARIN SODIUM 100 MG: 100 INJECTION SUBCUTANEOUS at 11:12

## 2020-12-05 RX ADMIN — OXYCODONE HYDROCHLORIDE 10 MG: 5 TABLET ORAL at 02:12

## 2020-12-05 RX ADMIN — PANTOPRAZOLE SODIUM 40 MG: 40 TABLET, DELAYED RELEASE ORAL at 09:12

## 2020-12-05 RX ADMIN — POTASSIUM CHLORIDE 20 MEQ: 20 TABLET, EXTENDED RELEASE ORAL at 05:12

## 2020-12-05 RX ADMIN — DOCUSATE SODIUM 100 MG: 100 CAPSULE, LIQUID FILLED ORAL at 09:12

## 2020-12-05 RX ADMIN — HYDROCODONE BITARTRATE AND ACETAMINOPHEN 1 TABLET: 5; 325 TABLET ORAL at 11:12

## 2020-12-05 RX ADMIN — PANTOPRAZOLE SODIUM 40 MG: 40 TABLET, DELAYED RELEASE ORAL at 08:12

## 2020-12-05 RX ADMIN — KETOROLAC TROMETHAMINE 15 MG: 30 INJECTION, SOLUTION INTRAMUSCULAR at 11:12

## 2020-12-05 RX ADMIN — METOPROLOL TARTRATE 12.5 MG: 25 TABLET, FILM COATED ORAL at 08:12

## 2020-12-05 RX ADMIN — HYDROCODONE BITARTRATE AND ACETAMINOPHEN 1 TABLET: 5; 325 TABLET ORAL at 05:12

## 2020-12-05 RX ADMIN — MORPHINE SULFATE 4 MG: 4 INJECTION INTRAVENOUS at 11:12

## 2020-12-05 RX ADMIN — CHLORHEXIDINE GLUCONATE 15 ML: 1.2 RINSE ORAL at 08:12

## 2020-12-05 NOTE — RESPIRATORY THERAPY
12/04/20 3430   Patient Assessment/Suction   Level of Consciousness (AVPU) alert   Respiratory Effort Normal;Unlabored   Expansion/Accessory Muscles/Retractions no use of accessory muscles;no retractions;expansion symmetric   All Lung Fields Breath Sounds equal bilaterally;clear   Rhythm/Pattern, Respiratory unlabored;pattern regular;depth regular   Cough Frequency no cough   PRE-TX-O2   O2 Device (Oxygen Therapy) nasal cannula   $ Is the patient on Low Flow Oxygen? Yes   Flow (L/min) 2   Pulse Oximetry Type Continuous   $ Pulse Oximetry - Multiple Charge Pulse Oximetry - Multiple   Incentive Spirometer   $ Incentive Spirometer Charges done independently per patient   Incentive Spirometer Predicted Level (mL) 1000   Administration (IS) proper technique demonstrated;self-administered   Number of Repetitions (IS) 10   Level Incentive Spirometer (mL) 800   Patient Tolerance (IS) fair;no adverse signs/symptoms present   Wound Care   $ Wound Care Tech Time 15 min   Area of Concern Left;Right;Behind ear;Cheek   Skin Color/Characteristics without discoloration   Skin Temperature warm   Education   $ Education DME Oxygen;15 min   Respiratory Evaluation   $ Care Plan Tech Time 15 min

## 2020-12-05 NOTE — NURSING TRANSFER
Nursing Transfer Note      12/5/2020     Transfer To: [id number]     Transfer via wheelchair    Transfer with cardiac monitoring    Transported by fiorella    Medicines sent: no    Chart send with patient: Yes    Notified: pt has phone and will notify family as necessary.  No family or calls today    Patient reassessed at: now (date, time)    Upon arrival to floor: cardiac monitor applied, patient oriented to room, call bell in reach and bed in lowest position

## 2020-12-05 NOTE — NURSING
OOB 3 times this shift, up to bathroom with minimal assistance. Up to chair at bedside for 45 minutes this AM, then back to bed. Tolerated well.

## 2020-12-05 NOTE — PT/OT/SLP PROGRESS
Physical Therapy      Patient Name:  Aleyda Costa   MRN:  [id number]    Patient not seen today secondary to (Pt had already ambulated 2 laps with nurse.). Will follow-up 12/6/2020.    Coreen Ayala PT

## 2020-12-05 NOTE — PLAN OF CARE
12/05/20 0821   Patient Assessment/Suction   Level of Consciousness (AVPU) alert   Respiratory Effort Unlabored   PRE-TX-O2   O2 Device (Oxygen Therapy) room air   SpO2 (!) 92 %   Pulse Oximetry Type Continuous   $ Pulse Oximetry - Multiple Charge Pulse Oximetry - Multiple   Pulse 83   Resp 19   Respiratory Evaluation   $ Care Plan Tech Time 15 min

## 2020-12-05 NOTE — PROGRESS NOTES
Progress Note  Cardiology    Admit Date: 11/28/2020   LOS: 5 days     Follow-up For:  ACS/non-STEMI; 3 vessel CABG with LIMA 12/02/2020    Scheduled Meds:   aspirin  81 mg Oral Daily    atorvastatin  40 mg Oral QHS    chlorhexidine  15 mL Mouth/Throat BID    docusate sodium  100 mg Oral BID    enoxparin  1 mg/kg Subcutaneous Q12H    FLUoxetine  40 mg Oral Daily    custom IVPB builder   Irrigation Once    insulin (HUMAN R) infusion (adults)  1 Units/hr Intravenous Once    mupirocin   Nasal BID    pantoprazole  40 mg Oral BID    sodium chloride 0.9%  500 mL Intravenous Once     Continuous Infusions:   sodium chloride 0.45% 50 mL/hr at 12/03/20 0601    insulin regular 1 units/mL infusion orderable (CTS POST-OP) Stopped (12/03/20 1200)     PRN Meds:albumin human 5%, calcium chloride IVPB, calcium chloride IVPB, calcium chloride IVPB, calcium gluconate IVPB, calcium gluconate IVPB, calcium gluconate IVPB, dextrose 50%, dextrose 50%, HYDROcodone-acetaminophen, hydrOXYzine pamoate, ketorolac, magnesium oxide, magnesium sulfate IVPB, magnesium sulfate IVPB, magnesium sulfate IVPB, magnesium sulfate IVPB, magnesium sulfate IVPB, magnesium sulfate IVPB, morphine, morphine, ondansetron, oxyCODONE, potassium chloride in water, potassium chloride in water, potassium chloride in water, potassium chloride in water, potassium chloride in water, potassium chloride in water, potassium chloride in water, potassium chloride, potassium chloride, potassium chloride, potassium chloride, sodium phosphate IVPB, sodium phosphate IVPB, sodium phosphate IVPB, sodium phosphate IVPB, sodium phosphate IVPB, sodium phosphate IVPB, sodium phosphate IVPB, sodium phosphate IVPB, tiZANidine    Review of patient's allergies indicates:   Allergen Reactions    Clarithromycin Swelling and Other (See Comments)     DIFFICULTY SWALLOWING  DIFFICULTY SWALLOWING    Pcn [penicillins] Anaphylaxis    Sulfa (sulfonamide antibiotics) Hives     Wellbutrin [bupropion hcl] Shortness Of Breath and Anaphylaxis    Betadine [povidone-iodine] Hives     Swelling      Cefprozil Hives    Iodinated contrast media Hives    Latex, natural rubber Rash    Sulfamethoxazole-trimethoprim Nausea And Vomiting    Iodine Hives and Swelling    Latex Hives and Swelling       SUBJECTIVE:     Interval History: Patient has complaints mild cough.  No chest pain or tightness, no shortness of breath.    Review of Systems  Respiratory: positive for cough, negative for hemoptysis, sputum and wheezing  Cardiovascular: negative for chest pain, dyspnea, orthopnea, palpitations, paroxysmal nocturnal dyspnea and Ambulated in the alvarenga without difficulty    OBJECTIVE:     Vital Signs (Most Recent)  Temp: 98.4 °F (36.9 °C) (12/05/20 1200)  Pulse: 84 (12/05/20 1200)  Resp: 16 (12/05/20 1455)  BP: 102/61 (12/05/20 1200)  SpO2: (!) 92 % (12/05/20 0821)    Vital Signs Range (Last 24H):  Temp:  [97.6 °F (36.4 °C)-98.8 °F (37.1 °C)]   Pulse:  [71-93]   Resp:  [12-33]   BP: ()/(40-61)   SpO2:  [90 %-100 %]       Physical Exam:  Neck: no carotid bruit, no JVD and supple, symmetrical, trachea midline  Lungs: clear to auscultation bilaterally, normal respiratory effort  Heart: regular rate and rhythm, S1, S2 normal, no murmur, click, rub or gallop  Abdomen: abnormal findings:  obese  Extremities: Extremities normal, atraumatic, no cyanosis, clubbing, or edema    Recent Results (from the past 24 hour(s))   Hemoglobin    Collection Time: 12/04/20  3:24 PM   Result Value Ref Range    Hemoglobin 7.8 (L) 12.0 - 16.0 g/dL   Hematocrit    Collection Time: 12/04/20  3:24 PM   Result Value Ref Range    Hematocrit 24.9 (L) 37.0 - 48.5 %   Iron and TIBC    Collection Time: 12/04/20 10:01 PM   Result Value Ref Range    Iron 14 (L) 30 - 160 ug/dL    Transferrin 141 (L) 200 - 375 mg/dL    TIBC 197 (L) 250 - 450 ug/dL    Saturated Iron 7 (L) 20 - 50 %   Ferritin    Collection Time: 12/04/20 10:01 PM    Result Value Ref Range    Ferritin 72 20.0 - 300.0 ng/mL   Hemoglobin    Collection Time: 12/04/20 10:01 PM   Result Value Ref Range    Hemoglobin 8.5 (L) 12.0 - 16.0 g/dL   Hematocrit    Collection Time: 12/04/20 10:01 PM   Result Value Ref Range    Hematocrit 26.0 (L) 37.0 - 48.5 %   Magnesium    Collection Time: 12/04/20 10:01 PM   Result Value Ref Range    Magnesium 1.8 1.6 - 2.6 mg/dL   Comprehensive metabolic panel, DAILY IN AM    Collection Time: 12/05/20  4:18 AM   Result Value Ref Range    Sodium 139 136 - 145 mmol/L    Potassium 3.9 3.5 - 5.1 mmol/L    Chloride 106 95 - 110 mmol/L    CO2 28 23 - 29 mmol/L    Glucose 106 70 - 110 mg/dL    BUN 12 6 - 20 mg/dL    Creatinine 0.7 0.5 - 1.4 mg/dL    Calcium 8.2 (L) 8.7 - 10.5 mg/dL    Total Protein 5.5 (L) 6.0 - 8.4 g/dL    Albumin 3.0 (L) 3.5 - 5.2 g/dL    Total Bilirubin 0.9 0.1 - 1.0 mg/dL    Alkaline Phosphatase 52 (L) 55 - 135 U/L    AST 21 10 - 40 U/L    ALT 24 10 - 44 U/L    Anion Gap 5 (L) 8 - 16 mmol/L    eGFR if African American >60.0 >60 mL/min/1.73 m^2    eGFR if non African American >60.0 >60 mL/min/1.73 m^2   Magnesium, DAILY    Collection Time: 12/05/20  4:18 AM   Result Value Ref Range    Magnesium 1.9 1.6 - 2.6 mg/dL   CBC auto differential    Collection Time: 12/05/20  4:18 AM   Result Value Ref Range    WBC 8.41 3.90 - 12.70 K/uL    RBC 2.84 (L) 4.00 - 5.40 M/uL    Hemoglobin 8.4 (L) 12.0 - 16.0 g/dL    Hematocrit 25.5 (L) 37.0 - 48.5 %    MCV 90 82 - 98 fL    MCH 29.6 27.0 - 31.0 pg    MCHC 32.9 32.0 - 36.0 g/dL    RDW 18.1 (H) 11.5 - 14.5 %    Platelets 120 (L) 150 - 350 K/uL    MPV 10.6 9.2 - 12.9 fL    Immature Granulocytes 0.6 (H) 0.0 - 0.5 %    Gran # (ANC) 5.5 1.8 - 7.7 K/uL    Immature Grans (Abs) 0.05 (H) 0.00 - 0.04 K/uL    Lymph # 2.1 1.0 - 4.8 K/uL    Mono # 0.6 0.3 - 1.0 K/uL    Eos # 0.1 0.0 - 0.5 K/uL    Baso # 0.03 0.00 - 0.20 K/uL    nRBC 0 0 /100 WBC    Gran % 65.4 38.0 - 73.0 %    Lymph % 24.4 18.0 - 48.0 %    Mono % 7.5  4.0 - 15.0 %    Eosinophil % 1.7 0.0 - 8.0 %    Basophil % 0.4 0.0 - 1.9 %    Differential Method Automated        Diagnostic Results:  Labs: Reviewed  ECG: Reviewed    ASSESSMENT/PLAN:     Doing well.  Patient is being transferred to Cardiology today.

## 2020-12-05 NOTE — PROGRESS NOTES
[facility] Medicine  Progress Note    Patient Name: Aleyda Costa  MRN: [id number]  Patient Class: IP- Inpatient   Admission Date: 11/28/2020  Length of Stay: 5 days  Attending Physician: Donald Hilton MD  Primary Care Provider: Darlene Hayden MD        Subjective:     Principal Problem:Chest pain    Interval History:     Patient is feeling better  Blood pressure is more stabilized  Seems like patient has not tolerate to beta-blockers.  She does not want to take it anymore  Starting Lovenox    ICU Objective:     Vital Signs (Most Recent):  Temp: 97.6 °F (36.4 °C) (12/05/20 0715)  Pulse: 83 (12/05/20 0821)  Resp: (!) 24 (12/05/20 1127)  BP: (!) 103/56 (12/05/20 0815)  SpO2: (!) 92 % (12/05/20 0821) Vital Signs (24h Range):  Temp:  [97.6 °F (36.4 °C)-98.8 °F (37.1 °C)] 97.6 °F (36.4 °C)  Pulse:  [70-93] 83  Resp:  [12-33] 24  SpO2:  [90 %-100 %] 92 %  BP: ()/(40-59) 103/56     Weight: 98.8 kg (217 lb 13 oz)  Body mass index is 29.54 kg/m².    Intake/Output Summary (Last 24 hours) at 12/5/2020 1322  Last data filed at 12/5/2020 0600  Gross per 24 hour   Intake 2105 ml   Output --   Net 2105 ml      Physical Exam    Physical Exam:  General- Patient alert and oriented  HEENT- PERRLA  Neck- No JVD, Lymphadenopathy  CV- Regular rate and rhythm,   Resp- Lungs CTA Bilaterally  GI- Non tender/non-distended,  Extrem- No cyanosis, clubbing, edema.  Neuro- Strength 5/5 flexors/extensors,  Skin-  No masses, rashes or lesions noted on cursory skin exam.  Overview/Hospital Course:     Significant Labs:   BMP:   Recent Labs   Lab 12/05/20  0418         K 3.9      CO2 28   BUN 12   CREATININE 0.7   CALCIUM 8.2*   MG 1.9     CBC:   Recent Labs   Lab 12/04/20  0405 12/04/20  1524 12/04/20  2201 12/05/20  0418   WBC 14.69*  --   --  8.41   HGB 8.9* 7.8* 8.5* 8.4*   HCT 27.5* 24.9* 26.0* 25.5*     --   --  120*     CMP:   Recent Labs   Lab 12/04/20  0405 12/05/20  0418    * 139   K 4.1 3.9    106   CO2 27 28   * 106   BUN 7 12   CREATININE 0.6 0.7   CALCIUM 8.2* 8.2*   PROT 5.7* 5.5*   ALBUMIN 2.9* 3.0*   BILITOT 0.5 0.9   ALKPHOS 54* 52*   AST 29 21   ALT 29 24   ANIONGAP 5* 5*   EGFRNONAA >60.0 >60.0       Significant Imaging: I have reviewed and interpreted all pertinent imaging results/findings within the past 24 hours.  CXR with right upper lobe opacity to follow   Assessment/Plan:      Active Diagnoses:    Diagnosis Date Noted POA    PRINCIPAL PROBLEM:  Chest pain [R07.9] 06/25/2020 Yes    S/P CABG x 3 [Z95.1] 12/03/2020 Not Applicable    Coronary artery disease of native artery with stable angina pectoris [I25.118] 10/19/2020 Yes    H/o PE and DVT [I27.82] 10/19/2020 Yes    Hyperlipidemia [E78.5] 10/06/2020 Yes    DVT (deep venous thrombosis) [I82.409] 06/21/2020 Yes    Dependence on nicotine from cigarettes [F17.210] 11/19/2018 Yes    Sleep apnea [G47.30] 11/13/2018 Yes      Problems Resolved During this Admission:       ASSESSMENT AND PLAN         1. Acute on Chronic angina      2. H/O CAD with small vessel disease s/p angiogram with MVD and s/p      Balloon pump s/pCABG   3. H/O DVT/PE  4. Hyperlipidemia  5. Essential HTN   6. Right  Upper lobe opacity to follow     PLAN   Post op care as per CT surgery   Continue present management  Lopressor on hold because of hypotension and intolerance.  Resume Lovenox therapeutic dose   Follow  Dr. Pina as OP.  Downgrade   Possible DC soon   VTE Risk Mitigation (From admission, onward)         Ordered     enoxaparin injection 100 mg  Every 12 hours (non-standard times)      12/04/20 1359     heparin (porcine) 6,000 Units in lactated ringers 1,000 mL  Once      12/02/20 1333                   Donald Hilton MD  Department of Hospital Medicine   Formerly [facility], later [facility]

## 2020-12-05 NOTE — PROGRESS NOTES
Progress Note  Cardiothoracic Surgery    Admit Date: 11/28/2020  Post-operative Day: 3 Days Post-Op  Hospital Day: 8    SUBJECTIVE:  Denies chest pain or shortness of breath.     Follow-up For: Procedure(s) (LRB):  CORONARY ARTERY BYPASS GRAFT (CABG) (N/A)  SURGICAL PROCUREMENT, VEIN, ENDOSCOPIC (Left)    Scheduled Meds:   aspirin  81 mg Oral Daily    atorvastatin  40 mg Oral QHS    chlorhexidine  15 mL Mouth/Throat BID    docusate sodium  100 mg Oral BID    enoxparin  1 mg/kg Subcutaneous Q12H    FLUoxetine  40 mg Oral Daily    custom IVPB builder   Irrigation Once    insulin (HUMAN R) infusion (adults)  1 Units/hr Intravenous Once    mupirocin   Nasal BID    pantoprazole  40 mg Oral BID    sodium chloride 0.9%  500 mL Intravenous Once     Infusions/Drips:   sodium chloride 0.45% 50 mL/hr at 12/03/20 0601    insulin regular 1 units/mL infusion orderable (CTS POST-OP) Stopped (12/03/20 1200)     PRN Meds: albumin human 5%, calcium chloride IVPB, calcium chloride IVPB, calcium chloride IVPB, calcium gluconate IVPB, calcium gluconate IVPB, calcium gluconate IVPB, dextrose 50%, dextrose 50%, HYDROcodone-acetaminophen, hydrOXYzine pamoate, ketorolac, magnesium oxide, magnesium sulfate IVPB, magnesium sulfate IVPB, magnesium sulfate IVPB, magnesium sulfate IVPB, magnesium sulfate IVPB, magnesium sulfate IVPB, morphine, morphine, ondansetron, oxyCODONE, potassium chloride in water, potassium chloride in water, potassium chloride in water, potassium chloride in water, potassium chloride in water, potassium chloride in water, potassium chloride in water, potassium chloride, potassium chloride, potassium chloride, potassium chloride, sodium phosphate IVPB, sodium phosphate IVPB, sodium phosphate IVPB, sodium phosphate IVPB, sodium phosphate IVPB, sodium phosphate IVPB, sodium phosphate IVPB, sodium phosphate IVPB, tiZANidine    Review of patient's allergies indicates:   Allergen Reactions    Clarithromycin  Swelling and Other (See Comments)     DIFFICULTY SWALLOWING  DIFFICULTY SWALLOWING    Pcn [penicillins] Anaphylaxis    Sulfa (sulfonamide antibiotics) Hives    Wellbutrin [bupropion hcl] Shortness Of Breath and Anaphylaxis    Betadine [povidone-iodine] Hives     Swelling      Cefprozil Hives    Iodinated contrast media Hives    Latex, natural rubber Rash    Sulfamethoxazole-trimethoprim Nausea And Vomiting    Iodine Hives and Swelling    Latex Hives and Swelling       OBJECTIVE:     Vital Signs (Most Recent)  Temp: 98.2 °F (36.8 °C) (12/05/20 1500)  Pulse: 75 (12/05/20 1500)  Resp: 20 (12/05/20 1500)  BP: (!) 112/59 (12/05/20 1500)  SpO2: 98 % (12/05/20 1500)    Admission Weight: 97.5 kg (215 lb) (11/27/20 2246)   Most Recent Weight: 98.8 kg (217 lb 13 oz) (12/01/20 0137)    Vital Signs Range (Last 24H):  Temp:  [97.6 °F (36.4 °C)-98.8 °F (37.1 °C)]   Pulse:  [73-93]   Resp:  [12-33]   BP: ()/(44-61)   SpO2:  [90 %-100 %]     I & O (Last 24H):    Intake/Output Summary (Last 24 hours) at 12/5/2020 1626  Last data filed at 12/5/2020 1301  Gross per 24 hour   Intake 2085 ml   Output --   Net 2085 ml     Physical Exam:  Awake, alert and oriented.  Neck is supple.  Heart has a regular rate and rhythm.  Lungs are clear.  Sternum is stable.      Wound/Incision:  clean, dry, intact    Laboratory:  CBC:   Recent Labs   Lab 12/05/20 0418   WBC 8.41   RBC 2.84*   HGB 8.4*   HCT 25.5*   *   MCV 90   MCH 29.6   MCHC 32.9     BMP:   Recent Labs   Lab 12/05/20 0418         K 3.9      CO2 28   BUN 12   CREATININE 0.7   CALCIUM 8.2*   MG 1.9       Diagnostic Results:  X-Ray: Reviewed    ASSESSMENT/PLAN:     Assessment: expected acute post-operative blood loss anemia.    Plan: Incentive spirometry.  Ambulate..  Beta blockade

## 2020-12-05 NOTE — NURSING
Pt received from ICU, vss, sr on monitor. Incisions CDI. C/o incisional pain, prn pain med given. Call light in reach.

## 2020-12-06 LAB
ALBUMIN SERPL BCP-MCNC: 2.8 G/DL (ref 3.5–5.2)
ALP SERPL-CCNC: 61 U/L (ref 55–135)
ALT SERPL W/O P-5'-P-CCNC: 28 U/L (ref 10–44)
ANION GAP SERPL CALC-SCNC: 7 MMOL/L (ref 8–16)
ANION GAP SERPL CALC-SCNC: 7 MMOL/L (ref 8–16)
AST SERPL-CCNC: 25 U/L (ref 10–40)
BASOPHILS # BLD AUTO: 0.03 K/UL (ref 0–0.2)
BASOPHILS NFR BLD: 0.6 % (ref 0–1.9)
BILIRUB SERPL-MCNC: 0.7 MG/DL (ref 0.1–1)
BLD PROD TYP BPU: NORMAL
BLOOD UNIT EXPIRATION DATE: NORMAL
BLOOD UNIT TYPE CODE: 6200
BLOOD UNIT TYPE: NORMAL
BUN SERPL-MCNC: 12 MG/DL (ref 6–20)
BUN SERPL-MCNC: 12 MG/DL (ref 6–20)
CALCIUM SERPL-MCNC: 8.2 MG/DL (ref 8.7–10.5)
CALCIUM SERPL-MCNC: 8.2 MG/DL (ref 8.7–10.5)
CHLORIDE SERPL-SCNC: 106 MMOL/L (ref 95–110)
CHLORIDE SERPL-SCNC: 106 MMOL/L (ref 95–110)
CO2 SERPL-SCNC: 28 MMOL/L (ref 23–29)
CO2 SERPL-SCNC: 28 MMOL/L (ref 23–29)
CODING SYSTEM: NORMAL
CREAT SERPL-MCNC: 0.5 MG/DL (ref 0.5–1.4)
CREAT SERPL-MCNC: 0.5 MG/DL (ref 0.5–1.4)
DIFFERENTIAL METHOD: ABNORMAL
DISPENSE STATUS: NORMAL
EOSINOPHIL # BLD AUTO: 0.2 K/UL (ref 0–0.5)
EOSINOPHIL NFR BLD: 3.5 % (ref 0–8)
ERYTHROCYTE [DISTWIDTH] IN BLOOD BY AUTOMATED COUNT: 17.7 % (ref 11.5–14.5)
EST. GFR  (AFRICAN AMERICAN): >60 ML/MIN/1.73 M^2
EST. GFR  (AFRICAN AMERICAN): >60 ML/MIN/1.73 M^2
EST. GFR  (NON AFRICAN AMERICAN): >60 ML/MIN/1.73 M^2
EST. GFR  (NON AFRICAN AMERICAN): >60 ML/MIN/1.73 M^2
GLUCOSE SERPL-MCNC: 99 MG/DL (ref 70–110)
GLUCOSE SERPL-MCNC: 99 MG/DL (ref 70–110)
HCT VFR BLD AUTO: 26 % (ref 37–48.5)
HGB BLD-MCNC: 8.2 G/DL (ref 12–16)
IMM GRANULOCYTES # BLD AUTO: 0.04 K/UL (ref 0–0.04)
IMM GRANULOCYTES NFR BLD AUTO: 0.8 % (ref 0–0.5)
LYMPHOCYTES # BLD AUTO: 1.3 K/UL (ref 1–4.8)
LYMPHOCYTES NFR BLD: 24.5 % (ref 18–48)
MAGNESIUM SERPL-MCNC: 2 MG/DL (ref 1.6–2.6)
MCH RBC QN AUTO: 28.3 PG (ref 27–31)
MCHC RBC AUTO-ENTMCNC: 31.5 G/DL (ref 32–36)
MCV RBC AUTO: 90 FL (ref 82–98)
MONOCYTES # BLD AUTO: 0.4 K/UL (ref 0.3–1)
MONOCYTES NFR BLD: 7.9 % (ref 4–15)
NEUTROPHILS # BLD AUTO: 3.3 K/UL (ref 1.8–7.7)
NEUTROPHILS NFR BLD: 62.7 % (ref 38–73)
NRBC BLD-RTO: 0 /100 WBC
NUM UNITS TRANS PACKED RBC: NORMAL
PLATELET # BLD AUTO: 131 K/UL (ref 150–350)
PMV BLD AUTO: 10.4 FL (ref 9.2–12.9)
POTASSIUM SERPL-SCNC: 3.8 MMOL/L (ref 3.5–5.1)
POTASSIUM SERPL-SCNC: 3.8 MMOL/L (ref 3.5–5.1)
PROT SERPL-MCNC: 5.3 G/DL (ref 6–8.4)
RBC # BLD AUTO: 2.9 M/UL (ref 4–5.4)
SODIUM SERPL-SCNC: 141 MMOL/L (ref 136–145)
SODIUM SERPL-SCNC: 141 MMOL/L (ref 136–145)
WBC # BLD AUTO: 5.18 K/UL (ref 3.9–12.7)

## 2020-12-06 PROCEDURE — 94799 UNLISTED PULMONARY SVC/PX: CPT

## 2020-12-06 PROCEDURE — 25000003 PHARM REV CODE 250: Performed by: NURSE PRACTITIONER

## 2020-12-06 PROCEDURE — 94761 N-INVAS EAR/PLS OXIMETRY MLT: CPT

## 2020-12-06 PROCEDURE — 99900035 HC TECH TIME PER 15 MIN (STAT)

## 2020-12-06 PROCEDURE — 25000003 PHARM REV CODE 250

## 2020-12-06 PROCEDURE — 80053 COMPREHEN METABOLIC PANEL: CPT

## 2020-12-06 PROCEDURE — 25000003 PHARM REV CODE 250: Performed by: THORACIC SURGERY (CARDIOTHORACIC VASCULAR SURGERY)

## 2020-12-06 PROCEDURE — 25000003 PHARM REV CODE 250: Performed by: SPECIALIST

## 2020-12-06 PROCEDURE — 63600175 PHARM REV CODE 636 W HCPCS: Performed by: INTERNAL MEDICINE

## 2020-12-06 PROCEDURE — 83735 ASSAY OF MAGNESIUM: CPT

## 2020-12-06 PROCEDURE — 63600175 PHARM REV CODE 636 W HCPCS: Performed by: THORACIC SURGERY (CARDIOTHORACIC VASCULAR SURGERY)

## 2020-12-06 PROCEDURE — 25000003 PHARM REV CODE 250: Performed by: INTERNAL MEDICINE

## 2020-12-06 PROCEDURE — 85025 COMPLETE CBC W/AUTO DIFF WBC: CPT

## 2020-12-06 PROCEDURE — 21000000 HC CCU ICU ROOM CHARGE

## 2020-12-06 RX ORDER — HYDROCODONE BITARTRATE AND ACETAMINOPHEN 5; 325 MG/1; MG/1
2 TABLET ORAL EVERY 4 HOURS PRN
Status: DISCONTINUED | OUTPATIENT
Start: 2020-12-06 | End: 2020-12-07 | Stop reason: HOSPADM

## 2020-12-06 RX ORDER — ENOXAPARIN SODIUM 100 MG/ML
150 INJECTION SUBCUTANEOUS
Status: DISCONTINUED | OUTPATIENT
Start: 2020-12-06 | End: 2020-12-07 | Stop reason: HOSPADM

## 2020-12-06 RX ORDER — FERROUS SULFATE 325(65) MG
325 TABLET ORAL 2 TIMES DAILY
Status: DISCONTINUED | OUTPATIENT
Start: 2020-12-06 | End: 2020-12-07 | Stop reason: HOSPADM

## 2020-12-06 RX ADMIN — OXYCODONE HYDROCHLORIDE 10 MG: 5 TABLET ORAL at 03:12

## 2020-12-06 RX ADMIN — HYDROCODONE BITARTRATE AND ACETAMINOPHEN 2 TABLET: 5; 325 TABLET ORAL at 12:12

## 2020-12-06 RX ADMIN — MORPHINE SULFATE 2 MG: 2 INJECTION, SOLUTION INTRAMUSCULAR; INTRAVENOUS at 08:12

## 2020-12-06 RX ADMIN — PANTOPRAZOLE SODIUM 40 MG: 40 TABLET, DELAYED RELEASE ORAL at 09:12

## 2020-12-06 RX ADMIN — CHLORHEXIDINE GLUCONATE 15 ML: 1.2 RINSE ORAL at 08:12

## 2020-12-06 RX ADMIN — OXYCODONE HYDROCHLORIDE 10 MG: 5 TABLET ORAL at 09:12

## 2020-12-06 RX ADMIN — HYDROCODONE BITARTRATE AND ACETAMINOPHEN 2 TABLET: 5; 325 TABLET ORAL at 05:12

## 2020-12-06 RX ADMIN — ONDANSETRON 4 MG: 2 INJECTION INTRAMUSCULAR; INTRAVENOUS at 11:12

## 2020-12-06 RX ADMIN — PANTOPRAZOLE SODIUM 40 MG: 40 TABLET, DELAYED RELEASE ORAL at 08:12

## 2020-12-06 RX ADMIN — ASPIRIN 81 MG 81 MG: 81 TABLET ORAL at 08:12

## 2020-12-06 RX ADMIN — FLUOXETINE 40 MG: 20 CAPSULE ORAL at 05:12

## 2020-12-06 RX ADMIN — FERROUS SULFATE TAB 325 MG (65 MG ELEMENTAL FE) 325 MG: 325 (65 FE) TAB at 09:12

## 2020-12-06 RX ADMIN — MORPHINE SULFATE 2 MG: 2 INJECTION, SOLUTION INTRAMUSCULAR; INTRAVENOUS at 05:12

## 2020-12-06 RX ADMIN — OXYCODONE HYDROCHLORIDE 10 MG: 5 TABLET ORAL at 06:12

## 2020-12-06 RX ADMIN — DOCUSATE SODIUM 100 MG: 100 CAPSULE, LIQUID FILLED ORAL at 09:12

## 2020-12-06 RX ADMIN — MORPHINE SULFATE 4 MG: 4 INJECTION INTRAVENOUS at 10:12

## 2020-12-06 RX ADMIN — CHLORHEXIDINE GLUCONATE 15 ML: 1.2 RINSE ORAL at 09:12

## 2020-12-06 RX ADMIN — ATORVASTATIN CALCIUM 40 MG: 40 TABLET, FILM COATED ORAL at 09:12

## 2020-12-06 RX ADMIN — ENOXAPARIN SODIUM 100 MG: 100 INJECTION SUBCUTANEOUS at 11:12

## 2020-12-06 RX ADMIN — MORPHINE SULFATE 2 MG: 2 INJECTION, SOLUTION INTRAMUSCULAR; INTRAVENOUS at 01:12

## 2020-12-06 RX ADMIN — DOCUSATE SODIUM 100 MG: 100 CAPSULE, LIQUID FILLED ORAL at 08:12

## 2020-12-06 RX ADMIN — MORPHINE SULFATE 2 MG: 2 INJECTION, SOLUTION INTRAMUSCULAR; INTRAVENOUS at 11:12

## 2020-12-06 NOTE — RESPIRATORY THERAPY
12/05/20 2246   Patient Assessment/Suction   Level of Consciousness (AVPU) alert   Respiratory Effort Unlabored   Expansion/Accessory Muscles/Retractions expansion symmetric;no retractions;no use of accessory muscles   All Lung Fields Breath Sounds diminished   Rhythm/Pattern, Respiratory no shortness of breath reported;pattern regular;unlabored   PRE-TX-O2   O2 Device (Oxygen Therapy) room air   SpO2 (!) 92 %   Pulse Oximetry Type Continuous   $ Pulse Oximetry - Multiple Charge Pulse Oximetry - Multiple   Pulse 68   Resp 18   Respiratory Interventions   Cough And Deep Breathing done with encouragement   Breathing Techniques/Airway Clearance deep/controlled cough encouraged;diaphragmatic breathing promoted   Education   $ Education Other (see comment);15 min  (IS,deep diaphragmatic breathing exercises)   Respiratory Evaluation   $ Care Plan Tech Time 15 min   Evaluation For   (care plan)        12/05/20 2246   Patient Assessment/Suction   Level of Consciousness (AVPU) alert   Respiratory Effort Unlabored   Expansion/Accessory Muscles/Retractions expansion symmetric;no retractions;no use of accessory muscles   All Lung Fields Breath Sounds diminished   Rhythm/Pattern, Respiratory no shortness of breath reported;pattern regular;unlabored   PRE-TX-O2   O2 Device (Oxygen Therapy) room air   SpO2 (!) 92 %   Pulse Oximetry Type Continuous   $ Pulse Oximetry - Multiple Charge Pulse Oximetry - Multiple   Pulse 68   Resp 18   Respiratory Interventions   Cough And Deep Breathing done with encouragement   Breathing Techniques/Airway Clearance deep/controlled cough encouraged;diaphragmatic breathing promoted   Education   $ Education Other (see comment);15 min  (IS,deep diaphragmatic breathing exercises)   Respiratory Evaluation   $ Care Plan Tech Time 15 min   Evaluation For   (care plan)

## 2020-12-06 NOTE — PROGRESS NOTES
Progress Note  Cardiology    Admit Date: 11/28/2020   LOS: 6 days     Follow-up For:  ACS/non STEMI; 3 vessel CABG with LIMA    Scheduled Meds:   aspirin  81 mg Oral Daily    atorvastatin  40 mg Oral QHS    chlorhexidine  15 mL Mouth/Throat BID    docusate sodium  100 mg Oral BID    enoxparin  1 mg/kg Subcutaneous Q12H    FLUoxetine  40 mg Oral Daily    custom IVPB builder   Irrigation Once    insulin (HUMAN R) infusion (adults)  1 Units/hr Intravenous Once    metoprolol tartrate  12.5 mg Oral BID    mupirocin   Nasal BID    pantoprazole  40 mg Oral BID    sodium chloride 0.9%  500 mL Intravenous Once     Continuous Infusions:   sodium chloride 0.45% 50 mL/hr at 12/03/20 0601    insulin regular 1 units/mL infusion orderable (CTS POST-OP) Stopped (12/03/20 1200)     PRN Meds:albumin human 5%, calcium chloride IVPB, calcium chloride IVPB, calcium chloride IVPB, calcium gluconate IVPB, calcium gluconate IVPB, calcium gluconate IVPB, dextrose 50%, dextrose 50%, HYDROcodone-acetaminophen, hydrOXYzine pamoate, magnesium oxide, magnesium sulfate IVPB, magnesium sulfate IVPB, magnesium sulfate IVPB, magnesium sulfate IVPB, magnesium sulfate IVPB, magnesium sulfate IVPB, morphine, morphine, ondansetron, oxyCODONE, potassium chloride in water, potassium chloride in water, potassium chloride in water, potassium chloride in water, potassium chloride in water, potassium chloride in water, potassium chloride in water, potassium chloride, potassium chloride, potassium chloride, potassium chloride, sodium phosphate IVPB, sodium phosphate IVPB, sodium phosphate IVPB, sodium phosphate IVPB, sodium phosphate IVPB, sodium phosphate IVPB, sodium phosphate IVPB, sodium phosphate IVPB, tiZANidine    Review of patient's allergies indicates:   Allergen Reactions    Clarithromycin Swelling and Other (See Comments)     DIFFICULTY SWALLOWING  DIFFICULTY SWALLOWING    Pcn [penicillins] Anaphylaxis    Sulfa (sulfonamide  antibiotics) Hives    Wellbutrin [bupropion hcl] Shortness Of Breath and Anaphylaxis    Betadine [povidone-iodine] Hives     Swelling      Cefprozil Hives    Iodinated contrast media Hives    Latex, natural rubber Rash    Sulfamethoxazole-trimethoprim Nausea And Vomiting    Iodine Hives and Swelling    Latex Hives and Swelling       SUBJECTIVE:     Interval History: Patient has no complaint of shortness of breath or chest tightness.    Review of Systems  Respiratory: positive for cough, negative for hemoptysis, sputum and wheezing  Cardiovascular: positive for chest pain and ; incisional, negative for chest pressure/discomfort, orthopnea, palpitations and paroxysmal nocturnal dyspnea    OBJECTIVE:     Vital Signs (Most Recent)  Temp: 99.1 °F (37.3 °C) (12/06/20 0720)  Pulse: 72 (12/06/20 1025)  Resp: 15 (12/06/20 1025)  BP: 108/70 (12/06/20 0720)  SpO2: (!) 93 % (12/06/20 1025)    Vital Signs Range (Last 24H):  Temp:  [98.2 °F (36.8 °C)-99.1 °F (37.3 °C)]   Pulse:  [67-76]   Resp:  [15-20]   BP: ()/(52-70)   SpO2:  [91 %-98 %]       Physical Exam:  Neck: no carotid bruit, no JVD and supple, symmetrical, trachea midline  Lungs: clear to auscultation bilaterally, normal respiratory effort  Heart: regular rate and rhythm, S1, S2 normal, no murmur, click, rub or gallop  Abdomen: soft, non-tender; bowel sounds normal; no masses,  no organomegaly  Extremities: Extremities normal, atraumatic, no cyanosis, clubbing, or edema    Recent Results (from the past 24 hour(s))   Basic metabolic panel    Collection Time: 12/06/20  5:16 AM   Result Value Ref Range    Sodium 141 136 - 145 mmol/L    Potassium 3.8 3.5 - 5.1 mmol/L    Chloride 106 95 - 110 mmol/L    CO2 28 23 - 29 mmol/L    Glucose 99 70 - 110 mg/dL    BUN 12 6 - 20 mg/dL    Creatinine 0.5 0.5 - 1.4 mg/dL    Calcium 8.2 (L) 8.7 - 10.5 mg/dL    Anion Gap 7 (L) 8 - 16 mmol/L    eGFR if African American >60.0 >60 mL/min/1.73 m^2    eGFR if non African American  >60.0 >60 mL/min/1.73 m^2   Comprehensive metabolic panel, DAILY IN AM    Collection Time: 12/06/20  5:16 AM   Result Value Ref Range    Sodium 141 136 - 145 mmol/L    Potassium 3.8 3.5 - 5.1 mmol/L    Chloride 106 95 - 110 mmol/L    CO2 28 23 - 29 mmol/L    Glucose 99 70 - 110 mg/dL    BUN 12 6 - 20 mg/dL    Creatinine 0.5 0.5 - 1.4 mg/dL    Calcium 8.2 (L) 8.7 - 10.5 mg/dL    Total Protein 5.3 (L) 6.0 - 8.4 g/dL    Albumin 2.8 (L) 3.5 - 5.2 g/dL    Total Bilirubin 0.7 0.1 - 1.0 mg/dL    Alkaline Phosphatase 61 55 - 135 U/L    AST 25 10 - 40 U/L    ALT 28 10 - 44 U/L    Anion Gap 7 (L) 8 - 16 mmol/L    eGFR if African American >60.0 >60 mL/min/1.73 m^2    eGFR if non African American >60.0 >60 mL/min/1.73 m^2   Magnesium, DAILY    Collection Time: 12/06/20  5:16 AM   Result Value Ref Range    Magnesium 2.0 1.6 - 2.6 mg/dL   CBC Auto Differential    Collection Time: 12/06/20  8:35 AM   Result Value Ref Range    WBC 5.18 3.90 - 12.70 K/uL    RBC 2.90 (L) 4.00 - 5.40 M/uL    Hemoglobin 8.2 (L) 12.0 - 16.0 g/dL    Hematocrit 26.0 (L) 37.0 - 48.5 %    MCV 90 82 - 98 fL    MCH 28.3 27.0 - 31.0 pg    MCHC 31.5 (L) 32.0 - 36.0 g/dL    RDW 17.7 (H) 11.5 - 14.5 %    Platelets 131 (L) 150 - 350 K/uL    MPV 10.4 9.2 - 12.9 fL    Immature Granulocytes 0.8 (H) 0.0 - 0.5 %    Gran # (ANC) 3.3 1.8 - 7.7 K/uL    Immature Grans (Abs) 0.04 0.00 - 0.04 K/uL    Lymph # 1.3 1.0 - 4.8 K/uL    Mono # 0.4 0.3 - 1.0 K/uL    Eos # 0.2 0.0 - 0.5 K/uL    Baso # 0.03 0.00 - 0.20 K/uL    nRBC 0 0 /100 WBC    Gran % 62.7 38.0 - 73.0 %    Lymph % 24.5 18.0 - 48.0 %    Mono % 7.9 4.0 - 15.0 %    Eosinophil % 3.5 0.0 - 8.0 %    Basophil % 0.6 0.0 - 1.9 %    Differential Method Automated        Diagnostic Results:  Labs: Reviewed    ASSESSMENT/PLAN:     Trend hemoglobin and hematocrit.  Chest x-ray in a.m..  The patient ambulated around the alvarenga.  Hopefully, she can be discharged in a.m. if agreeable with .  Resume Lovenox 150 mg subcu  daily-pre admit dose.

## 2020-12-06 NOTE — PROGRESS NOTES
[facility] Medicine  Progress Note    Patient Name: Aleyda Costa  MRN: [id number]  Patient Class: IP- Inpatient   Admission Date: 11/28/2020  Length of Stay: 6 days  Attending Physician: Donald Hilton MD  Primary Care Provider: Darlene Hayden MD        Subjective:     Principal Problem:Chest pain    Interval History:   Patient had episode of hypotension middle of the night  But otherwise no complaint  Chest wound appeared to be clean  Patient walk around in the hallway  ICU Objective:     Vital Signs (Most Recent):  Temp: 98.2 °F (36.8 °C) (12/06/20 1140)  Pulse: 77 (12/06/20 1140)  Resp: 16 (12/06/20 1216)  BP: 103/61 (12/06/20 1140)  SpO2: 96 % (12/06/20 1140) Vital Signs (24h Range):  Temp:  [98.2 °F (36.8 °C)-99.1 °F (37.3 °C)] 98.2 °F (36.8 °C)  Pulse:  [67-77] 77  Resp:  [15-20] 16  SpO2:  [91 %-98 %] 96 %  BP: ()/(52-70) 103/61     Weight: 98.8 kg (217 lb 13 oz)  Body mass index is 29.54 kg/m².  No intake or output data in the 24 hours ending 12/06/20 1346   Physical Exam    Physical Exam:  General- Patient alert and oriented  HEENT- PERRLA  Neck- No JVD, Lymphadenopathy  CV- Regular rate and rhythm,   Resp- Lungs CTA Bilaterally  GI- Non tender/non-distended,  Extrem- No cyanosis, clubbing, edema.  Neuro- Strength 5/5 flexors/extensors,  Skin-  No masses, rashes or lesions noted on cursory skin exam.  Overview/Hospital Course:     Significant Labs:   BMP:   Recent Labs   Lab 12/06/20  0516   GLU 99  99     141   K 3.8  3.8     106   CO2 28  28   BUN 12  12   CREATININE 0.5  0.5   CALCIUM 8.2*  8.2*   MG 2.0     CBC:   Recent Labs   Lab 12/04/20  2201 12/05/20  0418 12/06/20  0835   WBC  --  8.41 5.18   HGB 8.5* 8.4* 8.2*   HCT 26.0* 25.5* 26.0*   PLT  --  120* 131*     CMP:   Recent Labs   Lab 12/05/20  0418 12/06/20  0516    141  141   K 3.9 3.8  3.8    106  106   CO2 28 28  28    99  99   BUN 12 12  12   CREATININE 0.7  0.5  0.5   CALCIUM 8.2* 8.2*  8.2*   PROT 5.5* 5.3*   ALBUMIN 3.0* 2.8*   BILITOT 0.9 0.7   ALKPHOS 52* 61   AST 21 25   ALT 24 28   ANIONGAP 5* 7*  7*   EGFRNONAA >60.0 >60.0  >60.0       Significant Imaging: I have reviewed and interpreted all pertinent imaging results/findings within the past 24 hours.  CXR with right upper lobe opacity to follow   Assessment/Plan:      Active Diagnoses:    Diagnosis Date Noted POA    PRINCIPAL PROBLEM:  Chest pain [R07.9] 06/25/2020 Yes    S/P CABG x 3 [Z95.1] 12/03/2020 Not Applicable    Coronary artery disease of native artery with stable angina pectoris [I25.118] 10/19/2020 Yes    H/o PE and DVT [I27.82] 10/19/2020 Yes    Hyperlipidemia [E78.5] 10/06/2020 Yes    DVT (deep venous thrombosis) [I82.409] 06/21/2020 Yes    Dependence on nicotine from cigarettes [F17.210] 11/19/2018 Yes    Sleep apnea [G47.30] 11/13/2018 Yes      Problems Resolved During this Admission:       ASSESSMENT AND PLAN         1. Acute on Chronic angina      2. H/O CAD with small vessel disease s/p angiogram with MVD and s/p      Balloon pump s/pCABG   3. H/O DVT/PE  4. Hyperlipidemia  5. Essential HTN   6. Right  Upper lobe opacity to follow     PLAN   Possible DC tomorrow   Lopressor on hold because of hypotension and intolerance.  Resume Lovenox therapeutic dose   Follow  Dr. Pina as OP.  Possible DC soon  Tomorrow   Close monitor of Hb  VTE Risk Mitigation (From admission, onward)         Ordered     enoxaparin injection 150 mg  Every 24 hours (non-standard times)      12/06/20 1219                   Donald Hilton MD  Department of Hospital Medicine   [facility]

## 2020-12-06 NOTE — PROGRESS NOTES
Progress Note  Cardiothoracic Surgery    Admit Date: 11/28/2020  Post-operative Day: 4 Days Post-Op  Hospital Day: 9    SUBJECTIVE:  No new complaints.     Follow-up For: Procedure(s) (LRB):  CORONARY ARTERY BYPASS GRAFT (CABG) (N/A)  SURGICAL PROCUREMENT, VEIN, ENDOSCOPIC (Left)    Scheduled Meds:   aspirin  81 mg Oral Daily    atorvastatin  40 mg Oral QHS    chlorhexidine  15 mL Mouth/Throat BID    docusate sodium  100 mg Oral BID    enoxparin  1 mg/kg Subcutaneous Q12H    FLUoxetine  40 mg Oral Daily    custom IVPB builder   Irrigation Once    insulin (HUMAN R) infusion (adults)  1 Units/hr Intravenous Once    metoprolol tartrate  12.5 mg Oral BID    mupirocin   Nasal BID    pantoprazole  40 mg Oral BID    sodium chloride 0.9%  500 mL Intravenous Once     Infusions/Drips:   sodium chloride 0.45% 50 mL/hr at 12/03/20 0601    insulin regular 1 units/mL infusion orderable (CTS POST-OP) Stopped (12/03/20 1200)     PRN Meds: albumin human 5%, calcium chloride IVPB, calcium chloride IVPB, calcium chloride IVPB, calcium gluconate IVPB, calcium gluconate IVPB, calcium gluconate IVPB, dextrose 50%, dextrose 50%, HYDROcodone-acetaminophen, hydrOXYzine pamoate, magnesium oxide, magnesium sulfate IVPB, magnesium sulfate IVPB, magnesium sulfate IVPB, magnesium sulfate IVPB, magnesium sulfate IVPB, magnesium sulfate IVPB, morphine, morphine, ondansetron, oxyCODONE, potassium chloride in water, potassium chloride in water, potassium chloride in water, potassium chloride in water, potassium chloride in water, potassium chloride in water, potassium chloride in water, potassium chloride, potassium chloride, potassium chloride, potassium chloride, sodium phosphate IVPB, sodium phosphate IVPB, sodium phosphate IVPB, sodium phosphate IVPB, sodium phosphate IVPB, sodium phosphate IVPB, sodium phosphate IVPB, sodium phosphate IVPB, tiZANidine    Review of patient's allergies indicates:   Allergen Reactions     Clarithromycin Swelling and Other (See Comments)     DIFFICULTY SWALLOWING  DIFFICULTY SWALLOWING    Pcn [penicillins] Anaphylaxis    Sulfa (sulfonamide antibiotics) Hives    Wellbutrin [bupropion hcl] Shortness Of Breath and Anaphylaxis    Betadine [povidone-iodine] Hives     Swelling      Cefprozil Hives    Iodinated contrast media Hives    Latex, natural rubber Rash    Sulfamethoxazole-trimethoprim Nausea And Vomiting    Iodine Hives and Swelling    Latex Hives and Swelling       OBJECTIVE:     Vital Signs (Most Recent)  Temp: 99.1 °F (37.3 °C) (12/06/20 0720)  Pulse: 72 (12/06/20 1025)  Resp: 15 (12/06/20 1025)  BP: 108/70 (12/06/20 0720)  SpO2: (!) 93 % (12/06/20 1025)    Admission Weight: 97.5 kg (215 lb) (11/27/20 2246)   Most Recent Weight: 98.8 kg (217 lb 13 oz) (12/01/20 0137)    Vital Signs Range (Last 24H):  Temp:  [98.2 °F (36.8 °C)-99.1 °F (37.3 °C)]   Pulse:  [67-84]   Resp:  [15-24]   BP: ()/(52-70)   SpO2:  [91 %-98 %]     I & O (Last 24H):    Intake/Output Summary (Last 24 hours) at 12/6/2020 1121  Last data filed at 12/5/2020 1301  Gross per 24 hour   Intake 240 ml   Output --   Net 240 ml     Physical Exam:  Awake, alert and oriented.  Neck is supple.  Heart has a regular rate and rhythm.  Lungs are clear.  Sternum is stable.      Wound/Incision:  clean, dry, intact    Laboratory:  CBC:   Recent Labs   Lab 12/06/20  0835   WBC 5.18   RBC 2.90*   HGB 8.2*   HCT 26.0*   *   MCV 90   MCH 28.3   MCHC 31.5*     BMP:   Recent Labs   Lab 12/06/20  0516   GLU 99  99     141   K 3.8  3.8     106   CO2 28  28   BUN 12  12   CREATININE 0.5  0.5   CALCIUM 8.2*  8.2*   MG 2.0       Diagnostic Results:  X-Ray: Reviewed    ASSESSMENT/PLAN:     Assessment: expected acute post-operative blood loss anemia.    Plan: Incentive spirometry.  Ambulate.

## 2020-12-06 NOTE — PLAN OF CARE
This note also relates to the following rows which could not be included:  SpO2 - Cannot attach notes to unvalidated device data  Pulse - Cannot attach notes to unvalidated device data       12/06/20 1025   PRE-TX-O2   O2 Device (Oxygen Therapy) room air   Pulse Oximetry Type Continuous   $ Pulse Oximetry - Multiple Charge Pulse Oximetry - Multiple   Resp 15   Respiratory Evaluation   $ Care Plan Tech Time 15 min

## 2020-12-06 NOTE — PT/OT/SLP PROGRESS
Physical Therapy      Patient Name:  Aleyda Costa   MRN:  [id number]    Patient not seen today secondary to (Pt ambulated in the alvarenga several times with  nurse.). Will follow-up 12/07/2020.    Coreen Ayala PT

## 2020-12-07 VITALS
HEART RATE: 75 BPM | HEIGHT: 72 IN | WEIGHT: 234.13 LBS | OXYGEN SATURATION: 94 % | TEMPERATURE: 98 F | DIASTOLIC BLOOD PRESSURE: 55 MMHG | SYSTOLIC BLOOD PRESSURE: 96 MMHG | RESPIRATION RATE: 22 BRPM | BODY MASS INDEX: 31.71 KG/M2

## 2020-12-07 LAB
ANION GAP SERPL CALC-SCNC: 6 MMOL/L (ref 8–16)
BUN SERPL-MCNC: 7 MG/DL (ref 6–20)
CALCIUM SERPL-MCNC: 7.9 MG/DL (ref 8.7–10.5)
CHLORIDE SERPL-SCNC: 102 MMOL/L (ref 95–110)
CO2 SERPL-SCNC: 28 MMOL/L (ref 23–29)
CREAT SERPL-MCNC: 0.5 MG/DL (ref 0.5–1.4)
ERYTHROCYTE [DISTWIDTH] IN BLOOD BY AUTOMATED COUNT: 17.1 % (ref 11.5–14.5)
EST. GFR  (AFRICAN AMERICAN): >60 ML/MIN/1.73 M^2
EST. GFR  (NON AFRICAN AMERICAN): >60 ML/MIN/1.73 M^2
GLUCOSE SERPL-MCNC: 109 MG/DL (ref 70–110)
HCT VFR BLD AUTO: 26.3 % (ref 37–48.5)
HGB BLD-MCNC: 8.2 G/DL (ref 12–16)
MAGNESIUM SERPL-MCNC: 2 MG/DL (ref 1.6–2.6)
MCH RBC QN AUTO: 27.9 PG (ref 27–31)
MCHC RBC AUTO-ENTMCNC: 31.2 G/DL (ref 32–36)
MCV RBC AUTO: 90 FL (ref 82–98)
PLATELET # BLD AUTO: 166 K/UL (ref 150–350)
PMV BLD AUTO: 10 FL (ref 9.2–12.9)
POTASSIUM SERPL-SCNC: 3.7 MMOL/L (ref 3.5–5.1)
RBC # BLD AUTO: 2.94 M/UL (ref 4–5.4)
SODIUM SERPL-SCNC: 136 MMOL/L (ref 136–145)
WBC # BLD AUTO: 5.13 K/UL (ref 3.9–12.7)

## 2020-12-07 PROCEDURE — 25000003 PHARM REV CODE 250: Performed by: SPECIALIST

## 2020-12-07 PROCEDURE — 63600175 PHARM REV CODE 636 W HCPCS: Performed by: SPECIALIST

## 2020-12-07 PROCEDURE — 25000003 PHARM REV CODE 250

## 2020-12-07 PROCEDURE — 25000003 PHARM REV CODE 250: Performed by: THORACIC SURGERY (CARDIOTHORACIC VASCULAR SURGERY)

## 2020-12-07 PROCEDURE — 63600175 PHARM REV CODE 636 W HCPCS: Performed by: THORACIC SURGERY (CARDIOTHORACIC VASCULAR SURGERY)

## 2020-12-07 PROCEDURE — 83735 ASSAY OF MAGNESIUM: CPT

## 2020-12-07 PROCEDURE — 80048 BASIC METABOLIC PNL TOTAL CA: CPT

## 2020-12-07 PROCEDURE — 25000003 PHARM REV CODE 250: Performed by: NURSE PRACTITIONER

## 2020-12-07 PROCEDURE — 25000003 PHARM REV CODE 250: Performed by: INTERNAL MEDICINE

## 2020-12-07 PROCEDURE — 85027 COMPLETE CBC AUTOMATED: CPT

## 2020-12-07 RX ORDER — METOPROLOL TARTRATE 25 MG/1
12.5 TABLET, FILM COATED ORAL 2 TIMES DAILY
Qty: 30 TABLET | Refills: 3 | Status: SHIPPED | OUTPATIENT
Start: 2020-12-07 | End: 2021-01-25 | Stop reason: SDUPTHER

## 2020-12-07 RX ORDER — FERROUS SULFATE 325(65) MG
325 TABLET ORAL 2 TIMES DAILY
Qty: 30 TABLET | Refills: 2 | Status: ON HOLD | OUTPATIENT
Start: 2020-12-07 | End: 2020-12-28 | Stop reason: SDUPTHER

## 2020-12-07 RX ORDER — NAPROXEN SODIUM 220 MG/1
81 TABLET, FILM COATED ORAL DAILY
Qty: 30 TABLET | Refills: 2 | Status: SHIPPED | OUTPATIENT
Start: 2020-12-08 | End: 2021-12-08

## 2020-12-07 RX ADMIN — ASPIRIN 81 MG 81 MG: 81 TABLET ORAL at 09:12

## 2020-12-07 RX ADMIN — MUPIROCIN: 20 OINTMENT TOPICAL at 10:12

## 2020-12-07 RX ADMIN — FERROUS SULFATE TAB 325 MG (65 MG ELEMENTAL FE) 325 MG: 325 (65 FE) TAB at 09:12

## 2020-12-07 RX ADMIN — HYDROCODONE BITARTRATE AND ACETAMINOPHEN 2 TABLET: 5; 325 TABLET ORAL at 05:12

## 2020-12-07 RX ADMIN — DOCUSATE SODIUM 100 MG: 100 CAPSULE, LIQUID FILLED ORAL at 09:12

## 2020-12-07 RX ADMIN — OXYCODONE HYDROCHLORIDE 10 MG: 5 TABLET ORAL at 09:12

## 2020-12-07 RX ADMIN — OXYCODONE HYDROCHLORIDE 10 MG: 5 TABLET ORAL at 03:12

## 2020-12-07 RX ADMIN — METOPROLOL TARTRATE 12.5 MG: 25 TABLET, FILM COATED ORAL at 10:12

## 2020-12-07 RX ADMIN — ONDANSETRON 4 MG: 2 INJECTION INTRAMUSCULAR; INTRAVENOUS at 05:12

## 2020-12-07 RX ADMIN — PANTOPRAZOLE SODIUM 40 MG: 40 TABLET, DELAYED RELEASE ORAL at 09:12

## 2020-12-07 RX ADMIN — ENOXAPARIN SODIUM 150 MG: 100 INJECTION SUBCUTANEOUS at 09:12

## 2020-12-07 RX ADMIN — POTASSIUM CHLORIDE 20 MEQ: 20 TABLET, EXTENDED RELEASE ORAL at 06:12

## 2020-12-07 RX ADMIN — CHLORHEXIDINE GLUCONATE 15 ML: 1.2 RINSE ORAL at 09:12

## 2020-12-07 NOTE — PLAN OF CARE
This note also relates to the following rows which could not be included:  SpO2 - Cannot attach notes to unvalidated device data  Pulse - Cannot attach notes to unvalidated device data       12/06/20 2021   Patient Assessment/Suction   Level of Consciousness (AVPU) alert   Respiratory Effort Normal;Unlabored   Expansion/Accessory Muscles/Retractions no use of accessory muscles   All Lung Fields Breath Sounds diminished   JENNIFER Breath Sounds clear   PRE-TX-O2   O2 Device (Oxygen Therapy) room air   Pulse Oximetry Type Continuous   $ Pulse Oximetry - Multiple Charge Pulse Oximetry - Multiple   Resp 20   Incentive Spirometer   $ Incentive Spirometer Charges done with encouragement   Incentive Spirometer Predicted Level (mL) 1000   Administration (IS) mouthpiece   Number of Repetitions (IS) 10   Level Incentive Spirometer (mL) 1250   Patient Tolerance (IS) good   continue to dncourage incentive spirometer

## 2020-12-07 NOTE — PLAN OF CARE
12/07/20 1409   Discharge Assessment   Assessment Type Discharge Planning Reassessment     Cookie with Cardiac Rehab called this CM about pt DC earlier to Card Rehab. Pt lives in [place]. She unable to drive and her  works at night. Prob better for her with HH and PT/OT and then Outpt therapy if needed. Secure chat sent to Dr Hilton and updated the above to Dr Hilton.

## 2020-12-07 NOTE — PLAN OF CARE
12/07/20 1230   Final Note   Assessment Type Final Discharge Note   Anticipated Discharge Disposition Home  (With Cardiac Rehab)   Post-Acute Status   Post-Acute Authorization Other  (Cardiac Rehab)   Other Status See Comments  (Cardiac Rehab)   Discharge Delays None known at this time

## 2020-12-07 NOTE — PROGRESS NOTES
Cardiac Rehab     Aleyda Costa   [id number]   12/7/2020         Cardiac Rehab Phase Taught: Phase 1    Teaching Method: Verbal, Audio/Visual    Handouts: None    Educational Videos: Preparing for Discharge    Understanding:  Knowledge indicated by feedback, Learning indicated by feedback and Verbalize understanding    Comments: pt sitting in bed, states she walked earlier. Review of sternal precautions, IS, daily activity, diet, pain control. Pt voiced understanding. Pt states she will probably go home today. Spoke with bedside RN. Will follow    Total Time Spent:15mins            Cookie Smith RN

## 2020-12-07 NOTE — PROGRESS NOTES
Cardiac Rehab     Aleyda Costa   [id number]   12/7/2020         Cardiac Rehab Phase Taught: Phase 1    Teaching Method: Verbal, Written    Handouts: After Heart Surgery Booklet, Cardiac Diet Pack, Cardiac Rehab Tear Sheet, Discharge Instructions First Two Weeks Post-Op, Home Activity Sheet, Home Walking Program Sheet and Post-op CABG Booklet    Educational Videos: None    Understanding:  Knowledge indicated by feedback, Learning indicated by feedback and Verbalize understanding    Comments: discharge cabg education including sternal precautions, wound care, IS, s/s to report, diet, exercise, meds, follow up. Pt voiced understanding. Questions answered. [place] further out of town, does not wish to attend cardiac rehab at this time. Spoke with bedside RN. Will follow    Total Time Spent:30mins            Cookie Smith RN

## 2020-12-07 NOTE — PLAN OF CARE
12/07/20 1510   Post-Acute Status   Post-Acute Authorization Other   Part D Coverage [facility] Choice Plus   Other Status See Comments  ([facility] in [place] for Cardiac Rehab)   Discharge Delays None known at this time   Discharge Plan   Discharge Plan A Home with family   Discharge Plan B Home with family

## 2020-12-07 NOTE — PLAN OF CARE
Problem: Adult Inpatient Plan of Care  Goal: Plan of Care Review  Outcome: Met  Goal: Patient-Specific Goal (Individualization)  Outcome: Met  Goal: Absence of Hospital-Acquired Illness or Injury  Outcome: Met  Goal: Optimal Comfort and Wellbeing  Outcome: Met  Goal: Readiness for Transition of Care  Outcome: Met  Goal: Rounds/Family Conference  Outcome: Met     Problem: Fall Injury Risk  Goal: Absence of Fall and Fall-Related Injury  Outcome: Met     Problem: Infection  Goal: Infection Symptom Resolution  Outcome: Met     Problem: Skin Injury Risk Increased  Goal: Skin Health and Integrity  Outcome: Met

## 2020-12-07 NOTE — NURSING
Discharge orders received and given to pt; central line removed per protocol; pt dressed and left via wheelchair to family members vehicle without incident

## 2020-12-08 ENCOUNTER — OFFICE VISIT (OUTPATIENT)
Dept: HEMATOLOGY/ONCOLOGY | Facility: CLINIC | Age: 40
End: 2020-12-08
Payer: COMMERCIAL

## 2020-12-08 ENCOUNTER — PATIENT MESSAGE (OUTPATIENT)
Dept: HEMATOLOGY/ONCOLOGY | Facility: CLINIC | Age: 40
End: 2020-12-08

## 2020-12-08 ENCOUNTER — TELEPHONE (OUTPATIENT)
Dept: HEMATOLOGY/ONCOLOGY | Facility: CLINIC | Age: 40
End: 2020-12-08

## 2020-12-08 VITALS
TEMPERATURE: 98 F | HEART RATE: 85 BPM | SYSTOLIC BLOOD PRESSURE: 122 MMHG | WEIGHT: 233.69 LBS | DIASTOLIC BLOOD PRESSURE: 59 MMHG | OXYGEN SATURATION: 95 % | RESPIRATION RATE: 18 BRPM | BODY MASS INDEX: 31.69 KG/M2

## 2020-12-08 DIAGNOSIS — D50.9 IRON DEFICIENCY ANEMIA, UNSPECIFIED IRON DEFICIENCY ANEMIA TYPE: ICD-10-CM

## 2020-12-08 DIAGNOSIS — I82.551 CHRONIC DEEP VEIN THROMBOSIS (DVT) OF RIGHT PERONEAL VEIN: Primary | ICD-10-CM

## 2020-12-08 DIAGNOSIS — Z79.01 ANTICOAGULATED: ICD-10-CM

## 2020-12-08 PROCEDURE — 99214 PR OFFICE/OUTPT VISIT, EST, LEVL IV, 30-39 MIN: ICD-10-PCS | Mod: S$GLB,,, | Performed by: PHYSICIAN ASSISTANT

## 2020-12-08 PROCEDURE — 99214 OFFICE O/P EST MOD 30 MIN: CPT | Mod: S$GLB,,, | Performed by: PHYSICIAN ASSISTANT

## 2020-12-08 PROCEDURE — 99999 PR PBB SHADOW E&M-EST. PATIENT-LVL V: ICD-10-PCS | Mod: PBBFAC,,, | Performed by: PHYSICIAN ASSISTANT

## 2020-12-08 PROCEDURE — 99999 PR PBB SHADOW E&M-EST. PATIENT-LVL V: CPT | Mod: PBBFAC,,, | Performed by: PHYSICIAN ASSISTANT

## 2020-12-08 NOTE — DISCHARGE SUMMARY
[facility] Medicine  Discharge Summary      Patient Name: Aleyda Costa  MRN: [id number]  Admission Date: 11/28/2020  Hospital Length of Stay: 7 days  Discharge Date and Time:  12/07/2020 6:40 PM  Attending Physician: No att. providers found   Discharging Provider: Donald Hilton MD  Primary Care Provider: Darlene Hayden MD      HPI:      40-year-old  female presents emergency room complaints of chest pain.  The patient states she has been having chest pain on and for the past 24-48 hours.  She described the pain as a tight sensation she took sublingual nitro with improvement in the chest discomfort.       The chest pain is located to her mid sternum is nonradiating there was associated nausea vomiting.       The patient did complain of cough that she described as dry.       This patient has a known history of DVT/PEs anticoagulated with Lovenox, coronary artery disease status post left heart catheterization that revealed small-vessel disease and was manage medically in June or July of 2020,   Essential hypertension, depression    Procedure(s) (LRB):  CORONARY ARTERY BYPASS GRAFT (CABG) (N/A)  SURGICAL PROCUREMENT, VEIN, ENDOSCOPIC (Left)      Hospital Course:   Patient was admitted with recurrent chronic chest pain.  Admitted with  chronic angina awaited for possible angiogram and awaited patient's decision.  While awaited in the hospital ,one  night she started having chest pain with EKG changes and later positive troponin and brought angiogram and found  multiple cell disease and CABG was done and uneventful.  She had previous history of coagulation disorder and failed several anticoagulants and was on Lovenox therapeutic dose prior to admission and which was resumed as per recommendation by hemato Oncology.  Plavix was  changed to aspirin and continued statin and discharged with Lovenox therapeutic dose.  Cardiac rehab arranged at [place] and cardiology and  cardiothoracic surgery appointment given 1 week.     Consults:   Consults (From admission, onward)        Status Ordering Provider     Inpatient consult to Cardiology  Once     Provider:  Eulalio Guthrie MD    Completed NELLI UREÑA     Inpatient consult to Cardiothoracic Surgery  Once     Provider:  Tyler Bland MD    Completed EULALIO GUTHRIE     Inpatient consult to Hematology Oncology  Once     Provider:  Rabia Pina MD    Completed TYLER BLAND          No new Assessment & Plan notes have been filed under this hospital service since the last note was generated.  Service: Hospital Medicine    Final Active Diagnoses:    Diagnosis Date Noted POA    PRINCIPAL PROBLEM:  Chest pain [R07.9] 06/25/2020 Yes    S/P CABG x 3 [Z95.1] 12/03/2020 Not Applicable    Coronary artery disease of native artery with stable angina pectoris [I25.118] 10/19/2020 Yes    H/o PE and DVT [I27.82] 10/19/2020 Yes    Hyperlipidemia [E78.5] 10/06/2020 Yes    DVT (deep venous thrombosis) [I82.409] 06/21/2020 Yes    Dependence on nicotine from cigarettes [F17.210] 11/19/2018 Yes    Sleep apnea [G47.30] 11/13/2018 Yes      Problems Resolved During this Admission:       Discharged Condition: good    Disposition: Home or Self Care    Follow Up:  Follow-up Information     Eulalio Guthrie MD In 2 weeks.    Specialties: Cardiology, INTERVENTIONAL CARDIOLOGY  Contact information:  [address]  [address]  [place] [address]  [phone]             Tyler Bland MD In 1 week.    Specialties: Cardiothoracic Surgery, Vascular Surgery, Cardiovascular Disease, Cardiology  Contact information:  [address] [place] [address]  [phone]                 Patient Instructions:      Ambulatory referral/consult to Cardiac Rehab   Standing Status: Future   Referral Priority: Routine Referral Type: Consultation   Referral Reason: Specialty Services Required   Requested Specialty: Cardiac  Rehabilitation   Number of Visits Requested: 1     Diet Cardiac     Type & Screen - Lab Collect   Standing Status: Future Standing Exp. Date: 01/02/22     Prepare RBC 4 Units; surgery   Standing Status: Future Standing Exp. Date: 01/02/22     Order Specific Question Answer Comments   Number of Units 4 Units    Reason to prepare multiple units (e.g. Emergency): surgery    Purpose of Preparation: Pre-operative      Prepare Platelets 1 Dose   Standing Status: Future Standing Exp. Date: 01/02/22   Order Comments: 6 units     Order Specific Question Answer Comments   Number of Units 1 Dose    Purpose of Preparation: Pre-operative      Activity as tolerated       Significant Diagnostic Studies: Labs:   CMP   Recent Labs   Lab 12/06/20  0516 12/07/20  0528     141 136   K 3.8  3.8 3.7     106 102   CO2 28  28 28   GLU 99  99 109   BUN 12  12 7   CREATININE 0.5  0.5 0.5   CALCIUM 8.2*  8.2* 7.9*   PROT 5.3*  --    ALBUMIN 2.8*  --    BILITOT 0.7  --    ALKPHOS 61  --    AST 25  --    ALT 28  --    ANIONGAP 7*  7* 6*   ESTGFRAFRICA >60.0  >60.0 >60.0   EGFRNONAA >60.0  >60.0 >60.0    and CBC   Recent Labs   Lab 12/06/20  0835 12/07/20  0528   WBC 5.18 5.13   HGB 8.2* 8.2*   HCT 26.0* 26.3*   * 166       Pending Diagnostic Studies:     None         Medications:  Reconciled Home Medications:      Medication List      START taking these medications    aspirin 81 MG Chew  Take 1 tablet (81 mg total) by mouth once daily.  Start taking on: December 8, 2020     ferrous sulfate 325 mg (65 mg iron) Tab tablet  Commonly known as: FEOSOL  Take 1 tablet (325 mg total) by mouth 2 (two) times daily.     metoprolol tartrate 25 MG tablet  Commonly known as: LOPRESSOR  Take 0.5 tablets (12.5 mg total) by mouth 2 (two) times daily.        CONTINUE taking these medications    AIMOVIG AUTOINJECTOR 140 mg/mL autoinjector  Generic drug: erenumab-aooe  Inject 140 mg into the skin every 28 days.     atorvastatin 40 MG  tablet  Commonly known as: LIPITOR  Take 40 mg by mouth every evening.     cetirizine 5 MG tablet  Commonly known as: ZYRTEC  Take 5 mg by mouth as needed.     cyanocobalamin (vitamin B-12) 1,000 mcg/mL Kit  Inject 1 mL into the muscle every 21 days.     enoxaparin 150 mg/mL Syrg  Commonly known as: LOVENOX  Inject 1 mL (150 mg total) into the skin once daily.     FLUoxetine 40 MG capsule  Take 1 capsule (40 mg total) by mouth 2 (two) times daily.     hydrOXYzine pamoate 25 MG Cap  Commonly known as: VISTARIL  Take 25 mg by mouth every 8 (eight) hours as needed.     ketorolac 60 mg/2 mL Soln  Commonly known as: TORADOL  Inject 30 mg into the muscle every 6 (six) hours.     LORazepam 0.5 MG tablet  Commonly known as: ATIVAN  Take 0.5 mg by mouth every 4 (four) hours as needed.     nitroGLYCERIN 0.4 MG SL tablet  Commonly known as: NITROSTAT  Place 0.4 mg under the tongue every 5 (five) minutes as needed for Chest pain.     oxyCODONE 10 mg Tab immediate release tablet  Commonly known as: ROXICODONE  Take 10 mg by mouth every 6 (six) hours as needed for Pain.     pantoprazole 40 MG tablet  Commonly known as: PROTONIX  Take 1 tablet (40 mg total) by mouth 2 (two) times daily.     tiZANidine 4 MG tablet  Commonly known as: ZANAFLEX  Take 4 mg by mouth every 6 (six) hours as needed.        STOP taking these medications    clopidogreL 75 mg tablet  Commonly known as: PLAVIX     ranolazine 500 MG Tb12  Commonly known as: RANEXA            Indwelling Lines/Drains at time of discharge:   Lines/Drains/Airways     None                 Time spent on the discharge of patient: 35 minutes  Patient was seen and examined on the date of discharge and determined to be suitable for discharge.         Donald Hilton MD  Department of Hospital Medicine  [facility]

## 2020-12-08 NOTE — HPI
40-year-old  female presents emergency room complaints of chest pain.  The patient states she has been having chest pain on and for the past 24-48 hours.  She described the pain as a tight sensation she took sublingual nitro with improvement in the chest discomfort.       The chest pain is located to her mid sternum is nonradiating there was associated nausea vomiting.       The patient did complain of cough that she described as dry.       This patient has a known history of DVT/PEs anticoagulated with Lovenox, coronary artery disease status post left heart catheterization that revealed small-vessel disease and was manage medically in June or July of 2020,   Essential hypertension, depression

## 2020-12-08 NOTE — TELEPHONE ENCOUNTER
Printed out LABS for patient to take with her--pt sts she gets labs drawn there--no further action needed at this time

## 2020-12-08 NOTE — HOSPITAL COURSE
Patient was admitted with recurrent chronic chest pain.  Admitted with  chronic angina awaited for possible angiogram and awaited patient's decision.  While awaited in the hospital ,one  night she started having chest pain with EKG changes and later positive troponin and brought angiogram and found  multiple cell disease and CABG was done and uneventful.  She had previous history of coagulation disorder and failed several anticoagulants and was on Lovenox therapeutic dose prior to admission and which was resumed as per recommendation by hemato Oncology.  Plavix was  changed to aspirin and continued statin and discharged with Lovenox therapeutic dose.  Cardiac rehab arranged at [place] and cardiology and cardiothoracic surgery appointment given 1 week.

## 2020-12-08 NOTE — PROGRESS NOTES
[facility]     Subjective:      Patient: Aleyda Costa Patient PCP: Darlene Hayden MD         :  1980     Sex:  female      MRN:  [id number]          Date of Visit: 2020      Chief Complaint: Hospital follow up    Patient ID: Aleyda Costa is a 40 y.o. female  with PMHx of HTN, bilateral PE, and DVT who presents to clinic for hospital follow up. Pt presented with at [facility] 2020 with unstable angina and underwent left heart catheterization showing multivessel CAD with high-grade proximal LAD lesion. She also had myocardial infarction. She underwent emergent CABG x3 with Dr. Neville. Pt had a total left knee replacement in 2018 and a few days after surgery she presented to ER with increasing SOB. Workup revealed bilateral PE. She was placed on xarelto. She had an ultrasound of legs which was negative in 2020 .Repeat CTA revealed no resolution of PE and she was changed to pradaxa and was later placed on coumadin. Pt had right leg DVT noted on US 2020 and was then changed over to enoxaparin (LOVENOX) 150 mg/mL Syrg Inject 1 mL (150 mg total) into the skin once daily by Dr. Rabia Pina. Pt is wearing NITHYA hose stocking on RLE and continues to do her lovenox injections. Pt has post-op bandage on her neck, which she states she was instructed to take off, but she would like a provider to do it and requests that I take off her bandage. Pt had 500cc blood loss in hospital and has H/H of 8.2/26.0 with platelet count decreased to 131. She was discharged with ferrous sulfate (FEOSOL) 325 mg (65 mg iron) Tab PO BID for SALMA. Pt states she has mild chest pain and fatigue s/p CABG, but otherwise has no other complaints at this time.    Review of Systems   Constitutional: Positive for fatigue. Negative for activity change, appetite change, chills, fever and unexpected weight change.   HENT: Negative for mouth sores and trouble swallowing.    Eyes: Negative for  photophobia and visual disturbance.   Respiratory: Negative for cough, chest tightness, shortness of breath, wheezing and stridor.    Cardiovascular: Positive for chest pain (s/p CABG). Negative for leg swelling.   Gastrointestinal: Negative for abdominal pain, constipation, diarrhea, nausea and vomiting.   Musculoskeletal: Negative for arthralgias, back pain and myalgias.   Skin: Negative for color change, pallor, rash and wound.   Neurological: Negative for syncope, speech difficulty and weakness.   Hematological: Negative for adenopathy. Does not bruise/bleed easily.   Psychiatric/Behavioral: Negative for agitation, behavioral problems, confusion, decreased concentration and dysphoric mood.        Past Medical History:   Diagnosis Date    Abnormal Pap smear of cervix     Arthritis     OA    Back pain     Cancer     skin cancer to back    Depression     Endometriosis     Full dentures     GERD (gastroesophageal reflux disease)     Migraine     Pulmonary embolism 2018    S/P CABG x 3 12/3/2020    Seizures     Sleep apnea     Does not use c-pap since weight loss - 170 lbs    Uterine fibroid     Wears glasses      Family History   Problem Relation Age of Onset    Heart disease Mother     Heart disease Father     Esophageal cancer Maternal Grandmother     Breast cancer Maternal Aunt 46    Colon cancer Neg Hx     Stomach cancer Neg Hx     Ovarian cancer Neg Hx      Past Surgical History:   Procedure Laterality Date    ANGIOGRAM, CORONARY, WITH LEFT HEART CATHETERIZATION Left 6/29/2020    Procedure: Left heart cath;  Surgeon: Jm Guthrie MD;  Location: [facility] CATH/EP LAB;  Service: Cardiology;  Laterality: Left;    ANGIOGRAM, CORONARY, WITH LEFT HEART CATHETERIZATION N/A 12/2/2020    Procedure: Angiogram, Coronary, with Left Heart Cath 12 noon;  Surgeon: Jm Guthrie MD;  Location: [facility] CATH/EP LAB;  Service: Cardiology;  Laterality: N/A;    APPENDECTOMY      ARTERIOGRAPHY OF  AORTIC ROOT N/A 2020    Procedure: ARTERIOGRAM, AORTIC ROOT;  Surgeon: Jm Guthrie MD;  Location: [facility] CATH/EP LAB;  Service: Cardiology;  Laterality: N/A;    CARPAL TUNNEL RELEASE Bilateral      SECTION      CHOLECYSTECTOMY      COLONOSCOPY N/A 2019    Procedure: COLONOSCOPY;  Surgeon: Anselmo Blackwell MD;  Location: [facility] ENDO;  Service: Endoscopy;  Laterality: N/A;    CORONARY ARTERY BYPASS GRAFT (CABG) N/A 2020    Procedure: CORONARY ARTERY BYPASS GRAFT (CABG);  Surgeon: Tyler Neville MD;  Location: [facility];  Service: Cardiovascular;  Laterality: N/A;    ENDOSCOPIC HARVEST OF VEIN Left 2020    Procedure: SURGICAL PROCUREMENT, VEIN, ENDOSCOPIC;  Surgeon: Tyler Neville MD;  Location: [facility];  Service: Cardiovascular;  Laterality: Left;    EPIDURAL STEROID INJECTION INTO LUMBAR SPINE N/A 2019    Procedure: Injection-steroid-epidural-lumbar;  Surgeon: Yariel Ramirez MD;  Location: [facility];  Service: Pain Management;  Laterality: N/A;  L3-4    ESOPHAGOGASTRODUODENOSCOPY N/A 2019    Procedure: EGD (ESOPHAGOGASTRODUODENOSCOPY);  Surgeon: Anselmo Blackwell MD;  Location: [facility];  Service: Endoscopy;  Laterality: N/A;    FRACTURE SURGERY      ankle    gastric sleve      HYSTERECTOMY      endo and fibroids    HYSTERECTOMY      INSERTION OF INTRA-AORTIC BALLOON ASSIST DEVICE  2020    Procedure: INSERTION, INTRA-AORTIC BALLOON PUMP;  Surgeon: Jm Guthrie MD;  Location: [facility] CATH/EP LAB;  Service: Cardiology;;    JOINT REPLACEMENT Left 2018    KNEE ARTHROPLASTY Left 2018    Procedure: ARTHROPLASTY, KNEE;  Surgeon: Feliberto Cardenas MD;  Location: [facility] OR;  Service: Orthopedics;  Laterality: Left;    KNEE ARTHROPLASTY Right 2020    Procedure: ARTHROPLASTY, KNEE;  Surgeon: Feliberto Cardenas MD;  Location: [facility];  Service: Orthopedics;  Laterality: Right;    KNEE ARTHROSCOPY W/ MENISCECTOMY Right  10/25/2019    Procedure: ARTHROSCOPY, KNEE, WITH MENISCECTOMY;  Surgeon: Feliberto Cardenas MD;  Location: [facility] OR;  Service: Orthopedics;  Laterality: Right;    KNEE SURGERY      LUMBAR EPIDURAL INJECTION      OOPHORECTOMY Left     LSO    RADIAL NERVE Right     RECONSTRUCTION OF MEDIAL PATELLOFEMORAL LIGAMENT OF RIGHT KNEE Right 10/25/2019    Procedure: RECONSTRUCTION, LIGAMENT, MEDIAL PATELLOFEMORAL, RIGHT;  Surgeon: Feliberto Cardenas MD;  Location: [facility] OR;  Service: Orthopedics;  Laterality: Right;    SINUS SURGERY      UPPER GASTROINTESTINAL ENDOSCOPY  11/27/2019    Dr. Blackwell; mild schatzki ring-dilated; gastritis; bx in process     Social History     Socioeconomic History    Marital status:      Spouse name: Not on file    Number of children: Not on file    Years of education: Not on file    Highest education level: Not on file   Occupational History    Not on file   Social Needs    Financial resource strain: Not on file    Food insecurity     Worry: Not on file     Inability: Not on file    Transportation needs     Medical: Not on file     Non-medical: Not on file   Tobacco Use    Smoking status: Current Every Day Smoker     Packs/day: 0.25     Years: 20.00     Pack years: 5.00     Types: Cigarettes    Smokeless tobacco: Never Used    Tobacco comment: trying to quit again   Substance and Sexual Activity    Alcohol use: Yes     Alcohol/week: 0.0 standard drinks     Comment: occasionally    Drug use: Not Currently     Types: Other-see comments     Comment: no    Sexual activity: Yes     Partners: Male   Lifestyle    Physical activity     Days per week: Not on file     Minutes per session: Not on file    Stress: Not on file   Relationships    Social connections     Talks on phone: Not on file     Gets together: Not on file     Attends Buddhism service: Not on file     Active member of club or organization: Not on file     Attends meetings of clubs or organizations: Not  on file     Relationship status: Not on file   Other Topics Concern    Not on file   Social History Narrative    Not on file       Current Outpatient Medications:     aspirin 81 MG Chew, Take 1 tablet (81 mg total) by mouth once daily., Disp: 30 tablet, Rfl: 2    atorvastatin (LIPITOR) 40 MG tablet, Take 40 mg by mouth every evening. , Disp: , Rfl:     cetirizine (ZYRTEC) 5 MG tablet, Take 5 mg by mouth as needed. , Disp: , Rfl:     cyanocobalamin, vitamin B-12, 1,000 mcg/mL Kit, Inject 1 mL into the muscle every 21 days. , Disp: , Rfl:     enoxaparin (LOVENOX) 150 mg/mL Syrg, Inject 1 mL (150 mg total) into the skin once daily., Disp: 30 Syringe, Rfl: 1    erenumab-aooe (AIMOVIG) 140 mg/mL autoinjector, Inject 140 mg into the skin every 28 days., Disp: 1 mL, Rfl: 11    ferrous sulfate (FEOSOL) 325 mg (65 mg iron) Tab tablet, Take 1 tablet (325 mg total) by mouth 2 (two) times daily., Disp: 30 tablet, Rfl: 2    FLUoxetine 40 MG capsule, Take 1 capsule (40 mg total) by mouth 2 (two) times daily., Disp: 60 capsule, Rfl: 1    hydrOXYzine pamoate (VISTARIL) 25 MG Cap, Take 25 mg by mouth every 8 (eight) hours as needed. , Disp: , Rfl:     ketorolac (TORADOL) 60 mg/2 mL Soln, Inject 30 mg into the muscle every 6 (six) hours. , Disp: , Rfl:     lorazepam (ATIVAN) 0.5 MG tablet, Take 0.5 mg by mouth every 4 (four) hours as needed. , Disp: , Rfl:     metoprolol tartrate (LOPRESSOR) 25 MG tablet, Take 0.5 tablets (12.5 mg total) by mouth 2 (two) times daily., Disp: 30 tablet, Rfl: 3    nitroGLYCERIN (NITROSTAT) 0.4 MG SL tablet, Place 0.4 mg under the tongue every 5 (five) minutes as needed for Chest pain., Disp: , Rfl:     oxyCODONE (ROXICODONE) 10 mg Tab immediate release tablet, Take 10 mg by mouth every 6 (six) hours as needed for Pain., Disp: , Rfl:     tiZANidine (ZANAFLEX) 4 MG tablet, Take 4 mg by mouth every 6 (six) hours as needed., Disp: , Rfl:     pantoprazole (PROTONIX) 40 MG tablet, Take 1  "tablet (40 mg total) by mouth 2 (two) times daily., Disp: 60 tablet, Rfl: 1  No current facility-administered medications for this visit.     Facility-Administered Medications Ordered in Other Visits:     lactated ringers infusion, , Intravenous, Continuous, Anjel Hernandez MD, Stopped at 12/02/20 1810    lidocaine (PF) 10 mg/ml (1%) injection 10 mg, 1 mL, Intradermal, Once, Anjel Hernandez MD    Review of patient's allergies indicates:   Allergen Reactions    Clarithromycin Swelling and Other (See Comments)     DIFFICULTY SWALLOWING  DIFFICULTY SWALLOWING    Pcn [penicillins] Anaphylaxis    Sulfa (sulfonamide antibiotics) Hives    Wellbutrin [bupropion hcl] Shortness Of Breath and Anaphylaxis    Betadine [povidone-iodine] Hives     Swelling      Cefprozil Hives    Iodinated contrast media Hives    Latex, natural rubber Rash    Sulfamethoxazole-trimethoprim Nausea And Vomiting    Iodine Hives and Swelling    Latex Hives and Swelling     All medications, allergies, and past history have been reviewed.  Objective:      Vitals:  Vitals - 1 value per visit 11/28/2020 12/7/2020 12/8/2020   SYSTOLIC - 96 -   DIASTOLIC - 55 -   PULSE - 75 -   TEMPERATURE - 97.9 98.4   RESPIRATIONS - 22 18   SPO2 - 94 -   Weight (lb) - 234.13 233.69   Weight (kg) - 106.2 106   HEIGHT 6' 0" - -   BODY MASS INDEX 31.75 - 31.69   VISIT REPORT - - -   Pain Score  - - 8   Some recent data might be hidden       Body surface area is 2.32 meters squared.  Weight Readings:  Wt Readings from Last 5 Encounters:   12/08/20 106 kg (233 lb 11 oz)   12/07/20 106.2 kg (234 lb 2.1 oz)   11/24/20 102.8 kg (226 lb 10.1 oz)   11/19/20 101.6 kg (224 lb)   11/12/20 101.6 kg (224 lb)      Blood Type:  A POS     Physical Exam  Constitutional:       General: She is not in acute distress.     Appearance: Normal appearance. She is not ill-appearing.   HENT:      Head: Normocephalic and atraumatic.      Right Ear: External ear normal.      Left Ear: " External ear normal.      Nose: Nose normal. No congestion or rhinorrhea.   Eyes:      General:         Right eye: No discharge.         Left eye: No discharge.      Extraocular Movements: Extraocular movements intact.      Conjunctiva/sclera: Conjunctivae normal.      Pupils: Pupils are equal, round, and reactive to light.   Neck:      Musculoskeletal: Normal range of motion and neck supple. No neck rigidity.   Cardiovascular:      Rate and Rhythm: Normal rate and regular rhythm.      Heart sounds: Normal heart sounds. No murmur.   Pulmonary:      Effort: Pulmonary effort is normal. No respiratory distress.      Breath sounds: Normal breath sounds. No stridor. No wheezing, rhonchi or rales.   Abdominal:      General: Bowel sounds are normal. There is no distension.      Palpations: Abdomen is soft.      Tenderness: There is no abdominal tenderness. There is no guarding.   Musculoskeletal: Normal range of motion.         General: No deformity.      Right lower leg: No edema.      Left lower leg: No edema.   Lymphadenopathy:      Cervical: No cervical adenopathy.   Skin:     General: Skin is warm and dry.      Coloration: Skin is not pale.      Findings: No erythema or rash.      Comments: Vertical incision running down sternum healing well   Neurological:      General: No focal deficit present.      Mental Status: She is alert and oriented to person, place, and time. Mental status is at baseline.      Cranial Nerves: No cranial nerve deficit.   Psychiatric:         Mood and Affect: Mood normal.         Behavior: Behavior normal.         Thought Content: Thought content normal.         Judgment: Judgment normal.       Labs:    CBC  WBC   Date Value Ref Range Status   12/07/2020 5.13 3.90 - 12.70 K/uL Final   12/06/2020 5.18 3.90 - 12.70 K/uL Final   12/05/2020 8.41 3.90 - 12.70 K/uL Final     RBC   Date Value Ref Range Status   12/07/2020 2.94 (L) 4.00 - 5.40 M/uL Final   12/06/2020 2.90 (L) 4.00 - 5.40 M/uL Final    12/05/2020 2.84 (L) 4.00 - 5.40 M/uL Final     Hemoglobin   Date Value Ref Range Status   12/07/2020 8.2 (L) 12.0 - 16.0 g/dL Final   12/06/2020 8.2 (L) 12.0 - 16.0 g/dL Final   12/05/2020 8.4 (L) 12.0 - 16.0 g/dL Final     Hematocrit   Date Value Ref Range Status   12/07/2020 26.3 (L) 37.0 - 48.5 % Final   12/06/2020 26.0 (L) 37.0 - 48.5 % Final   12/05/2020 25.5 (L) 37.0 - 48.5 % Final     MCV   Date Value Ref Range Status   12/07/2020 90 82 - 98 fL Final   12/06/2020 90 82 - 98 fL Final   12/05/2020 90 82 - 98 fL Final     Platelets   Date Value Ref Range Status   12/07/2020 166 150 - 350 K/uL Final   12/06/2020 131 (L) 150 - 350 K/uL Final   12/05/2020 120 (L) 150 - 350 K/uL Final     MCH   Date Value Ref Range Status   12/07/2020 27.9 27.0 - 31.0 pg Final   12/06/2020 28.3 27.0 - 31.0 pg Final   12/05/2020 29.6 27.0 - 31.0 pg Final     MCHC   Date Value Ref Range Status   12/07/2020 31.2 (L) 32.0 - 36.0 g/dL Final   12/06/2020 31.5 (L) 32.0 - 36.0 g/dL Final   12/05/2020 32.9 32.0 - 36.0 g/dL Final     RDW   Date Value Ref Range Status   12/07/2020 17.1 (H) 11.5 - 14.5 % Final   12/06/2020 17.7 (H) 11.5 - 14.5 % Final   12/05/2020 18.1 (H) 11.5 - 14.5 % Final     CMP  Sodium   Date Value Ref Range Status   12/07/2020 136 136 - 145 mmol/L Final   12/06/2020 141 136 - 145 mmol/L Final   12/06/2020 141 136 - 145 mmol/L Final     Potassium   Date Value Ref Range Status   12/07/2020 3.7 3.5 - 5.1 mmol/L Final   12/06/2020 3.8 3.5 - 5.1 mmol/L Final   12/06/2020 3.8 3.5 - 5.1 mmol/L Final     Chloride   Date Value Ref Range Status   12/07/2020 102 95 - 110 mmol/L Final   12/06/2020 106 95 - 110 mmol/L Final   12/06/2020 106 95 - 110 mmol/L Final     CO2   Date Value Ref Range Status   12/07/2020 28 23 - 29 mmol/L Final   12/06/2020 28 23 - 29 mmol/L Final   12/06/2020 28 23 - 29 mmol/L Final     BUN   Date Value Ref Range Status   12/07/2020 7 6 - 20 mg/dL Final   12/06/2020 12 6 - 20 mg/dL Final   12/06/2020 12 6  - 20 mg/dL Final     Creatinine   Date Value Ref Range Status   12/07/2020 0.5 0.5 - 1.4 mg/dL Final   12/06/2020 0.5 0.5 - 1.4 mg/dL Final   12/06/2020 0.5 0.5 - 1.4 mg/dL Final     Glucose   Date Value Ref Range Status   12/07/2020 109 70 - 110 mg/dL Final   12/06/2020 99 70 - 110 mg/dL Final   12/06/2020 99 70 - 110 mg/dL Final     Calcium   Date Value Ref Range Status   12/07/2020 7.9 (L) 8.7 - 10.5 mg/dL Final   12/06/2020 8.2 (L) 8.7 - 10.5 mg/dL Final   12/06/2020 8.2 (L) 8.7 - 10.5 mg/dL Final     Magnesium   Date Value Ref Range Status   12/07/2020 2.0 1.6 - 2.6 mg/dL Final   12/06/2020 2.0 1.6 - 2.6 mg/dL Final   12/05/2020 1.9 1.6 - 2.6 mg/dL Final     Phosphorus   Date Value Ref Range Status   12/02/2020 2.3 (L) 2.7 - 4.5 mg/dL Final   10/21/2020 4.0 2.7 - 4.5 mg/dL Final   10/20/2020 4.4 2.7 - 4.5 mg/dL Final     Alkaline Phosphatase   Date Value Ref Range Status   12/06/2020 61 55 - 135 U/L Final   12/05/2020 52 (L) 55 - 135 U/L Final   12/04/2020 54 (L) 55 - 135 U/L Final     Total Protein   Date Value Ref Range Status   12/06/2020 5.3 (L) 6.0 - 8.4 g/dL Final   12/05/2020 5.5 (L) 6.0 - 8.4 g/dL Final   12/04/2020 5.7 (L) 6.0 - 8.4 g/dL Final     Albumin   Date Value Ref Range Status   12/06/2020 2.8 (L) 3.5 - 5.2 g/dL Final   12/05/2020 3.0 (L) 3.5 - 5.2 g/dL Final   12/04/2020 2.9 (L) 3.5 - 5.2 g/dL Final     Total Bilirubin   Date Value Ref Range Status   12/06/2020 0.7 0.1 - 1.0 mg/dL Final     Comment:     For infants and newborns, interpretation of results should be based  on gestational age, weight and in agreement with clinical  observations.  Premature Infant recommended reference ranges:  Up to 24 hours.............<8.0 mg/dL  Up to 48 hours............<12.0 mg/dL  3-5 days..................<15.0 mg/dL  6-29 days.................<15.0 mg/dL     12/05/2020 0.9 0.1 - 1.0 mg/dL Final     Comment:     For infants and newborns, interpretation of results should be based  on gestational age, weight  and in agreement with clinical  observations.  Premature Infant recommended reference ranges:  Up to 24 hours.............<8.0 mg/dL  Up to 48 hours............<12.0 mg/dL  3-5 days..................<15.0 mg/dL  6-29 days.................<15.0 mg/dL     12/04/2020 0.5 0.1 - 1.0 mg/dL Final     Comment:     For infants and newborns, interpretation of results should be based  on gestational age, weight and in agreement with clinical  observations.  Premature Infant recommended reference ranges:  Up to 24 hours.............<8.0 mg/dL  Up to 48 hours............<12.0 mg/dL  3-5 days..................<15.0 mg/dL  6-29 days.................<15.0 mg/dL       AST   Date Value Ref Range Status   12/06/2020 25 10 - 40 U/L Final   12/05/2020 21 10 - 40 U/L Final   12/04/2020 29 10 - 40 U/L Final     ALT   Date Value Ref Range Status   12/06/2020 28 10 - 44 U/L Final   12/05/2020 24 10 - 44 U/L Final   12/04/2020 29 10 - 44 U/L Final     Anemia Labs  Haptoglobin   Date Value Ref Range Status   06/21/2020 262 (H) 30 - 250 mg/dL Final     Iron   Date Value Ref Range Status   12/04/2020 14 (L) 30 - 160 ug/dL Final   06/21/2020 37 30 - 160 ug/dL Final     Transferrin   Date Value Ref Range Status   12/04/2020 141 (L) 200 - 375 mg/dL Final   06/21/2020 198 (L) 200 - 375 mg/dL Final     TIBC   Date Value Ref Range Status   12/04/2020 197 (L) 250 - 450 ug/dL Final   06/21/2020 293 250 - 450 ug/dL Final     Ferritin   Date Value Ref Range Status   12/04/2020 72 20.0 - 300.0 ng/mL Final   06/21/2020 180 20.0 - 300.0 ng/mL Final     Markers  No results found for: AFP, YK6611, CEA, , , , OY5171, HCG, CHROMOSOME, PSADIAG, PSA, PSATOTAL  Coags  INR   Date Value Ref Range Status   06/24/2020 1.0 0.8 - 1.2 Final     Comment:     Coumadin Therapy:  2.0 - 3.0 for INR for all indicators except mechanical heart valves  and antiphospholipid syndromes which should use 2.5 - 3.5.     12/03/2018 1.2 0.8 - 1.2 Final     Comment:      Coumadin Therapy:  2.0 - 3.0 for INR for all indicators except mechanical heart valves  and antiphospholipid syndromes which should use 2.5 - 3.5.     11/19/2018 1.1 0.8 - 1.2 Final     Comment:     Coumadin Therapy:  2.0 - 3.0 for INR for all indicators except mechanical heart valves  and antiphospholipid syndromes which should use 2.5 - 3.5.       aPTT   Date Value Ref Range Status   12/02/2020 28.6 23.6 - 33.3 sec Final     Comment:     Therapeutic Range of 67.5-105.1 secs.  Equivalent to Heparin Concentration of anti-Xa 0.3-0.7 IU/ml.     12/03/2018 51.6 (H) 21.0 - 32.0 sec Final     Comment:     aPTT therapeutic range = 39-69 seconds   11/19/2018 40.7 (H) 21.0 - 32.0 sec Final     Comment:     aPTT therapeutic range = 39-69 seconds     Pathology:  Pathology Results  (Last 10 years)               12/30/19 0935  Specimen to Pathology, Surgery Gastrointestinal tract Final result    Narrative:  Pre-op Diagnosis: Hematochezia [K92.1]   Right lower quadrant abdominal tenderness without rebound   tenderness [R10.813]   GERD without esophagitis [K21.9]   S/P cholecystectomy [Z90.49]   S/P laparoscopic sleeve gastrectomy [Z98.84]   Procedure(s):   COLONOSCOPY   POST:  POLYP  /  RECTAL ERYTHEMA / HEMORRHOIDS   Number of specimens: 2 JARS   Name of specimens: 1. CECAL POLYP  2.  RECTUM BX  MILD   IRRITATION   Specimen total (fresh, frozen, permanent):->2       11/27/19 0931  Specimen to Pathology, Surgery Gastrointestinal tract Final result    Narrative:  Pre-op Diagnosis: Heartburn [R12]   Nausea and vomiting, intractability of vomiting not   specified, unspecified vomiting type [R11.2]   Oropharyngeal dysphagia [R13.12]   Hiatal hernia [K44.9]   POST;  SCHATZKI'S RING / GASTRITIS / HIATAL HERNIA / SLEEVE   ANATOMY   Procedure(s):   EGD (ESOPHAGOGASTRODUODENOSCOPY)   Number of specimens: 1 JAR   Name of specimens: 1. ANTRUM AND BODY BX   Specimen total (fresh, frozen, permanent):->1           Imaging:  No results found  for this or any previous visit (from the past 2160 hour(s)).  No results found for this or any previous visit (from the past 2160 hour(s)).  Results for orders placed or performed during the hospital encounter of 11/17/20 (from the past 2160 hour(s))   MRI Tibia Fibula Without Contrast Right    Impression    1. Well-defined subcutaneous nodule along the superficial soft tissues at mid shin.  Contrast was not administered which somewhat limits characterization.  While lesion morphology is indeterminate, favored benign entity such as pressure lesion, scar or fat trauma.  2. Subtle periosteal edema in the adjacent tibial cortex is presumably reactive, as lesion does not directly involve the bone.      Electronically signed by: Dominik Sexton  Date:    11/18/2020  Time:    11:54     All lab results and imaging results have been reviewed.    Assessment and Plan:        ICD-10-CM ICD-9-CM   1. Chronic deep vein thrombosis (DVT) of right peroneal vein  I82.551 453.52   2. Anticoagulated  Z79.01 V58.61   3. Iron deficiency anemia, unspecified iron deficiency anemia type  D50.9 280.9     -Patient had SALMA due to blood loss s/p CABG and is taking ferrous sulfate (FEOSOL) 325 mg (65 mg iron) Tab PO BID for SALMA. Pt most recent H/H 8.2/26.0. Pt has mild fatigue, but denies chest tightness, SOB, palpitations. Pt has mild chest pain s/p CABG. I will place orders for updated iron and TIBC and ferritin to assess response to oral iron and if iron levels remain low, pt would benefit from IV iron infusion. These labs will be added to 1 week follow up with Dr. Rabia green for anti-XA heparin monitoring.  -Dressing removed from left side of patient's neck and pt appears to be healing well.          -Pt is to follow up with Dr. Green in 1 week with CBC, CMP, Iron and TIBC, ferritin, anti-XA heparin monitoring as pt was restarted on her lovenox in the hospital.   -Pt is to continue enoxaparin (LOVENOX) 150 mg/mL Syrg Inject 1 mL (150 mg  total) into the skin once daily     Sincerely,    Surjit Mejia PA-C    Note is available for collaborating MD; Dr. Rabia Pina for review.  Electronically signed by: Surjit Mejia PA-C    Future Appointments   Date Time Provider Department Center   12/17/2020 11:15 AM Feliberto Cardenas MD [facility]

## 2020-12-08 NOTE — Clinical Note
Pt is to follow up with Dr. Pina in 1 week with CBC, CMP, Iron and TIBC, ferritin, anti-XA heparin monitoring.

## 2020-12-09 ENCOUNTER — PATIENT MESSAGE (OUTPATIENT)
Dept: VASCULAR SURGERY | Facility: CLINIC | Age: 40
End: 2020-12-09

## 2020-12-10 ENCOUNTER — TELEPHONE (OUTPATIENT)
Dept: HEMATOLOGY/ONCOLOGY | Facility: CLINIC | Age: 40
End: 2020-12-10

## 2020-12-10 ENCOUNTER — TELEPHONE (OUTPATIENT)
Dept: VASCULAR SURGERY | Facility: CLINIC | Age: 40
End: 2020-12-10

## 2020-12-10 ENCOUNTER — PATIENT MESSAGE (OUTPATIENT)
Dept: VASCULAR SURGERY | Facility: CLINIC | Age: 40
End: 2020-12-10

## 2020-12-10 LAB — CATH EF QUANTITATIVE: 60 %

## 2020-12-10 NOTE — TELEPHONE ENCOUNTER
Pt could have been seen by me but chose to see Kenny LACEY This note is to avoid any confusion with scheduling

## 2020-12-10 NOTE — TELEPHONE ENCOUNTER
Spoke with patient to schedule appt for 1 week follow up with Dr Pina per ABHIJIT Cox recommendations--made pt aware that unfortunately Dr Pina didn't have any availabilities unless patient wanted to be seen tomorrow 12/11--patient sts that since she just was in clinic, she wants to see ABHIJIT Cox next week--scheduled as requested by patient--both parties voiced understanding--no further questions or concerns noted at this time

## 2020-12-10 NOTE — TELEPHONE ENCOUNTER
----- Message from Lolita Ying sent at 12/10/2020  8:24 AM CST -----  Type:  Patient Returning Call    Who Called:Aleyda  Who Left Message for Patient:  Does the patient know what this is regarding?:yes  Would the patient rather a call back or a response via [facility]? call  Best Call Back Number:[phone]    Additional Information:

## 2020-12-12 ENCOUNTER — HOSPITAL ENCOUNTER (EMERGENCY)
Facility: HOSPITAL | Age: 40
Discharge: HOME | End: 2020-12-12
Attending: EMERGENCY MEDICINE
Payer: COMMERCIAL

## 2020-12-12 VITALS
SYSTOLIC BLOOD PRESSURE: 102 MMHG | HEART RATE: 84 BPM | TEMPERATURE: 100 F | OXYGEN SATURATION: 97 % | DIASTOLIC BLOOD PRESSURE: 55 MMHG | RESPIRATION RATE: 16 BRPM

## 2020-12-12 DIAGNOSIS — S80.12XA HEMATOMA OF LEFT LOWER EXTREMITY, INITIAL ENCOUNTER: Primary | ICD-10-CM

## 2020-12-12 LAB
ALBUMIN SERPL BCP-MCNC: 3.3 G/DL (ref 3.5–5.2)
ALP SERPL-CCNC: 74 U/L (ref 55–135)
ALT SERPL W/O P-5'-P-CCNC: 13 U/L (ref 10–44)
ANION GAP SERPL CALC-SCNC: 11 MMOL/L (ref 8–16)
AST SERPL-CCNC: 14 U/L (ref 10–40)
BASOPHILS # BLD AUTO: 0.05 K/UL (ref 0–0.2)
BASOPHILS NFR BLD: 0.5 % (ref 0–1.9)
BILIRUB SERPL-MCNC: 0.5 MG/DL (ref 0.1–1)
BUN SERPL-MCNC: 5 MG/DL (ref 6–20)
CALCIUM SERPL-MCNC: 8.5 MG/DL (ref 8.7–10.5)
CHLORIDE SERPL-SCNC: 103 MMOL/L (ref 95–110)
CO2 SERPL-SCNC: 23 MMOL/L (ref 23–29)
CREAT SERPL-MCNC: 0.6 MG/DL (ref 0.5–1.4)
DIFFERENTIAL METHOD: ABNORMAL
EOSINOPHIL # BLD AUTO: 0.5 K/UL (ref 0–0.5)
EOSINOPHIL NFR BLD: 4.5 % (ref 0–8)
ERYTHROCYTE [DISTWIDTH] IN BLOOD BY AUTOMATED COUNT: 15.9 % (ref 11.5–14.5)
EST. GFR  (AFRICAN AMERICAN): >60 ML/MIN/1.73 M^2
EST. GFR  (NON AFRICAN AMERICAN): >60 ML/MIN/1.73 M^2
GLUCOSE SERPL-MCNC: 111 MG/DL (ref 70–110)
HCT VFR BLD AUTO: 29.2 % (ref 37–48.5)
HGB BLD-MCNC: 9.5 G/DL (ref 12–16)
IMM GRANULOCYTES # BLD AUTO: 0.07 K/UL (ref 0–0.04)
IMM GRANULOCYTES NFR BLD AUTO: 0.7 % (ref 0–0.5)
INR PPP: 1.4
LYMPHOCYTES # BLD AUTO: 2 K/UL (ref 1–4.8)
LYMPHOCYTES NFR BLD: 19.7 % (ref 18–48)
MAGNESIUM SERPL-MCNC: 1.9 MG/DL (ref 1.6–2.6)
MCH RBC QN AUTO: 29 PG (ref 27–31)
MCHC RBC AUTO-ENTMCNC: 32.5 G/DL (ref 32–36)
MCV RBC AUTO: 89 FL (ref 82–98)
MONOCYTES # BLD AUTO: 0.8 K/UL (ref 0.3–1)
MONOCYTES NFR BLD: 8 % (ref 4–15)
NEUTROPHILS # BLD AUTO: 6.9 K/UL (ref 1.8–7.7)
NEUTROPHILS NFR BLD: 66.6 % (ref 38–73)
NRBC BLD-RTO: 0 /100 WBC
PLATELET # BLD AUTO: 338 K/UL (ref 150–350)
PMV BLD AUTO: 10.5 FL (ref 9.2–12.9)
POTASSIUM SERPL-SCNC: 3 MMOL/L (ref 3.5–5.1)
PROT SERPL-MCNC: 7.1 G/DL (ref 6–8.4)
PROTHROMBIN TIME: 16.2 SEC (ref 10.6–14.8)
RBC # BLD AUTO: 3.28 M/UL (ref 4–5.4)
SODIUM SERPL-SCNC: 137 MMOL/L (ref 136–145)
WBC # BLD AUTO: 10.31 K/UL (ref 3.9–12.7)

## 2020-12-12 PROCEDURE — 80053 COMPREHEN METABOLIC PANEL: CPT

## 2020-12-12 PROCEDURE — 83735 ASSAY OF MAGNESIUM: CPT

## 2020-12-12 PROCEDURE — 96375 TX/PRO/DX INJ NEW DRUG ADDON: CPT

## 2020-12-12 PROCEDURE — 85610 PROTHROMBIN TIME: CPT

## 2020-12-12 PROCEDURE — 99284 EMERGENCY DEPT VISIT MOD MDM: CPT | Mod: 25

## 2020-12-12 PROCEDURE — 63600175 PHARM REV CODE 636 W HCPCS: Performed by: EMERGENCY MEDICINE

## 2020-12-12 PROCEDURE — 96374 THER/PROPH/DIAG INJ IV PUSH: CPT

## 2020-12-12 PROCEDURE — 85025 COMPLETE CBC W/AUTO DIFF WBC: CPT

## 2020-12-12 PROCEDURE — 25000003 PHARM REV CODE 250: Performed by: EMERGENCY MEDICINE

## 2020-12-12 RX ORDER — ONDANSETRON 2 MG/ML
4 INJECTION INTRAMUSCULAR; INTRAVENOUS
Status: COMPLETED | OUTPATIENT
Start: 2020-12-12 | End: 2020-12-12

## 2020-12-12 RX ORDER — POTASSIUM CHLORIDE 20 MEQ/1
40 TABLET, EXTENDED RELEASE ORAL ONCE
Status: COMPLETED | OUTPATIENT
Start: 2020-12-12 | End: 2020-12-12

## 2020-12-12 RX ORDER — MORPHINE SULFATE 4 MG/ML
4 INJECTION, SOLUTION INTRAMUSCULAR; INTRAVENOUS
Status: COMPLETED | OUTPATIENT
Start: 2020-12-12 | End: 2020-12-12

## 2020-12-12 RX ORDER — CLINDAMYCIN HYDROCHLORIDE 150 MG/1
300 CAPSULE ORAL 4 TIMES DAILY
Qty: 56 CAPSULE | Refills: 0 | Status: SHIPPED | OUTPATIENT
Start: 2020-12-12 | End: 2020-12-19

## 2020-12-12 RX ADMIN — POTASSIUM CHLORIDE 40 MEQ: 20 TABLET, EXTENDED RELEASE ORAL at 10:12

## 2020-12-12 RX ADMIN — MORPHINE SULFATE 4 MG: 4 INJECTION INTRAVENOUS at 09:12

## 2020-12-12 RX ADMIN — ONDANSETRON 4 MG: 2 INJECTION INTRAMUSCULAR; INTRAVENOUS at 09:12

## 2020-12-13 NOTE — ED PROVIDER NOTES
Encounter Date: 12/12/2020       History     Chief Complaint   Patient presents with    Leg Pain     reports recent open heart surgery 10 days ago and now experiencing left leg pain when vein was harvested. Pt denies cp/SOB    Post-op Problem     Patient presents complaining of left leg pain.  Patient has a history of open heart surgery and has been having some discomfort in the left leg concerned she may have a blood clot.  She denies any fever or chills.        Review of patient's allergies indicates:   Allergen Reactions    Clarithromycin Swelling and Other (See Comments)     DIFFICULTY SWALLOWING  DIFFICULTY SWALLOWING    Pcn [penicillins] Anaphylaxis    Sulfa (sulfonamide antibiotics) Hives    Wellbutrin [bupropion hcl] Shortness Of Breath and Anaphylaxis    Betadine [povidone-iodine] Hives     Swelling      Cefprozil Hives    Iodinated contrast media Hives    Latex, natural rubber Rash    Sulfamethoxazole-trimethoprim Nausea And Vomiting    Iodine Hives and Swelling    Latex Hives and Swelling     Past Medical History:   Diagnosis Date    Abnormal Pap smear of cervix     Arthritis     OA    Back pain     Cancer     skin cancer to back    Depression     Endometriosis     Full dentures     GERD (gastroesophageal reflux disease)     Migraine     Pulmonary embolism 2018    S/P CABG x 3 12/3/2020    Seizures     Sleep apnea     Does not use c-pap since weight loss - 170 lbs    Uterine fibroid     Wears glasses      Past Surgical History:   Procedure Laterality Date    ANGIOGRAM, CORONARY, WITH LEFT HEART CATHETERIZATION Left 6/29/2020    Procedure: Left heart cath;  Surgeon: Jm Gutrhie MD;  Location: [facility] CATH/EP LAB;  Service: Cardiology;  Laterality: Left;    ANGIOGRAM, CORONARY, WITH LEFT HEART CATHETERIZATION N/A 12/2/2020    Procedure: Angiogram, Coronary, with Left Heart Cath 12 noon;  Surgeon: Jm Guthrie MD;  Location: [facility] CATH/EP LAB;  Service: Cardiology;   Laterality: N/A;    APPENDECTOMY      ARTERIOGRAPHY OF AORTIC ROOT N/A 2020    Procedure: ARTERIOGRAM, AORTIC ROOT;  Surgeon: Jm Guthrie MD;  Location: [facility] CATH/EP LAB;  Service: Cardiology;  Laterality: N/A;    CARPAL TUNNEL RELEASE Bilateral      SECTION      CHOLECYSTECTOMY      COLONOSCOPY N/A 2019    Procedure: COLONOSCOPY;  Surgeon: Anselmo Blackwell MD;  Location: [facility] ENDO;  Service: Endoscopy;  Laterality: N/A;    CORONARY ARTERY BYPASS GRAFT (CABG) N/A 2020    Procedure: CORONARY ARTERY BYPASS GRAFT (CABG);  Surgeon: Tyler Neville MD;  Location: [facility];  Service: Cardiovascular;  Laterality: N/A;    ENDOSCOPIC HARVEST OF VEIN Left 2020    Procedure: SURGICAL PROCUREMENT, VEIN, ENDOSCOPIC;  Surgeon: Tyler Neville MD;  Location: [facility];  Service: Cardiovascular;  Laterality: Left;    EPIDURAL STEROID INJECTION INTO LUMBAR SPINE N/A 2019    Procedure: Injection-steroid-epidural-lumbar;  Surgeon: Yariel Ramirez MD;  Location: [facility];  Service: Pain Management;  Laterality: N/A;  L3-4    ESOPHAGOGASTRODUODENOSCOPY N/A 2019    Procedure: EGD (ESOPHAGOGASTRODUODENOSCOPY);  Surgeon: Anselmo Blackwell MD;  Location: [facility] ENDO;  Service: Endoscopy;  Laterality: N/A;    FRACTURE SURGERY      ankle    gastric sleve      HYSTERECTOMY  2014    endo and fibroids    HYSTERECTOMY      INSERTION OF INTRA-AORTIC BALLOON ASSIST DEVICE  2020    Procedure: INSERTION, INTRA-AORTIC BALLOON PUMP;  Surgeon: Jm Guthrie MD;  Location: [facility] CATH/EP LAB;  Service: Cardiology;;    JOINT REPLACEMENT Left 2018    KNEE ARTHROPLASTY Left 2018    Procedure: ARTHROPLASTY, KNEE;  Surgeon: Feliberto Cardenas MD;  Location: [facility] OR;  Service: Orthopedics;  Laterality: Left;    KNEE ARTHROPLASTY Right 2020    Procedure: ARTHROPLASTY, KNEE;  Surgeon: Feliberto Cardenas MD;  Location: [facility];  Service: Orthopedics;  Laterality:  Right;    KNEE ARTHROSCOPY W/ MENISCECTOMY Right 10/25/2019    Procedure: ARTHROSCOPY, KNEE, WITH MENISCECTOMY;  Surgeon: Feliberto Cardenas MD;  Location: [facility] OR;  Service: Orthopedics;  Laterality: Right;    KNEE SURGERY      LUMBAR EPIDURAL INJECTION      OOPHORECTOMY Left     LSO    RADIAL NERVE Right     RECONSTRUCTION OF MEDIAL PATELLOFEMORAL LIGAMENT OF RIGHT KNEE Right 10/25/2019    Procedure: RECONSTRUCTION, LIGAMENT, MEDIAL PATELLOFEMORAL, RIGHT;  Surgeon: Feliberto Cardenas MD;  Location: [facility] OR;  Service: Orthopedics;  Laterality: Right;    SINUS SURGERY      UPPER GASTROINTESTINAL ENDOSCOPY  11/27/2019    Dr. Blackwell; mild schatzki ring-dilated; gastritis; bx in process     Family History   Problem Relation Age of Onset    Heart disease Mother     Heart disease Father     Esophageal cancer Maternal Grandmother     Breast cancer Maternal Aunt 46    Colon cancer Neg Hx     Stomach cancer Neg Hx     Ovarian cancer Neg Hx      Social History     Tobacco Use    Smoking status: Current Every Day Smoker     Packs/day: 0.25     Years: 20.00     Pack years: 5.00     Types: Cigarettes    Smokeless tobacco: Never Used    Tobacco comment: trying to quit again   Substance Use Topics    Alcohol use: Yes     Alcohol/week: 0.0 standard drinks     Comment: occasionally    Drug use: Not Currently     Types: Other-see comments     Comment: no     Review of Systems   All other systems reviewed and are negative.      Physical Exam     Initial Vitals [12/12/20 2057]   BP Pulse Resp Temp SpO2   (!) 102/57 88 16 99.6 °F (37.6 °C) 98 %      MAP       --         Physical Exam    Nursing note and vitals reviewed.  Constitutional: She appears well-developed and well-nourished.   HENT:   Head: Normocephalic and atraumatic.   Eyes: EOM are normal.   Neck: Normal range of motion. Neck supple.   Pulmonary/Chest: No respiratory distress.   Musculoskeletal:      Comments: The left lower extremity harvest  site appears without erythema warmth or cellulitis there is a small area of hematoma behind the left knee and then there is an extending area of ecchymosis up the thigh where the vein was harvested from.   Neurological: She is alert and oriented to person, place, and time.   Skin: Skin is warm and dry. Capillary refill takes less than 2 seconds.   Psychiatric: She has a normal mood and affect. Her behavior is normal. Judgment and thought content normal.         ED Course   Procedures  Labs Reviewed   COMPREHENSIVE METABOLIC PANEL - Abnormal; Notable for the following components:       Result Value    Potassium 3.0 (*)     Glucose 111 (*)     BUN 5 (*)     Calcium 8.5 (*)     Albumin 3.3 (*)     All other components within normal limits   CBC W/ AUTO DIFFERENTIAL - Abnormal; Notable for the following components:    RBC 3.28 (*)     Hemoglobin 9.5 (*)     Hematocrit 29.2 (*)     RDW 15.9 (*)     Immature Granulocytes 0.7 (*)     Immature Grans (Abs) 0.07 (*)     All other components within normal limits   PROTIME-INR - Abnormal; Notable for the following components:    PT 16.2 (*)     All other components within normal limits   MAGNESIUM          Imaging Results          US Soft Tissue, Lower Extremity, Left (Final result)  Result time 12/12/20 21:11:00    Final result by Yariel Krishnamurthy MD (12/12/20 21:11:00)                 Narrative:    EXAM DESCRIPTION: US LOWER EXTREMITY VEINS LEFT (accession [id number]), US SOFT TISSUE, LOWER EXTREMITY, LEFT (accession [id number]) 12/12/2020 10:43 PM CST    CLINICAL HISTORY: 40 years, Female, hematoma left lower extremity swelling, pain    COMPARISON: None.    FINDINGS:    Multiple grayscale images as well as duplex Doppler ultrasound of left lower extremity were performed.  Left common femoral vein, left superficial femoral vein, left popliteal vein, left anterior, posterior tibial and peroneal veins at the level of the calf were imaged.  Spectral waveform demonstrate normal  compressibility, phasicity and augmentation.  No intraluminal defects were seen.    In addition soft tissue nonvascular imaging of the left lower extremity was performed.  At the site of the left posterior medial distal thigh/palpable area demonstrate deep subcutaneous tissue anechoic area measuring 2.4 x 0.9 x 1 cm adjacent to the site of incision area medial distal thigh second fluid collection measuring 2.2 x 0.8 x 1.6 cm perhaps corresponding to postsurgical fluid-liquefied hematomas at the site of most likely greater saphenous harvesting for patient history of CABG performed on 12/2/2020.    IMPRESSION:    NO EVIDENCE FOR DEEP VEIN THROMBOSIS OF THE LEFT LOWER EXTREMITY.  DEEP POSTSURGICAL FLUID COLLECTIONS AND/OR LIQUEFIED HEMATOMAS AT THE SITE OF INCISION WITHIN THE DISTAL MEDIAL THIGH PERHAPS CORRESPONDING TO POSTSURGICAL CHANGES FROM VENOUS HARVESTING IN A PATIENT WITH HISTORY OF RECENT CABG PERFORMED 12/2/2020.    Electronically signed by:  Yariel Krishnamurthy MD  12/12/2020 10:46 PM CST Workstation: [id number]                             US Lower Extremity Veins Left (Final result)  Result time 12/12/20 21:09:00    Final result by Yariel Krishnamurthy MD (12/12/20 21:09:00)                 Narrative:    EXAM DESCRIPTION: US LOWER EXTREMITY VEINS LEFT (accession [id number]), US SOFT TISSUE, LOWER EXTREMITY, LEFT (accession [id number]) 12/12/2020 10:43 PM CST    CLINICAL HISTORY: 40 years, Female, hematoma left lower extremity swelling, pain    COMPARISON: None.    FINDINGS:    Multiple grayscale images as well as duplex Doppler ultrasound of left lower extremity were performed.  Left common femoral vein, left superficial femoral vein, left popliteal vein, left anterior, posterior tibial and peroneal veins at the level of the calf were imaged.  Spectral waveform demonstrate normal compressibility, phasicity and augmentation.  No intraluminal defects were seen.    In addition soft tissue nonvascular imaging of the  left lower extremity was performed.  At the site of the left posterior medial distal thigh/palpable area demonstrate deep subcutaneous tissue anechoic area measuring 2.4 x 0.9 x 1 cm adjacent to the site of incision area medial distal thigh second fluid collection measuring 2.2 x 0.8 x 1.6 cm perhaps corresponding to postsurgical fluid-liquefied hematomas at the site of most likely greater saphenous harvesting for patient history of CABG performed on 12/2/2020.    IMPRESSION:    NO EVIDENCE FOR DEEP VEIN THROMBOSIS OF THE LEFT LOWER EXTREMITY.  DEEP POSTSURGICAL FLUID COLLECTIONS AND/OR LIQUEFIED HEMATOMAS AT THE SITE OF INCISION WITHIN THE DISTAL MEDIAL THIGH PERHAPS CORRESPONDING TO POSTSURGICAL CHANGES FROM VENOUS HARVESTING IN A PATIENT WITH HISTORY OF RECENT CABG PERFORMED 12/2/2020.    Electronically signed by:  Yariel Krishnamurthy MD  12/12/2020 10:46 PM [facility] [[email or web address]] Workstation: [id number]                               Medical Decision Making:   Initial Assessment:   Patient is in no apparent distress.  Differential Diagnosis:   I saw and examined the patient.  I have reviewed and agree with the resident's findings, including all diagnostic interpretations and plans as written.  I was present for the key portions of the separately billed procedures.                               Clinical Impression:       ICD-10-CM ICD-9-CM   1. Hematoma of left lower extremity, initial encounter  S80.12XA 924.5                          ED Disposition Condition    Discharge Stable        ED Prescriptions     Medication Sig Dispense Start Date End Date Auth. Provider    clindamycin (CLEOCIN) 150 MG capsule Take 2 capsules (300 mg total) by mouth 4 (four) times daily. for 7 days 56 capsule 12/12/2020 12/19/2020 Lasha Burgess MD        Follow-up Information     Follow up With Specialties Details Why Contact Info    Darlene Hayden MD Family Medicine In 1 week  [address] [facility] [address] [phone]                                          Lasha Burgess MD  12/13/20 0156

## 2020-12-15 ENCOUNTER — HOSPITAL ENCOUNTER (EMERGENCY)
Facility: HOSPITAL | Age: 40
Discharge: HOME | End: 2020-12-15
Attending: EMERGENCY MEDICINE
Payer: COMMERCIAL

## 2020-12-15 VITALS
TEMPERATURE: 99 F | BODY MASS INDEX: 29.12 KG/M2 | DIASTOLIC BLOOD PRESSURE: 57 MMHG | WEIGHT: 215 LBS | OXYGEN SATURATION: 97 % | HEART RATE: 71 BPM | RESPIRATION RATE: 17 BRPM | HEIGHT: 72 IN | SYSTOLIC BLOOD PRESSURE: 101 MMHG

## 2020-12-15 DIAGNOSIS — R07.89 CHEST WALL PAIN: ICD-10-CM

## 2020-12-15 DIAGNOSIS — R05.9 COUGH: Primary | ICD-10-CM

## 2020-12-15 DIAGNOSIS — Z95.1 S/P CABG (CORONARY ARTERY BYPASS GRAFT): ICD-10-CM

## 2020-12-15 DIAGNOSIS — R11.10 VOMITING: ICD-10-CM

## 2020-12-15 LAB
ALBUMIN SERPL BCP-MCNC: 3.3 G/DL (ref 3.5–5.2)
ALP SERPL-CCNC: 78 U/L (ref 55–135)
ALT SERPL W/O P-5'-P-CCNC: 14 U/L (ref 10–44)
ANION GAP SERPL CALC-SCNC: 8 MMOL/L (ref 8–16)
AST SERPL-CCNC: 14 U/L (ref 10–40)
BASOPHILS # BLD AUTO: 0.05 K/UL (ref 0–0.2)
BASOPHILS NFR BLD: 0.5 % (ref 0–1.9)
BILIRUB SERPL-MCNC: 0.5 MG/DL (ref 0.1–1)
BNP SERPL-MCNC: 230 PG/ML (ref 0–99)
BUN SERPL-MCNC: <5 MG/DL (ref 6–20)
CALCIUM SERPL-MCNC: 8.9 MG/DL (ref 8.7–10.5)
CHLORIDE SERPL-SCNC: 102 MMOL/L (ref 95–110)
CO2 SERPL-SCNC: 26 MMOL/L (ref 23–29)
CREAT SERPL-MCNC: 0.6 MG/DL (ref 0.5–1.4)
DIFFERENTIAL METHOD: ABNORMAL
EOSINOPHIL # BLD AUTO: 0.3 K/UL (ref 0–0.5)
EOSINOPHIL NFR BLD: 2.8 % (ref 0–8)
ERYTHROCYTE [DISTWIDTH] IN BLOOD BY AUTOMATED COUNT: 16.1 % (ref 11.5–14.5)
EST. GFR  (AFRICAN AMERICAN): >60 ML/MIN/1.73 M^2
EST. GFR  (NON AFRICAN AMERICAN): >60 ML/MIN/1.73 M^2
GLUCOSE SERPL-MCNC: 110 MG/DL (ref 70–110)
HCT VFR BLD AUTO: 29.5 % (ref 37–48.5)
HGB BLD-MCNC: 9.3 G/DL (ref 12–16)
IMM GRANULOCYTES # BLD AUTO: 0.09 K/UL (ref 0–0.04)
IMM GRANULOCYTES NFR BLD AUTO: 0.9 % (ref 0–0.5)
INR PPP: 1.3
LIPASE SERPL-CCNC: 17 U/L (ref 4–60)
LYMPHOCYTES # BLD AUTO: 1.4 K/UL (ref 1–4.8)
LYMPHOCYTES NFR BLD: 14.8 % (ref 18–48)
MCH RBC QN AUTO: 27.8 PG (ref 27–31)
MCHC RBC AUTO-ENTMCNC: 31.5 G/DL (ref 32–36)
MCV RBC AUTO: 88 FL (ref 82–98)
MONOCYTES # BLD AUTO: 0.7 K/UL (ref 0.3–1)
MONOCYTES NFR BLD: 7.3 % (ref 4–15)
NEUTROPHILS # BLD AUTO: 7.2 K/UL (ref 1.8–7.7)
NEUTROPHILS NFR BLD: 73.7 % (ref 38–73)
NRBC BLD-RTO: 0 /100 WBC
PLATELET # BLD AUTO: 403 K/UL (ref 150–350)
PMV BLD AUTO: 10.7 FL (ref 9.2–12.9)
POTASSIUM SERPL-SCNC: 3.4 MMOL/L (ref 3.5–5.1)
PROT SERPL-MCNC: 7.3 G/DL (ref 6–8.4)
PROTHROMBIN TIME: 15.8 SEC (ref 10.6–14.8)
RBC # BLD AUTO: 3.34 M/UL (ref 4–5.4)
SODIUM SERPL-SCNC: 136 MMOL/L (ref 136–145)
TROPONIN I SERPL DL<=0.01 NG/ML-MCNC: <0.03 NG/ML
WBC # BLD AUTO: 9.71 K/UL (ref 3.9–12.7)

## 2020-12-15 PROCEDURE — 80053 COMPREHEN METABOLIC PANEL: CPT

## 2020-12-15 PROCEDURE — 83880 ASSAY OF NATRIURETIC PEPTIDE: CPT

## 2020-12-15 PROCEDURE — 93010 EKG 12-LEAD: ICD-10-PCS | Mod: ,,, | Performed by: INTERNAL MEDICINE

## 2020-12-15 PROCEDURE — 99285 EMERGENCY DEPT VISIT HI MDM: CPT | Mod: 25

## 2020-12-15 PROCEDURE — 25000003 PHARM REV CODE 250: Performed by: EMERGENCY MEDICINE

## 2020-12-15 PROCEDURE — 83690 ASSAY OF LIPASE: CPT

## 2020-12-15 PROCEDURE — 96365 THER/PROPH/DIAG IV INF INIT: CPT

## 2020-12-15 PROCEDURE — 63600175 PHARM REV CODE 636 W HCPCS: Performed by: EMERGENCY MEDICINE

## 2020-12-15 PROCEDURE — 84484 ASSAY OF TROPONIN QUANT: CPT

## 2020-12-15 PROCEDURE — 85610 PROTHROMBIN TIME: CPT

## 2020-12-15 PROCEDURE — 93005 ELECTROCARDIOGRAM TRACING: CPT | Performed by: INTERNAL MEDICINE

## 2020-12-15 PROCEDURE — 36415 COLL VENOUS BLD VENIPUNCTURE: CPT

## 2020-12-15 PROCEDURE — 85025 COMPLETE CBC W/AUTO DIFF WBC: CPT

## 2020-12-15 PROCEDURE — 93010 ELECTROCARDIOGRAM REPORT: CPT | Mod: ,,, | Performed by: INTERNAL MEDICINE

## 2020-12-15 RX ORDER — ERGOCALCIFEROL 1.25 MG/1
50000 CAPSULE ORAL
COMMUNITY

## 2020-12-15 RX ORDER — POTASSIUM CHLORIDE 20 MEQ/1
40 TABLET, EXTENDED RELEASE ORAL ONCE
Status: COMPLETED | OUTPATIENT
Start: 2020-12-15 | End: 2020-12-15

## 2020-12-15 RX ORDER — BENZONATATE 100 MG/1
100 CAPSULE ORAL 3 TIMES DAILY PRN
Qty: 20 CAPSULE | Refills: 0 | OUTPATIENT
Start: 2020-12-15 | End: 2020-12-28

## 2020-12-15 RX ORDER — DOXYCYCLINE 100 MG/1
100 CAPSULE ORAL 2 TIMES DAILY
Qty: 20 CAPSULE | Refills: 0 | Status: SHIPPED | OUTPATIENT
Start: 2020-12-15 | End: 2020-12-28

## 2020-12-15 RX ADMIN — PROMETHAZINE HYDROCHLORIDE 12.5 MG: 25 INJECTION INTRAMUSCULAR; INTRAVENOUS at 11:12

## 2020-12-15 RX ADMIN — POTASSIUM CHLORIDE 40 MEQ: 20 TABLET, EXTENDED RELEASE ORAL at 12:12

## 2020-12-15 NOTE — ED PROVIDER NOTES
Encounter Date: 12/15/2020       History     Chief Complaint   Patient presents with    Emesis     40-year-old female has had a history of endometriosis, depression, skin cancer, GERD, PE, and who had a 3 vessel CABG on the 2nd of December, presents with complaints of nausea vomiting that began last night.  No complaints of any abdominal pain or cramping.  No diarrhea.  She does complain of midsternal chest pain with cough, deep breath or with vomiting.  No fever or chills.  No unusual food, no contacts nor exposure to anyone else with vomiting.  She denies any abdominal pain.  Patient admits that before a.m. today she had worsening of symptoms prompting her ER visit.  Bowel habits have been normal.  She denies any difficulty urinating.  The patient did complain of some dizziness this morning as well as some headache.  No palpitations        Review of patient's allergies indicates:   Allergen Reactions    Clarithromycin Swelling and Other (See Comments)     DIFFICULTY SWALLOWING  DIFFICULTY SWALLOWING    Pcn [penicillins] Anaphylaxis    Sulfa (sulfonamide antibiotics) Hives    Wellbutrin [bupropion hcl] Shortness Of Breath and Anaphylaxis    Betadine [povidone-iodine] Hives     Swelling      Cefprozil Hives    Iodinated contrast media Hives    Latex, natural rubber Rash    Sulfamethoxazole-trimethoprim Nausea And Vomiting    Iodine Hives and Swelling    Latex Hives and Swelling     Past Medical History:   Diagnosis Date    Abnormal Pap smear of cervix     Arthritis     OA    Back pain     Cancer     skin cancer to back    Depression     Endometriosis     Full dentures     GERD (gastroesophageal reflux disease)     Migraine     Pulmonary embolism 2018    S/P CABG x 3 12/3/2020    Seizures     Sleep apnea     Does not use c-pap since weight loss - 170 lbs    Uterine fibroid     Wears glasses      Past Surgical History:   Procedure Laterality Date    ANGIOGRAM, CORONARY, WITH LEFT HEART  CATHETERIZATION Left 2020    Procedure: Left heart cath;  Surgeon: Jm Guthrie MD;  Location: [facility] CATH/EP LAB;  Service: Cardiology;  Laterality: Left;    ANGIOGRAM, CORONARY, WITH LEFT HEART CATHETERIZATION N/A 2020    Procedure: Angiogram, Coronary, with Left Heart Cath 12 noon;  Surgeon: Jm Guthrie MD;  Location: [facility] CATH/EP LAB;  Service: Cardiology;  Laterality: N/A;    APPENDECTOMY      ARTERIOGRAPHY OF AORTIC ROOT N/A 2020    Procedure: ARTERIOGRAM, AORTIC ROOT;  Surgeon: Jm Guthrie MD;  Location: [facility] CATH/EP LAB;  Service: Cardiology;  Laterality: N/A;    CARPAL TUNNEL RELEASE Bilateral      SECTION      CHOLECYSTECTOMY      COLONOSCOPY N/A 2019    Procedure: COLONOSCOPY;  Surgeon: Anselmo Blackwell MD;  Location: [facility] ENDO;  Service: Endoscopy;  Laterality: N/A;    CORONARY ARTERY BYPASS GRAFT (CABG) N/A 2020    Procedure: CORONARY ARTERY BYPASS GRAFT (CABG);  Surgeon: Tyler Neville MD;  Location: [facility] OR;  Service: Cardiovascular;  Laterality: N/A;    ENDOSCOPIC HARVEST OF VEIN Left 2020    Procedure: SURGICAL PROCUREMENT, VEIN, ENDOSCOPIC;  Surgeon: Tyler Neville MD;  Location: [facility];  Service: Cardiovascular;  Laterality: Left;    EPIDURAL STEROID INJECTION INTO LUMBAR SPINE N/A 2019    Procedure: Injection-steroid-epidural-lumbar;  Surgeon: Yariel Ramirez MD;  Location: [facility] OR;  Service: Pain Management;  Laterality: N/A;  L3-4    ESOPHAGOGASTRODUODENOSCOPY N/A 2019    Procedure: EGD (ESOPHAGOGASTRODUODENOSCOPY);  Surgeon: Anselmo Blackwell MD;  Location: [facility] ENDO;  Service: Endoscopy;  Laterality: N/A;    FRACTURE SURGERY      ankle    gastric sleve      HYSTERECTOMY  2014    endo and fibroids    HYSTERECTOMY      INSERTION OF INTRA-AORTIC BALLOON ASSIST DEVICE  2020    Procedure: INSERTION, INTRA-AORTIC BALLOON PUMP;  Surgeon: Jm Guthrie MD;  Location: [facility] CATH/EP LAB;  Service:  Cardiology;;    JOINT REPLACEMENT Left 11/13/2018    KNEE ARTHROPLASTY Left 11/13/2018    Procedure: ARTHROPLASTY, KNEE;  Surgeon: Feliberto Cardenas MD;  Location: [facility] OR;  Service: Orthopedics;  Laterality: Left;    KNEE ARTHROPLASTY Right 6/16/2020    Procedure: ARTHROPLASTY, KNEE;  Surgeon: Feliberto Cardenas MD;  Location: [facility] OR;  Service: Orthopedics;  Laterality: Right;    KNEE ARTHROSCOPY W/ MENISCECTOMY Right 10/25/2019    Procedure: ARTHROSCOPY, KNEE, WITH MENISCECTOMY;  Surgeon: Feliberto Cardenas MD;  Location: [facility] OR;  Service: Orthopedics;  Laterality: Right;    KNEE SURGERY      LUMBAR EPIDURAL INJECTION      OOPHORECTOMY Left     LSO    RADIAL NERVE Right     RECONSTRUCTION OF MEDIAL PATELLOFEMORAL LIGAMENT OF RIGHT KNEE Right 10/25/2019    Procedure: RECONSTRUCTION, LIGAMENT, MEDIAL PATELLOFEMORAL, RIGHT;  Surgeon: Feliberto Cardenas MD;  Location: [facility] OR;  Service: Orthopedics;  Laterality: Right;    SINUS SURGERY      UPPER GASTROINTESTINAL ENDOSCOPY  11/27/2019    Dr. Blackwell; mild schatzki ring-dilated; gastritis; bx in process     Family History   Problem Relation Age of Onset    Heart disease Mother     Heart disease Father     Esophageal cancer Maternal Grandmother     Breast cancer Maternal Aunt 46    Colon cancer Neg Hx     Stomach cancer Neg Hx     Ovarian cancer Neg Hx      Social History     Tobacco Use    Smoking status: Current Every Day Smoker     Packs/day: 0.25     Years: 20.00     Pack years: 5.00     Types: Cigarettes    Smokeless tobacco: Never Used    Tobacco comment: trying to quit again   Substance Use Topics    Alcohol use: Yes     Alcohol/week: 0.0 standard drinks     Comment: occasionally    Drug use: Not Currently     Types: Other-see comments     Comment: no     Review of Systems   Constitutional: Positive for appetite change. Negative for chills, diaphoresis, fatigue and fever.   HENT: Negative for congestion, nosebleeds, sore  throat and trouble swallowing.    Respiratory: Negative for shortness of breath and wheezing.    Cardiovascular: Positive for chest pain. Negative for leg swelling.   Gastrointestinal: Positive for nausea and vomiting. Negative for abdominal pain.   Genitourinary: Negative for difficulty urinating and dysuria.   Musculoskeletal: Negative for back pain.   Skin: Negative.  Negative for rash.   Neurological: Negative for dizziness and weakness.   Hematological: Does not bruise/bleed easily.   All other systems reviewed and are negative.      Physical Exam     Initial Vitals [12/15/20 0857]   BP Pulse Resp Temp SpO2   (!) 104/53 71 17 98.7 °F (37.1 °C) 96 %      MAP       --         Physical Exam    Vitals reviewed.  Constitutional: She appears well-developed and well-nourished. She is not diaphoretic. No distress.   HENT:   Head: Normocephalic and atraumatic.   Nose: Nose normal.   Mouth/Throat: Oropharynx is clear and moist. No oropharyngeal exudate.   Eyes: Conjunctivae are normal. Pupils are equal, round, and reactive to light.   Neck: Normal range of motion. Neck supple. No JVD present.   Cardiovascular: Normal rate, regular rhythm, normal heart sounds and intact distal pulses. Exam reveals no gallop and no friction rub.    No murmur heard.  Pulmonary/Chest: Breath sounds normal. No respiratory distress. She has no wheezes. She has no rhonchi. She has no rales. She exhibits tenderness.   Healing midline sternal scar with only very mild erythema at the base of the wound.   Abdominal: Soft. Bowel sounds are normal. She exhibits no distension. There is no abdominal tenderness. There is no rebound and no guarding.   Musculoskeletal: Normal range of motion. No tenderness or edema.   Lymphadenopathy:     She has no cervical adenopathy.   Neurological: She is alert and oriented to person, place, and time. She has normal strength. GCS score is 15. GCS eye subscore is 4. GCS verbal subscore is 5. GCS motor subscore is 6.    Skin: Skin is warm and dry. Capillary refill takes less than 2 seconds. No rash noted. No erythema. No pallor.   Psychiatric: She has a normal mood and affect. Her behavior is normal. Judgment and thought content normal.         ED Course   Procedures  Labs Reviewed   CBC W/ AUTO DIFFERENTIAL - Abnormal; Notable for the following components:       Result Value    RBC 3.34 (*)     Hemoglobin 9.3 (*)     Hematocrit 29.5 (*)     MCHC 31.5 (*)     RDW 16.1 (*)     Platelets 403 (*)     Immature Granulocytes 0.9 (*)     Immature Grans (Abs) 0.09 (*)     Gran % 73.7 (*)     Lymph % 14.8 (*)     All other components within normal limits    Narrative:     For upper or mid abdominal pain.   COMPREHENSIVE METABOLIC PANEL - Abnormal; Notable for the following components:    Potassium 3.4 (*)     BUN <5 (*)     Albumin 3.3 (*)     All other components within normal limits    Narrative:     For upper or mid abdominal pain.   B-TYPE NATRIURETIC PEPTIDE - Abnormal; Notable for the following components:     (*)     All other components within normal limits    Narrative:     For upper or mid abdominal pain.   PROTIME-INR - Abnormal; Notable for the following components:    PT 15.8 (*)     All other components within normal limits    Narrative:     For upper or mid abdominal pain.   LIPASE    Narrative:     For upper or mid abdominal pain.   TROPONIN I    Narrative:     For upper or mid abdominal pain.   URINALYSIS, REFLEX TO URINE CULTURE   URINALYSIS, REFLEX TO URINE CULTURE        ECG Results          EKG 12-lead (In process)  Result time 12/15/20 09:54:47    In process by Interface, Lab In [facility] (12/15/20 09:54:47)                 Narrative:    Test Reason : R11.10,    Vent. Rate : 066 BPM     Atrial Rate : 066 BPM     P-R Int : 132 ms          QRS Dur : 078 ms      QT Int : 416 ms       P-R-T Axes : 039 047 056 degrees     QTc Int : 436 ms    Normal sinus rhythm  Nonspecific T wave abnormality  Abnormal ECG  When  compared with ECG of 05-DEC-2020 06:18,  Nonspecific T wave abnormality now evident in Inferior leads    Referred By: [email or web address]   SELF           Confirmed By:                             Imaging Results          X-Ray Abdomen Flat And Erect (Final result)  Result time 12/15/20 11:04:10    Final result by Leonor Saravia MD (12/15/20 11:04:10)                 Narrative:    PROCEDURE:   XR ABDOMEN FLAT AND ERECT  dated  12/15/2020 10:39 AM    CLINICAL HISTORY:   Female 40 years of age.   Abdominal pain. Nausea  and vomiting.    TECHNIQUE: AP supine and AP erect abdomen.    PREVIOUS STUDIES:  None    FINDINGS:    There are cholecystectomy clips in the right upper quadrant. Bowel gas  pattern is normal. There is no intraperitoneal free air. Soft tissue  planes are normal. There is no suspicious calcification. Bones are  normal.    IMPRESSION:    Normal other than prior cholecystectomy.    Electronically Signed by Leonor Saravia on 12/15/2020 11:08 AM                             X-Ray Chest AP Portable (Final result)  Result time 12/15/20 09:42:58    Final result by Adrienne Haji MD (12/15/20 09:42:58)                 Narrative:    Portable chest x-ray at 9:30 AM is compared to prior study 12/7/2020    Clinical history is chest pain    The patient is had a prior CABG. The lungs are clear. The  cardiomediastinal silhouette is normal in size. The right IJ catheter  is been removed.    There are no acute osseous abnormalities.      IMPRESSION: Prior CABG with no acute pulmonary process    Electronically Signed by Adrienne Haji M.D. on 12/15/2020 9:44 AM                                            Attending Attestation:             Attending ED Notes:   This 40-year-old female who is recently s/p 3 vessel CABG who presented with nausea and vomiting, has been given IV fluids and antiemetics.  Labs at this time showed a BNP of 230.  Chest x-ray showed no evidence of any infiltrate or effusion.  The chemistries also  showed a potassium of 3.4 for which he received 40 mEq orally.  H&H is low at 9.3 and 29.5 which is in the same range it had been previously.  The patient did complain of midsternal pain primarily with coughing and this is pain at her sternotomy site as opposed to any of the etiology.  She will be advised to continue her oxycodone at home for the chest pain will also serve as a cough suppressant.  She will be prescribed Tessalon Perles and advised also take Delsym to stop coughing.  She is advised to stop smoking and will also be given a prescription for Zithromax.                    Clinical Impression:       ICD-10-CM ICD-9-CM   1. Cough  R05 786.2   2. Vomiting  R11.10 787.03   3. S/P CABG (coronary artery bypass graft)  Z95.1 V45.81   4. Chest wall pain  R07.89 786.52                          ED Disposition Condition    Discharge Stable        ED Prescriptions     Medication Sig Dispense Start Date End Date Auth. Provider    benzonatate (TESSALON) 100 MG capsule Take 1 capsule (100 mg total) by mouth 3 (three) times daily as needed for Cough. 20 capsule 12/15/2020 12/25/2020 Jaquan Britton Jr., MD    doxycycline (VIBRAMYCIN) 100 MG Cap Take 1 capsule (100 mg total) by mouth 2 (two) times daily. for 10 days 20 capsule 12/15/2020 12/25/2020 Jaquan Britton Jr., MD        Follow-up Information     Follow up With Specialties Details Why Contact Info    Your Primary Care Doctor  Call in 2 days For recheck                                        Jaquan Britton Jr., MD  12/15/20 1211       Jaquan Britton Jr., MD  12/15/20 1213

## 2020-12-15 NOTE — DISCHARGE INSTRUCTIONS
Stop smoking.  Continue Zofran and Phenergan for nausea.  Continue oxycodone for chest wall pain.  Delsym twice daily for cough.  Clear liquid diet for 24 hours.  Return to the ER as needed otherwise follow up with your physicians within the next couple days

## 2020-12-15 NOTE — PROGRESS NOTES
[facility]     Subjective:      Patient: Aleyda Costa Patient PCP: Darlene Hayden MD         :  1980     Sex:  female      MRN:  [id number]          Date of Visit: 2020      Chief Complaint: DVT    Patient ID: Aleyda Costa is a 40 y.o. female  with PMHx of HTN, bilateral PE, and DVT who presents to clinic for hospital follow up. Pt presented with at [facility] 2020 with unstable angina and underwent left heart catheterization showing multivessel CAD with high-grade proximal LAD lesion. She also had myocardial infarction. She underwent emergent CABG x3 with Dr. Neville. Pt had a total left knee replacement in 2018 and a few days after surgery she presented to ER with increasing SOB. Workup revealed bilateral PE. She was placed on xarelto. She had an ultrasound of legs which was negative in 2020 .Repeat CTA revealed no resolution of PE and she was changed to pradaxa and was later placed on coumadin. Pt had right leg DVT noted on US 2020 and was then changed over to enoxaparin (LOVENOX) 150 mg/mL Syrg Inject 1 mL (150 mg total) into the skin once daily by Dr. Rabia Pina. Pt is wearing NITHYA hose stocking on RLE and continues to do her lovenox injections. Pt has post-op bandage on her neck, which she states she was instructed to take off, but she would like a provider to do it and requests that I take off her bandage. Pt had 500cc blood loss in hospital and had H/H of 8.2/26.0 with platelet count decreased to 131. She was discharged with ferrous sulfate (FEOSOL) 325 mg (65 mg iron) Tab PO BID for SALMA. Pt has been taking feosol and has updated H/H of 9.3/29.5 with serum iron levels of 25 and saturated iron levels of 12. Pt seen in clinic today with social distancing of 6 feet apart and endorses fatigue, cough, and vitals show temperature of 99.6F. Pt went to ED yesterday with complaints of nausea and vomiting and cough. Pt was given IV fluids and  antiemetics. Chest x-ray showed no evidence of any infiltrate or effusion. Labs showed a potassium of 3.4 for which she received 40 mEq orally. ED did not complete covid19 screening on patient. Pt was prescribed Tessalon Perles and advised also take Delsym to stop coughing. She was advised to stop smoking and was also be given a prescription for Zithromax and she was discharged to home. Pt has not had anti-XA heparin monitoring completed. Pt has no other complaints at this time.     Review of Systems   Constitutional: Positive for fatigue. Negative for activity change, appetite change, chills, fever and unexpected weight change.   HENT: Negative for mouth sores and trouble swallowing.    Eyes: Negative for photophobia and visual disturbance.   Respiratory: Positive for cough and shortness of breath. Negative for chest tightness, wheezing and stridor.    Cardiovascular: Positive for chest pain (s/p CABG). Negative for leg swelling.   Gastrointestinal: Negative for abdominal pain, constipation, diarrhea, nausea and vomiting.   Musculoskeletal: Negative for arthralgias, back pain and myalgias.   Skin: Negative for color change, pallor, rash and wound.   Neurological: Negative for syncope, speech difficulty and weakness.   Hematological: Negative for adenopathy. Does not bruise/bleed easily.   Psychiatric/Behavioral: Negative for agitation, behavioral problems, confusion, decreased concentration and dysphoric mood.      Past Medical History:   Diagnosis Date    Abnormal Pap smear of cervix     Arthritis     OA    Back pain     Cancer     skin cancer to back    Depression     Endometriosis     Full dentures     GERD (gastroesophageal reflux disease)     Migraine     Pulmonary embolism 2018    S/P CABG x 3 12/3/2020    Seizures     Sleep apnea     Does not use c-pap since weight loss - 170 lbs    Uterine fibroid     Wears glasses      Family History   Problem Relation Age of Onset    Heart disease Mother      Heart disease Father     Esophageal cancer Maternal Grandmother     Breast cancer Maternal Aunt 46    Colon cancer Neg Hx     Stomach cancer Neg Hx     Ovarian cancer Neg Hx      Past Surgical History:   Procedure Laterality Date    ANGIOGRAM, CORONARY, WITH LEFT HEART CATHETERIZATION Left 2020    Procedure: Left heart cath;  Surgeon: Jm Guthrie MD;  Location: [facility] CATH/EP LAB;  Service: Cardiology;  Laterality: Left;    ANGIOGRAM, CORONARY, WITH LEFT HEART CATHETERIZATION N/A 2020    Procedure: Angiogram, Coronary, with Left Heart Cath 12 noon;  Surgeon: Jm Guthrie MD;  Location: [facility] CATH/EP LAB;  Service: Cardiology;  Laterality: N/A;    APPENDECTOMY      ARTERIOGRAPHY OF AORTIC ROOT N/A 2020    Procedure: ARTERIOGRAM, AORTIC ROOT;  Surgeon: Jm Guthrie MD;  Location: [facility] CATH/EP LAB;  Service: Cardiology;  Laterality: N/A;    CARPAL TUNNEL RELEASE Bilateral      SECTION      CHOLECYSTECTOMY      COLONOSCOPY N/A 2019    Procedure: COLONOSCOPY;  Surgeon: Anselmo Blackwell MD;  Location: [facility] ENDO;  Service: Endoscopy;  Laterality: N/A;    CORONARY ARTERY BYPASS GRAFT (CABG) N/A 2020    Procedure: CORONARY ARTERY BYPASS GRAFT (CABG);  Surgeon: Tyler Neville MD;  Location: [facility] OR;  Service: Cardiovascular;  Laterality: N/A;    ENDOSCOPIC HARVEST OF VEIN Left 2020    Procedure: SURGICAL PROCUREMENT, VEIN, ENDOSCOPIC;  Surgeon: Tyler Neville MD;  Location: [facility] OR;  Service: Cardiovascular;  Laterality: Left;    EPIDURAL STEROID INJECTION INTO LUMBAR SPINE N/A 2019    Procedure: Injection-steroid-epidural-lumbar;  Surgeon: Yariel Ramirez MD;  Location: [facility] OR;  Service: Pain Management;  Laterality: N/A;  L3-4    ESOPHAGOGASTRODUODENOSCOPY N/A 2019    Procedure: EGD (ESOPHAGOGASTRODUODENOSCOPY);  Surgeon: Anselmo Blackwell MD;  Location: [facility] ENDO;  Service: Endoscopy;  Laterality: N/A;    FRACTURE SURGERY       ankle    gastric sleve      HYSTERECTOMY  2014    endo and fibroids    HYSTERECTOMY      INSERTION OF INTRA-AORTIC BALLOON ASSIST DEVICE  12/2/2020    Procedure: INSERTION, INTRA-AORTIC BALLOON PUMP;  Surgeon: Jm Guthrie MD;  Location: [facility] CATH/EP LAB;  Service: Cardiology;;    JOINT REPLACEMENT Left 11/13/2018    KNEE ARTHROPLASTY Left 11/13/2018    Procedure: ARTHROPLASTY, KNEE;  Surgeon: Feliberto Cardenas MD;  Location: [facility] OR;  Service: Orthopedics;  Laterality: Left;    KNEE ARTHROPLASTY Right 6/16/2020    Procedure: ARTHROPLASTY, KNEE;  Surgeon: Feliberto Cardenas MD;  Location: [facility] OR;  Service: Orthopedics;  Laterality: Right;    KNEE ARTHROSCOPY W/ MENISCECTOMY Right 10/25/2019    Procedure: ARTHROSCOPY, KNEE, WITH MENISCECTOMY;  Surgeon: Feliberto Cardenas MD;  Location: [facility] OR;  Service: Orthopedics;  Laterality: Right;    KNEE SURGERY      LUMBAR EPIDURAL INJECTION      OOPHORECTOMY Left     LSO    RADIAL NERVE Right     RECONSTRUCTION OF MEDIAL PATELLOFEMORAL LIGAMENT OF RIGHT KNEE Right 10/25/2019    Procedure: RECONSTRUCTION, LIGAMENT, MEDIAL PATELLOFEMORAL, RIGHT;  Surgeon: Feliberto Cardenas MD;  Location: [facility] OR;  Service: Orthopedics;  Laterality: Right;    SINUS SURGERY      UPPER GASTROINTESTINAL ENDOSCOPY  11/27/2019    Dr. Blackwell; mild schatzki ring-dilated; gastritis; bx in process     Social History     Socioeconomic History    Marital status:      Spouse name: Not on file    Number of children: Not on file    Years of education: Not on file    Highest education level: Not on file   Occupational History    Not on file   Social Needs    Financial resource strain: Not on file    Food insecurity     Worry: Not on file     Inability: Not on file    Transportation needs     Medical: Not on file     Non-medical: Not on file   Tobacco Use    Smoking status: Current Every Day Smoker     Packs/day: 0.25     Years: 20.00     Pack years:  5.00     Types: Cigarettes    Smokeless tobacco: Never Used    Tobacco comment: trying to quit again   Substance and Sexual Activity    Alcohol use: Yes     Alcohol/week: 0.0 standard drinks     Comment: occasionally    Drug use: Not Currently     Types: Other-see comments     Comment: no    Sexual activity: Yes     Partners: Male   Lifestyle    Physical activity     Days per week: Not on file     Minutes per session: Not on file    Stress: Not on file   Relationships    Social connections     Talks on phone: Not on file     Gets together: Not on file     Attends Jew service: Not on file     Active member of club or organization: Not on file     Attends meetings of clubs or organizations: Not on file     Relationship status: Not on file   Other Topics Concern    Not on file   Social History Narrative    Not on file       Current Outpatient Medications:     aspirin 81 MG Chew, Take 1 tablet (81 mg total) by mouth once daily., Disp: 30 tablet, Rfl: 2    atorvastatin (LIPITOR) 40 MG tablet, Take 40 mg by mouth every evening. , Disp: , Rfl:     benzonatate (TESSALON) 100 MG capsule, Take 1 capsule (100 mg total) by mouth 3 (three) times daily as needed for Cough., Disp: 20 capsule, Rfl: 0    cetirizine (ZYRTEC) 5 MG tablet, Take 5 mg by mouth as needed. , Disp: , Rfl:     clindamycin (CLEOCIN) 150 MG capsule, Take 2 capsules (300 mg total) by mouth 4 (four) times daily. for 7 days (Patient not taking: Reported on 12/16/2020), Disp: 56 capsule, Rfl: 0    cyanocobalamin, vitamin B-12, 1,000 mcg/mL Kit, Inject 1 mL into the muscle every 21 days. , Disp: , Rfl:     doxycycline (VIBRAMYCIN) 100 MG Cap, Take 1 capsule (100 mg total) by mouth 2 (two) times daily. for 10 days, Disp: 20 capsule, Rfl: 0    enoxaparin (LOVENOX) 150 mg/mL Syrg, Inject 1 mL (150 mg total) into the skin once daily., Disp: 30 Syringe, Rfl: 1    erenumab-aooe (AIMOVIG) 140 mg/mL autoinjector, Inject 140 mg into the skin every  28 days., Disp: 1 mL, Rfl: 11    ergocalciferol (ERGOCALCIFEROL) 50,000 unit Cap, Take 50,000 Units by mouth every 7 days., Disp: , Rfl:     ferrous sulfate (FEOSOL) 325 mg (65 mg iron) Tab tablet, Take 1 tablet (325 mg total) by mouth 2 (two) times daily., Disp: 30 tablet, Rfl: 2    FLUoxetine 40 MG capsule, Take 1 capsule (40 mg total) by mouth 2 (two) times daily., Disp: 60 capsule, Rfl: 1    hydrOXYzine pamoate (VISTARIL) 25 MG Cap, Take 25 mg by mouth every 8 (eight) hours as needed. , Disp: , Rfl:     ketorolac (TORADOL) 60 mg/2 mL Soln, Inject 30 mg into the muscle every 6 (six) hours. , Disp: , Rfl:     lorazepam (ATIVAN) 0.5 MG tablet, Take 0.5 mg by mouth every 4 (four) hours as needed. , Disp: , Rfl:     metoprolol tartrate (LOPRESSOR) 25 MG tablet, Take 0.5 tablets (12.5 mg total) by mouth 2 (two) times daily., Disp: 30 tablet, Rfl: 3    nitroGLYCERIN (NITROSTAT) 0.4 MG SL tablet, Place 0.4 mg under the tongue every 5 (five) minutes as needed for Chest pain., Disp: , Rfl:     oxyCODONE (ROXICODONE) 10 mg Tab immediate release tablet, Take 10 mg by mouth every 6 (six) hours as needed for Pain., Disp: , Rfl:     pantoprazole (PROTONIX) 40 MG tablet, Take 1 tablet (40 mg total) by mouth 2 (two) times daily., Disp: 60 tablet, Rfl: 1    tiZANidine (ZANAFLEX) 4 MG tablet, Take 4 mg by mouth every 6 (six) hours as needed., Disp: , Rfl:   No current facility-administered medications for this visit.     Facility-Administered Medications Ordered in Other Visits:     lactated ringers infusion, , Intravenous, Continuous, Anjel Hernandez MD, Stopped at 12/02/20 1810    lidocaine (PF) 10 mg/ml (1%) injection 10 mg, 1 mL, Intradermal, Once, Anjel Hernandez MD    Review of patient's allergies indicates:   Allergen Reactions    Clarithromycin Swelling and Other (See Comments)     DIFFICULTY SWALLOWING  DIFFICULTY SWALLOWING    Pcn [penicillins] Anaphylaxis    Sulfa (sulfonamide antibiotics) Hives     "Wellbutrin [bupropion hcl] Shortness Of Breath and Anaphylaxis    Betadine [povidone-iodine] Hives     Swelling      Cefprozil Hives    Iodinated contrast media Hives    Latex, natural rubber Rash    Sulfamethoxazole-trimethoprim Nausea And Vomiting    Iodine Hives and Swelling    Latex Hives and Swelling     All medications, allergies, and past history have been reviewed.  Objective:      Vitals:  Vitals - 1 value per visit 12/12/2020 12/15/2020 12/16/2020   SYSTOLIC 102 101 101   DIASTOLIC 55 57 55   PULSE 84 71 68   TEMPERATURE 99.6 98.7 99.6   RESPIRATIONS 16 17 18   SPO2 97 97 97   Weight (lb) - 215 218.48   Weight (kg) - 97.523 99.1   HEIGHT - 6' 0" 6' 0"   BODY MASS INDEX - 29.16 29.63   VISIT REPORT - - -   Pain Score  - - 8   Some recent data might be hidden       Body surface area is 2.24 meters squared.  Weight Readings:  Wt Readings from Last 5 Encounters:   12/16/20 99.1 kg (218 lb 7.6 oz)   12/15/20 97.5 kg (215 lb)   12/08/20 106 kg (233 lb 11 oz)   12/07/20 106.2 kg (234 lb 2.1 oz)   11/24/20 102.8 kg (226 lb 10.1 oz)      Blood Type:  A POS     Physical Exam limited to visual exam due to pt having cough/SOB, and mildly elevated temperature of 99.6F.  Physical Exam  Constitutional:       Appearance: Normal appearance.   HENT:      Head: Normocephalic and atraumatic.      Right Ear: External ear normal.      Left Ear: External ear normal.      Nose: Nose normal.   Eyes:      General:         Right eye: No discharge.         Left eye: No discharge.      Pupils: Pupils are equal, round, and reactive to light.   Neck:      Musculoskeletal: Normal range of motion.   Pulmonary:      Effort: Pulmonary effort is normal.   Skin:     General: Skin is dry.      Coloration: Skin is not pale.      Findings: No erythema.   Neurological:      General: No focal deficit present.      Mental Status: She is alert and oriented to person, place, and time. Mental status is at baseline.      Cranial Nerves: No " cranial nerve deficit.   Psychiatric:         Mood and Affect: Mood normal.         Behavior: Behavior normal.         Thought Content: Thought content normal.         Judgment: Judgment normal.       Labs:    CBC  WBC   Date Value Ref Range Status   12/15/2020 9.71 3.90 - 12.70 K/uL Final   12/12/2020 10.31 3.90 - 12.70 K/uL Final   12/07/2020 5.13 3.90 - 12.70 K/uL Final     RBC   Date Value Ref Range Status   12/15/2020 3.34 (L) 4.00 - 5.40 M/uL Final   12/12/2020 3.28 (L) 4.00 - 5.40 M/uL Final   12/07/2020 2.94 (L) 4.00 - 5.40 M/uL Final     Hemoglobin   Date Value Ref Range Status   12/15/2020 9.3 (L) 12.0 - 16.0 g/dL Final   12/12/2020 9.5 (L) 12.0 - 16.0 g/dL Final   12/07/2020 8.2 (L) 12.0 - 16.0 g/dL Final     Hematocrit   Date Value Ref Range Status   12/15/2020 29.5 (L) 37.0 - 48.5 % Final   12/12/2020 29.2 (L) 37.0 - 48.5 % Final   12/07/2020 26.3 (L) 37.0 - 48.5 % Final     MCV   Date Value Ref Range Status   12/15/2020 88 82 - 98 fL Final   12/12/2020 89 82 - 98 fL Final   12/07/2020 90 82 - 98 fL Final     Platelets   Date Value Ref Range Status   12/15/2020 403 (H) 150 - 350 K/uL Final   12/12/2020 338 150 - 350 K/uL Final     Comment:     Reviewed by Technologist.   12/07/2020 166 150 - 350 K/uL Final     MCH   Date Value Ref Range Status   12/15/2020 27.8 27.0 - 31.0 pg Final   12/12/2020 29.0 27.0 - 31.0 pg Final   12/07/2020 27.9 27.0 - 31.0 pg Final     MCHC   Date Value Ref Range Status   12/15/2020 31.5 (L) 32.0 - 36.0 g/dL Final   12/12/2020 32.5 32.0 - 36.0 g/dL Final   12/07/2020 31.2 (L) 32.0 - 36.0 g/dL Final     RDW   Date Value Ref Range Status   12/15/2020 16.1 (H) 11.5 - 14.5 % Final   12/12/2020 15.9 (H) 11.5 - 14.5 % Final   12/07/2020 17.1 (H) 11.5 - 14.5 % Final     CMP  Sodium   Date Value Ref Range Status   12/15/2020 136 136 - 145 mmol/L Final   12/12/2020 137 136 - 145 mmol/L Final   12/07/2020 136 136 - 145 mmol/L Final     Potassium   Date Value Ref Range Status    12/15/2020 3.4 (L) 3.5 - 5.1 mmol/L Final   12/12/2020 3.0 (L) 3.5 - 5.1 mmol/L Final   12/07/2020 3.7 3.5 - 5.1 mmol/L Final     Chloride   Date Value Ref Range Status   12/15/2020 102 95 - 110 mmol/L Final   12/12/2020 103 95 - 110 mmol/L Final   12/07/2020 102 95 - 110 mmol/L Final     CO2   Date Value Ref Range Status   12/15/2020 26 23 - 29 mmol/L Final   12/12/2020 23 23 - 29 mmol/L Final   12/07/2020 28 23 - 29 mmol/L Final     BUN   Date Value Ref Range Status   12/15/2020 <5 (L) 6 - 20 mg/dL Final   12/12/2020 5 (L) 6 - 20 mg/dL Final   12/07/2020 7 6 - 20 mg/dL Final     Creatinine   Date Value Ref Range Status   12/15/2020 0.6 0.5 - 1.4 mg/dL Final   12/12/2020 0.6 0.5 - 1.4 mg/dL Final   12/07/2020 0.5 0.5 - 1.4 mg/dL Final     Glucose   Date Value Ref Range Status   12/15/2020 110 70 - 110 mg/dL Final   12/12/2020 111 (H) 70 - 110 mg/dL Final   12/07/2020 109 70 - 110 mg/dL Final     Calcium   Date Value Ref Range Status   12/15/2020 8.9 8.7 - 10.5 mg/dL Final   12/12/2020 8.5 (L) 8.7 - 10.5 mg/dL Final   12/07/2020 7.9 (L) 8.7 - 10.5 mg/dL Final     Magnesium   Date Value Ref Range Status   12/12/2020 1.9 1.6 - 2.6 mg/dL Final   12/07/2020 2.0 1.6 - 2.6 mg/dL Final   12/06/2020 2.0 1.6 - 2.6 mg/dL Final     Phosphorus   Date Value Ref Range Status   12/02/2020 2.3 (L) 2.7 - 4.5 mg/dL Final   10/21/2020 4.0 2.7 - 4.5 mg/dL Final   10/20/2020 4.4 2.7 - 4.5 mg/dL Final     Alkaline Phosphatase   Date Value Ref Range Status   12/15/2020 78 55 - 135 U/L Final   12/12/2020 74 55 - 135 U/L Final   12/06/2020 61 55 - 135 U/L Final     Total Protein   Date Value Ref Range Status   12/15/2020 7.3 6.0 - 8.4 g/dL Final   12/12/2020 7.1 6.0 - 8.4 g/dL Final   12/06/2020 5.3 (L) 6.0 - 8.4 g/dL Final     Albumin   Date Value Ref Range Status   12/15/2020 3.3 (L) 3.5 - 5.2 g/dL Final   12/12/2020 3.3 (L) 3.5 - 5.2 g/dL Final   12/06/2020 2.8 (L) 3.5 - 5.2 g/dL Final     Total Bilirubin   Date Value Ref Range Status    12/15/2020 0.5 0.1 - 1.0 mg/dL Final     Comment:     For infants and newborns, interpretation of results should be based  on gestational age, weight and in agreement with clinical  observations.  Premature Infant recommended reference ranges:  Up to 24 hours.............<8.0 mg/dL  Up to 48 hours............<12.0 mg/dL  3-5 days..................<15.0 mg/dL  6-29 days.................<15.0 mg/dL     12/12/2020 0.5 0.1 - 1.0 mg/dL Final     Comment:     For infants and newborns, interpretation of results should be based  on gestational age, weight and in agreement with clinical  observations.  Premature Infant recommended reference ranges:  Up to 24 hours.............<8.0 mg/dL  Up to 48 hours............<12.0 mg/dL  3-5 days..................<15.0 mg/dL  6-29 days.................<15.0 mg/dL     12/06/2020 0.7 0.1 - 1.0 mg/dL Final     Comment:     For infants and newborns, interpretation of results should be based  on gestational age, weight and in agreement with clinical  observations.  Premature Infant recommended reference ranges:  Up to 24 hours.............<8.0 mg/dL  Up to 48 hours............<12.0 mg/dL  3-5 days..................<15.0 mg/dL  6-29 days.................<15.0 mg/dL       AST   Date Value Ref Range Status   12/15/2020 14 10 - 40 U/L Final   12/12/2020 14 10 - 40 U/L Final   12/06/2020 25 10 - 40 U/L Final     ALT   Date Value Ref Range Status   12/15/2020 14 10 - 44 U/L Final   12/12/2020 13 10 - 44 U/L Final   12/06/2020 28 10 - 44 U/L Final     Anemia Labs  Haptoglobin   Date Value Ref Range Status   06/21/2020 262 (H) 30 - 250 mg/dL Final     Iron   Date Value Ref Range Status   12/04/2020 14 (L) 30 - 160 ug/dL Final   06/21/2020 37 30 - 160 ug/dL Final     Transferrin   Date Value Ref Range Status   12/04/2020 141 (L) 200 - 375 mg/dL Final   06/21/2020 198 (L) 200 - 375 mg/dL Final     TIBC   Date Value Ref Range Status   12/04/2020 197 (L) 250 - 450 ug/dL Final   06/21/2020 293 250 -  450 ug/dL Final     Ferritin   Date Value Ref Range Status   12/04/2020 72 20.0 - 300.0 ng/mL Final   06/21/2020 180 20.0 - 300.0 ng/mL Final     Pathology:  Pathology Results  (Last 10 years)               12/30/19 0935  Specimen to Pathology, Surgery Gastrointestinal tract Final result    Narrative:  Pre-op Diagnosis: Hematochezia [K92.1]   Right lower quadrant abdominal tenderness without rebound   tenderness [R10.813]   GERD without esophagitis [K21.9]   S/P cholecystectomy [Z90.49]   S/P laparoscopic sleeve gastrectomy [Z98.84]   Procedure(s):   COLONOSCOPY   POST:  POLYP  /  RECTAL ERYTHEMA / HEMORRHOIDS   Number of specimens: 2 JARS   Name of specimens: 1. CECAL POLYP  2.  RECTUM BX  MILD   IRRITATION   Specimen total (fresh, frozen, permanent):->2       11/27/19 0931  Specimen to Pathology, Surgery Gastrointestinal tract Final result    Narrative:  Pre-op Diagnosis: Heartburn [R12]   Nausea and vomiting, intractability of vomiting not   specified, unspecified vomiting type [R11.2]   Oropharyngeal dysphagia [R13.12]   Hiatal hernia [K44.9]   POST;  SCHATZKI'S RING / GASTRITIS / HIATAL HERNIA / SLEEVE   ANATOMY   Procedure(s):   EGD (ESOPHAGOGASTRODUODENOSCOPY)   Number of specimens: 1 JAR   Name of specimens: 1. ANTRUM AND BODY BX   Specimen total (fresh, frozen, permanent):->1           Imaging:  No results found for this or any previous visit (from the past 2160 hour(s)).  No results found for this or any previous visit (from the past 2160 hour(s)).  Results for orders placed or performed during the hospital encounter of 11/17/20 (from the past 2160 hour(s))   MRI Tibia Fibula Without Contrast Right    Impression    1. Well-defined subcutaneous nodule along the superficial soft tissues at mid shin.  Contrast was not administered which somewhat limits characterization.  While lesion morphology is indeterminate, favored benign entity such as pressure lesion, scar or fat trauma.  2. Subtle periosteal edema in  the adjacent tibial cortex is presumably reactive, as lesion does not directly involve the bone.      Electronically signed by: Dominik Sexton  Date:    11/18/2020  Time:    11:54     All lab results and imaging results have been reviewed.    Assessment and Plan:        ICD-10-CM ICD-9-CM   1. Chronic deep vein thrombosis (DVT) of right peroneal vein  I82.551 453.52   2. Anticoagulated  Z79.01 V58.61   3. Other iron deficiency anemia  D50.8 280.8     -Pt seen in clinic today with social distancing of 6 feet apart. I have reviewed labs and visualized patient from a distance.  -Pt has been taking ferrous sulfate (FEOSOL) 325 mg (65 mg iron) Tab PO BID for SALMA s/p 500cc blood loss from CABG on 12/02/2020 and has updated H/H of 9.3/29.5 with serum iron levels of 25 and saturated iron levels of 12. I have informed patient that she would benefit from IV iron and that I have placed injectafer orders in the system with injections being completed 1 week apart for 2 doses and that we will get injectafer authorized. I have informed patient that due to her symptoms of cough, SOB, and fatigue along with mild elevation of temperature of 99.6F that she needs to go to [facility] to have covid19 rapid swab completed. I have informed her that [facility] has strict guidelines and that pt with symptoms of covid19 must be tested prior to entering the building and that she is to go straight to [facility] for rapid testing. I gave pt my business card and wrote down [facility] information [facility] [facility] [place] [place] [address] [phone] and that she is to go there straight away and be rapid tested as she has a cardiology appointment planned for today.        -Pt is to follow up with Dr. Rabia Pina in 1 week with CBC, CMP, and anti-XA and injectafer clearance. Please have injectafer authorized for patient as she has SALMA due to blood loss s/p CABG 12/02/2020. Orders have been placed.  -Pt is to continue  enoxaparin (LOVENOX) 150 mg/mL Syrg Inject 1 mL (150 mg total) into the skin once daily.    Sincerely,    Surjit Mejia PA-C    Note is available for collaborating MD; Dr. Rabia Pina for review.  Electronically signed by: Surjit Mejia PA-C    Future Appointments   Date Time Provider Department Center   12/16/2020  2:00 PM Tyler Neville MD [facility] CARDAILEEN [place] [facility] [place]   12/17/2020 11:15 AM Feliberto Cardenas MD [facility]TH[facility] [place] [facility]

## 2020-12-16 ENCOUNTER — PATIENT MESSAGE (OUTPATIENT)
Dept: VASCULAR SURGERY | Facility: CLINIC | Age: 40
End: 2020-12-16

## 2020-12-16 ENCOUNTER — PATIENT MESSAGE (OUTPATIENT)
Dept: HEMATOLOGY/ONCOLOGY | Facility: CLINIC | Age: 40
End: 2020-12-16

## 2020-12-16 ENCOUNTER — OFFICE VISIT (OUTPATIENT)
Dept: HEMATOLOGY/ONCOLOGY | Facility: CLINIC | Age: 40
End: 2020-12-16
Payer: COMMERCIAL

## 2020-12-16 ENCOUNTER — TELEPHONE (OUTPATIENT)
Dept: VASCULAR SURGERY | Facility: CLINIC | Age: 40
End: 2020-12-16

## 2020-12-16 VITALS
RESPIRATION RATE: 18 BRPM | OXYGEN SATURATION: 97 % | WEIGHT: 218.5 LBS | HEIGHT: 72 IN | HEART RATE: 68 BPM | TEMPERATURE: 100 F | SYSTOLIC BLOOD PRESSURE: 101 MMHG | BODY MASS INDEX: 29.59 KG/M2 | DIASTOLIC BLOOD PRESSURE: 55 MMHG

## 2020-12-16 DIAGNOSIS — D50.8 OTHER IRON DEFICIENCY ANEMIA: ICD-10-CM

## 2020-12-16 DIAGNOSIS — I82.551 CHRONIC DEEP VEIN THROMBOSIS (DVT) OF RIGHT PERONEAL VEIN: Primary | ICD-10-CM

## 2020-12-16 DIAGNOSIS — Z79.01 ANTICOAGULATED: ICD-10-CM

## 2020-12-16 PROBLEM — D50.9 IRON DEFICIENCY ANEMIA: Status: ACTIVE | Noted: 2020-12-16

## 2020-12-16 PROCEDURE — 99999 PR PBB SHADOW E&M-EST. PATIENT-LVL V: ICD-10-PCS | Mod: PBBFAC,,, | Performed by: PHYSICIAN ASSISTANT

## 2020-12-16 PROCEDURE — 99213 PR OFFICE/OUTPT VISIT, EST, LEVL III, 20-29 MIN: ICD-10-PCS | Mod: S$GLB,,, | Performed by: PHYSICIAN ASSISTANT

## 2020-12-16 PROCEDURE — 99213 OFFICE O/P EST LOW 20 MIN: CPT | Mod: S$GLB,,, | Performed by: PHYSICIAN ASSISTANT

## 2020-12-16 PROCEDURE — 99999 PR PBB SHADOW E&M-EST. PATIENT-LVL V: CPT | Mod: PBBFAC,,, | Performed by: PHYSICIAN ASSISTANT

## 2020-12-16 RX ORDER — DIPHENHYDRAMINE HYDROCHLORIDE 50 MG/ML
50 INJECTION INTRAMUSCULAR; INTRAVENOUS ONCE AS NEEDED
Status: CANCELLED | OUTPATIENT
Start: 2020-12-16

## 2020-12-16 RX ORDER — SODIUM CHLORIDE 0.9 % (FLUSH) 0.9 %
10 SYRINGE (ML) INJECTION
Status: CANCELLED | OUTPATIENT
Start: 2020-12-16

## 2020-12-16 RX ORDER — SODIUM CHLORIDE 9 MG/ML
INJECTION, SOLUTION INTRAVENOUS CONTINUOUS
Status: CANCELLED | OUTPATIENT
Start: 2020-12-16

## 2020-12-16 RX ORDER — EPINEPHRINE 0.3 MG/.3ML
0.3 INJECTION SUBCUTANEOUS ONCE AS NEEDED
Status: CANCELLED | OUTPATIENT
Start: 2020-12-16

## 2020-12-16 RX ORDER — HEPARIN 100 UNIT/ML
5 SYRINGE INTRAVENOUS
Status: CANCELLED | OUTPATIENT
Start: 2020-12-16

## 2020-12-16 RX ORDER — METHYLPREDNISOLONE SOD SUCC 125 MG
125 VIAL (EA) INJECTION ONCE AS NEEDED
Status: CANCELLED | OUTPATIENT
Start: 2020-12-16

## 2020-12-16 NOTE — Clinical Note
Pt is to follow up with Dr. Rabia Pina in 1 week with CBC, CMP, and anti-XA and injectafer clearance. Please have injectafer authorized for patient as she has SALMA due to blood loss s/p CABG 12/02/2020. Orders have been placed.

## 2020-12-16 NOTE — TELEPHONE ENCOUNTER
----- Message from Og Morrell sent at 12/16/2020 11:55 AM CST -----  Type:  Sooner Apoointment Request    Caller is requesting a sooner appointment.  Caller declined first available appointment listed below.  Caller will not accept being placed on the waitlist and is requesting a message be sent to doctor.    Name of Caller: Patient  When is the first available appointment?  Patient needs to reschedule today's appointment due to Covid testing and soonest is 03/03/21  Symptoms:  Post Op  Best Call Back Number:  [phone]  Additional Information:

## 2020-12-16 NOTE — Clinical Note
Pt sent to [place] urgent care for covid19 rapid testing. Please ensure pt has testing completed prior to follow up appointment with Dr. Pina. Thank you!

## 2020-12-17 ENCOUNTER — PATIENT MESSAGE (OUTPATIENT)
Dept: HEMATOLOGY/ONCOLOGY | Facility: CLINIC | Age: 40
End: 2020-12-17

## 2020-12-18 ENCOUNTER — PATIENT MESSAGE (OUTPATIENT)
Dept: VASCULAR SURGERY | Facility: CLINIC | Age: 40
End: 2020-12-18

## 2020-12-18 NOTE — TELEPHONE ENCOUNTER
Patient is already on Doxycycline. Advised he to shower with antibacterial soap rinse under shower and pat dry. Keep open to air. Appointment 12/23 and call if any questions or concerns before then.

## 2020-12-21 ENCOUNTER — TELEPHONE (OUTPATIENT)
Dept: CARDIAC REHAB | Facility: HOSPITAL | Age: 40
End: 2020-12-21

## 2020-12-23 ENCOUNTER — OFFICE VISIT (OUTPATIENT)
Dept: VASCULAR SURGERY | Facility: CLINIC | Age: 40
End: 2020-12-23
Payer: COMMERCIAL

## 2020-12-23 ENCOUNTER — PATIENT MESSAGE (OUTPATIENT)
Dept: HEMATOLOGY/ONCOLOGY | Facility: CLINIC | Age: 40
End: 2020-12-23

## 2020-12-23 VITALS
HEIGHT: 72 IN | HEART RATE: 61 BPM | BODY MASS INDEX: 29.63 KG/M2 | DIASTOLIC BLOOD PRESSURE: 62 MMHG | SYSTOLIC BLOOD PRESSURE: 97 MMHG

## 2020-12-23 DIAGNOSIS — I25.110 ATHEROSCLEROSIS OF NATIVE CORONARY ARTERY OF NATIVE HEART WITH UNSTABLE ANGINA PECTORIS: Primary | ICD-10-CM

## 2020-12-23 PROCEDURE — 99024 PR POST-OP FOLLOW-UP VISIT: ICD-10-PCS | Mod: S$GLB,,, | Performed by: THORACIC SURGERY (CARDIOTHORACIC VASCULAR SURGERY)

## 2020-12-23 PROCEDURE — 3008F BODY MASS INDEX DOCD: CPT | Mod: CPTII,S$GLB,, | Performed by: THORACIC SURGERY (CARDIOTHORACIC VASCULAR SURGERY)

## 2020-12-23 PROCEDURE — 1125F PR PAIN SEVERITY QUANTIFIED, PAIN PRESENT: ICD-10-PCS | Mod: S$GLB,,, | Performed by: THORACIC SURGERY (CARDIOTHORACIC VASCULAR SURGERY)

## 2020-12-23 PROCEDURE — 3008F PR BODY MASS INDEX (BMI) DOCUMENTED: ICD-10-PCS | Mod: CPTII,S$GLB,, | Performed by: THORACIC SURGERY (CARDIOTHORACIC VASCULAR SURGERY)

## 2020-12-23 PROCEDURE — 99999 PR PBB SHADOW E&M-EST. PATIENT-LVL IV: CPT | Mod: PBBFAC,,, | Performed by: THORACIC SURGERY (CARDIOTHORACIC VASCULAR SURGERY)

## 2020-12-23 PROCEDURE — 99999 PR PBB SHADOW E&M-EST. PATIENT-LVL IV: ICD-10-PCS | Mod: PBBFAC,,, | Performed by: THORACIC SURGERY (CARDIOTHORACIC VASCULAR SURGERY)

## 2020-12-23 PROCEDURE — 99024 POSTOP FOLLOW-UP VISIT: CPT | Mod: S$GLB,,, | Performed by: THORACIC SURGERY (CARDIOTHORACIC VASCULAR SURGERY)

## 2020-12-23 PROCEDURE — 1125F AMNT PAIN NOTED PAIN PRSNT: CPT | Mod: S$GLB,,, | Performed by: THORACIC SURGERY (CARDIOTHORACIC VASCULAR SURGERY)

## 2020-12-23 RX ORDER — METHYLPREDNISOLONE 4 MG/1
TABLET ORAL
Status: ON HOLD | COMMUNITY
Start: 2020-12-18 | End: 2020-12-28 | Stop reason: HOSPADM

## 2020-12-23 RX ORDER — NYSTATIN 100000 [USP'U]/ML
500000 SUSPENSION ORAL
Status: ON HOLD | COMMUNITY
Start: 2020-12-21 | End: 2020-12-28 | Stop reason: HOSPADM

## 2020-12-23 RX ORDER — ZINC GLUCONATE 50 MG
50 TABLET ORAL DAILY
COMMUNITY

## 2020-12-23 RX ORDER — CODEINE PHOSPHATE AND GUAIFENESIN 10; 100 MG/5ML; MG/5ML
SOLUTION ORAL
COMMUNITY
Start: 2020-12-18 | End: 2021-03-23

## 2020-12-23 RX ORDER — CETIRIZINE HYDROCHLORIDE 10 MG/1
TABLET ORAL
COMMUNITY
Start: 2020-12-18

## 2020-12-23 RX ORDER — FAMOTIDINE 20 MG/1
20 TABLET, FILM COATED ORAL 2 TIMES DAILY
COMMUNITY
End: 2021-01-25

## 2020-12-23 NOTE — PROGRESS NOTES
This patient is status post emergency coronary artery bypass grafting on the 2nd December 2020.  She comes back to the office today in follow-up.  She was concerned about the lower aspect of her sternotomy incision which has slightly  in 2 locations.  Associated with this she has slight erythema.  She has been prescribed doxycycline.  Medicines are noted and are part of the epic record.  Her problem list was reviewed.  On exam vital signs are stable.  Her sternotomy incision is intact but she does have slight separation of the skin at the distal 3rd of the incision.  Associated with this there is eschar noted but no significant drainage.  She has slight erythema on the skin at its site of separation inferiorly.  The wound was cleaned in office today and dressed.  Patient was given instructions on wound care.  She will remain on doxycycline.  If the wound is not improved over the next few days she will call for follow-up appointment.

## 2020-12-26 ENCOUNTER — HOSPITAL ENCOUNTER (OUTPATIENT)
Facility: HOSPITAL | Age: 40
Discharge: HOME | End: 2020-12-28
Attending: EMERGENCY MEDICINE | Admitting: INTERNAL MEDICINE
Payer: COMMERCIAL

## 2020-12-26 DIAGNOSIS — M25.512 ACUTE PAIN OF LEFT SHOULDER: ICD-10-CM

## 2020-12-26 DIAGNOSIS — T81.31XA DEHISCENCE OF OPERATIVE WOUND, INITIAL ENCOUNTER: ICD-10-CM

## 2020-12-26 DIAGNOSIS — R07.9 CHEST PAIN: Primary | ICD-10-CM

## 2020-12-26 DIAGNOSIS — D50.9 IRON DEFICIENCY ANEMIA, UNSPECIFIED IRON DEFICIENCY ANEMIA TYPE: ICD-10-CM

## 2020-12-26 LAB
ALBUMIN SERPL BCP-MCNC: 3.5 G/DL (ref 3.5–5.2)
ALP SERPL-CCNC: 83 U/L (ref 55–135)
ALT SERPL W/O P-5'-P-CCNC: 23 U/L (ref 10–44)
ANION GAP SERPL CALC-SCNC: 9 MMOL/L (ref 8–16)
AST SERPL-CCNC: 27 U/L (ref 10–40)
BASOPHILS # BLD AUTO: 0.03 K/UL (ref 0–0.2)
BASOPHILS NFR BLD: 0.4 % (ref 0–1.9)
BILIRUB SERPL-MCNC: 0.6 MG/DL (ref 0.1–1)
BNP SERPL-MCNC: 188 PG/ML (ref 0–99)
BUN SERPL-MCNC: 5 MG/DL (ref 6–20)
CALCIUM SERPL-MCNC: 8.8 MG/DL (ref 8.7–10.5)
CHLORIDE SERPL-SCNC: 104 MMOL/L (ref 95–110)
CO2 SERPL-SCNC: 25 MMOL/L (ref 23–29)
CREAT SERPL-MCNC: 0.6 MG/DL (ref 0.5–1.4)
DIFFERENTIAL METHOD: ABNORMAL
EOSINOPHIL # BLD AUTO: 0.3 K/UL (ref 0–0.5)
EOSINOPHIL NFR BLD: 3.3 % (ref 0–8)
ERYTHROCYTE [DISTWIDTH] IN BLOOD BY AUTOMATED COUNT: 16.2 % (ref 11.5–14.5)
EST. GFR  (AFRICAN AMERICAN): >60 ML/MIN/1.73 M^2
EST. GFR  (NON AFRICAN AMERICAN): >60 ML/MIN/1.73 M^2
GLUCOSE SERPL-MCNC: 91 MG/DL (ref 70–110)
HCT VFR BLD AUTO: 34.8 % (ref 37–48.5)
HGB BLD-MCNC: 10.8 G/DL (ref 12–16)
IMM GRANULOCYTES # BLD AUTO: 0.05 K/UL (ref 0–0.04)
IMM GRANULOCYTES NFR BLD AUTO: 0.6 % (ref 0–0.5)
INR PPP: 1.2
LACTATE SERPL-SCNC: 1 MMOL/L (ref 0.5–1.9)
LYMPHOCYTES # BLD AUTO: 2.5 K/UL (ref 1–4.8)
LYMPHOCYTES NFR BLD: 31.2 % (ref 18–48)
MCH RBC QN AUTO: 27.3 PG (ref 27–31)
MCHC RBC AUTO-ENTMCNC: 31 G/DL (ref 32–36)
MCV RBC AUTO: 88 FL (ref 82–98)
MONOCYTES # BLD AUTO: 0.8 K/UL (ref 0.3–1)
MONOCYTES NFR BLD: 9.9 % (ref 4–15)
NEUTROPHILS # BLD AUTO: 4.5 K/UL (ref 1.8–7.7)
NEUTROPHILS NFR BLD: 54.6 % (ref 38–73)
NRBC BLD-RTO: 0 /100 WBC
PLATELET # BLD AUTO: 385 K/UL (ref 150–350)
PMV BLD AUTO: 10 FL (ref 9.2–12.9)
POTASSIUM SERPL-SCNC: 3.4 MMOL/L (ref 3.5–5.1)
PROT SERPL-MCNC: 7.3 G/DL (ref 6–8.4)
PROTHROMBIN TIME: 14.6 SEC (ref 10.6–14.8)
RBC # BLD AUTO: 3.95 M/UL (ref 4–5.4)
SODIUM SERPL-SCNC: 138 MMOL/L (ref 136–145)
TROPONIN I SERPL DL<=0.01 NG/ML-MCNC: <0.03 NG/ML
WBC # BLD AUTO: 8.15 K/UL (ref 3.9–12.7)

## 2020-12-26 PROCEDURE — 93005 ELECTROCARDIOGRAM TRACING: CPT | Performed by: SPECIALIST

## 2020-12-26 PROCEDURE — 84484 ASSAY OF TROPONIN QUANT: CPT

## 2020-12-26 PROCEDURE — 99285 EMERGENCY DEPT VISIT HI MDM: CPT | Mod: 25

## 2020-12-26 PROCEDURE — 96361 HYDRATE IV INFUSION ADD-ON: CPT

## 2020-12-26 PROCEDURE — 93010 EKG 12-LEAD: ICD-10-PCS | Mod: ,,, | Performed by: SPECIALIST

## 2020-12-26 PROCEDURE — 36415 COLL VENOUS BLD VENIPUNCTURE: CPT

## 2020-12-26 PROCEDURE — 63600175 PHARM REV CODE 636 W HCPCS: Performed by: EMERGENCY MEDICINE

## 2020-12-26 PROCEDURE — 83605 ASSAY OF LACTIC ACID: CPT

## 2020-12-26 PROCEDURE — 93010 ELECTROCARDIOGRAM REPORT: CPT | Mod: ,,, | Performed by: SPECIALIST

## 2020-12-26 PROCEDURE — 83880 ASSAY OF NATRIURETIC PEPTIDE: CPT

## 2020-12-26 PROCEDURE — 85025 COMPLETE CBC W/AUTO DIFF WBC: CPT

## 2020-12-26 PROCEDURE — 25500020 PHARM REV CODE 255: Performed by: EMERGENCY MEDICINE

## 2020-12-26 PROCEDURE — 87040 BLOOD CULTURE FOR BACTERIA: CPT

## 2020-12-26 PROCEDURE — 80053 COMPREHEN METABOLIC PANEL: CPT

## 2020-12-26 PROCEDURE — 25000003 PHARM REV CODE 250: Performed by: EMERGENCY MEDICINE

## 2020-12-26 PROCEDURE — 96375 TX/PRO/DX INJ NEW DRUG ADDON: CPT

## 2020-12-26 PROCEDURE — 96374 THER/PROPH/DIAG INJ IV PUSH: CPT

## 2020-12-26 PROCEDURE — 85610 PROTHROMBIN TIME: CPT

## 2020-12-26 RX ORDER — DIPHENHYDRAMINE HYDROCHLORIDE 50 MG/ML
25 INJECTION INTRAMUSCULAR; INTRAVENOUS
Status: COMPLETED | OUTPATIENT
Start: 2020-12-26 | End: 2020-12-26

## 2020-12-26 RX ORDER — METHYLPREDNISOLONE SOD SUCC 125 MG
125 VIAL (EA) INJECTION
Status: COMPLETED | OUTPATIENT
Start: 2020-12-26 | End: 2020-12-26

## 2020-12-26 RX ORDER — MORPHINE SULFATE 2 MG/ML
2 INJECTION, SOLUTION INTRAMUSCULAR; INTRAVENOUS
Status: COMPLETED | OUTPATIENT
Start: 2020-12-26 | End: 2020-12-26

## 2020-12-26 RX ADMIN — DIPHENHYDRAMINE HYDROCHLORIDE 25 MG: 50 INJECTION INTRAMUSCULAR; INTRAVENOUS at 09:12

## 2020-12-26 RX ADMIN — METHYLPREDNISOLONE SODIUM SUCCINATE 125 MG: 125 INJECTION, POWDER, FOR SOLUTION INTRAMUSCULAR; INTRAVENOUS at 09:12

## 2020-12-26 RX ADMIN — MORPHINE SULFATE 2 MG: 2 INJECTION, SOLUTION INTRAMUSCULAR; INTRAVENOUS at 11:12

## 2020-12-26 RX ADMIN — SODIUM CHLORIDE 500 ML: 0.9 INJECTION, SOLUTION INTRAVENOUS at 09:12

## 2020-12-26 RX ADMIN — IOHEXOL 100 ML: 350 INJECTION, SOLUTION INTRAVENOUS at 10:12

## 2020-12-27 PROBLEM — Z20.822 COUGH WITH EXPOSURE TO COVID-19 VIRUS: Status: ACTIVE | Noted: 2020-12-27

## 2020-12-27 PROBLEM — Z86.16 HISTORY OF 2019 NOVEL CORONAVIRUS DISEASE (COVID-19): Status: ACTIVE | Noted: 2020-12-27

## 2020-12-27 PROBLEM — R05.8 COUGH WITH EXPOSURE TO COVID-19 VIRUS: Status: ACTIVE | Noted: 2020-12-27

## 2020-12-27 PROBLEM — M25.512 ACUTE PAIN OF LEFT SHOULDER: Status: ACTIVE | Noted: 2020-12-27

## 2020-12-27 PROBLEM — T81.31XA SURGICAL WOUND BREAKDOWN: Status: ACTIVE | Noted: 2020-12-27

## 2020-12-27 LAB
ALBUMIN SERPL BCP-MCNC: 3.3 G/DL (ref 3.5–5.2)
ALP SERPL-CCNC: 79 U/L (ref 55–135)
ALT SERPL W/O P-5'-P-CCNC: 20 U/L (ref 10–44)
ANION GAP SERPL CALC-SCNC: 13 MMOL/L (ref 8–16)
AST SERPL-CCNC: 24 U/L (ref 10–40)
BILIRUB SERPL-MCNC: 0.5 MG/DL (ref 0.1–1)
BUN SERPL-MCNC: <5 MG/DL (ref 6–20)
CALCIUM SERPL-MCNC: 8.8 MG/DL (ref 8.7–10.5)
CHLORIDE SERPL-SCNC: 104 MMOL/L (ref 95–110)
CO2 SERPL-SCNC: 21 MMOL/L (ref 23–29)
CREAT SERPL-MCNC: 0.5 MG/DL (ref 0.5–1.4)
EST. GFR  (AFRICAN AMERICAN): >60 ML/MIN/1.73 M^2
EST. GFR  (NON AFRICAN AMERICAN): >60 ML/MIN/1.73 M^2
GLUCOSE SERPL-MCNC: 199 MG/DL (ref 70–110)
IRON SERPL-MCNC: 17 UG/DL (ref 30–160)
MAGNESIUM SERPL-MCNC: 1.9 MG/DL (ref 1.6–2.6)
POTASSIUM SERPL-SCNC: 3.7 MMOL/L (ref 3.5–5.1)
PROT SERPL-MCNC: 7.1 G/DL (ref 6–8.4)
SARS-COV-2 RDRP RESP QL NAA+PROBE: NEGATIVE
SATURATED IRON: 7 % (ref 20–50)
SODIUM SERPL-SCNC: 138 MMOL/L (ref 136–145)
TOTAL IRON BINDING CAPACITY: 237 UG/DL (ref 250–450)
TRANSFERRIN SERPL-MCNC: 169 MG/DL (ref 200–375)
TROPONIN I SERPL DL<=0.01 NG/ML-MCNC: <0.03 NG/ML

## 2020-12-27 PROCEDURE — G0378 HOSPITAL OBSERVATION PER HR: HCPCS

## 2020-12-27 PROCEDURE — U0002 COVID-19 LAB TEST NON-CDC: HCPCS

## 2020-12-27 PROCEDURE — 84484 ASSAY OF TROPONIN QUANT: CPT

## 2020-12-27 PROCEDURE — 25000003 PHARM REV CODE 250: Performed by: STUDENT IN AN ORGANIZED HEALTH CARE EDUCATION/TRAINING PROGRAM

## 2020-12-27 PROCEDURE — 96376 TX/PRO/DX INJ SAME DRUG ADON: CPT

## 2020-12-27 PROCEDURE — 63600175 PHARM REV CODE 636 W HCPCS: Performed by: NURSE PRACTITIONER

## 2020-12-27 PROCEDURE — 63600175 PHARM REV CODE 636 W HCPCS: Performed by: INTERNAL MEDICINE

## 2020-12-27 PROCEDURE — 25000003 PHARM REV CODE 250: Performed by: NURSE PRACTITIONER

## 2020-12-27 PROCEDURE — 25000003 PHARM REV CODE 250: Performed by: INTERNAL MEDICINE

## 2020-12-27 PROCEDURE — 83735 ASSAY OF MAGNESIUM: CPT

## 2020-12-27 PROCEDURE — 96372 THER/PROPH/DIAG INJ SC/IM: CPT | Mod: 59

## 2020-12-27 PROCEDURE — 99900035 HC TECH TIME PER 15 MIN (STAT)

## 2020-12-27 PROCEDURE — 94761 N-INVAS EAR/PLS OXIMETRY MLT: CPT

## 2020-12-27 PROCEDURE — 83540 ASSAY OF IRON: CPT

## 2020-12-27 PROCEDURE — 80053 COMPREHEN METABOLIC PANEL: CPT

## 2020-12-27 PROCEDURE — 36415 COLL VENOUS BLD VENIPUNCTURE: CPT

## 2020-12-27 RX ORDER — METOPROLOL TARTRATE 25 MG/1
12.5 TABLET ORAL 2 TIMES DAILY
Status: DISCONTINUED | OUTPATIENT
Start: 2020-12-27 | End: 2020-12-28 | Stop reason: HOSPADM

## 2020-12-27 RX ORDER — ROSUVASTATIN CALCIUM 20 MG/1
40 TABLET, COATED ORAL DAILY
Status: DISCONTINUED | OUTPATIENT
Start: 2020-12-27 | End: 2020-12-27

## 2020-12-27 RX ORDER — NAPROXEN SODIUM 220 MG/1
81 TABLET, FILM COATED ORAL DAILY
Status: DISCONTINUED | OUTPATIENT
Start: 2020-12-28 | End: 2020-12-28 | Stop reason: HOSPADM

## 2020-12-27 RX ORDER — LORAZEPAM 0.5 MG/1
0.5 TABLET ORAL EVERY 12 HOURS PRN
Status: DISCONTINUED | OUTPATIENT
Start: 2020-12-27 | End: 2020-12-28 | Stop reason: HOSPADM

## 2020-12-27 RX ORDER — HYDROXYZINE PAMOATE 25 MG/1
25 CAPSULE ORAL EVERY 8 HOURS PRN
Status: DISCONTINUED | OUTPATIENT
Start: 2020-12-27 | End: 2020-12-28 | Stop reason: HOSPADM

## 2020-12-27 RX ORDER — ATORVASTATIN CALCIUM 40 MG/1
40 TABLET, FILM COATED ORAL NIGHTLY
Status: DISCONTINUED | OUTPATIENT
Start: 2020-12-27 | End: 2020-12-28 | Stop reason: HOSPADM

## 2020-12-27 RX ORDER — SODIUM CHLORIDE 9 MG/ML
INJECTION, SOLUTION INTRAVENOUS CONTINUOUS
Status: ACTIVE | OUTPATIENT
Start: 2020-12-27 | End: 2020-12-28

## 2020-12-27 RX ORDER — NITROGLYCERIN 0.4 MG/1
0.4 TABLET SUBLINGUAL EVERY 5 MIN PRN
Status: DISCONTINUED | OUTPATIENT
Start: 2020-12-27 | End: 2020-12-28 | Stop reason: HOSPADM

## 2020-12-27 RX ORDER — FLUOXETINE HYDROCHLORIDE 20 MG/1
40 CAPSULE ORAL 2 TIMES DAILY
Status: DISCONTINUED | OUTPATIENT
Start: 2020-12-27 | End: 2020-12-28 | Stop reason: HOSPADM

## 2020-12-27 RX ORDER — OXYCODONE HYDROCHLORIDE 5 MG/1
10 TABLET ORAL EVERY 6 HOURS PRN
Status: DISCONTINUED | OUTPATIENT
Start: 2020-12-27 | End: 2020-12-27

## 2020-12-27 RX ORDER — FERROUS SULFATE 325(65) MG
325 TABLET ORAL 2 TIMES DAILY
Status: DISCONTINUED | OUTPATIENT
Start: 2020-12-27 | End: 2020-12-28 | Stop reason: HOSPADM

## 2020-12-27 RX ORDER — NAPROXEN SODIUM 220 MG/1
324 TABLET, FILM COATED ORAL ONCE
Status: DISCONTINUED | OUTPATIENT
Start: 2020-12-27 | End: 2020-12-27

## 2020-12-27 RX ORDER — ZINC SULFATE 50(220)MG
220 CAPSULE ORAL DAILY
Status: DISCONTINUED | OUTPATIENT
Start: 2020-12-27 | End: 2020-12-28 | Stop reason: HOSPADM

## 2020-12-27 RX ORDER — BENZONATATE 100 MG/1
100 CAPSULE ORAL 3 TIMES DAILY PRN
Status: DISCONTINUED | OUTPATIENT
Start: 2020-12-27 | End: 2020-12-28 | Stop reason: HOSPADM

## 2020-12-27 RX ORDER — PROMETHAZINE HYDROCHLORIDE 25 MG/1
25 TABLET ORAL EVERY 6 HOURS PRN
COMMUNITY

## 2020-12-27 RX ORDER — TIZANIDINE 4 MG/1
4 TABLET ORAL EVERY 6 HOURS PRN
Status: DISCONTINUED | OUTPATIENT
Start: 2020-12-27 | End: 2020-12-28 | Stop reason: HOSPADM

## 2020-12-27 RX ORDER — ASCORBIC ACID 500 MG
500 TABLET ORAL 2 TIMES DAILY
Status: DISCONTINUED | OUTPATIENT
Start: 2020-12-27 | End: 2020-12-28 | Stop reason: HOSPADM

## 2020-12-27 RX ORDER — NITROGLYCERIN 0.4 MG/1
0.4 TABLET SUBLINGUAL EVERY 5 MIN PRN
Status: DISCONTINUED | OUTPATIENT
Start: 2020-12-27 | End: 2020-12-27

## 2020-12-27 RX ORDER — ENOXAPARIN SODIUM 100 MG/ML
150 INJECTION SUBCUTANEOUS DAILY
Status: DISCONTINUED | OUTPATIENT
Start: 2020-12-27 | End: 2020-12-28 | Stop reason: HOSPADM

## 2020-12-27 RX ORDER — MORPHINE SULFATE 2 MG/ML
2 INJECTION, SOLUTION INTRAMUSCULAR; INTRAVENOUS EVERY 4 HOURS PRN
Status: DISCONTINUED | OUTPATIENT
Start: 2020-12-27 | End: 2020-12-27

## 2020-12-27 RX ORDER — MORPHINE SULFATE 2 MG/ML
1 INJECTION, SOLUTION INTRAMUSCULAR; INTRAVENOUS
Status: COMPLETED | OUTPATIENT
Start: 2020-12-27 | End: 2020-12-27

## 2020-12-27 RX ORDER — FAMOTIDINE 20 MG/1
20 TABLET, FILM COATED ORAL 2 TIMES DAILY
Status: DISCONTINUED | OUTPATIENT
Start: 2020-12-27 | End: 2020-12-28 | Stop reason: HOSPADM

## 2020-12-27 RX ORDER — NAPROXEN SODIUM 220 MG/1
81 TABLET, FILM COATED ORAL DAILY
Status: DISCONTINUED | OUTPATIENT
Start: 2020-12-27 | End: 2020-12-27

## 2020-12-27 RX ORDER — TRAMADOL HYDROCHLORIDE 50 MG/1
50 TABLET ORAL EVERY 6 HOURS PRN
Status: DISCONTINUED | OUTPATIENT
Start: 2020-12-27 | End: 2020-12-28 | Stop reason: HOSPADM

## 2020-12-27 RX ADMIN — METOPROLOL TARTRATE 12.5 MG: 25 TABLET, FILM COATED ORAL at 08:12

## 2020-12-27 RX ADMIN — OXYCODONE HYDROCHLORIDE AND ACETAMINOPHEN 500 MG: 500 TABLET ORAL at 08:12

## 2020-12-27 RX ADMIN — TIZANIDINE 4 MG: 4 TABLET ORAL at 05:12

## 2020-12-27 RX ADMIN — ATORVASTATIN CALCIUM 40 MG: 40 TABLET, FILM COATED ORAL at 04:12

## 2020-12-27 RX ADMIN — SODIUM CHLORIDE 75 ML/HR: 0.9 INJECTION, SOLUTION INTRAVENOUS at 04:12

## 2020-12-27 RX ADMIN — ATORVASTATIN CALCIUM 40 MG: 40 TABLET, FILM COATED ORAL at 08:12

## 2020-12-27 RX ADMIN — LORAZEPAM 0.5 MG: 0.5 TABLET ORAL at 08:12

## 2020-12-27 RX ADMIN — TIZANIDINE 4 MG: 4 TABLET ORAL at 11:12

## 2020-12-27 RX ADMIN — FAMOTIDINE 20 MG: 20 TABLET ORAL at 08:12

## 2020-12-27 RX ADMIN — ZINC SULFATE 220 MG (50 MG) CAPSULE 220 MG: CAPSULE at 08:12

## 2020-12-27 RX ADMIN — TRAMADOL HYDROCHLORIDE 50 MG: 50 TABLET, FILM COATED ORAL at 05:12

## 2020-12-27 RX ADMIN — FERROUS SULFATE TAB 325 MG (65 MG ELEMENTAL FE) 325 MG: 325 (65 FE) TAB at 08:12

## 2020-12-27 RX ADMIN — ENOXAPARIN SODIUM 150 MG: 100 INJECTION SUBCUTANEOUS at 08:12

## 2020-12-27 RX ADMIN — OXYCODONE HYDROCHLORIDE 10 MG: 5 TABLET ORAL at 05:12

## 2020-12-27 RX ADMIN — MORPHINE SULFATE 1 MG: 2 INJECTION, SOLUTION INTRAMUSCULAR; INTRAVENOUS at 02:12

## 2020-12-27 RX ADMIN — FLUOXETINE 40 MG: 20 CAPSULE ORAL at 08:12

## 2020-12-27 RX ADMIN — OXYCODONE HYDROCHLORIDE 10 MG: 5 TABLET ORAL at 11:12

## 2020-12-28 VITALS
HEIGHT: 72 IN | WEIGHT: 210 LBS | SYSTOLIC BLOOD PRESSURE: 113 MMHG | TEMPERATURE: 99 F | RESPIRATION RATE: 31 BRPM | BODY MASS INDEX: 28.44 KG/M2 | OXYGEN SATURATION: 97 % | HEART RATE: 55 BPM | DIASTOLIC BLOOD PRESSURE: 59 MMHG

## 2020-12-28 PROBLEM — R07.9 CHEST PAIN: Status: RESOLVED | Noted: 2020-06-25 | Resolved: 2020-12-28

## 2020-12-28 LAB
ANION GAP SERPL CALC-SCNC: 4 MMOL/L (ref 8–16)
BUN SERPL-MCNC: 8 MG/DL (ref 6–20)
CALCIUM SERPL-MCNC: 8.4 MG/DL (ref 8.7–10.5)
CHLORIDE SERPL-SCNC: 108 MMOL/L (ref 95–110)
CO2 SERPL-SCNC: 26 MMOL/L (ref 23–29)
CREAT SERPL-MCNC: 0.5 MG/DL (ref 0.5–1.4)
ERYTHROCYTE [DISTWIDTH] IN BLOOD BY AUTOMATED COUNT: 16.5 % (ref 11.5–14.5)
EST. GFR  (AFRICAN AMERICAN): >60 ML/MIN/1.73 M^2
EST. GFR  (NON AFRICAN AMERICAN): >60 ML/MIN/1.73 M^2
GLUCOSE SERPL-MCNC: 99 MG/DL (ref 70–110)
HCT VFR BLD AUTO: 31.6 % (ref 37–48.5)
HGB BLD-MCNC: 9.9 G/DL (ref 12–16)
MAGNESIUM SERPL-MCNC: 1.9 MG/DL (ref 1.6–2.6)
MCH RBC QN AUTO: 28 PG (ref 27–31)
MCHC RBC AUTO-ENTMCNC: 31.3 G/DL (ref 32–36)
MCV RBC AUTO: 89 FL (ref 82–98)
PLATELET # BLD AUTO: 307 K/UL (ref 150–350)
PMV BLD AUTO: 10.3 FL (ref 9.2–12.9)
POTASSIUM SERPL-SCNC: 3.4 MMOL/L (ref 3.5–5.1)
RBC # BLD AUTO: 3.54 M/UL (ref 4–5.4)
SODIUM SERPL-SCNC: 138 MMOL/L (ref 136–145)
WBC # BLD AUTO: 7.48 K/UL (ref 3.9–12.7)

## 2020-12-28 PROCEDURE — 96375 TX/PRO/DX INJ NEW DRUG ADDON: CPT

## 2020-12-28 PROCEDURE — 97535 SELF CARE MNGMENT TRAINING: CPT | Mod: 59

## 2020-12-28 PROCEDURE — 87070 CULTURE OTHR SPECIMN AEROBIC: CPT

## 2020-12-28 PROCEDURE — 63600175 PHARM REV CODE 636 W HCPCS: Performed by: NURSE PRACTITIONER

## 2020-12-28 PROCEDURE — 97165 OT EVAL LOW COMPLEX 30 MIN: CPT

## 2020-12-28 PROCEDURE — 63600175 PHARM REV CODE 636 W HCPCS: Performed by: STUDENT IN AN ORGANIZED HEALTH CARE EDUCATION/TRAINING PROGRAM

## 2020-12-28 PROCEDURE — 25000003 PHARM REV CODE 250: Performed by: STUDENT IN AN ORGANIZED HEALTH CARE EDUCATION/TRAINING PROGRAM

## 2020-12-28 PROCEDURE — 83735 ASSAY OF MAGNESIUM: CPT

## 2020-12-28 PROCEDURE — 97530 THERAPEUTIC ACTIVITIES: CPT

## 2020-12-28 PROCEDURE — 36415 COLL VENOUS BLD VENIPUNCTURE: CPT

## 2020-12-28 PROCEDURE — 25000003 PHARM REV CODE 250: Performed by: INTERNAL MEDICINE

## 2020-12-28 PROCEDURE — 96372 THER/PROPH/DIAG INJ SC/IM: CPT | Mod: 59

## 2020-12-28 PROCEDURE — 97162 PT EVAL MOD COMPLEX 30 MIN: CPT

## 2020-12-28 PROCEDURE — 87147 CULTURE TYPE IMMUNOLOGIC: CPT

## 2020-12-28 PROCEDURE — 80048 BASIC METABOLIC PNL TOTAL CA: CPT

## 2020-12-28 PROCEDURE — 85027 COMPLETE CBC AUTOMATED: CPT

## 2020-12-28 PROCEDURE — G0378 HOSPITAL OBSERVATION PER HR: HCPCS

## 2020-12-28 PROCEDURE — 25000003 PHARM REV CODE 250: Performed by: NURSE PRACTITIONER

## 2020-12-28 RX ORDER — POTASSIUM CHLORIDE 20 MEQ/1
20 TABLET, EXTENDED RELEASE ORAL
Status: DISCONTINUED | OUTPATIENT
Start: 2020-12-28 | End: 2020-12-28 | Stop reason: HOSPADM

## 2020-12-28 RX ORDER — DOXYCYCLINE 100 MG/1
100 CAPSULE ORAL 2 TIMES DAILY
Qty: 20 CAPSULE | Refills: 0 | Status: SHIPPED | OUTPATIENT
Start: 2020-12-28 | End: 2021-01-06 | Stop reason: SDUPTHER

## 2020-12-28 RX ORDER — MAGNESIUM SULFATE HEPTAHYDRATE 40 MG/ML
4 INJECTION, SOLUTION INTRAVENOUS
Status: DISCONTINUED | OUTPATIENT
Start: 2020-12-28 | End: 2020-12-28 | Stop reason: HOSPADM

## 2020-12-28 RX ORDER — POTASSIUM CHLORIDE 20 MEQ/1
40 TABLET, EXTENDED RELEASE ORAL
Status: DISCONTINUED | OUTPATIENT
Start: 2020-12-28 | End: 2020-12-28 | Stop reason: HOSPADM

## 2020-12-28 RX ORDER — POTASSIUM CHLORIDE 7.45 MG/ML
40 INJECTION INTRAVENOUS
Status: DISCONTINUED | OUTPATIENT
Start: 2020-12-28 | End: 2020-12-28 | Stop reason: HOSPADM

## 2020-12-28 RX ORDER — LANOLIN ALCOHOL/MO/W.PET/CERES
800 CREAM (GRAM) TOPICAL
Status: DISCONTINUED | OUTPATIENT
Start: 2020-12-28 | End: 2020-12-28 | Stop reason: HOSPADM

## 2020-12-28 RX ORDER — FERROUS SULFATE 325(65) MG
325 TABLET ORAL DAILY
Qty: 30 TABLET | Refills: 2 | Status: SHIPPED | OUTPATIENT
Start: 2020-12-28

## 2020-12-28 RX ORDER — MAGNESIUM SULFATE HEPTAHYDRATE 40 MG/ML
2 INJECTION, SOLUTION INTRAVENOUS
Status: DISCONTINUED | OUTPATIENT
Start: 2020-12-28 | End: 2020-12-28 | Stop reason: HOSPADM

## 2020-12-28 RX ORDER — POTASSIUM CHLORIDE 7.45 MG/ML
20 INJECTION INTRAVENOUS
Status: DISCONTINUED | OUTPATIENT
Start: 2020-12-28 | End: 2020-12-28 | Stop reason: HOSPADM

## 2020-12-28 RX ORDER — CALCIUM CHLORIDE IN 0.9 % NACL 1 G/100 ML
1 INTRAVENOUS SOLUTION, PIGGYBACK (ML) INTRAVENOUS
Status: DISCONTINUED | OUTPATIENT
Start: 2020-12-28 | End: 2020-12-28 | Stop reason: HOSPADM

## 2020-12-28 RX ORDER — MAGNESIUM SULFATE 1 G/100ML
1 INJECTION INTRAVENOUS
Status: DISCONTINUED | OUTPATIENT
Start: 2020-12-28 | End: 2020-12-28 | Stop reason: HOSPADM

## 2020-12-28 RX ORDER — ONDANSETRON 2 MG/ML
4 INJECTION INTRAMUSCULAR; INTRAVENOUS EVERY 6 HOURS PRN
Status: DISCONTINUED | OUTPATIENT
Start: 2020-12-28 | End: 2020-12-28 | Stop reason: HOSPADM

## 2020-12-28 RX ORDER — OXYCODONE HYDROCHLORIDE 5 MG/1
10 TABLET ORAL EVERY 6 HOURS PRN
Status: DISCONTINUED | OUTPATIENT
Start: 2020-12-28 | End: 2020-12-28 | Stop reason: HOSPADM

## 2020-12-28 RX ORDER — DOXYCYCLINE 100 MG/1
100 CAPSULE ORAL EVERY 12 HOURS
Status: DISCONTINUED | OUTPATIENT
Start: 2020-12-28 | End: 2020-12-28 | Stop reason: HOSPADM

## 2020-12-28 RX ADMIN — ASPIRIN 81 MG: 81 TABLET, CHEWABLE ORAL at 09:12

## 2020-12-28 RX ADMIN — SODIUM CHLORIDE 125 MG: 9 INJECTION, SOLUTION INTRAVENOUS at 02:12

## 2020-12-28 RX ADMIN — TIZANIDINE 4 MG: 4 TABLET ORAL at 06:12

## 2020-12-28 RX ADMIN — ENOXAPARIN SODIUM 150 MG: 100 INJECTION SUBCUTANEOUS at 09:12

## 2020-12-28 RX ADMIN — ZINC SULFATE 220 MG (50 MG) CAPSULE 220 MG: CAPSULE at 09:12

## 2020-12-28 RX ADMIN — TRAMADOL HYDROCHLORIDE 50 MG: 50 TABLET, FILM COATED ORAL at 06:12

## 2020-12-28 RX ADMIN — FLUOXETINE 40 MG: 20 CAPSULE ORAL at 09:12

## 2020-12-28 RX ADMIN — POTASSIUM CHLORIDE 40 MEQ: 20 TABLET, EXTENDED RELEASE ORAL at 11:12

## 2020-12-28 RX ADMIN — TIZANIDINE 4 MG: 4 TABLET ORAL at 12:12

## 2020-12-28 RX ADMIN — FAMOTIDINE 20 MG: 20 TABLET ORAL at 09:12

## 2020-12-28 RX ADMIN — ONDANSETRON 4 MG: 2 INJECTION INTRAMUSCULAR; INTRAVENOUS at 10:12

## 2020-12-28 RX ADMIN — FERROUS SULFATE TAB 325 MG (65 MG ELEMENTAL FE) 325 MG: 325 (65 FE) TAB at 09:12

## 2020-12-28 RX ADMIN — OXYCODONE HYDROCHLORIDE AND ACETAMINOPHEN 500 MG: 500 TABLET ORAL at 09:12

## 2020-12-28 RX ADMIN — TRAMADOL HYDROCHLORIDE 50 MG: 50 TABLET, FILM COATED ORAL at 12:12

## 2020-12-28 RX ADMIN — OXYCODONE HYDROCHLORIDE 10 MG: 5 TABLET ORAL at 10:12

## 2020-12-31 LAB
BACTERIA BLD CULT: NORMAL
BACTERIA BLD CULT: NORMAL
BACTERIA SPEC AEROBE CULT: NORMAL

## 2021-01-04 ENCOUNTER — PATIENT MESSAGE (OUTPATIENT)
Dept: VASCULAR SURGERY | Facility: CLINIC | Age: 41
End: 2021-01-04

## 2021-01-04 ENCOUNTER — PATIENT MESSAGE (OUTPATIENT)
Dept: HEMATOLOGY/ONCOLOGY | Facility: CLINIC | Age: 41
End: 2021-01-04

## 2021-01-04 ENCOUNTER — LAB VISIT (OUTPATIENT)
Dept: LAB | Facility: HOSPITAL | Age: 41
End: 2021-01-04
Attending: INTERNAL MEDICINE
Payer: COMMERCIAL

## 2021-01-04 ENCOUNTER — TELEPHONE (OUTPATIENT)
Dept: HEMATOLOGY/ONCOLOGY | Facility: CLINIC | Age: 41
End: 2021-01-04

## 2021-01-04 ENCOUNTER — OFFICE VISIT (OUTPATIENT)
Dept: HEMATOLOGY/ONCOLOGY | Facility: CLINIC | Age: 41
End: 2021-01-04
Payer: COMMERCIAL

## 2021-01-04 VITALS
HEART RATE: 64 BPM | BODY MASS INDEX: 29.26 KG/M2 | SYSTOLIC BLOOD PRESSURE: 113 MMHG | HEIGHT: 72 IN | WEIGHT: 216.06 LBS | RESPIRATION RATE: 18 BRPM | OXYGEN SATURATION: 97 % | DIASTOLIC BLOOD PRESSURE: 60 MMHG | TEMPERATURE: 98 F

## 2021-01-04 DIAGNOSIS — Z51.81 MONITORING FOR LONG-TERM ANTICOAGULANT USE: ICD-10-CM

## 2021-01-04 DIAGNOSIS — Z86.718 HISTORY OF RECURRENT DEEP VEIN THROMBOSIS (DVT): Primary | ICD-10-CM

## 2021-01-04 DIAGNOSIS — Z79.01 MONITORING FOR LONG-TERM ANTICOAGULANT USE: ICD-10-CM

## 2021-01-04 DIAGNOSIS — D50.9 IRON DEFICIENCY ANEMIA, UNSPECIFIED IRON DEFICIENCY ANEMIA TYPE: ICD-10-CM

## 2021-01-04 DIAGNOSIS — Z79.899 ON STATIN THERAPY: ICD-10-CM

## 2021-01-04 DIAGNOSIS — I82.551 CHRONIC DEEP VEIN THROMBOSIS (DVT) OF RIGHT PERONEAL VEIN: ICD-10-CM

## 2021-01-04 DIAGNOSIS — Z86.711 HISTORY OF PULMONARY EMBOLISM: ICD-10-CM

## 2021-01-04 LAB
BASOPHILS # BLD AUTO: 0.04 K/UL (ref 0–0.2)
BASOPHILS NFR BLD: 0.5 % (ref 0–1.9)
DIFFERENTIAL METHOD: ABNORMAL
EOSINOPHIL # BLD AUTO: 0.2 K/UL (ref 0–0.5)
EOSINOPHIL NFR BLD: 2.8 % (ref 0–8)
ERYTHROCYTE [DISTWIDTH] IN BLOOD BY AUTOMATED COUNT: 17.2 % (ref 11.5–14.5)
HCT VFR BLD AUTO: 39.3 % (ref 37–48.5)
HGB BLD-MCNC: 11.8 G/DL (ref 12–16)
IMM GRANULOCYTES # BLD AUTO: 0.04 K/UL (ref 0–0.04)
IMM GRANULOCYTES NFR BLD AUTO: 0.5 % (ref 0–0.5)
LYMPHOCYTES # BLD AUTO: 2.6 K/UL (ref 1–4.8)
LYMPHOCYTES NFR BLD: 29.6 % (ref 18–48)
MCH RBC QN AUTO: 27.6 PG (ref 27–31)
MCHC RBC AUTO-ENTMCNC: 30 G/DL (ref 32–36)
MCV RBC AUTO: 92 FL (ref 82–98)
MONOCYTES # BLD AUTO: 0.8 K/UL (ref 0.3–1)
MONOCYTES NFR BLD: 9 % (ref 4–15)
NEUTROPHILS # BLD AUTO: 5 K/UL (ref 1.8–7.7)
NEUTROPHILS NFR BLD: 57.6 % (ref 38–73)
NRBC BLD-RTO: 0 /100 WBC
PLATELET # BLD AUTO: 320 K/UL (ref 150–350)
PMV BLD AUTO: 10.6 FL (ref 9.2–12.9)
RBC # BLD AUTO: 4.28 M/UL (ref 4–5.4)
WBC # BLD AUTO: 8.71 K/UL (ref 3.9–12.7)

## 2021-01-04 PROCEDURE — 3008F BODY MASS INDEX DOCD: CPT | Mod: CPTII,S$GLB,, | Performed by: INTERNAL MEDICINE

## 2021-01-04 PROCEDURE — 99214 PR OFFICE/OUTPT VISIT, EST, LEVL IV, 30-39 MIN: ICD-10-PCS | Mod: S$GLB,,, | Performed by: INTERNAL MEDICINE

## 2021-01-04 PROCEDURE — 99214 OFFICE O/P EST MOD 30 MIN: CPT | Mod: S$GLB,,, | Performed by: INTERNAL MEDICINE

## 2021-01-04 PROCEDURE — 83540 ASSAY OF IRON: CPT

## 2021-01-04 PROCEDURE — 85025 COMPLETE CBC W/AUTO DIFF WBC: CPT

## 2021-01-04 PROCEDURE — 36415 COLL VENOUS BLD VENIPUNCTURE: CPT

## 2021-01-04 PROCEDURE — 1125F PR PAIN SEVERITY QUANTIFIED, PAIN PRESENT: ICD-10-PCS | Mod: S$GLB,,, | Performed by: INTERNAL MEDICINE

## 2021-01-04 PROCEDURE — 99999 PR PBB SHADOW E&M-EST. PATIENT-LVL IV: ICD-10-PCS | Mod: PBBFAC,,, | Performed by: INTERNAL MEDICINE

## 2021-01-04 PROCEDURE — 99999 PR PBB SHADOW E&M-EST. PATIENT-LVL IV: CPT | Mod: PBBFAC,,, | Performed by: INTERNAL MEDICINE

## 2021-01-04 PROCEDURE — 1125F AMNT PAIN NOTED PAIN PRSNT: CPT | Mod: S$GLB,,, | Performed by: INTERNAL MEDICINE

## 2021-01-04 PROCEDURE — 3008F PR BODY MASS INDEX (BMI) DOCUMENTED: ICD-10-PCS | Mod: CPTII,S$GLB,, | Performed by: INTERNAL MEDICINE

## 2021-01-04 RX ORDER — ENOXAPARIN SODIUM 150 MG/ML
150 INJECTION SUBCUTANEOUS DAILY
Qty: 30 SYRINGE | Refills: 1 | Status: CANCELLED | OUTPATIENT
Start: 2021-01-04

## 2021-01-05 DIAGNOSIS — I82.551 CHRONIC DEEP VEIN THROMBOSIS (DVT) OF RIGHT PERONEAL VEIN: ICD-10-CM

## 2021-01-05 DIAGNOSIS — D50.8 OTHER IRON DEFICIENCY ANEMIA: Primary | ICD-10-CM

## 2021-01-05 LAB
IRON SERPL-MCNC: 43 UG/DL (ref 30–160)
SATURATED IRON: 17 % (ref 20–50)
TOTAL IRON BINDING CAPACITY: 260 UG/DL (ref 250–450)
TRANSFERRIN SERPL-MCNC: 176 MG/DL (ref 200–375)

## 2021-01-05 RX ORDER — ENOXAPARIN SODIUM 150 MG/ML
150 INJECTION SUBCUTANEOUS DAILY
Qty: 30 SYRINGE | Refills: 1 | Status: SHIPPED | OUTPATIENT
Start: 2021-01-05 | End: 2021-01-29 | Stop reason: SDUPTHER

## 2021-01-06 ENCOUNTER — TELEPHONE (OUTPATIENT)
Dept: HEMATOLOGY/ONCOLOGY | Facility: CLINIC | Age: 41
End: 2021-01-06

## 2021-01-06 ENCOUNTER — OFFICE VISIT (OUTPATIENT)
Dept: VASCULAR SURGERY | Facility: CLINIC | Age: 41
End: 2021-01-06
Payer: COMMERCIAL

## 2021-01-06 ENCOUNTER — PATIENT MESSAGE (OUTPATIENT)
Dept: HEMATOLOGY/ONCOLOGY | Facility: CLINIC | Age: 41
End: 2021-01-06

## 2021-01-06 VITALS
BODY MASS INDEX: 29.3 KG/M2 | HEIGHT: 72 IN | DIASTOLIC BLOOD PRESSURE: 74 MMHG | SYSTOLIC BLOOD PRESSURE: 107 MMHG | HEART RATE: 80 BPM

## 2021-01-06 DIAGNOSIS — Z48.89 ENCOUNTER FOR POST SURGICAL WOUND CHECK: Primary | ICD-10-CM

## 2021-01-06 PROCEDURE — 99024 POSTOP FOLLOW-UP VISIT: CPT | Mod: S$GLB,,, | Performed by: THORACIC SURGERY (CARDIOTHORACIC VASCULAR SURGERY)

## 2021-01-06 PROCEDURE — 1125F PR PAIN SEVERITY QUANTIFIED, PAIN PRESENT: ICD-10-PCS | Mod: S$GLB,,, | Performed by: THORACIC SURGERY (CARDIOTHORACIC VASCULAR SURGERY)

## 2021-01-06 PROCEDURE — 99999 PR PBB SHADOW E&M-EST. PATIENT-LVL III: CPT | Mod: PBBFAC,,, | Performed by: THORACIC SURGERY (CARDIOTHORACIC VASCULAR SURGERY)

## 2021-01-06 PROCEDURE — 99999 PR PBB SHADOW E&M-EST. PATIENT-LVL III: ICD-10-PCS | Mod: PBBFAC,,, | Performed by: THORACIC SURGERY (CARDIOTHORACIC VASCULAR SURGERY)

## 2021-01-06 PROCEDURE — 3008F BODY MASS INDEX DOCD: CPT | Mod: CPTII,S$GLB,, | Performed by: THORACIC SURGERY (CARDIOTHORACIC VASCULAR SURGERY)

## 2021-01-06 PROCEDURE — 1125F AMNT PAIN NOTED PAIN PRSNT: CPT | Mod: S$GLB,,, | Performed by: THORACIC SURGERY (CARDIOTHORACIC VASCULAR SURGERY)

## 2021-01-06 PROCEDURE — 3008F PR BODY MASS INDEX (BMI) DOCUMENTED: ICD-10-PCS | Mod: CPTII,S$GLB,, | Performed by: THORACIC SURGERY (CARDIOTHORACIC VASCULAR SURGERY)

## 2021-01-06 PROCEDURE — 99024 PR POST-OP FOLLOW-UP VISIT: ICD-10-PCS | Mod: S$GLB,,, | Performed by: THORACIC SURGERY (CARDIOTHORACIC VASCULAR SURGERY)

## 2021-01-06 RX ORDER — DOXYCYCLINE 100 MG/1
100 CAPSULE ORAL 2 TIMES DAILY
Qty: 20 CAPSULE | Refills: 0
Start: 2021-01-06 | End: 2021-01-16

## 2021-01-07 ENCOUNTER — TELEPHONE (OUTPATIENT)
Dept: HEMATOLOGY/ONCOLOGY | Facility: CLINIC | Age: 41
End: 2021-01-07

## 2021-01-12 ENCOUNTER — TELEPHONE (OUTPATIENT)
Dept: INFUSION THERAPY | Facility: HOSPITAL | Age: 41
End: 2021-01-12

## 2021-01-12 ENCOUNTER — PATIENT MESSAGE (OUTPATIENT)
Dept: GASTROENTEROLOGY | Facility: CLINIC | Age: 41
End: 2021-01-12

## 2021-01-12 ENCOUNTER — TELEPHONE (OUTPATIENT)
Dept: HEMATOLOGY/ONCOLOGY | Facility: CLINIC | Age: 41
End: 2021-01-12

## 2021-01-13 ENCOUNTER — PATIENT MESSAGE (OUTPATIENT)
Dept: GASTROENTEROLOGY | Facility: CLINIC | Age: 41
End: 2021-01-13

## 2021-01-14 ENCOUNTER — INFUSION (OUTPATIENT)
Dept: INFUSION THERAPY | Facility: HOSPITAL | Age: 41
End: 2021-01-14
Attending: PHYSICIAN ASSISTANT
Payer: COMMERCIAL

## 2021-01-14 VITALS
RESPIRATION RATE: 17 BRPM | BODY MASS INDEX: 28.87 KG/M2 | WEIGHT: 213.13 LBS | DIASTOLIC BLOOD PRESSURE: 65 MMHG | OXYGEN SATURATION: 95 % | HEART RATE: 65 BPM | TEMPERATURE: 97 F | SYSTOLIC BLOOD PRESSURE: 97 MMHG | HEIGHT: 72 IN

## 2021-01-14 DIAGNOSIS — D50.8 OTHER IRON DEFICIENCY ANEMIA: Primary | ICD-10-CM

## 2021-01-14 PROCEDURE — 25000003 PHARM REV CODE 250: Performed by: PHYSICIAN ASSISTANT

## 2021-01-14 PROCEDURE — A4216 STERILE WATER/SALINE, 10 ML: HCPCS | Performed by: PHYSICIAN ASSISTANT

## 2021-01-14 PROCEDURE — 63600175 PHARM REV CODE 636 W HCPCS: Mod: JG | Performed by: PHYSICIAN ASSISTANT

## 2021-01-14 PROCEDURE — 96365 THER/PROPH/DIAG IV INF INIT: CPT

## 2021-01-14 RX ORDER — METHYLPREDNISOLONE SOD SUCC 125 MG
125 VIAL (EA) INJECTION ONCE AS NEEDED
Status: CANCELLED | OUTPATIENT
Start: 2021-01-21

## 2021-01-14 RX ORDER — SODIUM CHLORIDE 9 MG/ML
INJECTION, SOLUTION INTRAVENOUS CONTINUOUS
Status: CANCELLED | OUTPATIENT
Start: 2021-01-21

## 2021-01-14 RX ORDER — DIPHENHYDRAMINE HYDROCHLORIDE 50 MG/ML
50 INJECTION INTRAMUSCULAR; INTRAVENOUS ONCE AS NEEDED
Status: CANCELLED | OUTPATIENT
Start: 2021-01-21

## 2021-01-14 RX ORDER — SODIUM CHLORIDE 0.9 % (FLUSH) 0.9 %
10 SYRINGE (ML) INJECTION
Status: CANCELLED | OUTPATIENT
Start: 2021-01-21

## 2021-01-14 RX ORDER — SODIUM CHLORIDE 9 MG/ML
INJECTION, SOLUTION INTRAVENOUS CONTINUOUS
Status: DISCONTINUED | OUTPATIENT
Start: 2021-01-14 | End: 2021-01-14 | Stop reason: HOSPADM

## 2021-01-14 RX ORDER — SODIUM CHLORIDE 0.9 % (FLUSH) 0.9 %
10 SYRINGE (ML) INJECTION
Status: DISCONTINUED | OUTPATIENT
Start: 2021-01-14 | End: 2021-01-14 | Stop reason: HOSPADM

## 2021-01-14 RX ORDER — HEPARIN 100 UNIT/ML
5 SYRINGE INTRAVENOUS
Status: CANCELLED | OUTPATIENT
Start: 2021-01-21

## 2021-01-14 RX ORDER — EPINEPHRINE 0.3 MG/.3ML
0.3 INJECTION SUBCUTANEOUS ONCE AS NEEDED
Status: CANCELLED | OUTPATIENT
Start: 2021-01-21

## 2021-01-14 RX ADMIN — SODIUM CHLORIDE 250 ML/HR: 0.9 INJECTION, SOLUTION INTRAVENOUS at 02:01

## 2021-01-14 RX ADMIN — SODIUM CHLORIDE, PRESERVATIVE FREE 10 ML: 5 INJECTION INTRAVENOUS at 03:01

## 2021-01-14 RX ADMIN — FERRIC CARBOXYMALTOSE INJECTION 750 MG: 50 INJECTION, SOLUTION INTRAVENOUS at 02:01

## 2021-01-15 ENCOUNTER — TELEPHONE (OUTPATIENT)
Dept: NEUROLOGY | Facility: CLINIC | Age: 41
End: 2021-01-15

## 2021-01-15 ENCOUNTER — OFFICE VISIT (OUTPATIENT)
Dept: NEUROLOGY | Facility: CLINIC | Age: 41
End: 2021-01-15
Payer: COMMERCIAL

## 2021-01-15 DIAGNOSIS — E23.7 PITUITARY LESION: ICD-10-CM

## 2021-01-15 DIAGNOSIS — C71.9 MALIGNANT NEOPLASM OF BRAIN, UNSPECIFIED LOCATION: ICD-10-CM

## 2021-01-15 DIAGNOSIS — G43.719 INTRACTABLE CHRONIC MIGRAINE WITHOUT AURA AND WITHOUT STATUS MIGRAINOSUS: Primary | ICD-10-CM

## 2021-01-15 PROCEDURE — 99214 PR OFFICE/OUTPT VISIT, EST, LEVL IV, 30-39 MIN: ICD-10-PCS | Mod: 95,,, | Performed by: NURSE PRACTITIONER

## 2021-01-15 PROCEDURE — 1125F AMNT PAIN NOTED PAIN PRSNT: CPT | Mod: 95,,, | Performed by: NURSE PRACTITIONER

## 2021-01-15 PROCEDURE — 99214 OFFICE O/P EST MOD 30 MIN: CPT | Mod: 95,,, | Performed by: NURSE PRACTITIONER

## 2021-01-15 PROCEDURE — 1125F PR PAIN SEVERITY QUANTIFIED, PAIN PRESENT: ICD-10-PCS | Mod: 95,,, | Performed by: NURSE PRACTITIONER

## 2021-01-15 RX ORDER — GALCANEZUMAB 120 MG/ML
120 INJECTION, SOLUTION SUBCUTANEOUS
Qty: 1 ML | Refills: 11 | Status: SHIPPED | OUTPATIENT
Start: 2021-01-15 | End: 2021-04-14

## 2021-01-18 ENCOUNTER — PATIENT MESSAGE (OUTPATIENT)
Dept: GASTROENTEROLOGY | Facility: CLINIC | Age: 41
End: 2021-01-18

## 2021-01-18 ENCOUNTER — PATIENT MESSAGE (OUTPATIENT)
Dept: VASCULAR SURGERY | Facility: CLINIC | Age: 41
End: 2021-01-18

## 2021-01-18 DIAGNOSIS — K21.9 GERD WITHOUT ESOPHAGITIS: Primary | ICD-10-CM

## 2021-01-19 ENCOUNTER — PATIENT MESSAGE (OUTPATIENT)
Dept: NEUROLOGY | Facility: CLINIC | Age: 41
End: 2021-01-19

## 2021-01-19 RX ORDER — KETOROLAC TROMETHAMINE 30 MG/ML
30 INJECTION, SOLUTION INTRAMUSCULAR; INTRAVENOUS EVERY 6 HOURS
Qty: 1 ML | Refills: 6 | Status: SHIPPED | OUTPATIENT
Start: 2021-01-19 | End: 2021-01-29 | Stop reason: SDUPTHER

## 2021-01-19 RX ORDER — PANTOPRAZOLE SODIUM 40 MG/1
40 TABLET, DELAYED RELEASE ORAL DAILY
Qty: 30 TABLET | Refills: 2 | Status: SHIPPED | OUTPATIENT
Start: 2021-01-19 | End: 2021-01-25

## 2021-01-20 ENCOUNTER — TELEPHONE (OUTPATIENT)
Dept: HEMATOLOGY/ONCOLOGY | Facility: CLINIC | Age: 41
End: 2021-01-20

## 2021-01-21 ENCOUNTER — TELEPHONE (OUTPATIENT)
Dept: PHARMACY | Facility: CLINIC | Age: 41
End: 2021-01-21

## 2021-01-21 ENCOUNTER — PATIENT MESSAGE (OUTPATIENT)
Dept: HEMATOLOGY/ONCOLOGY | Facility: CLINIC | Age: 41
End: 2021-01-21

## 2021-01-21 ENCOUNTER — TELEPHONE (OUTPATIENT)
Dept: HEMATOLOGY/ONCOLOGY | Facility: CLINIC | Age: 41
End: 2021-01-21

## 2021-01-21 ENCOUNTER — INFUSION (OUTPATIENT)
Dept: INFUSION THERAPY | Facility: HOSPITAL | Age: 41
End: 2021-01-21
Attending: PHYSICIAN ASSISTANT
Payer: COMMERCIAL

## 2021-01-21 VITALS
HEART RATE: 68 BPM | WEIGHT: 212.5 LBS | OXYGEN SATURATION: 98 % | TEMPERATURE: 97 F | RESPIRATION RATE: 18 BRPM | SYSTOLIC BLOOD PRESSURE: 97 MMHG | BODY MASS INDEX: 28.82 KG/M2 | DIASTOLIC BLOOD PRESSURE: 51 MMHG

## 2021-01-21 DIAGNOSIS — D50.8 OTHER IRON DEFICIENCY ANEMIA: Primary | ICD-10-CM

## 2021-01-21 PROCEDURE — 96365 THER/PROPH/DIAG IV INF INIT: CPT

## 2021-01-21 PROCEDURE — 63600175 PHARM REV CODE 636 W HCPCS: Mod: JG | Performed by: PHYSICIAN ASSISTANT

## 2021-01-21 PROCEDURE — 25000003 PHARM REV CODE 250: Performed by: PHYSICIAN ASSISTANT

## 2021-01-21 RX ORDER — HEPARIN 100 UNIT/ML
5 SYRINGE INTRAVENOUS
Status: DISCONTINUED | OUTPATIENT
Start: 2021-01-21 | End: 2021-01-21 | Stop reason: HOSPADM

## 2021-01-21 RX ORDER — SODIUM CHLORIDE 0.9 % (FLUSH) 0.9 %
10 SYRINGE (ML) INJECTION
Status: CANCELLED | OUTPATIENT
Start: 2021-01-28

## 2021-01-21 RX ORDER — SODIUM CHLORIDE 9 MG/ML
INJECTION, SOLUTION INTRAVENOUS CONTINUOUS
Status: CANCELLED | OUTPATIENT
Start: 2021-01-28

## 2021-01-21 RX ORDER — EPINEPHRINE 0.3 MG/.3ML
0.3 INJECTION SUBCUTANEOUS ONCE AS NEEDED
Status: CANCELLED | OUTPATIENT
Start: 2021-01-28

## 2021-01-21 RX ORDER — DIPHENHYDRAMINE HYDROCHLORIDE 50 MG/ML
50 INJECTION INTRAMUSCULAR; INTRAVENOUS ONCE AS NEEDED
Status: CANCELLED | OUTPATIENT
Start: 2021-01-28

## 2021-01-21 RX ORDER — HEPARIN 100 UNIT/ML
5 SYRINGE INTRAVENOUS
Status: CANCELLED | OUTPATIENT
Start: 2021-01-28

## 2021-01-21 RX ORDER — METHYLPREDNISOLONE SOD SUCC 125 MG
125 VIAL (EA) INJECTION ONCE AS NEEDED
Status: CANCELLED | OUTPATIENT
Start: 2021-01-28

## 2021-01-21 RX ORDER — SODIUM CHLORIDE 0.9 % (FLUSH) 0.9 %
10 SYRINGE (ML) INJECTION
Status: DISCONTINUED | OUTPATIENT
Start: 2021-01-21 | End: 2021-01-21 | Stop reason: HOSPADM

## 2021-01-21 RX ORDER — SODIUM CHLORIDE 9 MG/ML
INJECTION, SOLUTION INTRAVENOUS CONTINUOUS
Status: DISCONTINUED | OUTPATIENT
Start: 2021-01-21 | End: 2021-01-21 | Stop reason: HOSPADM

## 2021-01-21 RX ADMIN — FERRIC CARBOXYMALTOSE INJECTION 750 MG: 50 INJECTION, SOLUTION INTRAVENOUS at 08:01

## 2021-01-25 ENCOUNTER — OFFICE VISIT (OUTPATIENT)
Dept: GASTROENTEROLOGY | Facility: CLINIC | Age: 41
End: 2021-01-25
Payer: COMMERCIAL

## 2021-01-25 ENCOUNTER — PATIENT MESSAGE (OUTPATIENT)
Dept: GASTROENTEROLOGY | Facility: CLINIC | Age: 41
End: 2021-01-25

## 2021-01-25 VITALS — BODY MASS INDEX: 28.76 KG/M2 | HEIGHT: 72 IN | WEIGHT: 212.31 LBS

## 2021-01-25 DIAGNOSIS — K21.9 GASTROESOPHAGEAL REFLUX DISEASE WITHOUT ESOPHAGITIS: ICD-10-CM

## 2021-01-25 DIAGNOSIS — K64.8 INTERNAL HEMORRHOID: ICD-10-CM

## 2021-01-25 DIAGNOSIS — D50.9 IRON DEFICIENCY ANEMIA, UNSPECIFIED IRON DEFICIENCY ANEMIA TYPE: Primary | ICD-10-CM

## 2021-01-25 DIAGNOSIS — R11.2 NON-INTRACTABLE VOMITING WITH NAUSEA, UNSPECIFIED VOMITING TYPE: Primary | ICD-10-CM

## 2021-01-25 DIAGNOSIS — K62.5 ANAL BLEEDING: ICD-10-CM

## 2021-01-25 DIAGNOSIS — Z90.3 HISTORY OF SLEEVE GASTRECTOMY: ICD-10-CM

## 2021-01-25 DIAGNOSIS — R11.2 NON-INTRACTABLE VOMITING WITH NAUSEA, UNSPECIFIED VOMITING TYPE: ICD-10-CM

## 2021-01-25 DIAGNOSIS — Z86.010 HISTORY OF COLON POLYPS: ICD-10-CM

## 2021-01-25 DIAGNOSIS — Z90.49 S/P CHOLECYSTECTOMY: ICD-10-CM

## 2021-01-25 DIAGNOSIS — Z87.19 HISTORY OF GASTRITIS: ICD-10-CM

## 2021-01-25 PROCEDURE — 1126F PR PAIN SEVERITY QUANTIFIED, NO PAIN PRESENT: ICD-10-PCS | Mod: S$GLB,,, | Performed by: NURSE PRACTITIONER

## 2021-01-25 PROCEDURE — 99999 PR PBB SHADOW E&M-EST. PATIENT-LVL V: ICD-10-PCS | Mod: PBBFAC,,, | Performed by: NURSE PRACTITIONER

## 2021-01-25 PROCEDURE — 99999 PR PBB SHADOW E&M-EST. PATIENT-LVL V: CPT | Mod: PBBFAC,,, | Performed by: NURSE PRACTITIONER

## 2021-01-25 PROCEDURE — 3008F PR BODY MASS INDEX (BMI) DOCUMENTED: ICD-10-PCS | Mod: CPTII,S$GLB,, | Performed by: NURSE PRACTITIONER

## 2021-01-25 PROCEDURE — 1126F AMNT PAIN NOTED NONE PRSNT: CPT | Mod: S$GLB,,, | Performed by: NURSE PRACTITIONER

## 2021-01-25 PROCEDURE — 3008F BODY MASS INDEX DOCD: CPT | Mod: CPTII,S$GLB,, | Performed by: NURSE PRACTITIONER

## 2021-01-25 PROCEDURE — 99214 OFFICE O/P EST MOD 30 MIN: CPT | Mod: S$GLB,,, | Performed by: NURSE PRACTITIONER

## 2021-01-25 PROCEDURE — 99214 PR OFFICE/OUTPT VISIT, EST, LEVL IV, 30-39 MIN: ICD-10-PCS | Mod: S$GLB,,, | Performed by: NURSE PRACTITIONER

## 2021-01-25 RX ORDER — OMEPRAZOLE 40 MG/1
40 CAPSULE, DELAYED RELEASE ORAL DAILY
Qty: 30 CAPSULE | Refills: 2 | Status: SHIPPED | OUTPATIENT
Start: 2021-01-25 | End: 2021-05-20

## 2021-01-25 RX ORDER — ONDANSETRON 4 MG/1
4 TABLET, FILM COATED ORAL 2 TIMES DAILY
Qty: 40 TABLET | Refills: 0 | Status: SHIPPED | OUTPATIENT
Start: 2021-01-25 | End: 2021-02-14

## 2021-01-25 RX ORDER — METOPROLOL SUCCINATE 25 MG/1
12.5 TABLET, EXTENDED RELEASE ORAL
COMMUNITY

## 2021-01-25 RX ORDER — HYDROCORTISONE ACETATE 25 MG/1
25 SUPPOSITORY RECTAL 2 TIMES DAILY
Qty: 20 SUPPOSITORY | Refills: 0 | Status: SHIPPED | OUTPATIENT
Start: 2021-01-25 | End: 2021-02-04

## 2021-01-25 RX ORDER — DEXLANSOPRAZOLE 60 MG/1
60 CAPSULE, DELAYED RELEASE ORAL DAILY
Qty: 30 CAPSULE | Refills: 2 | Status: SHIPPED | OUTPATIENT
Start: 2021-01-25 | End: 2021-01-25

## 2021-01-27 ENCOUNTER — TELEPHONE (OUTPATIENT)
Dept: NEUROSURGERY | Facility: CLINIC | Age: 41
End: 2021-01-27

## 2021-01-27 ENCOUNTER — HOSPITAL ENCOUNTER (OUTPATIENT)
Dept: RADIOLOGY | Facility: HOSPITAL | Age: 41
Discharge: HOME | End: 2021-01-27
Attending: NURSE PRACTITIONER
Payer: COMMERCIAL

## 2021-01-27 ENCOUNTER — PATIENT MESSAGE (OUTPATIENT)
Dept: HEMATOLOGY/ONCOLOGY | Facility: CLINIC | Age: 41
End: 2021-01-27

## 2021-01-27 ENCOUNTER — TELEPHONE (OUTPATIENT)
Dept: HEMATOLOGY/ONCOLOGY | Facility: CLINIC | Age: 41
End: 2021-01-27

## 2021-01-27 DIAGNOSIS — C71.9 MALIGNANT NEOPLASM OF BRAIN, UNSPECIFIED LOCATION: ICD-10-CM

## 2021-01-27 DIAGNOSIS — E23.7 PITUITARY LESION: ICD-10-CM

## 2021-01-27 DIAGNOSIS — E23.7 PITUITARY LESION: Primary | ICD-10-CM

## 2021-01-27 PROCEDURE — 25500020 PHARM REV CODE 255: Performed by: NURSE PRACTITIONER

## 2021-01-27 PROCEDURE — A9585 GADOBUTROL INJECTION: HCPCS | Performed by: NURSE PRACTITIONER

## 2021-01-27 PROCEDURE — 70553 MRI BRAIN STEM W/O & W/DYE: CPT | Mod: 26,,, | Performed by: RADIOLOGY

## 2021-01-27 PROCEDURE — 70553 MRI BRAIN STEM W/O & W/DYE: CPT | Mod: TC

## 2021-01-27 PROCEDURE — 70553 MRI PITUITARY W W/O CONTRAST: ICD-10-PCS | Mod: 26,,, | Performed by: RADIOLOGY

## 2021-01-27 RX ORDER — GADOBUTROL 604.72 MG/ML
5 INJECTION INTRAVENOUS
Status: COMPLETED | OUTPATIENT
Start: 2021-01-27 | End: 2021-01-27

## 2021-01-27 RX ADMIN — GADOBUTROL 5 ML: 604.72 INJECTION INTRAVENOUS at 07:01

## 2021-01-28 ENCOUNTER — PATIENT MESSAGE (OUTPATIENT)
Dept: HEMATOLOGY/ONCOLOGY | Facility: CLINIC | Age: 41
End: 2021-01-28

## 2021-01-28 DIAGNOSIS — I82.551 CHRONIC DEEP VEIN THROMBOSIS (DVT) OF RIGHT PERONEAL VEIN: ICD-10-CM

## 2021-01-29 RX ORDER — ENOXAPARIN SODIUM 150 MG/ML
150 INJECTION SUBCUTANEOUS DAILY
Qty: 30 SYRINGE | Refills: 1 | Status: SHIPPED | OUTPATIENT
Start: 2021-01-29 | End: 2021-02-23 | Stop reason: SDUPTHER

## 2021-01-29 RX ORDER — KETOROLAC TROMETHAMINE 30 MG/ML
30 INJECTION, SOLUTION INTRAMUSCULAR; INTRAVENOUS EVERY 6 HOURS
Qty: 1 ML | Refills: 6 | Status: SHIPPED | OUTPATIENT
Start: 2021-01-29

## 2021-02-01 ENCOUNTER — TELEPHONE (OUTPATIENT)
Dept: HEMATOLOGY/ONCOLOGY | Facility: CLINIC | Age: 41
End: 2021-02-01

## 2021-02-01 ENCOUNTER — PATIENT MESSAGE (OUTPATIENT)
Dept: HEMATOLOGY/ONCOLOGY | Facility: CLINIC | Age: 41
End: 2021-02-01

## 2021-02-02 ENCOUNTER — TELEPHONE (OUTPATIENT)
Dept: HEMATOLOGY/ONCOLOGY | Facility: CLINIC | Age: 41
End: 2021-02-02

## 2021-02-03 ENCOUNTER — TELEPHONE (OUTPATIENT)
Dept: GASTROENTEROLOGY | Facility: CLINIC | Age: 41
End: 2021-02-03

## 2021-02-03 ENCOUNTER — HOSPITAL ENCOUNTER (OUTPATIENT)
Dept: RADIOLOGY | Facility: HOSPITAL | Age: 41
Discharge: HOME | End: 2021-02-03
Attending: NURSE PRACTITIONER
Payer: COMMERCIAL

## 2021-02-03 DIAGNOSIS — K21.9 GASTROESOPHAGEAL REFLUX DISEASE WITHOUT ESOPHAGITIS: ICD-10-CM

## 2021-02-03 DIAGNOSIS — Z87.19 HISTORY OF GASTRITIS: ICD-10-CM

## 2021-02-03 DIAGNOSIS — R11.2 NON-INTRACTABLE VOMITING WITH NAUSEA, UNSPECIFIED VOMITING TYPE: ICD-10-CM

## 2021-02-03 PROCEDURE — 74246 X-RAY XM UPR GI TRC 2CNTRST: CPT | Mod: 26,,, | Performed by: RADIOLOGY

## 2021-02-03 PROCEDURE — A9698 NON-RAD CONTRAST MATERIALNOC: HCPCS | Performed by: NURSE PRACTITIONER

## 2021-02-03 PROCEDURE — 74246 X-RAY XM UPR GI TRC 2CNTRST: CPT | Mod: TC,FY

## 2021-02-03 PROCEDURE — 74246 FL UPPER GI AIR CONTRAST: ICD-10-PCS | Mod: 26,,, | Performed by: RADIOLOGY

## 2021-02-03 PROCEDURE — 25500020 PHARM REV CODE 255: Performed by: NURSE PRACTITIONER

## 2021-02-03 RX ADMIN — BARIUM SULFATE 50 ML: 980 POWDER, FOR SUSPENSION ORAL at 10:02

## 2021-02-03 RX ADMIN — BARIUM SULFATE 100 ML: 0.6 SUSPENSION ORAL at 10:02

## 2021-02-09 ENCOUNTER — TELEPHONE (OUTPATIENT)
Dept: NEUROSURGERY | Facility: CLINIC | Age: 41
End: 2021-02-09

## 2021-02-09 ENCOUNTER — TELEPHONE (OUTPATIENT)
Dept: OPHTHALMOLOGY | Facility: CLINIC | Age: 41
End: 2021-02-09

## 2021-02-09 ENCOUNTER — OFFICE VISIT (OUTPATIENT)
Dept: NEUROSURGERY | Facility: CLINIC | Age: 41
End: 2021-02-09
Payer: COMMERCIAL

## 2021-02-09 ENCOUNTER — TELEPHONE (OUTPATIENT)
Dept: ENDOCRINOLOGY | Facility: CLINIC | Age: 41
End: 2021-02-09

## 2021-02-09 VITALS
SYSTOLIC BLOOD PRESSURE: 90 MMHG | TEMPERATURE: 97 F | WEIGHT: 212.31 LBS | DIASTOLIC BLOOD PRESSURE: 59 MMHG | RESPIRATION RATE: 20 BRPM | BODY MASS INDEX: 28.76 KG/M2 | HEART RATE: 60 BPM | HEIGHT: 72 IN

## 2021-02-09 DIAGNOSIS — E23.7 PITUITARY LESION: ICD-10-CM

## 2021-02-09 PROCEDURE — 99999 PR PBB SHADOW E&M-EST. PATIENT-LVL IV: ICD-10-PCS | Mod: PBBFAC,,, | Performed by: NEUROLOGICAL SURGERY

## 2021-02-09 PROCEDURE — 99204 PR OFFICE/OUTPT VISIT, NEW, LEVL IV, 45-59 MIN: ICD-10-PCS | Mod: S$GLB,,, | Performed by: NEUROLOGICAL SURGERY

## 2021-02-09 PROCEDURE — 1125F PR PAIN SEVERITY QUANTIFIED, PAIN PRESENT: ICD-10-PCS | Mod: S$GLB,,, | Performed by: NEUROLOGICAL SURGERY

## 2021-02-09 PROCEDURE — 3008F PR BODY MASS INDEX (BMI) DOCUMENTED: ICD-10-PCS | Mod: CPTII,S$GLB,, | Performed by: NEUROLOGICAL SURGERY

## 2021-02-09 PROCEDURE — 1125F AMNT PAIN NOTED PAIN PRSNT: CPT | Mod: S$GLB,,, | Performed by: NEUROLOGICAL SURGERY

## 2021-02-09 PROCEDURE — 99204 OFFICE O/P NEW MOD 45 MIN: CPT | Mod: S$GLB,,, | Performed by: NEUROLOGICAL SURGERY

## 2021-02-09 PROCEDURE — 3008F BODY MASS INDEX DOCD: CPT | Mod: CPTII,S$GLB,, | Performed by: NEUROLOGICAL SURGERY

## 2021-02-09 PROCEDURE — 99999 PR PBB SHADOW E&M-EST. PATIENT-LVL IV: CPT | Mod: PBBFAC,,, | Performed by: NEUROLOGICAL SURGERY

## 2021-02-11 ENCOUNTER — PATIENT MESSAGE (OUTPATIENT)
Dept: HEMATOLOGY/ONCOLOGY | Facility: CLINIC | Age: 41
End: 2021-02-11

## 2021-02-11 ENCOUNTER — TELEPHONE (OUTPATIENT)
Dept: OPHTHALMOLOGY | Facility: CLINIC | Age: 41
End: 2021-02-11

## 2021-02-11 ENCOUNTER — TELEPHONE (OUTPATIENT)
Dept: HEMATOLOGY/ONCOLOGY | Facility: CLINIC | Age: 41
End: 2021-02-11

## 2021-02-11 ENCOUNTER — TELEPHONE (OUTPATIENT)
Dept: ENDOCRINOLOGY | Facility: CLINIC | Age: 41
End: 2021-02-11

## 2021-02-23 ENCOUNTER — OFFICE VISIT (OUTPATIENT)
Dept: HEMATOLOGY/ONCOLOGY | Facility: CLINIC | Age: 41
End: 2021-02-23
Payer: COMMERCIAL

## 2021-02-23 VITALS
RESPIRATION RATE: 18 BRPM | BODY MASS INDEX: 28.82 KG/M2 | HEIGHT: 72 IN | WEIGHT: 212.75 LBS | OXYGEN SATURATION: 100 % | HEART RATE: 76 BPM | DIASTOLIC BLOOD PRESSURE: 70 MMHG | SYSTOLIC BLOOD PRESSURE: 113 MMHG | TEMPERATURE: 98 F

## 2021-02-23 DIAGNOSIS — Z86.718 HISTORY OF RECURRENT DEEP VEIN THROMBOSIS (DVT): ICD-10-CM

## 2021-02-23 DIAGNOSIS — D50.8 OTHER IRON DEFICIENCY ANEMIA: ICD-10-CM

## 2021-02-23 DIAGNOSIS — I82.551 CHRONIC DEEP VEIN THROMBOSIS (DVT) OF RIGHT PERONEAL VEIN: Primary | ICD-10-CM

## 2021-02-23 PROCEDURE — 99214 OFFICE O/P EST MOD 30 MIN: CPT | Mod: S$GLB,,, | Performed by: INTERNAL MEDICINE

## 2021-02-23 PROCEDURE — 1125F AMNT PAIN NOTED PAIN PRSNT: CPT | Mod: S$GLB,,, | Performed by: INTERNAL MEDICINE

## 2021-02-23 PROCEDURE — 1125F PR PAIN SEVERITY QUANTIFIED, PAIN PRESENT: ICD-10-PCS | Mod: S$GLB,,, | Performed by: INTERNAL MEDICINE

## 2021-02-23 PROCEDURE — 99214 PR OFFICE/OUTPT VISIT, EST, LEVL IV, 30-39 MIN: ICD-10-PCS | Mod: S$GLB,,, | Performed by: INTERNAL MEDICINE

## 2021-02-23 PROCEDURE — 1126F AMNT PAIN NOTED NONE PRSNT: CPT | Mod: S$GLB,,, | Performed by: INTERNAL MEDICINE

## 2021-02-23 PROCEDURE — 3008F PR BODY MASS INDEX (BMI) DOCUMENTED: ICD-10-PCS | Mod: CPTII,S$GLB,, | Performed by: INTERNAL MEDICINE

## 2021-02-23 PROCEDURE — 99999 PR PBB SHADOW E&M-EST. PATIENT-LVL V: ICD-10-PCS | Mod: PBBFAC,,, | Performed by: INTERNAL MEDICINE

## 2021-02-23 PROCEDURE — 99999 PR PBB SHADOW E&M-EST. PATIENT-LVL V: CPT | Mod: PBBFAC,,, | Performed by: INTERNAL MEDICINE

## 2021-02-23 PROCEDURE — 1126F PR PAIN SEVERITY QUANTIFIED, NO PAIN PRESENT: ICD-10-PCS | Mod: S$GLB,,, | Performed by: INTERNAL MEDICINE

## 2021-02-23 PROCEDURE — 3008F BODY MASS INDEX DOCD: CPT | Mod: CPTII,S$GLB,, | Performed by: INTERNAL MEDICINE

## 2021-02-23 RX ORDER — PROMETHAZINE HYDROCHLORIDE AND DEXTROMETHORPHAN HYDROBROMIDE 6.25; 15 MG/5ML; MG/5ML
5 SYRUP ORAL
COMMUNITY
Start: 2021-02-16 | End: 2021-02-23

## 2021-02-23 RX ORDER — ENOXAPARIN SODIUM 150 MG/ML
150 INJECTION SUBCUTANEOUS DAILY
Qty: 30 SYRINGE | Refills: 1 | Status: SHIPPED | OUTPATIENT
Start: 2021-02-23

## 2021-03-01 ENCOUNTER — HOSPITAL ENCOUNTER (OUTPATIENT)
Facility: HOSPITAL | Age: 41
Discharge: HOME | End: 2021-03-01
Attending: INTERNAL MEDICINE | Admitting: INTERNAL MEDICINE
Payer: COMMERCIAL

## 2021-03-01 VITALS
TEMPERATURE: 98 F | SYSTOLIC BLOOD PRESSURE: 100 MMHG | HEART RATE: 64 BPM | OXYGEN SATURATION: 97 % | DIASTOLIC BLOOD PRESSURE: 61 MMHG | RESPIRATION RATE: 15 BRPM

## 2021-03-01 DIAGNOSIS — D50.9 IRON DEFICIENCY ANEMIA: ICD-10-CM

## 2021-03-01 PROCEDURE — 25000003 PHARM REV CODE 250: Performed by: INTERNAL MEDICINE

## 2021-03-01 PROCEDURE — 91110 GI TRC IMG INTRAL ESOPH-ILE: CPT | Performed by: INTERNAL MEDICINE

## 2021-03-01 PROCEDURE — 91110 GI TRC IMG INTRAL ESOPH-ILE: CPT | Mod: 26,,, | Performed by: INTERNAL MEDICINE

## 2021-03-01 PROCEDURE — 91110 PR GI TRACT CAPSULE ENDOSCOPY: ICD-10-PCS | Mod: 26,,, | Performed by: INTERNAL MEDICINE

## 2021-03-01 RX ORDER — DEXTROMETHORPHAN/PSEUDOEPHED 2.5-7.5/.8
40 DROPS ORAL ONCE
Status: COMPLETED | OUTPATIENT
Start: 2021-03-01 | End: 2021-03-01

## 2021-03-01 RX ADMIN — SIMETHICONE 40 MG: 20 SUSPENSION/ DROPS ORAL at 07:03

## 2021-03-03 ENCOUNTER — PATIENT MESSAGE (OUTPATIENT)
Dept: GASTROENTEROLOGY | Facility: CLINIC | Age: 41
End: 2021-03-03

## 2021-03-22 DIAGNOSIS — D49.7 PITUITARY TUMOR: Primary | ICD-10-CM

## 2021-03-23 ENCOUNTER — CLINICAL SUPPORT (OUTPATIENT)
Dept: OPHTHALMOLOGY | Facility: CLINIC | Age: 41
End: 2021-03-23
Payer: COMMERCIAL

## 2021-03-23 ENCOUNTER — OFFICE VISIT (OUTPATIENT)
Dept: OPHTHALMOLOGY | Facility: CLINIC | Age: 41
End: 2021-03-23
Payer: COMMERCIAL

## 2021-03-23 ENCOUNTER — OFFICE VISIT (OUTPATIENT)
Dept: ENDOCRINOLOGY | Facility: CLINIC | Age: 41
End: 2021-03-23
Payer: COMMERCIAL

## 2021-03-23 VITALS
RESPIRATION RATE: 16 BRPM | SYSTOLIC BLOOD PRESSURE: 90 MMHG | HEART RATE: 62 BPM | OXYGEN SATURATION: 99 % | DIASTOLIC BLOOD PRESSURE: 62 MMHG | WEIGHT: 216.94 LBS | BODY MASS INDEX: 29.42 KG/M2

## 2021-03-23 DIAGNOSIS — D35.2 PITUITARY ADENOMA: Primary | ICD-10-CM

## 2021-03-23 DIAGNOSIS — D49.7 PITUITARY TUMOR: ICD-10-CM

## 2021-03-23 DIAGNOSIS — E23.7 PITUITARY LESION: ICD-10-CM

## 2021-03-23 DIAGNOSIS — H53.431: ICD-10-CM

## 2021-03-23 DIAGNOSIS — I27.82 CHRONIC PULMONARY EMBOLISM, UNSPECIFIED PULMONARY EMBOLISM TYPE, UNSPECIFIED WHETHER ACUTE COR PULMONALE PRESENT: ICD-10-CM

## 2021-03-23 PROCEDURE — 99999 PR PBB SHADOW E&M-EST. PATIENT-LVL III: CPT | Mod: PBBFAC,,, | Performed by: INTERNAL MEDICINE

## 2021-03-23 PROCEDURE — 99204 PR OFFICE/OUTPT VISIT, NEW, LEVL IV, 45-59 MIN: ICD-10-PCS | Mod: S$GLB,,, | Performed by: INTERNAL MEDICINE

## 2021-03-23 PROCEDURE — 92083 EXTENDED VISUAL FIELD XM: CPT | Mod: S$GLB,,, | Performed by: OPHTHALMOLOGY

## 2021-03-23 PROCEDURE — 3008F PR BODY MASS INDEX (BMI) DOCUMENTED: ICD-10-PCS | Mod: CPTII,S$GLB,, | Performed by: INTERNAL MEDICINE

## 2021-03-23 PROCEDURE — 99999 PR PBB SHADOW E&M-EST. PATIENT-LVL IV: CPT | Mod: PBBFAC,,, | Performed by: OPHTHALMOLOGY

## 2021-03-23 PROCEDURE — 99204 OFFICE O/P NEW MOD 45 MIN: CPT | Mod: S$GLB,,, | Performed by: INTERNAL MEDICINE

## 2021-03-23 PROCEDURE — 99999 PR PBB SHADOW E&M-EST. PATIENT-LVL III: ICD-10-PCS | Mod: PBBFAC,,, | Performed by: INTERNAL MEDICINE

## 2021-03-23 PROCEDURE — 1126F AMNT PAIN NOTED NONE PRSNT: CPT | Mod: S$GLB,,, | Performed by: INTERNAL MEDICINE

## 2021-03-23 PROCEDURE — 1126F PR PAIN SEVERITY QUANTIFIED, NO PAIN PRESENT: ICD-10-PCS | Mod: S$GLB,,, | Performed by: INTERNAL MEDICINE

## 2021-03-23 PROCEDURE — 1125F AMNT PAIN NOTED PAIN PRSNT: CPT | Mod: S$GLB,,, | Performed by: OPHTHALMOLOGY

## 2021-03-23 PROCEDURE — 1125F PR PAIN SEVERITY QUANTIFIED, PAIN PRESENT: ICD-10-PCS | Mod: S$GLB,,, | Performed by: OPHTHALMOLOGY

## 2021-03-23 PROCEDURE — 99999 PR PBB SHADOW E&M-EST. PATIENT-LVL IV: ICD-10-PCS | Mod: PBBFAC,,, | Performed by: OPHTHALMOLOGY

## 2021-03-23 PROCEDURE — 92083 HUMPHREY VISUAL FIELD - OU - BOTH EYES: ICD-10-PCS | Mod: S$GLB,,, | Performed by: OPHTHALMOLOGY

## 2021-03-23 PROCEDURE — 92004 COMPRE OPH EXAM NEW PT 1/>: CPT | Mod: S$GLB,,, | Performed by: OPHTHALMOLOGY

## 2021-03-23 PROCEDURE — 3008F BODY MASS INDEX DOCD: CPT | Mod: CPTII,S$GLB,, | Performed by: INTERNAL MEDICINE

## 2021-03-23 PROCEDURE — 92004 PR EYE EXAM, NEW PATIENT,COMPREHESV: ICD-10-PCS | Mod: S$GLB,,, | Performed by: OPHTHALMOLOGY

## 2021-03-23 RX ORDER — METOPROLOL TARTRATE 25 MG/1
TABLET, FILM COATED ORAL
COMMUNITY
Start: 2021-02-26 | End: 2021-04-14 | Stop reason: SDUPTHER

## 2021-03-23 RX ORDER — DEXAMETHASONE 1 MG/1
1 TABLET ORAL ONCE
Qty: 1 TABLET | Refills: 0 | Status: SHIPPED | OUTPATIENT
Start: 2021-03-23 | End: 2021-03-23

## 2021-03-28 PROBLEM — E23.7 PITUITARY LESION: Status: ACTIVE | Noted: 2021-03-28

## 2021-04-04 ENCOUNTER — PATIENT MESSAGE (OUTPATIENT)
Dept: ORTHOPEDICS | Facility: CLINIC | Age: 41
End: 2021-04-04

## 2021-04-12 ENCOUNTER — CLINICAL SUPPORT (OUTPATIENT)
Dept: OPHTHALMOLOGY | Facility: CLINIC | Age: 41
End: 2021-04-12
Payer: COMMERCIAL

## 2021-04-12 ENCOUNTER — OFFICE VISIT (OUTPATIENT)
Dept: OPHTHALMOLOGY | Facility: CLINIC | Age: 41
End: 2021-04-12
Payer: COMMERCIAL

## 2021-04-12 DIAGNOSIS — D49.7 PITUITARY TUMOR: Primary | ICD-10-CM

## 2021-04-12 DIAGNOSIS — D49.7 PITUITARY TUMOR: ICD-10-CM

## 2021-04-12 PROCEDURE — 99499 UNLISTED E&M SERVICE: CPT | Mod: S$GLB,,, | Performed by: OPHTHALMOLOGY

## 2021-04-12 PROCEDURE — 99999 PR PBB SHADOW E&M-EST. PATIENT-LVL II: ICD-10-PCS | Mod: PBBFAC,,, | Performed by: OPHTHALMOLOGY

## 2021-04-12 PROCEDURE — 99999 PR PBB SHADOW E&M-EST. PATIENT-LVL II: CPT | Mod: PBBFAC,,, | Performed by: OPHTHALMOLOGY

## 2021-04-12 PROCEDURE — 99499 NO LOS: ICD-10-PCS | Mod: S$GLB,,, | Performed by: OPHTHALMOLOGY

## 2021-04-13 ENCOUNTER — PATIENT MESSAGE (OUTPATIENT)
Dept: NEUROSURGERY | Facility: CLINIC | Age: 41
End: 2021-04-13

## 2021-04-13 ENCOUNTER — PATIENT MESSAGE (OUTPATIENT)
Dept: NEUROLOGY | Facility: CLINIC | Age: 41
End: 2021-04-13

## 2021-04-14 ENCOUNTER — OFFICE VISIT (OUTPATIENT)
Dept: NEUROLOGY | Facility: CLINIC | Age: 41
End: 2021-04-14
Payer: COMMERCIAL

## 2021-04-14 ENCOUNTER — TELEPHONE (OUTPATIENT)
Dept: NEUROLOGY | Facility: CLINIC | Age: 41
End: 2021-04-14

## 2021-04-14 DIAGNOSIS — G43.719 INTRACTABLE CHRONIC MIGRAINE WITHOUT AURA AND WITHOUT STATUS MIGRAINOSUS: Primary | ICD-10-CM

## 2021-04-14 DIAGNOSIS — E23.7 PITUITARY LESION: ICD-10-CM

## 2021-04-14 DIAGNOSIS — M25.561 RIGHT KNEE PAIN, UNSPECIFIED CHRONICITY: Primary | ICD-10-CM

## 2021-04-14 PROCEDURE — 1125F PR PAIN SEVERITY QUANTIFIED, PAIN PRESENT: ICD-10-PCS | Mod: 95,,, | Performed by: NURSE PRACTITIONER

## 2021-04-14 PROCEDURE — 99214 PR OFFICE/OUTPT VISIT, EST, LEVL IV, 30-39 MIN: ICD-10-PCS | Mod: 95,,, | Performed by: NURSE PRACTITIONER

## 2021-04-14 PROCEDURE — 1125F AMNT PAIN NOTED PAIN PRSNT: CPT | Mod: 95,,, | Performed by: NURSE PRACTITIONER

## 2021-04-14 PROCEDURE — 99214 OFFICE O/P EST MOD 30 MIN: CPT | Mod: 95,,, | Performed by: NURSE PRACTITIONER

## 2021-04-14 RX ORDER — METHYLPREDNISOLONE 4 MG/1
TABLET ORAL
Qty: 1 PACKAGE | Refills: 0 | Status: SHIPPED | OUTPATIENT
Start: 2021-04-14 | End: 2021-05-05

## 2021-04-14 RX ORDER — UBROGEPANT 100 MG/1
TABLET ORAL
Qty: 8 TABLET | Refills: 2 | Status: SHIPPED | OUTPATIENT
Start: 2021-04-14

## 2021-04-15 ENCOUNTER — OUTPATIENT CASE MANAGEMENT (OUTPATIENT)
Dept: ADMINISTRATIVE | Facility: OTHER | Age: 41
End: 2021-04-15

## 2021-04-15 ENCOUNTER — PATIENT MESSAGE (OUTPATIENT)
Dept: ORTHOPEDICS | Facility: CLINIC | Age: 41
End: 2021-04-15

## 2021-04-20 ENCOUNTER — TELEPHONE (OUTPATIENT)
Dept: PHARMACY | Facility: CLINIC | Age: 41
End: 2021-04-20

## 2021-05-03 ENCOUNTER — TELEPHONE (OUTPATIENT)
Dept: NEUROLOGY | Facility: CLINIC | Age: 41
End: 2021-05-03

## 2021-05-13 ENCOUNTER — TELEPHONE (OUTPATIENT)
Dept: HEMATOLOGY/ONCOLOGY | Facility: CLINIC | Age: 41
End: 2021-05-13

## 2021-05-18 ENCOUNTER — TELEPHONE (OUTPATIENT)
Dept: HEMATOLOGY/ONCOLOGY | Facility: CLINIC | Age: 41
End: 2021-05-18

## 2021-05-18 DIAGNOSIS — D50.8 OTHER IRON DEFICIENCY ANEMIA: ICD-10-CM

## 2021-05-18 DIAGNOSIS — I82.551 CHRONIC DEEP VEIN THROMBOSIS (DVT) OF RIGHT PERONEAL VEIN: Primary | ICD-10-CM

## 2021-05-18 DIAGNOSIS — Z79.01 MONITORING FOR LONG-TERM ANTICOAGULANT USE: ICD-10-CM

## 2021-05-18 DIAGNOSIS — Z51.81 MONITORING FOR LONG-TERM ANTICOAGULANT USE: ICD-10-CM

## 2021-06-16 NOTE — NURSING
Discharge instructions explained to patient, verbalized understanding. PIV removed, tolerated well, catheter intact. Telemetry monitor removed, returned to monitor unit. Vital Signs stable,denies complaints. Patient off unit via WC.    none

## 2021-08-09 ENCOUNTER — TELEPHONE (OUTPATIENT)
Dept: NEUROSURGERY | Facility: CLINIC | Age: 41
End: 2021-08-09

## 2021-08-09 DIAGNOSIS — E23.7 PITUITARY LESION: Primary | ICD-10-CM

## 2021-08-25 ENCOUNTER — OFFICE VISIT (OUTPATIENT)
Dept: NEUROSURGERY | Facility: CLINIC | Age: 41
End: 2021-08-25
Payer: MEDICAID

## 2021-08-25 VITALS
SYSTOLIC BLOOD PRESSURE: 103 MMHG | RESPIRATION RATE: 18 BRPM | HEIGHT: 72 IN | HEART RATE: 73 BPM | BODY MASS INDEX: 29.38 KG/M2 | WEIGHT: 216.94 LBS | DIASTOLIC BLOOD PRESSURE: 68 MMHG

## 2021-08-25 DIAGNOSIS — E23.7 PITUITARY LESION: Primary | ICD-10-CM

## 2021-08-25 PROCEDURE — 99214 OFFICE O/P EST MOD 30 MIN: CPT | Mod: S$PBB,,, | Performed by: NURSE PRACTITIONER

## 2021-08-25 PROCEDURE — 99999 PR PBB SHADOW E&M-EST. PATIENT-LVL IV: ICD-10-PCS | Mod: PBBFAC,,, | Performed by: NURSE PRACTITIONER

## 2021-08-25 PROCEDURE — 99999 PR PBB SHADOW E&M-EST. PATIENT-LVL IV: CPT | Mod: PBBFAC,,, | Performed by: NURSE PRACTITIONER

## 2021-08-25 PROCEDURE — 99214 OFFICE O/P EST MOD 30 MIN: CPT | Mod: PBBFAC,PN | Performed by: NURSE PRACTITIONER

## 2021-08-25 PROCEDURE — 99214 PR OFFICE/OUTPT VISIT, EST, LEVL IV, 30-39 MIN: ICD-10-PCS | Mod: S$PBB,,, | Performed by: NURSE PRACTITIONER

## 2021-08-25 RX ORDER — AMLODIPINE BESYLATE 2.5 MG/1
TABLET ORAL
COMMUNITY
Start: 2021-07-28

## 2021-08-25 RX ORDER — RANOLAZINE 1000 MG/1
1000 TABLET, EXTENDED RELEASE ORAL 2 TIMES DAILY
COMMUNITY
Start: 2021-07-07

## 2021-08-25 RX ORDER — OXYCODONE HYDROCHLORIDE 10 MG/1
1 TABLET ORAL EVERY 6 HOURS PRN
COMMUNITY
Start: 2021-07-28 | End: 2021-08-27

## 2021-11-01 NOTE — ASSESSMENT & PLAN NOTE
Ongoing SW/CM Assessment/Plan of Care Note     See SW/CM flowsheets for goals and other objective data.    Patient/Family discharge goal (s):  Goal #1: Psychosocial needs assessed  Goal #2: Home Care arranged or issues addressed  Goal #3: Discharge to other institution(s) arranged    PT Recommendation:  Recommendation for Discharge: PT WI: Less intensive rehab       OT Recommendation:  Recommendations for Discharge: OT WI: Less intensive rehab       Disposition:  Planned Discharge Destination: Rehabilitation/Skilled Care    Progress note:   Chart reviewed.  BASIL assumed care of pt today.    SW reviewed destination tab.  It appeared that all referrals were still pending no request sent so SW sent referrals out to all pending [facility] placements listed in the destination tab.  SW to continue to follow for discharge planning.    Discharge plan continues to be [facility].         Continue routine pain medication, consult PT for AM.
